# Patient Record
Sex: FEMALE | Race: WHITE | Employment: OTHER | URBAN - METROPOLITAN AREA
[De-identification: names, ages, dates, MRNs, and addresses within clinical notes are randomized per-mention and may not be internally consistent; named-entity substitution may affect disease eponyms.]

---

## 2017-01-04 ENCOUNTER — OFFICE VISIT (OUTPATIENT)
Dept: FAMILY MEDICINE CLINIC | Facility: CLINIC | Age: 72
End: 2017-01-04

## 2017-01-04 VITALS
RESPIRATION RATE: 16 BRPM | HEIGHT: 60 IN | WEIGHT: 134 LBS | SYSTOLIC BLOOD PRESSURE: 126 MMHG | BODY MASS INDEX: 26.31 KG/M2 | DIASTOLIC BLOOD PRESSURE: 88 MMHG | TEMPERATURE: 99 F | HEART RATE: 84 BPM

## 2017-01-04 DIAGNOSIS — L29.9 ITCHING: Primary | ICD-10-CM

## 2017-01-04 PROCEDURE — 99213 OFFICE O/P EST LOW 20 MIN: CPT | Performed by: FAMILY MEDICINE

## 2017-01-04 RX ORDER — HYDROXYZINE HYDROCHLORIDE 25 MG/1
TABLET, FILM COATED ORAL EVERY 8 HOURS PRN
Qty: 40 TABLET | Refills: 1 | Status: SHIPPED | OUTPATIENT
Start: 2017-01-04 | End: 2017-01-17

## 2017-01-04 RX ORDER — PREDNISONE 10 MG/1
10 TABLET ORAL DAILY
Qty: 30 TABLET | Refills: 0 | Status: SHIPPED | OUTPATIENT
Start: 2017-01-04 | End: 2017-04-25 | Stop reason: ALTCHOICE

## 2017-01-06 RX ORDER — FLUOXETINE 10 MG/1
TABLET, FILM COATED ORAL
Qty: 80 TABLET | Refills: 0 | OUTPATIENT
Start: 2017-01-06

## 2017-01-06 NOTE — PROGRESS NOTES
HPI:    Patient ID: Kaykay Aguila is a 66year old female. HPI Comments: She stopped meds for 1 week while on steroids, but then restarted the medications. So we can not tell if stopping the meds really helped her symptoms.   She did not follow up l Rfl: 1   mupirocin 2 % External Ointment Apply 1 Application topically 3 (three) times daily. Disp: 30 g Rfl: 1   aspirin 81 MG Oral Tab EC Take 1 tablet (81 mg total) by mouth daily.  Disp: 90 tablet Rfl: 3   Budesonide-Formoterol Fumarate (SYMBICORT) 160- heard.  Pulmonary/Chest: Effort normal and breath sounds normal. No respiratory distress. She has no wheezes. She has no rales. Skin: She is not diaphoretic.    Slight papular rash on back, chest, and arms, mostly it is just excoriated scabs from her scra

## 2017-01-12 RX ORDER — LEVOTHYROXINE SODIUM 0.05 MG/1
TABLET ORAL
Qty: 90 TABLET | Refills: 0 | OUTPATIENT
Start: 2017-01-12

## 2017-01-16 RX ORDER — BUDESONIDE AND FORMOTEROL FUMARATE DIHYDRATE 160; 4.5 UG/1; UG/1
AEROSOL RESPIRATORY (INHALATION)
Refills: 0 | OUTPATIENT
Start: 2017-01-16

## 2017-01-17 ENCOUNTER — OFFICE VISIT (OUTPATIENT)
Dept: FAMILY MEDICINE CLINIC | Facility: CLINIC | Age: 72
End: 2017-01-17

## 2017-01-17 VITALS
HEIGHT: 60 IN | SYSTOLIC BLOOD PRESSURE: 124 MMHG | TEMPERATURE: 99 F | OXYGEN SATURATION: 98 % | WEIGHT: 136 LBS | HEART RATE: 74 BPM | RESPIRATION RATE: 20 BRPM | DIASTOLIC BLOOD PRESSURE: 80 MMHG | BODY MASS INDEX: 26.7 KG/M2

## 2017-01-17 DIAGNOSIS — L73.9 FOLLICULITIS: ICD-10-CM

## 2017-01-17 DIAGNOSIS — L29.9 ITCHING: Primary | ICD-10-CM

## 2017-01-17 PROCEDURE — 99213 OFFICE O/P EST LOW 20 MIN: CPT | Performed by: FAMILY MEDICINE

## 2017-01-17 RX ORDER — LEVOTHYROXINE SODIUM 0.05 MG/1
50 TABLET ORAL
Qty: 90 TABLET | Refills: 1 | Status: SHIPPED | OUTPATIENT
Start: 2017-01-17 | End: 2017-06-12

## 2017-01-17 RX ORDER — ESCITALOPRAM OXALATE 20 MG/1
20 TABLET ORAL DAILY
Qty: 30 TABLET | Refills: 4 | Status: SHIPPED | OUTPATIENT
Start: 2017-01-17 | End: 2017-06-12

## 2017-01-17 RX ORDER — PREDNISONE 20 MG/1
TABLET ORAL
Qty: 33 TABLET | Refills: 0 | Status: SHIPPED | OUTPATIENT
Start: 2017-01-17 | End: 2017-02-14 | Stop reason: ALTCHOICE

## 2017-01-17 RX ORDER — HYDROXYZINE PAMOATE 50 MG/1
50 CAPSULE ORAL 3 TIMES DAILY PRN
Qty: 90 CAPSULE | Refills: 3 | Status: SHIPPED | OUTPATIENT
Start: 2017-01-17 | End: 2017-04-25

## 2017-01-17 NOTE — PROGRESS NOTES
HPI:    Patient ID: Federico Coronel is a 66year old female. Rash  This is a chronic problem. The current episode started more than 1 month ago. The problem is unchanged. Location: back, chest, and b/l arms.  The rash is characterized by itchiness and needed.  Disp: 30 capsule Rfl: 0   MONTELUKAST SODIUM 10 MG Oral Tab TAKE 1 TABLET(10 MG) BY MOUTH EVERY DAY Disp: 90 tablet Rfl: 0   Clobetasol Propionate 0.05 % External Ointment Apply to the affected of rash BID x 2 weeks then PRN flares Disp: 60 g Rfl: heal, and itching stops. 1. Itching  - predniSONE 20 MG Oral Tab; Daily x 3 weeks, then 1/2 tablet daily x 3 weeks  Dispense: 33 tablet; Refill: 0  - escitalopram 20 MG Oral Tab; Take 1 tablet (20 mg total) by mouth daily. Dispense: 30 tablet;  Refill:

## 2017-01-18 RX ORDER — PRAVASTATIN SODIUM 20 MG
TABLET ORAL
Qty: 90 TABLET | Refills: 0 | Status: SHIPPED | OUTPATIENT
Start: 2017-01-18 | End: 2017-04-25

## 2017-01-20 ENCOUNTER — TELEPHONE (OUTPATIENT)
Dept: FAMILY MEDICINE CLINIC | Facility: CLINIC | Age: 72
End: 2017-01-20

## 2017-01-20 NOTE — TELEPHONE ENCOUNTER
Message from [employer] via fax:  Requesting drug change for Hydroxyz HCL Tab 25mg             OV 1-17-17 YP with RX ordered.

## 2017-01-27 ENCOUNTER — TELEPHONE (OUTPATIENT)
Dept: FAMILY MEDICINE CLINIC | Facility: CLINIC | Age: 72
End: 2017-01-27

## 2017-02-01 RX ORDER — CLONAZEPAM 0.5 MG/1
TABLET ORAL
Qty: 60 TABLET | Refills: 0 | Status: SHIPPED | COMMUNITY
Start: 2017-02-01 | End: 2017-03-01

## 2017-02-01 RX ORDER — BUDESONIDE AND FORMOTEROL FUMARATE DIHYDRATE 160; 4.5 UG/1; UG/1
AEROSOL RESPIRATORY (INHALATION)
Qty: 1 INHALER | Refills: 0 | Status: SHIPPED | OUTPATIENT
Start: 2017-02-01 | End: 2017-03-01

## 2017-02-14 ENCOUNTER — OFFICE VISIT (OUTPATIENT)
Dept: FAMILY MEDICINE CLINIC | Facility: CLINIC | Age: 72
End: 2017-02-14

## 2017-02-14 VITALS
RESPIRATION RATE: 20 BRPM | OXYGEN SATURATION: 98 % | BODY MASS INDEX: 27.09 KG/M2 | HEART RATE: 71 BPM | DIASTOLIC BLOOD PRESSURE: 80 MMHG | WEIGHT: 138 LBS | HEIGHT: 60 IN | SYSTOLIC BLOOD PRESSURE: 138 MMHG | TEMPERATURE: 99 F

## 2017-02-14 DIAGNOSIS — L30.9 DERMATITIS, UNSPECIFIED: Primary | ICD-10-CM

## 2017-02-14 PROCEDURE — 99213 OFFICE O/P EST LOW 20 MIN: CPT | Performed by: FAMILY MEDICINE

## 2017-02-14 NOTE — PROGRESS NOTES
HPI:    Patient ID: Yoselyn Lay is a 66year old female. Itching  This is a chronic problem. The current episode started more than 1 month ago. The problem occurs daily. The problem has been unchanged.  Associated symptoms comments: Itching causing Rfl: 3   benzonatate 200 MG Oral Cap Take 1 capsule (200 mg total) by mouth every 8 (eight) hours as needed.  Disp: 30 capsule Rfl: 0   MONTELUKAST SODIUM 10 MG Oral Tab TAKE 1 TABLET(10 MG) BY MOUTH EVERY DAY Disp: 90 tablet Rfl: 0   Clobetasol Propionate 20mg or extend 10mg as pt needs. Will eventually work on stopping the prednisone. No orders of the defined types were placed in this encounter.        Meds This Visit:  No prescriptions requested or ordered in this encounter    Imaging & Referrals:  N

## 2017-02-16 RX ORDER — PANTOPRAZOLE SODIUM 40 MG/1
TABLET, DELAYED RELEASE ORAL
Qty: 90 TABLET | Refills: 0 | OUTPATIENT
Start: 2017-02-16

## 2017-02-16 RX ORDER — MONTELUKAST SODIUM 10 MG/1
TABLET ORAL
Qty: 90 TABLET | Refills: 0 | OUTPATIENT
Start: 2017-02-16

## 2017-02-22 NOTE — TELEPHONE ENCOUNTER
[employer], spoke with Ronaldo Wilkins, she states this medication went through insurance in January, no PA required. Task completed.

## 2017-03-01 RX ORDER — BUDESONIDE AND FORMOTEROL FUMARATE DIHYDRATE 160; 4.5 UG/1; UG/1
AEROSOL RESPIRATORY (INHALATION)
Qty: 1 INHALER | Refills: 0 | Status: SHIPPED | OUTPATIENT
Start: 2017-03-01 | End: 2017-05-01

## 2017-03-03 RX ORDER — CLONAZEPAM 0.5 MG/1
TABLET ORAL
Qty: 60 TABLET | Refills: 0 | Status: SHIPPED | OUTPATIENT
Start: 2017-03-03 | End: 2017-04-03

## 2017-03-13 ENCOUNTER — TELEPHONE (OUTPATIENT)
Dept: FAMILY MEDICINE CLINIC | Facility: CLINIC | Age: 72
End: 2017-03-13

## 2017-03-13 RX ORDER — PREDNISONE 10 MG/1
TABLET ORAL
Qty: 26 TABLET | Refills: 0 | Status: SHIPPED | OUTPATIENT
Start: 2017-03-13 | End: 2017-04-25 | Stop reason: ALTCHOICE

## 2017-03-13 NOTE — TELEPHONE ENCOUNTER
Pt seen 2/14/17 \"1. Dermatitis, unspecified  Continue current treatment.  Follow up in a month. Increase prednisone back to 20mg or extend 10mg as pt needs. Will eventually work on stopping the prednisone\"    Okay for pt to increase prednisone to 20mg.

## 2017-04-04 RX ORDER — CLONAZEPAM 0.5 MG/1
TABLET ORAL
Qty: 40 TABLET | Refills: 0 | Status: SHIPPED | OUTPATIENT
Start: 2017-04-04 | End: 2017-04-25

## 2017-04-06 ENCOUNTER — TELEPHONE (OUTPATIENT)
Dept: FAMILY MEDICINE CLINIC | Facility: CLINIC | Age: 72
End: 2017-04-06

## 2017-04-07 NOTE — TELEPHONE ENCOUNTER
LMOM for pt with YP's input and advised pt to specific quantity requested at next refill. Requested a return call with questions or to further discuss. Task completed.

## 2017-04-25 ENCOUNTER — OFFICE VISIT (OUTPATIENT)
Dept: FAMILY MEDICINE CLINIC | Facility: CLINIC | Age: 72
End: 2017-04-25

## 2017-04-25 VITALS
HEIGHT: 60 IN | RESPIRATION RATE: 20 BRPM | WEIGHT: 137 LBS | BODY MASS INDEX: 26.9 KG/M2 | HEART RATE: 67 BPM | OXYGEN SATURATION: 97 % | SYSTOLIC BLOOD PRESSURE: 122 MMHG | TEMPERATURE: 98 F | DIASTOLIC BLOOD PRESSURE: 70 MMHG

## 2017-04-25 DIAGNOSIS — R10.13 EPIGASTRIC ABDOMINAL PAIN: ICD-10-CM

## 2017-04-25 DIAGNOSIS — F41.9 ANXIETY: ICD-10-CM

## 2017-04-25 DIAGNOSIS — S49.91XA RIGHT SHOULDER INJURY, INITIAL ENCOUNTER: ICD-10-CM

## 2017-04-25 DIAGNOSIS — Z80.0 FAMILY HISTORY OF COLON CANCER: ICD-10-CM

## 2017-04-25 DIAGNOSIS — J42 CHRONIC BRONCHITIS, UNSPECIFIED CHRONIC BRONCHITIS TYPE (HCC): Primary | ICD-10-CM

## 2017-04-25 DIAGNOSIS — L29.9 ITCHING: ICD-10-CM

## 2017-04-25 DIAGNOSIS — Z86.010 HX OF COLONIC POLYP: ICD-10-CM

## 2017-04-25 DIAGNOSIS — F33.41 RECURRENT MAJOR DEPRESSIVE DISORDER, IN PARTIAL REMISSION (HCC): ICD-10-CM

## 2017-04-25 PROCEDURE — 99214 OFFICE O/P EST MOD 30 MIN: CPT | Performed by: FAMILY MEDICINE

## 2017-04-25 RX ORDER — HYDROXYZINE PAMOATE 50 MG/1
50 CAPSULE ORAL 3 TIMES DAILY PRN
Qty: 90 CAPSULE | Refills: 0 | Status: SHIPPED | OUTPATIENT
Start: 2017-04-25 | End: 2017-06-12

## 2017-04-25 RX ORDER — CLONAZEPAM 0.5 MG/1
0.5 TABLET ORAL 2 TIMES DAILY
Qty: 60 TABLET | Refills: 0 | Status: SHIPPED | OUTPATIENT
Start: 2017-04-25 | End: 2017-06-01

## 2017-04-25 RX ORDER — PANTOPRAZOLE SODIUM 40 MG/1
40 TABLET, DELAYED RELEASE ORAL
Qty: 60 TABLET | Refills: 1 | Status: SHIPPED | OUTPATIENT
Start: 2017-04-25 | End: 2017-04-25

## 2017-04-25 RX ORDER — PRAVASTATIN SODIUM 20 MG
TABLET ORAL
Qty: 90 TABLET | Refills: 2 | Status: SHIPPED | OUTPATIENT
Start: 2017-04-25 | End: 2017-12-03

## 2017-04-25 RX ORDER — PANTOPRAZOLE SODIUM 40 MG/1
TABLET, DELAYED RELEASE ORAL
Qty: 180 TABLET | Refills: 0 | Status: SHIPPED | OUTPATIENT
Start: 2017-04-25 | End: 2017-06-28

## 2017-04-25 RX ORDER — ESCITALOPRAM OXALATE 20 MG/1
20 TABLET ORAL DAILY
Qty: 90 TABLET | Refills: 2 | Status: SHIPPED | OUTPATIENT
Start: 2017-04-25 | End: 2017-06-12

## 2017-04-25 NOTE — PROGRESS NOTES
HPI:    Patient ID: Lanny Field is a 66year old female. HPI Comments: + depression. Waxing and waning. Most of time feels controlled on escitalopram.  At times anhedonia, hopelessness, feeling sad, crying, and change in appetite.   Denied suicid aggravated by activity. She has tried NSAIDS, rest, movement, cold and heat for the symptoms. The treatment provided mild relief. Review of Systems   Musculoskeletal: Positive for stiffness. Neurological: Negative for tingling and numbness.    All o mg by mouth as needed for Pain.  Disp:  Rfl:    Levothyroxine Sodium (SYNTHROID) 50 MCG Oral Tab TAKE 1 TABLET BY MOUTH DAILY Disp: 30 tablet Rfl: 0   PROAIR  (90 BASE) MCG/ACT Inhalation Aero Soln INHALE 2 PUFFS BY MOUTH EVERY 6 HOURS AS NEEDED FOR adenopathy. Skin: She is not diaphoretic. Diffuse excoriations. Scabbing. They do not appear to be infected. Psychiatric: Her behavior is normal. Judgment and thought content normal.   Mild anxiety and depression.   Does not seem suicidal   Vitals r TABLET BY MOUTH EVERY NIGHT AT BEDTIME      HydrOXYzine Pamoate 50 MG Oral Cap 90 capsule 0      Sig: Take 1 capsule (50 mg total) by mouth 3 (three) times daily as needed for Itching.       Pantoprazole Sodium 40 MG Oral Tab EC 60 tablet 1      Sig: Take 1

## 2017-05-02 RX ORDER — BUDESONIDE AND FORMOTEROL FUMARATE DIHYDRATE 160; 4.5 UG/1; UG/1
AEROSOL RESPIRATORY (INHALATION)
Qty: 1 INHALER | Refills: 0 | Status: SHIPPED | OUTPATIENT
Start: 2017-05-02 | End: 2017-05-31

## 2017-05-16 PROCEDURE — 88175 CYTOPATH C/V AUTO FLUID REDO: CPT | Performed by: FAMILY MEDICINE

## 2017-05-16 NOTE — PROGRESS NOTES
HPI:   Yoseph Nguyen is a 66year old female who presents for a MA (Medicare Advantage) [facility] (Once per calendar year). Chronic depression. Waxing and waning. At times has hopelessness, anhedonia, crying.   Decreased appetite and decreased s MCG/ACT Inhalation Aerosol INHALE 2 PUFFS BY MOUTH TWICE DAILY   TRIAMCINOLONE ACETONIDE 0.1 % External Ointment APPLY TO THE AFFECTED AREA TWICE DAILY   escitalopram 20 MG Oral Tab Take 1 tablet (20 mg total) by mouth daily.    ClonazePAM 0.5 MG Oral Tab T impairment; and Esophageal reflux.     She  has past surgical history that includes colonoscopy; cystotomy,excis vesical neck (Left); correct bunion,simple (Bilateral); colonoscopy & polypectomy (9/14); gastro - dmg (9/14); upper gi endoscopy,diagnosis (N/A Acuity: Corrected Right Eye Chart Acuity: 20/50   Left Eye Visual Acuity: Corrected Left Eye Chart Acuity: 20/50   Both Eyes Visual Acuity: Corrected Both Eyes Chart Acuity: 20/50   Able To Tolerate Visual Acuity: Yes      General Appearance:  Alert, coope major depressive disorder, in partial remission ([facility] [address]  - continue escitalopram. See therapist.  Follow up in 3 months. Earlier if wrose.     Encounter for annual health examination  -     Wellness Visit/ CPX, ages 72 and over, Established    Mixed simple a appetite been poor?: No    How does the patient maintain a good energy level?: Daily Walks    How would you describe your daily physical activity?: Light    How would you describe your current health state?: Fair    How do you maintain positive mental well Please go to \"Cognitive Assessment\" under Medicare Assessment section in Charting, test patient and document. Then, refresh your progress note to see your input here.   Cognitive Assessment     What day of the week is this?: Correct    What month i Chlamydia  Annually if high risk No results found for: CHLAMYDIA No flowsheet data found.     Screening Mammogram      Mammogram Annually to 76, then as discussed Mammogram,1 Yr due on 01/11/2017 Update Health Maintenance if applicable     Immunizations Serum Conc  Annually No results found for: DIGOXIN, DIG, VALP No flowsheet data found. Diabetes      HgbA1C  Annually HGBA1C (%)   Date Value   02/28/2013 5.6    No flowsheet data found.     Creat/alb ratio  Annually      LDL  Annually LDL CHOLESTEROL (m

## 2017-05-16 NOTE — PATIENT INSTRUCTIONS
Over the counter vit and calcium daily. Vit D 1000 units and calcium 400mg to 800mg. Bibi Diaz's SCREENING SCHEDULE   Tests on this list are recommended by your physician but may not be covered, or covered at this frequency, by your insurer.  Pl patients who meet one of the following criteria:   • Men who are 73-68 years old and have smoked more than 100 cigarettes in their lifetime   • Anyone with a family history    Colorectal Cancer Screening  Covered up to Age 76     Colonoscopy Screen   Cover Influenza  Covered Annually   Orders placed or performed in visit on 10/17/16  -FLU VAC NO PRSV 4 MISA 3 YRS+   Orders placed or performed in visit on 11/02/15  -FLU VAC NO PRSV 4 MISA 3 YRS+    Please get every year    Pneumococcal 13 (Prevnar)  [employer] regarding Advance Directives.

## 2017-05-17 ENCOUNTER — LAB ENCOUNTER (OUTPATIENT)
Dept: LAB | Age: 72
End: 2017-05-17
Attending: FAMILY MEDICINE
Payer: MEDICARE

## 2017-05-17 DIAGNOSIS — J41.8 MIXED SIMPLE AND MUCOPURULENT CHRONIC BRONCHITIS (HCC): ICD-10-CM

## 2017-05-17 DIAGNOSIS — E03.9 HYPOTHYROIDISM, UNSPECIFIED TYPE: ICD-10-CM

## 2017-05-17 DIAGNOSIS — N18.30 CKD (CHRONIC KIDNEY DISEASE) STAGE 3, GFR 30-59 ML/MIN (HCC): ICD-10-CM

## 2017-05-17 DIAGNOSIS — E78.5 HYPERLIPIDEMIA, UNSPECIFIED HYPERLIPIDEMIA TYPE: ICD-10-CM

## 2017-05-17 PROCEDURE — 80053 COMPREHEN METABOLIC PANEL: CPT | Performed by: FAMILY MEDICINE

## 2017-05-17 PROCEDURE — 85025 COMPLETE CBC W/AUTO DIFF WBC: CPT | Performed by: FAMILY MEDICINE

## 2017-05-17 PROCEDURE — 36415 COLL VENOUS BLD VENIPUNCTURE: CPT | Performed by: FAMILY MEDICINE

## 2017-05-17 PROCEDURE — 80061 LIPID PANEL: CPT | Performed by: FAMILY MEDICINE

## 2017-05-17 PROCEDURE — 84443 ASSAY THYROID STIM HORMONE: CPT | Performed by: FAMILY MEDICINE

## 2017-05-18 ENCOUNTER — TELEPHONE (OUTPATIENT)
Dept: FAMILY MEDICINE CLINIC | Facility: CLINIC | Age: 72
End: 2017-05-18

## 2017-05-18 DIAGNOSIS — E78.2 ELEVATED CHOLESTEROL WITH ELEVATED TRIGLYCERIDES: ICD-10-CM

## 2017-05-18 DIAGNOSIS — E87.6 LOW SERUM POTASSIUM LEVEL: Primary | ICD-10-CM

## 2017-05-18 RX ORDER — POTASSIUM CHLORIDE 20 MEQ/1
TABLET, EXTENDED RELEASE ORAL
Qty: 32 TABLET | Refills: 0 | OUTPATIENT
Start: 2017-05-18

## 2017-05-18 RX ORDER — POTASSIUM CHLORIDE 20 MEQ/1
TABLET, EXTENDED RELEASE ORAL
Qty: 30 TABLET | Refills: 0 | Status: SHIPPED | OUTPATIENT
Start: 2017-05-18 | End: 2017-08-07

## 2017-05-18 NOTE — TELEPHONE ENCOUNTER
----- Message from Carlos May DO sent at 5/18/2017 11:30 AM CDT -----  Also send KCL 20 mEq daily x 5 days, then twice weekly x 12 weeks.

## 2017-06-02 RX ORDER — BUDESONIDE AND FORMOTEROL FUMARATE DIHYDRATE 160; 4.5 UG/1; UG/1
AEROSOL RESPIRATORY (INHALATION)
Qty: 3 INHALER | Refills: 3 | Status: SHIPPED | OUTPATIENT
Start: 2017-06-02 | End: 2017-06-12

## 2017-06-12 ENCOUNTER — OFFICE VISIT (OUTPATIENT)
Dept: FAMILY MEDICINE CLINIC | Facility: CLINIC | Age: 72
End: 2017-06-12

## 2017-06-12 VITALS
HEIGHT: 60 IN | SYSTOLIC BLOOD PRESSURE: 138 MMHG | RESPIRATION RATE: 16 BRPM | TEMPERATURE: 98 F | WEIGHT: 138 LBS | HEART RATE: 70 BPM | BODY MASS INDEX: 27.09 KG/M2 | OXYGEN SATURATION: 97 % | DIASTOLIC BLOOD PRESSURE: 76 MMHG

## 2017-06-12 DIAGNOSIS — J44.0 COPD WITH ACUTE BRONCHITIS (HCC): Primary | ICD-10-CM

## 2017-06-12 DIAGNOSIS — J20.9 COPD WITH ACUTE BRONCHITIS (HCC): Primary | ICD-10-CM

## 2017-06-12 PROCEDURE — 99213 OFFICE O/P EST LOW 20 MIN: CPT | Performed by: INTERNAL MEDICINE

## 2017-06-12 RX ORDER — ESCITALOPRAM OXALATE 20 MG/1
TABLET ORAL
Qty: 90 TABLET | Refills: 1 | Status: SHIPPED | OUTPATIENT
Start: 2017-06-12 | End: 2017-12-15

## 2017-06-12 RX ORDER — PREDNISONE 10 MG/1
TABLET ORAL
Qty: 12 TABLET | Refills: 0 | Status: SHIPPED | OUTPATIENT
Start: 2017-06-12 | End: 2017-12-03

## 2017-06-12 RX ORDER — ALBUTEROL SULFATE 90 UG/1
AEROSOL, METERED RESPIRATORY (INHALATION)
Qty: 8.5 G | Refills: 1 | Status: SHIPPED | OUTPATIENT
Start: 2017-06-12 | End: 2017-07-31

## 2017-06-12 RX ORDER — CEFDINIR 300 MG/1
300 CAPSULE ORAL 2 TIMES DAILY
Qty: 20 CAPSULE | Refills: 0 | Status: SHIPPED | OUTPATIENT
Start: 2017-06-12 | End: 2017-12-03

## 2017-06-12 NOTE — PROGRESS NOTES
HPI:   Elton Gallagher is a 66year old female who presents for cough and congestion for 1 week. Patient reports congestion, cough with clear colored sputum, cough is keeping pt up at night, wheezing. Has COPD with history of bronchitis.  Reports the sa tablet Rfl: 0      Past Medical History   Diagnosis Date   • COPD (chronic obstructive pulmonary disease) (HCC)    • Emphysema    • Hay fever    • Chronic rhinitis    • PONV (postoperative nausea and vomiting)    • Hypercholesterolemia    • Reflux    • Anx and verbalized understanding of care.

## 2017-06-28 DIAGNOSIS — R10.13 EPIGASTRIC ABDOMINAL PAIN: ICD-10-CM

## 2017-06-28 RX ORDER — PANTOPRAZOLE SODIUM 40 MG/1
TABLET, DELAYED RELEASE ORAL
Qty: 60 TABLET | Refills: 0 | Status: SHIPPED | OUTPATIENT
Start: 2017-06-28 | End: 2017-12-03

## 2017-06-28 RX ORDER — PANTOPRAZOLE SODIUM 40 MG/1
TABLET, DELAYED RELEASE ORAL
Qty: 180 TABLET | Refills: 0 | OUTPATIENT
Start: 2017-06-28

## 2017-07-03 DIAGNOSIS — F41.9 ANXIETY: ICD-10-CM

## 2017-07-05 RX ORDER — CLONAZEPAM 0.5 MG/1
TABLET ORAL
Qty: 60 TABLET | Refills: 0 | Status: SHIPPED | OUTPATIENT
Start: 2017-07-05 | End: 2017-08-07

## 2017-07-11 DIAGNOSIS — L29.9 ITCHING: ICD-10-CM

## 2017-07-11 RX ORDER — LEVOTHYROXINE SODIUM 0.05 MG/1
TABLET ORAL
Qty: 90 TABLET | Refills: 0 | Status: SHIPPED | OUTPATIENT
Start: 2017-07-11 | End: 2017-10-19

## 2017-08-07 DIAGNOSIS — F41.9 ANXIETY: ICD-10-CM

## 2017-08-07 RX ORDER — CLONAZEPAM 0.5 MG/1
TABLET ORAL
Qty: 60 TABLET | Refills: 0 | Status: SHIPPED | OUTPATIENT
Start: 2017-08-07 | End: 2017-09-08

## 2017-08-07 RX ORDER — POTASSIUM CHLORIDE 20 MEQ/1
TABLET, EXTENDED RELEASE ORAL
Qty: 30 TABLET | Refills: 0 | Status: SHIPPED | OUTPATIENT
Start: 2017-08-07 | End: 2017-10-30

## 2017-08-07 NOTE — TELEPHONE ENCOUNTER
Last ov was 6/12/17  Last refill potassium chloride ER 20 MEQ 30 pills 5/8/17    .   Potassium:    Lab Results  Component Value Date   K 3.5 (L) 05/17/2017   K 3.9 12/06/2016   K 4.0 11/28/2016

## 2017-09-08 DIAGNOSIS — F41.9 ANXIETY: ICD-10-CM

## 2017-09-08 RX ORDER — CLONAZEPAM 0.5 MG/1
0.5 TABLET ORAL 2 TIMES DAILY
Qty: 60 TABLET | Refills: 0 | Status: SHIPPED | COMMUNITY
Start: 2017-09-08 | End: 2017-10-06

## 2017-09-08 NOTE — TELEPHONE ENCOUNTER
Pt called stated her pharmacy sent us a request for her clonazepam medication about 3 days ago, pt called the pharmacy and was told he have not responded nor approve her refill, pt is now out of medication. Pt is asking for a refill.  Please call and advise

## 2017-10-06 DIAGNOSIS — F41.9 ANXIETY: ICD-10-CM

## 2017-10-09 RX ORDER — CLONAZEPAM 0.5 MG/1
TABLET ORAL
Qty: 60 TABLET | Refills: 0 | Status: SHIPPED | OUTPATIENT
Start: 2017-10-09 | End: 2017-11-07

## 2017-10-19 DIAGNOSIS — L29.9 ITCHING: ICD-10-CM

## 2017-10-19 RX ORDER — LEVOTHYROXINE SODIUM 0.05 MG/1
TABLET ORAL
Qty: 90 TABLET | Refills: 1 | Status: SHIPPED | OUTPATIENT
Start: 2017-10-19 | End: 2017-12-03

## 2017-10-30 NOTE — TELEPHONE ENCOUNTER
Last ov was 6/12/17  Last refill potassium 8/7/17    .   Potassium:    Lab Results  Component Value Date   K 3.5 (L) 05/17/2017   K 3.9 12/06/2016   K 4.0 11/28/2016

## 2017-10-31 RX ORDER — POTASSIUM CHLORIDE 20 MEQ/1
TABLET, EXTENDED RELEASE ORAL
Qty: 30 TABLET | Refills: 0 | Status: SHIPPED | OUTPATIENT
Start: 2017-10-31 | End: 2017-12-03

## 2017-11-07 DIAGNOSIS — F41.9 ANXIETY: ICD-10-CM

## 2017-11-10 ENCOUNTER — TELEPHONE (OUTPATIENT)
Dept: FAMILY MEDICINE CLINIC | Facility: CLINIC | Age: 72
End: 2017-11-10

## 2017-11-10 NOTE — TELEPHONE ENCOUNTER
Patient calling crying. She only has one pill left. She said she will end up in the hospital if she doesn't get the refill.

## 2017-11-11 RX ORDER — CLONAZEPAM 0.5 MG/1
TABLET ORAL
Qty: 60 TABLET | Refills: 0 | Status: SHIPPED | OUTPATIENT
Start: 2017-11-11 | End: 2017-12-07

## 2017-12-03 ENCOUNTER — APPOINTMENT (OUTPATIENT)
Dept: GENERAL RADIOLOGY | Facility: HOSPITAL | Age: 72
End: 2017-12-03
Payer: MEDICARE

## 2017-12-03 ENCOUNTER — HOSPITAL ENCOUNTER (EMERGENCY)
Facility: HOSPITAL | Age: 72
Discharge: HOME | End: 2017-12-03
Payer: MEDICARE

## 2017-12-03 VITALS
TEMPERATURE: 99 F | HEIGHT: 60 IN | OXYGEN SATURATION: 98 % | RESPIRATION RATE: 20 BRPM | SYSTOLIC BLOOD PRESSURE: 132 MMHG | WEIGHT: 140 LBS | DIASTOLIC BLOOD PRESSURE: 84 MMHG | BODY MASS INDEX: 27.48 KG/M2 | HEART RATE: 80 BPM

## 2017-12-03 DIAGNOSIS — L30.9 DERMATITIS: Primary | ICD-10-CM

## 2017-12-03 DIAGNOSIS — J44.1 COPD EXACERBATION (HCC): ICD-10-CM

## 2017-12-03 DIAGNOSIS — F41.9 ANXIETY: ICD-10-CM

## 2017-12-03 PROCEDURE — 99283 EMERGENCY DEPT VISIT LOW MDM: CPT

## 2017-12-03 PROCEDURE — 71020 XR CHEST PA + LAT CHEST (CPT=71020): CPT

## 2017-12-03 RX ORDER — HYDROXYZINE HYDROCHLORIDE 25 MG/1
TABLET, FILM COATED ORAL EVERY 4 HOURS PRN
Qty: 20 TABLET | Refills: 0 | Status: SHIPPED | OUTPATIENT
Start: 2017-12-03 | End: 2017-12-07 | Stop reason: ALTCHOICE

## 2017-12-03 RX ORDER — AZITHROMYCIN 250 MG/1
TABLET, FILM COATED ORAL
Qty: 1 PACKAGE | Refills: 0 | Status: SHIPPED | OUTPATIENT
Start: 2017-12-03 | End: 2017-12-07 | Stop reason: ALTCHOICE

## 2017-12-03 RX ORDER — PREDNISONE 20 MG/1
40 TABLET ORAL DAILY
Qty: 10 TABLET | Refills: 0 | Status: SHIPPED | OUTPATIENT
Start: 2017-12-03 | End: 2017-12-08

## 2017-12-03 RX ORDER — CLONAZEPAM 0.5 MG/1
0.5 TABLET ORAL 3 TIMES DAILY PRN
Qty: 20 TABLET | Refills: 0 | Status: SHIPPED | OUTPATIENT
Start: 2017-12-03 | End: 2017-12-15

## 2017-12-03 NOTE — ED PROVIDER NOTES
Patient Seen in: [facility] Emergency Department    History   Patient presents with:  Dyspnea ROSS SOB (respiratory)    Stated Complaint: cough, rash, hx of emphysema    HPI    Presents with a rash and a cough.   The patient has been diagnosed with de EXPERIENCED ANY* N/A      Comment: Procedure: ESOPHAGOGASTRODUODENOSCOPY,                COLONOSCOPY, POSSIBLE BIOPSY, POSSIBLE                POLYPECTOMY 75687,90089;  Surgeon: Madison Maldonado MD;  Location: [facility]  9/22/2014: P equal round and reactive to light, oropharynx clear, uvula midline. Neck: Supple. Cardiovascular: Regular rate and rhythm, no murmurs. Respiratory: Lungs clear to auscultation. Abdomen: Soft, nontender. Extremities: No CCE.   Skin: Excoriations to trun antibiotics and I agreed to give her a Z-Mehul. If the breathing worsens, she should return to the emergency department.     Disposition and Plan     Clinical Impression:  Dermatitis  (primary encounter diagnosis)  Anxiety  COPD exacerbation ([facility]    Alpa Scott

## 2017-12-03 NOTE — ED NOTES
Round on pt. XR completed. Pt to ED with c/o ROSS x 1 week. Pt sts she came to the ER for the dermatitis that she has had for a couple months. Scattered scabs noted to body. Pt c/o itching. Some bleeding noted. Pt denies any change to meds. Afebrile.  No dis

## 2017-12-03 NOTE — ED INITIAL ASSESSMENT (HPI)
Verbalized concern that she may have bronchitis again. Productive clear cough x 1 week. Chills. Unknown if fevers. Patient also said she was being treated for dermatitis for a few months, but now thinks she is having a flare-up.  Patient said she feels Wm Chi

## 2017-12-03 NOTE — ED NOTES
DC instructions and RXs handed to pt. No distress noted. Pt denies any needs at this time. Pt thanks staff for care. Denies need for [facility] out of ED.

## 2017-12-07 ENCOUNTER — OFFICE VISIT (OUTPATIENT)
Dept: FAMILY MEDICINE CLINIC | Facility: CLINIC | Age: 72
End: 2017-12-07

## 2017-12-07 ENCOUNTER — TELEPHONE (OUTPATIENT)
Dept: FAMILY MEDICINE CLINIC | Facility: CLINIC | Age: 72
End: 2017-12-07

## 2017-12-07 VITALS
HEIGHT: 60 IN | BODY MASS INDEX: 25.52 KG/M2 | RESPIRATION RATE: 16 BRPM | OXYGEN SATURATION: 97 % | DIASTOLIC BLOOD PRESSURE: 82 MMHG | HEART RATE: 69 BPM | TEMPERATURE: 98 F | WEIGHT: 130 LBS | SYSTOLIC BLOOD PRESSURE: 132 MMHG

## 2017-12-07 DIAGNOSIS — E78.5 HYPERLIPIDEMIA, UNSPECIFIED HYPERLIPIDEMIA TYPE: ICD-10-CM

## 2017-12-07 DIAGNOSIS — R05.3 CHRONIC COUGH: ICD-10-CM

## 2017-12-07 DIAGNOSIS — Z12.39 BREAST CANCER SCREENING: ICD-10-CM

## 2017-12-07 DIAGNOSIS — T14.8XXA EXCORIATION: ICD-10-CM

## 2017-12-07 DIAGNOSIS — K21.9 GASTROESOPHAGEAL REFLUX DISEASE WITHOUT ESOPHAGITIS: ICD-10-CM

## 2017-12-07 DIAGNOSIS — L29.9 ITCHING: Primary | ICD-10-CM

## 2017-12-07 PROCEDURE — 99214 OFFICE O/P EST MOD 30 MIN: CPT | Performed by: FAMILY MEDICINE

## 2017-12-07 RX ORDER — PANTOPRAZOLE SODIUM 40 MG/1
40 TABLET, DELAYED RELEASE ORAL
Qty: 180 TABLET | Refills: 1 | Status: ON HOLD | OUTPATIENT
Start: 2017-12-07 | End: 2018-03-02

## 2017-12-07 RX ORDER — GABAPENTIN 300 MG/1
CAPSULE ORAL
Qty: 90 CAPSULE | Refills: 1 | Status: SHIPPED | OUTPATIENT
Start: 2017-12-07 | End: 2017-12-15

## 2017-12-07 RX ORDER — CEFDINIR 300 MG/1
300 CAPSULE ORAL 2 TIMES DAILY
COMMUNITY
End: 2017-12-15

## 2017-12-07 RX ORDER — PREDNISONE 10 MG/1
TABLET ORAL
Qty: 70 TABLET | Refills: 1 | Status: ON HOLD | OUTPATIENT
Start: 2017-12-07 | End: 2018-03-02

## 2017-12-07 RX ORDER — BUDESONIDE AND FORMOTEROL FUMARATE DIHYDRATE 160; 4.5 UG/1; UG/1
2 AEROSOL RESPIRATORY (INHALATION) 2 TIMES DAILY
COMMUNITY
End: 2018-10-22 | Stop reason: ALTCHOICE

## 2017-12-07 RX ORDER — PRAVASTATIN SODIUM 20 MG
20 TABLET ORAL NIGHTLY
Qty: 90 TABLET | Refills: 3 | Status: SHIPPED | OUTPATIENT
Start: 2017-12-07 | End: 2017-12-15

## 2017-12-07 NOTE — PROGRESS NOTES
HPI:    Patient ID: Farideh Garcia is a 67year old female. Itching   This is a chronic problem. The current episode started more than 1 month ago. The problem occurs constantly. The problem has been unchanged.  Associated symptoms include chills and ago. The problem occurs constantly. The problem has been waxing and waning. The symptoms are aggravated by certain foods. Risk factors include smoking/tobacco exposure. She has tried a PPI for the symptoms. The treatment provided moderate relief.        Rev Disp: 30 tablet Rfl: 0     Allergies: Allergy                     Comment:\"Animals\": runny nose, watery eyes, itching  Amoxicillin                 Comment:Vomiting.   Avelox [Moxifloxaci*    Swelling  Avelox [Moxifloxaci*    Tongue Swelling    Comment:\" Refill: 1  - predniSONE 10 MG Oral Tab; 5 tab day 1, 4 tab day 2, 3 tab day 3-5, 2 tab day 6-8, 1 tab day 9-11, 1/2 tab daily after that  Dispense: 70 tablet;  Refill: 1  - gabapentin 300 MG Oral Cap; 1 tab day 1, 1 tab twice daily day 2, then 1 tab 3 times times daily           Imaging & Referrals:  [facility] WADE 2D+3D SCREENING BILAT (CPT=77067/09379)       ZS#[id number]

## 2017-12-12 ENCOUNTER — LABORATORY ENCOUNTER (OUTPATIENT)
Dept: LAB | Age: 72
End: 2017-12-12
Attending: FAMILY MEDICINE
Payer: MEDICARE

## 2017-12-12 DIAGNOSIS — E78.5 HYPERLIPIDEMIA, UNSPECIFIED HYPERLIPIDEMIA TYPE: ICD-10-CM

## 2017-12-12 DIAGNOSIS — E87.6 LOW SERUM POTASSIUM LEVEL: ICD-10-CM

## 2017-12-12 DIAGNOSIS — L29.9 ITCHING: ICD-10-CM

## 2017-12-12 DIAGNOSIS — E78.2 ELEVATED CHOLESTEROL WITH ELEVATED TRIGLYCERIDES: ICD-10-CM

## 2017-12-12 PROCEDURE — 84443 ASSAY THYROID STIM HORMONE: CPT | Performed by: FAMILY MEDICINE

## 2017-12-12 PROCEDURE — 36415 COLL VENOUS BLD VENIPUNCTURE: CPT | Performed by: FAMILY MEDICINE

## 2017-12-12 PROCEDURE — 80053 COMPREHEN METABOLIC PANEL: CPT | Performed by: FAMILY MEDICINE

## 2017-12-12 PROCEDURE — 80061 LIPID PANEL: CPT | Performed by: FAMILY MEDICINE

## 2017-12-12 PROCEDURE — 85025 COMPLETE CBC W/AUTO DIFF WBC: CPT | Performed by: FAMILY MEDICINE

## 2017-12-15 ENCOUNTER — OFFICE VISIT (OUTPATIENT)
Dept: FAMILY MEDICINE CLINIC | Facility: CLINIC | Age: 72
End: 2017-12-15

## 2017-12-15 VITALS
TEMPERATURE: 98 F | BODY MASS INDEX: 25.52 KG/M2 | DIASTOLIC BLOOD PRESSURE: 86 MMHG | HEIGHT: 60 IN | RESPIRATION RATE: 20 BRPM | OXYGEN SATURATION: 96 % | WEIGHT: 130 LBS | HEART RATE: 73 BPM | SYSTOLIC BLOOD PRESSURE: 136 MMHG

## 2017-12-15 DIAGNOSIS — K21.9 GASTROESOPHAGEAL REFLUX DISEASE WITHOUT ESOPHAGITIS: ICD-10-CM

## 2017-12-15 DIAGNOSIS — L29.9 ITCHING: Primary | ICD-10-CM

## 2017-12-15 DIAGNOSIS — F32.A ANXIETY AND DEPRESSION: ICD-10-CM

## 2017-12-15 DIAGNOSIS — J45.20 MILD INTERMITTENT ASTHMA WITHOUT COMPLICATION: ICD-10-CM

## 2017-12-15 DIAGNOSIS — Z12.11 COLON CANCER SCREENING: ICD-10-CM

## 2017-12-15 DIAGNOSIS — E78.5 HYPERLIPIDEMIA, UNSPECIFIED HYPERLIPIDEMIA TYPE: ICD-10-CM

## 2017-12-15 DIAGNOSIS — F41.9 ANXIETY AND DEPRESSION: ICD-10-CM

## 2017-12-15 PROCEDURE — 96160 PT-FOCUSED HLTH RISK ASSMT: CPT | Performed by: FAMILY MEDICINE

## 2017-12-15 PROCEDURE — 99214 OFFICE O/P EST MOD 30 MIN: CPT | Performed by: FAMILY MEDICINE

## 2017-12-15 PROCEDURE — 99397 PER PM REEVAL EST PAT 65+ YR: CPT | Performed by: FAMILY MEDICINE

## 2017-12-15 RX ORDER — ESCITALOPRAM OXALATE 20 MG/1
30 TABLET ORAL DAILY
Qty: 135 TABLET | Refills: 1 | Status: SHIPPED | OUTPATIENT
Start: 2017-12-15 | End: 2018-03-07

## 2017-12-15 RX ORDER — CLONAZEPAM 0.5 MG/1
0.5 TABLET ORAL 3 TIMES DAILY PRN
Qty: 60 TABLET | Refills: 0 | Status: SHIPPED | OUTPATIENT
Start: 2017-12-15 | End: 2018-01-15

## 2017-12-15 RX ORDER — GABAPENTIN 100 MG/1
100 CAPSULE ORAL 3 TIMES DAILY
Qty: 90 CAPSULE | Refills: 1 | Status: SHIPPED | OUTPATIENT
Start: 2017-12-15 | End: 2018-01-05 | Stop reason: ALTCHOICE

## 2017-12-15 NOTE — PROGRESS NOTES
HPI:   Kaykay Aguila is a 67year old female who presents for a MA (Medicare Advantage) [facility] (Once per calendar year). 2. Anxiety and depression  Chronic mild to moderate at times.   Using lexapro 20mg but still episodes of crying for which she file in [address] [place] [address] Rd. Discussed with patient and patient refused resource information       She does NOT have a Power of  for [employer] on file in [address] [place] [address] Rd.    Discussed with patient and patient refused resource information         She smoked tobacco in the 100 MG Oral Cap Take 1 capsule (100 mg total) by mouth 3 (three) times daily. escitalopram 20 MG Oral Tab Take 1.5 tablets (30 mg total) by mouth daily.    ClonazePAM 0.5 MG Oral Tab Take 1 tablet (0.5 mg total) by mouth 3 (three) times daily as needed fo reports that she quit smoking about 37 years ago. Her smoking use included Cigarettes. She quit after 10.00 years of use. She has never used smokeless tobacco. She reports that she drinks alcohol. She reports that she does not use drugs.      REVIEW OF SYST no discharge. Left eye exhibits no discharge. No scleral icterus. Neck: Normal range of motion. Neck supple. No thyromegaly present. Cardiovascular: Normal rate, regular rhythm, normal heart sounds and intact distal pulses. Edema not present.   Pulm Oral Tab; Take 1.5 tablets (30 mg total) by mouth daily. Dispense: 135 tablet; Refill: 1  - ClonazePAM 0.5 MG Oral Tab; Take 1 tablet (0.5 mg total) by mouth 3 (three) times daily as needed for Anxiety. Dispense: 60 tablet; Refill: 0    3.  Colon cancer s vaccine history found Please get every year    Pneumococcal 13 (Prevnar)  Covered Once after 65 05/16/2017 Please get once after your 65th birthday    Pneumococcal 23 (Pneumovax)  Covered Once after 65 01/04/2012 Please get once after your 65th birthday

## 2017-12-19 ENCOUNTER — TELEPHONE (OUTPATIENT)
Dept: FAMILY MEDICINE CLINIC | Facility: CLINIC | Age: 72
End: 2017-12-19

## 2017-12-19 NOTE — TELEPHONE ENCOUNTER
Needs additional info please call Ref# [id number],   Need to respond by tomorrow or it will auto deny

## 2017-12-20 ENCOUNTER — TELEPHONE (OUTPATIENT)
Dept: FAMILY MEDICINE CLINIC | Facility: CLINIC | Age: 72
End: 2017-12-20

## 2017-12-20 ENCOUNTER — HOSPITAL ENCOUNTER (OUTPATIENT)
Dept: MAMMOGRAPHY | Age: 72
Discharge: HOME | End: 2017-12-20
Attending: FAMILY MEDICINE
Payer: MEDICARE

## 2017-12-20 DIAGNOSIS — Z12.39 BREAST CANCER SCREENING: ICD-10-CM

## 2017-12-20 PROCEDURE — 77063 BREAST TOMOSYNTHESIS BI: CPT | Performed by: FAMILY MEDICINE

## 2017-12-20 PROCEDURE — 77067 SCR MAMMO BI INCL CAD: CPT | Performed by: FAMILY MEDICINE

## 2017-12-21 ENCOUNTER — TELEPHONE (OUTPATIENT)
Dept: FAMILY MEDICINE CLINIC | Facility: CLINIC | Age: 72
End: 2017-12-21

## 2018-01-05 ENCOUNTER — OFFICE VISIT (OUTPATIENT)
Dept: FAMILY MEDICINE CLINIC | Facility: CLINIC | Age: 73
End: 2018-01-05

## 2018-01-05 VITALS
WEIGHT: 136 LBS | HEIGHT: 60 IN | DIASTOLIC BLOOD PRESSURE: 82 MMHG | OXYGEN SATURATION: 98 % | SYSTOLIC BLOOD PRESSURE: 122 MMHG | RESPIRATION RATE: 18 BRPM | BODY MASS INDEX: 26.7 KG/M2 | TEMPERATURE: 98 F | HEART RATE: 78 BPM

## 2018-01-05 DIAGNOSIS — R21 RASH: Primary | ICD-10-CM

## 2018-01-05 PROCEDURE — 99213 OFFICE O/P EST LOW 20 MIN: CPT | Performed by: FAMILY MEDICINE

## 2018-01-05 NOTE — PROGRESS NOTES
HPI:    Patient ID: Troy Aden is a 67year old female. Rash   This is a chronic problem. The current episode started more than 1 year ago. The problem has been gradually improving since onset.  The affected locations include the right shoulder an Comment:\"Animals\": runny nose, watery eyes, itching  Amoxicillin                 Comment:Vomiting.   Avelox [Moxifloxaci*    Swelling  Avelox [Moxifloxaci*    Tongue Swelling    Comment:\"TABS\"  Doxycycline Hyclate     Hives, Swelling    Comment:\"

## 2018-01-15 DIAGNOSIS — F41.9 ANXIETY AND DEPRESSION: ICD-10-CM

## 2018-01-15 DIAGNOSIS — F32.A ANXIETY AND DEPRESSION: ICD-10-CM

## 2018-01-16 RX ORDER — CLONAZEPAM 0.5 MG/1
TABLET ORAL
Qty: 60 TABLET | Refills: 0 | Status: SHIPPED | OUTPATIENT
Start: 2018-01-16 | End: 2018-03-07

## 2018-01-29 ENCOUNTER — APPOINTMENT (OUTPATIENT)
Dept: LAB | Age: 73
End: 2018-01-29
Attending: FAMILY MEDICINE
Payer: MEDICARE

## 2018-01-29 ENCOUNTER — OFFICE VISIT (OUTPATIENT)
Dept: FAMILY MEDICINE CLINIC | Facility: CLINIC | Age: 73
End: 2018-01-29

## 2018-01-29 VITALS
HEIGHT: 60 IN | HEART RATE: 68 BPM | SYSTOLIC BLOOD PRESSURE: 136 MMHG | WEIGHT: 134 LBS | BODY MASS INDEX: 26.31 KG/M2 | DIASTOLIC BLOOD PRESSURE: 92 MMHG | TEMPERATURE: 98 F | RESPIRATION RATE: 18 BRPM | OXYGEN SATURATION: 98 %

## 2018-01-29 DIAGNOSIS — L29.9 ITCHING: ICD-10-CM

## 2018-01-29 PROCEDURE — 86003 ALLG SPEC IGE CRUDE XTRC EA: CPT | Performed by: FAMILY MEDICINE

## 2018-01-29 PROCEDURE — 99213 OFFICE O/P EST LOW 20 MIN: CPT | Performed by: FAMILY MEDICINE

## 2018-01-29 PROCEDURE — 36415 COLL VENOUS BLD VENIPUNCTURE: CPT | Performed by: FAMILY MEDICINE

## 2018-01-29 RX ORDER — PREDNISONE 20 MG/1
TABLET ORAL
Qty: 15 TABLET | Refills: 0 | Status: ON HOLD | OUTPATIENT
Start: 2018-01-29 | End: 2018-03-02

## 2018-01-29 RX ORDER — CEPHALEXIN 500 MG/1
500 CAPSULE ORAL 3 TIMES DAILY
Qty: 42 CAPSULE | Refills: 0 | Status: ON HOLD | OUTPATIENT
Start: 2018-01-29 | End: 2018-03-02

## 2018-01-29 NOTE — PROGRESS NOTES
HPI:    Patient ID: Myla Santos is a 67year old female. Rash   This is a chronic problem. The current episode started more than 1 year ago. The problem has been waxing and waning since onset.  The affected locations include the right shoulder and tablet Rfl: 1   PROAIR  (90 Base) MCG/ACT Inhalation Aero Soln INHALE 2 PUFFS BY MOUTH EVERY 6 HOURS AS NEEDED FOR WHEEZING Disp: 8.5 g Rfl: 0   Levothyroxine Sodium (SYNTHROID) 50 MCG Oral Tab TAKE 1 TABLET BY MOUTH DAILY Disp: 30 tablet Rfl: 0 (three) times daily.       predniSONE 20 MG Oral Tab 15 tablet 0      Si tab day 1-4, 1.5 tab day 5 & 6, 1 tab day 7&8, then 1/2 tablet day 9-12      triamcinolone acetonide 0.1 % External Cream 60 g 3      Si application twice daily as needed twice

## 2018-01-30 LAB
ALLERGEN,  SHRIMP IGE: <0.1 KU/L
ALLERGEN, CLAM IGE: <0.1 KU/L
ALLERGEN, CODFISH: <0.1 KU/L
ALLERGEN, CORN IGE: <0.1 KU/L
ALLERGEN, EGG WHITE IGE: <0.1 KU/L
ALLERGEN, MILK (COW) IGE: 0.15 KU/L
ALLERGEN, PEANUT IGE: <0.1 KU/L
ALLERGEN, SCALLOP IGE: <0.1 KU/L
ALLERGEN, SOYBEAN IGE: <0.1 KU/L
ALLERGEN, WALNUT/BLACK WALNUT: <0.1 KU/L
ALLERGEN, WHEAT IGE: 0.11 KU/L
IMMUNOGLOBULIN E: 244 KU/L

## 2018-02-28 ENCOUNTER — HOSPITAL ENCOUNTER (OUTPATIENT)
Facility: HOSPITAL | Age: 73
Setting detail: OBSERVATION
LOS: 2 days | Discharge: HOME HEALTH | End: 2018-03-02
Attending: EMERGENCY MEDICINE | Admitting: HOSPITALIST
Payer: MEDICARE

## 2018-02-28 ENCOUNTER — APPOINTMENT (OUTPATIENT)
Dept: GENERAL RADIOLOGY | Age: 73
End: 2018-02-28
Attending: EMERGENCY MEDICINE
Payer: MEDICARE

## 2018-02-28 DIAGNOSIS — D50.9 IRON DEFICIENCY ANEMIA, UNSPECIFIED IRON DEFICIENCY ANEMIA TYPE: ICD-10-CM

## 2018-02-28 DIAGNOSIS — D64.9 ANEMIA, UNSPECIFIED TYPE: ICD-10-CM

## 2018-02-28 DIAGNOSIS — K92.1 MELENA: ICD-10-CM

## 2018-02-28 DIAGNOSIS — K21.9 GASTROESOPHAGEAL REFLUX DISEASE WITHOUT ESOPHAGITIS: ICD-10-CM

## 2018-02-28 DIAGNOSIS — J18.9 PNEUMONIA OF BOTH LOWER LOBES DUE TO INFECTIOUS ORGANISM: Primary | ICD-10-CM

## 2018-02-28 DIAGNOSIS — R05.3 CHRONIC COUGH: ICD-10-CM

## 2018-02-28 LAB
ALBUMIN SERPL-MCNC: 3.5 G/DL (ref 3.5–4.8)
ALP LIVER SERPL-CCNC: 73 U/L (ref 55–142)
ALT SERPL-CCNC: 26 U/L (ref 14–54)
AST SERPL-CCNC: 28 U/L (ref 15–41)
BASOPHILS # BLD AUTO: 0.08 X10(3) UL (ref 0–0.1)
BASOPHILS NFR BLD AUTO: 1.2 %
BILIRUB SERPL-MCNC: 0.3 MG/DL (ref 0.1–2)
BILIRUB UR QL STRIP.AUTO: NEGATIVE
BUN BLD-MCNC: 10 MG/DL (ref 8–20)
CALCIUM BLD-MCNC: 8.6 MG/DL (ref 8.3–10.3)
CHLORIDE: 110 MMOL/L (ref 101–111)
CLARITY UR REFRACT.AUTO: CLEAR
CO2: 22 MMOL/L (ref 22–32)
COLOR UR AUTO: YELLOW
CREAT BLD-MCNC: 1.1 MG/DL (ref 0.55–1.02)
EOSINOPHIL # BLD AUTO: 0.44 X10(3) UL (ref 0–0.3)
EOSINOPHIL NFR BLD AUTO: 6.8 %
ERYTHROCYTE [DISTWIDTH] IN BLOOD BY AUTOMATED COUNT: 14.5 % (ref 11.5–16)
GLUCOSE BLD-MCNC: 85 MG/DL (ref 70–99)
GLUCOSE UR STRIP.AUTO-MCNC: NEGATIVE MG/DL
HCT VFR BLD AUTO: 31.5 % (ref 34–50)
HGB BLD-MCNC: 10.7 G/DL (ref 12–16)
HGB BLD-MCNC: 9.9 G/DL (ref 12–16)
IMMATURE GRANULOCYTE COUNT: 0.02 X10(3) UL (ref 0–1)
IMMATURE GRANULOCYTE RATIO %: 0.3 %
IRON SATURATION: 5 % (ref 13–45)
IRON: 22 UG/DL (ref 28–170)
KETONES UR STRIP.AUTO-MCNC: NEGATIVE MG/DL
LEUKOCYTE ESTERASE UR QL STRIP.AUTO: NEGATIVE
LYMPHOCYTES # BLD AUTO: 1.07 X10(3) UL (ref 0.9–4)
LYMPHOCYTES NFR BLD AUTO: 16.6 %
M PROTEIN MFR SERPL ELPH: 6.8 G/DL (ref 6.1–8.3)
MCH RBC QN AUTO: 26.8 PG (ref 27–33.2)
MCHC RBC AUTO-ENTMCNC: 31.4 G/DL (ref 31–37)
MCV RBC AUTO: 85.4 FL (ref 81–100)
MONOCYTES # BLD AUTO: 0.97 X10(3) UL (ref 0.1–1)
MONOCYTES NFR BLD AUTO: 15 %
NEUTROPHIL ABS PRELIM: 3.88 X10 (3) UL (ref 1.3–6.7)
NEUTROPHILS # BLD AUTO: 3.88 X10(3) UL (ref 1.3–6.7)
NEUTROPHILS NFR BLD AUTO: 60.1 %
NITRITE UR QL STRIP.AUTO: NEGATIVE
PH UR STRIP.AUTO: 6 [PH] (ref 4.5–8)
PLATELET # BLD AUTO: 421 10(3)UL (ref 150–450)
POTASSIUM SERPL-SCNC: 3.7 MMOL/L (ref 3.6–5.1)
PRO-BETA NATRIURETIC PEPTIDE: 170 PG/ML (ref ?–125)
PROCALCITONIN SERPL-MCNC: <0.11 NG/ML (ref ?–0.11)
PROT UR STRIP.AUTO-MCNC: NEGATIVE MG/DL
RBC # BLD AUTO: 3.69 X10(6)UL (ref 3.8–5.1)
RED CELL DISTRIBUTION WIDTH-SD: 44.4 FL (ref 35.1–46.3)
RETIC ABS CALC AUTO: 63 X10(3) UL (ref 22.5–147.5)
RETIC IRF CALC: 0.22 RATIO (ref 0.09–0.3)
RETIC%: 1.6 % (ref 0.5–2.5)
RETICULOCYTE HEMOGLOBIN EQUIVALENT: 27.6 PG (ref 28.2–36.3)
SODIUM SERPL-SCNC: 141 MMOL/L (ref 136–144)
SP GR UR STRIP.AUTO: 1.01 (ref 1–1.03)
TOTAL IRON BINDING CAPACITY: 435 UG/DL (ref 298–536)
TRANSFERRIN: 292 MG/DL (ref 200–360)
TROPONIN: <0.046 NG/ML (ref ?–0.05)
UROBILINOGEN UR STRIP.AUTO-MCNC: 0.2 MG/DL
WBC # BLD AUTO: 6.5 X10(3) UL (ref 4–13)

## 2018-02-28 PROCEDURE — 71046 X-RAY EXAM CHEST 2 VIEWS: CPT | Performed by: EMERGENCY MEDICINE

## 2018-02-28 RX ORDER — ONDANSETRON 2 MG/ML
4 INJECTION INTRAMUSCULAR; INTRAVENOUS EVERY 6 HOURS PRN
Status: DISCONTINUED | OUTPATIENT
Start: 2018-02-28 | End: 2018-03-02

## 2018-02-28 RX ORDER — CLONAZEPAM 0.5 MG/1
0.5 TABLET ORAL 3 TIMES DAILY PRN
Status: DISCONTINUED | OUTPATIENT
Start: 2018-02-28 | End: 2018-03-02

## 2018-02-28 RX ORDER — DIPHENHYDRAMINE HCL 25 MG
25 TABLET ORAL EVERY 6 HOURS PRN
COMMUNITY
End: 2018-03-20

## 2018-02-28 RX ORDER — LEVOTHYROXINE SODIUM 0.05 MG/1
50 TABLET ORAL
Status: DISCONTINUED | OUTPATIENT
Start: 2018-03-01 | End: 2018-03-02

## 2018-02-28 RX ORDER — POTASSIUM CHLORIDE 20 MEQ/1
40 TABLET, EXTENDED RELEASE ORAL ONCE
Status: COMPLETED | OUTPATIENT
Start: 2018-02-28 | End: 2018-02-28

## 2018-02-28 RX ORDER — SODIUM CHLORIDE 9 MG/ML
INJECTION, SOLUTION INTRAVENOUS CONTINUOUS
Status: DISCONTINUED | OUTPATIENT
Start: 2018-02-28 | End: 2018-03-02

## 2018-02-28 RX ORDER — IPRATROPIUM BROMIDE AND ALBUTEROL SULFATE 2.5; .5 MG/3ML; MG/3ML
3 SOLUTION RESPIRATORY (INHALATION) ONCE
Status: COMPLETED | OUTPATIENT
Start: 2018-02-28 | End: 2018-02-28

## 2018-02-28 RX ORDER — IPRATROPIUM BROMIDE AND ALBUTEROL SULFATE 2.5; .5 MG/3ML; MG/3ML
3 SOLUTION RESPIRATORY (INHALATION) EVERY 4 HOURS PRN
Status: DISCONTINUED | OUTPATIENT
Start: 2018-02-28 | End: 2018-03-02

## 2018-02-28 RX ORDER — ALBUTEROL SULFATE 90 UG/1
1 AEROSOL, METERED RESPIRATORY (INHALATION) EVERY 4 HOURS PRN
Status: DISCONTINUED | OUTPATIENT
Start: 2018-02-28 | End: 2018-03-02

## 2018-02-28 RX ORDER — DIPHENHYDRAMINE HCL 25 MG
25 CAPSULE ORAL EVERY 6 HOURS PRN
Status: DISCONTINUED | OUTPATIENT
Start: 2018-02-28 | End: 2018-03-02

## 2018-02-28 NOTE — ED PROVIDER NOTES
Patient Seen in: THE [facility] Emergency Department In [place]    History   Patient presents with:  Dyspnea JORGE LUIS SOB (respiratory)    Stated Complaint: jorge luis, copd hx, feels shaky, cough     HPI    Patient presents with shortness of breath.   The patient states Comment: multiple polyps; tics; repeat 3 yrs  9/22/2014: Mckenzie Foreman N/A      Comment: Procedure: ESOPHAGOGASTRODUODENOSCOPY,                COLONOSCOPY, POSSIBLE BIOPSY, POSSIBLE                POLYPECTOMY 68811,88109;  Surgeon: Uche Fabian, reviewed. All other systems reviewed and negative except as noted above.     Physical Exam   ED Triage Vitals [02/28/18 1626]  BP: 151/86  Pulse: 82  Resp: 22  Temp: 97.7 °F (36.5 °C)  Temp src: Temporal  SpO2: 99 %  O2 Device: None (Room air)    Mary PLATELET    Narrative: The following orders were created for panel order CBC WITH DIFFERENTIAL WITH PLATELET.   Procedure                               Abnormality         Status                     ---------                               ----------- 2/28/18 1641)   CefTRIAXone Sodium (ROCEPHIN) 1 g in sodium chloride 0.9 % 100 mL MBP/ADD-vantage (0 g Intravenous Stopped 2/28/18 1858)   Pantoprazole Sodium (PROTONIX) 40 mg in Sodium Chloride 0.9 % 10 mL IV push (40 mg Intravenous Given 2/28/18 1856)

## 2018-02-28 NOTE — ED INITIAL ASSESSMENT (HPI)
C/o copd flare up cough today, denies fevers. C/O ROSS and \"feeling shaky\".  Also c/o cough that started this am. Last took inhaler 1 hour pta

## 2018-03-01 ENCOUNTER — ANESTHESIA EVENT (OUTPATIENT)
Dept: ENDOSCOPY | Facility: HOSPITAL | Age: 73
End: 2018-03-01

## 2018-03-01 PROBLEM — D64.9 ANEMIA: Status: ACTIVE | Noted: 2018-02-28

## 2018-03-01 PROBLEM — D50.0 IRON DEFICIENCY ANEMIA DUE TO CHRONIC BLOOD LOSS: Status: ACTIVE | Noted: 2018-03-01

## 2018-03-01 PROBLEM — E03.9 ACQUIRED HYPOTHYROIDISM: Chronic | Status: ACTIVE | Noted: 2018-03-01

## 2018-03-01 PROBLEM — J44.0 CHRONIC OBSTRUCTIVE PULMONARY DISEASE WITH ACUTE LOWER RESPIRATORY INFECTION (HCC): Status: ACTIVE | Noted: 2018-03-01

## 2018-03-01 LAB
ADENOVIRUS PCR:: NEGATIVE
ATRIAL RATE: 73 BPM
B PERT DNA SPEC QL NAA+PROBE: NEGATIVE
C PNEUM DNA SPEC QL NAA+PROBE: NEGATIVE
CORONAVIRUS 229E PCR:: NEGATIVE
CORONAVIRUS HKU1 PCR:: NEGATIVE
CORONAVIRUS NL63 PCR:: NEGATIVE
CORONAVIRUS OC43 PCR:: NEGATIVE
ERYTHROCYTE [DISTWIDTH] IN BLOOD BY AUTOMATED COUNT: 14.2 % (ref 11.5–16)
FLUAV RNA SPEC QL NAA+PROBE: NEGATIVE
FLUBV RNA SPEC QL NAA+PROBE: NEGATIVE
FOLATE (FOLIC ACID), SERUM: 35.2 NG/ML (ref 8.7–24)
HAV AB SERPL IA-ACNC: 509 PG/ML (ref 193–986)
HCT VFR BLD AUTO: 30.9 % (ref 34–50)
HGB BLD-MCNC: 9.6 G/DL (ref 12–16)
INR BLD: 1.09 (ref 0.89–1.11)
MCH RBC QN AUTO: 26.4 PG (ref 27–33.2)
MCHC RBC AUTO-ENTMCNC: 31.1 G/DL (ref 31–37)
MCV RBC AUTO: 84.9 FL (ref 81–100)
METAPNEUMOVIRUS PCR:: NEGATIVE
MYCOPLASMA PNEUMONIA PCR:: NEGATIVE
P AXIS: 45 DEGREES
P-R INTERVAL: 156 MS
PARAINFLUENZA 1 PCR:: NEGATIVE
PARAINFLUENZA 2 PCR:: NEGATIVE
PARAINFLUENZA 3 PCR:: NEGATIVE
PARAINFLUENZA 4 PCR:: NEGATIVE
PLATELET # BLD AUTO: 396 10(3)UL (ref 150–450)
POTASSIUM SERPL-SCNC: 4.2 MMOL/L (ref 3.6–5.1)
PSA SERPL DL<=0.01 NG/ML-MCNC: 14.1 SECONDS (ref 12–14.3)
Q-T INTERVAL: 418 MS
QRS DURATION: 68 MS
QTC CALCULATION (BEZET): 460 MS
R AXIS: -18 DEGREES
RBC # BLD AUTO: 3.64 X10(6)UL (ref 3.8–5.1)
RED CELL DISTRIBUTION WIDTH-SD: 44.3 FL (ref 35.1–46.3)
RHINOVIRUS/ENTERO PCR:: NEGATIVE
RSV RNA SPEC QL NAA+PROBE: NEGATIVE
T AXIS: 37 DEGREES
VENTRICULAR RATE: 73 BPM
WBC # BLD AUTO: 5.8 X10(3) UL (ref 4–13)

## 2018-03-01 PROCEDURE — 99225 SUBSEQUENT OBSERVATION CARE: CPT | Performed by: HOSPITALIST

## 2018-03-01 RX ORDER — LORAZEPAM 2 MG/ML
0.5 INJECTION INTRAMUSCULAR EVERY 6 HOURS PRN
Status: DISCONTINUED | OUTPATIENT
Start: 2018-03-01 | End: 2018-03-02

## 2018-03-01 RX ORDER — CODEINE PHOSPHATE AND GUAIFENESIN 10; 100 MG/5ML; MG/5ML
5 SOLUTION ORAL EVERY 4 HOURS PRN
Status: DISCONTINUED | OUTPATIENT
Start: 2018-03-01 | End: 2018-03-02

## 2018-03-01 RX ORDER — ACETAMINOPHEN 325 MG/1
650 TABLET ORAL EVERY 6 HOURS PRN
Status: DISCONTINUED | OUTPATIENT
Start: 2018-03-01 | End: 2018-03-02

## 2018-03-01 RX ORDER — HYDROXYZINE HYDROCHLORIDE 10 MG/1
10 TABLET, FILM COATED ORAL EVERY 4 HOURS PRN
Status: DISCONTINUED | OUTPATIENT
Start: 2018-03-01 | End: 2018-03-02

## 2018-03-01 NOTE — CONSULTS
[facility]                       Gastroenterology Consultation-[facility]    Frieda Jewell Patient Status:  Inpatient    1945 MRN [id number]   [facility] 5NW-A Attending Vazquez Ruiz* History of depression    • Hypercholesterolemia    • Itch of skin    • Mild intermittent asthma without complication    • PONV (postoperative nausea and vomiting)    • Rash    • Reflux    • Uncomfortable fullness after meals    • Visual impairment     glas Push 200 mg 200 mg Intravenous Daily   HydrOXYzine HCl (ATARAX) tab 10 mg 10 mg Oral Q4H PRN   triamcinolone acetonide (KENALOG) 0.1 % cream  Topical BID   [COMPLETED] ipratropium-albuterol (DUONEB) nebulizer solution 3 mL 3 mL Nebulization Once   [COMPLET Rash  Sulfa Antibiotics       Rash, Itching  Sulfa Drugs Cross R*    Rash, Itching    SocHx:    Smoking status: Quit 1980   Alcohol use: No   Drug use: No     FamHx: + mother was dx with colon cancer;   No family history of ulcer disease, or inflammat apparent; no tympany to percussion    Ext: No peripheral edema or cyanosis    Skin: Warm and dry; diffuse scabs (back/legs/arms/abd)    Psychiatric: Appropriate mood and congruent affect without obvious depression or anxiety    Labs:   Lab Results  Compone silhouette. MEDIASTINUM:  Normal.  PLEURA:  Normal.  No pleural effusions. BONES:  Normal for age.  =====  CONCLUSION:    1. Bilateral lower lobe airspace opacities concerning for pneumonia, followup to resolution. 2. COPD.      Dictated by: Nahid Zamora consultation, we will follow the patient with you.     Laurena Heimlich, APN  10:35 AM  3/1/2018  [facility]  [phone]    Attending physician addendum:   I personally saw and examined the patient, agree with the above findings, assessment a

## 2018-03-01 NOTE — PLAN OF CARE
GASTROINTESTINAL - ADULT    • Maintains or returns to baseline bowel function Progressing        Patient/Family Goals    • Patient/Family Long Term Goal Progressing    • Patient/Family Short Term Goal Progressing        RESPIRATORY - ADULT    • Achieves op

## 2018-03-01 NOTE — H&P
[facility]  History and Physical     Georges Louis Patient Status:  Inpatient    1945 MRN [id number]   [facility] 5NW-A Attending Em Cormier MD   Hosp Day # 0 PCP Lana London DO     Chief Complaint: SOB    History Surgeon: Indu Holden MD;  Location: [facility]  No date: CORRECT BUNION,SIMPLE Bilateral  No date: CYSTOTOMY,EXCIS VESICAL NECK Left  9/14: [facility]STR[facility] - [facility]      Comment: stricture; [facility]  2017: OOPHORECTOMY Bilateral      Comment: only [Moxifloxaci*    Tongue Swelling    Comment:\"TABS\"  Doxycycline Hyclate     Hives, Swelling    Comment:\"TABS\"  Dust                      Latex                   Rash  Sulfa Antibiotics       Rash, Itching  Sulfa Drugs Cross R*    Rash, Itching    Medic noted in the HPI. Physical Exam:    /61 (BP Location: Left arm)   Pulse 72   Temp (!) 97.4 °F (36.3 °C) (Oral)   Resp 20   Ht 5' (1.524 m)   Wt 135 lb (61.2 kg)   SpO2 100%   BMI 26.37 kg/m²   General: No acute distress. Alert and oriented x 3.   H consider checking legionella/ strep   4. Diarrhea - check C.diff/ FOBt  5. Anxiety   1.  Psych eval       Quality:  · DVT Prophylaxis: SCD  · CODE status: DNR  · Flodo: none    Plan of care discussed with patient and RN    Neno Chand MD  2/28/2018

## 2018-03-01 NOTE — PLAN OF CARE
NURSING ADMISSION NOTE      Patient admitted via Cart  Oriented to room 526. Safety precautions initiated. Bed in low position. Call light in reach.

## 2018-03-01 NOTE — PROGRESS NOTES
[facility]  Progress Note     Snehal Phillips Patient Status:  Inpatient    1945 MRN [id number]   [facility] 5NW-A Attending Irene Rodríguez\[facility] [place] [facility]\""[facility] Day # 1 PCP Merary Resendiz DO     Chief Complaint: Shortness of reji reviewed in Epic.     Medications:   • iron sucrose  200 mg Intravenous Daily   • triamcinolone acetonide   Topical BID   • Fluticasone Furoate-Vilanterol  1 puff Inhalation Daily   • Levothyroxine Sodium  50 mcg Oral Before breakfast   • cefTRIAXone  1 g I

## 2018-03-01 NOTE — CM/SW NOTE
No d/c needs anticipated at this time. CM/SW to follow.      03/01/18 1400   CM/SW Referral Data   Referral Source    Reason for Referral Discharge planning   Informant Patient   Pertinent Medical Hx   Primary Care Physician Name Dr. Ponoam Grimes

## 2018-03-02 ENCOUNTER — ANESTHESIA (OUTPATIENT)
Dept: ENDOSCOPY | Facility: HOSPITAL | Age: 73
End: 2018-03-02

## 2018-03-02 ENCOUNTER — SURGERY (OUTPATIENT)
Age: 73
End: 2018-03-02

## 2018-03-02 VITALS
WEIGHT: 134.88 LBS | RESPIRATION RATE: 20 BRPM | OXYGEN SATURATION: 99 % | BODY MASS INDEX: 26.48 KG/M2 | DIASTOLIC BLOOD PRESSURE: 62 MMHG | HEART RATE: 66 BPM | HEIGHT: 60 IN | SYSTOLIC BLOOD PRESSURE: 120 MMHG | TEMPERATURE: 99 F

## 2018-03-02 LAB
BASOPHILS # BLD AUTO: 0.08 X10(3) UL (ref 0–0.1)
BASOPHILS NFR BLD AUTO: 1.6 %
BUN BLD-MCNC: 5 MG/DL (ref 8–20)
CALCIUM BLD-MCNC: 8.9 MG/DL (ref 8.3–10.3)
CHLORIDE: 113 MMOL/L (ref 101–111)
CO2: 25 MMOL/L (ref 22–32)
CREAT BLD-MCNC: 0.95 MG/DL (ref 0.55–1.02)
EOSINOPHIL # BLD AUTO: 0.52 X10(3) UL (ref 0–0.3)
EOSINOPHIL NFR BLD AUTO: 10.4 %
ERYTHROCYTE [DISTWIDTH] IN BLOOD BY AUTOMATED COUNT: 14.4 % (ref 11.5–16)
GLUCOSE BLD-MCNC: 86 MG/DL (ref 70–99)
HAV IGM SER QL: 2.2 MG/DL (ref 1.7–3)
HCT VFR BLD AUTO: 32.2 % (ref 34–50)
HGB BLD-MCNC: 9.9 G/DL (ref 12–16)
IMMATURE GRANULOCYTE COUNT: 0.01 X10(3) UL (ref 0–1)
IMMATURE GRANULOCYTE RATIO %: 0.2 %
LYMPHOCYTES # BLD AUTO: 0.96 X10(3) UL (ref 0.9–4)
LYMPHOCYTES NFR BLD AUTO: 19.2 %
MCH RBC QN AUTO: 26.4 PG (ref 27–33.2)
MCHC RBC AUTO-ENTMCNC: 30.7 G/DL (ref 31–37)
MCV RBC AUTO: 85.9 FL (ref 81–100)
MONOCYTES # BLD AUTO: 0.87 X10(3) UL (ref 0.1–1)
MONOCYTES NFR BLD AUTO: 17.4 %
NEUTROPHIL ABS PRELIM: 2.56 X10 (3) UL (ref 1.3–6.7)
NEUTROPHILS # BLD AUTO: 2.56 X10(3) UL (ref 1.3–6.7)
NEUTROPHILS NFR BLD AUTO: 51.2 %
PLATELET # BLD AUTO: 367 10(3)UL (ref 150–450)
POTASSIUM SERPL-SCNC: 3.8 MMOL/L (ref 3.6–5.1)
RBC # BLD AUTO: 3.75 X10(6)UL (ref 3.8–5.1)
RED CELL DISTRIBUTION WIDTH-SD: 44.5 FL (ref 35.1–46.3)
SODIUM SERPL-SCNC: 144 MMOL/L (ref 136–144)
WBC # BLD AUTO: 5 X10(3) UL (ref 4–13)

## 2018-03-02 PROCEDURE — 0DJ08ZZ INSPECTION OF UPPER INTESTINAL TRACT, VIA NATURAL OR ARTIFICIAL OPENING ENDOSCOPIC: ICD-10-PCS | Performed by: INTERNAL MEDICINE

## 2018-03-02 PROCEDURE — 99217 OBSERVATION CARE DISCHARGE: CPT | Performed by: HOSPITALIST

## 2018-03-02 RX ORDER — PANTOPRAZOLE SODIUM 40 MG/1
40 TABLET, DELAYED RELEASE ORAL
Qty: 112 TABLET | Refills: 0 | Status: SHIPPED | OUTPATIENT
Start: 2018-03-02 | End: 2018-03-20

## 2018-03-02 RX ORDER — CEFUROXIME AXETIL 500 MG/1
500 TABLET ORAL 2 TIMES DAILY
Qty: 12 TABLET | Refills: 0 | Status: SHIPPED | OUTPATIENT
Start: 2018-03-02 | End: 2018-03-08

## 2018-03-02 RX ORDER — HYDROXYZINE HYDROCHLORIDE 10 MG/1
10 TABLET, FILM COATED ORAL EVERY 4 HOURS PRN
Qty: 60 TABLET | Refills: 1 | Status: SHIPPED | OUTPATIENT
Start: 2018-03-02 | End: 2018-03-20

## 2018-03-02 RX ORDER — POTASSIUM CHLORIDE 20 MEQ/1
40 TABLET, EXTENDED RELEASE ORAL ONCE
Status: COMPLETED | OUTPATIENT
Start: 2018-03-02 | End: 2018-03-02

## 2018-03-02 RX ORDER — PANTOPRAZOLE SODIUM 40 MG/1
40 TABLET, DELAYED RELEASE ORAL
Status: DISCONTINUED | OUTPATIENT
Start: 2018-03-02 | End: 2018-03-02

## 2018-03-02 NOTE — CM/SW NOTE
Patient failed Inpatient criteria. Second level of review completed and supports Observation. UR committee in agreement. Discussed with Dr Alexia Herring approves.

## 2018-03-02 NOTE — CM/SW NOTE
MSW spoke with James from [facility]. She met with the patient and provided choice of [address] The patient chose [facility]. Financial interest disclosed. The patient will dc home with [facility] when medically cleared.     [facility]  P:[phone]

## 2018-03-02 NOTE — CONSULTS
[facility] Note: Route Optimization for Pantoprazole (PROTONIX)    Patient is currently on Pantoprazole (PROTONIX) 40 mg IV every 12 hours.    The patient meets the criteria to convert to the oral equivalent as established by the IV to Oral conversion pro

## 2018-03-02 NOTE — ANESTHESIA POSTPROCEDURE EVALUATION
[address] Patient Status:  Inpatient   Age/Gender 67year old female MRN [id number]   Location [facility]. Attending [facility] Day # 2 PCP Baudilio Romero DO       Anesthesia Post-op Note    Proc

## 2018-03-02 NOTE — BH PROGRESS NOTE
Level of Care Assessment Note     General Questions  Why are you here?: \"Turned out to be pneumonia but I thought it was COPD but I guess not. \"  Precipitating Events: Pt states she has been itching for the past year and it is frustrating because they can Property: No  Danger to Others/Property Collateral Provided By: No one present.      Access to Means  Access to Means: No  Access to Firearm/Weapon: No  Discussion for Removal: N/A  Do you have a firearm owner ID card?: No  Access to Means Collateral Provi illicit/prescription drugs have you used/abused?: Denies                                             Withdrawal Symptoms  History of Withdrawal Symptoms: Denies past symptoms  Current Withdrawal Symptoms: No                                   Functional Imp Appropriate to situation     Assessment Summary  Assessment Summary: 67year old female being treated on [facility] floor for pneumonia states that she has been dealing with a rsh/scabs all over her bady that she is constantly itching and it won't go a questions  Transferred:  No

## 2018-03-02 NOTE — PAYOR COMM NOTE
--------------  Order Requisition   Admit to inpatient Once (Order #[id number]) on 2/28/18        ADMISSION REVIEW     Payor: Jorge Sandoval Britt #:  [id number]  Authorization Number: [id number]    Admit date: 2/28/18  Admit time: 2135 Smoker                                                             Physical Exam   BP: 151/86  Pulse: 82  Resp: 22  Temp: 99.7   SpO2: 99 % RA  BMI 26.37    Physical Exam  General: Alert and oriented x3 in no acute distress, seems anxious and is mildly kam oxygen saturations. She did not have any bowel movements while in the emergency department. She was given Protonix given her history of melanotic stools as well as anemia. She was given Rocephin and azithromycin for community acquired pneumonia.   The pa (SYNTHROID) 50 MCG Oral Tab   cephALEXin 500 MG Oral Cap   predniSONE 20 MG Oral Tab   PEG 3350-KCl-NaBcb-NaCl-NaSulf (PEG 3350/ELECTROLYTES) 240 g Oral Recon Soln   Budesonide-Formoterol Fumarate 160-4.5 MCG/ACT Inhalation Aerosol   predniSONE 10 MG Oral 02/28/2018   CA 8.6 02/28/2018   ALB 3.5 02/28/2018   ALKPHO 73 02/28/2018   BILT 0.3 02/28/2018   AST 28 02/28/2018   ALT 26 02/28/2018   INR 1.09 03/01/2018   PTP 14.1 03/01/2018     Medications:   • iron sucrose  200 mg Intravenous Daily   • triamcinolo fluctuations in this pattern and hematochezia. No nausea, vomiting, heartburn (controlled for yrs with daily Protonix), dysphagia, and odynophagia. She denies fluctuations in weight, early satiety, and changes in dietary patterns.  She reports using Advil 4 1 g in sodium chloride 0.9 % 100 mL MBP/ADD-vantage     Date Action Dose Route User    3/1/2018 1731 New Bag 1 g Intravenous Mervat Ana, RN         diphenhydrAMINE (BENADRYL) cap/tab 25 mg     Date Action Dose Route User    3/1/2018 2104 Given 25 mg O

## 2018-03-02 NOTE — OPERATIVE REPORT
Darrel Schultz Patient Status:  Inpatient    1945 MRN [id number]   [facility] ENDOSCOPY Attending SASHA Dumont 21 Day # 2 PCP Andrey Daniel DO       PREOPERATIVE DIAGNOSIS/INDICATION: Anemia, Preet Bi RECOMMENDATIONS:    1. Avoid NSAIDs. 2. Soft diet and advance as tolerated. 3. Pantoprazole 40 mg twice daily x 8 weeks. 4. Check H. Pylori Stool Ag.   5. Follow up in GI office in 6 weeks with repeat cbc, iron studies.     6. Repeat EGD in 8 week

## 2018-03-02 NOTE — INTERVAL H&P NOTE
Pre-op Diagnosis: Melena [K92.1]  Iron deficiency anemia, unspecified iron deficiency anemia type [D50.9]    The above referenced H&P was reviewed by Michelle Wiseman DO on 3/2/2018, the patient was examined and no significant changes have occurred in the

## 2018-03-02 NOTE — H&P (VIEW-ONLY)
[facility]                       Gastroenterology Consultation-[facility]    Natanael Hill Patient Status:  Inpatient    1945 MRN [id number]   [facility] 5NW-A Attending Nicola Carter* History of depression    • Hypercholesterolemia    • Itch of skin    • Mild intermittent asthma without complication    • PONV (postoperative nausea and vomiting)    • Rash    • Reflux    • Uncomfortable fullness after meals    • Visual impairment     glas Push 200 mg 200 mg Intravenous Daily   HydrOXYzine HCl (ATARAX) tab 10 mg 10 mg Oral Q4H PRN   triamcinolone acetonide (KENALOG) 0.1 % cream  Topical BID   [COMPLETED] ipratropium-albuterol (DUONEB) nebulizer solution 3 mL 3 mL Nebulization Once   [COMPLET Rash  Sulfa Antibiotics       Rash, Itching  Sulfa Drugs Cross R*    Rash, Itching    SocHx:    Smoking status: Quit 1980   Alcohol use: No   Drug use: No     FamHx: + mother was dx with colon cancer;   No family history of ulcer disease, or inflammat apparent; no tympany to percussion    Ext: No peripheral edema or cyanosis    Skin: Warm and dry; diffuse scabs (back/legs/arms/abd)    Psychiatric: Appropriate mood and congruent affect without obvious depression or anxiety    Labs:   Lab Results  Compone silhouette. MEDIASTINUM:  Normal.  PLEURA:  Normal.  No pleural effusions. BONES:  Normal for age.  =====  CONCLUSION:    1. Bilateral lower lobe airspace opacities concerning for pneumonia, followup to resolution. 2. COPD.      Dictated by: Nini Lares consultation, we will follow the patient with you.     NICOLE Gallegos  10:35 AM  3/1/2018  [facility]  [phone]    Attending physician addendum:   I personally saw and examined the patient, agree with the above findings, assessment a

## 2018-03-02 NOTE — ANESTHESIA PREPROCEDURE EVALUATION
PRE-OP EVALUATION    Patient Name: Yara Rodriguez    Pre-op Diagnosis: Melena [K92.1]  Iron deficiency anemia, unspecified iron deficiency anemia type [D50.9]    Procedure(s):  ESOPHAGOGASTRODUODENOSCOPY (EGD)    Surgeon(s) and Role:     * Lindsay Navarro Nebulization Q4H PRN   azithromycin (ZITHROMAX) 500 mg in sodium chloride 0.9 % 250 mL IVPB 500 mg Intravenous Q24H   Pantoprazole Sodium (PROTONIX) 40 mg in Sodium Chloride 0.9 % 10 mL IV push 40 mg Intravenous Q12H   [COMPLETED] Potassium Chloride ER (K- Surgical History:  No date: COLONOSCOPY  9/14: COLONOSCOPY & POLYPECTOMY      Comment: multiple polyps; tics; repeat 3 yrs  9/22/2014: Penny Rangel N/A      Comment: Procedure: ESOPHAGOGASTRODUODENOSCOPY,                COLONOSCOPY, POSSIBLE MCH 26.4 (L) 03/01/2018   MCHC 31.1 03/01/2018   RDW 14.2 03/01/2018   .0 03/01/2018       Lab Results  Component Value Date    02/28/2018   K 4.2 03/01/2018    02/28/2018   CO2 22.0 02/28/2018   BUN 10 02/28/2018   CREATSERUM 1.10 (H)

## 2018-03-02 NOTE — PROGRESS NOTES
[facility]  Progress Note     Sharri Lieberman Patient Status:  Inpatient    1945 MRN [id number]   [facility] 5NW-A Attending [facility] Day # 2 PCP Sonam Campa DO     Chief Complaint: Shortness of reji Imaging: Imaging data reviewed in Epic.     Medications:   • iron sucrose  200 mg Intravenous Daily   • triamcinolone acetonide   Topical BID   • Fluticasone Furoate-Vilanterol  1 puff Inhalation Daily   • Levothyroxine Sodium  50 mcg Oral Before breakf

## 2018-03-02 NOTE — PAYOR COMM NOTE
--------------  Order Requisition   Place in observation Once (Order #[id number]) on 3/2/18          CONTINUED STAY REVIEW    Payor: Jorge Sandoval Knox #:  [id number]  Authorization Number: [id number]    Admit date: 2/28/18  Admit time: 2

## 2018-03-04 NOTE — DISCHARGE SUMMARY
[facility] HOSPITAL[facility]  DISCHARGE SUMMARY     Peg Dowse Patient Status:  Observation    1945 MRN [id number]   [facility] 5NW-A Attending No att. providers found   Hosp Day # 2 PCP Ammon Casiano DO     Date of Admission: 2018 cultures sent. Blood sugars came back negative patient was having melena so was evaluated by gastroenterology who did an EGD which showed multiple clean based ulcers that was likely a source of the anemia felt was likely NSAID related.   Patient was contin Tabs  Commonly known as:  SYNTHROID      TAKE 1 TABLET BY MOUTH DAILY   Quantity:  30 tablet  Refills:  0     Pantoprazole Sodium 40 MG Tbec  Commonly known as:  PROTONIX      Take 1 tablet (40 mg total) by mouth 2 (two) times daily before meals.    Stop ta General: No acute distress. Respiratory: Clear to auscultation bilaterally. No wheezes. No rhonchi. Cardiovascular: S1, S2. Regular rate and rhythm. No murmurs, rubs or gallops. Abdomen: Soft, nontender, nondistended. Positive bowel sounds.  No rebo

## 2018-03-05 ENCOUNTER — PATIENT OUTREACH (OUTPATIENT)
Dept: CASE MANAGEMENT | Age: 73
End: 2018-03-05

## 2018-03-05 DIAGNOSIS — Z02.9 ENCOUNTERS FOR ADMINISTRATIVE PURPOSE: ICD-10-CM

## 2018-03-05 NOTE — CM/SW NOTE
Patient discharged on 03/02/2018 as previously planned.          03/05/18 1600   Discharge disposition   Discharged to: Home-Health   Name of On[facility] 1 services after discharge Skilled home care   Discharge transportatio

## 2018-03-06 ENCOUNTER — TELEPHONE (OUTPATIENT)
Dept: FAMILY MEDICINE CLINIC | Facility: CLINIC | Age: 73
End: 2018-03-06

## 2018-03-06 NOTE — PROGRESS NOTES
Initial Post Discharge Follow Up   Discharge Date: 3/2/18  Contact Date: 3/6/2018    Consent Verification:  Assessment Completed With: Patient  HIPAA Verified?   Yes    Discharge Dx:   Pneumonia     General:   • How have you been since your discharge fro External Cream 1 application twice daily as needed twice daily.   No more then 2 weeks at a time in one area without 1 week break Disp: 60 g Rfl: 3   CLONAZEPAM 0.5 MG Oral Tab TAKE 1 TABLET BY MOUTH THREE TIMES DAILY AS NEEDED FOR ANXIETY Disp: 60 tablet R Blanche Duckworth TCMCOPDTEMP  CVA: .TCMCVATEMP  CV Surgery:  . TCMCVSURGERY  Pneumonia: . TCMPNEUMONIATEMP  Ortho/Spine:  TCMORTHOSPINETEMP  Re-admit:  . TCMREADMITTEMP    Pneumonia:   Tell me what you understand about the signs and symptoms of pneumonia reoccurrence?  Review cannot make your appointments? No     NCM Reviewed upcoming Specialist Appt with patient     Yes       Overall Rating:    How would you rate the care you received while in the hospital?  excellent        Interventions by NCM: All d/c instructions reviewed

## 2018-03-06 NOTE — PROGRESS NOTES
Jigar Esteban called back stating the RN has been notified the pt would like to start services therefore the pt should receive a call soon to set up the initial visit. Nothing further needed at this time.

## 2018-03-06 NOTE — TELEPHONE ENCOUNTER
Escitalopram 20mg tablets    Plan does not cover this medication    Plan [id number]  Patgient ID  [id number]    Please see dosage question on form.       Put in PA Triage

## 2018-03-07 ENCOUNTER — HOSPITAL ENCOUNTER (OUTPATIENT)
Dept: GENERAL RADIOLOGY | Age: 73
Discharge: HOME | End: 2018-03-07
Attending: FAMILY MEDICINE
Payer: MEDICARE

## 2018-03-07 ENCOUNTER — OFFICE VISIT (OUTPATIENT)
Dept: FAMILY MEDICINE CLINIC | Facility: CLINIC | Age: 73
End: 2018-03-07

## 2018-03-07 VITALS
SYSTOLIC BLOOD PRESSURE: 148 MMHG | DIASTOLIC BLOOD PRESSURE: 80 MMHG | OXYGEN SATURATION: 98 % | WEIGHT: 134 LBS | HEART RATE: 69 BPM | BODY MASS INDEX: 26.31 KG/M2 | TEMPERATURE: 98 F | RESPIRATION RATE: 18 BRPM | HEIGHT: 60 IN

## 2018-03-07 DIAGNOSIS — F41.9 ANXIETY AND DEPRESSION: ICD-10-CM

## 2018-03-07 DIAGNOSIS — L29.9 ITCHING: ICD-10-CM

## 2018-03-07 DIAGNOSIS — J18.9 PNEUMONIA OF BOTH LOWER LOBES DUE TO INFECTIOUS ORGANISM: Primary | ICD-10-CM

## 2018-03-07 DIAGNOSIS — F32.A ANXIETY AND DEPRESSION: ICD-10-CM

## 2018-03-07 DIAGNOSIS — R11.0 NAUSEA: ICD-10-CM

## 2018-03-07 DIAGNOSIS — J18.9 PNEUMONIA OF BOTH LOWER LOBES DUE TO INFECTIOUS ORGANISM: ICD-10-CM

## 2018-03-07 DIAGNOSIS — K25.4 GASTROINTESTINAL HEMORRHAGE ASSOCIATED WITH GASTRIC ULCER: ICD-10-CM

## 2018-03-07 PROCEDURE — 71046 X-RAY EXAM CHEST 2 VIEWS: CPT | Performed by: FAMILY MEDICINE

## 2018-03-07 PROCEDURE — 1111F DSCHRG MED/CURRENT MED MERGE: CPT | Performed by: FAMILY MEDICINE

## 2018-03-07 PROCEDURE — 99496 TRANSJ CARE MGMT HIGH F2F 7D: CPT | Performed by: FAMILY MEDICINE

## 2018-03-07 RX ORDER — ONDANSETRON 4 MG/1
4 TABLET, FILM COATED ORAL EVERY 8 HOURS PRN
Qty: 30 TABLET | Refills: 0 | Status: SHIPPED | OUTPATIENT
Start: 2018-03-07 | End: 2018-03-22 | Stop reason: ALTCHOICE

## 2018-03-07 RX ORDER — CETIRIZINE HYDROCHLORIDE 10 MG/1
TABLET ORAL
Refills: 1 | COMMUNITY
Start: 2018-03-02 | End: 2018-03-20

## 2018-03-07 RX ORDER — TRIAMCINOLONE ACETONIDE 5 MG/G
1 OINTMENT TOPICAL 2 TIMES DAILY
Qty: 60 G | Refills: 4 | Status: SHIPPED | OUTPATIENT
Start: 2018-03-07 | End: 2018-10-22 | Stop reason: ALTCHOICE

## 2018-03-07 RX ORDER — CLONAZEPAM 0.5 MG/1
0.5 TABLET ORAL 2 TIMES DAILY
Qty: 60 TABLET | Refills: 0 | Status: SHIPPED | OUTPATIENT
Start: 2018-03-07 | End: 2018-07-25

## 2018-03-07 NOTE — PROGRESS NOTES
HPI:    Frieda Jewell is a 67year old female here today for hospital follow up.    She was discharged from Inpatient hospital, [facility] to Home   Admission Date: 2/28/18   Discharge Date: 3/2/18  Hospital Discharge Diagnoses (since 2/5/2018) occurs daily. The problem has been gradually improving. The cough is non-productive. Risk factors for lung disease include smoking/tobacco exposure (pt was a former smoker). Her past medical history is significant for COPD.    Anxiety   This is a chronic pr obstructive pulmonary disease) ([facility] 75.); Depression; Disorder of thyroid; Emphysema; Esophageal reflux; Flatulence/gas pain/belching; Hay fever; Hemorrhoids; High cholesterol; History of depression; Hypercholesterolemia;  Itch of skin; Mild intermittent asthma weakness  PSYCHE: denies depression or anxiety  HEMATOLOGIC: denies hx of anemia, or bruising, denies bleeding  ENDOCRINE: denies thyroid history  ALL/ASTHMA: denies hx of allergy or asthma    PHYSICAL EXAM:   No LMP recorded.  Patient is postmenopausal.  E dose    No orders of the defined types were placed in this encounter.       Meds & Refills for this Visit:    Signed Prescriptions Disp Refills    Ondansetron HCl (ZOFRAN) 4 mg tablet 30 tablet 0      Sig: Take 1 tablet (4 mg total) by mouth every 8 (eight)

## 2018-03-08 ENCOUNTER — TELEPHONE (OUTPATIENT)
Dept: FAMILY MEDICINE CLINIC | Facility: CLINIC | Age: 73
End: 2018-03-08

## 2018-03-19 ENCOUNTER — HOSPITAL ENCOUNTER (EMERGENCY)
Facility: HOSPITAL | Age: 73
Discharge: HOME | End: 2018-03-19
Attending: EMERGENCY MEDICINE
Payer: MEDICARE

## 2018-03-19 VITALS
OXYGEN SATURATION: 95 % | RESPIRATION RATE: 16 BRPM | TEMPERATURE: 99 F | SYSTOLIC BLOOD PRESSURE: 137 MMHG | HEART RATE: 66 BPM | BODY MASS INDEX: 26.5 KG/M2 | DIASTOLIC BLOOD PRESSURE: 66 MMHG | WEIGHT: 135 LBS | HEIGHT: 60 IN

## 2018-03-19 DIAGNOSIS — F32.A DEPRESSION, UNSPECIFIED DEPRESSION TYPE: ICD-10-CM

## 2018-03-19 DIAGNOSIS — L30.9 DERMATITIS: Primary | ICD-10-CM

## 2018-03-19 PROCEDURE — 96374 THER/PROPH/DIAG INJ IV PUSH: CPT

## 2018-03-19 PROCEDURE — 96375 TX/PRO/DX INJ NEW DRUG ADDON: CPT

## 2018-03-19 PROCEDURE — S0028 INJECTION, FAMOTIDINE, 20 MG: HCPCS | Performed by: EMERGENCY MEDICINE

## 2018-03-19 PROCEDURE — 99284 EMERGENCY DEPT VISIT MOD MDM: CPT

## 2018-03-19 RX ORDER — DIPHENHYDRAMINE HYDROCHLORIDE 50 MG/ML
25 INJECTION INTRAMUSCULAR; INTRAVENOUS ONCE
Status: COMPLETED | OUTPATIENT
Start: 2018-03-19 | End: 2018-03-19

## 2018-03-19 RX ORDER — FAMOTIDINE 10 MG/ML
20 INJECTION, SOLUTION INTRAVENOUS ONCE
Status: COMPLETED | OUTPATIENT
Start: 2018-03-19 | End: 2018-03-19

## 2018-03-19 RX ORDER — METHYLPREDNISOLONE SODIUM SUCCINATE 125 MG/2ML
125 INJECTION, POWDER, LYOPHILIZED, FOR SOLUTION INTRAMUSCULAR; INTRAVENOUS ONCE
Status: COMPLETED | OUTPATIENT
Start: 2018-03-19 | End: 2018-03-19

## 2018-03-19 RX ORDER — FAMOTIDINE 20 MG/1
20 TABLET ORAL 2 TIMES DAILY PRN
Qty: 30 TABLET | Refills: 0 | Status: SHIPPED | OUTPATIENT
Start: 2018-03-19 | End: 2018-04-18

## 2018-03-19 NOTE — ED INITIAL ASSESSMENT (HPI)
Pt reports itching all over her body w/ open and scabbed areas from pt itching - reports has not slept for days- is \"suffering from an allergy for over a year\" = reports has been on prednisone and has seen multiple physicians including rheumatologist and

## 2018-03-19 NOTE — ED PROVIDER NOTES
Patient Seen in: [facility] Emergency Department    History   Patient presents with:  Rash Skin Problem (integumentary)    Stated Complaint: recurrent rash    HPI    2year-old female with history of depression and anxiety presents emergency room fo Location: [facility]  No date: CORRECT BUNION,SIMPLE Bilateral  No date: CYSTOTOMY,EXCIS VESICAL NECK Left  9/14: GASTRO - [facility]      Comment: stricture; [facility]  2017: OOPHORECTOMY Bilateral      Comment: only ovaries removed.   9/22/2014: PATIENT DOCUMENTED (Temporal)   Resp 14   Ht 152.4 cm (5')   Wt 61.2 kg   SpO2 96%   BMI 26.37 kg/m²         Physical Exam    GENERAL: Patient is awake, alert, well-appearing, in no acute distress. HEENT:no scleral icterus.   Mucous membranes are moist, oropharynx is clear, encounter diagnosis)  Depression, unspecified depression type    Disposition:  Discharge  3/19/2018  4:37 pm    Follow-up:  Prerna Foote DO  [address] [facility]  [phone]    Go in 1 day  10 am        Medications Pres

## 2018-03-20 ENCOUNTER — OFFICE VISIT (OUTPATIENT)
Dept: FAMILY MEDICINE CLINIC | Facility: CLINIC | Age: 73
End: 2018-03-20

## 2018-03-20 ENCOUNTER — TELEPHONE (OUTPATIENT)
Dept: FAMILY MEDICINE CLINIC | Facility: CLINIC | Age: 73
End: 2018-03-20

## 2018-03-20 VITALS
TEMPERATURE: 98 F | SYSTOLIC BLOOD PRESSURE: 142 MMHG | OXYGEN SATURATION: 98 % | RESPIRATION RATE: 20 BRPM | WEIGHT: 132 LBS | HEIGHT: 60 IN | HEART RATE: 73 BPM | BODY MASS INDEX: 25.91 KG/M2 | DIASTOLIC BLOOD PRESSURE: 80 MMHG

## 2018-03-20 DIAGNOSIS — F41.9 ANXIETY AND DEPRESSION: ICD-10-CM

## 2018-03-20 DIAGNOSIS — F32.A ANXIETY AND DEPRESSION: ICD-10-CM

## 2018-03-20 DIAGNOSIS — L29.9 ITCHING: Primary | ICD-10-CM

## 2018-03-20 PROCEDURE — 99213 OFFICE O/P EST LOW 20 MIN: CPT | Performed by: FAMILY MEDICINE

## 2018-03-20 RX ORDER — CLONAZEPAM 0.5 MG/1
0.5 TABLET ORAL 3 TIMES DAILY PRN
Qty: 90 TABLET | Refills: 1 | Status: SHIPPED | OUTPATIENT
Start: 2018-03-20 | End: 2018-06-05

## 2018-03-20 RX ORDER — LORATADINE 10 MG/1
10 TABLET ORAL 3 TIMES DAILY
Qty: 90 TABLET | Refills: 1 | Status: SHIPPED | OUTPATIENT
Start: 2018-03-20 | End: 2018-10-22 | Stop reason: ALTCHOICE

## 2018-03-20 NOTE — PROGRESS NOTES
HPI:    Patient ID: Sharri Lieberman is a 67year old female. Pt came in for a fup on her itching    Itching   This is a chronic problem. The current episode started more than 1 year ago.  Pt states the problem hasn't been improving and has been waxing by mouth 2 (two) times daily. Disp: 60 tablet Rfl: 0   triamcinolone acetonide 0.5 % External Ointment Apply 1 Application topically 2 (two) times daily.  Apply to b/l arms and torso Disp: 60 g Rfl: 4   triamcinolone acetonide 0.1 % External Cream 1 applica healthy diet, regularly exercise, and get plenty of sleep       . 1. Anxiety and depression  - Sertraline HCl 50 MG Oral Tab; 1/2 tablet daily x 5 days, then 1 tablet daily  Dispense: 30 tablet; Refill: 0  - OP REFERRAL TO [facility]    2.  Itching  - ClonazeP

## 2018-03-20 NOTE — TELEPHONE ENCOUNTER
Patient was seen today. Martina was calling for clarification on the Claritin that was indicated to take 3 times a day.     Please advise

## 2018-03-21 ENCOUNTER — TELEPHONE (OUTPATIENT)
Dept: FAMILY MEDICINE CLINIC | Facility: CLINIC | Age: 73
End: 2018-03-21

## 2018-03-21 NOTE — TELEPHONE ENCOUNTER
Pt called stated her insurance did not cover the claritin, can pt get a different prescription. Please call pt and advise.

## 2018-03-29 ENCOUNTER — HOSPITAL ENCOUNTER (EMERGENCY)
Facility: HOSPITAL | Age: 73
Discharge: HOME | End: 2018-03-29
Attending: EMERGENCY MEDICINE
Payer: MEDICARE

## 2018-03-29 VITALS
RESPIRATION RATE: 18 BRPM | OXYGEN SATURATION: 98 % | DIASTOLIC BLOOD PRESSURE: 64 MMHG | HEIGHT: 60 IN | TEMPERATURE: 100 F | HEART RATE: 67 BPM | SYSTOLIC BLOOD PRESSURE: 134 MMHG | WEIGHT: 133 LBS | BODY MASS INDEX: 26.11 KG/M2

## 2018-03-29 DIAGNOSIS — L30.9 DERMATITIS: Primary | ICD-10-CM

## 2018-03-29 PROCEDURE — 99284 EMERGENCY DEPT VISIT MOD MDM: CPT

## 2018-03-29 PROCEDURE — 96374 THER/PROPH/DIAG INJ IV PUSH: CPT

## 2018-03-29 PROCEDURE — 96375 TX/PRO/DX INJ NEW DRUG ADDON: CPT

## 2018-03-29 PROCEDURE — S0028 INJECTION, FAMOTIDINE, 20 MG: HCPCS | Performed by: EMERGENCY MEDICINE

## 2018-03-29 RX ORDER — METHYLPREDNISOLONE 4 MG/1
TABLET ORAL
Qty: 1 PACKAGE | Refills: 0 | Status: SHIPPED | OUTPATIENT
Start: 2018-03-29 | End: 2018-04-03

## 2018-03-29 RX ORDER — METHYLPREDNISOLONE SODIUM SUCCINATE 125 MG/2ML
125 INJECTION, POWDER, LYOPHILIZED, FOR SOLUTION INTRAMUSCULAR; INTRAVENOUS ONCE
Status: COMPLETED | OUTPATIENT
Start: 2018-03-29 | End: 2018-03-29

## 2018-03-29 RX ORDER — FAMOTIDINE 10 MG/ML
20 INJECTION, SOLUTION INTRAVENOUS ONCE
Status: COMPLETED | OUTPATIENT
Start: 2018-03-29 | End: 2018-03-29

## 2018-03-29 RX ORDER — ONDANSETRON 4 MG/1
4 TABLET, ORALLY DISINTEGRATING ORAL EVERY 4 HOURS PRN
Qty: 10 TABLET | Refills: 0 | Status: SHIPPED | OUTPATIENT
Start: 2018-03-29 | End: 2018-04-05

## 2018-03-29 RX ORDER — DIPHENHYDRAMINE HYDROCHLORIDE 50 MG/ML
25 INJECTION INTRAMUSCULAR; INTRAVENOUS ONCE
Status: COMPLETED | OUTPATIENT
Start: 2018-03-29 | End: 2018-03-29

## 2018-03-29 NOTE — ED INITIAL ASSESSMENT (HPI)
Patient c/o rash started 1 year ago and her pmd hasn't be able to determine what the cause is. Generalized rash.

## 2018-03-29 NOTE — ED PROVIDER NOTES
Patient Seen in: [facility] Emergency Department    History   Patient presents with:  Rash Skin Problem (integumentary)    Stated Complaint: rash    HPI    50-year-old female presents with a pruritic rash.   She was seen in this emergency room 10 day MD;  Location: [facility]  No date: CORRECT BUNION,SIMPLE Bilateral  No date: CYSTOTOMY,EXCIS VESICAL NECK Left  9/14: GASTRO - [facility]      Comment: stricture; [place]  2017: OOPHORECTOMY Bilateral  9/22/2014: PATIENT DOCUMENTED NOT TO HAVE EXPERIENCED ANY* N/ 25.97 kg/m²         Physical Exam    General:  Vitals as listed. No acute distress   HEENT: Sclerae anicteric. Conjunctivae show no pallor.   Oropharynx clear, mucous membranes moist   Neck: supple, no rigidity   Lungs: good air exchange and clear   Heart

## 2018-04-10 DIAGNOSIS — F41.9 ANXIETY AND DEPRESSION: ICD-10-CM

## 2018-04-10 DIAGNOSIS — F32.A ANXIETY AND DEPRESSION: ICD-10-CM

## 2018-04-10 RX ORDER — SERTRALINE HYDROCHLORIDE 100 MG/1
TABLET, FILM COATED ORAL
Qty: 90 TABLET | Refills: 1 | OUTPATIENT
Start: 2018-04-10

## 2018-04-10 NOTE — PROGRESS NOTES
HPI:    Patient ID: Kaykay Aguila is a 67year old female. Pt came in for a fup on her itching. Waxing and waning. Benadryl helping mildly.     Pt is going to see the GI doctor and they are requesting blood work to be done before her visit    Itchi Rfl: 1   loratadine 10 MG Oral Tab Take 1 tablet (10 mg total) by mouth 3 (three) times daily. Disp: 90 tablet Rfl: 1   famoTIDine (PEPCID) 20 MG Oral Tab Take 1 tablet (20 mg total) by mouth 2 (two) times daily as needed for Heartburn.  (Patient taking dif wheezes. She has no rales. Abdominal: Soft. Bowel sounds are normal. She exhibits no distension. There is no tenderness. Lymphadenopathy:     She has no cervical adenopathy. Skin: Rash noted.    Excoriations and redness on both anterior and posterior

## 2018-04-10 NOTE — PATIENT INSTRUCTIONS
Stopped statin but no change in itching. Stopping lexapro did not help with itching. Tried atarax, claritin, zyrtec, pepcid.   But benadryl has worked the best to help with itching  Clonazepam also helps with itching  Prednisone helps control the itch

## 2018-04-11 ENCOUNTER — LABORATORY ENCOUNTER (OUTPATIENT)
Dept: LAB | Age: 73
End: 2018-04-11
Attending: FAMILY MEDICINE
Payer: MEDICARE

## 2018-04-11 DIAGNOSIS — D64.9 ANEMIA, UNSPECIFIED TYPE: ICD-10-CM

## 2018-04-11 DIAGNOSIS — E78.5 HYPERLIPIDEMIA, UNSPECIFIED HYPERLIPIDEMIA TYPE: ICD-10-CM

## 2018-04-11 PROCEDURE — 83540 ASSAY OF IRON: CPT | Performed by: FAMILY MEDICINE

## 2018-04-11 PROCEDURE — 80061 LIPID PANEL: CPT | Performed by: FAMILY MEDICINE

## 2018-04-11 PROCEDURE — 82728 ASSAY OF FERRITIN: CPT | Performed by: FAMILY MEDICINE

## 2018-04-11 PROCEDURE — 85025 COMPLETE CBC W/AUTO DIFF WBC: CPT | Performed by: FAMILY MEDICINE

## 2018-04-11 PROCEDURE — 83550 IRON BINDING TEST: CPT | Performed by: FAMILY MEDICINE

## 2018-04-11 PROCEDURE — 36415 COLL VENOUS BLD VENIPUNCTURE: CPT | Performed by: FAMILY MEDICINE

## 2018-04-13 RX ORDER — PRAVASTATIN SODIUM 20 MG
20 TABLET ORAL NIGHTLY
Qty: 90 TABLET | Refills: 3 | Status: SHIPPED | OUTPATIENT
Start: 2018-04-13 | End: 2018-10-22 | Stop reason: ALTCHOICE

## 2018-04-17 ENCOUNTER — APPOINTMENT (OUTPATIENT)
Dept: LAB | Age: 73
End: 2018-04-17
Attending: NURSE PRACTITIONER
Payer: MEDICARE

## 2018-04-17 DIAGNOSIS — D64.9 ANEMIA, UNSPECIFIED TYPE: ICD-10-CM

## 2018-04-17 DIAGNOSIS — K25.9 GASTRIC ULCER, UNSPECIFIED CHRONICITY, UNSPECIFIED WHETHER GASTRIC ULCER HEMORRHAGE OR PERFORATION PRESENT: ICD-10-CM

## 2018-04-17 DIAGNOSIS — K92.1 MELENA: ICD-10-CM

## 2018-04-17 PROCEDURE — 87338 HPYLORI STOOL AG IA: CPT

## 2018-04-23 DIAGNOSIS — L29.9 ITCHING: ICD-10-CM

## 2018-04-23 RX ORDER — LEVOTHYROXINE SODIUM 0.05 MG/1
TABLET ORAL
Qty: 90 TABLET | Refills: 0 | Status: SHIPPED | OUTPATIENT
Start: 2018-04-23 | End: 2018-05-08

## 2018-05-02 ENCOUNTER — ANESTHESIA (OUTPATIENT)
Dept: ENDOSCOPY | Facility: HOSPITAL | Age: 73
End: 2018-05-02
Payer: MEDICARE

## 2018-05-02 ENCOUNTER — ANESTHESIA EVENT (OUTPATIENT)
Dept: ENDOSCOPY | Facility: HOSPITAL | Age: 73
End: 2018-05-02
Payer: MEDICARE

## 2018-05-02 ENCOUNTER — HOSPITAL ENCOUNTER (OUTPATIENT)
Facility: HOSPITAL | Age: 73
Setting detail: HOSPITAL OUTPATIENT SURGERY
Discharge: HOME | End: 2018-05-02
Attending: INTERNAL MEDICINE | Admitting: INTERNAL MEDICINE
Payer: MEDICARE

## 2018-05-02 ENCOUNTER — SURGERY (OUTPATIENT)
Age: 73
End: 2018-05-02

## 2018-05-02 VITALS
TEMPERATURE: 98 F | RESPIRATION RATE: 16 BRPM | WEIGHT: 134 LBS | BODY MASS INDEX: 26.31 KG/M2 | SYSTOLIC BLOOD PRESSURE: 171 MMHG | HEART RATE: 59 BPM | DIASTOLIC BLOOD PRESSURE: 76 MMHG | HEIGHT: 60 IN | OXYGEN SATURATION: 96 %

## 2018-05-02 DIAGNOSIS — K25.9 GASTRIC ULCER, UNSPECIFIED CHRONICITY, UNSPECIFIED WHETHER GASTRIC ULCER HEMORRHAGE OR PERFORATION PRESENT: ICD-10-CM

## 2018-05-02 PROCEDURE — 0DJ08ZZ INSPECTION OF UPPER INTESTINAL TRACT, VIA NATURAL OR ARTIFICIAL OPENING ENDOSCOPIC: ICD-10-PCS | Performed by: INTERNAL MEDICINE

## 2018-05-02 RX ORDER — HYDROMORPHONE HYDROCHLORIDE 1 MG/ML
0.4 INJECTION, SOLUTION INTRAMUSCULAR; INTRAVENOUS; SUBCUTANEOUS EVERY 5 MIN PRN
Status: DISCONTINUED | OUTPATIENT
Start: 2018-05-02 | End: 2018-05-02

## 2018-05-02 RX ORDER — NALOXONE HYDROCHLORIDE 0.4 MG/ML
80 INJECTION, SOLUTION INTRAMUSCULAR; INTRAVENOUS; SUBCUTANEOUS AS NEEDED
Status: DISCONTINUED | OUTPATIENT
Start: 2018-05-02 | End: 2018-05-02

## 2018-05-02 RX ORDER — SODIUM CHLORIDE, SODIUM LACTATE, POTASSIUM CHLORIDE, CALCIUM CHLORIDE 600; 310; 30; 20 MG/100ML; MG/100ML; MG/100ML; MG/100ML
INJECTION, SOLUTION INTRAVENOUS CONTINUOUS
Status: DISCONTINUED | OUTPATIENT
Start: 2018-05-02 | End: 2018-05-02

## 2018-05-02 NOTE — ANESTHESIA POSTPROCEDURE EVALUATION
4 Gracie Campbell Patient Status:  Hospital Outpatient Surgery   Age/Gender 67year old female MRN [id number]   Location [address] [facility]. Attending 3 Anthony Walker, [address] Day # 0 PCP Marianna Barragan DO       Anesthesia Post-op

## 2018-05-02 NOTE — ANESTHESIA PREPROCEDURE EVALUATION
PRE-OP EVALUATION    Patient Name: Troy Aden    Pre-op Diagnosis: Gastric ulcer, unspecified chronicity, unspecified whether gastric ulcer hemorrhage or perforation present [K25.9]    Procedure(s):  ESOPHAGOGASTRODUODENOSCOPY     Surgeon(s) and Rol reviewed.     Anesthetic Complications  (-) history of anesthetic complications         GI/Hepatic/Renal      (+) GERD                           Cardiovascular                     (+) hyperlipidemia                                  Endo/Other           (+) Comment: 1/2 pack per day. Social history shows \"Tobacco Use: Active\" and \"Never Smoker: Active\"    Alcohol use No    Comment: \"socially\"       Drug use: No     Available pre-op labs reviewed.     Lab Results  Component Value Date   WBC 5.4 04/11/2018

## 2018-05-02 NOTE — H&P
History & Physical Examination    Patient Name: Rachelle Lockett  MRN: [id number]  CSN: [id number]  YOB: 1945    Diagnosis: History of gastric ulcers    Present Illness: 66 y/o F history of as above presents for EGD      Prescriptions Prior t MOUTH DAILY Disp: 30 tablet Rfl: 0 5/1/2018 at Unknown time       Current Facility-Administered Medications:  lactated ringers infusion  Intravenous Continuous       Allergies:    Allergy                     Comment:\"Animals\": runny nose, watery eyes, itc ESOPHAGOGASTRODUODENOSCOPY,                COLONOSCOPY, POSSIBLE BIOPSY, POSSIBLE                POLYPECTOMY [id number];  Surgeon: Mattie Cerna MD;  Location: [facility]  9/22/2014: PATIENT [facility] PREOPERATIVE ORDER FOR IV ANT*

## 2018-05-02 NOTE — OPERATIVE REPORT
Halina Lawrence Patient Status:  Hospital Outpatient Surgery    1945 MRN [id number]   [facility] ENDOSCOPY Attending Chino Deluna DO   Hosp Day # 0 PCP Marifer Carrero DO       PREOPERATIVE DIAGNOSIS/INDICATION: Histor persistent linear clean based non bleeding Juan M's ulcers present due to large paraesophageal hernia. RECOMMENDATIONS:    1.  Given persistent Juan M's ulcers, continue daily pantoprazole 40 mg indefinitely as patient poor surgical candidate for paraes

## 2018-05-08 ENCOUNTER — OFFICE VISIT (OUTPATIENT)
Dept: FAMILY MEDICINE CLINIC | Facility: CLINIC | Age: 73
End: 2018-05-08

## 2018-05-08 VITALS
HEART RATE: 78 BPM | TEMPERATURE: 99 F | DIASTOLIC BLOOD PRESSURE: 86 MMHG | HEIGHT: 60 IN | BODY MASS INDEX: 25.32 KG/M2 | SYSTOLIC BLOOD PRESSURE: 120 MMHG | RESPIRATION RATE: 16 BRPM | WEIGHT: 129 LBS

## 2018-05-08 DIAGNOSIS — E78.5 HYPERLIPIDEMIA, UNSPECIFIED HYPERLIPIDEMIA TYPE: ICD-10-CM

## 2018-05-08 DIAGNOSIS — L20.9 ATOPIC DERMATITIS, UNSPECIFIED TYPE: Primary | ICD-10-CM

## 2018-05-08 DIAGNOSIS — E03.9 HYPOTHYROIDISM, UNSPECIFIED TYPE: ICD-10-CM

## 2018-05-08 PROCEDURE — 99214 OFFICE O/P EST MOD 30 MIN: CPT | Performed by: FAMILY MEDICINE

## 2018-05-08 RX ORDER — PREDNISONE 10 MG/1
10 TABLET ORAL DAILY
COMMUNITY
End: 2018-07-10

## 2018-05-08 NOTE — PROGRESS NOTES
HPI:    Patient ID: Christina Eaton is a 67year old female. Pt came in for a fup on her itching. Pt also needs to get her thyroid levels checked again    Itching   This is a chronic problem. The current episode started more than 1 year ago.  Pt sta Take one tablet (40 mg total) by mouth twice daily, 30 minutes prior to breakfast and dinner. Disp: 60 tablet Rfl: 1   Pravastatin Sodium 20 MG Oral Tab Take 1 tablet (20 mg total) by mouth nightly.  Disp: 90 tablet Rfl: 3   Sertraline HCl 100 MG Oral Tab T Neck: Normal range of motion. Neck supple. No thyromegaly present. Cardiovascular: Normal rate, regular rhythm and normal heart sounds. Pulmonary/Chest: Effort normal and breath sounds normal. No respiratory distress. She has no wheezes.  She has no

## 2018-05-09 ENCOUNTER — TELEPHONE (OUTPATIENT)
Dept: FAMILY MEDICINE CLINIC | Facility: CLINIC | Age: 73
End: 2018-05-09

## 2018-05-10 ENCOUNTER — TELEPHONE (OUTPATIENT)
Dept: FAMILY MEDICINE CLINIC | Facility: CLINIC | Age: 73
End: 2018-05-10

## 2018-05-14 ENCOUNTER — TELEPHONE (OUTPATIENT)
Dept: FAMILY MEDICINE CLINIC | Facility: CLINIC | Age: 73
End: 2018-05-14

## 2018-05-17 ENCOUNTER — TELEPHONE (OUTPATIENT)
Dept: FAMILY MEDICINE CLINIC | Facility: CLINIC | Age: 73
End: 2018-05-17

## 2018-06-05 DIAGNOSIS — F32.A ANXIETY AND DEPRESSION: ICD-10-CM

## 2018-06-05 DIAGNOSIS — F41.9 ANXIETY AND DEPRESSION: ICD-10-CM

## 2018-06-05 DIAGNOSIS — L29.9 ITCHING: ICD-10-CM

## 2018-06-05 NOTE — TELEPHONE ENCOUNTER
Last visit 5-8-18    Last refill Sertraline HCL 100mg 4-10-18    Last refill Clonazepam 0.5mg 3-20-18

## 2018-06-06 NOTE — TELEPHONE ENCOUNTER
Patient called checking on status of her refill request for Sertraline and Clonazapam.    Please advise.

## 2018-06-08 RX ORDER — CLONAZEPAM 0.5 MG/1
TABLET ORAL
Qty: 90 TABLET | Refills: 0 | Status: SHIPPED | OUTPATIENT
Start: 2018-06-08 | End: 2018-11-12

## 2018-06-08 RX ORDER — SERTRALINE HYDROCHLORIDE 100 MG/1
TABLET, FILM COATED ORAL
Qty: 30 TABLET | Refills: 0 | Status: SHIPPED | OUTPATIENT
Start: 2018-06-08 | End: 2018-07-06

## 2018-06-20 NOTE — ED NOTES
Joyce Albert from [facility] here to speak with patient, pt and son refuse ZEYAD intervention at this time.
Pt resting comfortably, states feeling better, NAD
no

## 2018-06-27 ENCOUNTER — APPOINTMENT (OUTPATIENT)
Dept: GENERAL RADIOLOGY | Age: 73
End: 2018-06-27
Attending: FAMILY MEDICINE
Payer: MEDICARE

## 2018-06-27 ENCOUNTER — HOSPITAL ENCOUNTER (OUTPATIENT)
Age: 73
Discharge: HOME | End: 2018-06-27
Attending: FAMILY MEDICINE
Payer: MEDICARE

## 2018-06-27 VITALS
SYSTOLIC BLOOD PRESSURE: 127 MMHG | TEMPERATURE: 98 F | HEART RATE: 78 BPM | WEIGHT: 133 LBS | BODY MASS INDEX: 26.11 KG/M2 | HEIGHT: 60 IN | RESPIRATION RATE: 22 BRPM | OXYGEN SATURATION: 97 % | DIASTOLIC BLOOD PRESSURE: 72 MMHG

## 2018-06-27 DIAGNOSIS — J40 BRONCHITIS: Primary | ICD-10-CM

## 2018-06-27 PROCEDURE — 71046 X-RAY EXAM CHEST 2 VIEWS: CPT | Performed by: FAMILY MEDICINE

## 2018-06-27 PROCEDURE — 99204 OFFICE O/P NEW MOD 45 MIN: CPT

## 2018-06-27 PROCEDURE — 99213 OFFICE O/P EST LOW 20 MIN: CPT

## 2018-06-27 RX ORDER — METHYLPREDNISOLONE 4 MG/1
TABLET ORAL
Qty: 1 PACKAGE | Refills: 0 | Status: SHIPPED | OUTPATIENT
Start: 2018-06-27 | End: 2018-07-10

## 2018-06-27 RX ORDER — CLARITHROMYCIN 500 MG/1
500 TABLET, COATED ORAL 2 TIMES DAILY
Qty: 20 TABLET | Refills: 0 | Status: SHIPPED | OUTPATIENT
Start: 2018-06-27 | End: 2018-07-07

## 2018-06-27 NOTE — ED INITIAL ASSESSMENT (HPI)
Cough- with clear phlegm  X 5 days. Pt states she has normally has cough due to COPD. Also c/o chills. Felt nauseated took zofran 10 am today, also c/o no appetite.   Pt has h/o pneumonia in march 2018

## 2018-06-28 NOTE — ED PROVIDER NOTES
Patient Seen in: [facility] END    History   Patient presents with:  Cough    Stated Complaint: COPD, cough, x5days     HPI    67year old female with history of COPD presents for cough and congestion for past 5 days.  She reports co POLYPECTOMY [id number];  Surgeon: Myla Beckham MD;  Location: [facility]  9/22/2014: PATIENT [facility] PREOPERATIVE ORDER FOR IV ANT* N/A      Comment: Procedure: ESOPHAGOGASTRODUODENOSCOPY,                COLONOSCOPY, POSSIBLE Oropharynx is clear and moist.   Eyes: Conjunctivae and EOM are normal. Pupils are equal, round, and reactive to light. Neck: Normal range of motion. Neck supple.    Cardiovascular: Normal rate, regular rhythm, normal heart sounds and intact distal pulses as of 6/27/2018 12:05 PM    START taking these medications    clarithromycin 500 MG Oral Tab  Take 1 tablet (500 mg total) by mouth 2 (two) times daily. , Normal, Disp-20 tablet, R-0    methylPREDNISolone (MEDROL) 4 MG Oral Tablet Therapy Pack  Dosepack: ta

## 2018-07-05 ENCOUNTER — APPOINTMENT (OUTPATIENT)
Dept: GENERAL RADIOLOGY | Age: 73
End: 2018-07-05
Attending: FAMILY MEDICINE
Payer: MEDICARE

## 2018-07-05 ENCOUNTER — HOSPITAL ENCOUNTER (OUTPATIENT)
Age: 73
Discharge: HOME | End: 2018-07-05
Attending: FAMILY MEDICINE
Payer: MEDICARE

## 2018-07-05 VITALS
RESPIRATION RATE: 20 BRPM | TEMPERATURE: 98 F | HEART RATE: 88 BPM | SYSTOLIC BLOOD PRESSURE: 112 MMHG | DIASTOLIC BLOOD PRESSURE: 87 MMHG | OXYGEN SATURATION: 100 %

## 2018-07-05 DIAGNOSIS — J44.1 COPD EXACERBATION (HCC): ICD-10-CM

## 2018-07-05 DIAGNOSIS — K59.00 CONSTIPATION, UNSPECIFIED CONSTIPATION TYPE: Primary | ICD-10-CM

## 2018-07-05 LAB
POCT BILIRUBIN URINE: NEGATIVE
POCT BLOOD URINE: NEGATIVE
POCT GLUCOSE URINE: NEGATIVE MG/DL
POCT KETONE URINE: NEGATIVE MG/DL
POCT NITRITE URINE: NEGATIVE
POCT PH URINE: 8.5 (ref 5–8)
POCT SPECIFIC GRAVITY URINE: 1.02
POCT UROBILINOGEN URINE: 1 MG/DL

## 2018-07-05 PROCEDURE — 99214 OFFICE O/P EST MOD 30 MIN: CPT

## 2018-07-05 PROCEDURE — 87086 URINE CULTURE/COLONY COUNT: CPT | Performed by: FAMILY MEDICINE

## 2018-07-05 PROCEDURE — 81002 URINALYSIS NONAUTO W/O SCOPE: CPT | Performed by: FAMILY MEDICINE

## 2018-07-05 PROCEDURE — 74019 RADEX ABDOMEN 2 VIEWS: CPT | Performed by: FAMILY MEDICINE

## 2018-07-05 RX ORDER — SODIUM PHOSPHATE, DIBASIC AND SODIUM PHOSPHATE, MONOBASIC 7; 19 G/133ML; G/133ML
1 ENEMA RECTAL ONCE AS NEEDED
Qty: 1 BOTTLE | Refills: 0 | Status: SHIPPED | OUTPATIENT
Start: 2018-07-05 | End: 2018-07-05

## 2018-07-05 RX ORDER — MAGNESIUM CARB/ALUMINUM HYDROX 105-160MG
296 TABLET,CHEWABLE ORAL ONCE
Qty: 296 ML | Refills: 0 | Status: SHIPPED | OUTPATIENT
Start: 2018-07-05 | End: 2018-07-05

## 2018-07-05 RX ORDER — ONDANSETRON 4 MG/1
4 TABLET, ORALLY DISINTEGRATING ORAL EVERY 4 HOURS PRN
Qty: 10 TABLET | Refills: 0 | Status: SHIPPED | OUTPATIENT
Start: 2018-07-05 | End: 2018-07-12

## 2018-07-05 NOTE — ED INITIAL ASSESSMENT (HPI)
Constipation - x 5 days states she  Passes small hard stool, last was yesterday passed 2 small hard stool. Pt used a suppository but nothing happened x 2 days. Last dose last night. Pt states she has lower abdomen pain.    Cough- pt was seen here for bronch

## 2018-07-05 NOTE — ED PROVIDER NOTES
Patient Seen in: [address] [facility]    History   Patient presents with:  Constipation (gastrointestinal)  Cough    Stated Complaint: 5 days no bowel movements,3-4 days cough    HPI    76-year-old female with a history of excessive flatu N/A      Comment: Procedure: ESOPHAGOGASTRODUODENOSCOPY,                COLONOSCOPY, POSSIBLE BIOPSY, POSSIBLE                POLYPECTOMY [phone];  Surgeon: Norma Serna MD;  Location: [facility] date: CORRECT [employer] Racer noted above.     Physical Exam   ED Triage Vitals  BP: 112/87 [07/05/18 0900]  Pulse: 88 [07/05/18 0900]  Resp: 20 [07/05/18 0900]  Temp: 98 °F (36.7 °C) [07/05/18 0900]  Temp src: Oral [07/05/18 0900]  SpO2: 100 % [07/05/18 0900]  O2 Device: None (Room air She has no dysuria, fevers, or chills. We will send urine off for culture as the urinalysis showed trace of sites.         Disposition and Plan     Clinical Impression:  Constipation, unspecified constipation type  (primary encounter diagnosis)  COPD exace

## 2018-07-06 DIAGNOSIS — F32.A ANXIETY AND DEPRESSION: ICD-10-CM

## 2018-07-06 DIAGNOSIS — F41.9 ANXIETY AND DEPRESSION: ICD-10-CM

## 2018-07-08 RX ORDER — SERTRALINE HYDROCHLORIDE 100 MG/1
TABLET, FILM COATED ORAL
Qty: 30 TABLET | Refills: 0 | Status: SHIPPED | OUTPATIENT
Start: 2018-07-08 | End: 2018-08-06

## 2018-07-10 ENCOUNTER — HOSPITAL ENCOUNTER (OUTPATIENT)
Dept: GENERAL RADIOLOGY | Age: 73
Discharge: HOME | End: 2018-07-10
Attending: FAMILY MEDICINE
Payer: MEDICARE

## 2018-07-10 ENCOUNTER — OFFICE VISIT (OUTPATIENT)
Dept: FAMILY MEDICINE CLINIC | Facility: CLINIC | Age: 73
End: 2018-07-10

## 2018-07-10 ENCOUNTER — TELEPHONE (OUTPATIENT)
Dept: FAMILY MEDICINE CLINIC | Facility: CLINIC | Age: 73
End: 2018-07-10

## 2018-07-10 VITALS
DIASTOLIC BLOOD PRESSURE: 78 MMHG | OXYGEN SATURATION: 98 % | RESPIRATION RATE: 20 BRPM | SYSTOLIC BLOOD PRESSURE: 120 MMHG | BODY MASS INDEX: 25.32 KG/M2 | HEIGHT: 60 IN | HEART RATE: 80 BPM | TEMPERATURE: 98 F | WEIGHT: 129 LBS

## 2018-07-10 DIAGNOSIS — E03.9 ACQUIRED HYPOTHYROIDISM: Chronic | ICD-10-CM

## 2018-07-10 DIAGNOSIS — K92.1 MELENA: ICD-10-CM

## 2018-07-10 DIAGNOSIS — R53.83 TIRED: ICD-10-CM

## 2018-07-10 DIAGNOSIS — Z12.31 VISIT FOR SCREENING MAMMOGRAM: ICD-10-CM

## 2018-07-10 DIAGNOSIS — Z86.010 HISTORY OF COLON POLYPS: ICD-10-CM

## 2018-07-10 DIAGNOSIS — K59.00 CONSTIPATION, UNSPECIFIED CONSTIPATION TYPE: ICD-10-CM

## 2018-07-10 DIAGNOSIS — R05.9 COUGH: ICD-10-CM

## 2018-07-10 DIAGNOSIS — K21.9 GASTROESOPHAGEAL REFLUX DISEASE WITHOUT ESOPHAGITIS: ICD-10-CM

## 2018-07-10 DIAGNOSIS — Z12.11 COLON CANCER SCREENING: ICD-10-CM

## 2018-07-10 DIAGNOSIS — J44.0 CHRONIC OBSTRUCTIVE PULMONARY DISEASE WITH ACUTE LOWER RESPIRATORY INFECTION (HCC): ICD-10-CM

## 2018-07-10 DIAGNOSIS — F33.0 MILD RECURRENT MAJOR DEPRESSION (HCC): Chronic | ICD-10-CM

## 2018-07-10 DIAGNOSIS — Z00.00 ENCOUNTER FOR ANNUAL HEALTH EXAMINATION: Primary | ICD-10-CM

## 2018-07-10 DIAGNOSIS — Z13.6 SCREENING FOR CARDIOVASCULAR CONDITION: ICD-10-CM

## 2018-07-10 PROBLEM — D50.0 IRON DEFICIENCY ANEMIA DUE TO CHRONIC BLOOD LOSS: Status: RESOLVED | Noted: 2018-03-01 | Resolved: 2018-07-10

## 2018-07-10 PROBLEM — J18.9 PNEUMONIA OF BOTH LOWER LOBES DUE TO INFECTIOUS ORGANISM: Status: RESOLVED | Noted: 2018-02-28 | Resolved: 2018-07-10

## 2018-07-10 PROBLEM — D64.9 ANEMIA: Status: RESOLVED | Noted: 2018-02-28 | Resolved: 2018-07-10

## 2018-07-10 PROCEDURE — G0439 PPPS, SUBSEQ VISIT: HCPCS | Performed by: FAMILY MEDICINE

## 2018-07-10 PROCEDURE — 96160 PT-FOCUSED HLTH RISK ASSMT: CPT | Performed by: FAMILY MEDICINE

## 2018-07-10 PROCEDURE — 71046 X-RAY EXAM CHEST 2 VIEWS: CPT | Performed by: FAMILY MEDICINE

## 2018-07-10 PROCEDURE — 99397 PER PM REEVAL EST PAT 65+ YR: CPT | Performed by: FAMILY MEDICINE

## 2018-07-10 RX ORDER — PREDNISONE 20 MG/1
TABLET ORAL
Qty: 11 TABLET | Refills: 0 | Status: SHIPPED | OUTPATIENT
Start: 2018-07-10 | End: 2018-10-22 | Stop reason: ALTCHOICE

## 2018-07-10 NOTE — PROGRESS NOTES
HPI:   Chuck Blackwood is a 67year old female who presents for a MA (Medicare Advantage) [facility] (Once per calendar year).     Pt also had hx of polyps being found in her colon and chronic obstructive pulmonary disease with acute lower respiratory inf Associated symptoms include fatigue and melena. Risk factors include lack of exercise and smoking/tobacco exposure. She has tried a PPI for the symptoms. The treatment provided mild relief. Anxiety   This is a chronic problem.  The current episode started functional status.    Vision Problems? : Yes                Depression Screening (PHQ-2/PHQ-9): Over the LAST 2 WEEKS   Little interest or pleasure in doing things (over the last two weeks)?: Not at all  Feeling down, depressed, or hopeless (over the last t CHOLEST 236 (H) 04/11/2018   HDL 48 04/11/2018    (H) 04/11/2018   TRIG 160 (H) 04/11/2018          Last Chemistry Labs:     Lab Results  Component Value Date   AST 28 02/28/2018   ALT 26 02/28/2018   CA 8.9 03/02/2018   ALB 3.5 02/28/2018   TSH 1 Budesonide-Formoterol Fumarate 160-4.5 MCG/ACT Inhalation Aerosol Inhale 2 puffs into the lungs 2 (two) times daily.    PROAIR  (90 Base) MCG/ACT Inhalation Aero Soln INHALE 2 PUFFS BY MOUTH EVERY 6 HOURS AS NEEDED FOR WHEEZING   Levothyroxine Sodi not drink alcohol or use drugs. REVIEW OF SYSTEMS:   Review of Systems   Constitutional: Positive for fatigue. Negative for chills and fever. HENT: Negative for sore throat. Respiratory: Positive for cough.     Cardiovascular: Negative for chest pa Tympanic membrane is not erythematous. No cerumen present  Left Ear: Tympanic membrane and ear canal normal. Tympanic membrane is not erythematous.  No cerumen present  Nose: Nose normal.   Mouth/Throat: Oropharynx is clear and moist. No oropharyngeal exuda Medicare Assessment. PLAN SUMMARY:   Diagnoses and all orders for this visit:    Encounter for annual health examination  -     COMP METABOLIC PANEL (14); Future  -     CBC WITH DIFFERENTIAL WITH PLATELET;  Future    Colon cancer screening  -     GASTRO 02/28/2013 5.6    No flowsheet data found.     Fasting Blood Sugar (FSB)Annually   Glucose (mg/dL)   Date Value   03/02/2018 86   ----------  GLUCOSE (mg/dL)   Date Value   04/18/2014 85   04/17/2012 99   ----------       Cardiovascular Disease Screening 01/04/2012 Please get once after your 65th birthday    Hepatitis B for Moderate/High Risk No vaccine history found Medium/high risk factors:   End-stage renal disease   Hemophiliacs who received Factor VIII or IX concentrates   Clients of institutions for

## 2018-07-10 NOTE — PATIENT INSTRUCTIONS
Take fiber, + colace for a little while to stay from constipation. Bibi Diaz's SCREENING SCHEDULE   Tests on this list are recommended by your physician but may not be covered, or covered at this frequency, by your insurer.  Please check with you IPPE only No results found for this or any previous visit.  Limited to patients who meet one of the following criteria:   • Men who are 73-68 years old and have smoked more than 100 cigarettes in their lifetime   • Anyone with a family history    Colorectal least age 76, and as needed after 76 Mammogram due on 12/20/2018 Please get this Mammogram regularly   Immunizations      Influenza  Covered Annually   Orders placed or performed in visit on 10/17/16  -FLU VAC NO PRSV 4 MISA 3 YRS+   Orders placed or perfor home computer and printer. (the forms are also available in [address])  [email or web address]  This link also has information from the [facility] regarding Advance Directives.

## 2018-07-10 NOTE — TELEPHONE ENCOUNTER
----- Message from Lana London DO sent at 7/10/2018 12:22 PM CDT -----  Results look ok. Send prednisone 40mg x 4 days, then 30mg day 5, 20mg day 6, 10mg day 7.

## 2018-07-11 ENCOUNTER — LABORATORY ENCOUNTER (OUTPATIENT)
Dept: LAB | Age: 73
End: 2018-07-11
Attending: FAMILY MEDICINE
Payer: MEDICARE

## 2018-07-11 DIAGNOSIS — E03.9 ACQUIRED HYPOTHYROIDISM: Chronic | ICD-10-CM

## 2018-07-11 DIAGNOSIS — Z13.6 SCREENING FOR CARDIOVASCULAR CONDITION: ICD-10-CM

## 2018-07-11 DIAGNOSIS — Z00.00 ENCOUNTER FOR ANNUAL HEALTH EXAMINATION: ICD-10-CM

## 2018-07-11 DIAGNOSIS — K92.1 MELENA: ICD-10-CM

## 2018-07-11 DIAGNOSIS — K21.9 GASTROESOPHAGEAL REFLUX DISEASE WITHOUT ESOPHAGITIS: ICD-10-CM

## 2018-07-11 LAB
ALBUMIN SERPL-MCNC: 3.4 G/DL (ref 3.5–4.8)
ALP LIVER SERPL-CCNC: 87 U/L (ref 55–142)
ALT SERPL-CCNC: 86 U/L (ref 14–54)
AST SERPL-CCNC: 58 U/L (ref 15–41)
BASOPHILS # BLD AUTO: 0.08 X10(3) UL (ref 0–0.1)
BASOPHILS NFR BLD AUTO: 1 %
BILIRUB SERPL-MCNC: 0.7 MG/DL (ref 0.1–2)
BUN BLD-MCNC: 8 MG/DL (ref 8–20)
CALCIUM BLD-MCNC: 9.5 MG/DL (ref 8.3–10.3)
CHLORIDE: 109 MMOL/L (ref 101–111)
CHOLEST SMN-MCNC: 206 MG/DL (ref ?–200)
CO2: 26 MMOL/L (ref 22–32)
CREAT BLD-MCNC: 0.97 MG/DL (ref 0.55–1.02)
DEPRECATED HBV CORE AB SER IA-ACNC: 93.4 NG/ML (ref 18–340)
EOSINOPHIL # BLD AUTO: 0.27 X10(3) UL (ref 0–0.3)
EOSINOPHIL NFR BLD AUTO: 3.4 %
ERYTHROCYTE [DISTWIDTH] IN BLOOD BY AUTOMATED COUNT: 13.8 % (ref 11.5–16)
GLUCOSE BLD-MCNC: 91 MG/DL (ref 70–99)
HCT VFR BLD AUTO: 42 % (ref 34–50)
HDLC SERPL-MCNC: 52 MG/DL (ref 45–?)
HDLC SERPL: 3.96 {RATIO} (ref ?–4.44)
HGB BLD-MCNC: 13.3 G/DL (ref 12–16)
IMMATURE GRANULOCYTE COUNT: 0.02 X10(3) UL (ref 0–1)
IMMATURE GRANULOCYTE RATIO %: 0.3 %
IRON SATURATION: 25 % (ref 20–50)
IRON: 98 UG/DL (ref 28–170)
LDLC SERPL CALC-MCNC: 124 MG/DL (ref ?–130)
LYMPHOCYTES # BLD AUTO: 1.02 X10(3) UL (ref 0.9–4)
LYMPHOCYTES NFR BLD AUTO: 12.8 %
M PROTEIN MFR SERPL ELPH: 7 G/DL (ref 6.1–8.3)
MCH RBC QN AUTO: 29.2 PG (ref 27–33.2)
MCHC RBC AUTO-ENTMCNC: 31.7 G/DL (ref 31–37)
MCV RBC AUTO: 92.3 FL (ref 81–100)
MONOCYTES # BLD AUTO: 0.91 X10(3) UL (ref 0.1–1)
MONOCYTES NFR BLD AUTO: 11.4 %
NEUTROPHIL ABS PRELIM: 5.65 X10 (3) UL (ref 1.3–6.7)
NEUTROPHILS # BLD AUTO: 5.65 X10(3) UL (ref 1.3–6.7)
NEUTROPHILS NFR BLD AUTO: 71.1 %
NONHDLC SERPL-MCNC: 154 MG/DL (ref ?–130)
PLATELET # BLD AUTO: 310 10(3)UL (ref 150–450)
POTASSIUM SERPL-SCNC: 3.8 MMOL/L (ref 3.6–5.1)
RBC # BLD AUTO: 4.55 X10(6)UL (ref 3.8–5.1)
RED CELL DISTRIBUTION WIDTH-SD: 46.6 FL (ref 35.1–46.3)
SODIUM SERPL-SCNC: 142 MMOL/L (ref 136–144)
TOTAL IRON BINDING CAPACITY: 387 UG/DL (ref 240–450)
TRANSFERRIN: 260 MG/DL (ref 200–360)
TRIGL SERPL-MCNC: 148 MG/DL (ref ?–150)
TSI SER-ACNC: 3.62 MIU/ML (ref 0.35–5.5)
VLDLC SERPL CALC-MCNC: 30 MG/DL (ref 5–40)
WBC # BLD AUTO: 8 X10(3) UL (ref 4–13)

## 2018-07-11 PROCEDURE — 84443 ASSAY THYROID STIM HORMONE: CPT

## 2018-07-11 PROCEDURE — 36415 COLL VENOUS BLD VENIPUNCTURE: CPT

## 2018-07-11 PROCEDURE — 82728 ASSAY OF FERRITIN: CPT

## 2018-07-11 PROCEDURE — 83550 IRON BINDING TEST: CPT

## 2018-07-11 PROCEDURE — 85025 COMPLETE CBC W/AUTO DIFF WBC: CPT

## 2018-07-11 PROCEDURE — 80061 LIPID PANEL: CPT

## 2018-07-11 PROCEDURE — 83540 ASSAY OF IRON: CPT

## 2018-07-11 PROCEDURE — 80053 COMPREHEN METABOLIC PANEL: CPT

## 2018-07-13 ENCOUNTER — OFFICE VISIT (OUTPATIENT)
Dept: FAMILY MEDICINE CLINIC | Facility: CLINIC | Age: 73
End: 2018-07-13

## 2018-07-13 VITALS
TEMPERATURE: 98 F | WEIGHT: 129 LBS | OXYGEN SATURATION: 96 % | DIASTOLIC BLOOD PRESSURE: 70 MMHG | RESPIRATION RATE: 18 BRPM | SYSTOLIC BLOOD PRESSURE: 122 MMHG | HEART RATE: 73 BPM | HEIGHT: 60 IN | BODY MASS INDEX: 25.32 KG/M2

## 2018-07-13 DIAGNOSIS — J40 BRONCHITIS: Primary | ICD-10-CM

## 2018-07-13 DIAGNOSIS — R35.0 URINARY FREQUENCY: ICD-10-CM

## 2018-07-13 PROCEDURE — 99213 OFFICE O/P EST LOW 20 MIN: CPT | Performed by: FAMILY MEDICINE

## 2018-07-13 NOTE — PROGRESS NOTES
HPI:    Patient ID: Elton Gallagher is a 67year old female. Bronchitis   This is a chronic problem. The current episode started more than 1 month ago. The problem occurs daily. The problem has been gradually improving.  Associated symptoms include cou Oral Tab Take 1 tablet (0.5 mg total) by mouth 2 (two) times daily. Disp: 60 tablet Rfl: 0   triamcinolone acetonide 0.5 % External Ointment Apply 1 Application topically 2 (two) times daily.  Apply to b/l arms and torso (Patient taking differently: Apply 1 placed in this encounter.       Meds This Visit:  No prescriptions requested or ordered in this encounter    Imaging & Referrals:  US BLADDER ONLY (WWD=[id number])       #[phone]

## 2018-07-16 RX ORDER — BUDESONIDE AND FORMOTEROL FUMARATE DIHYDRATE 160; 4.5 UG/1; UG/1
AEROSOL RESPIRATORY (INHALATION)
Qty: 3 INHALER | Refills: 0 | Status: SHIPPED | OUTPATIENT
Start: 2018-07-16 | End: 2018-10-12

## 2018-07-18 ENCOUNTER — HOSPITAL ENCOUNTER (OUTPATIENT)
Dept: ULTRASOUND IMAGING | Age: 73
Discharge: HOME | End: 2018-07-18
Attending: FAMILY MEDICINE
Payer: MEDICARE

## 2018-07-18 DIAGNOSIS — R35.0 URINARY FREQUENCY: ICD-10-CM

## 2018-07-18 PROCEDURE — 76857 US EXAM PELVIC LIMITED: CPT | Performed by: FAMILY MEDICINE

## 2018-07-20 RX ORDER — OXYBUTYNIN CHLORIDE 5 MG/1
TABLET ORAL
Qty: 180 TABLET | Refills: 1 | Status: SHIPPED | OUTPATIENT
Start: 2018-07-20 | End: 2018-10-22 | Stop reason: ALTCHOICE

## 2018-07-25 ENCOUNTER — TELEPHONE (OUTPATIENT)
Dept: FAMILY MEDICINE CLINIC | Facility: CLINIC | Age: 73
End: 2018-07-25

## 2018-07-25 DIAGNOSIS — F41.9 ANXIETY AND DEPRESSION: ICD-10-CM

## 2018-07-25 DIAGNOSIS — F32.A ANXIETY AND DEPRESSION: ICD-10-CM

## 2018-07-25 RX ORDER — CLONAZEPAM 0.5 MG/1
0.5 TABLET ORAL 2 TIMES DAILY
Qty: 60 TABLET | Refills: 0 | Status: SHIPPED | OUTPATIENT
Start: 2018-07-25 | End: 2018-08-24

## 2018-07-25 NOTE — TELEPHONE ENCOUNTER
[facility] wants to know how many refills or how many times pcp wants to approve the clonazepam? Please call and advise.

## 2018-08-06 DIAGNOSIS — F32.A ANXIETY AND DEPRESSION: ICD-10-CM

## 2018-08-06 DIAGNOSIS — F41.9 ANXIETY AND DEPRESSION: ICD-10-CM

## 2018-08-09 RX ORDER — SERTRALINE HYDROCHLORIDE 100 MG/1
TABLET, FILM COATED ORAL
Qty: 30 TABLET | Refills: 0 | Status: SHIPPED | OUTPATIENT
Start: 2018-08-09 | End: 2018-09-07

## 2018-08-09 NOTE — TELEPHONE ENCOUNTER
PT CHECKING STATUS OF SERTRALINE  SHE WAS TOLD YESTERDAY THAT IT WOULD BE READY  PLEASE ADVISE  SHE TOOK LAST PILL LAST NIGHT

## 2018-08-24 DIAGNOSIS — F32.A ANXIETY AND DEPRESSION: ICD-10-CM

## 2018-08-24 DIAGNOSIS — F41.9 ANXIETY AND DEPRESSION: ICD-10-CM

## 2018-08-24 RX ORDER — CLONAZEPAM 0.5 MG/1
TABLET ORAL
Qty: 60 TABLET | Refills: 0 | Status: SHIPPED | OUTPATIENT
Start: 2018-08-24 | End: 2018-09-28

## 2018-08-30 ENCOUNTER — TELEPHONE (OUTPATIENT)
Dept: FAMILY MEDICINE CLINIC | Facility: CLINIC | Age: 73
End: 2018-08-30

## 2018-09-07 DIAGNOSIS — F41.9 ANXIETY AND DEPRESSION: ICD-10-CM

## 2018-09-07 DIAGNOSIS — F32.A ANXIETY AND DEPRESSION: ICD-10-CM

## 2018-09-10 RX ORDER — SERTRALINE HYDROCHLORIDE 100 MG/1
TABLET, FILM COATED ORAL
Qty: 90 TABLET | Refills: 1 | Status: SHIPPED | OUTPATIENT
Start: 2018-09-10 | End: 2019-02-12

## 2018-09-28 DIAGNOSIS — F32.A ANXIETY AND DEPRESSION: ICD-10-CM

## 2018-09-28 DIAGNOSIS — F41.9 ANXIETY AND DEPRESSION: ICD-10-CM

## 2018-10-02 RX ORDER — CLONAZEPAM 0.5 MG/1
TABLET ORAL
Qty: 60 TABLET | Refills: 0 | Status: SHIPPED | OUTPATIENT
Start: 2018-10-02 | End: 2018-10-22

## 2018-10-12 RX ORDER — BUDESONIDE AND FORMOTEROL FUMARATE DIHYDRATE 160; 4.5 UG/1; UG/1
AEROSOL RESPIRATORY (INHALATION)
Qty: 1 INHALER | Refills: 6 | Status: SHIPPED | OUTPATIENT
Start: 2018-10-12 | End: 2019-05-13

## 2018-10-22 ENCOUNTER — OFFICE VISIT (OUTPATIENT)
Dept: FAMILY MEDICINE CLINIC | Facility: CLINIC | Age: 73
End: 2018-10-22
Payer: COMMERCIAL

## 2018-10-22 VITALS
OXYGEN SATURATION: 98 % | TEMPERATURE: 98 F | HEART RATE: 76 BPM | SYSTOLIC BLOOD PRESSURE: 120 MMHG | DIASTOLIC BLOOD PRESSURE: 72 MMHG | WEIGHT: 126 LBS | HEIGHT: 60 IN | BODY MASS INDEX: 24.74 KG/M2 | RESPIRATION RATE: 18 BRPM

## 2018-10-22 DIAGNOSIS — G89.29 TOE PAIN, CHRONIC, LEFT: Primary | ICD-10-CM

## 2018-10-22 DIAGNOSIS — M79.675 TOE PAIN, CHRONIC, LEFT: Primary | ICD-10-CM

## 2018-10-22 PROCEDURE — 99213 OFFICE O/P EST LOW 20 MIN: CPT | Performed by: FAMILY MEDICINE

## 2018-10-22 NOTE — PROGRESS NOTES
HPI:    Patient ID: Farideh Garcia is a 75year old female. Pt came in today complaining about a corn in between the first and second digits of her left foot. This is chronic problem that has been going for more than 6 months.  Pt has tried corn pads Normal rate, regular rhythm and normal heart sounds. Pulmonary/Chest: Effort normal and breath sounds normal. No respiratory distress. She has no wheezes. She has no rales. Vitals reviewed.              ASSESSMENT/PLAN:   Toe pain, chronic, left  (prima

## 2018-11-12 NOTE — PROGRESS NOTES
HPI:    Patient ID: Jay Andres is a 75year old female. Anxiety   This is a chronic problem. The current episode started more than 1 year ago. The problem occurs daily. The problem has been gradually improving.  Associated symptoms include fatigue Comment:Vomiting.   Avelox [Moxifloxaci*    SWELLING  Avelox [Moxifloxaci*    TONGUE SWELLING    Comment:\"TABS\"  Doxycycline Hyclate     HIVES, SWELLING    Comment:\"TABS\"  Dust                      Latex                   RASH  Sulfa Antibiotics       R

## 2018-12-12 DIAGNOSIS — F41.9 ANXIETY: ICD-10-CM

## 2018-12-12 DIAGNOSIS — L29.9 ITCHING: ICD-10-CM

## 2018-12-14 RX ORDER — CLONAZEPAM 0.5 MG/1
TABLET ORAL
Qty: 60 TABLET | Refills: 0 | Status: SHIPPED | OUTPATIENT
Start: 2018-12-14 | End: 2019-01-16

## 2018-12-16 ENCOUNTER — HOSPITAL ENCOUNTER (OUTPATIENT)
Age: 73
Discharge: HOME | End: 2018-12-16
Attending: FAMILY MEDICINE
Payer: MEDICARE

## 2018-12-16 VITALS
OXYGEN SATURATION: 98 % | RESPIRATION RATE: 16 BRPM | HEART RATE: 65 BPM | SYSTOLIC BLOOD PRESSURE: 135 MMHG | TEMPERATURE: 98 F | DIASTOLIC BLOOD PRESSURE: 76 MMHG

## 2018-12-16 DIAGNOSIS — R19.7 DIARRHEA, UNSPECIFIED TYPE: Primary | ICD-10-CM

## 2018-12-16 PROCEDURE — 80047 BASIC METABLC PNL IONIZED CA: CPT

## 2018-12-16 PROCEDURE — 99214 OFFICE O/P EST MOD 30 MIN: CPT

## 2018-12-16 PROCEDURE — 96360 HYDRATION IV INFUSION INIT: CPT

## 2018-12-16 PROCEDURE — 99215 OFFICE O/P EST HI 40 MIN: CPT

## 2018-12-16 PROCEDURE — 85025 COMPLETE CBC W/AUTO DIFF WBC: CPT | Performed by: FAMILY MEDICINE

## 2018-12-16 RX ORDER — SODIUM CHLORIDE 9 MG/ML
INJECTION, SOLUTION INTRAVENOUS ONCE
Status: COMPLETED | OUTPATIENT
Start: 2018-12-16 | End: 2018-12-16

## 2018-12-16 RX ORDER — SODIUM CHLORIDE 9 MG/ML
INJECTION, SOLUTION INTRAVENOUS ONCE
Status: DISCONTINUED | OUTPATIENT
Start: 2018-12-16 | End: 2018-12-16

## 2018-12-16 NOTE — ED PROVIDER NOTES
Patient Seen in: [facility]    History   Patient presents with:  Nausea/Vomiting/Diarrhea (gastrointestinal)    Stated Complaint: diarrhea, chills, weak, dizzy    HPI  77-year-old female presents to immediate care with 4 days hx ESOPHAGOGASTRODUODENOSCOPY, COLONOSCOPY, POSSIBLE BIOPSY, POSSIBLE POLYPECTOMY 90443, [id number] N/A 9/22/2014    Performed by Juanita Ko MD at [facility]  9/14    stricture; HH   • LAPAROSCOPIC OVARIAN CYSTECTOMY N/A 2/9/2016    P pulses. Pulmonary/Chest: Effort normal and breath sounds normal. No respiratory distress. Abdominal: Soft. There is no hepatosplenomegaly. There is no tenderness. There is no guarding, no tenderness at McBurney's point and negative Acevedo's sign.    No [id number]  [phone]    Call in 2 days          Medications Prescribed:  Current Discharge Medication List

## 2018-12-17 ENCOUNTER — LAB ENCOUNTER (OUTPATIENT)
Dept: LAB | Age: 73
End: 2018-12-17
Attending: FAMILY MEDICINE
Payer: MEDICARE

## 2018-12-17 DIAGNOSIS — R19.7 DIARRHEA, UNSPECIFIED TYPE: ICD-10-CM

## 2018-12-17 PROCEDURE — 87427 SHIGA-LIKE TOXIN AG IA: CPT

## 2018-12-17 PROCEDURE — 87015 SPECIMEN INFECT AGNT CONCNTJ: CPT

## 2018-12-17 PROCEDURE — 87077 CULTURE AEROBIC IDENTIFY: CPT

## 2018-12-17 PROCEDURE — 87046 STOOL CULTR AEROBIC BACT EA: CPT

## 2018-12-17 PROCEDURE — 87207 SMEAR SPECIAL STAIN: CPT

## 2018-12-17 PROCEDURE — 87045 FECES CULTURE AEROBIC BACT: CPT

## 2018-12-21 ENCOUNTER — OFFICE VISIT (OUTPATIENT)
Dept: FAMILY MEDICINE CLINIC | Facility: CLINIC | Age: 73
End: 2018-12-21
Payer: COMMERCIAL

## 2018-12-21 VITALS
DIASTOLIC BLOOD PRESSURE: 78 MMHG | TEMPERATURE: 98 F | OXYGEN SATURATION: 96 % | BODY MASS INDEX: 23.95 KG/M2 | SYSTOLIC BLOOD PRESSURE: 122 MMHG | WEIGHT: 122 LBS | HEIGHT: 60 IN | RESPIRATION RATE: 18 BRPM | HEART RATE: 76 BPM

## 2018-12-21 DIAGNOSIS — R19.7 DIARRHEA, UNSPECIFIED TYPE: Primary | ICD-10-CM

## 2018-12-21 PROCEDURE — 99213 OFFICE O/P EST LOW 20 MIN: CPT | Performed by: FAMILY MEDICINE

## 2018-12-21 NOTE — PROGRESS NOTES
HPI:    Patient ID: Criselda Sahu is a 75year old female. Diarrhea    This is a chronic problem. Episode onset: 1 weeks. The problem occurs 2 to 4 times per day. The problem has been gradually improving. The stool consistency is described as watery. Comment:\"TABS\"  Amoxicillin             NAUSEA AND VOMITING    Comment:Vomiting.   Allergy                 Runny nose    Comment:\"Animals\": runny nose, watery eyes, itching  Dust                    Runny nose  Latex                   RASH  Sulfa Antibio

## 2018-12-27 ENCOUNTER — LABORATORY ENCOUNTER (OUTPATIENT)
Dept: LAB | Age: 73
End: 2018-12-27
Attending: INTERNAL MEDICINE
Payer: MEDICARE

## 2018-12-27 DIAGNOSIS — K52.9 CHRONIC DIARRHEA: ICD-10-CM

## 2018-12-27 PROCEDURE — 83516 IMMUNOASSAY NONANTIBODY: CPT

## 2018-12-27 PROCEDURE — 36415 COLL VENOUS BLD VENIPUNCTURE: CPT

## 2018-12-27 PROCEDURE — 83550 IRON BINDING TEST: CPT

## 2018-12-27 PROCEDURE — 82728 ASSAY OF FERRITIN: CPT

## 2018-12-27 PROCEDURE — 83540 ASSAY OF IRON: CPT

## 2019-01-15 DIAGNOSIS — F41.9 ANXIETY: ICD-10-CM

## 2019-01-15 DIAGNOSIS — L29.9 ITCHING: ICD-10-CM

## 2019-01-16 RX ORDER — CLONAZEPAM 0.5 MG/1
TABLET ORAL
Qty: 30 TABLET | Refills: 0 | Status: SHIPPED | OUTPATIENT
Start: 2019-01-16 | End: 2019-02-12

## 2019-02-12 ENCOUNTER — OFFICE VISIT (OUTPATIENT)
Dept: FAMILY MEDICINE CLINIC | Facility: CLINIC | Age: 74
End: 2019-02-12
Payer: COMMERCIAL

## 2019-02-12 VITALS
HEIGHT: 60 IN | BODY MASS INDEX: 23.16 KG/M2 | HEART RATE: 73 BPM | RESPIRATION RATE: 18 BRPM | OXYGEN SATURATION: 97 % | TEMPERATURE: 98 F | WEIGHT: 118 LBS | SYSTOLIC BLOOD PRESSURE: 128 MMHG | DIASTOLIC BLOOD PRESSURE: 80 MMHG

## 2019-02-12 DIAGNOSIS — M77.8 TENDINITIS OF LEFT SHOULDER: ICD-10-CM

## 2019-02-12 DIAGNOSIS — H93.8X3 PRESSURE SENSATION IN BOTH EARS: ICD-10-CM

## 2019-02-12 DIAGNOSIS — F32.A DEPRESSION, UNSPECIFIED DEPRESSION TYPE: Primary | ICD-10-CM

## 2019-02-12 DIAGNOSIS — E03.9 HYPOTHYROIDISM, UNSPECIFIED TYPE: ICD-10-CM

## 2019-02-12 DIAGNOSIS — F41.9 ANXIETY: ICD-10-CM

## 2019-02-12 PROCEDURE — 99214 OFFICE O/P EST MOD 30 MIN: CPT | Performed by: FAMILY MEDICINE

## 2019-02-12 RX ORDER — SERTRALINE HYDROCHLORIDE 100 MG/1
150 TABLET, FILM COATED ORAL DAILY
Qty: 45 TABLET | Refills: 5 | Status: SHIPPED | OUTPATIENT
Start: 2019-02-12 | End: 2019-03-13

## 2019-02-12 RX ORDER — CLONAZEPAM 0.5 MG/1
0.5 TABLET ORAL 3 TIMES DAILY PRN
Qty: 60 TABLET | Refills: 0 | Status: SHIPPED | OUTPATIENT
Start: 2019-02-12 | End: 2019-04-01

## 2019-02-12 RX ORDER — PANTOPRAZOLE SODIUM 40 MG/1
TABLET, DELAYED RELEASE ORAL
Qty: 60 TABLET | Refills: 5 | Status: SHIPPED | OUTPATIENT
Start: 2019-02-12 | End: 2020-07-15

## 2019-02-12 RX ORDER — LEVOTHYROXINE SODIUM 0.05 MG/1
50 TABLET ORAL
Qty: 90 TABLET | Refills: 3 | Status: SHIPPED | OUTPATIENT
Start: 2019-02-12 | End: 2020-05-08

## 2019-02-12 RX ORDER — TRAMADOL HYDROCHLORIDE 50 MG/1
TABLET ORAL EVERY 6 HOURS PRN
Qty: 30 TABLET | Refills: 1 | Status: SHIPPED | OUTPATIENT
Start: 2019-02-12 | End: 2019-06-11 | Stop reason: ALTCHOICE

## 2019-02-12 NOTE — PROGRESS NOTES
HPI:    Patient ID: Snehal Phillips is a 75year old female. Anxiety   This is a chronic problem. The current episode started more than 1 year ago. The problem occurs daily. The problem has been gradually improving.  The symptoms are aggravated by stre one tablet (40 mg total) by mouth twice daily, 30 minutes prior to breakfast and dinner. Disp: 60 tablet Rfl: 5   Levothyroxine Sodium 50 MCG Oral Tab Take 1 tablet (50 mcg total) by mouth once daily.  Disp: 90 tablet Rfl: 3   Diclofenac Sodium 1 % Transder edema, tenderness or deformity.    Unable to do impingement testing due to pain  Rotator cuff testing giving pain   Painful arc   Skin:   Corn in between the first and second digits of her left foot   Psychiatric: Her behavior is normal. Judgment and though tablets ( mg total) by mouth every 6 (six) hours as needed for Pain. Dispense: 30 tablet;  Refill: 1    Meds This Visit:  Requested Prescriptions     Signed Prescriptions Disp Refills   • Sertraline HCl 100 MG Oral Tab 45 tablet 5     Sig: Take 1.5 t

## 2019-02-13 ENCOUNTER — TELEPHONE (OUTPATIENT)
Dept: FAMILY MEDICINE CLINIC | Facility: CLINIC | Age: 74
End: 2019-02-13

## 2019-02-14 ENCOUNTER — APPOINTMENT (OUTPATIENT)
Dept: LAB | Age: 74
End: 2019-02-14
Attending: FAMILY MEDICINE
Payer: MEDICARE

## 2019-02-14 DIAGNOSIS — E03.9 HYPOTHYROIDISM, UNSPECIFIED TYPE: ICD-10-CM

## 2019-02-14 LAB — TSI SER-ACNC: 1.6 MIU/ML (ref 0.36–3.74)

## 2019-02-14 PROCEDURE — 36415 COLL VENOUS BLD VENIPUNCTURE: CPT

## 2019-02-14 PROCEDURE — 84443 ASSAY THYROID STIM HORMONE: CPT

## 2019-02-28 NOTE — TELEPHONE ENCOUNTER
PA for diclofenac gel was denied. States this was denied because the use is not supported by the FDA or by one of the Medicare approved references for treating the medical condition tendonitis of shoulder.       Please advise if you would like to pres

## 2019-03-06 ENCOUNTER — OFFICE VISIT (OUTPATIENT)
Dept: PHYSICAL THERAPY | Age: 74
End: 2019-03-06
Attending: FAMILY MEDICINE
Payer: MEDICARE

## 2019-03-06 DIAGNOSIS — M77.8 TENDINITIS OF LEFT SHOULDER: ICD-10-CM

## 2019-03-06 PROCEDURE — 97110 THERAPEUTIC EXERCISES: CPT

## 2019-03-06 PROCEDURE — 97162 PT EVAL MOD COMPLEX 30 MIN: CPT

## 2019-03-06 NOTE — PROGRESS NOTES
UPPER EXTREMITY EVALUATION:   Referring Physician: Dr. Hong Schwartz  Diagnosis: left shoulder pain with s/s RTCT      Date of Service: 3/6/2019     PATIENT SUMMARY   Rachelle Lockett is a 75year old y/o female who presents to therapy today with complaints of l impairments to improve shoulder ROM both active and passively, decrease pain, improve functional use of her UEs.      Precautions:  None  OBJECTIVE:   Observation/Posture: alert and oriented female, forward rounded shoulders   Cervical Screen:  Rotation/fle Patient will be seen for 1-2 x/week or a total of 8 visits over a 90 day period. Treatment will include: Manual Therapy; Therapeutic Exercises; Neuromuscular Re-education; Therapeutic Activity; Electrical Stim; Ultrasound;  Pt education; Home exercise progr

## 2019-03-11 ENCOUNTER — APPOINTMENT (OUTPATIENT)
Dept: PHYSICAL THERAPY | Age: 74
End: 2019-03-11
Attending: FAMILY MEDICINE
Payer: MEDICARE

## 2019-03-11 ENCOUNTER — TELEPHONE (OUTPATIENT)
Dept: PHYSICAL THERAPY | Age: 74
End: 2019-03-11

## 2019-03-13 ENCOUNTER — OFFICE VISIT (OUTPATIENT)
Dept: FAMILY MEDICINE CLINIC | Facility: CLINIC | Age: 74
End: 2019-03-13
Payer: COMMERCIAL

## 2019-03-13 VITALS
BODY MASS INDEX: 23.16 KG/M2 | HEIGHT: 60 IN | OXYGEN SATURATION: 98 % | RESPIRATION RATE: 20 BRPM | SYSTOLIC BLOOD PRESSURE: 120 MMHG | WEIGHT: 118 LBS | HEART RATE: 70 BPM | DIASTOLIC BLOOD PRESSURE: 84 MMHG | TEMPERATURE: 98 F

## 2019-03-13 DIAGNOSIS — M75.01 ADHESIVE CAPSULITIS OF BOTH SHOULDERS: Primary | ICD-10-CM

## 2019-03-13 DIAGNOSIS — F32.A DEPRESSION, UNSPECIFIED DEPRESSION TYPE: ICD-10-CM

## 2019-03-13 DIAGNOSIS — M77.8 TENDINITIS OF LEFT SHOULDER: ICD-10-CM

## 2019-03-13 DIAGNOSIS — M75.02 ADHESIVE CAPSULITIS OF BOTH SHOULDERS: Primary | ICD-10-CM

## 2019-03-13 DIAGNOSIS — F41.9 ANXIETY: ICD-10-CM

## 2019-03-13 PROCEDURE — 99213 OFFICE O/P EST LOW 20 MIN: CPT | Performed by: FAMILY MEDICINE

## 2019-03-13 RX ORDER — SERTRALINE HYDROCHLORIDE 100 MG/1
150 TABLET, FILM COATED ORAL DAILY
Qty: 135 TABLET | Refills: 3 | Status: SHIPPED | OUTPATIENT
Start: 2019-03-13 | End: 2019-10-07

## 2019-03-13 NOTE — PROGRESS NOTES
HPI:    Patient ID: Chuck Blackwood is a 75year old female. Anxiety   This is a chronic problem. The current episode started more than 1 year ago. The problem occurs daily. The problem has been gradually improving.  The symptoms are aggravated by stress 1-2 tablets ( mg total) by mouth every 6 (six) hours as needed for Pain.  Disp: 30 tablet Rfl: 1   triamcinolone acetonide 0.1 % External Cream  Disp:  Rfl:    Dupilumab (DUPIXENT) 300 MG/2ML Subcutaneous Solution Prefilled Syringe Inject 300 mg into about 1 month (around 4/13/2019), or if symptoms worsen or fail to improve. Encouraged PT but pt states she will just do it at home and refusing to go back to PT. 1. Depression, unspecified depression type  - Sertraline HCl 100 MG Oral Tab;  Take 1.5

## 2019-03-20 ENCOUNTER — APPOINTMENT (OUTPATIENT)
Dept: PHYSICAL THERAPY | Age: 74
End: 2019-03-20
Attending: FAMILY MEDICINE
Payer: MEDICARE

## 2019-03-26 ENCOUNTER — APPOINTMENT (OUTPATIENT)
Dept: PHYSICAL THERAPY | Age: 74
End: 2019-03-26
Attending: FAMILY MEDICINE
Payer: MEDICARE

## 2019-04-01 DIAGNOSIS — F41.9 ANXIETY: ICD-10-CM

## 2019-04-02 RX ORDER — CLONAZEPAM 0.5 MG/1
TABLET ORAL
Qty: 60 TABLET | Refills: 0 | OUTPATIENT
Start: 2019-04-02 | End: 2019-07-09

## 2019-05-13 RX ORDER — BUDESONIDE AND FORMOTEROL FUMARATE DIHYDRATE 160; 4.5 UG/1; UG/1
AEROSOL RESPIRATORY (INHALATION)
Qty: 1 INHALER | Refills: 3 | Status: SHIPPED | OUTPATIENT
Start: 2019-05-13 | End: 2019-09-04

## 2019-06-11 ENCOUNTER — HOSPITAL ENCOUNTER (OUTPATIENT)
Age: 74
Discharge: HOME | End: 2019-06-11
Attending: FAMILY MEDICINE
Payer: MEDICARE

## 2019-06-11 VITALS
DIASTOLIC BLOOD PRESSURE: 80 MMHG | TEMPERATURE: 100 F | SYSTOLIC BLOOD PRESSURE: 140 MMHG | OXYGEN SATURATION: 97 % | RESPIRATION RATE: 18 BRPM | HEART RATE: 69 BPM

## 2019-06-11 DIAGNOSIS — L03.312 CELLULITIS OF BACK EXCEPT BUTTOCK: Primary | ICD-10-CM

## 2019-06-11 PROCEDURE — 99214 OFFICE O/P EST MOD 30 MIN: CPT

## 2019-06-11 PROCEDURE — 99213 OFFICE O/P EST LOW 20 MIN: CPT

## 2019-06-11 RX ORDER — PREDNISONE 10 MG/1
TABLET ORAL
Qty: 20 TABLET | Refills: 0 | Status: SHIPPED | OUTPATIENT
Start: 2019-06-11 | End: 2019-07-09

## 2019-06-11 RX ORDER — PREDNISOLONE ACETATE 10 MG/ML
1 SUSPENSION/ DROPS OPHTHALMIC 3 TIMES DAILY
COMMUNITY
End: 2019-07-09

## 2019-06-11 RX ORDER — CEFPROZIL 250 MG/1
250 TABLET, FILM COATED ORAL 2 TIMES DAILY
Qty: 20 TABLET | Refills: 0 | Status: SHIPPED | OUTPATIENT
Start: 2019-06-11 | End: 2019-07-09

## 2019-06-11 NOTE — ED INITIAL ASSESSMENT (HPI)
Patient reports woke-up this morning with rashes to upper back and spreading to neck and shoulder area. Denies pain nor itching. Has acute dermatitis.

## 2019-06-11 NOTE — ED PROVIDER NOTES
Patient [facility]en in: [facility]    History   Patient presents with:  Rash Skin Problem (integumentary)    Stated Complaint: rash on back    HPI    This 50-year-old female presents to the office with complaint of rash on her upper ba Bilateral    • CYSTOTOMY,EXCIS VESICAL NECK Left    • EGD     • ESOPHAGOGASTRODUODENOSCOPY (EGD) N/A 5/2/2018    Performed by Teresa Squires DO at [facility] ENDOSCOPY   • ESOPHAGOGASTRODUODENOSCOPY (EGD) N/A 3/2/2018    Performed by Teresa Squires DO at [facility] thyromegaly,  HEART: Regular rate and rhythm, no S3, S4 or murmur noted. LUNGS: Clear to ausculation. No retractions or tachypnea noted. SKIN: Warm and dry.   The patient is noted to have a scab in the right upper back just adjacent to the scapular border I am aware of Amoxil allergy. !! - Potential duplicate medications found. Please discuss with provider. The rash on your back may be due to infection which caused cellulitis or may be allergic.   Take the Cefzil 250 mg 1 tablet with breakfast a

## 2019-07-09 ENCOUNTER — OFFICE VISIT (OUTPATIENT)
Dept: FAMILY MEDICINE CLINIC | Facility: CLINIC | Age: 74
End: 2019-07-09
Payer: COMMERCIAL

## 2019-07-09 ENCOUNTER — APPOINTMENT (OUTPATIENT)
Dept: LAB | Age: 74
End: 2019-07-09
Attending: FAMILY MEDICINE
Payer: MEDICARE

## 2019-07-09 ENCOUNTER — TELEPHONE (OUTPATIENT)
Dept: FAMILY MEDICINE CLINIC | Facility: CLINIC | Age: 74
End: 2019-07-09

## 2019-07-09 ENCOUNTER — HOSPITAL ENCOUNTER (OUTPATIENT)
Dept: GENERAL RADIOLOGY | Age: 74
Discharge: HOME | End: 2019-07-09
Attending: FAMILY MEDICINE
Payer: MEDICARE

## 2019-07-09 VITALS
OXYGEN SATURATION: 98 % | RESPIRATION RATE: 20 BRPM | HEART RATE: 76 BPM | DIASTOLIC BLOOD PRESSURE: 86 MMHG | BODY MASS INDEX: 24.15 KG/M2 | HEIGHT: 60 IN | TEMPERATURE: 98 F | WEIGHT: 123 LBS | SYSTOLIC BLOOD PRESSURE: 136 MMHG

## 2019-07-09 DIAGNOSIS — Z13.6 SCREENING FOR CARDIOVASCULAR CONDITION: ICD-10-CM

## 2019-07-09 DIAGNOSIS — Z00.00 ENCOUNTER FOR ANNUAL HEALTH EXAMINATION: Primary | ICD-10-CM

## 2019-07-09 DIAGNOSIS — G89.29 CHRONIC RIGHT-SIDED LOW BACK PAIN WITHOUT SCIATICA: ICD-10-CM

## 2019-07-09 DIAGNOSIS — M54.2 NECK PAIN: ICD-10-CM

## 2019-07-09 DIAGNOSIS — Z00.00 ENCOUNTER FOR ANNUAL HEALTH EXAMINATION: ICD-10-CM

## 2019-07-09 DIAGNOSIS — F33.0 MILD RECURRENT MAJOR DEPRESSION (HCC): Chronic | ICD-10-CM

## 2019-07-09 DIAGNOSIS — Z12.31 VISIT FOR SCREENING MAMMOGRAM: ICD-10-CM

## 2019-07-09 DIAGNOSIS — M54.50 CHRONIC RIGHT-SIDED LOW BACK PAIN WITHOUT SCIATICA: ICD-10-CM

## 2019-07-09 DIAGNOSIS — J44.0 CHRONIC OBSTRUCTIVE PULMONARY DISEASE WITH ACUTE LOWER RESPIRATORY INFECTION (HCC): ICD-10-CM

## 2019-07-09 DIAGNOSIS — R51.9 HEADACHE DISORDER: ICD-10-CM

## 2019-07-09 DIAGNOSIS — E03.9 ACQUIRED HYPOTHYROIDISM: Chronic | ICD-10-CM

## 2019-07-09 DIAGNOSIS — F41.9 ANXIETY: ICD-10-CM

## 2019-07-09 DIAGNOSIS — Z78.0 POST-MENOPAUSAL: ICD-10-CM

## 2019-07-09 DIAGNOSIS — K21.9 GASTROESOPHAGEAL REFLUX DISEASE WITHOUT ESOPHAGITIS: ICD-10-CM

## 2019-07-09 PROBLEM — N18.30 CKD (CHRONIC KIDNEY DISEASE) STAGE 3, GFR 30-59 ML/MIN (HCC): Chronic | Status: ACTIVE | Noted: 2019-07-09

## 2019-07-09 LAB
ATRIAL RATE: 65 BPM
P AXIS: 52 DEGREES
P-R INTERVAL: 158 MS
Q-T INTERVAL: 434 MS
QRS DURATION: 74 MS
QTC CALCULATION (BEZET): 451 MS
R AXIS: -45 DEGREES
T AXIS: 32 DEGREES
VENTRICULAR RATE: 65 BPM

## 2019-07-09 PROCEDURE — 93005 ELECTROCARDIOGRAM TRACING: CPT

## 2019-07-09 PROCEDURE — 96160 PT-FOCUSED HLTH RISK ASSMT: CPT | Performed by: FAMILY MEDICINE

## 2019-07-09 PROCEDURE — 71046 X-RAY EXAM CHEST 2 VIEWS: CPT | Performed by: FAMILY MEDICINE

## 2019-07-09 PROCEDURE — 93010 ELECTROCARDIOGRAM REPORT: CPT | Performed by: INTERNAL MEDICINE

## 2019-07-09 PROCEDURE — G0439 PPPS, SUBSEQ VISIT: HCPCS | Performed by: FAMILY MEDICINE

## 2019-07-09 PROCEDURE — 99397 PER PM REEVAL EST PAT 65+ YR: CPT | Performed by: FAMILY MEDICINE

## 2019-07-09 RX ORDER — CLONAZEPAM 0.5 MG/1
TABLET ORAL
Qty: 60 TABLET | Refills: 0 | Status: SHIPPED | OUTPATIENT
Start: 2019-07-09 | End: 2019-12-16

## 2019-07-09 RX ORDER — LIDOCAINE 50 MG/G
1 PATCH TOPICAL EVERY 24 HOURS
Qty: 30 PATCH | Refills: 0 | Status: SHIPPED | OUTPATIENT
Start: 2019-07-09 | End: 2019-07-19

## 2019-07-09 RX ORDER — TRAMADOL HYDROCHLORIDE 50 MG/1
50 TABLET ORAL EVERY 8 HOURS PRN
Qty: 20 TABLET | Refills: 0 | Status: SHIPPED | OUTPATIENT
Start: 2019-07-09 | End: 2019-08-30

## 2019-07-09 NOTE — PATIENT INSTRUCTIONS
Drink more fluids at least 50 ounces daily  Take the clonazepam nightly for sleep as needed for the next 3 weeks. Do not look down too much  Follow up in 3 weeks. Get blood test done fasting.   Get Chest xray  Use tramadol sparingly for pain but make sure indicated for medical reasons Electrocardiogram date02/28/2018 Routine EKG is not a screening covered service except at the [place] to Medicare Visit    Abdominal aortic aneurysm screening (once between ages 73-68) IPPE only No results found for this or an if High Risk   There are no preventive care reminders to display for this patient. Chlamydia  Annually if high risk No results found for: CHLAMYDIA No flowsheet data found.     Screening Mammogram      Mammogram    Recommend Annually to at least age 76, to the [place]. This site has a lot of good information including definitions of the different types of Advance Directives.  It also has the State forms available on it's website for anyone to review and print using their home compute hazards in your home that might cause you to fall, such as loose rugs or poor lighting? · Do you have a pet that jumps on you or might trip you? · Have you stopped getting regular exercise?   · Do you have diabetes?   · Do you have a neurologic disease, s

## 2019-07-09 NOTE — PROGRESS NOTES
HPI:   Markos Montes is a 75year old female who presents for a MA (Medicare Advantage) [address] [facility] (Once per calendar year). Thyroid Problem   This is a chronic problem. The current episode started more than 1 year ago. The problem occurs daily.  The p Episode frequency: Episodes can last all day. The problem has been unchanged. The pain is located in the bilateral region. The pain does not radiate. The quality of the pain is described as aching. The pain is mild. Associated symptoms include dizziness.  P for Falls and is High Risk: Fall/Risk Scorin    Increasing activity, increasing vitamin D, and/or physical therapy are recommended by the USPSTF and we discussed options, see handouts.     Do you have 3 or more medical conditions?: 1-Yes  Have you falle present), and forms available to patient in AVS         She smoked tobacco in the past but quit greater than 12 months ago.   Social History    Tobacco Use      Smoking status: Former Smoker        Years: 10.00        Types: Cigarettes        Quit date: 9/2 0.5 MG Oral Tab TK1 TABLET BY MOUTH TWICE DAILY AS NEEDED FOR ANXIETY   lidocaine 5 % External Patch Place 1 patch onto the skin daily.    traMADol HCl 50 MG Oral Tab Take 1 tablet (50 mg total) by mouth every 8 (eight) hours as needed for Pain.   rosanna gastro - dmg (9/14); upper gi endoscopy,diagnosis (N/A, 9/22/2014); colonoscopy,remv lesn,snare (N/A, 9/22/2014); patient withough preoperative order for iv antibiotic surgical site infection prophylaxis.  (N/A, 9/22/2014); patient documented not to have ex (Other)   Resp 20   Ht 60\"   Wt 123 lb   SpO2 98%   BMI 24.02 kg/m²  Estimated body mass index is 24.02 kg/m² as calculated from the following:    Height as of this encounter: 60\". Weight as of this encounter: 123 lb.     Medicare Hearing Assessment  ( no rebound and no guarding. No hernia. Musculoskeletal: Normal range of motion. Lymphadenopathy:     She has no cervical adenopathy. Neurological: She is alert and oriented to person, place, and time. Skin: Skin is warm and dry.  No lesion and no ra Tab; Take 1 tablet (50 mg total) by mouth every 8 (eight) hours as needed for Pain. Chronic right-sided low back pain without sciatica  -     traMADol HCl 50 MG Oral Tab; Take 1 tablet (50 mg total) by mouth every 8 (eight) hours as needed for Pain. previous visit. No flowsheet data found. Fecal Occult Blood Annually Occult Blood Result (no units)   Date Value   03/01/2018 Negative for Occult Blood    No flowsheet data found.     Glaucoma Screening      Ophthalmology Visit Annually: Diabetics, FHx covered with your pharmacy  prescription benefits      SPECIFIC DISEASE MONITORING Internal Lab or Procedure External Lab or Procedure      Annual Monitoring of Persistent     Medications (ACE/ARB, digoxin diuretics, anticonvulsants.)    Potassium  Annuall

## 2019-07-10 ENCOUNTER — APPOINTMENT (OUTPATIENT)
Dept: LAB | Age: 74
End: 2019-07-10
Attending: FAMILY MEDICINE
Payer: MEDICARE

## 2019-07-10 LAB
ALBUMIN SERPL-MCNC: 3.8 G/DL (ref 3.4–5)
ALBUMIN/GLOB SERPL: 1.1 {RATIO} (ref 1–2)
ALP LIVER SERPL-CCNC: 111 U/L (ref 55–142)
ALT SERPL-CCNC: 22 U/L (ref 13–56)
ANION GAP SERPL CALC-SCNC: 8 MMOL/L (ref 0–18)
AST SERPL-CCNC: 19 U/L (ref 15–37)
BASOPHILS # BLD AUTO: 0.08 X10(3) UL (ref 0–0.2)
BASOPHILS NFR BLD AUTO: 1.3 %
BILIRUB SERPL-MCNC: 0.5 MG/DL (ref 0.1–2)
BUN BLD-MCNC: 15 MG/DL (ref 7–18)
BUN/CREAT SERPL: 13.9 (ref 10–20)
CALCIUM BLD-MCNC: 9.7 MG/DL (ref 8.5–10.1)
CHLORIDE SERPL-SCNC: 109 MMOL/L (ref 98–112)
CHOLEST SMN-MCNC: 255 MG/DL (ref ?–200)
CO2 SERPL-SCNC: 25 MMOL/L (ref 21–32)
CREAT BLD-MCNC: 1.08 MG/DL (ref 0.55–1.02)
DEPRECATED RDW RBC AUTO: 49.3 FL (ref 35.1–46.3)
EOSINOPHIL # BLD AUTO: 0.22 X10(3) UL (ref 0–0.7)
EOSINOPHIL NFR BLD AUTO: 3.6 %
ERYTHROCYTE [DISTWIDTH] IN BLOOD BY AUTOMATED COUNT: 13.7 % (ref 11–15)
GLOBULIN PLAS-MCNC: 3.4 G/DL (ref 2.8–4.4)
GLUCOSE BLD-MCNC: 86 MG/DL (ref 70–99)
HCT VFR BLD AUTO: 44.6 % (ref 35–48)
HDLC SERPL-MCNC: 56 MG/DL (ref 40–59)
HGB BLD-MCNC: 14 G/DL (ref 12–16)
IMM GRANULOCYTES # BLD AUTO: 0.01 X10(3) UL (ref 0–1)
IMM GRANULOCYTES NFR BLD: 0.2 %
LDLC SERPL CALC-MCNC: 170 MG/DL (ref ?–100)
LYMPHOCYTES # BLD AUTO: 1.68 X10(3) UL (ref 1–4)
LYMPHOCYTES NFR BLD AUTO: 27.1 %
M PROTEIN MFR SERPL ELPH: 7.2 G/DL (ref 6.4–8.2)
MCH RBC QN AUTO: 30.6 PG (ref 26–34)
MCHC RBC AUTO-ENTMCNC: 31.4 G/DL (ref 31–37)
MCV RBC AUTO: 97.6 FL (ref 80–100)
MONOCYTES # BLD AUTO: 0.86 X10(3) UL (ref 0.1–1)
MONOCYTES NFR BLD AUTO: 13.9 %
NEUTROPHILS # BLD AUTO: 3.34 X10 (3) UL (ref 1.5–7.7)
NEUTROPHILS # BLD AUTO: 3.34 X10(3) UL (ref 1.5–7.7)
NEUTROPHILS NFR BLD AUTO: 53.9 %
NONHDLC SERPL-MCNC: 199 MG/DL (ref ?–130)
OSMOLALITY SERPL CALC.SUM OF ELEC: 294 MOSM/KG (ref 275–295)
PLATELET # BLD AUTO: 343 10(3)UL (ref 150–450)
POTASSIUM SERPL-SCNC: 4.1 MMOL/L (ref 3.5–5.1)
RBC # BLD AUTO: 4.57 X10(6)UL (ref 3.8–5.3)
SODIUM SERPL-SCNC: 142 MMOL/L (ref 136–145)
TRIGL SERPL-MCNC: 143 MG/DL (ref 30–149)
TSI SER-ACNC: 2.42 MIU/ML (ref 0.36–3.74)
VLDLC SERPL CALC-MCNC: 29 MG/DL (ref 0–30)
WBC # BLD AUTO: 6.2 X10(3) UL (ref 4–11)

## 2019-07-10 PROCEDURE — 84443 ASSAY THYROID STIM HORMONE: CPT | Performed by: FAMILY MEDICINE

## 2019-07-10 PROCEDURE — 80061 LIPID PANEL: CPT | Performed by: FAMILY MEDICINE

## 2019-07-10 PROCEDURE — 85025 COMPLETE CBC W/AUTO DIFF WBC: CPT | Performed by: FAMILY MEDICINE

## 2019-07-10 PROCEDURE — 36415 COLL VENOUS BLD VENIPUNCTURE: CPT | Performed by: FAMILY MEDICINE

## 2019-07-10 PROCEDURE — 80053 COMPREHEN METABOLIC PANEL: CPT | Performed by: FAMILY MEDICINE

## 2019-07-16 ENCOUNTER — HOSPITAL ENCOUNTER (OUTPATIENT)
Dept: BONE DENSITY | Age: 74
Discharge: HOME | End: 2019-07-16
Attending: FAMILY MEDICINE
Payer: MEDICARE

## 2019-07-16 DIAGNOSIS — Z78.0 POST-MENOPAUSAL: ICD-10-CM

## 2019-07-16 PROCEDURE — 77080 DXA BONE DENSITY AXIAL: CPT | Performed by: FAMILY MEDICINE

## 2019-07-30 ENCOUNTER — OFFICE VISIT (OUTPATIENT)
Dept: FAMILY MEDICINE CLINIC | Facility: CLINIC | Age: 74
End: 2019-07-30
Payer: COMMERCIAL

## 2019-07-30 VITALS
WEIGHT: 121 LBS | DIASTOLIC BLOOD PRESSURE: 82 MMHG | OXYGEN SATURATION: 99 % | SYSTOLIC BLOOD PRESSURE: 118 MMHG | BODY MASS INDEX: 23.75 KG/M2 | TEMPERATURE: 98 F | HEIGHT: 60 IN | HEART RATE: 77 BPM | RESPIRATION RATE: 20 BRPM

## 2019-07-30 DIAGNOSIS — R51.9 SINUS HEADACHE: Primary | ICD-10-CM

## 2019-07-30 DIAGNOSIS — G43.011 INTRACTABLE MIGRAINE WITHOUT AURA AND WITH STATUS MIGRAINOSUS: ICD-10-CM

## 2019-07-30 DIAGNOSIS — E78.5 HYPERLIPIDEMIA, UNSPECIFIED HYPERLIPIDEMIA TYPE: ICD-10-CM

## 2019-07-30 DIAGNOSIS — R51.9 SINUS HEADACHE: ICD-10-CM

## 2019-07-30 PROCEDURE — 99213 OFFICE O/P EST LOW 20 MIN: CPT | Performed by: FAMILY MEDICINE

## 2019-07-30 RX ORDER — AMOXICILLIN AND CLAVULANATE POTASSIUM 875; 125 MG/1; MG/1
1 TABLET, FILM COATED ORAL 2 TIMES DAILY
Qty: 20 TABLET | Refills: 0 | Status: SHIPPED | OUTPATIENT
Start: 2019-07-30 | End: 2019-08-09

## 2019-07-30 RX ORDER — PREDNISONE 20 MG/1
TABLET ORAL
Qty: 12 TABLET | Refills: 0 | Status: SHIPPED | OUTPATIENT
Start: 2019-07-30 | End: 2019-08-19

## 2019-07-30 RX ORDER — SUMATRIPTAN 25 MG/1
25 TABLET, FILM COATED ORAL EVERY 2 HOUR PRN
Qty: 20 TABLET | Refills: 1 | Status: SHIPPED | OUTPATIENT
Start: 2019-07-30 | End: 2019-08-06

## 2019-07-30 RX ORDER — ATORVASTATIN CALCIUM 20 MG/1
20 TABLET, FILM COATED ORAL NIGHTLY
Qty: 90 TABLET | Refills: 1 | Status: SHIPPED | OUTPATIENT
Start: 2019-07-30 | End: 2019-10-07

## 2019-07-30 RX ORDER — FLUTICASONE PROPIONATE 50 MCG
2 SPRAY, SUSPENSION (ML) NASAL DAILY
Qty: 1 BOTTLE | Refills: 1 | Status: SHIPPED | OUTPATIENT
Start: 2019-07-30 | End: 2019-10-07

## 2019-07-30 NOTE — PROGRESS NOTES
HPI:    Patient ID: Yoselyn Lay is a 75year old female. Headache    This is a chronic problem. The current episode started more than 1 month ago. The problem occurs constantly. The problem has been unchanged.  The pain is located in the bilateral re tablet Rfl: 0   SUMAtriptan Succinate 25 MG Oral Tab Take 1 tablet (25 mg total) by mouth every 2 (two) hours as needed for Migraine (max 100mg in 24 hrs).  Disp: 20 tablet Rfl: 1   clonazePAM 0.5 MG Oral Tab TK1 TABLET BY MOUTH TWICE DAILY AS NEEDED FOR AN Cardiovascular: Normal rate, regular rhythm and normal heart sounds. Pulmonary/Chest: Effort normal and breath sounds normal. No respiratory distress. She has no wheezes. She has no rales. Vitals reviewed.              ASSESSMENT/PLAN:   Sinus headach Succinate 25 MG Oral Tab 20 tablet 1     Sig: Take 1 tablet (25 mg total) by mouth every 2 (two) hours as needed for Migraine (max 100mg in 24 hrs).        Imaging & Referrals:  None       #[id number]

## 2019-07-31 RX ORDER — FLUTICASONE PROPIONATE 50 MCG
SPRAY, SUSPENSION (ML) NASAL
Refills: 1 | OUTPATIENT
Start: 2019-07-31

## 2019-08-06 ENCOUNTER — OFFICE VISIT (OUTPATIENT)
Dept: FAMILY MEDICINE CLINIC | Facility: CLINIC | Age: 74
End: 2019-08-06
Payer: COMMERCIAL

## 2019-08-06 VITALS
TEMPERATURE: 98 F | RESPIRATION RATE: 20 BRPM | SYSTOLIC BLOOD PRESSURE: 138 MMHG | OXYGEN SATURATION: 98 % | HEIGHT: 60 IN | DIASTOLIC BLOOD PRESSURE: 82 MMHG | WEIGHT: 121 LBS | BODY MASS INDEX: 23.75 KG/M2 | HEART RATE: 64 BPM

## 2019-08-06 DIAGNOSIS — Z82.49 FAMILY HISTORY OF BRAIN ANEURYSM: ICD-10-CM

## 2019-08-06 DIAGNOSIS — G43.011 INTRACTABLE MIGRAINE WITHOUT AURA AND WITH STATUS MIGRAINOSUS: Primary | ICD-10-CM

## 2019-08-06 DIAGNOSIS — R51.9 HEADACHE, WORSENING: ICD-10-CM

## 2019-08-06 DIAGNOSIS — G43.011 INTRACTABLE MIGRAINE WITHOUT AURA AND WITH STATUS MIGRAINOSUS: ICD-10-CM

## 2019-08-06 PROCEDURE — 99213 OFFICE O/P EST LOW 20 MIN: CPT | Performed by: FAMILY MEDICINE

## 2019-08-06 RX ORDER — SUMATRIPTAN 50 MG/1
50 TABLET, FILM COATED ORAL EVERY 2 HOUR PRN
Qty: 20 TABLET | Refills: 0 | Status: SHIPPED | OUTPATIENT
Start: 2019-08-06 | End: 2019-10-07

## 2019-08-06 RX ORDER — PROPRANOLOL HCL 60 MG
1 CAPSULE, EXTENDED RELEASE 24HR ORAL DAILY
Qty: 30 CAPSULE | Refills: 1 | Status: SHIPPED | OUTPATIENT
Start: 2019-08-06 | End: 2019-08-06

## 2019-08-06 RX ORDER — PROPRANOLOL HCL 60 MG
CAPSULE, EXTENDED RELEASE 24HR ORAL
Qty: 90 CAPSULE | Refills: 1 | Status: SHIPPED | OUTPATIENT
Start: 2019-08-06 | End: 2019-09-04

## 2019-08-06 RX ORDER — BUTALBITAL/ACETAMINOPHEN 50MG-325MG
1 TABLET ORAL EVERY 8 HOURS PRN
Qty: 40 TABLET | Refills: 0 | Status: SHIPPED | OUTPATIENT
Start: 2019-08-06 | End: 2019-08-19

## 2019-08-06 NOTE — PROGRESS NOTES
HPI:    Patient ID: Zachery Fish is a 75year old female. Headache    This is a chronic problem. The current episode started more than 1 month ago (2-3 months). The problem occurs constantly. The problem has been unchanged.  The pain is located in the nightly.  Disp: 90 tablet Rfl: 1   predniSONE 20 MG Oral Tab 2.5 tab day 1-2, 2 tab day 3&4, 1.5 tab day 5, 1 tab day 6, 0.5 tab day 7 Disp: 12 tablet Rfl: 0   clonazePAM 0.5 MG Oral Tab TK1 TABLET BY MOUTH TWICE DAILY AS NEEDED FOR ANXIETY Disp: 60 tablet Normal rate, regular rhythm and normal heart sounds. Pulmonary/Chest: Effort normal and breath sounds normal. No respiratory distress. She has no wheezes. She has no rales. Vitals reviewed.          ASSESSMENT/PLAN:   Intractable migraine without aura a

## 2019-08-07 ENCOUNTER — TELEPHONE (OUTPATIENT)
Dept: FAMILY MEDICINE CLINIC | Facility: CLINIC | Age: 74
End: 2019-08-07

## 2019-08-13 ENCOUNTER — HOSPITAL ENCOUNTER (OUTPATIENT)
Dept: MRI IMAGING | Age: 74
Discharge: HOME | End: 2019-08-13
Attending: FAMILY MEDICINE
Payer: MEDICARE

## 2019-08-13 DIAGNOSIS — G43.011 INTRACTABLE MIGRAINE WITHOUT AURA AND WITH STATUS MIGRAINOSUS: ICD-10-CM

## 2019-08-13 DIAGNOSIS — Z82.49 FAMILY HISTORY OF BRAIN ANEURYSM: ICD-10-CM

## 2019-08-13 DIAGNOSIS — R51.9 HEADACHE, WORSENING: ICD-10-CM

## 2019-08-13 PROCEDURE — 70551 MRI BRAIN STEM W/O DYE: CPT | Performed by: FAMILY MEDICINE

## 2019-08-13 PROCEDURE — 70544 MR ANGIOGRAPHY HEAD W/O DYE: CPT | Performed by: FAMILY MEDICINE

## 2019-08-19 ENCOUNTER — OFFICE VISIT (OUTPATIENT)
Dept: FAMILY MEDICINE CLINIC | Facility: CLINIC | Age: 74
End: 2019-08-19
Payer: COMMERCIAL

## 2019-08-19 VITALS
BODY MASS INDEX: 24.35 KG/M2 | WEIGHT: 124 LBS | TEMPERATURE: 98 F | RESPIRATION RATE: 18 BRPM | HEIGHT: 60 IN | SYSTOLIC BLOOD PRESSURE: 128 MMHG | OXYGEN SATURATION: 99 % | HEART RATE: 86 BPM | DIASTOLIC BLOOD PRESSURE: 76 MMHG

## 2019-08-19 DIAGNOSIS — R93.89 ABNORMAL MRI: Primary | ICD-10-CM

## 2019-08-19 DIAGNOSIS — R51.9 HEADACHE DISORDER: ICD-10-CM

## 2019-08-19 PROCEDURE — 99213 OFFICE O/P EST LOW 20 MIN: CPT | Performed by: FAMILY MEDICINE

## 2019-08-19 NOTE — PROGRESS NOTES
HPI:    Patient ID: Elton Gallagher is a 75year old female. Pt's previous MRI shows that pt had Mild FLAIR abnormalities the white matter likely sequelae of mild chronic small vessel ischemic disease    Headache    This is a chronic problem.  The shiv tablet Rfl: 0   traMADol HCl 50 MG Oral Tab Take 1 tablet (50 mg total) by mouth every 8 (eight) hours as needed for Pain.  Disp: 20 tablet Rfl: 0   triamcinolone acetonide 0.1 % External Cream Apply topically to the affected area twice daily until rash res previous medications that we ordered. Return in about 3 months (around 11/19/2019), or if symptoms worsen or fail to improve. 1. Abnormal MRI  - CTA BRAIN (CPT=70496); Future  2. Headache  Nothing seen on MRI that tells of reason for headache.

## 2019-08-30 ENCOUNTER — OFFICE VISIT (OUTPATIENT)
Dept: FAMILY MEDICINE CLINIC | Facility: CLINIC | Age: 74
End: 2019-08-30
Payer: COMMERCIAL

## 2019-08-30 VITALS
DIASTOLIC BLOOD PRESSURE: 74 MMHG | TEMPERATURE: 98 F | BODY MASS INDEX: 24.35 KG/M2 | OXYGEN SATURATION: 98 % | WEIGHT: 124 LBS | HEIGHT: 60 IN | RESPIRATION RATE: 20 BRPM | SYSTOLIC BLOOD PRESSURE: 110 MMHG | HEART RATE: 71 BPM

## 2019-08-30 DIAGNOSIS — H25.9 AGE-RELATED CATARACT OF BOTH EYES, UNSPECIFIED AGE-RELATED CATARACT TYPE: ICD-10-CM

## 2019-08-30 DIAGNOSIS — E78.5 HYPERLIPIDEMIA, UNSPECIFIED HYPERLIPIDEMIA TYPE: ICD-10-CM

## 2019-08-30 DIAGNOSIS — IMO0002 CHRONIC MIGRAINE: Primary | ICD-10-CM

## 2019-08-30 PROCEDURE — 99214 OFFICE O/P EST MOD 30 MIN: CPT | Performed by: FAMILY MEDICINE

## 2019-08-30 RX ORDER — HYDROCODONE BITARTRATE AND ACETAMINOPHEN 5; 325 MG/1; MG/1
1 TABLET ORAL EVERY 4 HOURS PRN
Qty: 20 TABLET | Refills: 0 | Status: SHIPPED | OUTPATIENT
Start: 2019-08-30 | End: 2019-09-27

## 2019-09-02 ENCOUNTER — HOSPITAL ENCOUNTER (EMERGENCY)
Facility: HOSPITAL | Age: 74
Discharge: HOME | End: 2019-09-02
Attending: EMERGENCY MEDICINE
Payer: MEDICARE

## 2019-09-02 ENCOUNTER — APPOINTMENT (OUTPATIENT)
Dept: CT IMAGING | Facility: HOSPITAL | Age: 74
End: 2019-09-02
Attending: EMERGENCY MEDICINE
Payer: MEDICARE

## 2019-09-02 ENCOUNTER — APPOINTMENT (OUTPATIENT)
Dept: GENERAL RADIOLOGY | Facility: HOSPITAL | Age: 74
End: 2019-09-02
Attending: EMERGENCY MEDICINE
Payer: MEDICARE

## 2019-09-02 VITALS
TEMPERATURE: 98 F | RESPIRATION RATE: 17 BRPM | OXYGEN SATURATION: 93 % | HEART RATE: 52 BPM | DIASTOLIC BLOOD PRESSURE: 60 MMHG | WEIGHT: 123 LBS | SYSTOLIC BLOOD PRESSURE: 141 MMHG | BODY MASS INDEX: 24.15 KG/M2 | HEIGHT: 60 IN

## 2019-09-02 DIAGNOSIS — I10 ESSENTIAL HYPERTENSION: ICD-10-CM

## 2019-09-02 DIAGNOSIS — R55 SYNCOPE, NEAR: Primary | ICD-10-CM

## 2019-09-02 DIAGNOSIS — R00.1 BRADYCARDIA: ICD-10-CM

## 2019-09-02 LAB
ANION GAP SERPL CALC-SCNC: 3 MMOL/L (ref 0–18)
BASOPHILS # BLD AUTO: 0.09 X10(3) UL (ref 0–0.2)
BASOPHILS NFR BLD AUTO: 0.9 %
BILIRUB UR QL STRIP.AUTO: NEGATIVE
BUN BLD-MCNC: 14 MG/DL (ref 7–18)
BUN/CREAT SERPL: 14.6 (ref 10–20)
CALCIUM BLD-MCNC: 9.5 MG/DL (ref 8.5–10.1)
CHLORIDE SERPL-SCNC: 112 MMOL/L (ref 98–112)
CLARITY UR REFRACT.AUTO: CLEAR
CO2 SERPL-SCNC: 28 MMOL/L (ref 21–32)
CREAT BLD-MCNC: 0.96 MG/DL (ref 0.55–1.02)
DEPRECATED RDW RBC AUTO: 44.5 FL (ref 35.1–46.3)
EOSINOPHIL # BLD AUTO: 0.21 X10(3) UL (ref 0–0.7)
EOSINOPHIL NFR BLD AUTO: 2.2 %
ERYTHROCYTE [DISTWIDTH] IN BLOOD BY AUTOMATED COUNT: 13.2 % (ref 11–15)
GLUCOSE BLD-MCNC: 95 MG/DL (ref 70–99)
GLUCOSE UR STRIP.AUTO-MCNC: NEGATIVE MG/DL
HCT VFR BLD AUTO: 40.3 % (ref 35–48)
HGB BLD-MCNC: 13.8 G/DL (ref 12–16)
IMM GRANULOCYTES # BLD AUTO: 0.02 X10(3) UL (ref 0–1)
IMM GRANULOCYTES NFR BLD: 0.2 %
KETONES UR STRIP.AUTO-MCNC: NEGATIVE MG/DL
LYMPHOCYTES # BLD AUTO: 1.47 X10(3) UL (ref 1–4)
LYMPHOCYTES NFR BLD AUTO: 15.4 %
MCH RBC QN AUTO: 31.2 PG (ref 26–34)
MCHC RBC AUTO-ENTMCNC: 34.2 G/DL (ref 31–37)
MCV RBC AUTO: 91.2 FL (ref 80–100)
MONOCYTES # BLD AUTO: 0.87 X10(3) UL (ref 0.1–1)
MONOCYTES NFR BLD AUTO: 9.1 %
NEUTROPHILS # BLD AUTO: 6.87 X10 (3) UL (ref 1.5–7.7)
NEUTROPHILS # BLD AUTO: 6.87 X10(3) UL (ref 1.5–7.7)
NEUTROPHILS NFR BLD AUTO: 72.2 %
NITRITE UR QL STRIP.AUTO: NEGATIVE
OSMOLALITY SERPL CALC.SUM OF ELEC: 296 MOSM/KG (ref 275–295)
PH UR STRIP.AUTO: 7 [PH] (ref 4.5–8)
PLATELET # BLD AUTO: 283 10(3)UL (ref 150–450)
POTASSIUM SERPL-SCNC: 3.8 MMOL/L (ref 3.5–5.1)
PROT UR STRIP.AUTO-MCNC: NEGATIVE MG/DL
RBC # BLD AUTO: 4.42 X10(6)UL (ref 3.8–5.3)
SODIUM SERPL-SCNC: 143 MMOL/L (ref 136–145)
SP GR UR STRIP.AUTO: 1.01 (ref 1–1.03)
TROPONIN I SERPL-MCNC: <0.045 NG/ML (ref ?–0.04)
UROBILINOGEN UR STRIP.AUTO-MCNC: <2 MG/DL
WBC # BLD AUTO: 9.5 X10(3) UL (ref 4–11)

## 2019-09-02 PROCEDURE — 84484 ASSAY OF TROPONIN QUANT: CPT | Performed by: EMERGENCY MEDICINE

## 2019-09-02 PROCEDURE — 96360 HYDRATION IV INFUSION INIT: CPT

## 2019-09-02 PROCEDURE — 71046 X-RAY EXAM CHEST 2 VIEWS: CPT | Performed by: EMERGENCY MEDICINE

## 2019-09-02 PROCEDURE — 70450 CT HEAD/BRAIN W/O DYE: CPT | Performed by: EMERGENCY MEDICINE

## 2019-09-02 PROCEDURE — 99285 EMERGENCY DEPT VISIT HI MDM: CPT

## 2019-09-02 PROCEDURE — 81001 URINALYSIS AUTO W/SCOPE: CPT | Performed by: EMERGENCY MEDICINE

## 2019-09-02 PROCEDURE — 93005 ELECTROCARDIOGRAM TRACING: CPT

## 2019-09-02 PROCEDURE — 85025 COMPLETE CBC W/AUTO DIFF WBC: CPT | Performed by: EMERGENCY MEDICINE

## 2019-09-02 PROCEDURE — 93010 ELECTROCARDIOGRAM REPORT: CPT

## 2019-09-02 PROCEDURE — 96361 HYDRATE IV INFUSION ADD-ON: CPT

## 2019-09-02 PROCEDURE — 80048 BASIC METABOLIC PNL TOTAL CA: CPT | Performed by: EMERGENCY MEDICINE

## 2019-09-02 RX ORDER — AMLODIPINE BESYLATE 2.5 MG/1
2.5 TABLET ORAL DAILY
Qty: 20 TABLET | Refills: 0 | Status: SHIPPED | OUTPATIENT
Start: 2019-09-02 | End: 2019-09-16

## 2019-09-02 RX ORDER — SODIUM CHLORIDE 9 MG/ML
1000 INJECTION, SOLUTION INTRAVENOUS ONCE
Status: COMPLETED | OUTPATIENT
Start: 2019-09-02 | End: 2019-09-02

## 2019-09-02 NOTE — ED INITIAL ASSESSMENT (HPI)
Had episode of dizziness and fell around 5am on Sunday. Pt reports she has had a CT scan for the headaches she has had over the past couple months and was diagnosed with migraines. Pt reports increased and worsening sinus congestion.

## 2019-09-02 NOTE — PROGRESS NOTES
HPI:    Patient ID: Woo Egan is a 75year old female. Has cataracts b/l eyes. Requesting referral to ophthomology to get surgery done as vision is getting worse from the cataracts. Migraine    This is a chronic problem.  The current episode s (four) hours as needed for Pain. Disp: 20 tablet Rfl: 0   SUMAtriptan Succinate 50 MG Oral Tab Take 1 tablet (50 mg total) by mouth every 2 (two) hours as needed for Migraine (max 100mg in 24 hrs).  Disp: 20 tablet Rfl: 0   PROPRANOLOL HCL ER 60 MG Oral Cap Exam   Constitutional: She appears well-developed and well-nourished. No distress. Eyes: Conjunctivae are normal. Right eye exhibits no discharge. Left eye exhibits no discharge. No scleral icterus. Neck: Normal range of motion. Neck supple.  No thyrome

## 2019-09-02 NOTE — ED PROVIDER NOTES
Patient Seen in: [facility] Emergency Department    History   Patient presents with:  Headache (neurologic)    Stated Complaint: uri    HPI    Patient had recent office visit with her primary care doctor just a few days ago.   Patient has a history o her doctor treated her with antibiotic and steroid for sinus infection about 1 month ago. Patient denies any focal numbness or weakness, slurred speech, facial droop, or incoordination or clumsiness at this time. No chest pain or shortness of breath.   No • GASTRO - DMG  9/14    stricture; HH   • LAPAROSCOPIC OVARIAN CYSTECTOMY N/A 2/9/2016    Performed by Luigi Singh MD at [facility] MAIN OR   • OOPHORECTOMY Bilateral 2017                    Social History    Tobacco Use      Smoking status: Former Smoker edema.  Skin: Unremarkable. No skin change or skin rash over the frontal scalp or temporal scalp. Neurologic:  Mental status as above. Cranial nerves as noted above patient moves all extremities with good strength and coordination.   Sensation intact t elevated 149/77. Even 9 months ago at urgent care visit, blood pressure was elevated 135/76. Patient's headache has been chronic and persistent and a little atypical of migraine. As noted, her blood pressure has been elevated for some time.   It is possi and cardiologist tomorrow to arrange close follow-up in the office.   Patient in agreement with this plan and was discharged per her request      Disposition and Plan     Clinical Impression:  Syncope, near  (primary encounter diagnosis)  Essential hyperten

## 2019-09-03 LAB
ATRIAL RATE: 48 BPM
P AXIS: 48 DEGREES
P-R INTERVAL: 176 MS
Q-T INTERVAL: 484 MS
QRS DURATION: 76 MS
QTC CALCULATION (BEZET): 432 MS
R AXIS: -23 DEGREES
T AXIS: 41 DEGREES
VENTRICULAR RATE: 48 BPM

## 2019-09-04 ENCOUNTER — OFFICE VISIT (OUTPATIENT)
Dept: FAMILY MEDICINE CLINIC | Facility: CLINIC | Age: 74
End: 2019-09-04
Payer: COMMERCIAL

## 2019-09-04 VITALS
TEMPERATURE: 97 F | HEIGHT: 60 IN | OXYGEN SATURATION: 97 % | HEART RATE: 72 BPM | DIASTOLIC BLOOD PRESSURE: 80 MMHG | WEIGHT: 124 LBS | SYSTOLIC BLOOD PRESSURE: 134 MMHG | BODY MASS INDEX: 24.35 KG/M2 | RESPIRATION RATE: 18 BRPM

## 2019-09-04 DIAGNOSIS — R51.9 HEADACHE DISORDER: Primary | ICD-10-CM

## 2019-09-04 DIAGNOSIS — I10 ESSENTIAL HYPERTENSION: ICD-10-CM

## 2019-09-04 DIAGNOSIS — N18.30 CKD (CHRONIC KIDNEY DISEASE) STAGE 3, GFR 30-59 ML/MIN (HCC): ICD-10-CM

## 2019-09-04 PROCEDURE — 99214 OFFICE O/P EST MOD 30 MIN: CPT | Performed by: FAMILY MEDICINE

## 2019-09-04 RX ORDER — BUDESONIDE AND FORMOTEROL FUMARATE DIHYDRATE 160; 4.5 UG/1; UG/1
AEROSOL RESPIRATORY (INHALATION)
Qty: 1 INHALER | Refills: 11 | Status: SHIPPED | OUTPATIENT
Start: 2019-09-04 | End: 2020-08-25

## 2019-09-04 RX ORDER — ALBUTEROL SULFATE 90 UG/1
AEROSOL, METERED RESPIRATORY (INHALATION)
Qty: 8.5 G | Refills: 11 | Status: SHIPPED | OUTPATIENT
Start: 2019-09-04 | End: 2021-08-30

## 2019-09-11 RX ORDER — AMLODIPINE BESYLATE 2.5 MG/1
2.5 TABLET ORAL
COMMUNITY
Start: 2019-09-02

## 2019-09-11 RX ORDER — ATORVASTATIN CALCIUM 20 MG/1
20 TABLET, FILM COATED ORAL
COMMUNITY
Start: 2019-07-30 | End: 2019-09-12 | Stop reason: ALTCHOICE

## 2019-09-11 RX ORDER — FLUTICASONE PROPIONATE 50 MCG
2 SPRAY, SUSPENSION (ML) NASAL
COMMUNITY
Start: 2019-07-30

## 2019-09-11 RX ORDER — ALBUTEROL SULFATE 90 UG/1
AEROSOL, METERED RESPIRATORY (INHALATION)
COMMUNITY
Start: 2019-09-04

## 2019-09-11 RX ORDER — HYDROCODONE BITARTRATE AND ACETAMINOPHEN 5; 325 MG/1; MG/1
1 TABLET ORAL
COMMUNITY
Start: 2019-08-30 | End: 2019-09-09

## 2019-09-11 RX ORDER — TRIAMCINOLONE ACETONIDE 1 MG/G
CREAM TOPICAL
COMMUNITY
Start: 2018-03-23

## 2019-09-11 RX ORDER — PREDNISONE 10 MG/1
10 TABLET ORAL
COMMUNITY
Start: 2018-05-03 | End: 2019-09-10

## 2019-09-11 RX ORDER — FLUTICASONE PROPIONATE 220 UG/1
AEROSOL, METERED RESPIRATORY (INHALATION)
COMMUNITY
Start: 2011-07-28

## 2019-09-11 RX ORDER — SUMATRIPTAN 50 MG/1
50 TABLET, FILM COATED ORAL
COMMUNITY
Start: 2019-08-06 | End: 2019-09-10 | Stop reason: ALTCHOICE

## 2019-09-11 RX ORDER — SERTRALINE HYDROCHLORIDE 100 MG/1
150 TABLET, FILM COATED ORAL
COMMUNITY
Start: 2019-03-13

## 2019-09-11 RX ORDER — HYDROXYZINE HYDROCHLORIDE 10 MG/1
TABLET, FILM COATED ORAL
COMMUNITY
Start: 2018-04-01 | End: 2019-09-10 | Stop reason: CLARIF

## 2019-09-11 RX ORDER — CLONAZEPAM 0.5 MG/1
TABLET ORAL
COMMUNITY
Start: 2018-03-27

## 2019-09-11 RX ORDER — BUDESONIDE AND FORMOTEROL FUMARATE DIHYDRATE 160; 4.5 UG/1; UG/1
AEROSOL RESPIRATORY (INHALATION)
COMMUNITY
Start: 2018-01-12

## 2019-09-11 RX ORDER — PANTOPRAZOLE SODIUM 40 MG/1
TABLET, DELAYED RELEASE ORAL
COMMUNITY
Start: 2018-04-20

## 2019-09-11 RX ORDER — FAMOTIDINE 20 MG/1
TABLET, FILM COATED ORAL
COMMUNITY
Start: 2018-03-20 | End: 2019-09-09 | Stop reason: CLARIF

## 2019-09-11 RX ORDER — LEVOTHYROXINE SODIUM 0.05 MG/1
50 TABLET ORAL
COMMUNITY
Start: 2018-01-15

## 2019-09-12 ENCOUNTER — OFFICE VISIT (OUTPATIENT)
Dept: CARDIOLOGY | Age: 74
End: 2019-09-12

## 2019-09-12 VITALS
HEART RATE: 72 BPM | SYSTOLIC BLOOD PRESSURE: 130 MMHG | HEIGHT: 60 IN | BODY MASS INDEX: 24.15 KG/M2 | WEIGHT: 123 LBS | DIASTOLIC BLOOD PRESSURE: 70 MMHG

## 2019-09-12 DIAGNOSIS — I10 ESSENTIAL HYPERTENSION: Primary | ICD-10-CM

## 2019-09-12 DIAGNOSIS — E78.2 HYPERLIPIDEMIA, MIXED: ICD-10-CM

## 2019-09-12 PROCEDURE — 3075F SYST BP GE 130 - 139MM HG: CPT | Performed by: INTERNAL MEDICINE

## 2019-09-12 PROCEDURE — 93000 ELECTROCARDIOGRAM COMPLETE: CPT | Performed by: INTERNAL MEDICINE

## 2019-09-12 PROCEDURE — 99203 OFFICE O/P NEW LOW 30 MIN: CPT | Performed by: INTERNAL MEDICINE

## 2019-09-12 PROCEDURE — 3078F DIAST BP <80 MM HG: CPT | Performed by: INTERNAL MEDICINE

## 2019-09-12 RX ORDER — ATORVASTATIN CALCIUM 40 MG/1
40 TABLET, FILM COATED ORAL DAILY
Qty: 90 TABLET | Refills: 3 | Status: SHIPPED | OUTPATIENT
Start: 2019-09-12

## 2019-09-12 ASSESSMENT — ENCOUNTER SYMPTOMS
WEIGHT GAIN: 0
HEADACHES: 1
HEMATOCHEZIA: 0
CHILLS: 0
HEMOPTYSIS: 0
FEVER: 0
BRUISES/BLEEDS EASILY: 0
SUSPICIOUS LESIONS: 0
ALLERGIC/IMMUNOLOGIC COMMENTS: NO NEW FOOD ALLERGIES
COUGH: 0
WEIGHT LOSS: 0

## 2019-09-16 NOTE — TELEPHONE ENCOUNTER
Pt needs refill of amlodipine  Pt needs it by Friday/ she spoke with Dr. Severiano Corona about filling it

## 2019-09-17 RX ORDER — AMLODIPINE BESYLATE 2.5 MG/1
2.5 TABLET ORAL DAILY
Qty: 30 TABLET | Refills: 0 | Status: SHIPPED | OUTPATIENT
Start: 2019-09-17 | End: 2019-10-07

## 2019-09-17 NOTE — PROGRESS NOTES
HPI:    Patient ID: Troy Aden is a 75year old female. HTN   This is a chronic problem. The current episode started more than 1 year ago. Progression since onset: rapidly worsened which caused her to go to ED. switching of her BP meds helped.  A 108 (90 Base) MCG/ACT Inhalation Aero Soln INHALE 2 PUFFS BY MOUTH EVERY 6 HOURS AS NEEDED FOR WHEEZING Disp: 8.5 g Rfl: 11   amLODIPine Besylate 2.5 MG Oral Tab Take 1 tablet (2.5 mg total) by mouth daily.  Disp: 20 tablet Rfl: 0   HYDROcodone-acetaminophe well-developed and well-nourished. No distress. Eyes: Conjunctivae are normal. Right eye exhibits no discharge. Left eye exhibits no discharge. No scleral icterus. Neck: Normal range of motion. Neck supple. No thyromegaly present.    Cardiovascular: Nor

## 2019-09-27 ENCOUNTER — OFFICE VISIT (OUTPATIENT)
Dept: FAMILY MEDICINE CLINIC | Facility: CLINIC | Age: 74
End: 2019-09-27
Payer: COMMERCIAL

## 2019-09-27 VITALS
DIASTOLIC BLOOD PRESSURE: 80 MMHG | OXYGEN SATURATION: 98 % | SYSTOLIC BLOOD PRESSURE: 138 MMHG | WEIGHT: 127 LBS | TEMPERATURE: 98 F | HEART RATE: 72 BPM | RESPIRATION RATE: 20 BRPM | HEIGHT: 60 IN | BODY MASS INDEX: 24.94 KG/M2

## 2019-09-27 DIAGNOSIS — I10 ESSENTIAL HYPERTENSION: ICD-10-CM

## 2019-09-27 DIAGNOSIS — R51.9 HEADACHE DISORDER: Primary | ICD-10-CM

## 2019-09-27 DIAGNOSIS — R51.9 HEADACHE DISORDER: ICD-10-CM

## 2019-09-27 DIAGNOSIS — IMO0002 CHRONIC MIGRAINE: ICD-10-CM

## 2019-09-27 PROCEDURE — 99213 OFFICE O/P EST LOW 20 MIN: CPT | Performed by: FAMILY MEDICINE

## 2019-09-27 RX ORDER — TOPIRAMATE 50 MG/1
50 TABLET, FILM COATED ORAL 2 TIMES DAILY
Qty: 180 TABLET | Refills: 0 | Status: SHIPPED | OUTPATIENT
Start: 2019-09-27 | End: 2019-10-07

## 2019-09-27 RX ORDER — TOPIRAMATE 50 MG/1
TABLET, FILM COATED ORAL
Qty: 60 TABLET | Refills: 0 | Status: SHIPPED | OUTPATIENT
Start: 2019-09-27 | End: 2019-09-27

## 2019-09-27 RX ORDER — AMLODIPINE BESYLATE 5 MG/1
5 TABLET ORAL DAILY
Qty: 30 TABLET | Refills: 0 | Status: SHIPPED | OUTPATIENT
Start: 2019-09-27 | End: 2019-09-27

## 2019-09-27 RX ORDER — HYDROCODONE BITARTRATE AND ACETAMINOPHEN 5; 325 MG/1; MG/1
1 TABLET ORAL EVERY 4 HOURS PRN
Qty: 20 TABLET | Refills: 0 | Status: SHIPPED | OUTPATIENT
Start: 2019-09-27 | End: 2019-10-07

## 2019-09-27 RX ORDER — AMLODIPINE BESYLATE 5 MG/1
TABLET ORAL
Qty: 90 TABLET | Refills: 0 | Status: SHIPPED | OUTPATIENT
Start: 2019-09-27 | End: 2021-09-08

## 2019-09-27 NOTE — PROGRESS NOTES
HPI:    Patient ID: Snehal Phillips is a 75year old female. Pt has had four visits with a chiropractor without relief.  and has an upcoming appointment with a neurologist. Pt stats that she has been trying not to take the Norco, but experiences relief w reviewed and are negative. Current Outpatient Medications:  hydrocortisone 2.5 % External Ointment Apply topically. Disp:  Rfl:    Ipratropium-Albuterol  MCG/ACT Inhalation Aero Soln Inhale 2 puffs into the lungs.  Disp:  Rfl:    topiramat mouth once daily. Disp: 90 tablet Rfl: 3   Dupilumab (DUPIXENT) 300 MG/2ML Subcutaneous Solution Prefilled Syringe Inject 300 mg into the skin. Disp:  Rfl:      Allergies:   Avelox [Moxifloxaci*    SWELLING  Avelox [Moxifloxaci*    TONGUE SWELLING    Commen 5 MG Oral Tab; Take 1 tablet (5 mg total) by mouth daily. Dispense: 30 tablet;  Refill: 0      Meds This Visit:  Requested Prescriptions     Signed Prescriptions Disp Refills   • topiramate 50 MG Oral Tab 60 tablet 0     Si/2 tablet twice daily x 2 wee

## 2019-10-07 ENCOUNTER — APPOINTMENT (OUTPATIENT)
Dept: LAB | Age: 74
End: 2019-10-07
Attending: Other
Payer: MEDICARE

## 2019-10-07 ENCOUNTER — OFFICE VISIT (OUTPATIENT)
Dept: NEUROLOGY | Facility: CLINIC | Age: 74
End: 2019-10-07
Payer: COMMERCIAL

## 2019-10-07 VITALS
BODY MASS INDEX: 25 KG/M2 | DIASTOLIC BLOOD PRESSURE: 70 MMHG | SYSTOLIC BLOOD PRESSURE: 122 MMHG | WEIGHT: 127 LBS | HEART RATE: 68 BPM | RESPIRATION RATE: 16 BRPM

## 2019-10-07 DIAGNOSIS — K06.8 PAIN IN GUMS: ICD-10-CM

## 2019-10-07 DIAGNOSIS — G44.201 INTRACTABLE TENSION-TYPE HEADACHE, UNSPECIFIED CHRONICITY PATTERN: ICD-10-CM

## 2019-10-07 DIAGNOSIS — G43.001 MIGRAINE WITHOUT AURA AND WITH STATUS MIGRAINOSUS, NOT INTRACTABLE: ICD-10-CM

## 2019-10-07 DIAGNOSIS — F41.9 ANXIETY: ICD-10-CM

## 2019-10-07 DIAGNOSIS — R41.3 MEMORY DEFICIT: Primary | ICD-10-CM

## 2019-10-07 DIAGNOSIS — F32.A DEPRESSION, UNSPECIFIED DEPRESSION TYPE: ICD-10-CM

## 2019-10-07 PROBLEM — G43.009 MIGRAINE WITHOUT AURA, NOT INTRACTABLE: Status: ACTIVE | Noted: 2019-10-07

## 2019-10-07 PROBLEM — G44.209 TENSION TYPE HEADACHE: Status: ACTIVE | Noted: 2019-10-07

## 2019-10-07 PROCEDURE — 85652 RBC SED RATE AUTOMATED: CPT | Performed by: OTHER

## 2019-10-07 PROCEDURE — 82607 VITAMIN B-12: CPT | Performed by: OTHER

## 2019-10-07 PROCEDURE — 82746 ASSAY OF FOLIC ACID SERUM: CPT | Performed by: OTHER

## 2019-10-07 PROCEDURE — 99205 OFFICE O/P NEW HI 60 MIN: CPT | Performed by: OTHER

## 2019-10-07 PROCEDURE — 36415 COLL VENOUS BLD VENIPUNCTURE: CPT | Performed by: OTHER

## 2019-10-07 RX ORDER — SERTRALINE HYDROCHLORIDE 100 MG/1
200 TABLET, FILM COATED ORAL DAILY
Qty: 60 TABLET | Refills: 5 | Status: SHIPPED | OUTPATIENT
Start: 2019-10-07 | End: 2020-07-15

## 2019-10-07 RX ORDER — ACETAMINOPHEN 500 MG
500 TABLET ORAL EVERY 6 HOURS PRN
COMMUNITY

## 2019-10-07 NOTE — PROGRESS NOTES
The patient is here for headaches. The patient is having headaches on the frontal region. Patient states constant headaches for the past four 4 months. Patient is also having pain in the temporal region that goes into the neck and shoulder.  Patient is marley

## 2019-10-07 NOTE — PROGRESS NOTES
[address]   Neurology; INITIAL CLINIC VISIT  10/7/2019, 2:24 PM     Elton Gallagher Patient Status:  No patient class for patient encounter    1945 MRN [id number]   Location [address] [place] [address] (postoperative nausea and vomiting)    • Rash    • Reflux    • Stress    • Uncomfortable fullness after meals    • Visual impairment     glasses   • Wears glasses        PAST SURGICAL HISTORY:  Past Surgical History:   Procedure Laterality Date   • COLONOS Medications:  acetaminophen 500 MG Oral Tab Take 500 mg by mouth every 6 (six) hours as needed for Pain. Disp:  Rfl:    Acetaminophen-Caffeine (EXCEDRIN TENSION HEADACHE) 500-65 MG Oral Tab Take 1 tablet by mouth every 8 (eight) hours as needed.  Disp: 30 t denies bruising or excessive bleeding  ENDOCRINE: denies excessive thirst or urination; denies unexpected wt gain or wt loss  ALLERGY/IMM.: denies food or seasonal allergies      PHYSICAL EXAMINATION:  VITAL SIGNS: /70   Pulse 68   Resp 16   Wt 127 l LABS:    Reviewed with patient      IMAGING: reviewed film or imaging with patient. CONCLUSION:  Stable chronic changes. No acute disease.         Dictated by: Jose Hood MD on 9/02/2019 at 18:04       Approved by: Jose Hood MD       CO Sertraline HCl 100 MG Oral Tab    (F41.9) Anxiety  Plan: Sertraline HCl 100 MG Oral Tab        Summery:  Clinically, This  patient presented with frequent HA, likely migraine of atypical form, Neurological Exam today is normal  MRI brain is normal, I kody

## 2019-10-09 ENCOUNTER — TELEPHONE (OUTPATIENT)
Dept: NEUROLOGY | Facility: CLINIC | Age: 74
End: 2019-10-09

## 2019-10-09 NOTE — TELEPHONE ENCOUNTER
To date, all labs resulted are normal, per below result note. Per Epic review, note that NICHOLAS with reflex is still outstanding. There is no indication in Epic that this lab was drawn.   Contacted ED lab regarding this test.  Spoke with [employer] who states

## 2019-10-14 ENCOUNTER — APPOINTMENT (OUTPATIENT)
Dept: LAB | Age: 74
End: 2019-10-14
Attending: Other
Payer: MEDICARE

## 2019-10-17 ENCOUNTER — TELEPHONE (OUTPATIENT)
Dept: NEUROLOGY | Facility: CLINIC | Age: 74
End: 2019-10-17

## 2019-10-17 NOTE — TELEPHONE ENCOUNTER
Spoke with patient and relayed NICHOLAS result from Dr. Naz Stevens. Pt verbalized understanding. Answered all questions and pt was encouraged to call office with any additional questions or concerns.       ----- Message from Will Campa MD sent at 10/16/2019  4:22 PM

## 2019-10-22 DIAGNOSIS — I10 ESSENTIAL HYPERTENSION: ICD-10-CM

## 2019-10-22 RX ORDER — AMLODIPINE BESYLATE 5 MG/1
TABLET ORAL
Qty: 30 TABLET | Refills: 0 | Status: SHIPPED | OUTPATIENT
Start: 2019-10-22 | End: 2019-11-22

## 2019-10-29 ENCOUNTER — OFFICE VISIT (OUTPATIENT)
Dept: FAMILY MEDICINE CLINIC | Facility: CLINIC | Age: 74
End: 2019-10-29
Payer: COMMERCIAL

## 2019-10-29 VITALS
DIASTOLIC BLOOD PRESSURE: 72 MMHG | HEIGHT: 60 IN | WEIGHT: 127 LBS | SYSTOLIC BLOOD PRESSURE: 132 MMHG | HEART RATE: 81 BPM | BODY MASS INDEX: 24.94 KG/M2 | TEMPERATURE: 98 F | RESPIRATION RATE: 18 BRPM | OXYGEN SATURATION: 98 %

## 2019-10-29 DIAGNOSIS — R51.9 HEADACHE DISORDER: Primary | ICD-10-CM

## 2019-10-29 DIAGNOSIS — IMO0002 CHRONIC MIGRAINE: ICD-10-CM

## 2019-10-29 PROCEDURE — G0008 ADMIN INFLUENZA VIRUS VAC: HCPCS | Performed by: FAMILY MEDICINE

## 2019-10-29 PROCEDURE — 90662 IIV NO PRSV INCREASED AG IM: CPT | Performed by: FAMILY MEDICINE

## 2019-10-29 PROCEDURE — 99213 OFFICE O/P EST LOW 20 MIN: CPT | Performed by: FAMILY MEDICINE

## 2019-10-29 NOTE — PROGRESS NOTES
HPI:    Patient ID: Georges Louis is a 75year old female. Headache    This is a chronic problem. The current episode started more than 1 month ago. The problem occurs daily. The problem has been gradually improving.  The pain is located in the frontal total) by mouth twice daily, 30 minutes prior to breakfast and dinner., Disp: 60 tablet, Rfl: 5  Levothyroxine Sodium 50 MCG Oral Tab, Take 1 tablet (50 mcg total) by mouth once daily. , Disp: 90 tablet, Rfl: 3  Dupilumab (DUPIXENT) 300 MG/2ML Subcutaneous

## 2019-11-22 DIAGNOSIS — I10 ESSENTIAL HYPERTENSION: ICD-10-CM

## 2019-11-22 RX ORDER — AMLODIPINE BESYLATE 5 MG/1
TABLET ORAL
Qty: 30 TABLET | Refills: 0 | Status: SHIPPED | OUTPATIENT
Start: 2019-11-22 | End: 2019-12-20

## 2019-12-09 NOTE — TELEPHONE ENCOUNTER
PT Progress Note     Today's date: 2019  Patient name: Dominic Carlos  : 1981  MRN: [id number]  Referring provider: Kishan Gray MD  Dx:   Encounter Diagnosis     ICD-10-CM    1  Status post arthroscopy of left shoulder Z98 [id number]    2  Acromioclavicular joint injury, left, subsequent encounter S49  [id number]    3  Acute pain of left shoulder M25 512                   Assessment  Dominic Carlos has been compliant with PT and has demonstrated decreased pain, increased strength, increased range of motion, and increased activity tolerance since starting physical therapy services  He is making steady progress toward goals  ROM has improved steadily and patient has reached normal limits of AROM L shoulder  Patient demonstrates weakness/instability of L shoulder and will continue to benefit from progressive strengthening/stabilization of L shoulder for safe return to work  He reports an overall improvement of 60% thus far  Patient will benefit from additional skilled physical therapy interventions to address impairments and maximize function  Impairments: abnormal or restricted ROM, activity intolerance, impaired physical strength, pain with function and scapular dyskinesis  Functional limitations: limited use of L UE (sling when not at home), limited ROM and strength  Understanding of Dx/Px/POC: good   Prognosis: good    Goals    STGs  1) In 4 weeks patient will report reduced pain 50% or greater, without pain medication  -MET  2) In 4 weeks patient will demonstrate independence with HEP -MET  5) In 4 weeks patient will demonstrate L shoulder strength of 4-/5 or greater in all directions with MMT    -MET    LTGs  1) In 4-8 weeks patient will demonstrate pain free L shoulder ROM WNL all directions  2) In 4-8 weeks patient will demonstrate L shoulder strength of at least 4/5 MMT all directions  3) in 4-8 weeks patient will demonstrate ability to lift at least 10 lbs above shoulder height with L Rx has been sent to Pt's pharamcy UE    Plan  Patient would benefit from: skilled physical therapy  Referral necessary: No  Planned modality interventions: cryotherapy, TENS, unattended electrical stimulation and thermotherapy: hydrocollator packs  Planned therapy interventions: joint mobilization, manual therapy, massage, Maloney taping, home exercise program, functional ROM exercises, flexibility, therapeutic exercise, stretching, strengthening, patient education, self care and postural training  Frequency: 3x week  Duration in weeks: 4  Plan of Care beginning date: 12/9/2019  Plan of Care expiration date: 1/6/2019  Treatment plan discussed with: patient        Subjective Evaluation    History of Present Illness  Date of onset: 8/23/2019  Mechanism of injury: Patient presents to PT after work related injury  Patient is employed as a  at [employer]  Reports injury on 8/23/19 to L shoulder when attempting to lift a 10 lb box from about waist height and felt an immediate sharp pain in the L shoulder  He stopped working at that moment, reported to his manager, went on lunch and the pain worsened  Saw WC doc that same day  Had xrays but no MRI at this time  Patient saw ortho doc (referring Dr Preston Marc) on Tues 9/17 and received steroid injection which provided much relief from pain on the day of and day after the injection  Reports the "grinding" is also less since the injection  Pain is primarily L shoulder superior and anterior  Occasionally feels "sore" into L elbow and forearm  Reports intermittent "numbness" along lateral aspect of forearm if he rests arm on pillow  Pain overall has gradually improved since the injury occurred  He uses a sling when out in public to avoid using his arm  RA 10/14:  Patient reports he is feeling better since he started PT  He reports compliance with lifting restrictions at home and at work, however he still has pain with repetitive use of L UE, WB onto L UE and with reaching/pushing/pulling   Nolvia has pain with quick sudden movements  He feels about 50% improved since his SOC  Reports that he no longer experiences the intermittent "numbness" along lateral aspect of forearm  Still using his sling when out in public and when at work not having to use L UE  RA :  Patient returns to PT after surgery on L shoulder 10/31/19  He denies any complications from the surgery  He reports nerve block lasted 3 days before wearing off  He now takes  mg as needed, usually in the morning  Takes prescribed oxycodone as needed as prescribed, but usually only needs to take once a day before bed  Patient is using his sling when "out and about" but does not use it when inside  Overall he feels a little less functional right now compared to before surgery, but feels it is already starting to feel better as the days go on  Patient has a f/u with surgeon in beginning of December  Will not be working at least until that follow up  Progress note :  Patient reports his shoulder is "better than it was" before surgery  He currently reports difficulty/pain with any sudden movements of the arm or with certain isometric resistance such as when attempting to open  when it is locked closed or with pulling blankets over in bed  Had difficulty using L hand/arm to stabilize when carving turkey on Thanksgiving day  Also notes discomfort/strain with overhead activity         Pain  IE    RA 10/14: RA : :  Current pain ratin  3  3  1  At best pain ratin  0  1 (w/meds) 0  At worst pain ratin  7  5  5  Quality: ache    Relieving factors: ice and medications  RA 10/14: takes Aleve or Tylenol prn (reports taking this about every other day), uses ice daily  RA : support (sling), rx medications, ice (using machine that was provided after surgery)  : takes OTC IBU after PT    Aggravating factors: overhead activity and lifting  RA 10/14: patient has been compliant with not lifting >10 lbs, pain increases with WB/leaning onto L UE and when using L UE for prolonged periods (hand held computer , etc) , pain with lowering L UE from elevated position and with reaching forward, pushing/pulling  RA 11/7: lifting  12/9: OHA, sudden movement    Social Support  Lives in: [facility]  Lives with: spouse and young children    Employment status: not working (employed as a  at [employer], currently injured)  Hand dominance: left  Exercise history: daily walks    Treatments  Current treatment: injection treatment and medication  Patient Goals  Patient goals for therapy: return to work and decreased pain          Objective     Tenderness     Left Shoulder   Tenderness in the [facility] joint, acromion, clavicle and coracoid process  No tenderness in the biceps tendon (proximal), bicipital groove, infraspinatus tendon, lateral scapula, medial scapula, scapular spine, SC joint, subscapularis tendon and supraspinatus tendon  RA 10/14: (+) ttp persists on L AC jt, infraspinatus and supraspinatus tendons  RA 11/7: Active Range of Motion    Cervical  Flexion:  WFL  Extension:  WFL  Left lateral flexion:  WFL  Right lateral flexion:  WFL  Left rotation:  WFL  Right rotation:  [facility]      RA 10/14    Left Shoulder  Flexion: 110 degrees with pain  135 deg with pain  Extension: 30 degrees    65 deg  Abduction: 60 degrees with pain  60 deg (pain in AC jt), can lift to 150* with pain  External rotation BTH: T1 with pain  T6  Internal rotation BTB: L2 with pain  L1 with pain    RA 11/7:    12/9:  Flexion: 145 deg w/pain  170 deg (arc of pain  deg)  Extension: 25 deg w/pain  40 deg w/discomfort  Abduction: 85 deg w/pain  170 deg w/discomfort  External rotation BTH: C6 w/pain T6  Internal rotation BTB: T11  T10 w/pain    Additional Active Range of Motion Details  Pain reproduced with all AROM   Extension, IR, ER >> abduction and flexion  RA 10/14: pain reproduced with AROM flexion, abduction and IR  RA 11/7: pain reproduced w/AROM flexion beginning at 90 deg and moves slowly to continue raising arm, reports "strain"     Passive Range of Motion   IE      RA 10/14    Left Shoulder   Flexion: [facility] pain free    Abduction: 50 degrees with pain  150 deg pain free   External rotation 90°: 65 degrees   90+ deg pain free   Internal rotation 90°: 40 degrees with pain 50 deg pain free     RA 11/7:    12/9:  Flexion: 152 deg    WNL pain free  Abduction: 120 deg   160 deg  External rotation @ 90: 70 deg 90+ deg pain free  Internal rotation @ 90: 25 deg  52 deg    Strength/Myotome Testing   IE     RA 10/14  RA 11/7: 12/9:  Left Shoulder     Planes of Motion   Flexion: 4-     4+   NT  4+w/pain  Extension: 4-     4+   NT  4+w/pain  Abduction: 4-     4- with pain  NT  4+  External rotation at 0°: 4-   4+   NT  4+  External rotation at 90°: 4-  4+   NT  4+    Additional Strength Details  MMT tested with shoulder shoulder neutral (arm by side)  RA 11/7: NT due to recent surgery  Will test at next assessment  Tests     Left Shoulder   Positive anterior load and shift, scapular relocation  and scapular assistance test positive  Negative belly press, clunk, crank, Hawkin's, Neer's, painful arc, passive horizontal adduction and bicep load      RA 10/14: (-) anterior load and shift, (+) scapular relocation, (+) scapular assistance test   11/7: N/A, post-op      Flowsheet Rows      Most Recent Value   PT/OT G-Codes   Current Score  67 (initial evaluation = 32)   Projected Score  67          Precautions: none  Re-cert due 7/2/25  Manual  12/5 12/9 11/25 11/29 12/4   L shoulder PROM to jayda x10 mins x10 mins    Focus on IR at future visits x15 min x15 min x10 min                       Exercise Diary  12/5 12/9 11/25 11/29 12/4   HEP instruction        Pulleys Flex/scap  10"x10 ea Flex/scap  10"x10 ea Flex/scap  5" x10 ea Flex/scap  10"x10 ea Flex/scap  10"x10 ea   Finger ladder Flex/scap  5"x10 ea DC Flex/scap  5" x10 ea Flex/scap  5"x10 ea Flex/scap  5"x10 ea   Isometric shoulder IR/ER Red TB IR/ER 2x10 ea Resume NV Yellow TB IR/ER 2x10 ea Red TB IR/ER 2x10 ea Red TB IR/ER 2x10 ea   MTP with TB Green TB 5" 2x10 Resume NV Red TB 5" 2x10 Green TB 5" 2x10 Green TB 5" 2x10   LTP with TB Green TB 5" 2x10 Resume NV Red TB 5" 2x10 Green TB 5" 2x10 Green TB 5" 2x10   Prone Ext 1# 2x10 Resume NV 0# 2x10 0# 2x10 1# 2x10   Prone Hrz ABD 1# 2x10 Resume NV 0# 2x10 0# 2x10 1# 2x10   Prone Flex 1# 2x10 Resume NV 0# 2x10 0# 2x10 1# 2x10   Prone Row 1# 2x10 Resume NV 0# 2x10 0# 2x10 1# 2x10   UBE 90 rpm 5'fwd/5'retro 90 rpm 5'fwd/5'retro 120 rpm 2  5'fwd/2  5'retro 120 rpm 2  5'fwd/2  5'retro 90 rpm 2  5'fwd/2  5'retro   Standing b/l shoulder flexion to 90* 1# 2x10 2# 2x10 0# 2x10 0# 2x10 1# 2x10   Standing b/l shoulder scaption to 90* 1# 2x10 2# 2x10 0# 2x10 0# 2x10 1# 2x10   Rhythmic stabilization - supine at 90* shoulder flexion 15" x4 DC 15" x4 15" x4 15" x4   Rhythmic stabilization - supine shoulder flex , horiz abd 0-30 deg  1# x20 ea      Scap punches 1# 2x10, 5" 2# x20 0# 2x10, 5"  0# 2x10, 5" 1# 2x10, 5"   Scap ABCs 1# x1 2# x1 0# x1 0# x1 1# x1   Flexbar "[place]" Red FlexBar 15"x4 Red FlexBar 15"x4   Red FlexBar 15"x4   Rhythmic stabilization: Ball roll against wall  Green ball  4 directions  20x ea      UT stretch w/TB  Silver  5x10"

## 2019-12-12 ENCOUNTER — TELEPHONE (OUTPATIENT)
Dept: FAMILY MEDICINE CLINIC | Facility: CLINIC | Age: 74
End: 2019-12-12

## 2019-12-16 DIAGNOSIS — F41.9 ANXIETY: ICD-10-CM

## 2019-12-16 RX ORDER — CLONAZEPAM 0.5 MG/1
TABLET ORAL
Qty: 40 TABLET | Refills: 0 | Status: SHIPPED | OUTPATIENT
Start: 2019-12-16 | End: 2020-02-14

## 2019-12-20 DIAGNOSIS — I10 ESSENTIAL HYPERTENSION: ICD-10-CM

## 2019-12-20 RX ORDER — AMLODIPINE BESYLATE 5 MG/1
TABLET ORAL
Qty: 30 TABLET | Refills: 0 | Status: SHIPPED | OUTPATIENT
Start: 2019-12-20 | End: 2019-12-20

## 2019-12-20 RX ORDER — AMLODIPINE BESYLATE 5 MG/1
TABLET ORAL
Qty: 90 TABLET | Refills: 0 | Status: SHIPPED | OUTPATIENT
Start: 2019-12-20 | End: 2020-03-17

## 2020-01-03 ENCOUNTER — OFFICE VISIT (OUTPATIENT)
Dept: FAMILY MEDICINE CLINIC | Facility: CLINIC | Age: 75
End: 2020-01-03
Payer: COMMERCIAL

## 2020-01-03 ENCOUNTER — HOSPITAL ENCOUNTER (OUTPATIENT)
Dept: GENERAL RADIOLOGY | Age: 75
Discharge: HOME | End: 2020-01-03
Attending: FAMILY MEDICINE
Payer: MEDICARE

## 2020-01-03 ENCOUNTER — APPOINTMENT (OUTPATIENT)
Dept: LAB | Age: 75
End: 2020-01-03
Attending: FAMILY MEDICINE
Payer: MEDICARE

## 2020-01-03 VITALS
RESPIRATION RATE: 18 BRPM | HEIGHT: 60 IN | OXYGEN SATURATION: 98 % | HEART RATE: 80 BPM | TEMPERATURE: 98 F | WEIGHT: 125 LBS | SYSTOLIC BLOOD PRESSURE: 124 MMHG | BODY MASS INDEX: 24.54 KG/M2 | DIASTOLIC BLOOD PRESSURE: 74 MMHG

## 2020-01-03 DIAGNOSIS — F33.0 MILD RECURRENT MAJOR DEPRESSION (HCC): ICD-10-CM

## 2020-01-03 DIAGNOSIS — Z01.818 PREOP EXAMINATION: ICD-10-CM

## 2020-01-03 DIAGNOSIS — J44.0 CHRONIC OBSTRUCTIVE PULMONARY DISEASE WITH ACUTE LOWER RESPIRATORY INFECTION (HCC): ICD-10-CM

## 2020-01-03 DIAGNOSIS — R94.31 ABNORMAL EKG: ICD-10-CM

## 2020-01-03 DIAGNOSIS — H26.9 CATARACT OF LEFT EYE, UNSPECIFIED CATARACT TYPE: ICD-10-CM

## 2020-01-03 DIAGNOSIS — L90.0 LICHEN SCLEROSUS: ICD-10-CM

## 2020-01-03 DIAGNOSIS — R05.9 COUGH: Primary | ICD-10-CM

## 2020-01-03 DIAGNOSIS — N18.30 CKD (CHRONIC KIDNEY DISEASE) STAGE 3, GFR 30-59 ML/MIN (HCC): ICD-10-CM

## 2020-01-03 DIAGNOSIS — M05.20 RHEUMATOID ARTERITIS (HCC): ICD-10-CM

## 2020-01-03 DIAGNOSIS — R05.9 COUGH: ICD-10-CM

## 2020-01-03 LAB
ALBUMIN SERPL-MCNC: 3.9 G/DL (ref 3.4–5)
ALBUMIN/GLOB SERPL: 1.1 {RATIO} (ref 1–2)
ALP LIVER SERPL-CCNC: 93 U/L (ref 55–142)
ALT SERPL-CCNC: 26 U/L (ref 13–56)
ANION GAP SERPL CALC-SCNC: 6 MMOL/L (ref 0–18)
AST SERPL-CCNC: 24 U/L (ref 15–37)
ATRIAL RATE: 67 BPM
BASOPHILS # BLD AUTO: 0.12 X10(3) UL (ref 0–0.2)
BASOPHILS NFR BLD AUTO: 1.4 %
BILIRUB SERPL-MCNC: 0.6 MG/DL (ref 0.1–2)
BUN BLD-MCNC: 10 MG/DL (ref 7–18)
BUN/CREAT SERPL: 10.2 (ref 10–20)
CALCIUM BLD-MCNC: 9.2 MG/DL (ref 8.5–10.1)
CHLORIDE SERPL-SCNC: 110 MMOL/L (ref 98–112)
CO2 SERPL-SCNC: 25 MMOL/L (ref 21–32)
CREAT BLD-MCNC: 0.98 MG/DL (ref 0.55–1.02)
DEPRECATED RDW RBC AUTO: 45.2 FL (ref 35.1–46.3)
EOSINOPHIL # BLD AUTO: 0.16 X10(3) UL (ref 0–0.7)
EOSINOPHIL NFR BLD AUTO: 1.9 %
ERYTHROCYTE [DISTWIDTH] IN BLOOD BY AUTOMATED COUNT: 13.2 % (ref 11–15)
GLOBULIN PLAS-MCNC: 3.4 G/DL (ref 2.8–4.4)
GLUCOSE BLD-MCNC: 94 MG/DL (ref 70–99)
HCT VFR BLD AUTO: 41.9 % (ref 35–48)
HGB BLD-MCNC: 13.6 G/DL (ref 12–16)
IMM GRANULOCYTES # BLD AUTO: 0.02 X10(3) UL (ref 0–1)
IMM GRANULOCYTES NFR BLD: 0.2 %
LYMPHOCYTES # BLD AUTO: 1.54 X10(3) UL (ref 1–4)
LYMPHOCYTES NFR BLD AUTO: 17.9 %
M PROTEIN MFR SERPL ELPH: 7.3 G/DL (ref 6.4–8.2)
MCH RBC QN AUTO: 30.5 PG (ref 26–34)
MCHC RBC AUTO-ENTMCNC: 32.5 G/DL (ref 31–37)
MCV RBC AUTO: 93.9 FL (ref 80–100)
MONOCYTES # BLD AUTO: 1.05 X10(3) UL (ref 0.1–1)
MONOCYTES NFR BLD AUTO: 12.2 %
NEUTROPHILS # BLD AUTO: 5.71 X10 (3) UL (ref 1.5–7.7)
NEUTROPHILS # BLD AUTO: 5.71 X10(3) UL (ref 1.5–7.7)
NEUTROPHILS NFR BLD AUTO: 66.4 %
OSMOLALITY SERPL CALC.SUM OF ELEC: 291 MOSM/KG (ref 275–295)
P AXIS: 59 DEGREES
P-R INTERVAL: 168 MS
PATIENT FASTING Y/N/NP: NO
PLATELET # BLD AUTO: 337 10(3)UL (ref 150–450)
POTASSIUM SERPL-SCNC: 3.7 MMOL/L (ref 3.5–5.1)
Q-T INTERVAL: 422 MS
QRS DURATION: 72 MS
QTC CALCULATION (BEZET): 445 MS
R AXIS: -37 DEGREES
RBC # BLD AUTO: 4.46 X10(6)UL (ref 3.8–5.3)
SODIUM SERPL-SCNC: 141 MMOL/L (ref 136–145)
T AXIS: 36 DEGREES
VENTRICULAR RATE: 67 BPM
WBC # BLD AUTO: 8.6 X10(3) UL (ref 4–11)

## 2020-01-03 PROCEDURE — 71046 X-RAY EXAM CHEST 2 VIEWS: CPT | Performed by: FAMILY MEDICINE

## 2020-01-03 PROCEDURE — 93005 ELECTROCARDIOGRAM TRACING: CPT

## 2020-01-03 PROCEDURE — 99214 OFFICE O/P EST MOD 30 MIN: CPT | Performed by: FAMILY MEDICINE

## 2020-01-03 PROCEDURE — 80053 COMPREHEN METABOLIC PANEL: CPT | Performed by: FAMILY MEDICINE

## 2020-01-03 PROCEDURE — 36415 COLL VENOUS BLD VENIPUNCTURE: CPT | Performed by: FAMILY MEDICINE

## 2020-01-03 PROCEDURE — 93010 ELECTROCARDIOGRAM REPORT: CPT | Performed by: INTERNAL MEDICINE

## 2020-01-03 PROCEDURE — 85025 COMPLETE CBC W/AUTO DIFF WBC: CPT | Performed by: FAMILY MEDICINE

## 2020-01-03 NOTE — H&P
Sharri Lieberman is a 75year old female who presents for a pre-op examination     Pt is scheduled for cataract removal surgery of the left eye with Dr. Anne Marie Joshi on 1/23/2020.  Pt has had surgery in the past with no complications with the anaesthesia External Cream Apply 1 Application topically 2 (two) times daily.  1 Tube 1   • AMLODIPINE BESYLATE 5 MG Oral Tab TAKE 1 TABLET(5 MG) BY MOUTH DAILY 90 tablet 0   • clonazePAM 0.5 MG Oral Tab TK1 TABLET BY MOUTH TWICE DAILY AS NEEDED FOR ANXIETY 40 tablet 0 Reflux    • Stress    • Uncomfortable fullness after meals    • Visual impairment     glasses   • Wears glasses       Past Surgical History:   Procedure Laterality Date   • COLONOSCOPY     • COLONOSCOPY & POLYPECTOMY  9/14    multiple polyps; tics; repeat pressure or palpitations  GI: denies abdominal pain,denies heartburn  : denies dysuria; denies vaginal discharge or irritation; denies dyspareunia; denies dryness  MS: denies back pain  EXT: denies LE edema  NEURO: denies frequent headaches or dizziness Future    5. CKD (chronic kidney disease) stage 3, GFR 30-59 ml/min ([facility])  CPM. Continue to monitor regularly. 6. Cataract of left eye, unspecified cataract type  Blood work and testing ordered so that cataract can be surgically removed.    - XR CHEST P

## 2020-01-04 DIAGNOSIS — J44.9 CHRONIC OBSTRUCTIVE PULMONARY DISEASE, UNSPECIFIED COPD TYPE (HCC): Primary | ICD-10-CM

## 2020-01-04 RX ORDER — PREDNISONE 10 MG/1
TABLET ORAL
Qty: 22 TABLET | Refills: 0 | Status: SHIPPED | OUTPATIENT
Start: 2020-01-04 | End: 2020-07-15 | Stop reason: ALTCHOICE

## 2020-01-07 ENCOUNTER — TELEPHONE (OUTPATIENT)
Dept: FAMILY MEDICINE CLINIC | Facility: CLINIC | Age: 75
End: 2020-01-07

## 2020-01-07 NOTE — TELEPHONE ENCOUNTER
Patient is concerned as to why on her problem list it states she has rheumatoid arthritis and chronic kidney disease stage 3. She state she does not have either issues. Please advise.

## 2020-01-08 NOTE — TELEPHONE ENCOUNTER
PC from patient   Patient was concerned that she had several problems listed on her chart that she felt were inaccurate. She states she does not have RA- NICHOLAS test in fall was negative  And she stated she does not have CKD3.    Her Kit lab GFR does support

## 2020-01-09 ENCOUNTER — TELEPHONE (OUTPATIENT)
Dept: FAMILY MEDICINE CLINIC | Facility: CLINIC | Age: 75
End: 2020-01-09

## 2020-01-09 NOTE — TELEPHONE ENCOUNTER
[facility] stated there is a pending denial for the nuclear test, therefore they need additional clinical information or a peer to peer. If is peer to peer it does need to be scheduled by 01/15/20 ref # [id number]. Please press option 3 , [facility] fax # [phone].

## 2020-01-10 ENCOUNTER — TELEPHONE (OUTPATIENT)
Dept: FAMILY MEDICINE CLINIC | Facility: CLINIC | Age: 75
End: 2020-01-10

## 2020-01-10 NOTE — TELEPHONE ENCOUNTER
Spoke w/ Abigail Light, a RN reviewer, to give more clinical information for the stress test.  She will send add'l information to MD for review. We can use the ref # to check the status.

## 2020-01-17 ENCOUNTER — HOSPITAL ENCOUNTER (OUTPATIENT)
Dept: CV DIAGNOSTICS | Facility: HOSPITAL | Age: 75
Discharge: HOME | End: 2020-01-17
Attending: FAMILY MEDICINE
Payer: MEDICARE

## 2020-01-17 DIAGNOSIS — H26.9 CATARACT OF LEFT EYE, UNSPECIFIED CATARACT TYPE: ICD-10-CM

## 2020-01-17 DIAGNOSIS — Z01.818 PREOP EXAMINATION: ICD-10-CM

## 2020-01-17 DIAGNOSIS — R94.31 ABNORMAL EKG: ICD-10-CM

## 2020-01-17 PROCEDURE — 93018 CV STRESS TEST I&R ONLY: CPT | Performed by: FAMILY MEDICINE

## 2020-01-17 PROCEDURE — 78452 HT MUSCLE IMAGE SPECT MULT: CPT | Performed by: FAMILY MEDICINE

## 2020-01-17 PROCEDURE — 93017 CV STRESS TEST TRACING ONLY: CPT | Performed by: FAMILY MEDICINE

## 2020-01-20 ENCOUNTER — TELEPHONE (OUTPATIENT)
Dept: FAMILY MEDICINE CLINIC | Facility: CLINIC | Age: 75
End: 2020-01-20

## 2020-02-12 DIAGNOSIS — F41.9 ANXIETY: ICD-10-CM

## 2020-02-14 RX ORDER — CLONAZEPAM 0.5 MG/1
TABLET ORAL
Qty: 40 TABLET | Refills: 0 | Status: SHIPPED | OUTPATIENT
Start: 2020-02-14 | End: 2020-04-21

## 2020-02-25 ENCOUNTER — TELEPHONE (OUTPATIENT)
Dept: FAMILY MEDICINE CLINIC | Facility: CLINIC | Age: 75
End: 2020-02-25

## 2020-02-25 NOTE — TELEPHONE ENCOUNTER
Patient received letter from the office regarding follow up testing. She would like to know about test ordered by [facility] and also why the outstanding labs?

## 2020-02-25 NOTE — TELEPHONE ENCOUNTER
Would you like to order any labs? Lipid? Last lipid was 7/2019, cholesterol elevated. Anything else?

## 2020-03-17 DIAGNOSIS — I10 ESSENTIAL HYPERTENSION: ICD-10-CM

## 2020-03-17 RX ORDER — AMLODIPINE BESYLATE 5 MG/1
TABLET ORAL
Qty: 90 TABLET | Refills: 0 | Status: SHIPPED | OUTPATIENT
Start: 2020-03-17 | End: 2020-06-16

## 2020-04-20 DIAGNOSIS — F41.9 ANXIETY: ICD-10-CM

## 2020-04-21 RX ORDER — CLONAZEPAM 0.5 MG/1
TABLET ORAL
Qty: 40 TABLET | Refills: 0 | Status: SHIPPED | OUTPATIENT
Start: 2020-04-21 | End: 2020-06-30

## 2020-05-08 DIAGNOSIS — H93.8X3 PRESSURE SENSATION IN BOTH EARS: ICD-10-CM

## 2020-05-08 DIAGNOSIS — E03.9 HYPOTHYROIDISM, UNSPECIFIED TYPE: ICD-10-CM

## 2020-05-08 RX ORDER — LEVOTHYROXINE SODIUM 0.05 MG/1
TABLET ORAL
Qty: 90 TABLET | Refills: 3 | Status: SHIPPED | OUTPATIENT
Start: 2020-05-08 | End: 2021-05-27

## 2020-05-08 NOTE — TELEPHONE ENCOUNTER
Levothyroxine sodium 50mcg prescription approved and sent to pharmacy, no cosign required per protocol as protocol passed.

## 2020-06-12 DIAGNOSIS — I10 ESSENTIAL HYPERTENSION: ICD-10-CM

## 2020-06-15 ENCOUNTER — MA CHART PREP (OUTPATIENT)
Dept: FAMILY MEDICINE CLINIC | Facility: CLINIC | Age: 75
End: 2020-06-15

## 2020-06-15 PROBLEM — I70.0 AORTIC ATHEROSCLEROSIS (HCC): Status: ACTIVE | Noted: 2020-06-15

## 2020-06-15 NOTE — TELEPHONE ENCOUNTER
Rx Request  AMLODIPINE BESYLATE 5 MG Oral Tab    Disp:     90               R: 0    Associated Dx: Essential hypertension    Last Visit: 01/03/2020     Last Refilled: 03/17/2020    Protocol Passed?  Yes[ x ]       No[  ]

## 2020-06-16 RX ORDER — AMLODIPINE BESYLATE 5 MG/1
TABLET ORAL
Qty: 90 TABLET | Refills: 0 | Status: SHIPPED | OUTPATIENT
Start: 2020-06-16 | End: 2020-09-15

## 2020-06-16 NOTE — TELEPHONE ENCOUNTER
Spoke with patient about refill has been sent to the pharmacy. Informed her that Dr. Jacqueline Lomas would like to see her next month for fu/awv. Patient will call back to schedule.

## 2020-06-30 DIAGNOSIS — F41.9 ANXIETY: ICD-10-CM

## 2020-06-30 RX ORDER — CLONAZEPAM 0.5 MG/1
TABLET ORAL
Qty: 40 TABLET | Refills: 0 | Status: SHIPPED | OUTPATIENT
Start: 2020-06-30 | End: 2020-08-24

## 2020-07-07 ENCOUNTER — PATIENT OUTREACH (OUTPATIENT)
Dept: FAMILY MEDICINE CLINIC | Facility: CLINIC | Age: 75
End: 2020-07-07

## 2020-07-07 NOTE — PROGRESS NOTES
Spoke with patient about scheduling MA supervisit. States that her grand-daughter started new job and she needs to find out her availability to bring her.

## 2020-07-15 ENCOUNTER — OFFICE VISIT (OUTPATIENT)
Dept: FAMILY MEDICINE CLINIC | Facility: CLINIC | Age: 75
End: 2020-07-15
Payer: COMMERCIAL

## 2020-07-15 ENCOUNTER — APPOINTMENT (OUTPATIENT)
Dept: LAB | Age: 75
End: 2020-07-15
Attending: FAMILY MEDICINE
Payer: MEDICARE

## 2020-07-15 VITALS
HEART RATE: 70 BPM | SYSTOLIC BLOOD PRESSURE: 110 MMHG | DIASTOLIC BLOOD PRESSURE: 68 MMHG | WEIGHT: 125 LBS | HEIGHT: 60 IN | BODY MASS INDEX: 24.54 KG/M2 | TEMPERATURE: 98 F | RESPIRATION RATE: 16 BRPM | OXYGEN SATURATION: 98 %

## 2020-07-15 DIAGNOSIS — M79.89 LEFT LEG SWELLING: ICD-10-CM

## 2020-07-15 DIAGNOSIS — R41.3 MEMORY DEFICIT: ICD-10-CM

## 2020-07-15 DIAGNOSIS — Z00.00 ANNUAL PHYSICAL EXAM: Primary | ICD-10-CM

## 2020-07-15 DIAGNOSIS — F33.0 MILD RECURRENT MAJOR DEPRESSION (HCC): Chronic | ICD-10-CM

## 2020-07-15 DIAGNOSIS — I70.0 AORTIC ATHEROSCLEROSIS (HCC): ICD-10-CM

## 2020-07-15 DIAGNOSIS — G43.009 MIGRAINE WITHOUT AURA AND WITHOUT STATUS MIGRAINOSUS, NOT INTRACTABLE: ICD-10-CM

## 2020-07-15 DIAGNOSIS — N18.30 CKD (CHRONIC KIDNEY DISEASE) STAGE 3, GFR 30-59 ML/MIN (HCC): Chronic | ICD-10-CM

## 2020-07-15 DIAGNOSIS — R07.89 ATYPICAL CHEST PAIN: ICD-10-CM

## 2020-07-15 DIAGNOSIS — K21.9 GASTROESOPHAGEAL REFLUX DISEASE WITHOUT ESOPHAGITIS: ICD-10-CM

## 2020-07-15 DIAGNOSIS — E03.9 HYPOTHYROIDISM, UNSPECIFIED TYPE: ICD-10-CM

## 2020-07-15 DIAGNOSIS — M05.20 RHEUMATOID ARTERITIS (HCC): ICD-10-CM

## 2020-07-15 DIAGNOSIS — J44.0 CHRONIC OBSTRUCTIVE PULMONARY DISEASE WITH ACUTE LOWER RESPIRATORY INFECTION (HCC): ICD-10-CM

## 2020-07-15 DIAGNOSIS — G44.201 INTRACTABLE TENSION-TYPE HEADACHE, UNSPECIFIED CHRONICITY PATTERN: ICD-10-CM

## 2020-07-15 DIAGNOSIS — F41.9 ANXIETY: ICD-10-CM

## 2020-07-15 DIAGNOSIS — K63.89 SMALL INTESTINAL BACTERIAL OVERGROWTH: ICD-10-CM

## 2020-07-15 DIAGNOSIS — F32.A DEPRESSION, UNSPECIFIED DEPRESSION TYPE: ICD-10-CM

## 2020-07-15 LAB
ATRIAL RATE: 64 BPM
P AXIS: 63 DEGREES
P-R INTERVAL: 168 MS
Q-T INTERVAL: 440 MS
QRS DURATION: 74 MS
QTC CALCULATION (BEZET): 453 MS
R AXIS: -11 DEGREES
T AXIS: 57 DEGREES
VENTRICULAR RATE: 64 BPM

## 2020-07-15 PROCEDURE — 90750 HZV VACC RECOMBINANT IM: CPT | Performed by: FAMILY MEDICINE

## 2020-07-15 PROCEDURE — 3074F SYST BP LT 130 MM HG: CPT | Performed by: FAMILY MEDICINE

## 2020-07-15 PROCEDURE — 96160 PT-FOCUSED HLTH RISK ASSMT: CPT | Performed by: FAMILY MEDICINE

## 2020-07-15 PROCEDURE — 90471 IMMUNIZATION ADMIN: CPT | Performed by: FAMILY MEDICINE

## 2020-07-15 PROCEDURE — 99397 PER PM REEVAL EST PAT 65+ YR: CPT | Performed by: FAMILY MEDICINE

## 2020-07-15 PROCEDURE — 93010 ELECTROCARDIOGRAM REPORT: CPT | Performed by: INTERNAL MEDICINE

## 2020-07-15 PROCEDURE — 3008F BODY MASS INDEX DOCD: CPT | Performed by: FAMILY MEDICINE

## 2020-07-15 PROCEDURE — 93005 ELECTROCARDIOGRAM TRACING: CPT

## 2020-07-15 PROCEDURE — 3078F DIAST BP <80 MM HG: CPT | Performed by: FAMILY MEDICINE

## 2020-07-15 PROCEDURE — G0439 PPPS, SUBSEQ VISIT: HCPCS | Performed by: FAMILY MEDICINE

## 2020-07-15 RX ORDER — AMOXICILLIN AND CLAVULANATE POTASSIUM 875; 125 MG/1; MG/1
1 TABLET, FILM COATED ORAL 2 TIMES DAILY
Qty: 20 TABLET | Refills: 0 | Status: SHIPPED | OUTPATIENT
Start: 2020-07-15 | End: 2020-07-25

## 2020-07-15 RX ORDER — SERTRALINE HYDROCHLORIDE 100 MG/1
200 TABLET, FILM COATED ORAL DAILY
Qty: 180 TABLET | Refills: 5 | Status: SHIPPED | OUTPATIENT
Start: 2020-07-15 | End: 2021-08-30 | Stop reason: ALTCHOICE

## 2020-07-15 RX ORDER — PANTOPRAZOLE SODIUM 40 MG/1
40 TABLET, DELAYED RELEASE ORAL
Qty: 60 TABLET | Refills: 3 | Status: SHIPPED | OUTPATIENT
Start: 2020-07-15

## 2020-07-15 NOTE — PROGRESS NOTES
HPI:   Jamshid Corrales is a 67year old female who presents for a MA (Medicare Advantage) [facility] (Once per calendar year). Pt complains of lower abdominal pain which onset 1 week ago. This problem has been worsening since onset.  She notes that she w anxiety, depression, worry, panic. Pt has decreased her zoloft from 200 MG to 100 MG. She notes this is providing moderate relief. She also takes clonazepam as needed for episodes of panic. Hx of iron deficiency. This is a chronic problem.  This problem risk: Fall/Risk Scorin    Cognitive Assessment   She had a completely normal cognitive assessment- see flowsheet entries    Functional Ability/Status   Katie Carty has some abnormal functions as listed below:  She has Driving difficulties based o Consulting Physician (NEUROLOGY)    Patient Active Problem List:     Esophageal reflux     Chronic obstructive pulmonary disease with acute lower respiratory infection ([facility] [address]     Acquired hypothyroidism     Mild recurrent major depression (HCC)     CKD (chr BESYLATE 5 MG Oral Tab, TAKE 1 TABLET(5 MG) BY MOUTH DAILY  Budesonide-Formoterol Fumarate (SYMBICORT) 160-4.5 MCG/ACT Inhalation Aerosol, INHALE 2 PUFFS BY MOUTH TWICE DAILY  Albuterol Sulfate HFA (PROAIR HFA) 108 (90 Base) MCG/ACT Inhalation Aero Soln, I her mother; emph in her father. SOCIAL HISTORY:   She  reports that she quit smoking about 39 years ago. Her smoking use included cigarettes. She quit after 10.00 years of use.  She has never used smokeless tobacco. She reports that she does not drink alc tenderness with exam  :  + lichen sclerosus  EXTREMITIES:    No calf tenderness  + edema of left lower extremity    Vaccination History     Immunization History   Administered Date(s) Administered   • FLU VACC High Dose 72 YRS & Older PRSV Free (60470) 1 60 tablet; Refill: 3  - Amoxicillin-Pot Clavulanate 875-125 MG Oral Tab; Take 1 tablet by mouth 2 (two) times daily for 10 days. Dispense: 20 tablet; Refill: 0    9.  Chronic obstructive pulmonary disease with acute lower respiratory infection ([facility] [address]  Jhony 30-59 ml/min (HCC)    Gastroesophageal reflux disease without esophagitis    Memory deficit    Mild recurrent major depression (HCC)    Migraine without aura and without status migrainosus, not intractable    Rheumatoid arteritis ([facility] [address]    Intractable tensi visit. No flowsheet data found. Fecal Occult Blood Annually Occult Blood Result (no units)   Date Value   03/01/2018 Negative for Occult Blood    No flowsheet data found.     Glaucoma Screening      Ophthalmology Visit Annually: Diabetics, FHx Glaucoma, This may be covered with your pharmacy  prescription benefits      SPECIFIC DISEASE MONITORING Internal Lab or Procedure External Lab or Procedure      Annual Monitoring of Persistent     Medications (ACE/ARB, digoxin diuretics, anticonvulsants.)    Jesi

## 2020-07-16 ENCOUNTER — TELEPHONE (OUTPATIENT)
Dept: FAMILY MEDICINE CLINIC | Facility: CLINIC | Age: 75
End: 2020-07-16

## 2020-07-16 ENCOUNTER — APPOINTMENT (OUTPATIENT)
Dept: LAB | Age: 75
End: 2020-07-16
Attending: FAMILY MEDICINE
Payer: MEDICARE

## 2020-07-16 LAB
ALBUMIN SERPL-MCNC: 3.8 G/DL (ref 3.4–5)
ALBUMIN/GLOB SERPL: 1.2 {RATIO} (ref 1–2)
ALP LIVER SERPL-CCNC: 94 U/L (ref 55–142)
ALT SERPL-CCNC: 22 U/L (ref 13–56)
ANION GAP SERPL CALC-SCNC: 7 MMOL/L (ref 0–18)
AST SERPL-CCNC: 19 U/L (ref 15–37)
BASOPHILS # BLD AUTO: 0.05 X10(3) UL (ref 0–0.2)
BASOPHILS NFR BLD AUTO: 0.6 %
BILIRUB SERPL-MCNC: 0.6 MG/DL (ref 0.1–2)
BUN BLD-MCNC: 8 MG/DL (ref 7–18)
BUN/CREAT SERPL: 8.2 (ref 10–20)
CALCIUM BLD-MCNC: 9.1 MG/DL (ref 8.5–10.1)
CHLORIDE SERPL-SCNC: 108 MMOL/L (ref 98–112)
CHOLEST SMN-MCNC: 268 MG/DL (ref ?–200)
CO2 SERPL-SCNC: 24 MMOL/L (ref 21–32)
CREAT BLD-MCNC: 0.98 MG/DL (ref 0.55–1.02)
DEPRECATED HBV CORE AB SER IA-ACNC: 50.9 NG/ML (ref 18–340)
DEPRECATED RDW RBC AUTO: 45.3 FL (ref 35.1–46.3)
EOSINOPHIL # BLD AUTO: 0.21 X10(3) UL (ref 0–0.7)
EOSINOPHIL NFR BLD AUTO: 2.7 %
ERYTHROCYTE [DISTWIDTH] IN BLOOD BY AUTOMATED COUNT: 13.2 % (ref 11–15)
GLOBULIN PLAS-MCNC: 3.3 G/DL (ref 2.8–4.4)
GLUCOSE BLD-MCNC: 91 MG/DL (ref 70–99)
HCT VFR BLD AUTO: 41.3 % (ref 35–48)
HDLC SERPL-MCNC: 47 MG/DL (ref 40–59)
HGB BLD-MCNC: 13.3 G/DL (ref 12–16)
IMM GRANULOCYTES # BLD AUTO: 0.03 X10(3) UL (ref 0–1)
IMM GRANULOCYTES NFR BLD: 0.4 %
LDLC SERPL CALC-MCNC: 200 MG/DL (ref ?–100)
LYMPHOCYTES # BLD AUTO: 1.11 X10(3) UL (ref 1–4)
LYMPHOCYTES NFR BLD AUTO: 14.2 %
M PROTEIN MFR SERPL ELPH: 7.1 G/DL (ref 6.4–8.2)
MCH RBC QN AUTO: 30.1 PG (ref 26–34)
MCHC RBC AUTO-ENTMCNC: 32.2 G/DL (ref 31–37)
MCV RBC AUTO: 93.4 FL (ref 80–100)
MONOCYTES # BLD AUTO: 0.81 X10(3) UL (ref 0.1–1)
MONOCYTES NFR BLD AUTO: 10.3 %
NEUTROPHILS # BLD AUTO: 5.62 X10 (3) UL (ref 1.5–7.7)
NEUTROPHILS # BLD AUTO: 5.62 X10(3) UL (ref 1.5–7.7)
NEUTROPHILS NFR BLD AUTO: 71.8 %
NONHDLC SERPL-MCNC: 221 MG/DL (ref ?–130)
OSMOLALITY SERPL CALC.SUM OF ELEC: 286 MOSM/KG (ref 275–295)
PATIENT FASTING Y/N/NP: YES
PATIENT FASTING Y/N/NP: YES
PLATELET # BLD AUTO: 297 10(3)UL (ref 150–450)
POTASSIUM SERPL-SCNC: 3.9 MMOL/L (ref 3.5–5.1)
RBC # BLD AUTO: 4.42 X10(6)UL (ref 3.8–5.3)
SODIUM SERPL-SCNC: 139 MMOL/L (ref 136–145)
TRIGL SERPL-MCNC: 106 MG/DL (ref 30–149)
TSI SER-ACNC: 1.3 MIU/ML (ref 0.36–3.74)
VLDLC SERPL CALC-MCNC: 21 MG/DL (ref 0–30)
WBC # BLD AUTO: 7.8 X10(3) UL (ref 4–11)

## 2020-07-16 PROCEDURE — 84443 ASSAY THYROID STIM HORMONE: CPT | Performed by: FAMILY MEDICINE

## 2020-07-16 PROCEDURE — 85025 COMPLETE CBC W/AUTO DIFF WBC: CPT | Performed by: FAMILY MEDICINE

## 2020-07-16 PROCEDURE — 80053 COMPREHEN METABOLIC PANEL: CPT | Performed by: FAMILY MEDICINE

## 2020-07-16 PROCEDURE — 36415 COLL VENOUS BLD VENIPUNCTURE: CPT | Performed by: FAMILY MEDICINE

## 2020-07-16 PROCEDURE — 80061 LIPID PANEL: CPT | Performed by: FAMILY MEDICINE

## 2020-07-16 PROCEDURE — 82728 ASSAY OF FERRITIN: CPT | Performed by: FAMILY MEDICINE

## 2020-07-16 NOTE — TELEPHONE ENCOUNTER
Patient notified, verbalized understanding, advised patient there may be a PA needed and it will require more time.

## 2020-07-16 NOTE — TELEPHONE ENCOUNTER
Patient reports:  3 hours after Amoxicillin-Pot Clavulanate 875-125 MG Oral Tab, 5 episodes of diarrhea- denies blood/mucous. No diarrhea today. Patient states she will not take abt again.     Patient requesting cream for Lichen Sclerosus    Dr. Silverio Miranda

## 2020-07-20 ENCOUNTER — HOSPITAL ENCOUNTER (OUTPATIENT)
Dept: ULTRASOUND IMAGING | Facility: HOSPITAL | Age: 75
Discharge: HOME | End: 2020-07-20
Attending: FAMILY MEDICINE
Payer: MEDICARE

## 2020-07-20 DIAGNOSIS — M79.89 LEFT LEG SWELLING: ICD-10-CM

## 2020-07-20 PROCEDURE — 93971 EXTREMITY STUDY: CPT | Performed by: FAMILY MEDICINE

## 2020-08-24 DIAGNOSIS — F41.9 ANXIETY: ICD-10-CM

## 2020-08-24 RX ORDER — CLONAZEPAM 0.5 MG/1
TABLET ORAL
Qty: 40 TABLET | Refills: 0 | Status: SHIPPED | OUTPATIENT
Start: 2020-08-24 | End: 2020-10-23

## 2020-08-25 RX ORDER — BUDESONIDE AND FORMOTEROL FUMARATE DIHYDRATE 160; 4.5 UG/1; UG/1
AEROSOL RESPIRATORY (INHALATION)
Qty: 1 INHALER | Refills: 11 | Status: SHIPPED | OUTPATIENT
Start: 2020-08-25 | End: 2021-08-07

## 2020-09-15 DIAGNOSIS — I10 ESSENTIAL HYPERTENSION: ICD-10-CM

## 2020-09-15 RX ORDER — AMLODIPINE BESYLATE 5 MG/1
5 TABLET ORAL DAILY
Qty: 90 TABLET | Refills: 0 | Status: SHIPPED | OUTPATIENT
Start: 2020-09-15 | End: 2020-12-11

## 2020-09-16 ENCOUNTER — APPOINTMENT (OUTPATIENT)
Dept: CARDIOLOGY | Age: 75
End: 2020-09-16

## 2020-10-22 DIAGNOSIS — F41.9 ANXIETY: ICD-10-CM

## 2020-10-23 RX ORDER — CLONAZEPAM 0.5 MG/1
TABLET ORAL
Qty: 40 TABLET | Refills: 0 | Status: SHIPPED | OUTPATIENT
Start: 2020-10-23 | End: 2020-12-11

## 2020-12-08 ENCOUNTER — TELEPHONE (OUTPATIENT)
Dept: FAMILY MEDICINE CLINIC | Facility: CLINIC | Age: 75
End: 2020-12-08

## 2020-12-10 DIAGNOSIS — I10 ESSENTIAL HYPERTENSION: ICD-10-CM

## 2020-12-10 DIAGNOSIS — F41.9 ANXIETY: ICD-10-CM

## 2020-12-11 RX ORDER — AMLODIPINE BESYLATE 5 MG/1
TABLET ORAL
Qty: 90 TABLET | Refills: 0 | Status: SHIPPED | OUTPATIENT
Start: 2020-12-11 | End: 2021-03-09

## 2020-12-11 RX ORDER — CLONAZEPAM 0.5 MG/1
TABLET ORAL
Qty: 40 TABLET | Refills: 0 | Status: SHIPPED | OUTPATIENT
Start: 2020-12-11 | End: 2021-02-10

## 2021-02-08 DIAGNOSIS — F41.9 ANXIETY: ICD-10-CM

## 2021-02-10 RX ORDER — CLONAZEPAM 0.5 MG/1
TABLET ORAL
Qty: 37 TABLET | Refills: 0 | Status: SHIPPED | OUTPATIENT
Start: 2021-02-10 | End: 2021-04-05

## 2021-03-05 DIAGNOSIS — Z23 NEED FOR VACCINATION: ICD-10-CM

## 2021-03-09 ENCOUNTER — TELEPHONE (OUTPATIENT)
Dept: FAMILY MEDICINE CLINIC | Facility: CLINIC | Age: 76
End: 2021-03-09

## 2021-03-09 DIAGNOSIS — I10 ESSENTIAL HYPERTENSION: ICD-10-CM

## 2021-03-09 RX ORDER — AMLODIPINE BESYLATE 5 MG/1
TABLET ORAL
Qty: 90 TABLET | Refills: 0 | Status: SHIPPED | OUTPATIENT
Start: 2021-03-09 | End: 2021-06-07

## 2021-04-05 ENCOUNTER — OFFICE VISIT (OUTPATIENT)
Dept: FAMILY MEDICINE CLINIC | Facility: CLINIC | Age: 76
End: 2021-04-05
Payer: COMMERCIAL

## 2021-04-05 ENCOUNTER — LAB ENCOUNTER (OUTPATIENT)
Dept: LAB | Age: 76
End: 2021-04-05
Attending: FAMILY MEDICINE
Payer: MEDICARE

## 2021-04-05 VITALS
DIASTOLIC BLOOD PRESSURE: 78 MMHG | BODY MASS INDEX: 22.97 KG/M2 | HEIGHT: 62 IN | RESPIRATION RATE: 18 BRPM | TEMPERATURE: 98 F | SYSTOLIC BLOOD PRESSURE: 128 MMHG | WEIGHT: 124.81 LBS | HEART RATE: 74 BPM | OXYGEN SATURATION: 96 %

## 2021-04-05 DIAGNOSIS — K21.9 GASTROESOPHAGEAL REFLUX DISEASE WITHOUT ESOPHAGITIS: ICD-10-CM

## 2021-04-05 DIAGNOSIS — F41.9 ANXIETY: ICD-10-CM

## 2021-04-05 DIAGNOSIS — Z78.0 POST-MENOPAUSAL: ICD-10-CM

## 2021-04-05 DIAGNOSIS — Z00.00 MEDICARE ANNUAL WELLNESS VISIT, SUBSEQUENT: Primary | ICD-10-CM

## 2021-04-05 DIAGNOSIS — I70.0 AORTIC ATHEROSCLEROSIS (HCC): ICD-10-CM

## 2021-04-05 DIAGNOSIS — N18.31 STAGE 3A CHRONIC KIDNEY DISEASE (HCC): ICD-10-CM

## 2021-04-05 DIAGNOSIS — G43.009 MIGRAINE WITHOUT AURA AND WITHOUT STATUS MIGRAINOSUS, NOT INTRACTABLE: ICD-10-CM

## 2021-04-05 DIAGNOSIS — E55.9 VITAMIN D DEFICIENCY: ICD-10-CM

## 2021-04-05 DIAGNOSIS — F33.0 MILD EPISODE OF RECURRENT MAJOR DEPRESSIVE DISORDER (HCC): ICD-10-CM

## 2021-04-05 DIAGNOSIS — J44.9 CHRONIC OBSTRUCTIVE PULMONARY DISEASE, UNSPECIFIED COPD TYPE (HCC): ICD-10-CM

## 2021-04-05 DIAGNOSIS — M25.50 POLYARTHRALGIA: ICD-10-CM

## 2021-04-05 DIAGNOSIS — E03.9 HYPOTHYROIDISM, UNSPECIFIED TYPE: ICD-10-CM

## 2021-04-05 PROCEDURE — 84443 ASSAY THYROID STIM HORMONE: CPT | Performed by: FAMILY MEDICINE

## 2021-04-05 PROCEDURE — 99397 PER PM REEVAL EST PAT 65+ YR: CPT | Performed by: FAMILY MEDICINE

## 2021-04-05 PROCEDURE — 3008F BODY MASS INDEX DOCD: CPT | Performed by: FAMILY MEDICINE

## 2021-04-05 PROCEDURE — 86431 RHEUMATOID FACTOR QUANT: CPT | Performed by: FAMILY MEDICINE

## 2021-04-05 PROCEDURE — 80061 LIPID PANEL: CPT | Performed by: FAMILY MEDICINE

## 2021-04-05 PROCEDURE — 36415 COLL VENOUS BLD VENIPUNCTURE: CPT | Performed by: FAMILY MEDICINE

## 2021-04-05 PROCEDURE — 80053 COMPREHEN METABOLIC PANEL: CPT | Performed by: FAMILY MEDICINE

## 2021-04-05 PROCEDURE — 82306 VITAMIN D 25 HYDROXY: CPT | Performed by: FAMILY MEDICINE

## 2021-04-05 PROCEDURE — 3078F DIAST BP <80 MM HG: CPT | Performed by: FAMILY MEDICINE

## 2021-04-05 PROCEDURE — 96160 PT-FOCUSED HLTH RISK ASSMT: CPT | Performed by: FAMILY MEDICINE

## 2021-04-05 PROCEDURE — 85025 COMPLETE CBC W/AUTO DIFF WBC: CPT | Performed by: FAMILY MEDICINE

## 2021-04-05 PROCEDURE — G0439 PPPS, SUBSEQ VISIT: HCPCS | Performed by: FAMILY MEDICINE

## 2021-04-05 PROCEDURE — 3074F SYST BP LT 130 MM HG: CPT | Performed by: FAMILY MEDICINE

## 2021-04-05 RX ORDER — CLONAZEPAM 0.5 MG/1
0.5 TABLET ORAL 2 TIMES DAILY PRN
Qty: 36 TABLET | Refills: 0 | Status: SHIPPED | OUTPATIENT
Start: 2021-04-05 | End: 2021-05-27

## 2021-04-05 NOTE — PROGRESS NOTES
HPI:   Elton Gallagher is a 67year old female who presents for a MA (Medicare Advantage) [facility] (Once per calendar year). Pt has polyarthralgias. This is a chronic problem. This problem onset over 1 year ago.  This problem has been gradually worseni over one year ago. This problem has been stable. GERD. She complains of coughing and heartburn. This is a chronic problem. The current episode started more than 1 year ago. The problem occurs constantly. The problem has been unchanged.  The symptoms are performed Face to Face with patient and Family/surrogate (if present), and forms available to patient in AVS       She smoked tobacco in the past but quit greater than 12 months ago.   Social History    Tobacco Use      Smoking status: Former Smoker dust, latex, sulfa antibiotics, and sulfa drugs cross reactors. CURRENT MEDICATIONS:   clonazePAM 0.5 MG Oral Tab, Take 1 tablet (0.5 mg total) by mouth 2 (two) times daily as needed for Anxiety.   Budesonide-Formoterol Fumarate 160-4.5 MCG/ACT Inhalatio includes colonoscopy; cystotomy,excis vesical neck (Left); correct bunion,simple (Bilateral); colonoscopy & polypectomy (9/14); gastro - dmg (9/14); upper gi endoscopy,diagnosis (N/A, 9/22/2014); colonoscopy,remv lesn,snare (N/A, 9/22/2014); patient withou encounter: 124 lb 12.8 oz.     Medicare Hearing Assessment  (Required for AWV/SWV)    Good with whisper exam         Visual Acuity  Right Eye Visual Acuity: Uncorrected Right Eye Chart Acuity: 20/30   Left Eye Visual Acuity: Uncorrected Left Eye Chart Acuit type  Controlled, CPM.    5. Anxiety  Controlled, CPM.  - clonazePAM 0.5 MG Oral Tab; Take 1 tablet (0.5 mg total) by mouth 2 (two) times daily as needed for Anxiety. Dispense: 36 tablet; Refill: 0    6.  Aortic atherosclerosis (HCC)  Controlled, CPM.    7 more than 10 pounds without trying?: 2 - No  Has your appetite been poor?: No  How does the patient maintain a good energy level?: Stretching  How would you describe your current health state?: Good  How do you maintain positive mental well-being?: Puzzles Chlamydia  Annually if high risk No results found for: CHLAMYDIA No flowsheet data found. Screening Mammogram      Mammogram  Annually to 75, then as discussed There are no preventive care reminders to display for this patient.  Update [employer] COPD      Spirometry Testing Annually Spirometry date:  No flowsheet data found.             Template: [facility] AMB MEDICARE ANNUAL ASSESSMENT FEMALE Myron Nugent [[id number]]

## 2021-04-05 NOTE — PATIENT INSTRUCTIONS
Get POLST form from online and bring it to us. Understanding Advance Care Planning  Advance care planning is the process of deciding one’s own future medical care.  It helps assure that if you can’t speak for yourself, your wishes can still be carried ou are many places to learn more about how to plan for your care. Ask your healthcare provider or  for resources. · Picking a healthcare proxy. This means choosing a trusted person to speak for you only when you can’t speak for yourself.  When yo duty  Your agent respects your wishes in the following ways:   · Your agent’s duty is to see that your wishes are followed. · If your wishes aren’t known, your agent should try to decide what you want.   · Your agent’s choices come before anyone else’s wis

## 2021-05-03 ENCOUNTER — HOSPITAL ENCOUNTER (EMERGENCY)
Facility: HOSPITAL | Age: 76
Discharge: HOME | End: 2021-05-03
Attending: EMERGENCY MEDICINE
Payer: MEDICARE

## 2021-05-03 ENCOUNTER — APPOINTMENT (OUTPATIENT)
Dept: GENERAL RADIOLOGY | Facility: HOSPITAL | Age: 76
End: 2021-05-03
Attending: EMERGENCY MEDICINE
Payer: MEDICARE

## 2021-05-03 ENCOUNTER — APPOINTMENT (OUTPATIENT)
Dept: CT IMAGING | Facility: HOSPITAL | Age: 76
End: 2021-05-03
Attending: EMERGENCY MEDICINE
Payer: MEDICARE

## 2021-05-03 VITALS
HEIGHT: 60 IN | SYSTOLIC BLOOD PRESSURE: 124 MMHG | RESPIRATION RATE: 16 BRPM | TEMPERATURE: 98 F | DIASTOLIC BLOOD PRESSURE: 74 MMHG | WEIGHT: 127 LBS | OXYGEN SATURATION: 100 % | HEART RATE: 66 BPM | BODY MASS INDEX: 24.94 KG/M2

## 2021-05-03 DIAGNOSIS — M79.10 MYALGIA: ICD-10-CM

## 2021-05-03 DIAGNOSIS — G43.909 MIGRAINE WITHOUT STATUS MIGRAINOSUS, NOT INTRACTABLE, UNSPECIFIED MIGRAINE TYPE: Primary | ICD-10-CM

## 2021-05-03 PROCEDURE — 84484 ASSAY OF TROPONIN QUANT: CPT

## 2021-05-03 PROCEDURE — 80053 COMPREHEN METABOLIC PANEL: CPT | Performed by: EMERGENCY MEDICINE

## 2021-05-03 PROCEDURE — 93010 ELECTROCARDIOGRAM REPORT: CPT

## 2021-05-03 PROCEDURE — 93005 ELECTROCARDIOGRAM TRACING: CPT

## 2021-05-03 PROCEDURE — 71045 X-RAY EXAM CHEST 1 VIEW: CPT | Performed by: EMERGENCY MEDICINE

## 2021-05-03 PROCEDURE — 84484 ASSAY OF TROPONIN QUANT: CPT | Performed by: EMERGENCY MEDICINE

## 2021-05-03 PROCEDURE — 96361 HYDRATE IV INFUSION ADD-ON: CPT

## 2021-05-03 PROCEDURE — 74018 RADEX ABDOMEN 1 VIEW: CPT | Performed by: EMERGENCY MEDICINE

## 2021-05-03 PROCEDURE — 99285 EMERGENCY DEPT VISIT HI MDM: CPT

## 2021-05-03 PROCEDURE — 85025 COMPLETE CBC W/AUTO DIFF WBC: CPT

## 2021-05-03 PROCEDURE — 87086 URINE CULTURE/COLONY COUNT: CPT | Performed by: EMERGENCY MEDICINE

## 2021-05-03 PROCEDURE — 80053 COMPREHEN METABOLIC PANEL: CPT

## 2021-05-03 PROCEDURE — 85025 COMPLETE CBC W/AUTO DIFF WBC: CPT | Performed by: EMERGENCY MEDICINE

## 2021-05-03 PROCEDURE — 70450 CT HEAD/BRAIN W/O DYE: CPT | Performed by: EMERGENCY MEDICINE

## 2021-05-03 PROCEDURE — 96374 THER/PROPH/DIAG INJ IV PUSH: CPT

## 2021-05-03 PROCEDURE — 81001 URINALYSIS AUTO W/SCOPE: CPT | Performed by: EMERGENCY MEDICINE

## 2021-05-03 RX ORDER — SODIUM CHLORIDE 9 MG/ML
INJECTION, SOLUTION INTRAVENOUS ONCE
Status: COMPLETED | OUTPATIENT
Start: 2021-05-03 | End: 2021-05-03

## 2021-05-03 RX ORDER — ACETAMINOPHEN 500 MG
1000 TABLET ORAL ONCE
Status: COMPLETED | OUTPATIENT
Start: 2021-05-03 | End: 2021-05-03

## 2021-05-03 RX ORDER — ONDANSETRON 4 MG/1
4 TABLET, ORALLY DISINTEGRATING ORAL EVERY 4 HOURS PRN
Qty: 10 TABLET | Refills: 0 | Status: SHIPPED | OUTPATIENT
Start: 2021-05-03 | End: 2021-05-10

## 2021-05-03 RX ORDER — ONDANSETRON 2 MG/ML
4 INJECTION INTRAMUSCULAR; INTRAVENOUS ONCE
Status: COMPLETED | OUTPATIENT
Start: 2021-05-03 | End: 2021-05-03

## 2021-05-03 NOTE — ED QUICK NOTES
Pt states she is feeling better, pain now 3/10. Pt reports nausea has subsided. Pt appears comfortable, fluids infusing per order.

## 2021-05-03 NOTE — ED QUICK NOTES
Pt awake and alert, skin w/d,resps reg/unlabored. Pt appears comfortable, states she is feeling a little better but still c/o discomfort to head. Awaiting MD dispo. Pt to attempt to void per bedside commode at this time.

## 2021-05-03 NOTE — ED INITIAL ASSESSMENT (HPI)
Pt reports she received second covid shot Friday. Since reports worsening fatigue, headache, ear congestion/pain and body aches. Also reports L lower rib pain radiating into back.

## 2021-05-03 NOTE — ED QUICK NOTES
Pt to bedside commode accompanied by RN with steady gait. Pt still states she feels a bit lightheaded.

## 2021-05-03 NOTE — ED QUICK NOTES
Pt awake and alert, skin w/d,resps reg/unlabored. Pt c/o headache, body aches, jaw pain, headache 5/10. Pt states she has to urinate, assisted up to bedside commode for clean catch urine sample. Pt with steady gait to commode.      Pt made aware of plan of

## 2021-05-03 NOTE — ED PROVIDER NOTES
Patient Seen in: [facility] Emergency Department      History   Patient presents with:  Headache  Body ache and/or chills    Stated Complaint: Pt presents to ED after getting the Pzifer vaccine on Friday, stating she does *    HPI/Subjective:   HPI no rhonchi   Abdomen: Positive bowel sounds,  soft, no tenderness, no distension, no rebound, no guarding   Extremities: No cyanosis, no clubbing, no edema   Musculoskeletal: No tenderness, swelling, deformity   Skin: No significant lesions   Neuro: Ernesto Ortiz ambulate, smiling and much more comfortable appearing. He was concerned about her job which is pre-existing the symptoms by at least a few weeks.   She does appear to have some TMJ tenderness and will follow up with her primary care physician regarding thi

## 2021-05-03 NOTE — ED QUICK NOTES
Pt states she is feeling much better. Gait steady. Pt left with belongings to await arrival of son for ride.

## 2021-05-10 ENCOUNTER — TELEPHONE (OUTPATIENT)
Dept: FAMILY MEDICINE CLINIC | Facility: CLINIC | Age: 76
End: 2021-05-10

## 2021-05-10 NOTE — TELEPHONE ENCOUNTER
Pt will go after 7/16 for her bone density. If she goes before that date her insurance won't cover it.

## 2021-05-13 ENCOUNTER — TELEPHONE (OUTPATIENT)
Dept: CARDIOLOGY | Age: 76
End: 2021-05-13

## 2021-05-26 DIAGNOSIS — F41.9 ANXIETY: ICD-10-CM

## 2021-05-26 DIAGNOSIS — H93.8X3 PRESSURE SENSATION IN BOTH EARS: ICD-10-CM

## 2021-05-26 DIAGNOSIS — E03.9 HYPOTHYROIDISM, UNSPECIFIED TYPE: ICD-10-CM

## 2021-05-27 ENCOUNTER — TELEPHONE (OUTPATIENT)
Dept: FAMILY MEDICINE CLINIC | Facility: CLINIC | Age: 76
End: 2021-05-27

## 2021-05-27 DIAGNOSIS — E03.9 HYPOTHYROIDISM, UNSPECIFIED TYPE: ICD-10-CM

## 2021-05-27 DIAGNOSIS — H93.8X3 PRESSURE SENSATION IN BOTH EARS: ICD-10-CM

## 2021-05-27 DIAGNOSIS — F41.9 ANXIETY: ICD-10-CM

## 2021-05-27 RX ORDER — LEVOTHYROXINE SODIUM 0.05 MG/1
TABLET ORAL
Qty: 90 TABLET | Refills: 3 | Status: SHIPPED | OUTPATIENT
Start: 2021-05-27 | End: 2021-08-30 | Stop reason: ALTCHOICE

## 2021-05-27 RX ORDER — CLONAZEPAM 0.5 MG/1
TABLET ORAL
Qty: 36 TABLET | Refills: 0 | Status: SHIPPED | OUTPATIENT
Start: 2021-05-27 | End: 2021-07-08

## 2021-05-27 RX ORDER — CLONAZEPAM 0.5 MG/1
TABLET ORAL
Qty: 36 TABLET | Refills: 0 | OUTPATIENT
Start: 2021-05-27

## 2021-05-27 RX ORDER — LEVOTHYROXINE SODIUM 0.05 MG/1
TABLET ORAL
Qty: 90 TABLET | Refills: 3 | Status: SHIPPED | OUTPATIENT
Start: 2021-05-27

## 2021-05-27 NOTE — TELEPHONE ENCOUNTER
Pt having some TMJ pain and Dr. Kalyani Villanueva asking if okay to prescribe some Flexeril - pls advise

## 2021-06-07 DIAGNOSIS — I10 ESSENTIAL HYPERTENSION: ICD-10-CM

## 2021-06-07 RX ORDER — AMLODIPINE BESYLATE 5 MG/1
TABLET ORAL
Qty: 90 TABLET | Refills: 0 | Status: SHIPPED | OUTPATIENT
Start: 2021-06-07 | End: 2021-08-30 | Stop reason: ALTCHOICE

## 2021-06-25 ENCOUNTER — TELEMEDICINE (OUTPATIENT)
Dept: FAMILY MEDICINE CLINIC | Facility: CLINIC | Age: 76
End: 2021-06-25
Payer: COMMERCIAL

## 2021-06-25 ENCOUNTER — TELEPHONE (OUTPATIENT)
Dept: FAMILY MEDICINE CLINIC | Facility: CLINIC | Age: 76
End: 2021-06-25

## 2021-06-25 DIAGNOSIS — G43.911 INTRACTABLE MIGRAINE WITH STATUS MIGRAINOSUS, UNSPECIFIED MIGRAINE TYPE: ICD-10-CM

## 2021-06-25 DIAGNOSIS — G43.911 INTRACTABLE MIGRAINE WITH STATUS MIGRAINOSUS, UNSPECIFIED MIGRAINE TYPE: Primary | ICD-10-CM

## 2021-06-25 PROCEDURE — 99213 OFFICE O/P EST LOW 20 MIN: CPT | Performed by: FAMILY MEDICINE

## 2021-06-25 RX ORDER — ONDANSETRON 4 MG/1
4 TABLET, FILM COATED ORAL EVERY 8 HOURS PRN
Qty: 20 TABLET | Refills: 2 | Status: SHIPPED | OUTPATIENT
Start: 2021-06-25 | End: 2021-12-17

## 2021-06-25 RX ORDER — BENZONATATE 200 MG/1
200 CAPSULE ORAL EVERY 8 HOURS PRN
Qty: 30 CAPSULE | Refills: 0 | Status: SHIPPED | OUTPATIENT
Start: 2021-06-25 | End: 2021-08-30 | Stop reason: ALTCHOICE

## 2021-06-25 RX ORDER — FLUTICASONE PROPIONATE 50 MCG
2 SPRAY, SUSPENSION (ML) NASAL DAILY
Qty: 1 EACH | Refills: 1 | Status: SHIPPED | OUTPATIENT
Start: 2021-06-25 | End: 2021-06-25

## 2021-06-25 RX ORDER — HYDROCODONE BITARTRATE AND ACETAMINOPHEN 5; 325 MG/1; MG/1
1 TABLET ORAL EVERY 6 HOURS PRN
Qty: 20 TABLET | Refills: 0 | Status: SHIPPED | OUTPATIENT
Start: 2021-06-25

## 2021-06-25 RX ORDER — SUMATRIPTAN 50 MG/1
50 TABLET, FILM COATED ORAL EVERY 2 HOUR PRN
Qty: 16 TABLET | Refills: 1 | Status: SHIPPED | OUTPATIENT
Start: 2021-06-25

## 2021-06-25 RX ORDER — FLUTICASONE PROPIONATE 50 MCG
SPRAY, SUSPENSION (ML) NASAL
Qty: 48 G | Refills: 0 | Status: SHIPPED | OUTPATIENT
Start: 2021-06-25 | End: 2021-08-02

## 2021-06-25 RX ORDER — PROPRANOLOL HCL 60 MG
CAPSULE, EXTENDED RELEASE 24HR ORAL
Qty: 90 CAPSULE | Refills: 0 | Status: SHIPPED | OUTPATIENT
Start: 2021-06-25

## 2021-06-25 RX ORDER — PROPRANOLOL HCL 60 MG
1 CAPSULE, EXTENDED RELEASE 24HR ORAL DAILY
Qty: 30 CAPSULE | Refills: 0 | Status: SHIPPED | OUTPATIENT
Start: 2021-06-25 | End: 2021-06-25

## 2021-06-25 NOTE — TELEPHONE ENCOUNTER
Re: Ondansetron:  PA completed electronically through Epic, denial received. Order was not released to pharmacy due to this, I left a verbal order for ondansetron for patient to use [facility] for medication.      Patient made aware, patient verbalized un

## 2021-06-25 NOTE — PROGRESS NOTES
HPI/Subjective:   Patient ID: Yoseph Nguyen is a 67year old female. Migraine   This is a chronic problem. The current episode started more than 1 year ago. The problem occurs intermittently. The problem has been gradually worsening.  Pain location: jasmine DAILY 90 tablet 3   • CLONAZEPAM 0.5 MG Oral Tab TAKE 1 TABLET(0.5 MG) BY MOUTH TWICE DAILY AS NEEDED FOR ANXIETY 36 tablet 0   • LEVOTHYROXINE SODIUM 50 MCG Oral Tab TAKE 1 TABLET BY MOUTH ONCE DAILY 90 tablet 3   • ergocalciferol 1.25 MG (75422 UT) Oral Affect: Mood normal.         Thought Content:  Thought content normal.       Two way communication was completed with video due to COVID lock down     Assessment & Plan:   Intractable migraine with status migrainosus, unspecified migraine type  (primary enc cough.   • Propranolol HCl ER 60 MG Oral Capsule SR 24 Hr 30 capsule 0     Sig: Take 1 capsule (60 mg total) by mouth daily.    • HYDROcodone-acetaminophen (NORCO) 5-325 MG Oral Tab 20 tablet 0     Sig: Take 1 tablet by mouth every 6 (six) hours as needed f

## 2021-06-29 ENCOUNTER — OFFICE VISIT (OUTPATIENT)
Dept: FAMILY MEDICINE CLINIC | Facility: CLINIC | Age: 76
End: 2021-06-29
Payer: COMMERCIAL

## 2021-06-29 ENCOUNTER — LAB ENCOUNTER (OUTPATIENT)
Dept: LAB | Age: 76
End: 2021-06-29
Attending: FAMILY MEDICINE
Payer: MEDICARE

## 2021-06-29 VITALS
SYSTOLIC BLOOD PRESSURE: 132 MMHG | BODY MASS INDEX: 24.74 KG/M2 | TEMPERATURE: 98 F | RESPIRATION RATE: 20 BRPM | HEART RATE: 68 BPM | HEIGHT: 60 IN | WEIGHT: 126 LBS | OXYGEN SATURATION: 98 % | DIASTOLIC BLOOD PRESSURE: 82 MMHG

## 2021-06-29 DIAGNOSIS — E78.5 HYPERLIPIDEMIA, UNSPECIFIED HYPERLIPIDEMIA TYPE: ICD-10-CM

## 2021-06-29 DIAGNOSIS — H92.03 DISCOMFORT OF BOTH EARS: ICD-10-CM

## 2021-06-29 DIAGNOSIS — G43.911 INTRACTABLE MIGRAINE WITH STATUS MIGRAINOSUS, UNSPECIFIED MIGRAINE TYPE: Primary | ICD-10-CM

## 2021-06-29 PROCEDURE — 3008F BODY MASS INDEX DOCD: CPT | Performed by: FAMILY MEDICINE

## 2021-06-29 PROCEDURE — 80061 LIPID PANEL: CPT | Performed by: FAMILY MEDICINE

## 2021-06-29 PROCEDURE — 3079F DIAST BP 80-89 MM HG: CPT | Performed by: FAMILY MEDICINE

## 2021-06-29 PROCEDURE — 36415 COLL VENOUS BLD VENIPUNCTURE: CPT | Performed by: FAMILY MEDICINE

## 2021-06-29 PROCEDURE — 3075F SYST BP GE 130 - 139MM HG: CPT | Performed by: FAMILY MEDICINE

## 2021-06-29 PROCEDURE — 80053 COMPREHEN METABOLIC PANEL: CPT | Performed by: FAMILY MEDICINE

## 2021-06-29 PROCEDURE — 99214 OFFICE O/P EST MOD 30 MIN: CPT | Performed by: FAMILY MEDICINE

## 2021-06-29 RX ORDER — UBROGEPANT 50 MG/1
50 TABLET ORAL AS NEEDED
Qty: 16 TABLET | Refills: 1 | Status: SHIPPED | OUTPATIENT
Start: 2021-06-29 | End: 2021-07-29

## 2021-06-29 RX ORDER — EZETIMIBE 10 MG/1
10 TABLET ORAL NIGHTLY
Qty: 90 TABLET | Refills: 1 | Status: SHIPPED | OUTPATIENT
Start: 2021-06-29 | End: 2021-12-30

## 2021-06-29 NOTE — PATIENT INSTRUCTIONS
Rosita over the counter daily    Fasting blood test in 3 months    Start ezetimibe in 2-3 months    Keep headache diary    Try staying away from various foods one month at a time to see how you do    Get over the counter mineral oil drops to do biwee

## 2021-06-29 NOTE — PROGRESS NOTES
HPI/Subjective:   Patient ID: Frieda Jewell is a 67year old female. Migraine   This is a chronic problem. The current episode started more than 1 year ago. The problem occurs intermittently.  The problem has been gradually worsening and is now occurring Musculoskeletal: Negative for myalgias. Neurological: Negative for focal weakness. All other systems reviewed and are negative.     Current Outpatient Medications   Medication Sig Dispense Refill   • ezetimibe 10 MG Oral Tab Take 1 tablet (10 mg total Pantoprazole Sodium 40 MG Oral Tab EC Take 1 tablet (40 mg total) by mouth 2 (two) times daily before meals. 60 tablet 3   • hydrocortisone 2.5 % External Cream Apply 1 Application topically 2 (two) times daily.  1 Tube 1   • acetaminophen 500 MG Oral Tab T encounter diagnosis)  Hyperlipidemia, unspecified hyperlipidemia type  Discomfort of both ears    1. Intractable migraine with status migrainosus, unspecified migraine type  Injection of aimovig given in office. Start propanolol.  Continue ubrelvy as needed

## 2021-07-07 DIAGNOSIS — F41.9 ANXIETY: ICD-10-CM

## 2021-07-08 RX ORDER — CLONAZEPAM 0.5 MG/1
TABLET ORAL
Qty: 36 TABLET | Refills: 0 | Status: SHIPPED | OUTPATIENT
Start: 2021-07-08 | End: 2021-08-16

## 2021-08-02 DIAGNOSIS — G43.911 INTRACTABLE MIGRAINE WITH STATUS MIGRAINOSUS, UNSPECIFIED MIGRAINE TYPE: ICD-10-CM

## 2021-08-02 RX ORDER — FLUTICASONE PROPIONATE 50 MCG
SPRAY, SUSPENSION (ML) NASAL
Qty: 48 G | Refills: 0 | Status: SHIPPED | OUTPATIENT
Start: 2021-08-02

## 2021-08-07 RX ORDER — BUDESONIDE AND FORMOTEROL FUMARATE DIHYDRATE 160; 4.5 UG/1; UG/1
AEROSOL RESPIRATORY (INHALATION)
Qty: 10.2 G | Refills: 11 | Status: SHIPPED | OUTPATIENT
Start: 2021-08-07 | End: 2022-07-05

## 2021-08-14 ENCOUNTER — HOSPITAL ENCOUNTER (OUTPATIENT)
Age: 76
Discharge: HOME | End: 2021-08-14
Attending: EMERGENCY MEDICINE
Payer: MEDICARE

## 2021-08-14 VITALS
WEIGHT: 127 LBS | OXYGEN SATURATION: 98 % | HEART RATE: 83 BPM | TEMPERATURE: 98 F | DIASTOLIC BLOOD PRESSURE: 79 MMHG | HEIGHT: 60 IN | BODY MASS INDEX: 24.94 KG/M2 | SYSTOLIC BLOOD PRESSURE: 118 MMHG | RESPIRATION RATE: 20 BRPM

## 2021-08-14 DIAGNOSIS — R19.7 DIARRHEA OF PRESUMED INFECTIOUS ORIGIN: Primary | ICD-10-CM

## 2021-08-14 LAB
#MXD IC: 1.4 X10ˆ3/UL (ref 0.1–1)
BUN BLD-MCNC: 6 MG/DL (ref 7–18)
CHLORIDE BLD-SCNC: 108 MMOL/L (ref 98–112)
CO2 BLD-SCNC: 21 MMOL/L (ref 21–32)
CREAT BLD-MCNC: 0.8 MG/DL
GLUCOSE BLD-MCNC: 97 MG/DL (ref 70–99)
HCT VFR BLD AUTO: 38 %
HCT VFR BLD CALC: 36 %
HGB BLD-MCNC: 12.7 G/DL
ISTAT IONIZED CALCIUM FOR CHEM 8: 1.16 MMOL/L (ref 1.12–1.32)
LYMPHOCYTES # BLD AUTO: 1 X10ˆ3/UL (ref 1–4)
LYMPHOCYTES NFR BLD AUTO: 13.5 %
MCH RBC QN AUTO: 30 PG (ref 26–34)
MCHC RBC AUTO-ENTMCNC: 33.4 G/DL (ref 31–37)
MCV RBC AUTO: 89.6 FL (ref 80–100)
MIXED CELL %: 18.7 %
NEUTROPHILS # BLD AUTO: 5.3 X10ˆ3/UL (ref 1.5–7.7)
NEUTROPHILS NFR BLD AUTO: 67.8 %
PLATELET # BLD AUTO: 330 X10ˆ3/UL (ref 150–450)
POTASSIUM BLD-SCNC: 3.7 MMOL/L (ref 3.6–5.1)
RBC # BLD AUTO: 4.24 X10ˆ6/UL
SODIUM BLD-SCNC: 141 MMOL/L (ref 136–145)
WBC # BLD AUTO: 7.7 X10ˆ3/UL (ref 4–11)

## 2021-08-14 PROCEDURE — 99214 OFFICE O/P EST MOD 30 MIN: CPT

## 2021-08-14 PROCEDURE — 85025 COMPLETE CBC W/AUTO DIFF WBC: CPT | Performed by: EMERGENCY MEDICINE

## 2021-08-14 PROCEDURE — 80047 BASIC METABLC PNL IONIZED CA: CPT

## 2021-08-14 PROCEDURE — 96360 HYDRATION IV INFUSION INIT: CPT

## 2021-08-14 RX ORDER — DICYCLOMINE HCL 20 MG
20 TABLET ORAL 2 TIMES DAILY
Qty: 30 TABLET | Refills: 0 | Status: SHIPPED | OUTPATIENT
Start: 2021-08-14 | End: 2021-09-13

## 2021-08-14 NOTE — ED PROVIDER NOTES
Patient Seen in: [facility]      History   Patient presents with:  Abdomen/Flank Pain    Stated Complaint: diarrhea x 4 days / stomach cramps    HPI/Subjective:   HPI    Diarrhea for 4 days. Improving, now only occurs after eating.   Debo COLONOSCOPY, POSSIBLE BIOPSY, POSSIBLE POLYPECTOMY [id number];  Surgeon: Tatiana Colorado MD;  Location: [facility]   • PATIENT [place] PREOPERATIVE ORDER FOR IV ANTIBIOTIC SURGICAL SITE INFECTION PROPHYLAXIS.  N/A 9/22/2014    Procedure: Marissa Correa color.  Pulmonary/Chest: Normal effort. No accessory muscle use. No clubbing, no cyanosis. Abdominal: Soft. There is no tenderness. There is no guarding. Musculoskeletal: No swelling, deformity or ecchymosis.     Neurological: Pt is alert and oriente

## 2021-08-14 NOTE — ED INITIAL ASSESSMENT (HPI)
Pt here w/ generalized sharp abd pain. Constant aching. Diarrhea x4 days, anytime she eats she immediately starts.  Had same s/s in the past and was SIBO

## 2021-08-16 DIAGNOSIS — F41.9 ANXIETY: ICD-10-CM

## 2021-08-16 RX ORDER — CLONAZEPAM 0.5 MG/1
TABLET ORAL
Qty: 36 TABLET | Refills: 0 | Status: SHIPPED | OUTPATIENT
Start: 2021-08-16 | End: 2021-09-21

## 2021-08-27 ENCOUNTER — TELEPHONE (OUTPATIENT)
Dept: FAMILY MEDICINE CLINIC | Facility: CLINIC | Age: 76
End: 2021-08-27

## 2021-08-27 DIAGNOSIS — L30.9 CHRONIC DERMATITIS: Primary | ICD-10-CM

## 2021-08-29 RX ORDER — PREDNISONE 20 MG/1
TABLET ORAL
Qty: 13 TABLET | Refills: 0 | Status: SHIPPED | OUTPATIENT
Start: 2021-08-29

## 2021-08-30 ENCOUNTER — OFFICE VISIT (OUTPATIENT)
Dept: FAMILY MEDICINE CLINIC | Facility: CLINIC | Age: 76
End: 2021-08-30
Payer: COMMERCIAL

## 2021-08-30 VITALS
HEART RATE: 82 BPM | BODY MASS INDEX: 24.15 KG/M2 | DIASTOLIC BLOOD PRESSURE: 76 MMHG | HEIGHT: 60 IN | OXYGEN SATURATION: 97 % | WEIGHT: 123 LBS | TEMPERATURE: 98 F | SYSTOLIC BLOOD PRESSURE: 120 MMHG

## 2021-08-30 DIAGNOSIS — L30.9 CHRONIC DERMATITIS: Primary | ICD-10-CM

## 2021-08-30 PROCEDURE — 3078F DIAST BP <80 MM HG: CPT | Performed by: FAMILY MEDICINE

## 2021-08-30 PROCEDURE — 3074F SYST BP LT 130 MM HG: CPT | Performed by: FAMILY MEDICINE

## 2021-08-30 PROCEDURE — 99213 OFFICE O/P EST LOW 20 MIN: CPT | Performed by: FAMILY MEDICINE

## 2021-08-30 PROCEDURE — 3008F BODY MASS INDEX DOCD: CPT | Performed by: FAMILY MEDICINE

## 2021-08-30 RX ORDER — BETAMETHASONE DIPROPIONATE 0.5 MG/G
1 CREAM TOPICAL 2 TIMES DAILY
Qty: 45 G | Refills: 1 | Status: SHIPPED | OUTPATIENT
Start: 2021-08-30

## 2021-08-30 RX ORDER — ALBUTEROL SULFATE 90 UG/1
AEROSOL, METERED RESPIRATORY (INHALATION)
Qty: 8.5 G | Refills: 2 | Status: SHIPPED | OUTPATIENT
Start: 2021-08-30

## 2021-08-30 NOTE — TELEPHONE ENCOUNTER
Patient called and stated she has dermatitis. She was in tears and said she is itching like crazy and couldn't get in to see her doctor. Can Dr. Agatha Sidhu help her in anyway?
Please squeeze in patient for Monday or Tuesday. I put in for prednisone.
Spoke to pt she states itching worse. Appt scheduled with Dr Koki Alba today. Pt informed Prednisone to pharmacy.
Spoke with patient she states she has extreme itchiness, requesting steroid? States she is on dupixent, Dr. Caro Elam was prescribing dupixent, but she was not able to make it to the city for follow up, so another doctor gave her 3 refills.    She states sh
lower

## 2021-09-06 NOTE — PROGRESS NOTES
HPI/Subjective:   Patient ID: Jamshid Corrales is a 67year old female. Rash  This is a chronic problem. The current episode started more than 1 year ago. The problem has been waxing and waning since onset. The rash is diffuse.  The rash is characterize every 2 (two) hours as needed for Migraine (max 100mg in 24 hrs).  16 tablet 1   • PROPRANOLOL HCL ER 60 MG Oral Capsule SR 24 Hr TAKE 1 CAPSULE(60 MG) BY MOUTH DAILY 90 capsule 0   • LEVOTHYROXINE SODIUM 50 MCG Oral Tab TAKE 1 TABLET BY MOUTH ONCE DAILY 90 respiratory distress. Breath sounds: Normal breath sounds. No wheezing or rales. Skin:     Comments: Multiple red rashs with significant excoriation on them. Assessment & Plan:   Chronic dermatitis  (primary encounter diagnosis)  1.  Chronic de

## 2021-09-08 DIAGNOSIS — I10 ESSENTIAL HYPERTENSION: ICD-10-CM

## 2021-09-08 RX ORDER — AMLODIPINE BESYLATE 5 MG/1
TABLET ORAL
Qty: 90 TABLET | Refills: 0 | Status: SHIPPED | OUTPATIENT
Start: 2021-09-08 | End: 2021-12-07

## 2021-09-20 DIAGNOSIS — F41.9 ANXIETY: ICD-10-CM

## 2021-09-21 RX ORDER — CLONAZEPAM 0.5 MG/1
TABLET ORAL
Qty: 36 TABLET | Refills: 0 | Status: SHIPPED | OUTPATIENT
Start: 2021-09-21 | End: 2021-11-01

## 2021-10-31 DIAGNOSIS — F41.9 ANXIETY: ICD-10-CM

## 2021-11-01 RX ORDER — CLONAZEPAM 0.5 MG/1
TABLET ORAL
Qty: 36 TABLET | Refills: 0 | Status: SHIPPED | OUTPATIENT
Start: 2021-11-01 | End: 2021-12-24

## 2021-12-07 DIAGNOSIS — I10 ESSENTIAL HYPERTENSION: ICD-10-CM

## 2021-12-07 RX ORDER — AMLODIPINE BESYLATE 5 MG/1
TABLET ORAL
Qty: 90 TABLET | Refills: 0 | Status: SHIPPED | OUTPATIENT
Start: 2021-12-07

## 2021-12-17 ENCOUNTER — TELEPHONE (OUTPATIENT)
Dept: FAMILY MEDICINE CLINIC | Facility: CLINIC | Age: 76
End: 2021-12-17

## 2021-12-17 NOTE — TELEPHONE ENCOUNTER
Patient wants us to update her chart that she does not take   ubrogepant (UBRELVY) 50 MG Oral Tab   or  Ondansetron HCl (ZOFRAN) 4 mg tablet    Pharmacy told the patient that we keep sending refill requests to them.   ???????

## 2021-12-22 DIAGNOSIS — F41.9 ANXIETY: ICD-10-CM

## 2021-12-24 RX ORDER — CLONAZEPAM 0.5 MG/1
TABLET ORAL
Qty: 36 TABLET | Refills: 0 | Status: SHIPPED | OUTPATIENT
Start: 2021-12-24

## 2021-12-30 ENCOUNTER — VIRTUAL PHONE E/M (OUTPATIENT)
Dept: FAMILY MEDICINE CLINIC | Facility: CLINIC | Age: 76
End: 2021-12-30
Payer: COMMERCIAL

## 2021-12-30 DIAGNOSIS — F32.A DEPRESSION, UNSPECIFIED DEPRESSION TYPE: Primary | ICD-10-CM

## 2021-12-30 DIAGNOSIS — E78.2 MIXED HYPERLIPIDEMIA: ICD-10-CM

## 2021-12-30 DIAGNOSIS — E78.5 HYPERLIPIDEMIA, UNSPECIFIED HYPERLIPIDEMIA TYPE: ICD-10-CM

## 2021-12-30 DIAGNOSIS — M25.50 POLYARTHRALGIA: ICD-10-CM

## 2021-12-30 PROCEDURE — 99443 PHONE E/M BY PHYS 21-30 MIN: CPT | Performed by: FAMILY MEDICINE

## 2021-12-30 RX ORDER — NAPROXEN 500 MG/1
500 TABLET ORAL 2 TIMES DAILY PRN
Qty: 30 TABLET | Refills: 0 | Status: SHIPPED | OUTPATIENT
Start: 2021-12-30

## 2021-12-30 RX ORDER — EZETIMIBE 10 MG/1
10 TABLET ORAL NIGHTLY
Qty: 90 TABLET | Refills: 1 | Status: SHIPPED | OUTPATIENT
Start: 2021-12-30

## 2021-12-30 RX ORDER — ESCITALOPRAM OXALATE 10 MG/1
TABLET ORAL
Qty: 30 TABLET | Refills: 3 | Status: SHIPPED | OUTPATIENT
Start: 2021-12-30

## 2021-12-30 NOTE — PROGRESS NOTES
Subjective:   Patient ID: Rocael Castillo is a 67year old female. Stopped depression medication x 3 months due to migraines. After stopping it migraines stopped. Last 1 month getting bad depression. Gets crying spells.   Doesn't want to get back on MCG/ACT Nasal Suspension SHAKE LIQUID AND USE 2 SPRAYS IN EACH NOSTRIL DAILY 48 g 0   • HYDROcodone-acetaminophen (NORCO) 5-325 MG Oral Tab Take 1 tablet by mouth every 6 (six) hours as needed for Pain.  20 tablet 0   • SUMAtriptan Succinate 50 MG Oral Tab Tab; 1/2 tab daily x 1 week then 1 tab daily  Dispense: 30 tablet; Refill: 3    2. Polyarthralgia  - CBC WITH DIFFERENTIAL WITH PLATELET  - COMP METABOLIC PANEL (14)  - MAGNESIUM  - RHEUMATOID ARTHRITIS FACTOR  - CYCLIC CITRULLINATE PEP.  IGG  - diclofenac

## 2022-01-14 LAB
ABSOLUTE BASOPHILS: 71 CELLS/UL (ref 0–200)
ABSOLUTE EOSINOPHILS: 171 CELLS/UL (ref 15–500)
ABSOLUTE LYMPHOCYTES: 1440 CELLS/UL (ref 850–3900)
ABSOLUTE MONOCYTES: 696 CELLS/UL (ref 200–950)
ABSOLUTE NEUTROPHILS: 3522 CELLS/UL (ref 1500–7800)
ALBUMIN/GLOBULIN RATIO: 1.9 (CALC) (ref 1–2.5)
ALBUMIN: 4.4 G/DL (ref 3.6–5.1)
ALKALINE PHOSPHATASE: 91 U/L (ref 37–153)
ALT: 14 U/L (ref 6–29)
AST: 20 U/L (ref 10–35)
BASOPHILS: 1.2 %
BILIRUBIN, TOTAL: 0.5 MG/DL (ref 0.2–1.2)
BUN/CREATININE RATIO: 14 (CALC) (ref 6–22)
BUN: 13 MG/DL (ref 7–25)
CALCIUM: 9.8 MG/DL (ref 8.6–10.4)
CARBON DIOXIDE: 27 MMOL/L (ref 20–32)
CHLORIDE: 108 MMOL/L (ref 98–110)
CHOL/HDLC RATIO: 4.9 (CALC)
CHOLESTEROL, TOTAL: 244 MG/DL
CREATININE: 0.94 MG/DL (ref 0.6–0.93)
CYCLIC CITRULLINATED$PEPTIDE (CCP) AB (IGG): <16 UNITS
EGFR IF AFRICN AM: 68 ML/MIN/1.73M2
EGFR IF NONAFRICN AM: 59 ML/MIN/1.73M2
EOSINOPHILS: 2.9 %
GLOBULIN: 2.3 G/DL (CALC) (ref 1.9–3.7)
GLUCOSE: 83 MG/DL (ref 65–99)
HDL CHOLESTEROL: 50 MG/DL
HEMATOCRIT: 37.4 % (ref 35–45)
HEMOGLOBIN: 12.7 G/DL (ref 11.7–15.5)
LDL-CHOLESTEROL: 163 MG/DL (CALC)
LYMPHOCYTES: 24.4 %
MAGNESIUM: 2.2 MG/DL (ref 1.5–2.5)
MCH: 30 PG (ref 27–33)
MCHC: 34 G/DL (ref 32–36)
MCV: 88.4 FL (ref 80–100)
MONOCYTES: 11.8 %
MPV: 9.7 FL (ref 7.5–12.5)
NEUTROPHILS: 59.7 %
NON-HDL CHOLESTEROL: 194 MG/DL (CALC)
PLATELET COUNT: 361 THOUSAND/UL (ref 140–400)
POTASSIUM: 4.3 MMOL/L (ref 3.5–5.3)
PROTEIN, TOTAL: 6.7 G/DL (ref 6.1–8.1)
RDW: 12.8 % (ref 11–15)
RED BLOOD CELL COUNT: 4.23 MILLION/UL (ref 3.8–5.1)
RHEUMATOID FACTOR: <14 IU/ML
SODIUM: 142 MMOL/L (ref 135–146)
TRIGLYCERIDES: 164 MG/DL
WHITE BLOOD CELL COUNT: 5.9 THOUSAND/UL (ref 3.8–10.8)

## 2022-02-15 RX ORDER — CLONAZEPAM 0.5 MG/1
TABLET ORAL
Qty: 36 TABLET | Refills: 0 | Status: SHIPPED | OUTPATIENT
Start: 2022-02-15 | End: 2022-03-31

## 2022-03-08 RX ORDER — AMLODIPINE BESYLATE 5 MG/1
TABLET ORAL
Qty: 90 TABLET | Refills: 0 | Status: SHIPPED | OUTPATIENT
Start: 2022-03-08

## 2022-03-31 RX ORDER — CLONAZEPAM 0.5 MG/1
TABLET ORAL
Qty: 36 TABLET | Refills: 0 | Status: SHIPPED | OUTPATIENT
Start: 2022-03-31

## 2022-04-20 ENCOUNTER — PATIENT OUTREACH (OUTPATIENT)
Dept: CASE MANAGEMENT | Age: 77
End: 2022-04-20

## 2022-04-21 ENCOUNTER — PATIENT OUTREACH (OUTPATIENT)
Dept: CASE MANAGEMENT | Age: 77
End: 2022-04-21

## 2022-04-21 ENCOUNTER — HOSPITAL ENCOUNTER (OUTPATIENT)
Age: 77
Discharge: HOME | End: 2022-04-21
Attending: EMERGENCY MEDICINE
Payer: MEDICARE

## 2022-04-21 VITALS
SYSTOLIC BLOOD PRESSURE: 128 MMHG | OXYGEN SATURATION: 96 % | DIASTOLIC BLOOD PRESSURE: 77 MMHG | HEART RATE: 77 BPM | RESPIRATION RATE: 16 BRPM | TEMPERATURE: 99 F

## 2022-04-21 DIAGNOSIS — N30.00 ACUTE CYSTITIS WITHOUT HEMATURIA: Primary | ICD-10-CM

## 2022-04-21 LAB
GLUCOSE BLD-MCNC: 95 MG/DL (ref 70–99)
POCT BILIRUBIN URINE: NEGATIVE
POCT GLUCOSE URINE: NEGATIVE MG/DL
POCT KETONE URINE: NEGATIVE MG/DL
POCT NITRITE URINE: NEGATIVE
POCT PH URINE: 7 (ref 5–8)
POCT PROTEIN URINE: NEGATIVE MG/DL
POCT SPECIFIC GRAVITY URINE: 1.02
POCT URINE COLOR: YELLOW
POCT UROBILINOGEN URINE: 0.2 MG/DL
SARS-COV-2 RNA RESP QL NAA+PROBE: NOT DETECTED

## 2022-04-21 PROCEDURE — 99213 OFFICE O/P EST LOW 20 MIN: CPT

## 2022-04-21 PROCEDURE — 87086 URINE CULTURE/COLONY COUNT: CPT | Performed by: EMERGENCY MEDICINE

## 2022-04-21 PROCEDURE — 99214 OFFICE O/P EST MOD 30 MIN: CPT

## 2022-04-21 PROCEDURE — 81002 URINALYSIS NONAUTO W/O SCOPE: CPT | Performed by: EMERGENCY MEDICINE

## 2022-04-21 PROCEDURE — 82962 GLUCOSE BLOOD TEST: CPT

## 2022-04-21 RX ORDER — NITROFURANTOIN MACROCRYSTALS 100 MG/1
100 CAPSULE ORAL 2 TIMES DAILY
Qty: 10 CAPSULE | Refills: 0 | Status: SHIPPED | OUTPATIENT
Start: 2022-04-21 | End: 2022-04-26

## 2022-04-21 NOTE — ED INITIAL ASSESSMENT (HPI)
C/o urinary frequency since yesterday afternoon. Feeling fatigue and weak, with suprapubic pressure and back pain.

## 2022-04-22 ENCOUNTER — HOSPITAL ENCOUNTER (EMERGENCY)
Facility: HOSPITAL | Age: 77
Discharge: HOME | End: 2022-04-22
Attending: EMERGENCY MEDICINE
Payer: MEDICARE

## 2022-04-22 ENCOUNTER — TELEPHONE (OUTPATIENT)
Dept: FAMILY MEDICINE CLINIC | Facility: CLINIC | Age: 77
End: 2022-04-22

## 2022-04-22 VITALS
BODY MASS INDEX: 22.31 KG/M2 | RESPIRATION RATE: 19 BRPM | SYSTOLIC BLOOD PRESSURE: 120 MMHG | DIASTOLIC BLOOD PRESSURE: 70 MMHG | OXYGEN SATURATION: 97 % | WEIGHT: 121.25 LBS | HEART RATE: 79 BPM | TEMPERATURE: 99 F | HEIGHT: 62 IN

## 2022-04-22 DIAGNOSIS — N39.0 URINARY TRACT INFECTION WITHOUT HEMATURIA, SITE UNSPECIFIED: Primary | ICD-10-CM

## 2022-04-22 LAB
ALBUMIN SERPL-MCNC: 3.9 G/DL (ref 3.4–5)
ALBUMIN/GLOB SERPL: 1.1 {RATIO} (ref 1–2)
ALP LIVER SERPL-CCNC: 98 U/L
ALT SERPL-CCNC: 25 U/L
ANION GAP SERPL CALC-SCNC: 8 MMOL/L (ref 0–18)
AST SERPL-CCNC: 24 U/L (ref 15–37)
BASOPHILS # BLD AUTO: 0.1 X10(3) UL (ref 0–0.2)
BASOPHILS NFR BLD AUTO: 1.2 %
BILIRUB SERPL-MCNC: 0.5 MG/DL (ref 0.1–2)
BILIRUB UR QL STRIP.AUTO: NEGATIVE
BUN BLD-MCNC: 11 MG/DL (ref 7–18)
CALCIUM BLD-MCNC: 9.8 MG/DL (ref 8.5–10.1)
CHLORIDE SERPL-SCNC: 107 MMOL/L (ref 98–112)
CLARITY UR REFRACT.AUTO: CLEAR
CO2 SERPL-SCNC: 22 MMOL/L (ref 21–32)
COLOR UR AUTO: YELLOW
CREAT BLD-MCNC: 0.9 MG/DL
EOSINOPHIL # BLD AUTO: 0.14 X10(3) UL (ref 0–0.7)
EOSINOPHIL NFR BLD AUTO: 1.6 %
ERYTHROCYTE [DISTWIDTH] IN BLOOD BY AUTOMATED COUNT: 13.1 %
GLOBULIN PLAS-MCNC: 3.6 G/DL (ref 2.8–4.4)
GLUCOSE BLD-MCNC: 109 MG/DL (ref 70–99)
GLUCOSE UR STRIP.AUTO-MCNC: NEGATIVE MG/DL
HCT VFR BLD AUTO: 42 %
HGB BLD-MCNC: 13.4 G/DL
IMM GRANULOCYTES # BLD AUTO: 0.02 X10(3) UL (ref 0–1)
IMM GRANULOCYTES NFR BLD: 0.2 %
KETONES UR STRIP.AUTO-MCNC: NEGATIVE MG/DL
LIPASE SERPL-CCNC: 85 U/L (ref 73–393)
LYMPHOCYTES # BLD AUTO: 1.47 X10(3) UL (ref 1–4)
LYMPHOCYTES NFR BLD AUTO: 17.2 %
MCH RBC QN AUTO: 29.3 PG (ref 26–34)
MCHC RBC AUTO-ENTMCNC: 31.9 G/DL (ref 31–37)
MCV RBC AUTO: 91.7 FL
MONOCYTES # BLD AUTO: 0.93 X10(3) UL (ref 0.1–1)
MONOCYTES NFR BLD AUTO: 10.9 %
NEUTROPHILS # BLD AUTO: 5.91 X10 (3) UL (ref 1.5–7.7)
NEUTROPHILS # BLD AUTO: 5.91 X10(3) UL (ref 1.5–7.7)
NEUTROPHILS NFR BLD AUTO: 68.9 %
NITRITE UR QL STRIP.AUTO: NEGATIVE
OSMOLALITY SERPL CALC.SUM OF ELEC: 284 MOSM/KG (ref 275–295)
PH UR STRIP.AUTO: 8 [PH] (ref 5–8)
PLATELET # BLD AUTO: 348 10(3)UL (ref 150–450)
POTASSIUM SERPL-SCNC: 3.8 MMOL/L (ref 3.5–5.1)
PROT SERPL-MCNC: 7.5 G/DL (ref 6.4–8.2)
PROT UR STRIP.AUTO-MCNC: NEGATIVE MG/DL
RBC # BLD AUTO: 4.58 X10(6)UL
RBC UR QL AUTO: NEGATIVE
SODIUM SERPL-SCNC: 137 MMOL/L (ref 136–145)
SP GR UR STRIP.AUTO: 1.01 (ref 1–1.03)
UROBILINOGEN UR STRIP.AUTO-MCNC: <2 MG/DL
WBC # BLD AUTO: 8.6 X10(3) UL (ref 4–11)

## 2022-04-22 PROCEDURE — 80053 COMPREHEN METABOLIC PANEL: CPT | Performed by: EMERGENCY MEDICINE

## 2022-04-22 PROCEDURE — 99284 EMERGENCY DEPT VISIT MOD MDM: CPT

## 2022-04-22 PROCEDURE — 96365 THER/PROPH/DIAG IV INF INIT: CPT

## 2022-04-22 PROCEDURE — 81001 URINALYSIS AUTO W/SCOPE: CPT | Performed by: EMERGENCY MEDICINE

## 2022-04-22 PROCEDURE — 83690 ASSAY OF LIPASE: CPT | Performed by: EMERGENCY MEDICINE

## 2022-04-22 PROCEDURE — 85025 COMPLETE CBC W/AUTO DIFF WBC: CPT | Performed by: EMERGENCY MEDICINE

## 2022-04-22 PROCEDURE — 96361 HYDRATE IV INFUSION ADD-ON: CPT

## 2022-04-22 PROCEDURE — 96375 TX/PRO/DX INJ NEW DRUG ADDON: CPT

## 2022-04-22 PROCEDURE — 99285 EMERGENCY DEPT VISIT HI MDM: CPT

## 2022-04-22 RX ORDER — SODIUM CHLORIDE 9 MG/ML
INJECTION, SOLUTION INTRAVENOUS CONTINUOUS
Status: DISCONTINUED | OUTPATIENT
Start: 2022-04-22 | End: 2022-04-22

## 2022-04-22 RX ORDER — ACETAMINOPHEN 325 MG/1
650 TABLET ORAL ONCE
Status: COMPLETED | OUTPATIENT
Start: 2022-04-22 | End: 2022-04-22

## 2022-04-22 RX ORDER — ONDANSETRON 2 MG/ML
4 INJECTION INTRAMUSCULAR; INTRAVENOUS
Status: DISCONTINUED | OUTPATIENT
Start: 2022-04-22 | End: 2022-04-22

## 2022-04-22 RX ORDER — ONDANSETRON 8 MG/1
8 TABLET, ORALLY DISINTEGRATING ORAL EVERY 6 HOURS PRN
Qty: 10 TABLET | Refills: 0 | Status: SHIPPED | OUTPATIENT
Start: 2022-04-22

## 2022-04-22 RX ORDER — CEPHALEXIN 500 MG/1
500 CAPSULE ORAL 3 TIMES DAILY
Qty: 21 CAPSULE | Refills: 0 | Status: SHIPPED | OUTPATIENT
Start: 2022-04-22 | End: 2022-04-29

## 2022-04-22 RX ORDER — LORAZEPAM 2 MG/ML
0.5 INJECTION INTRAMUSCULAR ONCE
Status: COMPLETED | OUTPATIENT
Start: 2022-04-22 | End: 2022-04-22

## 2022-04-22 NOTE — ED INITIAL ASSESSMENT (HPI)
Pt too the ER via walk in. Went to the urgent care yesterday. Was dx with a UTI and given ABX. worsening symptoms today. Pt presents to the ER a&ox4. Respirations even and nonlabored.  Pt actively anxious at this time

## 2022-04-22 NOTE — TELEPHONE ENCOUNTER
Patient was seen at urgent care yesterday and dx w/ UTI. She was given antibiotic but states that she feels worse today. Feels weak, still going to bathroom every hour, sick to her stomach. Please advise.

## 2022-04-22 NOTE — TELEPHONE ENCOUNTER
Pt states she is in pain, dizzy, weak, \"peeing every hour\", and sick from antibx. She states she called us and UC and they recommended ER. She is currently on way to ER. I told her I agree with plan. She has OV scheduled with us already for 4/26/22.

## 2022-04-26 ENCOUNTER — OFFICE VISIT (OUTPATIENT)
Dept: FAMILY MEDICINE CLINIC | Facility: CLINIC | Age: 77
End: 2022-04-26
Payer: COMMERCIAL

## 2022-04-26 ENCOUNTER — LAB ENCOUNTER (OUTPATIENT)
Dept: LAB | Age: 77
End: 2022-04-26
Attending: FAMILY MEDICINE
Payer: MEDICARE

## 2022-04-26 VITALS
BODY MASS INDEX: 22.26 KG/M2 | HEIGHT: 62 IN | OXYGEN SATURATION: 97 % | RESPIRATION RATE: 20 BRPM | TEMPERATURE: 97 F | DIASTOLIC BLOOD PRESSURE: 68 MMHG | SYSTOLIC BLOOD PRESSURE: 122 MMHG | WEIGHT: 121 LBS | HEART RATE: 78 BPM

## 2022-04-26 DIAGNOSIS — R53.83 TIRED: ICD-10-CM

## 2022-04-26 DIAGNOSIS — R53.1 WEAK: ICD-10-CM

## 2022-04-26 DIAGNOSIS — E03.9 HYPOTHYROIDISM, UNSPECIFIED TYPE: ICD-10-CM

## 2022-04-26 DIAGNOSIS — R53.83 TIRED: Primary | ICD-10-CM

## 2022-04-26 DIAGNOSIS — N30.00 ACUTE CYSTITIS WITHOUT HEMATURIA: ICD-10-CM

## 2022-04-26 DIAGNOSIS — N18.31 STAGE 3A CHRONIC KIDNEY DISEASE (HCC): ICD-10-CM

## 2022-04-26 LAB
TROPONIN I HIGH SENSITIVITY: 6 NG/L
TSI SER-ACNC: 1.5 MIU/ML (ref 0.36–3.74)

## 2022-04-26 PROCEDURE — 99214 OFFICE O/P EST MOD 30 MIN: CPT | Performed by: FAMILY MEDICINE

## 2022-04-26 PROCEDURE — 3008F BODY MASS INDEX DOCD: CPT | Performed by: FAMILY MEDICINE

## 2022-04-26 PROCEDURE — 84484 ASSAY OF TROPONIN QUANT: CPT

## 2022-04-26 PROCEDURE — 3078F DIAST BP <80 MM HG: CPT | Performed by: FAMILY MEDICINE

## 2022-04-26 PROCEDURE — 84443 ASSAY THYROID STIM HORMONE: CPT | Performed by: FAMILY MEDICINE

## 2022-04-26 PROCEDURE — 36415 COLL VENOUS BLD VENIPUNCTURE: CPT

## 2022-04-26 PROCEDURE — 3074F SYST BP LT 130 MM HG: CPT | Performed by: FAMILY MEDICINE

## 2022-04-26 RX ORDER — CEPHALEXIN 500 MG/1
500 CAPSULE ORAL 3 TIMES DAILY
COMMUNITY

## 2022-05-05 ENCOUNTER — OFFICE VISIT (OUTPATIENT)
Dept: FAMILY MEDICINE CLINIC | Facility: CLINIC | Age: 77
End: 2022-05-05
Payer: COMMERCIAL

## 2022-05-05 ENCOUNTER — HOSPITAL ENCOUNTER (OUTPATIENT)
Dept: GENERAL RADIOLOGY | Age: 77
Discharge: HOME | End: 2022-05-05
Attending: FAMILY MEDICINE
Payer: MEDICARE

## 2022-05-05 ENCOUNTER — LAB ENCOUNTER (OUTPATIENT)
Dept: LAB | Age: 77
End: 2022-05-05
Attending: FAMILY MEDICINE
Payer: MEDICARE

## 2022-05-05 ENCOUNTER — HOSPITAL ENCOUNTER (OUTPATIENT)
Age: 77
Discharge: HOME | End: 2022-05-05
Attending: EMERGENCY MEDICINE
Payer: MEDICARE

## 2022-05-05 VITALS
HEIGHT: 60 IN | OXYGEN SATURATION: 97 % | BODY MASS INDEX: 24.15 KG/M2 | WEIGHT: 123 LBS | DIASTOLIC BLOOD PRESSURE: 76 MMHG | TEMPERATURE: 100 F | HEART RATE: 71 BPM | RESPIRATION RATE: 16 BRPM | SYSTOLIC BLOOD PRESSURE: 142 MMHG

## 2022-05-05 VITALS
HEART RATE: 85 BPM | OXYGEN SATURATION: 98 % | HEIGHT: 62 IN | TEMPERATURE: 98 F | DIASTOLIC BLOOD PRESSURE: 86 MMHG | RESPIRATION RATE: 20 BRPM | BODY MASS INDEX: 22.26 KG/M2 | SYSTOLIC BLOOD PRESSURE: 150 MMHG | WEIGHT: 121 LBS

## 2022-05-05 DIAGNOSIS — R35.0 URINARY FREQUENCY: Primary | ICD-10-CM

## 2022-05-05 DIAGNOSIS — R05.9 COUGH: ICD-10-CM

## 2022-05-05 DIAGNOSIS — R53.83 FATIGUE, UNSPECIFIED TYPE: Primary | ICD-10-CM

## 2022-05-05 DIAGNOSIS — R11.0 NAUSEA: ICD-10-CM

## 2022-05-05 DIAGNOSIS — E86.0 DEHYDRATION: ICD-10-CM

## 2022-05-05 DIAGNOSIS — R53.83 FATIGUE, UNSPECIFIED TYPE: ICD-10-CM

## 2022-05-05 DIAGNOSIS — R35.0 URINARY FREQUENCY: ICD-10-CM

## 2022-05-05 DIAGNOSIS — N30.00 ACUTE CYSTITIS WITHOUT HEMATURIA: Primary | ICD-10-CM

## 2022-05-05 DIAGNOSIS — F41.9 ANXIETY AND DEPRESSION: ICD-10-CM

## 2022-05-05 DIAGNOSIS — F32.A ANXIETY AND DEPRESSION: ICD-10-CM

## 2022-05-05 LAB
APPEARANCE: YELLOW
BASOPHILS # BLD AUTO: 0.09 X10(3) UL (ref 0–0.2)
BASOPHILS NFR BLD AUTO: 0.9 %
BILIRUBIN: NEGATIVE
BUN BLD-MCNC: 10 MG/DL (ref 7–18)
CHLORIDE BLD-SCNC: 104 MMOL/L (ref 98–112)
CO2 BLD-SCNC: 23 MMOL/L (ref 21–32)
CREAT BLD-MCNC: 0.8 MG/DL
EOSINOPHIL # BLD AUTO: 0.07 X10(3) UL (ref 0–0.7)
EOSINOPHIL NFR BLD AUTO: 0.7 %
ERYTHROCYTE [DISTWIDTH] IN BLOOD BY AUTOMATED COUNT: 12.9 %
GLUCOSE (URINE DIPSTICK): NEGATIVE MG/DL
GLUCOSE BLD-MCNC: 101 MG/DL (ref 70–99)
HCT VFR BLD AUTO: 40.1 %
HCT VFR BLD CALC: 42 %
HGB BLD-MCNC: 13.5 G/DL
IMM GRANULOCYTES # BLD AUTO: 0.02 X10(3) UL (ref 0–1)
IMM GRANULOCYTES NFR BLD: 0.2 %
ISTAT IONIZED CALCIUM FOR CHEM 8: 1.2 MMOL/L (ref 1.12–1.32)
LYMPHOCYTES # BLD AUTO: 1.14 X10(3) UL (ref 1–4)
LYMPHOCYTES NFR BLD AUTO: 11.8 %
MCH RBC QN AUTO: 30.5 PG (ref 26–34)
MCHC RBC AUTO-ENTMCNC: 33.7 G/DL (ref 31–37)
MCV RBC AUTO: 90.7 FL
MONOCYTES # BLD AUTO: 0.76 X10(3) UL (ref 0.1–1)
MONOCYTES NFR BLD AUTO: 7.9 %
MULTISTIX LOT#: ABNORMAL NUMERIC
NEUTROPHILS # BLD AUTO: 7.57 X10 (3) UL (ref 1.5–7.7)
NEUTROPHILS # BLD AUTO: 7.57 X10(3) UL (ref 1.5–7.7)
NEUTROPHILS NFR BLD AUTO: 78.5 %
NITRITE, URINE: NEGATIVE
PH, URINE: 8 (ref 4.5–8)
PLATELET # BLD AUTO: 348 10(3)UL (ref 150–450)
POTASSIUM BLD-SCNC: 4.3 MMOL/L (ref 3.6–5.1)
PROTEIN (URINE DIPSTICK): NEGATIVE MG/DL
RBC # BLD AUTO: 4.42 X10(6)UL
SODIUM BLD-SCNC: 139 MMOL/L (ref 136–145)
SPECIFIC GRAVITY: 1.02 (ref 1–1.03)
URINE-COLOR: YELLOW
UROBILINOGEN,SEMI-QN: 0.2 MG/DL (ref 0–1.9)
WBC # BLD AUTO: 9.7 X10(3) UL (ref 4–11)

## 2022-05-05 PROCEDURE — 3077F SYST BP >= 140 MM HG: CPT | Performed by: FAMILY MEDICINE

## 2022-05-05 PROCEDURE — 36415 COLL VENOUS BLD VENIPUNCTURE: CPT | Performed by: FAMILY MEDICINE

## 2022-05-05 PROCEDURE — 3008F BODY MASS INDEX DOCD: CPT | Performed by: FAMILY MEDICINE

## 2022-05-05 PROCEDURE — 81003 URINALYSIS AUTO W/O SCOPE: CPT | Performed by: FAMILY MEDICINE

## 2022-05-05 PROCEDURE — 99214 OFFICE O/P EST MOD 30 MIN: CPT | Performed by: FAMILY MEDICINE

## 2022-05-05 PROCEDURE — 87086 URINE CULTURE/COLONY COUNT: CPT

## 2022-05-05 PROCEDURE — 71046 X-RAY EXAM CHEST 2 VIEWS: CPT | Performed by: FAMILY MEDICINE

## 2022-05-05 PROCEDURE — 85025 COMPLETE CBC W/AUTO DIFF WBC: CPT | Performed by: FAMILY MEDICINE

## 2022-05-05 PROCEDURE — 96360 HYDRATION IV INFUSION INIT: CPT

## 2022-05-05 PROCEDURE — 99213 OFFICE O/P EST LOW 20 MIN: CPT

## 2022-05-05 PROCEDURE — 80047 BASIC METABLC PNL IONIZED CA: CPT

## 2022-05-05 PROCEDURE — 99214 OFFICE O/P EST MOD 30 MIN: CPT

## 2022-05-05 PROCEDURE — 3079F DIAST BP 80-89 MM HG: CPT | Performed by: FAMILY MEDICINE

## 2022-05-05 RX ORDER — ACETAMINOPHEN 500 MG
1000 TABLET ORAL ONCE
Status: COMPLETED | OUTPATIENT
Start: 2022-05-05 | End: 2022-05-05

## 2022-05-05 RX ORDER — OXYBUTYNIN CHLORIDE 5 MG/1
5 TABLET ORAL 3 TIMES DAILY PRN
Qty: 30 TABLET | Refills: 0 | Status: SHIPPED | OUTPATIENT
Start: 2022-05-05

## 2022-05-05 RX ORDER — ONDANSETRON 4 MG/1
4 TABLET, ORALLY DISINTEGRATING ORAL EVERY 8 HOURS PRN
Qty: 30 TABLET | Refills: 0 | Status: SHIPPED | OUTPATIENT
Start: 2022-05-05

## 2022-05-05 RX ORDER — MIRABEGRON 25 MG/1
25 TABLET, FILM COATED, EXTENDED RELEASE ORAL DAILY
Qty: 30 TABLET | Refills: 0 | Status: SHIPPED | OUTPATIENT
Start: 2022-05-05 | End: 2022-06-04

## 2022-05-05 RX ORDER — CEFDINIR 300 MG/1
300 CAPSULE ORAL 2 TIMES DAILY
Qty: 14 CAPSULE | Refills: 0 | Status: SHIPPED | OUTPATIENT
Start: 2022-05-05

## 2022-05-05 RX ORDER — CLONAZEPAM 0.5 MG/1
0.5 TABLET ORAL 2 TIMES DAILY PRN
Qty: 20 TABLET | Refills: 0 | Status: SHIPPED | OUTPATIENT
Start: 2022-05-05

## 2022-05-05 RX ORDER — SODIUM CHLORIDE 9 MG/ML
1000 INJECTION, SOLUTION INTRAVENOUS ONCE
Status: COMPLETED | OUTPATIENT
Start: 2022-05-05 | End: 2022-05-05

## 2022-05-05 RX ORDER — BUPROPION HYDROCHLORIDE 150 MG/1
150 TABLET ORAL DAILY
Qty: 30 TABLET | Refills: 0 | Status: SHIPPED | OUTPATIENT
Start: 2022-05-05

## 2022-05-06 ENCOUNTER — DOCUMENTATION ONLY (OUTPATIENT)
Dept: FAMILY MEDICINE CLINIC | Facility: CLINIC | Age: 77
End: 2022-05-06

## 2022-05-06 LAB — SARS-COV-2 RNA RESP QL NAA+PROBE: NOT DETECTED

## 2022-05-06 NOTE — PROGRESS NOTES
Caitlin Sos in with Dr Odessia Burkitt on 5/5/22. There was a urine collection and a u/a and culture was ran. I sent the culture to [facility] as it says it is the patients preferred lab. Dr Odessia Burkitt received a message that [facility] did not have the culture to run. .. I call [facility] and they confirmed that the culture is there and that it will be processed.

## 2022-05-10 ENCOUNTER — HOSPITAL ENCOUNTER (EMERGENCY)
Facility: HOSPITAL | Age: 77
Discharge: HOME | End: 2022-05-10
Attending: EMERGENCY MEDICINE
Payer: MEDICARE

## 2022-05-10 VITALS
HEART RATE: 64 BPM | OXYGEN SATURATION: 94 % | RESPIRATION RATE: 16 BRPM | DIASTOLIC BLOOD PRESSURE: 64 MMHG | SYSTOLIC BLOOD PRESSURE: 134 MMHG

## 2022-05-10 DIAGNOSIS — R35.0 URINARY FREQUENCY: Primary | ICD-10-CM

## 2022-05-10 LAB
ALBUMIN SERPL-MCNC: 3.7 G/DL (ref 3.4–5)
ALBUMIN/GLOB SERPL: 1 {RATIO} (ref 1–2)
ALP LIVER SERPL-CCNC: 89 U/L
ALT SERPL-CCNC: 26 U/L
ANION GAP SERPL CALC-SCNC: 8 MMOL/L (ref 0–18)
AST SERPL-CCNC: 24 U/L (ref 15–37)
ATRIAL RATE: 68 BPM
BASOPHILS # BLD AUTO: 0.1 X10(3) UL (ref 0–0.2)
BASOPHILS NFR BLD AUTO: 1.2 %
BILIRUB SERPL-MCNC: 0.6 MG/DL (ref 0.1–2)
BILIRUB UR QL STRIP.AUTO: NEGATIVE
BUN BLD-MCNC: 10 MG/DL (ref 7–18)
CALCIUM BLD-MCNC: 9.5 MG/DL (ref 8.5–10.1)
CHLORIDE SERPL-SCNC: 108 MMOL/L (ref 98–112)
CLARITY UR REFRACT.AUTO: CLEAR
CO2 SERPL-SCNC: 23 MMOL/L (ref 21–32)
CREAT BLD-MCNC: 0.92 MG/DL
EOSINOPHIL # BLD AUTO: 0.13 X10(3) UL (ref 0–0.7)
EOSINOPHIL NFR BLD AUTO: 1.5 %
ERYTHROCYTE [DISTWIDTH] IN BLOOD BY AUTOMATED COUNT: 13 %
GLOBULIN PLAS-MCNC: 3.7 G/DL (ref 2.8–4.4)
GLUCOSE BLD-MCNC: 102 MG/DL (ref 70–99)
GLUCOSE UR STRIP.AUTO-MCNC: NEGATIVE MG/DL
HCT VFR BLD AUTO: 38.1 %
HGB BLD-MCNC: 12.9 G/DL
IMM GRANULOCYTES # BLD AUTO: 0.02 X10(3) UL (ref 0–1)
IMM GRANULOCYTES NFR BLD: 0.2 %
LYMPHOCYTES # BLD AUTO: 1.04 X10(3) UL (ref 1–4)
LYMPHOCYTES NFR BLD AUTO: 12.1 %
MCH RBC QN AUTO: 30.6 PG (ref 26–34)
MCHC RBC AUTO-ENTMCNC: 33.9 G/DL (ref 31–37)
MCV RBC AUTO: 90.5 FL
MONOCYTES # BLD AUTO: 0.77 X10(3) UL (ref 0.1–1)
MONOCYTES NFR BLD AUTO: 9 %
NEUTROPHILS # BLD AUTO: 6.51 X10 (3) UL (ref 1.5–7.7)
NEUTROPHILS # BLD AUTO: 6.51 X10(3) UL (ref 1.5–7.7)
NEUTROPHILS NFR BLD AUTO: 76 %
NITRITE UR QL STRIP.AUTO: NEGATIVE
OSMOLALITY SERPL CALC.SUM OF ELEC: 287 MOSM/KG (ref 275–295)
P AXIS: 36 DEGREES
P-R INTERVAL: 170 MS
PH UR STRIP.AUTO: 6 [PH] (ref 5–8)
PLATELET # BLD AUTO: 318 10(3)UL (ref 150–450)
POTASSIUM SERPL-SCNC: 3.9 MMOL/L (ref 3.5–5.1)
PROT SERPL-MCNC: 7.4 G/DL (ref 6.4–8.2)
PROT UR STRIP.AUTO-MCNC: NEGATIVE MG/DL
Q-T INTERVAL: 420 MS
QRS DURATION: 76 MS
QTC CALCULATION (BEZET): 446 MS
R AXIS: -30 DEGREES
RBC # BLD AUTO: 4.21 X10(6)UL
SODIUM SERPL-SCNC: 139 MMOL/L (ref 136–145)
SP GR UR STRIP.AUTO: 1.01 (ref 1–1.03)
T AXIS: 36 DEGREES
UROBILINOGEN UR STRIP.AUTO-MCNC: <2 MG/DL
VENTRICULAR RATE: 68 BPM
WBC # BLD AUTO: 8.6 X10(3) UL (ref 4–11)

## 2022-05-10 PROCEDURE — 80053 COMPREHEN METABOLIC PANEL: CPT | Performed by: EMERGENCY MEDICINE

## 2022-05-10 PROCEDURE — 87086 URINE CULTURE/COLONY COUNT: CPT | Performed by: EMERGENCY MEDICINE

## 2022-05-10 PROCEDURE — 99283 EMERGENCY DEPT VISIT LOW MDM: CPT

## 2022-05-10 PROCEDURE — 36415 COLL VENOUS BLD VENIPUNCTURE: CPT

## 2022-05-10 PROCEDURE — 85025 COMPLETE CBC W/AUTO DIFF WBC: CPT | Performed by: EMERGENCY MEDICINE

## 2022-05-10 PROCEDURE — 93005 ELECTROCARDIOGRAM TRACING: CPT

## 2022-05-10 PROCEDURE — 93010 ELECTROCARDIOGRAM REPORT: CPT

## 2022-05-10 PROCEDURE — 81001 URINALYSIS AUTO W/SCOPE: CPT | Performed by: EMERGENCY MEDICINE

## 2022-05-10 NOTE — ED INITIAL ASSESSMENT (HPI)
Pt here and was recently treated for UTI, STATES SHE STILL WITH URINARY FREQUENCY. Pt states she got up lightheaded and dizzy this morning.  No chest pain

## 2022-05-23 ENCOUNTER — TELEPHONE (OUTPATIENT)
Dept: FAMILY MEDICINE CLINIC | Facility: CLINIC | Age: 77
End: 2022-05-23

## 2022-05-23 NOTE — TELEPHONE ENCOUNTER
Pt daughter is concerned bupropion is not working and pt is not sleeping. Advised pt should have a visit with daughter on visit. VV appt scheduled.

## 2022-06-01 ENCOUNTER — PATIENT OUTREACH (OUTPATIENT)
Dept: CASE MANAGEMENT | Age: 77
End: 2022-06-01

## 2022-06-01 NOTE — PROGRESS NOTES
Attempted [facility] monthly outreach,  left message for patient to call back ,can be reached at  [phone]. Will try back at a later time.

## 2022-06-06 DIAGNOSIS — I10 ESSENTIAL HYPERTENSION: ICD-10-CM

## 2022-06-06 DIAGNOSIS — F41.9 ANXIETY AND DEPRESSION: ICD-10-CM

## 2022-06-06 DIAGNOSIS — F32.A ANXIETY AND DEPRESSION: ICD-10-CM

## 2022-06-06 RX ORDER — AMLODIPINE BESYLATE 5 MG/1
TABLET ORAL
Qty: 90 TABLET | Refills: 0 | Status: SHIPPED | OUTPATIENT
Start: 2022-06-06

## 2022-06-06 RX ORDER — CLONAZEPAM 0.5 MG/1
TABLET ORAL
Qty: 20 TABLET | Refills: 0 | Status: SHIPPED | OUTPATIENT
Start: 2022-06-06

## 2022-06-23 ENCOUNTER — PATIENT OUTREACH (OUTPATIENT)
Dept: CASE MANAGEMENT | Age: 77
End: 2022-06-23

## 2022-06-23 NOTE — PROGRESS NOTES
Attempted CCM intro- left message for patient to call back ,can be reached at  [phone].       Chart review - 3 min  Time with patient - 2 min  Total time - 5min

## 2022-06-27 DIAGNOSIS — E03.9 HYPOTHYROIDISM, UNSPECIFIED TYPE: ICD-10-CM

## 2022-06-27 DIAGNOSIS — H93.8X3 PRESSURE SENSATION IN BOTH EARS: ICD-10-CM

## 2022-06-27 RX ORDER — LEVOTHYROXINE SODIUM 0.05 MG/1
TABLET ORAL
Qty: 90 TABLET | Refills: 3 | Status: SHIPPED | OUTPATIENT
Start: 2022-06-27

## 2022-07-05 DIAGNOSIS — F32.A ANXIETY AND DEPRESSION: ICD-10-CM

## 2022-07-05 DIAGNOSIS — F41.9 ANXIETY AND DEPRESSION: ICD-10-CM

## 2022-07-05 RX ORDER — BUDESONIDE AND FORMOTEROL FUMARATE DIHYDRATE 160; 4.5 UG/1; UG/1
AEROSOL RESPIRATORY (INHALATION)
Qty: 10.2 G | Refills: 11 | Status: SHIPPED | OUTPATIENT
Start: 2022-07-05

## 2022-07-05 RX ORDER — CLONAZEPAM 0.5 MG/1
TABLET ORAL
Qty: 20 TABLET | Refills: 0 | Status: SHIPPED | OUTPATIENT
Start: 2022-07-05

## 2022-07-06 ENCOUNTER — PATIENT OUTREACH (OUTPATIENT)
Dept: CASE MANAGEMENT | Age: 77
End: 2022-07-06

## 2022-07-06 NOTE — PROGRESS NOTES
Attempted [facility] monthly outreach,  left message for patient to call back ,can be reached at  [phone]. Will try back at a later time. Medical record reviewed including recent office visits and test results.     Chart review - 3 min  Time with patient - 2 min  Total time - 5 min

## 2022-07-13 ENCOUNTER — PATIENT OUTREACH (OUTPATIENT)
Dept: CASE MANAGEMENT | Age: 77
End: 2022-07-13

## 2022-07-13 NOTE — PROGRESS NOTES
Attempted [facility] monthly outreach,  left message for patient to call back ,can be reached at  [phone]. Will try back at a later time.

## 2022-07-14 ENCOUNTER — APPOINTMENT (OUTPATIENT)
Dept: GENERAL RADIOLOGY | Facility: HOSPITAL | Age: 77
End: 2022-07-14
Attending: EMERGENCY MEDICINE
Payer: MEDICARE

## 2022-07-14 ENCOUNTER — APPOINTMENT (OUTPATIENT)
Dept: CT IMAGING | Facility: HOSPITAL | Age: 77
End: 2022-07-14
Attending: EMERGENCY MEDICINE
Payer: MEDICARE

## 2022-07-14 ENCOUNTER — HOSPITAL ENCOUNTER (EMERGENCY)
Facility: HOSPITAL | Age: 77
Discharge: HOME | End: 2022-07-14
Attending: EMERGENCY MEDICINE
Payer: MEDICARE

## 2022-07-14 VITALS
BODY MASS INDEX: 24.54 KG/M2 | WEIGHT: 125 LBS | DIASTOLIC BLOOD PRESSURE: 65 MMHG | TEMPERATURE: 98 F | HEIGHT: 60 IN | RESPIRATION RATE: 16 BRPM | OXYGEN SATURATION: 95 % | HEART RATE: 65 BPM | SYSTOLIC BLOOD PRESSURE: 136 MMHG

## 2022-07-14 DIAGNOSIS — B34.9 VIRAL SYNDROME: Primary | ICD-10-CM

## 2022-07-14 DIAGNOSIS — R51.9 ACUTE NONINTRACTABLE HEADACHE, UNSPECIFIED HEADACHE TYPE: ICD-10-CM

## 2022-07-14 LAB
ALBUMIN SERPL-MCNC: 3.8 G/DL (ref 3.4–5)
ALBUMIN/GLOB SERPL: 1.1 {RATIO} (ref 1–2)
ALP LIVER SERPL-CCNC: 89 U/L
ALT SERPL-CCNC: 20 U/L
ANION GAP SERPL CALC-SCNC: 8 MMOL/L (ref 0–18)
AST SERPL-CCNC: 22 U/L (ref 15–37)
BASOPHILS # BLD AUTO: 0.08 X10(3) UL (ref 0–0.2)
BASOPHILS NFR BLD AUTO: 1.2 %
BILIRUB SERPL-MCNC: 0.5 MG/DL (ref 0.1–2)
BILIRUB UR QL STRIP.AUTO: NEGATIVE
BUN BLD-MCNC: 11 MG/DL (ref 7–18)
CALCIUM BLD-MCNC: 9.6 MG/DL (ref 8.5–10.1)
CHLORIDE SERPL-SCNC: 109 MMOL/L (ref 98–112)
CLARITY UR REFRACT.AUTO: CLEAR
CO2 SERPL-SCNC: 23 MMOL/L (ref 21–32)
CREAT BLD-MCNC: 0.86 MG/DL
EOSINOPHIL # BLD AUTO: 0.23 X10(3) UL (ref 0–0.7)
EOSINOPHIL NFR BLD AUTO: 3.4 %
ERYTHROCYTE [DISTWIDTH] IN BLOOD BY AUTOMATED COUNT: 13 %
GLOBULIN PLAS-MCNC: 3.6 G/DL (ref 2.8–4.4)
GLUCOSE BLD-MCNC: 94 MG/DL (ref 70–99)
GLUCOSE UR STRIP.AUTO-MCNC: NEGATIVE MG/DL
HCT VFR BLD AUTO: 40.2 %
HGB BLD-MCNC: 13.5 G/DL
IMM GRANULOCYTES # BLD AUTO: 0.02 X10(3) UL (ref 0–1)
IMM GRANULOCYTES NFR BLD: 0.3 %
KETONES UR STRIP.AUTO-MCNC: NEGATIVE MG/DL
LYMPHOCYTES # BLD AUTO: 1.38 X10(3) UL (ref 1–4)
LYMPHOCYTES NFR BLD AUTO: 20.3 %
MCH RBC QN AUTO: 30.7 PG (ref 26–34)
MCHC RBC AUTO-ENTMCNC: 33.6 G/DL (ref 31–37)
MCV RBC AUTO: 91.4 FL
MONOCYTES # BLD AUTO: 0.88 X10(3) UL (ref 0.1–1)
MONOCYTES NFR BLD AUTO: 13 %
NEUTROPHILS # BLD AUTO: 4.2 X10 (3) UL (ref 1.5–7.7)
NEUTROPHILS # BLD AUTO: 4.2 X10(3) UL (ref 1.5–7.7)
NEUTROPHILS NFR BLD AUTO: 61.8 %
NITRITE UR QL STRIP.AUTO: NEGATIVE
OSMOLALITY SERPL CALC.SUM OF ELEC: 289 MOSM/KG (ref 275–295)
PH UR STRIP.AUTO: 7 [PH] (ref 5–8)
PLATELET # BLD AUTO: 321 10(3)UL (ref 150–450)
POTASSIUM SERPL-SCNC: 3.6 MMOL/L (ref 3.5–5.1)
PROT SERPL-MCNC: 7.4 G/DL (ref 6.4–8.2)
PROT UR STRIP.AUTO-MCNC: NEGATIVE MG/DL
RBC # BLD AUTO: 4.4 X10(6)UL
SARS-COV-2 RNA RESP QL NAA+PROBE: NOT DETECTED
SODIUM SERPL-SCNC: 140 MMOL/L (ref 136–145)
SP GR UR STRIP.AUTO: 1 (ref 1–1.03)
UROBILINOGEN UR STRIP.AUTO-MCNC: <2 MG/DL
WBC # BLD AUTO: 6.8 X10(3) UL (ref 4–11)

## 2022-07-14 PROCEDURE — 96361 HYDRATE IV INFUSION ADD-ON: CPT

## 2022-07-14 PROCEDURE — 99284 EMERGENCY DEPT VISIT MOD MDM: CPT

## 2022-07-14 PROCEDURE — 70450 CT HEAD/BRAIN W/O DYE: CPT | Performed by: EMERGENCY MEDICINE

## 2022-07-14 PROCEDURE — 71045 X-RAY EXAM CHEST 1 VIEW: CPT | Performed by: EMERGENCY MEDICINE

## 2022-07-14 PROCEDURE — 85025 COMPLETE CBC W/AUTO DIFF WBC: CPT | Performed by: EMERGENCY MEDICINE

## 2022-07-14 PROCEDURE — 80053 COMPREHEN METABOLIC PANEL: CPT | Performed by: EMERGENCY MEDICINE

## 2022-07-14 PROCEDURE — 87086 URINE CULTURE/COLONY COUNT: CPT | Performed by: EMERGENCY MEDICINE

## 2022-07-14 PROCEDURE — 96375 TX/PRO/DX INJ NEW DRUG ADDON: CPT

## 2022-07-14 PROCEDURE — 96374 THER/PROPH/DIAG INJ IV PUSH: CPT

## 2022-07-14 PROCEDURE — 81001 URINALYSIS AUTO W/SCOPE: CPT | Performed by: EMERGENCY MEDICINE

## 2022-07-14 RX ORDER — SODIUM CHLORIDE 9 MG/ML
125 INJECTION, SOLUTION INTRAVENOUS CONTINUOUS
Status: DISCONTINUED | OUTPATIENT
Start: 2022-07-14 | End: 2022-07-14

## 2022-07-14 RX ORDER — KETOROLAC TROMETHAMINE 15 MG/ML
15 INJECTION, SOLUTION INTRAMUSCULAR; INTRAVENOUS ONCE
Status: COMPLETED | OUTPATIENT
Start: 2022-07-14 | End: 2022-07-14

## 2022-07-14 RX ORDER — ONDANSETRON 2 MG/ML
4 INJECTION INTRAMUSCULAR; INTRAVENOUS ONCE
Status: COMPLETED | OUTPATIENT
Start: 2022-07-14 | End: 2022-07-14

## 2022-07-14 RX ORDER — ONDANSETRON 4 MG/1
4 TABLET, ORALLY DISINTEGRATING ORAL EVERY 4 HOURS PRN
Qty: 10 TABLET | Refills: 0 | Status: SHIPPED | OUTPATIENT
Start: 2022-07-14 | End: 2022-07-18 | Stop reason: ALTCHOICE

## 2022-07-14 RX ORDER — BUTALBITAL, ACETAMINOPHEN AND CAFFEINE 50; 325; 40 MG/1; MG/1; MG/1
1 TABLET ORAL EVERY 6 HOURS PRN
Qty: 10 TABLET | Refills: 0 | Status: SHIPPED | OUTPATIENT
Start: 2022-07-14 | End: 2022-07-18

## 2022-07-14 NOTE — ED QUICK NOTES
PIV d/c'd, pt verbalizes understanding of discharge paperwork, instructions, follow ups, and prescriptions. Denies any further questions or concerns at this time and denies any additional pains or discomforts. Pt ambulatory with steady gait to ED entrance with discharge paperwork in hand. Advised to return to ED if any worsening s/s occur.

## 2022-07-18 ENCOUNTER — OFFICE VISIT (OUTPATIENT)
Dept: FAMILY MEDICINE CLINIC | Facility: CLINIC | Age: 77
End: 2022-07-18
Payer: COMMERCIAL

## 2022-07-18 ENCOUNTER — LAB ENCOUNTER (OUTPATIENT)
Dept: LAB | Age: 77
End: 2022-07-18
Attending: FAMILY MEDICINE
Payer: MEDICARE

## 2022-07-18 VITALS
OXYGEN SATURATION: 98 % | WEIGHT: 120 LBS | HEART RATE: 108 BPM | TEMPERATURE: 98 F | RESPIRATION RATE: 22 BRPM | SYSTOLIC BLOOD PRESSURE: 138 MMHG | DIASTOLIC BLOOD PRESSURE: 96 MMHG | HEIGHT: 60 IN | BODY MASS INDEX: 23.56 KG/M2

## 2022-07-18 DIAGNOSIS — F41.9 ANXIETY AND DEPRESSION: ICD-10-CM

## 2022-07-18 DIAGNOSIS — F32.A ANXIETY AND DEPRESSION: ICD-10-CM

## 2022-07-18 DIAGNOSIS — R53.83 OTHER FATIGUE: Primary | ICD-10-CM

## 2022-07-18 DIAGNOSIS — R63.0 DECREASED APPETITE: ICD-10-CM

## 2022-07-18 DIAGNOSIS — J01.90 SUBACUTE SINUSITIS, UNSPECIFIED LOCATION: ICD-10-CM

## 2022-07-18 LAB
CRP SERPL-MCNC: 0.3 MG/DL (ref ?–0.3)
TSI SER-ACNC: 2.54 MIU/ML (ref 0.36–3.74)

## 2022-07-18 PROCEDURE — 3075F SYST BP GE 130 - 139MM HG: CPT | Performed by: FAMILY MEDICINE

## 2022-07-18 PROCEDURE — 36415 COLL VENOUS BLD VENIPUNCTURE: CPT | Performed by: FAMILY MEDICINE

## 2022-07-18 PROCEDURE — 99213 OFFICE O/P EST LOW 20 MIN: CPT | Performed by: FAMILY MEDICINE

## 2022-07-18 PROCEDURE — 86140 C-REACTIVE PROTEIN: CPT | Performed by: FAMILY MEDICINE

## 2022-07-18 PROCEDURE — 84443 ASSAY THYROID STIM HORMONE: CPT | Performed by: FAMILY MEDICINE

## 2022-07-18 PROCEDURE — 3080F DIAST BP >= 90 MM HG: CPT | Performed by: FAMILY MEDICINE

## 2022-07-18 PROCEDURE — 3008F BODY MASS INDEX DOCD: CPT | Performed by: FAMILY MEDICINE

## 2022-07-18 RX ORDER — CEFDINIR 300 MG/1
300 CAPSULE ORAL 2 TIMES DAILY
Qty: 20 CAPSULE | Refills: 0 | Status: SHIPPED | OUTPATIENT
Start: 2022-07-18

## 2022-07-18 RX ORDER — CLONAZEPAM 0.5 MG/1
0.5 TABLET ORAL 2 TIMES DAILY PRN
Qty: 20 TABLET | Refills: 0 | Status: SHIPPED | OUTPATIENT
Start: 2022-07-18

## 2022-07-18 RX ORDER — PREDNISONE 20 MG/1
40 TABLET ORAL DAILY
Qty: 10 TABLET | Refills: 0 | Status: SHIPPED | OUTPATIENT
Start: 2022-07-18 | End: 2022-07-23

## 2022-07-19 ENCOUNTER — TELEPHONE (OUTPATIENT)
Dept: FAMILY MEDICINE CLINIC | Facility: CLINIC | Age: 77
End: 2022-07-19

## 2022-07-19 NOTE — TELEPHONE ENCOUNTER
Pt called crying on phone, states feeling the same as yesterday, still no appetite, sleeping a lot, headache, can't stop crying, yesterday had one episode of diarrhea. Started the prednisone and cefdinir. Do you want to start pt on medication for mood disorder per office notes? Refer to Tasneem LU?

## 2022-07-19 NOTE — TELEPHONE ENCOUNTER
Called and spoke to patient - she will continue Prednisone and abx. Would like Anita Jackson to call her.

## 2022-07-19 NOTE — TELEPHONE ENCOUNTER
SAW LOLA YESTERDAY. RH TOLD HER TO CALL IF NOT BETTER. SHE CAN'T STOP CRYING, TROUBLE FOCUSING. SHE HASN'T BEEN EATING.   ANTIBIOTIC GAVE HER DIARRHEA

## 2022-07-20 ENCOUNTER — PATIENT OUTREACH (OUTPATIENT)
Dept: CASE MANAGEMENT | Age: 77
End: 2022-07-20

## 2022-07-20 NOTE — TELEPHONE ENCOUNTER
Placed outgoing follow up call to patient to advise re: counseling support. Left voicemail with this writer's direct contact information.      Eri Porter LCSW (she/her)   Behavioral Health Integration Clinician  Ph. [phone]

## 2022-07-20 NOTE — PROGRESS NOTES
Attempted CCM monthly outreach,  left message for patient to call back ,can be reached at  [phone]. Will try back at a later time.

## 2022-07-22 NOTE — PROGRESS NOTES
Attempted [facility] monthly outreach,  left message for patient to call back ,can be reached at  [phone]. Will try back at a later time. Medical record reviewed including recent office visits and test results.        Total time - 2 min

## 2022-08-01 ENCOUNTER — PATIENT OUTREACH (OUTPATIENT)
Dept: CASE MANAGEMENT | Age: 77
End: 2022-08-01

## 2022-08-01 NOTE — PROGRESS NOTES
Patient called and left a message , she would like to speak to CM about symptoms. Attempted [facility] monthly outreach,  left message for patient to call back ,can be reached at  [phone]. Will try back at a later time. Medical record reviewed including recent office visits and test results.     Chart review - 3 min  Time with patient - 2 min  Total time - 5 min

## 2022-08-04 ENCOUNTER — OFFICE VISIT (OUTPATIENT)
Dept: FAMILY MEDICINE CLINIC | Facility: CLINIC | Age: 77
End: 2022-08-04
Payer: COMMERCIAL

## 2022-08-04 VITALS
BODY MASS INDEX: 23.56 KG/M2 | DIASTOLIC BLOOD PRESSURE: 76 MMHG | SYSTOLIC BLOOD PRESSURE: 118 MMHG | HEART RATE: 86 BPM | WEIGHT: 120 LBS | HEIGHT: 60 IN | RESPIRATION RATE: 20 BRPM | OXYGEN SATURATION: 98 %

## 2022-08-04 DIAGNOSIS — G47.00 INSOMNIA, UNSPECIFIED TYPE: ICD-10-CM

## 2022-08-04 DIAGNOSIS — G44.201 INTRACTABLE TENSION-TYPE HEADACHE, UNSPECIFIED CHRONICITY PATTERN: ICD-10-CM

## 2022-08-04 DIAGNOSIS — I70.0 AORTIC ATHEROSCLEROSIS (HCC): ICD-10-CM

## 2022-08-04 DIAGNOSIS — K21.9 GASTROESOPHAGEAL REFLUX DISEASE WITHOUT ESOPHAGITIS: ICD-10-CM

## 2022-08-04 DIAGNOSIS — Z00.00 ENCOUNTER FOR ANNUAL HEALTH EXAMINATION: Primary | ICD-10-CM

## 2022-08-04 DIAGNOSIS — F32.A ANXIETY AND DEPRESSION: ICD-10-CM

## 2022-08-04 DIAGNOSIS — R41.3 MEMORY DEFICIT: ICD-10-CM

## 2022-08-04 DIAGNOSIS — J44.9 CHRONIC OBSTRUCTIVE PULMONARY DISEASE, UNSPECIFIED COPD TYPE (HCC): ICD-10-CM

## 2022-08-04 DIAGNOSIS — F41.9 ANXIETY AND DEPRESSION: ICD-10-CM

## 2022-08-04 DIAGNOSIS — N18.31 STAGE 3A CHRONIC KIDNEY DISEASE (HCC): Chronic | ICD-10-CM

## 2022-08-04 DIAGNOSIS — F33.0 MILD RECURRENT MAJOR DEPRESSION (HCC): Chronic | ICD-10-CM

## 2022-08-04 DIAGNOSIS — E03.9 ACQUIRED HYPOTHYROIDISM: Chronic | ICD-10-CM

## 2022-08-04 DIAGNOSIS — H57.13 EYE PAIN, BILATERAL: ICD-10-CM

## 2022-08-04 DIAGNOSIS — Z78.0 POST-MENOPAUSAL: ICD-10-CM

## 2022-08-04 PROBLEM — K06.8 PAIN IN GUMS: Status: RESOLVED | Noted: 2019-10-07 | Resolved: 2022-08-04

## 2022-08-04 PROBLEM — M05.20 RHEUMATOID ARTERITIS (HCC): Status: RESOLVED | Noted: 2020-01-03 | Resolved: 2022-08-04

## 2022-08-04 PROCEDURE — G0439 PPPS, SUBSEQ VISIT: HCPCS | Performed by: FAMILY MEDICINE

## 2022-08-04 PROCEDURE — 3008F BODY MASS INDEX DOCD: CPT | Performed by: FAMILY MEDICINE

## 2022-08-04 PROCEDURE — 1126F AMNT PAIN NOTED NONE PRSNT: CPT | Performed by: FAMILY MEDICINE

## 2022-08-04 PROCEDURE — 96160 PT-FOCUSED HLTH RISK ASSMT: CPT | Performed by: FAMILY MEDICINE

## 2022-08-04 PROCEDURE — 3078F DIAST BP <80 MM HG: CPT | Performed by: FAMILY MEDICINE

## 2022-08-04 PROCEDURE — 99397 PER PM REEVAL EST PAT 65+ YR: CPT | Performed by: FAMILY MEDICINE

## 2022-08-04 PROCEDURE — 3074F SYST BP LT 130 MM HG: CPT | Performed by: FAMILY MEDICINE

## 2022-08-04 RX ORDER — QUETIAPINE FUMARATE 50 MG/1
TABLET, FILM COATED ORAL
Qty: 30 TABLET | Refills: 3 | Status: SHIPPED | OUTPATIENT
Start: 2022-08-04

## 2022-08-04 RX ORDER — CLONAZEPAM 0.5 MG/1
0.5 TABLET ORAL 2 TIMES DAILY PRN
Qty: 30 TABLET | Refills: 0 | Status: SHIPPED | OUTPATIENT
Start: 2022-08-04

## 2022-08-11 ENCOUNTER — TELEPHONE (OUTPATIENT)
Dept: FAMILY MEDICINE CLINIC | Facility: CLINIC | Age: 77
End: 2022-08-11

## 2022-08-11 NOTE — TELEPHONE ENCOUNTER
Spoke with patient Monday woke up- shaky, weak, no appetite, coughing, no energy, lethargic, groggy too. Drinking fluids, ate toast with meds this morning, wants to sleep all the time. Coughing up clear phlegm in the morning. Everyday feels worse. Not on dupixant anymore- going to start another medication. Felt good last week when she saw Dr. Mare Dumont in office. Patient is refusing ER/UC at this time, she states she is frustrated with going and them never finding what is wrong with her. She states she is drinking, water, broth. /83 - took while on phone with me. 96 pulse on phone. Could not tolerate quetiapine, not taking, med rec update. Pt not in resp distress on the phone, denies feeling depressed- feels down because she is sick. VV made with YP.

## 2022-08-11 NOTE — TELEPHONE ENCOUNTER
Patient had seen RH a month ago while YP was out of office. Patient had viral infection. RH prescribed antibiotic. She is calling as she is experiencing symptoms again that started on Monday. Very weak, shaky, little cough, losing appetite. Please advise.

## 2022-08-12 ENCOUNTER — HOSPITAL ENCOUNTER (EMERGENCY)
Facility: HOSPITAL | Age: 77
Discharge: HOME | End: 2022-08-12
Attending: EMERGENCY MEDICINE
Payer: MEDICARE

## 2022-08-12 ENCOUNTER — APPOINTMENT (OUTPATIENT)
Dept: GENERAL RADIOLOGY | Facility: HOSPITAL | Age: 77
End: 2022-08-12
Attending: EMERGENCY MEDICINE
Payer: MEDICARE

## 2022-08-12 VITALS
HEIGHT: 60 IN | TEMPERATURE: 99 F | BODY MASS INDEX: 24.54 KG/M2 | SYSTOLIC BLOOD PRESSURE: 130 MMHG | HEART RATE: 70 BPM | WEIGHT: 125 LBS | OXYGEN SATURATION: 97 % | DIASTOLIC BLOOD PRESSURE: 71 MMHG | RESPIRATION RATE: 15 BRPM

## 2022-08-12 DIAGNOSIS — J44.1 COPD EXACERBATION (HCC): Primary | ICD-10-CM

## 2022-08-12 LAB — SARS-COV-2 RNA RESP QL NAA+PROBE: NOT DETECTED

## 2022-08-12 PROCEDURE — 99284 EMERGENCY DEPT VISIT MOD MDM: CPT | Performed by: EMERGENCY MEDICINE

## 2022-08-12 PROCEDURE — 99283 EMERGENCY DEPT VISIT LOW MDM: CPT | Performed by: EMERGENCY MEDICINE

## 2022-08-12 PROCEDURE — 71045 X-RAY EXAM CHEST 1 VIEW: CPT | Performed by: EMERGENCY MEDICINE

## 2022-08-12 RX ORDER — ONDANSETRON 4 MG/1
4 TABLET, ORALLY DISINTEGRATING ORAL ONCE
Status: COMPLETED | OUTPATIENT
Start: 2022-08-12 | End: 2022-08-12

## 2022-08-12 RX ORDER — ALBUTEROL SULFATE 90 UG/1
2 AEROSOL, METERED RESPIRATORY (INHALATION) EVERY 4 HOURS PRN
Qty: 1 EACH | Refills: 0 | Status: SHIPPED | OUTPATIENT
Start: 2022-08-12 | End: 2022-09-11

## 2022-08-12 RX ORDER — PREDNISONE 20 MG/1
40 TABLET ORAL DAILY
Qty: 10 TABLET | Refills: 0 | Status: SHIPPED | OUTPATIENT
Start: 2022-08-12 | End: 2022-08-17

## 2022-08-12 RX ORDER — ACETAMINOPHEN 500 MG
1000 TABLET ORAL ONCE
Status: COMPLETED | OUTPATIENT
Start: 2022-08-12 | End: 2022-08-12

## 2022-08-12 RX ORDER — ONDANSETRON 4 MG/1
4 TABLET, ORALLY DISINTEGRATING ORAL EVERY 4 HOURS PRN
Qty: 10 TABLET | Refills: 0 | Status: SHIPPED | OUTPATIENT
Start: 2022-08-12 | End: 2022-08-19

## 2022-08-12 RX ORDER — CEFUROXIME AXETIL 500 MG/1
500 TABLET ORAL 2 TIMES DAILY
Qty: 20 TABLET | Refills: 0 | Status: SHIPPED | OUTPATIENT
Start: 2022-08-12

## 2022-08-14 ENCOUNTER — APPOINTMENT (OUTPATIENT)
Dept: GENERAL RADIOLOGY | Facility: HOSPITAL | Age: 77
End: 2022-08-14
Attending: EMERGENCY MEDICINE
Payer: MEDICARE

## 2022-08-14 ENCOUNTER — HOSPITAL ENCOUNTER (EMERGENCY)
Facility: HOSPITAL | Age: 77
Discharge: HOME | End: 2022-08-14
Attending: EMERGENCY MEDICINE
Payer: MEDICARE

## 2022-08-14 VITALS
TEMPERATURE: 99 F | DIASTOLIC BLOOD PRESSURE: 76 MMHG | HEART RATE: 80 BPM | OXYGEN SATURATION: 97 % | RESPIRATION RATE: 16 BRPM | SYSTOLIC BLOOD PRESSURE: 140 MMHG

## 2022-08-14 DIAGNOSIS — R53.83 FATIGUE, UNSPECIFIED TYPE: Primary | ICD-10-CM

## 2022-08-14 LAB
ALBUMIN SERPL-MCNC: 3.6 G/DL (ref 3.4–5)
ALBUMIN/GLOB SERPL: 1.1 {RATIO} (ref 1–2)
ALP LIVER SERPL-CCNC: 78 U/L
ALT SERPL-CCNC: 21 U/L
ANION GAP SERPL CALC-SCNC: 7 MMOL/L (ref 0–18)
AST SERPL-CCNC: 14 U/L (ref 15–37)
BASOPHILS # BLD AUTO: 0.03 X10(3) UL (ref 0–0.2)
BASOPHILS NFR BLD AUTO: 0.4 %
BILIRUB SERPL-MCNC: 0.4 MG/DL (ref 0.1–2)
BILIRUB UR QL STRIP.AUTO: NEGATIVE
BUN BLD-MCNC: 14 MG/DL (ref 7–18)
CALCIUM BLD-MCNC: 9.3 MG/DL (ref 8.5–10.1)
CHLORIDE SERPL-SCNC: 106 MMOL/L (ref 98–112)
CLARITY UR REFRACT.AUTO: CLEAR
CO2 SERPL-SCNC: 21 MMOL/L (ref 21–32)
CREAT BLD-MCNC: 0.9 MG/DL
D DIMER PPP FEU-MCNC: 0.35 UG/ML FEU (ref ?–0.78)
EOSINOPHIL # BLD AUTO: 0 X10(3) UL (ref 0–0.7)
EOSINOPHIL NFR BLD AUTO: 0 %
ERYTHROCYTE [DISTWIDTH] IN BLOOD BY AUTOMATED COUNT: 12.7 %
GFR SERPLBLD BASED ON 1.73 SQ M-ARVRAT: 65 ML/MIN/1.73M2 (ref 60–?)
GLOBULIN PLAS-MCNC: 3.4 G/DL (ref 2.8–4.4)
GLUCOSE BLD-MCNC: 114 MG/DL (ref 70–99)
GLUCOSE UR STRIP.AUTO-MCNC: NEGATIVE MG/DL
HCT VFR BLD AUTO: 37.6 %
HGB BLD-MCNC: 12.7 G/DL
IMM GRANULOCYTES # BLD AUTO: 0.04 X10(3) UL (ref 0–1)
IMM GRANULOCYTES NFR BLD: 0.5 %
KETONES UR STRIP.AUTO-MCNC: NEGATIVE MG/DL
LYMPHOCYTES # BLD AUTO: 0.83 X10(3) UL (ref 1–4)
LYMPHOCYTES NFR BLD AUTO: 10.1 %
MCH RBC QN AUTO: 30.5 PG (ref 26–34)
MCHC RBC AUTO-ENTMCNC: 33.8 G/DL (ref 31–37)
MCV RBC AUTO: 90.2 FL
MONOCYTES # BLD AUTO: 0.28 X10(3) UL (ref 0.1–1)
MONOCYTES NFR BLD AUTO: 3.4 %
NEUTROPHILS # BLD AUTO: 7.03 X10 (3) UL (ref 1.5–7.7)
NEUTROPHILS # BLD AUTO: 7.03 X10(3) UL (ref 1.5–7.7)
NEUTROPHILS NFR BLD AUTO: 85.6 %
NITRITE UR QL STRIP.AUTO: NEGATIVE
OSMOLALITY SERPL CALC.SUM OF ELEC: 279 MOSM/KG (ref 275–295)
PH UR STRIP.AUTO: 7 [PH] (ref 5–8)
PLATELET # BLD AUTO: 345 10(3)UL (ref 150–450)
POTASSIUM SERPL-SCNC: 4 MMOL/L (ref 3.5–5.1)
PROT SERPL-MCNC: 7 G/DL (ref 6.4–8.2)
PROT UR STRIP.AUTO-MCNC: NEGATIVE MG/DL
RBC # BLD AUTO: 4.17 X10(6)UL
SARS-COV-2 RNA RESP QL NAA+PROBE: NOT DETECTED
SODIUM SERPL-SCNC: 134 MMOL/L (ref 136–145)
SP GR UR STRIP.AUTO: 1 (ref 1–1.03)
TROPONIN I HIGH SENSITIVITY: 5 NG/L
UROBILINOGEN UR STRIP.AUTO-MCNC: <2 MG/DL
WBC # BLD AUTO: 8.2 X10(3) UL (ref 4–11)

## 2022-08-14 PROCEDURE — 99284 EMERGENCY DEPT VISIT MOD MDM: CPT

## 2022-08-14 PROCEDURE — 93005 ELECTROCARDIOGRAM TRACING: CPT

## 2022-08-14 PROCEDURE — 87086 URINE CULTURE/COLONY COUNT: CPT | Performed by: EMERGENCY MEDICINE

## 2022-08-14 PROCEDURE — 81001 URINALYSIS AUTO W/SCOPE: CPT | Performed by: EMERGENCY MEDICINE

## 2022-08-14 PROCEDURE — 93010 ELECTROCARDIOGRAM REPORT: CPT

## 2022-08-14 PROCEDURE — 96374 THER/PROPH/DIAG INJ IV PUSH: CPT

## 2022-08-14 PROCEDURE — S0028 INJECTION, FAMOTIDINE, 20 MG: HCPCS | Performed by: EMERGENCY MEDICINE

## 2022-08-14 PROCEDURE — 80053 COMPREHEN METABOLIC PANEL: CPT | Performed by: EMERGENCY MEDICINE

## 2022-08-14 PROCEDURE — 85379 FIBRIN DEGRADATION QUANT: CPT | Performed by: EMERGENCY MEDICINE

## 2022-08-14 PROCEDURE — 71045 X-RAY EXAM CHEST 1 VIEW: CPT | Performed by: EMERGENCY MEDICINE

## 2022-08-14 PROCEDURE — 84484 ASSAY OF TROPONIN QUANT: CPT | Performed by: EMERGENCY MEDICINE

## 2022-08-14 PROCEDURE — 85025 COMPLETE CBC W/AUTO DIFF WBC: CPT | Performed by: EMERGENCY MEDICINE

## 2022-08-14 RX ORDER — FAMOTIDINE 10 MG/ML
20 INJECTION, SOLUTION INTRAVENOUS ONCE
Status: COMPLETED | OUTPATIENT
Start: 2022-08-14 | End: 2022-08-14

## 2022-08-14 RX ORDER — ACETAMINOPHEN 500 MG
1000 TABLET ORAL ONCE
Status: COMPLETED | OUTPATIENT
Start: 2022-08-14 | End: 2022-08-14

## 2022-08-14 NOTE — ED INITIAL ASSESSMENT (HPI)
Pt c/o ongoing fatigue since last see in ER 2 days ago.  Also reports cough and chest pressure when laying down

## 2022-08-15 ENCOUNTER — TELEPHONE (OUTPATIENT)
Dept: FAMILY MEDICINE CLINIC | Facility: CLINIC | Age: 77
End: 2022-08-15

## 2022-08-15 LAB
ATRIAL RATE: 83 BPM
P AXIS: 43 DEGREES
P-R INTERVAL: 156 MS
Q-T INTERVAL: 372 MS
QRS DURATION: 74 MS
QTC CALCULATION (BEZET): 437 MS
R AXIS: -26 DEGREES
T AXIS: 38 DEGREES
VENTRICULAR RATE: 83 BPM

## 2022-08-15 NOTE — TELEPHONE ENCOUNTER
Was seen in ER yesterday. They referred her to a gastro. She can't get in until 9/22.   She is hoping we can get her somewhere sooner and give her some medication until she can get in somewhere

## 2022-08-15 NOTE — TELEPHONE ENCOUNTER
Pt seen in ER yesterday, was to f/u for anxiety  Pt states she stopped Duloxetine- made her sleepy, do you want to replace?     pt thinks she has ulcer, wakes up gagging and spitting up water, no appetite, shaky, no vomitting, no diarrhea, + constipation, +nausea  Pt on omeprazole BID with no relief    Pt made appt with Dr. Brendan Huerta NP until 9/22    Is there anything else you can prescribe at this time until appt with GI?

## 2022-08-16 ENCOUNTER — PATIENT OUTREACH (OUTPATIENT)
Dept: CASE MANAGEMENT | Age: 77
End: 2022-08-16

## 2022-08-16 RX ORDER — ARIPIPRAZOLE 2 MG/1
2 TABLET ORAL DAILY
Qty: 30 TABLET | Refills: 1 | Status: SHIPPED | OUTPATIENT
Start: 2022-08-16

## 2022-08-16 RX ORDER — DICYCLOMINE HYDROCHLORIDE 10 MG/1
10 CAPSULE ORAL 4 TIMES DAILY PRN
Qty: 90 CAPSULE | Refills: 1 | Status: SHIPPED | OUTPATIENT
Start: 2022-08-16

## 2022-08-16 NOTE — TELEPHONE ENCOUNTER
Refer pt to Psychiatrist.  Sending abilify for her instead of duloxetine  Sending dicyclomine for the abdominal issues  Follow up in 1 week with me.

## 2022-08-16 NOTE — PROGRESS NOTES
Calling to follow up with patient for CCM . Left message for patient to call back .     Time Spent - 2 min

## 2022-08-23 NOTE — TELEPHONE ENCOUNTER
[phone] [facility] pt son. [phone] (M) [facility]. [facility] tried to reach pt on August:  16, 18, 22. Dr. Ramirez Comes, please see, do you want well being check on patient?

## 2022-08-24 NOTE — TELEPHONE ENCOUNTER
NADIR on pt's voicemail # [phone]  Antonia on emergency contact #'s Son OJP#[phone] and daughter in law # [phone]

## 2022-08-24 NOTE — TELEPHONE ENCOUNTER
Spoke with pt she states she is fine. Pt states she does not want to schedule a appointment with anyone. Pt states she does not want to see psychiatrist.Pt also states she is not taking Abilify. Pt states she does not like how meds make her feel. Pt states she will call and schedule appointment when she feels ready. Pt states she feels her anxiety is under control. Pt states she is seeing GI in September.

## 2022-08-30 DIAGNOSIS — I10 ESSENTIAL HYPERTENSION: ICD-10-CM

## 2022-08-30 RX ORDER — AMLODIPINE BESYLATE 5 MG/1
TABLET ORAL
Qty: 90 TABLET | Refills: 0 | Status: SHIPPED | OUTPATIENT
Start: 2022-08-30 | End: 2022-09-02

## 2022-09-02 ENCOUNTER — PATIENT OUTREACH (OUTPATIENT)
Dept: CASE MANAGEMENT | Age: 77
End: 2022-09-02

## 2022-09-02 DIAGNOSIS — F33.0 MILD RECURRENT MAJOR DEPRESSION (HCC): Chronic | ICD-10-CM

## 2022-09-02 DIAGNOSIS — N18.31 STAGE 3A CHRONIC KIDNEY DISEASE (HCC): Chronic | ICD-10-CM

## 2022-09-02 DIAGNOSIS — E03.9 ACQUIRED HYPOTHYROIDISM: Chronic | ICD-10-CM

## 2022-09-02 NOTE — PROGRESS NOTES
Attempted [facility] monthly outreach,  left message for patient to call back ,can be reached at  [phone]. Will try back at a later time. Medical record reviewed including recent office visits and test results.     Chart review - 3 min  Time with patient - 2 min  Total time - 5  min

## 2022-09-12 DIAGNOSIS — F41.9 ANXIETY AND DEPRESSION: ICD-10-CM

## 2022-09-12 DIAGNOSIS — F32.A ANXIETY AND DEPRESSION: ICD-10-CM

## 2022-09-12 NOTE — TELEPHONE ENCOUNTER
Rx Request  clonazePAM 0.5 MG Oral Tab    Disp:      30              R: 0    Associated Dx: A&D    Last Refilled: 08/04/2022    Last Visit: 08/11/2022 (virtual)

## 2022-09-13 RX ORDER — CLONAZEPAM 0.5 MG/1
TABLET ORAL
Qty: 30 TABLET | Refills: 0 | Status: SHIPPED | OUTPATIENT
Start: 2022-09-13

## 2022-09-22 PROBLEM — L30.9 ACUTE DERMATITIS: Status: ACTIVE | Noted: 2019-05-07

## 2022-09-25 ENCOUNTER — APPOINTMENT (OUTPATIENT)
Dept: CT IMAGING | Facility: HOSPITAL | Age: 77
End: 2022-09-25
Attending: EMERGENCY MEDICINE

## 2022-09-25 ENCOUNTER — HOSPITAL ENCOUNTER (EMERGENCY)
Facility: HOSPITAL | Age: 77
Discharge: HOME | End: 2022-09-25
Attending: EMERGENCY MEDICINE

## 2022-09-25 VITALS
BODY MASS INDEX: 23.95 KG/M2 | OXYGEN SATURATION: 96 % | DIASTOLIC BLOOD PRESSURE: 75 MMHG | TEMPERATURE: 98 F | SYSTOLIC BLOOD PRESSURE: 115 MMHG | WEIGHT: 122 LBS | HEIGHT: 60 IN | HEART RATE: 65 BPM | RESPIRATION RATE: 16 BRPM

## 2022-09-25 DIAGNOSIS — R51.9 ACUTE INTRACTABLE HEADACHE, UNSPECIFIED HEADACHE TYPE: Primary | ICD-10-CM

## 2022-09-25 LAB
ALBUMIN SERPL-MCNC: 3.9 G/DL (ref 3.4–5)
ALBUMIN/GLOB SERPL: 1.1 {RATIO} (ref 1–2)
ALP LIVER SERPL-CCNC: 84 U/L
ALT SERPL-CCNC: 22 U/L
ANION GAP SERPL CALC-SCNC: 5 MMOL/L (ref 0–18)
AST SERPL-CCNC: 23 U/L (ref 15–37)
BASOPHILS # BLD AUTO: 0.1 X10(3) UL (ref 0–0.2)
BASOPHILS NFR BLD AUTO: 1.3 %
BILIRUB SERPL-MCNC: 0.4 MG/DL (ref 0.1–2)
BUN BLD-MCNC: 14 MG/DL (ref 7–18)
CALCIUM BLD-MCNC: 9.5 MG/DL (ref 8.5–10.1)
CHLORIDE SERPL-SCNC: 108 MMOL/L (ref 98–112)
CO2 SERPL-SCNC: 26 MMOL/L (ref 21–32)
CREAT BLD-MCNC: 0.93 MG/DL
EOSINOPHIL # BLD AUTO: 0.14 X10(3) UL (ref 0–0.7)
EOSINOPHIL NFR BLD AUTO: 1.8 %
ERYTHROCYTE [DISTWIDTH] IN BLOOD BY AUTOMATED COUNT: 12.6 %
GFR SERPLBLD BASED ON 1.73 SQ M-ARVRAT: 63 ML/MIN/1.73M2 (ref 60–?)
GLOBULIN PLAS-MCNC: 3.6 G/DL (ref 2.8–4.4)
GLUCOSE BLD-MCNC: 103 MG/DL (ref 70–99)
HCT VFR BLD AUTO: 39 %
HGB BLD-MCNC: 13.4 G/DL
IMM GRANULOCYTES # BLD AUTO: 0.02 X10(3) UL (ref 0–1)
IMM GRANULOCYTES NFR BLD: 0.3 %
LYMPHOCYTES # BLD AUTO: 1.59 X10(3) UL (ref 1–4)
LYMPHOCYTES NFR BLD AUTO: 20.8 %
MCH RBC QN AUTO: 30.7 PG (ref 26–34)
MCHC RBC AUTO-ENTMCNC: 34.4 G/DL (ref 31–37)
MCV RBC AUTO: 89.4 FL
MONOCYTES # BLD AUTO: 0.75 X10(3) UL (ref 0.1–1)
MONOCYTES NFR BLD AUTO: 9.8 %
NEUTROPHILS # BLD AUTO: 5.03 X10 (3) UL (ref 1.5–7.7)
NEUTROPHILS # BLD AUTO: 5.03 X10(3) UL (ref 1.5–7.7)
NEUTROPHILS NFR BLD AUTO: 66 %
OSMOLALITY SERPL CALC.SUM OF ELEC: 289 MOSM/KG (ref 275–295)
PLATELET # BLD AUTO: 350 10(3)UL (ref 150–450)
POTASSIUM SERPL-SCNC: 3.7 MMOL/L (ref 3.5–5.1)
PROT SERPL-MCNC: 7.5 G/DL (ref 6.4–8.2)
RBC # BLD AUTO: 4.36 X10(6)UL
SARS-COV-2 RNA RESP QL NAA+PROBE: NOT DETECTED
SODIUM SERPL-SCNC: 139 MMOL/L (ref 136–145)
WBC # BLD AUTO: 7.6 X10(3) UL (ref 4–11)

## 2022-09-25 PROCEDURE — 96375 TX/PRO/DX INJ NEW DRUG ADDON: CPT | Performed by: EMERGENCY MEDICINE

## 2022-09-25 PROCEDURE — 70450 CT HEAD/BRAIN W/O DYE: CPT | Performed by: EMERGENCY MEDICINE

## 2022-09-25 PROCEDURE — 99285 EMERGENCY DEPT VISIT HI MDM: CPT | Performed by: EMERGENCY MEDICINE

## 2022-09-25 PROCEDURE — 96374 THER/PROPH/DIAG INJ IV PUSH: CPT | Performed by: EMERGENCY MEDICINE

## 2022-09-25 PROCEDURE — 93005 ELECTROCARDIOGRAM TRACING: CPT

## 2022-09-25 PROCEDURE — 85025 COMPLETE CBC W/AUTO DIFF WBC: CPT | Performed by: EMERGENCY MEDICINE

## 2022-09-25 PROCEDURE — 80053 COMPREHEN METABOLIC PANEL: CPT | Performed by: EMERGENCY MEDICINE

## 2022-09-25 RX ORDER — DIPHENHYDRAMINE HYDROCHLORIDE 50 MG/ML
25 INJECTION INTRAMUSCULAR; INTRAVENOUS ONCE
Status: COMPLETED | OUTPATIENT
Start: 2022-09-25 | End: 2022-09-25

## 2022-09-25 RX ORDER — KETOROLAC TROMETHAMINE 15 MG/ML
15 INJECTION, SOLUTION INTRAMUSCULAR; INTRAVENOUS ONCE
Status: COMPLETED | OUTPATIENT
Start: 2022-09-25 | End: 2022-09-25

## 2022-09-25 RX ORDER — TRAMADOL HYDROCHLORIDE 50 MG/1
TABLET ORAL EVERY 6 HOURS PRN
Qty: 10 TABLET | Refills: 0 | Status: SHIPPED | OUTPATIENT
Start: 2022-09-25 | End: 2022-09-29

## 2022-09-25 RX ORDER — METOCLOPRAMIDE HYDROCHLORIDE 5 MG/ML
10 INJECTION INTRAMUSCULAR; INTRAVENOUS ONCE
Status: COMPLETED | OUTPATIENT
Start: 2022-09-25 | End: 2022-09-25

## 2022-09-25 NOTE — ED INITIAL ASSESSMENT (HPI)
C/o headache, facial pain, ear fullness, mouth pain for several days. C/o gum/jaw pain.  States headache is the worst.

## 2022-09-26 LAB
ATRIAL RATE: 67 BPM
P AXIS: 58 DEGREES
P-R INTERVAL: 168 MS
Q-T INTERVAL: 418 MS
QRS DURATION: 76 MS
QTC CALCULATION (BEZET): 441 MS
R AXIS: -17 DEGREES
T AXIS: 45 DEGREES
VENTRICULAR RATE: 67 BPM

## 2022-09-29 ENCOUNTER — OFFICE VISIT (OUTPATIENT)
Dept: FAMILY MEDICINE CLINIC | Facility: CLINIC | Age: 77
End: 2022-09-29

## 2022-09-29 VITALS
OXYGEN SATURATION: 98 % | SYSTOLIC BLOOD PRESSURE: 120 MMHG | RESPIRATION RATE: 16 BRPM | DIASTOLIC BLOOD PRESSURE: 78 MMHG | WEIGHT: 122 LBS | BODY MASS INDEX: 23.95 KG/M2 | HEIGHT: 60 IN | HEART RATE: 68 BPM

## 2022-09-29 DIAGNOSIS — R51.9 ACUTE INTRACTABLE HEADACHE, UNSPECIFIED HEADACHE TYPE: Primary | ICD-10-CM

## 2022-09-29 DIAGNOSIS — L29.9 ITCHING: ICD-10-CM

## 2022-09-29 PROCEDURE — 3074F SYST BP LT 130 MM HG: CPT | Performed by: FAMILY MEDICINE

## 2022-09-29 PROCEDURE — 3078F DIAST BP <80 MM HG: CPT | Performed by: FAMILY MEDICINE

## 2022-09-29 PROCEDURE — 99213 OFFICE O/P EST LOW 20 MIN: CPT | Performed by: FAMILY MEDICINE

## 2022-09-29 PROCEDURE — 3008F BODY MASS INDEX DOCD: CPT | Performed by: FAMILY MEDICINE

## 2022-09-29 RX ORDER — CLOBETASOL PROPIONATE 0.5 MG/G
OINTMENT TOPICAL
Qty: 1 EACH | Refills: 0 | Status: SHIPPED | OUTPATIENT
Start: 2022-09-29

## 2022-09-29 RX ORDER — BUTALBITAL/ACETAMINOPHEN 50MG-325MG
1 TABLET ORAL EVERY 6 HOURS PRN
Qty: 30 TABLET | Refills: 0 | Status: SHIPPED | OUTPATIENT
Start: 2022-09-29

## 2022-09-29 RX ORDER — TRAMADOL HYDROCHLORIDE 50 MG/1
50 TABLET ORAL EVERY 6 HOURS PRN
Qty: 30 TABLET | Refills: 0 | Status: SHIPPED | OUTPATIENT
Start: 2022-09-29

## 2022-09-29 RX ORDER — SUMATRIPTAN 50 MG/1
50 TABLET, FILM COATED ORAL EVERY 2 HOUR PRN
Qty: 8 TABLET | Refills: 1 | Status: SHIPPED | OUTPATIENT
Start: 2022-09-29

## 2022-10-04 ENCOUNTER — PATIENT OUTREACH (OUTPATIENT)
Dept: CASE MANAGEMENT | Age: 77
End: 2022-10-04

## 2022-10-04 DIAGNOSIS — I70.0 AORTIC ATHEROSCLEROSIS (HCC): ICD-10-CM

## 2022-10-04 DIAGNOSIS — K21.9 GASTROESOPHAGEAL REFLUX DISEASE WITHOUT ESOPHAGITIS: ICD-10-CM

## 2022-10-04 DIAGNOSIS — J44.0 CHRONIC OBSTRUCTIVE PULMONARY DISEASE WITH ACUTE LOWER RESPIRATORY INFECTION (HCC): ICD-10-CM

## 2022-10-04 DIAGNOSIS — E78.00 PURE HYPERCHOLESTEROLEMIA: ICD-10-CM

## 2022-10-04 DIAGNOSIS — F33.0 MILD RECURRENT MAJOR DEPRESSION (HCC): Chronic | ICD-10-CM

## 2022-10-04 DIAGNOSIS — E03.9 ACQUIRED HYPOTHYROIDISM: Chronic | ICD-10-CM

## 2022-10-04 DIAGNOSIS — L30.9 ACUTE DERMATITIS: ICD-10-CM

## 2022-10-04 DIAGNOSIS — G44.201 INTRACTABLE TENSION-TYPE HEADACHE, UNSPECIFIED CHRONICITY PATTERN: ICD-10-CM

## 2022-10-05 ENCOUNTER — LAB ENCOUNTER (OUTPATIENT)
Dept: LAB | Facility: HOSPITAL | Age: 77
End: 2022-10-05
Attending: INTERNAL MEDICINE
Payer: MEDICARE

## 2022-10-05 DIAGNOSIS — Z20.822 ENCOUNTER FOR PREPROCEDURE SCREENING LABORATORY TESTING FOR COVID-19: ICD-10-CM

## 2022-10-05 DIAGNOSIS — Z01.812 ENCOUNTER FOR PREPROCEDURE SCREENING LABORATORY TESTING FOR COVID-19: ICD-10-CM

## 2022-10-05 LAB — SARS-COV-2 RNA RESP QL NAA+PROBE: NOT DETECTED

## 2022-10-07 ENCOUNTER — HOSPITAL ENCOUNTER (OUTPATIENT)
Facility: HOSPITAL | Age: 77
Setting detail: HOSPITAL OUTPATIENT SURGERY
Discharge: HOME | End: 2022-10-07
Attending: INTERNAL MEDICINE | Admitting: INTERNAL MEDICINE
Payer: MEDICARE

## 2022-10-07 ENCOUNTER — ANESTHESIA EVENT (OUTPATIENT)
Dept: ENDOSCOPY | Facility: HOSPITAL | Age: 77
End: 2022-10-07
Payer: MEDICARE

## 2022-10-07 ENCOUNTER — ANESTHESIA (OUTPATIENT)
Dept: ENDOSCOPY | Facility: HOSPITAL | Age: 77
End: 2022-10-07
Payer: MEDICARE

## 2022-10-07 VITALS
BODY MASS INDEX: 23.95 KG/M2 | TEMPERATURE: 98 F | HEIGHT: 60 IN | OXYGEN SATURATION: 98 % | DIASTOLIC BLOOD PRESSURE: 57 MMHG | SYSTOLIC BLOOD PRESSURE: 140 MMHG | HEART RATE: 70 BPM | RESPIRATION RATE: 16 BRPM | WEIGHT: 122 LBS

## 2022-10-07 DIAGNOSIS — Z01.812 ENCOUNTER FOR PREPROCEDURE SCREENING LABORATORY TESTING FOR COVID-19: Primary | ICD-10-CM

## 2022-10-07 DIAGNOSIS — Z20.822 ENCOUNTER FOR PREPROCEDURE SCREENING LABORATORY TESTING FOR COVID-19: Primary | ICD-10-CM

## 2022-10-07 DIAGNOSIS — R07.89 CHEST PRESSURE: ICD-10-CM

## 2022-10-07 DIAGNOSIS — R11.0 NAUSEA: ICD-10-CM

## 2022-10-07 PROCEDURE — 0DJ08ZZ INSPECTION OF UPPER INTESTINAL TRACT, VIA NATURAL OR ARTIFICIAL OPENING ENDOSCOPIC: ICD-10-PCS | Performed by: INTERNAL MEDICINE

## 2022-10-07 RX ORDER — SODIUM CHLORIDE, SODIUM LACTATE, POTASSIUM CHLORIDE, CALCIUM CHLORIDE 600; 310; 30; 20 MG/100ML; MG/100ML; MG/100ML; MG/100ML
INJECTION, SOLUTION INTRAVENOUS CONTINUOUS
Status: DISCONTINUED | OUTPATIENT
Start: 2022-10-07 | End: 2022-10-07

## 2022-10-07 NOTE — ANESTHESIA POSTPROCEDURE EVALUATION
[address] Patient Status:  Hospital Outpatient Surgery   Age/Gender 65year old female MRN [id number]   Location [address] [facility] 28 Attending Valerie Rand DO   Hosp Day # 0 PCP Vanessa Mireles DO       Anesthesia Post-op Note    ESOPHAGOGASTRODUODENOSCOPY (EGD)    Procedure Summary     Date: 10/07/22 Room / Location: [address] [place] [address] ENDOSCOPY 02 / 140[facility] ENDOSCOPY    Anesthesia Start: 3388 Anesthesia Stop:     Procedure: ESOPHAGOGASTRODUODENOSCOPY (EGD) (N/A ) Diagnosis:       Chest pressure      Nausea      (sue-esophageal hernia)    Surgeons: Valerie Rand DO Anesthesiologist: Robert Caceres MD    Anesthesia Type: MAC ASA Status: 3          Anesthesia Type: No value filed. Vitals Value Taken Time   /54 10/07/22 0943   Temp na 10/07/22 0943   Pulse 69 10/07/22 0943   Resp 12 10/07/22 0943   SpO2 93 10/07/22 0943       Patient Location: Same Day Surgery    Anesthesia Type: MAC    Airway Patency: patent    Postop Pain Control: adequate    Mental Status: preanesthetic baseline    Nausea/Vomiting: none    Cardiopulmonary/Hydration status: stable euvolemic    Complications: no apparent anesthesia related complications    Postop vital signs: stable    Dental Exam: Unchanged from Preop    Patient to be discharged home when criteria met.

## 2022-10-10 ENCOUNTER — OFFICE VISIT (OUTPATIENT)
Dept: FAMILY MEDICINE CLINIC | Facility: CLINIC | Age: 77
End: 2022-10-10
Payer: COMMERCIAL

## 2022-10-10 VITALS
HEART RATE: 72 BPM | BODY MASS INDEX: 23.36 KG/M2 | WEIGHT: 119 LBS | SYSTOLIC BLOOD PRESSURE: 136 MMHG | OXYGEN SATURATION: 98 % | HEIGHT: 60 IN | DIASTOLIC BLOOD PRESSURE: 86 MMHG | RESPIRATION RATE: 16 BRPM

## 2022-10-10 DIAGNOSIS — K44.9 PARAESOPHAGEAL HERNIA: ICD-10-CM

## 2022-10-10 DIAGNOSIS — R19.8 GAGGING EPISODE: ICD-10-CM

## 2022-10-10 DIAGNOSIS — F32.A ANXIETY AND DEPRESSION: ICD-10-CM

## 2022-10-10 DIAGNOSIS — F41.9 ANXIETY AND DEPRESSION: ICD-10-CM

## 2022-10-10 DIAGNOSIS — G47.00 INSOMNIA, UNSPECIFIED TYPE: Primary | ICD-10-CM

## 2022-10-10 DIAGNOSIS — R05.9 COUGH, UNSPECIFIED TYPE: ICD-10-CM

## 2022-10-10 DIAGNOSIS — R11.0 NAUSEA: ICD-10-CM

## 2022-10-10 PROCEDURE — 3008F BODY MASS INDEX DOCD: CPT | Performed by: FAMILY MEDICINE

## 2022-10-10 PROCEDURE — 3079F DIAST BP 80-89 MM HG: CPT | Performed by: FAMILY MEDICINE

## 2022-10-10 PROCEDURE — 3075F SYST BP GE 130 - 139MM HG: CPT | Performed by: FAMILY MEDICINE

## 2022-10-10 PROCEDURE — 99214 OFFICE O/P EST MOD 30 MIN: CPT | Performed by: FAMILY MEDICINE

## 2022-10-10 RX ORDER — CLONAZEPAM 1 MG/1
1 TABLET ORAL NIGHTLY PRN
Qty: 16 TABLET | Refills: 0 | Status: SHIPPED | OUTPATIENT
Start: 2022-10-10

## 2022-10-10 RX ORDER — PANTOPRAZOLE SODIUM 40 MG/1
40 TABLET, DELAYED RELEASE ORAL
Qty: 180 TABLET | Refills: 1 | Status: SHIPPED | OUTPATIENT
Start: 2022-10-10

## 2022-10-10 RX ORDER — PREGABALIN 25 MG/1
25 CAPSULE ORAL 3 TIMES DAILY
Qty: 90 CAPSULE | Refills: 1 | Status: SHIPPED | OUTPATIENT
Start: 2022-10-10

## 2022-10-10 RX ORDER — PAROXETINE 10 MG/1
10 TABLET, FILM COATED ORAL EVERY MORNING
Qty: 30 TABLET | Refills: 1 | Status: SHIPPED | OUTPATIENT
Start: 2022-10-10

## 2022-10-10 RX ORDER — METOCLOPRAMIDE 5 MG/1
5 TABLET ORAL
Qty: 30 TABLET | Refills: 0 | Status: SHIPPED | OUTPATIENT
Start: 2022-10-10

## 2022-10-10 RX ORDER — BENZONATATE 200 MG/1
200 CAPSULE ORAL EVERY 8 HOURS PRN
Qty: 30 CAPSULE | Refills: 0 | Status: SHIPPED | OUTPATIENT
Start: 2022-10-10

## 2022-10-10 NOTE — PATIENT INSTRUCTIONS
Pantoprazole for acid to help with gagging, abdominal pain, and cough    benzontate to help with coughing    lyrica which will help with gagging, and overall body pains. lyrica 25mg three times a day, after a week go to 50mg three times a day    Do flonase nasal spray daily. Increasing clonazepam 1mg for sleep. Paxil paroxetine 10mg daily. Metoclopramide as needed for nausea    Follow up in 2 weeks.

## 2022-10-19 DIAGNOSIS — F41.9 ANXIETY AND DEPRESSION: ICD-10-CM

## 2022-10-19 DIAGNOSIS — F32.A ANXIETY AND DEPRESSION: ICD-10-CM

## 2022-10-22 RX ORDER — CLONAZEPAM 0.5 MG/1
TABLET ORAL
Qty: 30 TABLET | Refills: 0 | Status: SHIPPED | OUTPATIENT
Start: 2022-10-22

## 2022-10-25 ENCOUNTER — LAB ENCOUNTER (OUTPATIENT)
Dept: LAB | Age: 77
End: 2022-10-25
Attending: FAMILY MEDICINE
Payer: MEDICARE

## 2022-10-25 ENCOUNTER — OFFICE VISIT (OUTPATIENT)
Dept: FAMILY MEDICINE CLINIC | Facility: CLINIC | Age: 77
End: 2022-10-25
Payer: COMMERCIAL

## 2022-10-25 VITALS
DIASTOLIC BLOOD PRESSURE: 74 MMHG | HEIGHT: 60 IN | SYSTOLIC BLOOD PRESSURE: 110 MMHG | WEIGHT: 118 LBS | HEART RATE: 72 BPM | BODY MASS INDEX: 23.16 KG/M2 | RESPIRATION RATE: 16 BRPM | OXYGEN SATURATION: 98 %

## 2022-10-25 DIAGNOSIS — F32.A DEPRESSION, UNSPECIFIED DEPRESSION TYPE: ICD-10-CM

## 2022-10-25 DIAGNOSIS — F41.9 ANXIETY: ICD-10-CM

## 2022-10-25 DIAGNOSIS — E78.5 HYPERLIPIDEMIA, UNSPECIFIED HYPERLIPIDEMIA TYPE: ICD-10-CM

## 2022-10-25 DIAGNOSIS — E55.9 VITAMIN D DEFICIENCY: ICD-10-CM

## 2022-10-25 DIAGNOSIS — E03.9 HYPOTHYROIDISM, UNSPECIFIED TYPE: Primary | ICD-10-CM

## 2022-10-25 DIAGNOSIS — G47.00 INSOMNIA, UNSPECIFIED TYPE: ICD-10-CM

## 2022-10-25 LAB
ALBUMIN SERPL-MCNC: 4 G/DL (ref 3.4–5)
ALBUMIN/GLOB SERPL: 1.1 {RATIO} (ref 1–2)
ALP LIVER SERPL-CCNC: 74 U/L
ALT SERPL-CCNC: 22 U/L
ANION GAP SERPL CALC-SCNC: 6 MMOL/L (ref 0–18)
AST SERPL-CCNC: 20 U/L (ref 15–37)
BASOPHILS # BLD AUTO: 0.09 X10(3) UL (ref 0–0.2)
BASOPHILS NFR BLD AUTO: 1.1 %
BILIRUB SERPL-MCNC: 0.7 MG/DL (ref 0.1–2)
BUN BLD-MCNC: 11 MG/DL (ref 7–18)
CALCIUM BLD-MCNC: 9.9 MG/DL (ref 8.5–10.1)
CHLORIDE SERPL-SCNC: 109 MMOL/L (ref 98–112)
CHOLEST SERPL-MCNC: 259 MG/DL (ref ?–200)
CO2 SERPL-SCNC: 25 MMOL/L (ref 21–32)
CREAT BLD-MCNC: 1.06 MG/DL
EOSINOPHIL # BLD AUTO: 0.14 X10(3) UL (ref 0–0.7)
EOSINOPHIL NFR BLD AUTO: 1.7 %
ERYTHROCYTE [DISTWIDTH] IN BLOOD BY AUTOMATED COUNT: 12.7 %
FASTING PATIENT LIPID ANSWER: YES
FASTING STATUS PATIENT QL REPORTED: YES
GFR SERPLBLD BASED ON 1.73 SQ M-ARVRAT: 54 ML/MIN/1.73M2 (ref 60–?)
GLOBULIN PLAS-MCNC: 3.6 G/DL (ref 2.8–4.4)
GLUCOSE BLD-MCNC: 100 MG/DL (ref 70–99)
HCT VFR BLD AUTO: 41 %
HDLC SERPL-MCNC: 50 MG/DL (ref 40–59)
HGB BLD-MCNC: 13.5 G/DL
IMM GRANULOCYTES # BLD AUTO: 0.03 X10(3) UL (ref 0–1)
IMM GRANULOCYTES NFR BLD: 0.4 %
LDLC SERPL CALC-MCNC: 176 MG/DL (ref ?–100)
LYMPHOCYTES # BLD AUTO: 1.1 X10(3) UL (ref 1–4)
LYMPHOCYTES NFR BLD AUTO: 13.6 %
MCH RBC QN AUTO: 31 PG (ref 26–34)
MCHC RBC AUTO-ENTMCNC: 32.9 G/DL (ref 31–37)
MCV RBC AUTO: 94 FL
MONOCYTES # BLD AUTO: 0.65 X10(3) UL (ref 0.1–1)
MONOCYTES NFR BLD AUTO: 8 %
NEUTROPHILS # BLD AUTO: 6.08 X10 (3) UL (ref 1.5–7.7)
NEUTROPHILS # BLD AUTO: 6.08 X10(3) UL (ref 1.5–7.7)
NEUTROPHILS NFR BLD AUTO: 75.2 %
NONHDLC SERPL-MCNC: 209 MG/DL (ref ?–130)
OSMOLALITY SERPL CALC.SUM OF ELEC: 289 MOSM/KG (ref 275–295)
PLATELET # BLD AUTO: 345 10(3)UL (ref 150–450)
POTASSIUM SERPL-SCNC: 4 MMOL/L (ref 3.5–5.1)
PROT SERPL-MCNC: 7.6 G/DL (ref 6.4–8.2)
RBC # BLD AUTO: 4.36 X10(6)UL
SODIUM SERPL-SCNC: 140 MMOL/L (ref 136–145)
T3FREE SERPL-MCNC: 2.52 PG/ML (ref 2.4–4.2)
TRIGL SERPL-MCNC: 179 MG/DL (ref 30–149)
TSI SER-ACNC: 4.41 MIU/ML (ref 0.36–3.74)
VIT D+METAB SERPL-MCNC: 25.4 NG/ML (ref 30–100)
VLDLC SERPL CALC-MCNC: 36 MG/DL (ref 0–30)
WBC # BLD AUTO: 8.1 X10(3) UL (ref 4–11)

## 2022-10-25 PROCEDURE — 3008F BODY MASS INDEX DOCD: CPT | Performed by: FAMILY MEDICINE

## 2022-10-25 PROCEDURE — 80053 COMPREHEN METABOLIC PANEL: CPT | Performed by: FAMILY MEDICINE

## 2022-10-25 PROCEDURE — 84443 ASSAY THYROID STIM HORMONE: CPT | Performed by: FAMILY MEDICINE

## 2022-10-25 PROCEDURE — 82306 VITAMIN D 25 HYDROXY: CPT

## 2022-10-25 PROCEDURE — 99214 OFFICE O/P EST MOD 30 MIN: CPT | Performed by: FAMILY MEDICINE

## 2022-10-25 PROCEDURE — 80061 LIPID PANEL: CPT | Performed by: FAMILY MEDICINE

## 2022-10-25 PROCEDURE — 84481 FREE ASSAY (FT-3): CPT | Performed by: FAMILY MEDICINE

## 2022-10-25 PROCEDURE — 3074F SYST BP LT 130 MM HG: CPT | Performed by: FAMILY MEDICINE

## 2022-10-25 PROCEDURE — 3078F DIAST BP <80 MM HG: CPT | Performed by: FAMILY MEDICINE

## 2022-10-25 PROCEDURE — 36415 COLL VENOUS BLD VENIPUNCTURE: CPT | Performed by: FAMILY MEDICINE

## 2022-10-25 PROCEDURE — 85025 COMPLETE CBC W/AUTO DIFF WBC: CPT | Performed by: FAMILY MEDICINE

## 2022-10-25 RX ORDER — FLUOXETINE 10 MG/1
TABLET, FILM COATED ORAL
Qty: 30 TABLET | Refills: 1 | Status: SHIPPED | OUTPATIENT
Start: 2022-10-25 | End: 2022-11-06

## 2022-10-25 RX ORDER — ALPRAZOLAM 0.5 MG/1
0.5 TABLET, ORALLY DISINTEGRATING ORAL NIGHTLY PRN
Qty: 30 TABLET | Refills: 1 | Status: SHIPPED | OUTPATIENT
Start: 2022-10-25 | End: 2022-11-03

## 2022-10-26 ENCOUNTER — TELEPHONE (OUTPATIENT)
Dept: FAMILY MEDICINE CLINIC | Facility: CLINIC | Age: 77
End: 2022-10-26

## 2022-10-26 DIAGNOSIS — E03.9 HYPOTHYROIDISM, UNSPECIFIED TYPE: Primary | ICD-10-CM

## 2022-10-26 NOTE — TELEPHONE ENCOUNTER
Pt discussed some issues she was having with her levothyroxine with Dr. Vane Fontenot. She forgot if he told her to start taking 1/2 the dose or not?

## 2022-10-27 RX ORDER — LEVOTHYROXINE AND LIOTHYRONINE 9.5; 2.25 UG/1; UG/1
15 TABLET ORAL DAILY
Qty: 30 TABLET | Refills: 5 | Status: SHIPPED | OUTPATIENT
Start: 2022-10-27

## 2022-10-27 NOTE — TELEPHONE ENCOUNTER
I am sending a different type of thyroid medication. So do that instead of the 1/2 levothyroxine. Recheck TSH around Dec 11th.

## 2022-11-01 RX ORDER — LEVOTHYROXINE SODIUM 0.03 MG/1
TABLET ORAL
Qty: 90 TABLET | Refills: 0 | Status: SHIPPED | OUTPATIENT
Start: 2022-11-01

## 2022-11-03 ENCOUNTER — HOSPITAL ENCOUNTER (OUTPATIENT)
Facility: HOSPITAL | Age: 77
Setting detail: OBSERVATION
Discharge: HOME | End: 2022-11-06
Attending: EMERGENCY MEDICINE | Admitting: INTERNAL MEDICINE
Payer: MEDICARE

## 2022-11-03 ENCOUNTER — APPOINTMENT (OUTPATIENT)
Dept: CT IMAGING | Facility: HOSPITAL | Age: 77
End: 2022-11-03
Attending: EMERGENCY MEDICINE
Payer: MEDICARE

## 2022-11-03 ENCOUNTER — APPOINTMENT (OUTPATIENT)
Dept: GENERAL RADIOLOGY | Facility: HOSPITAL | Age: 77
End: 2022-11-03
Attending: EMERGENCY MEDICINE
Payer: MEDICARE

## 2022-11-03 DIAGNOSIS — R11.0 NAUSEA: ICD-10-CM

## 2022-11-03 DIAGNOSIS — R53.1 WEAKNESS GENERALIZED: Primary | ICD-10-CM

## 2022-11-03 LAB
ALBUMIN SERPL-MCNC: 3.9 G/DL (ref 3.4–5)
ALBUMIN/GLOB SERPL: 1.3 {RATIO} (ref 1–2)
ALP LIVER SERPL-CCNC: 83 U/L
ALT SERPL-CCNC: 21 U/L
ANION GAP SERPL CALC-SCNC: 5 MMOL/L (ref 0–18)
AST SERPL-CCNC: 16 U/L (ref 15–37)
BASOPHILS # BLD AUTO: 0.07 X10(3) UL (ref 0–0.2)
BASOPHILS NFR BLD AUTO: 1.1 %
BILIRUB SERPL-MCNC: 0.5 MG/DL (ref 0.1–2)
BILIRUB UR QL STRIP.AUTO: NEGATIVE
BUN BLD-MCNC: 8 MG/DL (ref 7–18)
CALCIUM BLD-MCNC: 9.2 MG/DL (ref 8.5–10.1)
CHLORIDE SERPL-SCNC: 112 MMOL/L (ref 98–112)
CLARITY UR REFRACT.AUTO: CLEAR
CO2 SERPL-SCNC: 26 MMOL/L (ref 21–32)
CREAT BLD-MCNC: 0.87 MG/DL
EOSINOPHIL # BLD AUTO: 0.16 X10(3) UL (ref 0–0.7)
EOSINOPHIL NFR BLD AUTO: 2.4 %
ERYTHROCYTE [DISTWIDTH] IN BLOOD BY AUTOMATED COUNT: 12.6 %
FLUAV + FLUBV RNA SPEC NAA+PROBE: NEGATIVE
FLUAV + FLUBV RNA SPEC NAA+PROBE: NEGATIVE
GFR SERPLBLD BASED ON 1.73 SQ M-ARVRAT: 68 ML/MIN/1.73M2 (ref 60–?)
GLOBULIN PLAS-MCNC: 3.1 G/DL (ref 2.8–4.4)
GLUCOSE BLD-MCNC: 104 MG/DL (ref 70–99)
GLUCOSE UR STRIP.AUTO-MCNC: NEGATIVE MG/DL
HCT VFR BLD AUTO: 38.4 %
HGB BLD-MCNC: 12.8 G/DL
IMM GRANULOCYTES # BLD AUTO: 0.01 X10(3) UL (ref 0–1)
IMM GRANULOCYTES NFR BLD: 0.2 %
KETONES UR STRIP.AUTO-MCNC: NEGATIVE MG/DL
LACTATE SERPL-SCNC: 1.4 MMOL/L (ref 0.4–2)
LYMPHOCYTES # BLD AUTO: 1.25 X10(3) UL (ref 1–4)
LYMPHOCYTES NFR BLD AUTO: 19.1 %
MCH RBC QN AUTO: 30.5 PG (ref 26–34)
MCHC RBC AUTO-ENTMCNC: 33.3 G/DL (ref 31–37)
MCV RBC AUTO: 91.4 FL
MONOCYTES # BLD AUTO: 0.71 X10(3) UL (ref 0.1–1)
MONOCYTES NFR BLD AUTO: 10.8 %
NEUTROPHILS # BLD AUTO: 4.36 X10 (3) UL (ref 1.5–7.7)
NEUTROPHILS # BLD AUTO: 4.36 X10(3) UL (ref 1.5–7.7)
NEUTROPHILS NFR BLD AUTO: 66.4 %
NITRITE UR QL STRIP.AUTO: NEGATIVE
OSMOLALITY SERPL CALC.SUM OF ELEC: 295 MOSM/KG (ref 275–295)
PH UR STRIP.AUTO: 7 [PH] (ref 5–8)
PLATELET # BLD AUTO: 319 10(3)UL (ref 150–450)
POTASSIUM SERPL-SCNC: 3.9 MMOL/L (ref 3.5–5.1)
PROT SERPL-MCNC: 7 G/DL (ref 6.4–8.2)
PROT UR STRIP.AUTO-MCNC: NEGATIVE MG/DL
RBC # BLD AUTO: 4.2 X10(6)UL
RSV RNA SPEC NAA+PROBE: NEGATIVE
SARS-COV-2 RNA RESP QL NAA+PROBE: NOT DETECTED
SODIUM SERPL-SCNC: 143 MMOL/L (ref 136–145)
SP GR UR STRIP.AUTO: 1.01 (ref 1–1.03)
UROBILINOGEN UR STRIP.AUTO-MCNC: <2 MG/DL
WBC # BLD AUTO: 6.6 X10(3) UL (ref 4–11)

## 2022-11-03 PROCEDURE — 74177 CT ABD & PELVIS W/CONTRAST: CPT | Performed by: EMERGENCY MEDICINE

## 2022-11-03 PROCEDURE — 99220 INITIAL OBSERVATION CARE,LEVL III: CPT | Performed by: INTERNAL MEDICINE

## 2022-11-03 PROCEDURE — 71045 X-RAY EXAM CHEST 1 VIEW: CPT | Performed by: EMERGENCY MEDICINE

## 2022-11-03 RX ORDER — CLONAZEPAM 1 MG/1
0.5 TABLET ORAL 2 TIMES DAILY PRN
Status: DISCONTINUED | OUTPATIENT
Start: 2022-11-03 | End: 2022-11-06

## 2022-11-03 RX ORDER — FLUOXETINE 10 MG/1
10 TABLET, FILM COATED ORAL DAILY
Status: DISCONTINUED | OUTPATIENT
Start: 2022-11-04 | End: 2022-11-04

## 2022-11-03 RX ORDER — ALPRAZOLAM 0.5 MG/1
0.5 TABLET, ORALLY DISINTEGRATING ORAL NIGHTLY PRN
COMMUNITY
Start: 2022-11-03

## 2022-11-03 RX ORDER — ACETAMINOPHEN 500 MG
1000 TABLET ORAL ONCE
Status: COMPLETED | OUTPATIENT
Start: 2022-11-03 | End: 2022-11-03

## 2022-11-03 RX ORDER — ACETAMINOPHEN 500 MG
500 TABLET ORAL EVERY 6 HOURS PRN
Status: DISCONTINUED | OUTPATIENT
Start: 2022-11-03 | End: 2022-11-06

## 2022-11-03 RX ORDER — MELATONIN
3 NIGHTLY PRN
Status: DISCONTINUED | OUTPATIENT
Start: 2022-11-03 | End: 2022-11-06

## 2022-11-03 RX ORDER — ONDANSETRON 2 MG/ML
4 INJECTION INTRAMUSCULAR; INTRAVENOUS EVERY 6 HOURS PRN
Status: DISCONTINUED | OUTPATIENT
Start: 2022-11-03 | End: 2022-11-06

## 2022-11-03 RX ORDER — ALBUTEROL SULFATE 90 UG/1
1 AEROSOL, METERED RESPIRATORY (INHALATION) EVERY 6 HOURS PRN
Status: DISCONTINUED | OUTPATIENT
Start: 2022-11-03 | End: 2022-11-06

## 2022-11-03 RX ORDER — IOHEXOL 350 MG/ML
65 INJECTION, SOLUTION INTRAVENOUS
Status: COMPLETED | OUTPATIENT
Start: 2022-11-03 | End: 2022-11-03

## 2022-11-03 RX ORDER — FLUTICASONE FUROATE AND VILANTEROL 200; 25 UG/1; UG/1
1 POWDER RESPIRATORY (INHALATION) DAILY
Status: DISCONTINUED | OUTPATIENT
Start: 2022-11-03 | End: 2022-11-06

## 2022-11-03 RX ORDER — SODIUM CHLORIDE 9 MG/ML
INJECTION, SOLUTION INTRAVENOUS CONTINUOUS
Status: ACTIVE | OUTPATIENT
Start: 2022-11-03 | End: 2022-11-03

## 2022-11-03 RX ORDER — LEVOTHYROXINE SODIUM 0.03 MG/1
25 TABLET ORAL
Status: DISCONTINUED | OUTPATIENT
Start: 2022-11-04 | End: 2022-11-06

## 2022-11-03 RX ORDER — ONDANSETRON 2 MG/ML
4 INJECTION INTRAMUSCULAR; INTRAVENOUS EVERY 4 HOURS PRN
Status: DISCONTINUED | OUTPATIENT
Start: 2022-11-03 | End: 2022-11-03

## 2022-11-03 RX ORDER — DEXTROSE AND SODIUM CHLORIDE 5; .9 G/100ML; G/100ML
INJECTION, SOLUTION INTRAVENOUS CONTINUOUS
Status: DISCONTINUED | OUTPATIENT
Start: 2022-11-03 | End: 2022-11-06

## 2022-11-03 RX ORDER — ENOXAPARIN SODIUM 100 MG/ML
40 INJECTION SUBCUTANEOUS DAILY
Status: DISCONTINUED | OUTPATIENT
Start: 2022-11-03 | End: 2022-11-06

## 2022-11-03 RX ORDER — ALPRAZOLAM 0.5 MG/1
0.5 TABLET ORAL NIGHTLY PRN
Status: DISCONTINUED | OUTPATIENT
Start: 2022-11-03 | End: 2022-11-06

## 2022-11-03 RX ORDER — AMLODIPINE BESYLATE 5 MG/1
5 TABLET ORAL DAILY
Status: DISCONTINUED | OUTPATIENT
Start: 2022-11-04 | End: 2022-11-06

## 2022-11-03 RX ORDER — AMLODIPINE BESYLATE 5 MG/1
5 TABLET ORAL DAILY
COMMUNITY

## 2022-11-03 RX ORDER — METOCLOPRAMIDE HYDROCHLORIDE 5 MG/ML
5 INJECTION INTRAMUSCULAR; INTRAVENOUS EVERY 8 HOURS PRN
Status: DISCONTINUED | OUTPATIENT
Start: 2022-11-03 | End: 2022-11-06

## 2022-11-03 NOTE — ED QUICK NOTES
Orders for admission, patient is aware of plan and ready to go upstairs. Any questions, please call ED RN Te at extension [phone].      Patient Covid vaccination status: Fully vaccinated     COVID Test Ordered in ED: SARS-CoV-2/Flu A and B/RSV by PCR (GeneXpert)    COVID Suspicion at Admission: COVID negative    Running Infusions:  None    Mental Status/LOC at time of transport: A&O x3    Other pertinent information:   CIWA score: N/A   NIH score:  N/A

## 2022-11-03 NOTE — PROGRESS NOTES
Called about patient after CT abdomen results. Discussed with hospitalist about findings. Called and discussed with radiology who stated that the findings of the large hiatal hernia and reported organo axial volvulus are chronic in nature and present on prior imaging. They state that there are no acute findings or obstruction/strangulation. Patient with reported nausea and vomiting but no abdominal pain. Vital signs are stable. Given  these findings and prior recent EGD earlier this month with paraesophageal hernia, would recommend surgical evaluation and strict NPO status. Would also recommend obtaining lactate as well. If any change in symptoms, change in clinical status, or concern/need for urgent evaluation, advised to call and alert GI on-call overnight. Otherwise, will look to proceed with full consultation in a.m.

## 2022-11-03 NOTE — ED INITIAL ASSESSMENT (HPI)
Pt reports cough, lightheaded, shaky since Saturday. Last 2 days worse, unable to eat or drink. Denies vomiting.

## 2022-11-04 LAB — VIT B12 SERPL-MCNC: 420 PG/ML (ref 193–986)

## 2022-11-04 PROCEDURE — 99226 SUBSEQUENT OBSERVATION CARE: CPT | Performed by: HOSPITALIST

## 2022-11-04 PROCEDURE — 99203 OFFICE O/P NEW LOW 30 MIN: CPT | Performed by: SURGERY

## 2022-11-04 PROCEDURE — 90792 PSYCH DIAG EVAL W/MED SRVCS: CPT | Performed by: OTHER

## 2022-11-04 RX ORDER — MIRTAZAPINE 15 MG/1
15 TABLET, FILM COATED ORAL NIGHTLY
Status: DISCONTINUED | OUTPATIENT
Start: 2022-11-04 | End: 2022-11-06

## 2022-11-04 RX ORDER — RUFINAMIDE 40 MG/ML
1 SUSPENSION ORAL DAILY
Status: DISCONTINUED | OUTPATIENT
Start: 2022-11-04 | End: 2022-11-06

## 2022-11-04 NOTE — DISCHARGE INSTRUCTIONS
Psychiatry- Recommendations is to follow up with a Psychiatrist and Psychotherapist:  Please call one of the following for an appointment. The following has both Psychiatrist and [address]-  They have both psychiatrists and psychotherapist  [phone]   [address] [place] [address] 935- They have both psychiatrists and psychotherapists  [phone]  [address]  [address]  [place], [address] Both psychiatrists and psychotherapists   [address] [facility] [place], [address] [facility]      Counseling  agencies that come to your home:    [facility]-  [phone]    [facility]  [phone]

## 2022-11-04 NOTE — PROGRESS NOTES
22 0926   Over the last 2 weeks, how often have you been bothered by any of the following problems? Little interest or pleasure in doing things 1   Feeling down, depressed, or hopeless 1   Trouble falling or staying asleep, or sleeping too much 1   Feeling tired or having little energy 1   Poor appetite or overeating 1   Feeling bad about yourself - or that you are a failure or have let yourself or your family down 0   Trouble concentrating on things, such as reading the newspaper or watching television 1   Moving or speaking so slowly that other people could have noticed. Or the opposite - being so fidgety or restless that you have been moving around a lot more than usual 0   Thoughts that you would be better off dead, or of hurting yourself in some way 0   PHQ-9 TOTAL SCORE 6   If you checked off any problems, how difficult have these problems made it for you to do your work, take care of things at home, or get along with other people? Somewhat difficult   [facility] Resource Referral Counselor Note    Christobal Keepers Patient Status:  Observation    1945 MRN [id number]   [facility] 4NW-A Attending Rosa Lemus MD   Hosp Day # 0 PCP Eyal Troy DO       S(subjective) The patient admits to some anxiety and depression due to her medical symptoms. The patient said it hard because the doctors are not able to find what is wrong with me. She said, she knows her body and something has to be wrong. The patient denies any suicidal thoughts. O(objective) The patient is alert and oriented. She appears a little anxious. A(assessment) The phq9 was completed. P(plan) The patient was recommended to follow up with a psychiatrist and psychotherapist.  These referrals will be placed in the discharge summary of this record.       Kristian Guzmán RN  2022  9:27 AM

## 2022-11-04 NOTE — PROGRESS NOTES
PSYCH CONSULT    Date of Admission: 11/3/22  Date of Consult: 11/4/22  Reason for Consultation: Eval for anxiety and depression. Impression:  Primary Psychiatric Diagnosis:  Major depressive disorder, recurrent, severe, without psychotic features. She has significant apathy and anhedonia. She has moments of passive suicidal ideation lasting a few seconds on and off the past 6 months, but never any intention or plan of hurting herself. Generalized anxiety disorder. Social isolation and loneliness. Pertinent Medical Diagnoses:  Hiatal hernia. Nausea likely due to paraesophageal hernia per GI. Protein malnutrition. Recommendations:  1) DC Prozac that was just started this week. 2) Start Remeron 15mg nightly to help with low appetite, anxiety, and depression. 3) She will greatly benefit from psychotherapy. She has never had counseling. Psych liaison placed referrals in the DC instructions. Referrals for psychiatrists were give as well, but seeing a psychotherapist is more important at this time. 4) Continue Xanax 0.5mg nightly PRN insomnia/anxiety. 5) Continue Klonipin 0.5mg twice a day PRN anxiety. Full note to follow.     Dr. Whitehead Devaughn

## 2022-11-04 NOTE — PLAN OF CARE
Dx Hiatal hernia, diverticulosis, fatty liver. AOx4. Clear liquid diet. On room aior, clear lungs. NSR on tele. Lovenox for VTE. IV fluids. Up SBA. Had 1x loose stool small amt. C diff fired - will collect stool sample. Problem: Patient/Family Goals  Goal: Patient/Family Long Term Goal  Description: Patient's Long Term Goal: DC home    Interventions:  - administer meds  - monitor VS, labs, intake and output  - See additional Care Plan goals for specific interventions  Outcome: Progressing  Goal: Patient/Family Short Term Goal  Description: Patient's Short Term Goal: VS stable    Interventions:   - administer meds as ordered  - monitor VS, labs, intake and output  - See additional Care Plan goals for specific interventions  Outcome: Progressing     Problem: PAIN - ADULT  Goal: Verbalizes/displays adequate comfort level or patient's stated pain goal  Description: INTERVENTIONS:  - Encourage pt to monitor pain and request assistance  - Assess pain using appropriate pain scale  - Administer analgesics based on type and severity of pain and evaluate response  - Implement non-pharmacological measures as appropriate and evaluate response  - Consider cultural and social influences on pain and pain management  - Manage/alleviate anxiety  - Utilize distraction and/or relaxation techniques  - Monitor for opioid side effects  - Notify MD/LIP if interventions unsuccessful or patient reports new pain  - Anticipate increased pain with activity and pre-medicate as appropriate  Outcome: Progressing     Problem: SAFETY ADULT - FALL  Goal: Free from fall injury  Description: INTERVENTIONS:  - Assess pt frequently for physical needs  - Identify cognitive and physical deficits and behaviors that affect risk of falls.   - [place] fall precautions as indicated by assessment.  - Educate pt/family on patient safety including physical limitations  - Instruct pt to call for assistance with activity based on assessment  - Modify environment to reduce risk of injury  - Provide assistive devices as appropriate  - Consider OT/PT consult to assist with strengthening/mobility  - Encourage toileting schedule  Outcome: Progressing

## 2022-11-04 NOTE — PROGRESS NOTES
11/04/22 1448   Clinical Encounter Type   Visited With Patient not available;Health care provider   Routine Visit Follow-up   Taxonomy   Intended Effects Aligning care plan with patient's values     Discussion:   followed up on previous attempt to complete a POA.  consulted RN and verified patient is decisional. When  visited patient who was not feeling well and asked  to find RN.  expressed patient's need to the RN.  will attempt to complete POA later in the day.

## 2022-11-04 NOTE — PROGRESS NOTES
The patient was seen and examined by general surgery. Full consult note to follow. Jerry Basurto is a 76-year-old female who presented to [facility] with worsening fatigue, nausea, and emesis. The patient has a known large hiatal hernia and has a history of gastric ulcers. She has been evaluated by Dr. Krissy Muñoz and recently had an EGD done on 10/7/2022. That showed a large hiatal hernia. She has encouraged to continue twice daily PPIs and follow-up with surgery if her symptoms worsened. Upon presentation to the hospital the patient is afebrile and hemodynamically stable. There is no electrolyte abnormalities. No leukocytosis. A CT of abdomen and pelvis revealed large hiatal hernia with no acute strangulation. The patient denies dysphagia or abdominal pain.     Plan:  - There are no indications for urgent or emergent surgical intervention at this time  -The patient may have a clear liquid diet advance as tolerated  - Encourage frequent ambulation  - Appreciate continued medical management per primary service  - Pain management as needed  - DVT prophylaxis with Lovenox  - GI prophylaxis with Protonix twice daily    Vanessa Savage PA-C

## 2022-11-05 PROCEDURE — 99225 SUBSEQUENT OBSERVATION CARE: CPT | Performed by: HOSPITALIST

## 2022-11-05 RX ORDER — MIRTAZAPINE 15 MG/1
15 TABLET, FILM COATED ORAL NIGHTLY
Qty: 30 TABLET | Refills: 1 | Status: SHIPPED | OUTPATIENT
Start: 2022-11-05

## 2022-11-05 NOTE — CM/SW NOTE
11/05/22 1300   CM/SW Referral Data   Referral Source Physician   Reason for Referral Discharge planning   Informant Patient   Patient Info   Patient's Current Mental Status at Time of Assessment Alert;Oriented   Patient's Home Environment Condo/Apt no elevator   Number of Levels in Home 1   Patient lives with Alone   Patient Status Prior to Admission   Independent with ADLs and Mobility No   Pt. requires assistance with Driving   Discharge Needs   Anticipated D/C needs To be determined     Order written on 11/4 for sw to see for dc planning- pt can no longer live alone. Sara met with pt. She is 64 yo female, admitted due to nausea/wekaness. Gi following- pthas large hernia- plan outpt follow up. Pt initially was interested in a short stay in [place] [id number] so she can get stronger. Sara discussed Assisted Living and Supportive Living and the financial implications of both. Pt now more interested in short [facility] stay if needed. PT/OT to see pt. Await their recs. Discussed [facility] also.       Carolyn Landry LCSW  /Discharge Planner

## 2022-11-05 NOTE — PLAN OF CARE
Pt resting in bed with one episode of nausea and dry heaving today zofran given. Pt then felt anxious and PRN anxiety medication given. Surgery seen patient at bedside and signing off, recommend GI for medications. Pt would like referrals for assisted living.

## 2022-11-05 NOTE — PROGRESS NOTES
11/05/22 1815   Clinical Encounter Type   Visited With Patient   Routine Visit Follow-up   Continue Visiting No   Patient's Supportive Strategies/Resources  offered to assist patient with [address]. Patient is decisional and does not want to complete this form at this time. Taxonomy   Intended Effects Demonstrate caring and concern   Methods Offer support   Interventions Assist someone with Advance Directives    remains available for spiritual care at pager #[phone].

## 2022-11-05 NOTE — PLAN OF CARE
Received pt alert and oriented x4. VSS. Afebrile. Pt denies pain but felt very anxious. PRN Xanax given along with scheduled meds. IVF infusing. Safety precautions in place and call light within reach.

## 2022-11-06 VITALS
HEIGHT: 60 IN | HEART RATE: 73 BPM | DIASTOLIC BLOOD PRESSURE: 68 MMHG | RESPIRATION RATE: 20 BRPM | OXYGEN SATURATION: 96 % | SYSTOLIC BLOOD PRESSURE: 149 MMHG | WEIGHT: 118 LBS | BODY MASS INDEX: 23.16 KG/M2 | TEMPERATURE: 98 F

## 2022-11-06 PROCEDURE — 99217 OBSERVATION CARE DISCHARGE: CPT | Performed by: HOSPITALIST

## 2022-11-06 RX ORDER — PANTOPRAZOLE SODIUM 40 MG/1
40 TABLET, DELAYED RELEASE ORAL
Status: DISCONTINUED | OUTPATIENT
Start: 2022-11-06 | End: 2022-11-06

## 2022-11-06 NOTE — PROGRESS NOTES
[facility] Pharmacy Note: Route Optimization for Pantoprazole (PROTONIX)    Patient is currently on Pantoprazole (PROTONIX) 40 mg IV every 12 hours. The patient meets the criteria to convert to the oral equivalent as established by the IV to Oral conversion protocol approved by the P&T committee. Medication was changed from IV formulation to Pantoprazole (PROTONIX) 40 mg PO every 12 hours per protocol.       Colleen Aguiar PharmD  11/6/2022,  10:42 AM

## 2022-11-06 NOTE — PROGRESS NOTES
Pt A&Ox4 on room air, VSS, no complaints of pain. Tolerated medications well per mar. Per day shift nurse pt okay to be on regular diet. Pt tolerated diet well, no complaints. Call light within reach, frequent checks made, needs met.      Pt refused synthroid medication this AM.

## 2022-11-06 NOTE — OCCUPATIONAL THERAPY NOTE
Per PT services, pt does not have any skilled OT needs at this time. Will DC OT as no functional goals are present.      Thank you for allowing me to care for this patient,   Roderick Fabian, OTR/L   Occupational Therapist   [facility]   Pager: [phone]

## 2022-11-06 NOTE — PLAN OF CARE
Received pt alert and orientated x4. VSS. No sob on RA. Afebrile. No c/o pain. All meds given per [facility]. Pt tolerating diet. Worked with physical therapy. Up and walking in the halls, sitting up in the chair. Voiding in the bathroom. Plan to discharge later today. Fall precautions in place. Call light within reach. 1400: Pt cleared for DC. IV removed. DC paperwork provided, pt verbalized understanding. All pt belongings gathered and sent with the pt. NURSING DISCHARGE NOTE    Discharged Home via Wheelchair. Accompanied by Support staff  Belongings Taken by patient/family. Problem: PAIN - ADULT  Goal: Verbalizes/displays adequate comfort level or patient's stated pain goal  Description: INTERVENTIONS:  - Encourage pt to monitor pain and request assistance  - Assess pain using appropriate pain scale  - Administer analgesics based on type and severity of pain and evaluate response  - Implement non-pharmacological measures as appropriate and evaluate response  - Consider cultural and social influences on pain and pain management  - Manage/alleviate anxiety  - Utilize distraction and/or relaxation techniques  - Monitor for opioid side effects  - Notify MD/LIP if interventions unsuccessful or patient reports new pain  - Anticipate increased pain with activity and pre-medicate as appropriate  Outcome: Progressing     Problem: SAFETY ADULT - FALL  Goal: Free from fall injury  Description: INTERVENTIONS:  - Assess pt frequently for physical needs  - Identify cognitive and physical deficits and behaviors that affect risk of falls.   - [place] fall precautions as indicated by assessment.  - Educate pt/family on patient safety including physical limitations  - Instruct pt to call for assistance with activity based on assessment  - Modify environment to reduce risk of injury  - Provide assistive devices as appropriate  - Consider OT/PT consult to assist with strengthening/mobility  - Encourage toileting schedule  Outcome: Progressing

## 2022-11-06 NOTE — PROGRESS NOTES
On continued ivf at regulated rates,Medicated x1 for c/o pain,  Up to bathroom w/ stand by assist,Has fair appetite,Assisted needs.

## 2022-11-07 ENCOUNTER — PATIENT OUTREACH (OUTPATIENT)
Dept: CASE MANAGEMENT | Age: 77
End: 2022-11-07

## 2022-11-07 DIAGNOSIS — R53.1 WEAKNESS GENERALIZED: ICD-10-CM

## 2022-11-07 DIAGNOSIS — Z02.9 ENCOUNTERS FOR UNSPECIFIED ADMINISTRATIVE PURPOSE: ICD-10-CM

## 2022-11-07 PROCEDURE — 1111F DSCHRG MED/CURRENT MED MERGE: CPT

## 2022-11-08 ENCOUNTER — OFFICE VISIT (OUTPATIENT)
Dept: FAMILY MEDICINE CLINIC | Facility: CLINIC | Age: 77
End: 2022-11-08
Payer: COMMERCIAL

## 2022-11-08 VITALS
WEIGHT: 118 LBS | OXYGEN SATURATION: 98 % | SYSTOLIC BLOOD PRESSURE: 150 MMHG | BODY MASS INDEX: 23.16 KG/M2 | HEART RATE: 69 BPM | HEIGHT: 60 IN | DIASTOLIC BLOOD PRESSURE: 78 MMHG | RESPIRATION RATE: 20 BRPM

## 2022-11-08 DIAGNOSIS — E03.9 HYPOTHYROIDISM, UNSPECIFIED TYPE: ICD-10-CM

## 2022-11-08 DIAGNOSIS — R11.0 NAUSEA: ICD-10-CM

## 2022-11-08 DIAGNOSIS — F41.9 ANXIETY AND DEPRESSION: ICD-10-CM

## 2022-11-08 DIAGNOSIS — L29.9 ITCHING: ICD-10-CM

## 2022-11-08 DIAGNOSIS — R19.8 GAGGING EPISODE: ICD-10-CM

## 2022-11-08 DIAGNOSIS — F32.A ANXIETY AND DEPRESSION: ICD-10-CM

## 2022-11-08 DIAGNOSIS — K44.9 PARAESOPHAGEAL HERNIA: Primary | ICD-10-CM

## 2022-11-08 PROCEDURE — 99496 TRANSJ CARE MGMT HIGH F2F 7D: CPT | Performed by: FAMILY MEDICINE

## 2022-11-08 PROCEDURE — 3077F SYST BP >= 140 MM HG: CPT | Performed by: FAMILY MEDICINE

## 2022-11-08 PROCEDURE — 3008F BODY MASS INDEX DOCD: CPT | Performed by: FAMILY MEDICINE

## 2022-11-08 PROCEDURE — 1111F DSCHRG MED/CURRENT MED MERGE: CPT | Performed by: FAMILY MEDICINE

## 2022-11-08 PROCEDURE — 3078F DIAST BP <80 MM HG: CPT | Performed by: FAMILY MEDICINE

## 2022-11-08 RX ORDER — LOSARTAN POTASSIUM 50 MG/1
50 TABLET ORAL DAILY
Qty: 30 TABLET | Refills: 5 | Status: SHIPPED | OUTPATIENT
Start: 2022-11-08

## 2022-11-08 RX ORDER — LEVOTHYROXINE SODIUM 0.03 MG/1
25 TABLET ORAL
Qty: 30 TABLET | Refills: 5 | Status: SHIPPED | OUTPATIENT
Start: 2022-11-08

## 2022-11-08 RX ORDER — METOCLOPRAMIDE 5 MG/1
5 TABLET ORAL
Qty: 40 TABLET | Refills: 0 | Status: SHIPPED | OUTPATIENT
Start: 2022-11-08

## 2022-11-08 RX ORDER — CLOBETASOL PROPIONATE 0.5 MG/G
OINTMENT TOPICAL
Qty: 1 EACH | Refills: 0 | Status: SHIPPED | OUTPATIENT
Start: 2022-11-08

## 2022-11-14 ENCOUNTER — PATIENT OUTREACH (OUTPATIENT)
Dept: CASE MANAGEMENT | Age: 77
End: 2022-11-14

## 2022-11-14 NOTE — PROGRESS NOTES
Attempted [facility] monthly outreach,  left message for patient to call back ,can be reached at  [phone]. Will try back at a later time. Medical record reviewed including recent office visits and test results.     Chart review - 3 min  Time with patient - 2 min  Total time - 5 min

## 2022-11-18 ENCOUNTER — TELEPHONE (OUTPATIENT)
Dept: FAMILY MEDICINE CLINIC | Facility: CLINIC | Age: 77
End: 2022-11-18

## 2022-11-18 NOTE — TELEPHONE ENCOUNTER
Placed outgoing call to Pablo Garcia re: seeking therapy. Informed her that this writer is not currently accepting new patients. Due to some physical limitations, suggested that Pablo Garcia explore using [facility] for at-home social work visits. Pablo Garcia was agreeable, and this writer will call next week for an update.      Ajit Quezada LCSW (she/her)   Behavioral Health Integration Clinician  Ph. [phone]

## 2022-11-28 ENCOUNTER — HOSPITAL ENCOUNTER (OUTPATIENT)
Age: 77
Discharge: HOME | End: 2022-11-28
Payer: MEDICARE

## 2022-11-28 VITALS
TEMPERATURE: 98 F | OXYGEN SATURATION: 98 % | DIASTOLIC BLOOD PRESSURE: 94 MMHG | WEIGHT: 118 LBS | SYSTOLIC BLOOD PRESSURE: 161 MMHG | HEART RATE: 81 BPM | BODY MASS INDEX: 23 KG/M2 | RESPIRATION RATE: 18 BRPM

## 2022-11-28 DIAGNOSIS — L30.9 ECZEMA, UNSPECIFIED TYPE: Primary | ICD-10-CM

## 2022-11-28 DIAGNOSIS — L08.9 SKIN INFECTION: ICD-10-CM

## 2022-11-28 PROCEDURE — 99213 OFFICE O/P EST LOW 20 MIN: CPT

## 2022-11-28 PROCEDURE — 99214 OFFICE O/P EST MOD 30 MIN: CPT

## 2022-11-28 RX ORDER — METHYLPREDNISOLONE 4 MG/1
TABLET ORAL
Qty: 1 EACH | Refills: 0 | Status: SHIPPED | OUTPATIENT
Start: 2022-11-28 | End: 2022-12-05 | Stop reason: ALTCHOICE

## 2022-11-28 RX ORDER — CEPHALEXIN 500 MG/1
500 CAPSULE ORAL 4 TIMES DAILY
Qty: 40 CAPSULE | Refills: 0 | Status: SHIPPED | OUTPATIENT
Start: 2022-11-28 | End: 2022-12-05 | Stop reason: ALTCHOICE

## 2022-11-28 RX ORDER — DEXAMETHASONE 4 MG/1
8 TABLET ORAL ONCE
Status: COMPLETED | OUTPATIENT
Start: 2022-11-28 | End: 2022-11-28

## 2022-11-28 NOTE — DISCHARGE INSTRUCTIONS
Please return to the ER/clinic if symptoms worsen. Follow-up with your PCP in 24-48 hours as needed. The Decadron will work in your system the next several days. We will write for an additional Medrol dose pack to start on day 2 or 3 if symptoms persist.  Only tepid showers no hot water. Apply the mupirocin to the sores and use a good emollient over it i.e. Aquaphor. Take the full course of antibiotics as prescribed in tandem with a probiotic daily. Look for any continuing signs and symptoms of infection: Redness, swelling, streaking, drainage or fevers.

## 2022-11-28 NOTE — ED INITIAL ASSESSMENT (HPI)
Pt aox4. Pt states has hx taking dupexa and had to stop do to side effects. Pt states dermitis and eczema has increased. Pt has rash to body, back and buttocks with scabs. Pt states has been itching.

## 2022-12-02 ENCOUNTER — PATIENT OUTREACH (OUTPATIENT)
Dept: CASE MANAGEMENT | Age: 77
End: 2022-12-02

## 2022-12-02 NOTE — PROGRESS NOTES
Attempted [facility] monthly outreach,  left message for patient to call back ,can be reached at  [phone]. Will try back at a later time. Medical record reviewed including recent office visits and test results.     Chart review - 3 min  Time with patient - 2 min  Total time - 5 min

## 2022-12-05 ENCOUNTER — TELEPHONE (OUTPATIENT)
Dept: FAMILY MEDICINE CLINIC | Facility: CLINIC | Age: 77
End: 2022-12-05

## 2022-12-05 NOTE — PROGRESS NOTES
12/5/2022  Spoke to [facility] for CCM. Updates to patient care team/ comments: ***  Patient reported changes in medications: ***  Med Adherence  Comment: ***     Health Maintenance:  Zoster Vaccines(2 of 2) due on 09/09/2020  COVID-19 Vaccine(4 - Booster for [employer] series) due on 02/02/2022  HTN: BP Follow-Up due on 01/05/2023  Colorectal Cancer Screening due on 08/02/2023  Annual Wellness Visit due on 08/04/2023  Annual Depression Screen due on 11/04/2023  Fall Risk Screening due on 11/28/2023  Influenza Vaccine Completed  DEXA Scan Completed  Pneumococcal Vaccine: 65+ Years Completed    Patient Current Concerns:     Patient will continue to take pantoprazole 40 mg daily, and increase to 80 mg, and also continue to take for the rest of her life. Patient has chest pressure which is normal now that she       mirtazapine 15 MG Oral Tab 30 tablet 1 11/5/2022   Since starting mirtazapine 15 mg   Patient is taking clonazepam more often , feeling more anxious. Clonazepam  Twice a day     Cephalexin 500 mg , started last Monday , and took until Sunday took 4 times a day , felt very fatigue, she stated antibiotics make her feel lethargic, tired and irritable , not able to sleep . Patient discontinued and felt better . Patient denied any open sores, redness, swelling , or warm to the touch . Patient stated she is going to follow up with Dermatologist .         Previous Goal Met: ongoing     Self Management Goals/Action Plan:               Patient agrees to goal action plan. Self-Management Abilities (patient reported)             1= least confident in achieving goal, 5= very confident               - confidence: 3     Care Manager Follow Up: 1 month or sooner if needed. Reason For Follow Up: review progress and or barriers towards patients goals. Care Managers Interventions: Continue to provide encouragement, support and education for healthy coping and diagnosis.      Time Spent This Encounter Total: 48  min medical record review, telephone communication, care plan updates where needed, education, goals and action plan recreation/update. Provided acknowledgment and validation to patient's concerns.    Monthly Minute Total including today: 48     Physical assessment, complete health history, and need for CCM established by Rowan Silva DO.

## 2022-12-11 RX ORDER — LEVOTHYROXINE AND LIOTHYRONINE 9.5; 2.25 UG/1; UG/1
15 TABLET ORAL DAILY
Qty: 30 TABLET | Refills: 3 | Status: SHIPPED | OUTPATIENT
Start: 2022-12-11

## 2022-12-11 NOTE — TELEPHONE ENCOUNTER
Pt calling reporting ulcer to left foot x3-4 weeks.   Concerned as ulcer was almost healed, but started bleeding this morning.   Denies any current bleeding.   Reporting some intermittent numbness to foot.   Compliant with all medications including Plavix and aspirin.     Pt w/ hx venous insufficiency and PAD  He also underwent revascularization of his left anterior tibial and peroneal arteries with resolution of pain and healing of the wounds and that foot in 10/2019      Dr. Craig,     Would you be able to add patient for an in office visit for today or tomorrow?  Or is pt to follow up with [facility] - Pt denies having a PCP.       PSR/ RENAY RN-    Please arrange US Vasc extremity lower duplex arterial.  ASAP.   States US cancelled due to covid 19   Switching her to armour 15mg, stop the levothyroxine. Repeat TSH in 6 weeks.

## 2022-12-12 ENCOUNTER — OFFICE VISIT (OUTPATIENT)
Dept: FAMILY MEDICINE CLINIC | Facility: CLINIC | Age: 77
End: 2022-12-12
Payer: COMMERCIAL

## 2022-12-12 VITALS
WEIGHT: 119 LBS | BODY MASS INDEX: 23.36 KG/M2 | OXYGEN SATURATION: 97 % | DIASTOLIC BLOOD PRESSURE: 80 MMHG | RESPIRATION RATE: 20 BRPM | HEART RATE: 72 BPM | HEIGHT: 60 IN | SYSTOLIC BLOOD PRESSURE: 164 MMHG

## 2022-12-12 DIAGNOSIS — I10 PRIMARY HYPERTENSION: ICD-10-CM

## 2022-12-12 DIAGNOSIS — F41.9 ANXIETY AND DEPRESSION: ICD-10-CM

## 2022-12-12 DIAGNOSIS — F32.A ANXIETY AND DEPRESSION: ICD-10-CM

## 2022-12-12 DIAGNOSIS — I10 PRIMARY HYPERTENSION: Primary | ICD-10-CM

## 2022-12-12 DIAGNOSIS — E03.9 HYPOTHYROIDISM, UNSPECIFIED TYPE: ICD-10-CM

## 2022-12-12 PROCEDURE — 3008F BODY MASS INDEX DOCD: CPT | Performed by: FAMILY MEDICINE

## 2022-12-12 PROCEDURE — 3079F DIAST BP 80-89 MM HG: CPT | Performed by: FAMILY MEDICINE

## 2022-12-12 PROCEDURE — 99214 OFFICE O/P EST MOD 30 MIN: CPT | Performed by: FAMILY MEDICINE

## 2022-12-12 PROCEDURE — 3077F SYST BP >= 140 MM HG: CPT | Performed by: FAMILY MEDICINE

## 2022-12-12 RX ORDER — FLUOCINOLONE ACETONIDE 0.11 MG/ML
OIL TOPICAL
COMMUNITY
Start: 2022-12-08

## 2022-12-12 RX ORDER — LOSARTAN POTASSIUM AND HYDROCHLOROTHIAZIDE 12.5; 1 MG/1; MG/1
1 TABLET ORAL DAILY
Qty: 90 TABLET | Refills: 0 | Status: SHIPPED | OUTPATIENT
Start: 2022-12-12

## 2022-12-12 RX ORDER — CLONAZEPAM 0.5 MG/1
0.5 TABLET ORAL 2 TIMES DAILY PRN
Qty: 60 TABLET | Refills: 1 | Status: SHIPPED | OUTPATIENT
Start: 2022-12-12

## 2022-12-12 RX ORDER — LOSARTAN POTASSIUM AND HYDROCHLOROTHIAZIDE 12.5; 1 MG/1; MG/1
1 TABLET ORAL DAILY
Qty: 30 TABLET | Refills: 1 | Status: SHIPPED | OUTPATIENT
Start: 2022-12-12 | End: 2022-12-12

## 2022-12-12 RX ORDER — TRALOKINUMAB-LDRM 150 MG/ML
INJECTION, SOLUTION SUBCUTANEOUS
COMMUNITY
Start: 2022-12-08

## 2022-12-12 RX ORDER — ERGOCALCIFEROL 1.25 MG/1
50000 CAPSULE ORAL WEEKLY
Qty: 12 CAPSULE | Refills: 0 | Status: SHIPPED | OUTPATIENT
Start: 2022-12-12

## 2022-12-12 NOTE — TELEPHONE ENCOUNTER
Dr. Indiana Garrido-  Any advise on b/p and vit d, see note from [address]- see YP response as related to her thyroid

## 2022-12-28 RX ORDER — MIRTAZAPINE 15 MG/1
15 TABLET, FILM COATED ORAL NIGHTLY
Qty: 90 TABLET | Refills: 0 | Status: SHIPPED | OUTPATIENT
Start: 2022-12-28

## 2022-12-29 ENCOUNTER — HOSPITAL ENCOUNTER (INPATIENT)
Facility: HOSPITAL | Age: 77
LOS: 2 days | Discharge: HOME | End: 2022-12-31
Attending: EMERGENCY MEDICINE | Admitting: HOSPITALIST
Payer: MEDICARE

## 2022-12-29 ENCOUNTER — APPOINTMENT (OUTPATIENT)
Dept: CT IMAGING | Facility: HOSPITAL | Age: 77
End: 2022-12-29
Attending: EMERGENCY MEDICINE
Payer: MEDICARE

## 2022-12-29 ENCOUNTER — HOSPITAL ENCOUNTER (OUTPATIENT)
Facility: HOSPITAL | Age: 77
Setting detail: OBSERVATION
Discharge: HOME | End: 2022-12-31
Attending: EMERGENCY MEDICINE | Admitting: HOSPITALIST
Payer: MEDICARE

## 2022-12-29 DIAGNOSIS — R11.2 NAUSEA AND VOMITING IN ADULT: Primary | ICD-10-CM

## 2022-12-29 DIAGNOSIS — K44.9 PARAESOPHAGEAL HERNIA: ICD-10-CM

## 2022-12-29 LAB
ALBUMIN SERPL-MCNC: 3.8 G/DL (ref 3.4–5)
ALBUMIN/GLOB SERPL: 1 {RATIO} (ref 1–2)
ALP LIVER SERPL-CCNC: 85 U/L
ALT SERPL-CCNC: 27 U/L
ANION GAP SERPL CALC-SCNC: 1 MMOL/L (ref 0–18)
APTT PPP: 29.2 SECONDS (ref 23.3–35.6)
AST SERPL-CCNC: 19 U/L (ref 15–37)
BASOPHILS # BLD AUTO: 0.12 X10(3) UL (ref 0–0.2)
BASOPHILS NFR BLD AUTO: 1.7 %
BILIRUB SERPL-MCNC: 0.7 MG/DL (ref 0.1–2)
BUN BLD-MCNC: 8 MG/DL (ref 7–18)
CALCIUM BLD-MCNC: 9.6 MG/DL (ref 8.5–10.1)
CHLORIDE SERPL-SCNC: 107 MMOL/L (ref 98–112)
CO2 SERPL-SCNC: 27 MMOL/L (ref 21–32)
CREAT BLD-MCNC: 0.97 MG/DL
EOSINOPHIL # BLD AUTO: 0.25 X10(3) UL (ref 0–0.7)
EOSINOPHIL NFR BLD AUTO: 3.5 %
ERYTHROCYTE [DISTWIDTH] IN BLOOD BY AUTOMATED COUNT: 13 %
GFR SERPLBLD BASED ON 1.73 SQ M-ARVRAT: 60 ML/MIN/1.73M2 (ref 60–?)
GLOBULIN PLAS-MCNC: 4 G/DL (ref 2.8–4.4)
GLUCOSE BLD-MCNC: 93 MG/DL (ref 70–99)
HCT VFR BLD AUTO: 40 %
HGB BLD-MCNC: 13.3 G/DL
IMM GRANULOCYTES # BLD AUTO: 0.02 X10(3) UL (ref 0–1)
IMM GRANULOCYTES NFR BLD: 0.3 %
INR BLD: 0.99 (ref 0.85–1.16)
LIPASE SERPL-CCNC: 45 U/L (ref 73–393)
LYMPHOCYTES # BLD AUTO: 1.51 X10(3) UL (ref 1–4)
LYMPHOCYTES NFR BLD AUTO: 21 %
MCH RBC QN AUTO: 29.9 PG (ref 26–34)
MCHC RBC AUTO-ENTMCNC: 33.3 G/DL (ref 31–37)
MCV RBC AUTO: 89.9 FL
MONOCYTES # BLD AUTO: 0.77 X10(3) UL (ref 0.1–1)
MONOCYTES NFR BLD AUTO: 10.7 %
NEUTROPHILS # BLD AUTO: 4.51 X10 (3) UL (ref 1.5–7.7)
NEUTROPHILS # BLD AUTO: 4.51 X10(3) UL (ref 1.5–7.7)
NEUTROPHILS NFR BLD AUTO: 62.8 %
OSMOLALITY SERPL CALC.SUM OF ELEC: 278 MOSM/KG (ref 275–295)
PLATELET # BLD AUTO: 344 10(3)UL (ref 150–450)
POTASSIUM SERPL-SCNC: 3.7 MMOL/L (ref 3.5–5.1)
PROT SERPL-MCNC: 7.8 G/DL (ref 6.4–8.2)
PROTHROMBIN TIME: 13.1 SECONDS (ref 11.6–14.8)
RBC # BLD AUTO: 4.45 X10(6)UL
SARS-COV-2 RNA RESP QL NAA+PROBE: NOT DETECTED
SODIUM SERPL-SCNC: 135 MMOL/L (ref 136–145)
WBC # BLD AUTO: 7.2 X10(3) UL (ref 4–11)

## 2022-12-29 PROCEDURE — 74177 CT ABD & PELVIS W/CONTRAST: CPT | Performed by: EMERGENCY MEDICINE

## 2022-12-29 PROCEDURE — 99222 1ST HOSP IP/OBS MODERATE 55: CPT | Performed by: INTERNAL MEDICINE

## 2022-12-29 RX ORDER — SODIUM PHOSPHATE, DIBASIC AND SODIUM PHOSPHATE, MONOBASIC 7; 19 G/133ML; G/133ML
1 ENEMA RECTAL ONCE AS NEEDED
Status: DISCONTINUED | OUTPATIENT
Start: 2022-12-29 | End: 2022-12-31

## 2022-12-29 RX ORDER — FLUTICASONE FUROATE AND VILANTEROL 200; 25 UG/1; UG/1
1 POWDER RESPIRATORY (INHALATION) DAILY
Status: DISCONTINUED | OUTPATIENT
Start: 2022-12-29 | End: 2022-12-31

## 2022-12-29 RX ORDER — MELATONIN
3 NIGHTLY PRN
Status: DISCONTINUED | OUTPATIENT
Start: 2022-12-29 | End: 2022-12-31

## 2022-12-29 RX ORDER — SODIUM CHLORIDE 9 MG/ML
INJECTION, SOLUTION INTRAVENOUS CONTINUOUS
Status: ACTIVE | OUTPATIENT
Start: 2022-12-29 | End: 2022-12-29

## 2022-12-29 RX ORDER — ONDANSETRON 2 MG/ML
4 INJECTION INTRAMUSCULAR; INTRAVENOUS EVERY 6 HOURS PRN
Status: DISCONTINUED | OUTPATIENT
Start: 2022-12-29 | End: 2022-12-31

## 2022-12-29 RX ORDER — ONDANSETRON 2 MG/ML
4 INJECTION INTRAMUSCULAR; INTRAVENOUS ONCE
Status: COMPLETED | OUTPATIENT
Start: 2022-12-29 | End: 2022-12-29

## 2022-12-29 RX ORDER — METOCLOPRAMIDE HYDROCHLORIDE 5 MG/ML
5 INJECTION INTRAMUSCULAR; INTRAVENOUS EVERY 8 HOURS PRN
Status: DISCONTINUED | OUTPATIENT
Start: 2022-12-29 | End: 2022-12-31

## 2022-12-29 RX ORDER — SENNOSIDES 8.6 MG
17.2 TABLET ORAL NIGHTLY PRN
Status: DISCONTINUED | OUTPATIENT
Start: 2022-12-29 | End: 2022-12-31

## 2022-12-29 RX ORDER — ENOXAPARIN SODIUM 100 MG/ML
40 INJECTION SUBCUTANEOUS DAILY
Status: DISCONTINUED | OUTPATIENT
Start: 2022-12-30 | End: 2022-12-31

## 2022-12-29 RX ORDER — ONDANSETRON 2 MG/ML
4 INJECTION INTRAMUSCULAR; INTRAVENOUS EVERY 4 HOURS PRN
Status: DISCONTINUED | OUTPATIENT
Start: 2022-12-29 | End: 2022-12-29 | Stop reason: HOSPADM

## 2022-12-29 RX ORDER — SODIUM CHLORIDE 9 MG/ML
INJECTION, SOLUTION INTRAVENOUS CONTINUOUS
Status: ACTIVE | OUTPATIENT
Start: 2022-12-29 | End: 2022-12-30

## 2022-12-29 RX ORDER — BISACODYL 10 MG
10 SUPPOSITORY, RECTAL RECTAL
Status: DISCONTINUED | OUTPATIENT
Start: 2022-12-29 | End: 2022-12-31

## 2022-12-29 RX ORDER — POLYETHYLENE GLYCOL 3350 17 G/17G
17 POWDER, FOR SOLUTION ORAL DAILY PRN
Status: DISCONTINUED | OUTPATIENT
Start: 2022-12-29 | End: 2022-12-31

## 2022-12-29 RX ORDER — HYDROMORPHONE HYDROCHLORIDE 1 MG/ML
0.5 INJECTION, SOLUTION INTRAMUSCULAR; INTRAVENOUS; SUBCUTANEOUS EVERY 30 MIN PRN
Status: DISCONTINUED | OUTPATIENT
Start: 2022-12-29 | End: 2022-12-29

## 2022-12-29 RX ORDER — HYDROMORPHONE HYDROCHLORIDE 1 MG/ML
0.5 INJECTION, SOLUTION INTRAMUSCULAR; INTRAVENOUS; SUBCUTANEOUS EVERY 30 MIN PRN
Status: DISCONTINUED | OUTPATIENT
Start: 2022-12-29 | End: 2022-12-29 | Stop reason: HOSPADM

## 2022-12-29 RX ORDER — LORAZEPAM 2 MG/ML
0.5 INJECTION INTRAMUSCULAR 2 TIMES DAILY PRN
Status: DISCONTINUED | OUTPATIENT
Start: 2022-12-29 | End: 2022-12-31

## 2022-12-29 RX ORDER — ALBUTEROL SULFATE 90 UG/1
1 AEROSOL, METERED RESPIRATORY (INHALATION) EVERY 6 HOURS PRN
Status: DISCONTINUED | OUTPATIENT
Start: 2022-12-29 | End: 2022-12-31

## 2022-12-29 NOTE — ED INITIAL ASSESSMENT (HPI)
A&Ox3 ambulatory patient p/w fatigue, decreased appetite, nausea \"gagging up white liquid\"    Patient endorses hx of hiatal hernia, was admitted in November and was told needed surgery but declined at this time, states similar sx reoccurring currently.

## 2022-12-30 LAB
ALBUMIN SERPL-MCNC: 3 G/DL (ref 3.4–5)
ALBUMIN/GLOB SERPL: 0.9 {RATIO} (ref 1–2)
ALP LIVER SERPL-CCNC: 72 U/L
ALT SERPL-CCNC: 17 U/L
ANION GAP SERPL CALC-SCNC: 6 MMOL/L (ref 0–18)
AST SERPL-CCNC: 18 U/L (ref 15–37)
BILIRUB SERPL-MCNC: 0.4 MG/DL (ref 0.1–2)
BILIRUB UR QL STRIP.AUTO: NEGATIVE
BUN BLD-MCNC: 7 MG/DL (ref 7–18)
CALCIUM BLD-MCNC: 8.6 MG/DL (ref 8.5–10.1)
CHLORIDE SERPL-SCNC: 113 MMOL/L (ref 98–112)
CLARITY UR REFRACT.AUTO: CLEAR
CO2 SERPL-SCNC: 22 MMOL/L (ref 21–32)
COLOR UR AUTO: YELLOW
CREAT BLD-MCNC: 0.76 MG/DL
ERYTHROCYTE [DISTWIDTH] IN BLOOD BY AUTOMATED COUNT: 13.1 %
GFR SERPLBLD BASED ON 1.73 SQ M-ARVRAT: 80 ML/MIN/1.73M2 (ref 60–?)
GLOBULIN PLAS-MCNC: 3.4 G/DL (ref 2.8–4.4)
GLUCOSE BLD-MCNC: 90 MG/DL (ref 70–99)
GLUCOSE UR STRIP.AUTO-MCNC: NEGATIVE MG/DL
HCT VFR BLD AUTO: 35.4 %
HGB BLD-MCNC: 11.5 G/DL
KETONES UR STRIP.AUTO-MCNC: NEGATIVE MG/DL
LEUKOCYTE ESTERASE UR QL STRIP.AUTO: NEGATIVE
MAGNESIUM SERPL-MCNC: 2.3 MG/DL (ref 1.6–2.6)
MCH RBC QN AUTO: 30.3 PG (ref 26–34)
MCHC RBC AUTO-ENTMCNC: 32.5 G/DL (ref 31–37)
MCV RBC AUTO: 93.2 FL
NITRITE UR QL STRIP.AUTO: NEGATIVE
OSMOLALITY SERPL CALC.SUM OF ELEC: 290 MOSM/KG (ref 275–295)
PH UR STRIP.AUTO: 7 [PH] (ref 5–8)
PLATELET # BLD AUTO: 291 10(3)UL (ref 150–450)
POTASSIUM SERPL-SCNC: 3.9 MMOL/L (ref 3.5–5.1)
PROT SERPL-MCNC: 6.4 G/DL (ref 6.4–8.2)
PROT UR STRIP.AUTO-MCNC: NEGATIVE MG/DL
RBC # BLD AUTO: 3.8 X10(6)UL
RBC UR QL AUTO: NEGATIVE
SODIUM SERPL-SCNC: 141 MMOL/L (ref 136–145)
SP GR UR STRIP.AUTO: 1.01 (ref 1–1.03)
UROBILINOGEN UR STRIP.AUTO-MCNC: 0.2 MG/DL
WBC # BLD AUTO: 6 X10(3) UL (ref 4–11)

## 2022-12-30 PROCEDURE — 99232 SBSQ HOSP IP/OBS MODERATE 35: CPT | Performed by: INTERNAL MEDICINE

## 2022-12-30 PROCEDURE — 99223 1ST HOSP IP/OBS HIGH 75: CPT | Performed by: SURGERY

## 2022-12-30 RX ORDER — ACETAMINOPHEN 10 MG/ML
1000 INJECTION, SOLUTION INTRAVENOUS EVERY 6 HOURS PRN
Status: DISCONTINUED | OUTPATIENT
Start: 2022-12-30 | End: 2022-12-31

## 2022-12-30 NOTE — PROGRESS NOTES
Received permission from patient to speak with DIL, Violeta Soni. DIL would like to be updated on any further changes or treatment plans.  Cell number is [phone]

## 2022-12-30 NOTE — PROGRESS NOTES
Julisa Zavala is a 74-year-old female who presented to the emergency department with complaints of persistent gagging and vomiting. The patient has a known giant hiatal hernia. The patient reports that over the last week she has been feeling weak and \"crappy. \"  She states that she has been waking up gagging and vomiting/spitting up saliva. She states that she did not tolerate any oral intake yesterday, but today tolerated 2 bottles of Gatorade. She reports mild abdominal discomfort. She states that she is passing flatus and had 5 normal bowel movements yesterday. She denies fevers or chills. The patient was previously admitted to [facility] with similar symptoms in early November 2022. Her symptoms resolved with conservative management; however, the patient refused to proceed with recommendations for elective manometry testing and elective surgical intervention. Upon presentation to the emergency department today, the patient was noted to be hypertensive with a blood pressure of 154/91. She is afebrile. The patient CBC is unremarkable with white blood cell count of 7.2, hemoglobin of 13.3, platelets of 988. CMP revealed slightly low sodium of 135. LFTs are within normal limits. Creatinine is within normal limits at 0.97. CT scan is pending. On exam the patient's abdomen is nondistended and soft. No tenderness to palpation. No rebound or guarding. No peritoneal signs. Plan:  1. Ideally, the patient would undergo esophageal manometry testing as an outpatient and then proceed with elective hiatal hernia repair with mesh and possible fundoplication. 2. Await results of CT scan. 3. Continue NPO for now. 4. Further recommendations as the clinical course evolves. 5. Full consult note to follow. Leonardo Dolan PA-C         Addendum:    Agree with the documentation above   I reviewed the CT scan , stomach is incompletely imaged.  There is no clear signs of gastric volvulus, no signs of strangulation or gastric outlet obstruction   Labs and vitals are within normal limits  Patient tolerated liquids this morning without issues  Will require elective repair once workup complete    Baldemar Helton MD   [facility] Surgery

## 2022-12-30 NOTE — ED QUICK NOTES
Orders for admission, patient is aware of plan and ready to go upstairs. Any questions, please call ED RN Adams Calderón at extension [phone].      Patient Covid vaccination status: Fully vaccinated     COVID Test Ordered in ED: Rapid SARS-CoV-2 by PCR    COVID Suspicion at Admission: Low clinical suspicion for COVID    Running Infusions:      Mental Status/LOC at time of transport: 3/3    Other pertinent information:   CIWA score: N/A   NIH score:  N/A

## 2022-12-30 NOTE — PLAN OF CARE
Pt A&Ox4, forgetful at times, VSS on RA, . Pt reporting severe headache; IV tylenol given with adequate relief. Bowel sounds present, abdomen is soft and non tender to touch. Pt to ADAT, tolerating clears, voiding via bathroom, LBM 12/28. Plan yo schedule surgery OP and ADAT. Plan of care reviewed with patient. Patient demonstrates understanding.

## 2022-12-30 NOTE — OCCUPATIONAL THERAPY NOTE
OT orders received and pt chart reviewed. Per PT eval and conversation with pt, pt does not present with, nor reports any acute IP OT needs at this time. OT will sign off.

## 2022-12-30 NOTE — PLAN OF CARE
Patient admitted via cart from ED  Oriented to room. Safety precautions initiated. Bed in low position. Call light in reach. A&Ox4. VSS on room air. Denies pain at the moment. Feeling nauseous and had emesis episode with clear mucous appearance. Received ordered reglan and reported feeling less nauseous. Voiding via purewick. On NPO strict diet. Plan to see Gen sx in the morning. Discussed plan of care with patient. Safety precautions in place.

## 2022-12-31 VITALS
OXYGEN SATURATION: 93 % | HEART RATE: 75 BPM | TEMPERATURE: 99 F | RESPIRATION RATE: 18 BRPM | SYSTOLIC BLOOD PRESSURE: 115 MMHG | DIASTOLIC BLOOD PRESSURE: 58 MMHG | WEIGHT: 119 LBS | BODY MASS INDEX: 23 KG/M2

## 2022-12-31 PROCEDURE — 3078F DIAST BP <80 MM HG: CPT | Performed by: INTERNAL MEDICINE

## 2022-12-31 PROCEDURE — 3074F SYST BP LT 130 MM HG: CPT | Performed by: INTERNAL MEDICINE

## 2022-12-31 PROCEDURE — 99239 HOSP IP/OBS DSCHRG MGMT >30: CPT | Performed by: INTERNAL MEDICINE

## 2022-12-31 RX ORDER — METOCLOPRAMIDE 5 MG/1
5 TABLET ORAL EVERY 6 HOURS PRN
Qty: 30 TABLET | Refills: 0 | Status: SHIPPED | OUTPATIENT
Start: 2022-12-31

## 2022-12-31 NOTE — PLAN OF CARE
A&Ox4. VSS. On room air. . IS encouraged. Patient declined SCDs on BLE. Ankle pumps encouraged. Tolerating full liquid diet. Last BM 12/28, patient declined stool softer. Voiding freely. Pain controlled. Ambulating with standby assist. Plan is to dc home when medically clear. Patient updated on plan of care. Safety precautions in place. Call light within reach.

## 2022-12-31 NOTE — DISCHARGE INSTRUCTIONS
Please be sure to follow up with your general surgeon to make a plan together on when you will get your surgery done. Dr. Jr Katz. If any worsening symptoms please be sure to call Dr Jr Katz or come to ER.

## 2022-12-31 NOTE — PLAN OF CARE
Pt denies any nausea or pain at this time, vss at this time. Pt requesting ativan to help her sleep see mar. Pt declines laxatives, stool softeners at this time. Plan home once tolerating advanced diet.

## 2022-12-31 NOTE — PROGRESS NOTES
Patient discharged home via family transport. Discharge education taught, patient verbalizes understanding. Belongings sent with patient.

## 2023-01-03 ENCOUNTER — PATIENT OUTREACH (OUTPATIENT)
Dept: CASE MANAGEMENT | Age: 78
End: 2023-01-03

## 2023-01-03 DIAGNOSIS — K44.9 PARAESOPHAGEAL HERNIA: ICD-10-CM

## 2023-01-03 DIAGNOSIS — Z02.9 ENCOUNTERS FOR UNSPECIFIED ADMINISTRATIVE PURPOSE: ICD-10-CM

## 2023-01-03 PROCEDURE — 1111F DSCHRG MED/CURRENT MED MERGE: CPT

## 2023-01-06 ENCOUNTER — OFFICE VISIT (OUTPATIENT)
Dept: FAMILY MEDICINE CLINIC | Facility: CLINIC | Age: 78
End: 2023-01-06
Payer: COMMERCIAL

## 2023-01-06 VITALS
HEIGHT: 60 IN | DIASTOLIC BLOOD PRESSURE: 70 MMHG | BODY MASS INDEX: 23.36 KG/M2 | WEIGHT: 119 LBS | OXYGEN SATURATION: 98 % | RESPIRATION RATE: 16 BRPM | SYSTOLIC BLOOD PRESSURE: 116 MMHG | HEART RATE: 64 BPM

## 2023-01-06 DIAGNOSIS — E03.9 HYPOTHYROIDISM, UNSPECIFIED TYPE: ICD-10-CM

## 2023-01-06 DIAGNOSIS — R51.9 ACUTE INTRACTABLE HEADACHE, UNSPECIFIED HEADACHE TYPE: ICD-10-CM

## 2023-01-06 DIAGNOSIS — R11.0 NAUSEA: ICD-10-CM

## 2023-01-06 DIAGNOSIS — K44.9 PARAESOPHAGEAL HERNIA: Primary | ICD-10-CM

## 2023-01-06 DIAGNOSIS — F41.9 ANXIETY: ICD-10-CM

## 2023-01-06 RX ORDER — ACETAMINOPHEN 160 MG
2000 TABLET,DISINTEGRATING ORAL DAILY
COMMUNITY

## 2023-01-06 RX ORDER — LOSARTAN POTASSIUM 50 MG/1
TABLET ORAL DAILY
COMMUNITY

## 2023-01-06 RX ORDER — HYDROCODONE BITARTRATE AND ACETAMINOPHEN 5; 325 MG/1; MG/1
1 TABLET ORAL EVERY 8 HOURS PRN
Qty: 10 TABLET | Refills: 0 | Status: SHIPPED | OUTPATIENT
Start: 2023-01-06

## 2023-01-09 ENCOUNTER — HOSPITAL ENCOUNTER (EMERGENCY)
Facility: HOSPITAL | Age: 78
Discharge: HOME | End: 2023-01-09
Attending: EMERGENCY MEDICINE
Payer: MEDICARE

## 2023-01-09 VITALS
DIASTOLIC BLOOD PRESSURE: 64 MMHG | RESPIRATION RATE: 24 BRPM | SYSTOLIC BLOOD PRESSURE: 164 MMHG | OXYGEN SATURATION: 96 % | HEIGHT: 60.63 IN | HEART RATE: 63 BPM | WEIGHT: 116 LBS | TEMPERATURE: 98 F | BODY MASS INDEX: 22.19 KG/M2

## 2023-01-09 DIAGNOSIS — E05.90 HYPERTHYROIDISM: ICD-10-CM

## 2023-01-09 DIAGNOSIS — R11.0 NAUSEA: Primary | ICD-10-CM

## 2023-01-09 DIAGNOSIS — F41.9 ANXIETY: ICD-10-CM

## 2023-01-09 LAB
ALBUMIN SERPL-MCNC: 4.1 G/DL (ref 3.4–5)
ALBUMIN/GLOB SERPL: 1.1 {RATIO} (ref 1–2)
ALP LIVER SERPL-CCNC: 85 U/L
ALT SERPL-CCNC: 19 U/L
ANION GAP SERPL CALC-SCNC: 7 MMOL/L (ref 0–18)
AST SERPL-CCNC: 19 U/L (ref 15–37)
ATRIAL RATE: 70 BPM
BASOPHILS # BLD AUTO: 0.1 X10(3) UL (ref 0–0.2)
BASOPHILS NFR BLD AUTO: 1.1 %
BILIRUB SERPL-MCNC: 0.5 MG/DL (ref 0.1–2)
BUN BLD-MCNC: 10 MG/DL (ref 7–18)
CALCIUM BLD-MCNC: 9.8 MG/DL (ref 8.5–10.1)
CHLORIDE SERPL-SCNC: 111 MMOL/L (ref 98–112)
CO2 SERPL-SCNC: 24 MMOL/L (ref 21–32)
CREAT BLD-MCNC: 0.92 MG/DL
EOSINOPHIL # BLD AUTO: 0.2 X10(3) UL (ref 0–0.7)
EOSINOPHIL NFR BLD AUTO: 2.3 %
ERYTHROCYTE [DISTWIDTH] IN BLOOD BY AUTOMATED COUNT: 13.3 %
GFR SERPLBLD BASED ON 1.73 SQ M-ARVRAT: 64 ML/MIN/1.73M2 (ref 60–?)
GLOBULIN PLAS-MCNC: 3.6 G/DL (ref 2.8–4.4)
GLUCOSE BLD-MCNC: 107 MG/DL (ref 70–99)
HCT VFR BLD AUTO: 40.5 %
HGB BLD-MCNC: 13.5 G/DL
IMM GRANULOCYTES # BLD AUTO: 0.02 X10(3) UL (ref 0–1)
IMM GRANULOCYTES NFR BLD: 0.2 %
LYMPHOCYTES # BLD AUTO: 1.88 X10(3) UL (ref 1–4)
LYMPHOCYTES NFR BLD AUTO: 21.3 %
MCH RBC QN AUTO: 29.6 PG (ref 26–34)
MCHC RBC AUTO-ENTMCNC: 33.3 G/DL (ref 31–37)
MCV RBC AUTO: 88.8 FL
MONOCYTES # BLD AUTO: 0.89 X10(3) UL (ref 0.1–1)
MONOCYTES NFR BLD AUTO: 10.1 %
NEUTROPHILS # BLD AUTO: 5.74 X10 (3) UL (ref 1.5–7.7)
NEUTROPHILS # BLD AUTO: 5.74 X10(3) UL (ref 1.5–7.7)
NEUTROPHILS NFR BLD AUTO: 65 %
OSMOLALITY SERPL CALC.SUM OF ELEC: 294 MOSM/KG (ref 275–295)
P AXIS: 25 DEGREES
P-R INTERVAL: 168 MS
PLATELET # BLD AUTO: 358 10(3)UL (ref 150–450)
POTASSIUM SERPL-SCNC: 3.5 MMOL/L (ref 3.5–5.1)
PROT SERPL-MCNC: 7.7 G/DL (ref 6.4–8.2)
Q-T INTERVAL: 402 MS
QRS DURATION: 78 MS
QTC CALCULATION (BEZET): 434 MS
R AXIS: -79 DEGREES
RBC # BLD AUTO: 4.56 X10(6)UL
SODIUM SERPL-SCNC: 142 MMOL/L (ref 136–145)
T AXIS: 21 DEGREES
T4 FREE SERPL-MCNC: 0.8 NG/DL (ref 0.8–1.7)
TROPONIN I HIGH SENSITIVITY: 8 NG/L
TSI SER-ACNC: 8.18 MIU/ML (ref 0.36–3.74)
VENTRICULAR RATE: 70 BPM
WBC # BLD AUTO: 8.8 X10(3) UL (ref 4–11)

## 2023-01-09 PROCEDURE — 85025 COMPLETE CBC W/AUTO DIFF WBC: CPT

## 2023-01-09 PROCEDURE — 93010 ELECTROCARDIOGRAM REPORT: CPT

## 2023-01-09 PROCEDURE — 85025 COMPLETE CBC W/AUTO DIFF WBC: CPT | Performed by: EMERGENCY MEDICINE

## 2023-01-09 PROCEDURE — 96361 HYDRATE IV INFUSION ADD-ON: CPT

## 2023-01-09 PROCEDURE — 84484 ASSAY OF TROPONIN QUANT: CPT | Performed by: EMERGENCY MEDICINE

## 2023-01-09 PROCEDURE — 99284 EMERGENCY DEPT VISIT MOD MDM: CPT

## 2023-01-09 PROCEDURE — S0028 INJECTION, FAMOTIDINE, 20 MG: HCPCS | Performed by: EMERGENCY MEDICINE

## 2023-01-09 PROCEDURE — 96375 TX/PRO/DX INJ NEW DRUG ADDON: CPT

## 2023-01-09 PROCEDURE — 84439 ASSAY OF FREE THYROXINE: CPT | Performed by: EMERGENCY MEDICINE

## 2023-01-09 PROCEDURE — 96374 THER/PROPH/DIAG INJ IV PUSH: CPT

## 2023-01-09 PROCEDURE — 80053 COMPREHEN METABOLIC PANEL: CPT

## 2023-01-09 PROCEDURE — 80053 COMPREHEN METABOLIC PANEL: CPT | Performed by: EMERGENCY MEDICINE

## 2023-01-09 PROCEDURE — 93005 ELECTROCARDIOGRAM TRACING: CPT

## 2023-01-09 PROCEDURE — 84443 ASSAY THYROID STIM HORMONE: CPT | Performed by: EMERGENCY MEDICINE

## 2023-01-09 RX ORDER — FAMOTIDINE 10 MG/ML
20 INJECTION, SOLUTION INTRAVENOUS ONCE
Status: COMPLETED | OUTPATIENT
Start: 2023-01-09 | End: 2023-01-09

## 2023-01-09 RX ORDER — DIPHENHYDRAMINE HYDROCHLORIDE 50 MG/ML
25 INJECTION INTRAMUSCULAR; INTRAVENOUS ONCE
Status: COMPLETED | OUTPATIENT
Start: 2023-01-09 | End: 2023-01-09

## 2023-01-09 RX ORDER — ONDANSETRON 4 MG/1
4 TABLET, ORALLY DISINTEGRATING ORAL ONCE
Status: COMPLETED | OUTPATIENT
Start: 2023-01-09 | End: 2023-01-09

## 2023-01-09 RX ORDER — METOCLOPRAMIDE HYDROCHLORIDE 5 MG/ML
10 INJECTION INTRAMUSCULAR; INTRAVENOUS ONCE
Status: COMPLETED | OUTPATIENT
Start: 2023-01-09 | End: 2023-01-09

## 2023-01-09 NOTE — ED INITIAL ASSESSMENT (HPI)
Pt c/o of feeling shaky, N/V, and lightheaded. Pt given zofran en route. Pt states that she was hospitalized last week for her thyroid condition. Pt denies CP or SOB. EMS states that she felt like she was going to pass out while in route. Blood glucose was 112.

## 2023-01-11 ENCOUNTER — PATIENT OUTREACH (OUTPATIENT)
Dept: CASE MANAGEMENT | Age: 78
End: 2023-01-11

## 2023-01-11 ENCOUNTER — OFFICE VISIT (OUTPATIENT)
Facility: LOCATION | Age: 78
End: 2023-01-11
Payer: COMMERCIAL

## 2023-01-11 VITALS — HEART RATE: 75 BPM | TEMPERATURE: 98 F

## 2023-01-11 DIAGNOSIS — L90.0 LICHEN SCLEROSUS: ICD-10-CM

## 2023-01-11 DIAGNOSIS — J44.0 CHRONIC OBSTRUCTIVE PULMONARY DISEASE WITH ACUTE LOWER RESPIRATORY INFECTION (HCC): ICD-10-CM

## 2023-01-11 DIAGNOSIS — E78.00 PURE HYPERCHOLESTEROLEMIA: ICD-10-CM

## 2023-01-11 DIAGNOSIS — R11.2 INTRACTABLE NAUSEA AND VOMITING: ICD-10-CM

## 2023-01-11 DIAGNOSIS — F34.1 DYSTHYMIC DISORDER: ICD-10-CM

## 2023-01-11 DIAGNOSIS — L41.9 PARAPSORIASIS: ICD-10-CM

## 2023-01-11 DIAGNOSIS — G44.201 INTRACTABLE TENSION-TYPE HEADACHE, UNSPECIFIED CHRONICITY PATTERN: ICD-10-CM

## 2023-01-11 DIAGNOSIS — N18.31 STAGE 3A CHRONIC KIDNEY DISEASE (HCC): Chronic | ICD-10-CM

## 2023-01-11 DIAGNOSIS — K21.9 GASTROESOPHAGEAL REFLUX DISEASE WITHOUT ESOPHAGITIS: ICD-10-CM

## 2023-01-11 DIAGNOSIS — K44.9 PARAESOPHAGEAL HIATAL HERNIA: Primary | ICD-10-CM

## 2023-01-11 DIAGNOSIS — R53.1 WEAKNESS GENERALIZED: ICD-10-CM

## 2023-01-11 DIAGNOSIS — E55.9 VITAMIN D DEFICIENCY: ICD-10-CM

## 2023-01-11 DIAGNOSIS — R11.2 NAUSEA AND VOMITING IN ADULT: ICD-10-CM

## 2023-01-11 DIAGNOSIS — F33.0 MILD RECURRENT MAJOR DEPRESSION (HCC): Chronic | ICD-10-CM

## 2023-01-11 DIAGNOSIS — K44.9 PARAESOPHAGEAL HERNIA: ICD-10-CM

## 2023-01-11 DIAGNOSIS — E03.9 ACQUIRED HYPOTHYROIDISM: Chronic | ICD-10-CM

## 2023-01-11 DIAGNOSIS — R11.0 NAUSEA: ICD-10-CM

## 2023-01-11 DIAGNOSIS — R42 DIZZINESS AND GIDDINESS: ICD-10-CM

## 2023-01-11 DIAGNOSIS — I70.0 AORTIC ATHEROSCLEROSIS (HCC): ICD-10-CM

## 2023-01-11 PROCEDURE — 99213 OFFICE O/P EST LOW 20 MIN: CPT | Performed by: SURGERY

## 2023-01-12 ENCOUNTER — TELEPHONE (OUTPATIENT)
Dept: FAMILY MEDICINE CLINIC | Facility: CLINIC | Age: 78
End: 2023-01-12

## 2023-01-12 DIAGNOSIS — E78.5 HYPERLIPIDEMIA, UNSPECIFIED HYPERLIPIDEMIA TYPE: Primary | ICD-10-CM

## 2023-01-12 DIAGNOSIS — I70.0 AORTIC ATHEROSCLEROSIS (HCC): ICD-10-CM

## 2023-01-12 DIAGNOSIS — I10 ESSENTIAL HYPERTENSION: ICD-10-CM

## 2023-01-12 NOTE — TELEPHONE ENCOUNTER
Hi Dr. Suzi Dakins ,   I spoke with Patient today . Patient is scheduled for hernia repair surgery on 2/20/23. She is under the impression she should schedule cardio and pulmonology before surgery,  She wants to be sure you are not requiring her to get cardiac or pulmonary clearance, if so she would need a referral to get in as soon as possible as she has not seen either specialist for quite some time     I spoke with Naila Ayon - DONNIE with Dr. [facility] office, she confirmed he is only requesting medical clearance but it is your discretion for clearance. I have scheduled preop with you for 1/27  Future Appointments   Date Time Provider Mckenzie Ferrell   1/27/2023  9:00 AM Jenny Ma DO [facility] [address] [facility] [address]   2/8/2023  4:00 PM Roger Friedman MD [facility]       Please advise. Thank you .

## 2023-01-16 NOTE — TELEPHONE ENCOUNTER
Called to assist in scheduling an appointment with cardiology - time spent 10 minutes. L/m for office to call back . Patient informed and is appreciative of care managers help.

## 2023-01-16 NOTE — TELEPHONE ENCOUNTER
Call from [facility] - patient scheduled on 1/19/2023 @ 9:45 am .   Scheduled with  Dr. Timmy Cantrell MD     Patient informed and confirmed. Time spent  5 minutes.

## 2023-01-17 ENCOUNTER — TELEPHONE (OUTPATIENT)
Facility: LOCATION | Age: 78
End: 2023-01-17

## 2023-01-17 DIAGNOSIS — K44.9 HIATAL HERNIA: Primary | ICD-10-CM

## 2023-01-17 NOTE — TELEPHONE ENCOUNTER
Needed to reschedule surgery from 2/20 to 2/27. Multiple attempts were made to contact pt and finally a VM was left that we changed the date.

## 2023-01-27 ENCOUNTER — LAB ENCOUNTER (OUTPATIENT)
Dept: LAB | Age: 78
End: 2023-01-27
Attending: FAMILY MEDICINE
Payer: MEDICARE

## 2023-01-27 ENCOUNTER — OFFICE VISIT (OUTPATIENT)
Dept: FAMILY MEDICINE CLINIC | Facility: CLINIC | Age: 78
End: 2023-01-27
Payer: COMMERCIAL

## 2023-01-27 VITALS
WEIGHT: 112 LBS | HEART RATE: 80 BPM | DIASTOLIC BLOOD PRESSURE: 60 MMHG | BODY MASS INDEX: 21.99 KG/M2 | HEIGHT: 60 IN | SYSTOLIC BLOOD PRESSURE: 106 MMHG

## 2023-01-27 DIAGNOSIS — K44.9 PARAESOPHAGEAL HERNIA: ICD-10-CM

## 2023-01-27 DIAGNOSIS — E03.9 HYPOTHYROIDISM, UNSPECIFIED TYPE: ICD-10-CM

## 2023-01-27 DIAGNOSIS — Z01.818 PREOP EXAMINATION: Primary | ICD-10-CM

## 2023-01-27 DIAGNOSIS — E78.5 HYPERLIPIDEMIA, UNSPECIFIED HYPERLIPIDEMIA TYPE: ICD-10-CM

## 2023-01-27 DIAGNOSIS — Z01.818 PREOP EXAMINATION: ICD-10-CM

## 2023-01-27 DIAGNOSIS — I70.0 AORTIC ATHEROSCLEROSIS (HCC): ICD-10-CM

## 2023-01-27 DIAGNOSIS — I10 ESSENTIAL HYPERTENSION: ICD-10-CM

## 2023-01-27 PROBLEM — N18.30 CKD (CHRONIC KIDNEY DISEASE) STAGE 3, GFR 30-59 ML/MIN (HCC): Chronic | Status: RESOLVED | Noted: 2019-07-09 | Resolved: 2023-01-27

## 2023-01-27 PROCEDURE — 87081 CULTURE SCREEN ONLY: CPT

## 2023-01-27 PROCEDURE — 3074F SYST BP LT 130 MM HG: CPT | Performed by: FAMILY MEDICINE

## 2023-01-27 PROCEDURE — 1111F DSCHRG MED/CURRENT MED MERGE: CPT | Performed by: FAMILY MEDICINE

## 2023-01-27 PROCEDURE — 99214 OFFICE O/P EST MOD 30 MIN: CPT | Performed by: FAMILY MEDICINE

## 2023-01-27 PROCEDURE — 3078F DIAST BP <80 MM HG: CPT | Performed by: FAMILY MEDICINE

## 2023-01-27 PROCEDURE — 3008F BODY MASS INDEX DOCD: CPT | Performed by: FAMILY MEDICINE

## 2023-02-02 ENCOUNTER — PATIENT OUTREACH (OUTPATIENT)
Dept: CASE MANAGEMENT | Age: 78
End: 2023-02-02

## 2023-02-02 NOTE — PROGRESS NOTES
Attempted [facility] monthly outreach,  left message for patient to call back ,can be reached at  [phone]. Will try back at a later time. Medical record reviewed including recent office visits and test results.     Chart review - 3 min  Time with patient - 2 min  Total time - 5 min

## 2023-02-08 ENCOUNTER — OFFICE VISIT (OUTPATIENT)
Facility: LOCATION | Age: 78
End: 2023-02-08
Payer: COMMERCIAL

## 2023-02-08 VITALS — TEMPERATURE: 97 F | HEART RATE: 72 BPM

## 2023-02-08 DIAGNOSIS — K44.9 PARAESOPHAGEAL HIATAL HERNIA: Primary | ICD-10-CM

## 2023-02-08 PROCEDURE — 99213 OFFICE O/P EST LOW 20 MIN: CPT | Performed by: SURGERY

## 2023-02-20 RX ORDER — TRIAMCINOLONE ACETONIDE 1 MG/G
CREAM TOPICAL
Qty: 60 G | Refills: 3 | Status: SHIPPED | OUTPATIENT
Start: 2023-02-20

## 2023-02-21 ENCOUNTER — TELEPHONE (OUTPATIENT)
Facility: LOCATION | Age: 78
End: 2023-02-21

## 2023-02-24 ENCOUNTER — LAB ENCOUNTER (OUTPATIENT)
Dept: LAB | Facility: HOSPITAL | Age: 78
End: 2023-02-24
Attending: SURGERY
Payer: MEDICARE

## 2023-02-24 DIAGNOSIS — Z01.812 ENCOUNTER FOR PREOPERATIVE SCREENING LABORATORY TESTING FOR COVID-19 VIRUS: ICD-10-CM

## 2023-02-24 DIAGNOSIS — Z20.822 ENCOUNTER FOR PREOPERATIVE SCREENING LABORATORY TESTING FOR COVID-19 VIRUS: ICD-10-CM

## 2023-02-25 LAB — SARS-COV-2 RNA RESP QL NAA+PROBE: NOT DETECTED

## 2023-02-26 ENCOUNTER — ANESTHESIA EVENT (OUTPATIENT)
Dept: SURGERY | Facility: HOSPITAL | Age: 78
End: 2023-02-26
Payer: MEDICARE

## 2023-02-27 ENCOUNTER — ANESTHESIA (OUTPATIENT)
Dept: SURGERY | Facility: HOSPITAL | Age: 78
End: 2023-02-27
Payer: MEDICARE

## 2023-02-27 ENCOUNTER — HOSPITAL ENCOUNTER (OUTPATIENT)
Facility: HOSPITAL | Age: 78
Discharge: HOME | End: 2023-02-28
Attending: SURGERY | Admitting: SURGERY
Payer: MEDICARE

## 2023-02-27 DIAGNOSIS — Z20.822 ENCOUNTER FOR PREOPERATIVE SCREENING LABORATORY TESTING FOR COVID-19 VIRUS: Primary | ICD-10-CM

## 2023-02-27 DIAGNOSIS — Z01.812 ENCOUNTER FOR PREOPERATIVE SCREENING LABORATORY TESTING FOR COVID-19 VIRUS: Primary | ICD-10-CM

## 2023-02-27 PROCEDURE — 76942 ECHO GUIDE FOR BIOPSY: CPT | Performed by: INTERNAL MEDICINE

## 2023-02-27 PROCEDURE — 43282 LAP PARAESOPH HER RPR W/MESH: CPT | Performed by: SURGERY

## 2023-02-27 PROCEDURE — 0BUT4JZ SUPPLEMENT DIAPHRAGM WITH SYNTHETIC SUBSTITUTE, PERCUTANEOUS ENDOSCOPIC APPROACH: ICD-10-PCS | Performed by: SURGERY

## 2023-02-27 PROCEDURE — 43282 LAP PARAESOPH HER RPR W/MESH: CPT

## 2023-02-27 PROCEDURE — 8E0W4CZ ROBOTIC ASSISTED PROCEDURE OF TRUNK REGION, PERCUTANEOUS ENDOSCOPIC APPROACH: ICD-10-PCS | Performed by: SURGERY

## 2023-02-27 DEVICE — BIO-A TISSUE REINFORCEMENT 7CMX10CM
Type: IMPLANTABLE DEVICE | Site: ABDOMEN | Status: FUNCTIONAL
Brand: GORE BIO-A TISSUE REINFORCEMENT

## 2023-02-27 RX ORDER — ONDANSETRON 2 MG/ML
4 INJECTION INTRAMUSCULAR; INTRAVENOUS EVERY 6 HOURS PRN
Status: DISCONTINUED | OUTPATIENT
Start: 2023-02-27 | End: 2023-02-28

## 2023-02-27 RX ORDER — CLONAZEPAM 0.5 MG/1
0.5 TABLET ORAL 2 TIMES DAILY PRN
Status: DISCONTINUED | OUTPATIENT
Start: 2023-02-27 | End: 2023-02-28

## 2023-02-27 RX ORDER — DEXAMETHASONE SODIUM PHOSPHATE 4 MG/ML
VIAL (ML) INJECTION AS NEEDED
Status: DISCONTINUED | OUTPATIENT
Start: 2023-02-27 | End: 2023-02-27 | Stop reason: SURG

## 2023-02-27 RX ORDER — LOSARTAN POTASSIUM 50 MG/1
50 TABLET ORAL DAILY
Status: DISCONTINUED | OUTPATIENT
Start: 2023-02-27 | End: 2023-02-28

## 2023-02-27 RX ORDER — OXYCODONE HYDROCHLORIDE 5 MG/1
2.5 TABLET ORAL EVERY 4 HOURS PRN
Status: DISCONTINUED | OUTPATIENT
Start: 2023-02-27 | End: 2023-02-28

## 2023-02-27 RX ORDER — ACETAMINOPHEN 325 MG/1
650 TABLET ORAL
Status: DISCONTINUED | OUTPATIENT
Start: 2023-02-27 | End: 2023-02-28

## 2023-02-27 RX ORDER — CLINDAMYCIN PHOSPHATE 900 MG/50ML
900 INJECTION INTRAVENOUS ONCE
Status: COMPLETED | OUTPATIENT
Start: 2023-02-27 | End: 2023-02-27

## 2023-02-27 RX ORDER — NEOSTIGMINE METHYLSULFATE 1 MG/ML
INJECTION, SOLUTION INTRAVENOUS AS NEEDED
Status: DISCONTINUED | OUTPATIENT
Start: 2023-02-27 | End: 2023-02-27 | Stop reason: SURG

## 2023-02-27 RX ORDER — HYDROMORPHONE HYDROCHLORIDE 1 MG/ML
0.4 INJECTION, SOLUTION INTRAMUSCULAR; INTRAVENOUS; SUBCUTANEOUS EVERY 5 MIN PRN
Status: DISCONTINUED | OUTPATIENT
Start: 2023-02-27 | End: 2023-02-27 | Stop reason: HOSPADM

## 2023-02-27 RX ORDER — SENNOSIDES 8.6 MG
17.2 TABLET ORAL NIGHTLY PRN
Status: DISCONTINUED | OUTPATIENT
Start: 2023-02-27 | End: 2023-02-28

## 2023-02-27 RX ORDER — GLYCOPYRROLATE 0.2 MG/ML
INJECTION, SOLUTION INTRAMUSCULAR; INTRAVENOUS AS NEEDED
Status: DISCONTINUED | OUTPATIENT
Start: 2023-02-27 | End: 2023-02-27 | Stop reason: SURG

## 2023-02-27 RX ORDER — POLYETHYLENE GLYCOL 3350 17 G/17G
17 POWDER, FOR SOLUTION ORAL DAILY PRN
Status: DISCONTINUED | OUTPATIENT
Start: 2023-02-27 | End: 2023-02-28

## 2023-02-27 RX ORDER — ACETAMINOPHEN 500 MG
1000 TABLET ORAL ONCE
Status: DISCONTINUED | OUTPATIENT
Start: 2023-02-27 | End: 2023-02-27 | Stop reason: HOSPADM

## 2023-02-27 RX ORDER — HYDROMORPHONE HYDROCHLORIDE 1 MG/ML
0.2 INJECTION, SOLUTION INTRAMUSCULAR; INTRAVENOUS; SUBCUTANEOUS EVERY 2 HOUR PRN
Status: DISCONTINUED | OUTPATIENT
Start: 2023-02-27 | End: 2023-02-28

## 2023-02-27 RX ORDER — OXYCODONE HYDROCHLORIDE 5 MG/1
5 TABLET ORAL EVERY 4 HOURS PRN
Status: DISCONTINUED | OUTPATIENT
Start: 2023-02-27 | End: 2023-02-28

## 2023-02-27 RX ORDER — ALBUTEROL SULFATE 90 UG/1
2 AEROSOL, METERED RESPIRATORY (INHALATION) EVERY 4 HOURS PRN
Status: DISCONTINUED | OUTPATIENT
Start: 2023-02-27 | End: 2023-02-28

## 2023-02-27 RX ORDER — LIDOCAINE HYDROCHLORIDE 10 MG/ML
INJECTION, SOLUTION EPIDURAL; INFILTRATION; INTRACAUDAL; PERINEURAL AS NEEDED
Status: DISCONTINUED | OUTPATIENT
Start: 2023-02-27 | End: 2023-02-27 | Stop reason: SURG

## 2023-02-27 RX ORDER — MIRTAZAPINE 15 MG/1
15 TABLET, FILM COATED ORAL NIGHTLY
Status: DISCONTINUED | OUTPATIENT
Start: 2023-02-27 | End: 2023-02-28

## 2023-02-27 RX ORDER — HEPARIN SODIUM 5000 [USP'U]/ML
5000 INJECTION, SOLUTION INTRAVENOUS; SUBCUTANEOUS ONCE
Status: COMPLETED | OUTPATIENT
Start: 2023-02-27 | End: 2023-02-27

## 2023-02-27 RX ORDER — METOCLOPRAMIDE HYDROCHLORIDE 5 MG/ML
10 INJECTION INTRAMUSCULAR; INTRAVENOUS EVERY 8 HOURS PRN
Status: DISCONTINUED | OUTPATIENT
Start: 2023-02-27 | End: 2023-02-28

## 2023-02-27 RX ORDER — METOCLOPRAMIDE HYDROCHLORIDE 5 MG/ML
INJECTION INTRAMUSCULAR; INTRAVENOUS
Status: COMPLETED
Start: 2023-02-27 | End: 2023-02-27

## 2023-02-27 RX ORDER — ONDANSETRON 2 MG/ML
INJECTION INTRAMUSCULAR; INTRAVENOUS AS NEEDED
Status: DISCONTINUED | OUTPATIENT
Start: 2023-02-27 | End: 2023-02-27 | Stop reason: SURG

## 2023-02-27 RX ORDER — ONDANSETRON 2 MG/ML
INJECTION INTRAMUSCULAR; INTRAVENOUS
Status: COMPLETED
Start: 2023-02-27 | End: 2023-02-27

## 2023-02-27 RX ORDER — HYDROMORPHONE HYDROCHLORIDE 1 MG/ML
0.6 INJECTION, SOLUTION INTRAMUSCULAR; INTRAVENOUS; SUBCUTANEOUS EVERY 5 MIN PRN
Status: DISCONTINUED | OUTPATIENT
Start: 2023-02-27 | End: 2023-02-27 | Stop reason: HOSPADM

## 2023-02-27 RX ORDER — SODIUM CHLORIDE, SODIUM LACTATE, POTASSIUM CHLORIDE, CALCIUM CHLORIDE 600; 310; 30; 20 MG/100ML; MG/100ML; MG/100ML; MG/100ML
INJECTION, SOLUTION INTRAVENOUS CONTINUOUS
Status: DISCONTINUED | OUTPATIENT
Start: 2023-02-27 | End: 2023-02-27 | Stop reason: HOSPADM

## 2023-02-27 RX ORDER — SODIUM CHLORIDE, SODIUM LACTATE, POTASSIUM CHLORIDE, CALCIUM CHLORIDE 600; 310; 30; 20 MG/100ML; MG/100ML; MG/100ML; MG/100ML
INJECTION, SOLUTION INTRAVENOUS CONTINUOUS
Status: DISCONTINUED | OUTPATIENT
Start: 2023-02-27 | End: 2023-02-27

## 2023-02-27 RX ORDER — DEXTROSE, SODIUM CHLORIDE, AND POTASSIUM CHLORIDE 5; .45; .15 G/100ML; G/100ML; G/100ML
INJECTION INTRAVENOUS CONTINUOUS
Status: DISCONTINUED | OUTPATIENT
Start: 2023-02-27 | End: 2023-02-28

## 2023-02-27 RX ORDER — PROCHLORPERAZINE EDISYLATE 5 MG/ML
10 INJECTION INTRAMUSCULAR; INTRAVENOUS EVERY 6 HOURS PRN
Status: DISCONTINUED | OUTPATIENT
Start: 2023-02-27 | End: 2023-02-28

## 2023-02-27 RX ORDER — HYDROMORPHONE HYDROCHLORIDE 1 MG/ML
INJECTION, SOLUTION INTRAMUSCULAR; INTRAVENOUS; SUBCUTANEOUS
Status: COMPLETED
Start: 2023-02-27 | End: 2023-02-27

## 2023-02-27 RX ORDER — BISACODYL 10 MG
10 SUPPOSITORY, RECTAL RECTAL
Status: DISCONTINUED | OUTPATIENT
Start: 2023-02-27 | End: 2023-02-28

## 2023-02-27 RX ORDER — ONDANSETRON 2 MG/ML
4 INJECTION INTRAMUSCULAR; INTRAVENOUS EVERY 6 HOURS PRN
Status: DISCONTINUED | OUTPATIENT
Start: 2023-02-27 | End: 2023-02-27 | Stop reason: HOSPADM

## 2023-02-27 RX ORDER — PHENYLEPHRINE HCL 10 MG/ML
VIAL (ML) INJECTION AS NEEDED
Status: DISCONTINUED | OUTPATIENT
Start: 2023-02-27 | End: 2023-02-27 | Stop reason: SURG

## 2023-02-27 RX ORDER — HYDROMORPHONE HYDROCHLORIDE 1 MG/ML
0.4 INJECTION, SOLUTION INTRAMUSCULAR; INTRAVENOUS; SUBCUTANEOUS EVERY 2 HOUR PRN
Status: DISCONTINUED | OUTPATIENT
Start: 2023-02-27 | End: 2023-02-28

## 2023-02-27 RX ORDER — HYDROMORPHONE HYDROCHLORIDE 1 MG/ML
0.2 INJECTION, SOLUTION INTRAMUSCULAR; INTRAVENOUS; SUBCUTANEOUS EVERY 5 MIN PRN
Status: DISCONTINUED | OUTPATIENT
Start: 2023-02-27 | End: 2023-02-27 | Stop reason: HOSPADM

## 2023-02-27 RX ORDER — METOCLOPRAMIDE HYDROCHLORIDE 5 MG/ML
10 INJECTION INTRAMUSCULAR; INTRAVENOUS EVERY 8 HOURS PRN
Status: DISCONTINUED | OUTPATIENT
Start: 2023-02-27 | End: 2023-02-27 | Stop reason: HOSPADM

## 2023-02-27 RX ORDER — FLUTICASONE FUROATE AND VILANTEROL 100; 25 UG/1; UG/1
1 POWDER RESPIRATORY (INHALATION) DAILY
Status: DISCONTINUED | OUTPATIENT
Start: 2023-02-28 | End: 2023-02-28

## 2023-02-27 RX ORDER — BUPIVACAINE HYDROCHLORIDE 2.5 MG/ML
INJECTION, SOLUTION EPIDURAL; INFILTRATION; INTRACAUDAL AS NEEDED
Status: DISCONTINUED | OUTPATIENT
Start: 2023-02-27 | End: 2023-02-27 | Stop reason: HOSPADM

## 2023-02-27 RX ORDER — ROCURONIUM BROMIDE 10 MG/ML
INJECTION, SOLUTION INTRAVENOUS AS NEEDED
Status: DISCONTINUED | OUTPATIENT
Start: 2023-02-27 | End: 2023-02-27 | Stop reason: SURG

## 2023-02-27 RX ORDER — SODIUM PHOSPHATE, DIBASIC AND SODIUM PHOSPHATE, MONOBASIC 7; 19 G/133ML; G/133ML
1 ENEMA RECTAL ONCE AS NEEDED
Status: DISCONTINUED | OUTPATIENT
Start: 2023-02-27 | End: 2023-02-28

## 2023-02-27 RX ORDER — NALOXONE HYDROCHLORIDE 0.4 MG/ML
80 INJECTION, SOLUTION INTRAMUSCULAR; INTRAVENOUS; SUBCUTANEOUS AS NEEDED
Status: DISCONTINUED | OUTPATIENT
Start: 2023-02-27 | End: 2023-02-27 | Stop reason: HOSPADM

## 2023-02-27 RX ADMIN — SODIUM CHLORIDE, SODIUM LACTATE, POTASSIUM CHLORIDE, CALCIUM CHLORIDE: 600; 310; 30; 20 INJECTION, SOLUTION INTRAVENOUS at 10:12:00

## 2023-02-27 RX ADMIN — GLYCOPYRROLATE 0.4 MG: 0.2 INJECTION, SOLUTION INTRAMUSCULAR; INTRAVENOUS at 10:27:00

## 2023-02-27 RX ADMIN — ROCURONIUM BROMIDE 10 MG: 10 INJECTION, SOLUTION INTRAVENOUS at 08:57:00

## 2023-02-27 RX ADMIN — DEXAMETHASONE SODIUM PHOSPHATE 4 MG: 4 MG/ML VIAL (ML) INJECTION at 07:47:00

## 2023-02-27 RX ADMIN — ONDANSETRON 4 MG: 2 INJECTION INTRAMUSCULAR; INTRAVENOUS at 10:27:00

## 2023-02-27 RX ADMIN — GLYCOPYRROLATE 0.2 MG: 0.2 INJECTION, SOLUTION INTRAMUSCULAR; INTRAVENOUS at 08:37:00

## 2023-02-27 RX ADMIN — PHENYLEPHRINE HCL 100 MCG: 10 MG/ML VIAL (ML) INJECTION at 08:54:00

## 2023-02-27 RX ADMIN — CLINDAMYCIN PHOSPHATE 900 MG: 900 INJECTION INTRAVENOUS at 08:11:00

## 2023-02-27 RX ADMIN — NEOSTIGMINE METHYLSULFATE 3 MG: 1 INJECTION, SOLUTION INTRAVENOUS at 10:27:00

## 2023-02-27 RX ADMIN — SODIUM CHLORIDE, SODIUM LACTATE, POTASSIUM CHLORIDE, CALCIUM CHLORIDE: 600; 310; 30; 20 INJECTION, SOLUTION INTRAVENOUS at 07:42:00

## 2023-02-27 RX ADMIN — ROCURONIUM BROMIDE 50 MG: 10 INJECTION, SOLUTION INTRAVENOUS at 07:57:00

## 2023-02-27 RX ADMIN — LIDOCAINE HYDROCHLORIDE 50 MG: 10 INJECTION, SOLUTION EPIDURAL; INFILTRATION; INTRACAUDAL; PERINEURAL at 07:47:00

## 2023-02-27 NOTE — ANESTHESIA PROCEDURE NOTES
Regional Block    Date/Time: 2/27/2023 7:50 AM    Performed by: Stephanie Enrique MD  Authorized by: Stephanie Enrique MD      General Information and Staff    Start Time:  2/27/2023 7:50 AM  End Time:  2/27/2023 7:55 AM  Anesthesiologist:  Stephanie Enrique MD  Performed by: Anesthesiologist  Patient Location:  OR    Block Placement: Post Induction  Site Identification: real time ultrasound guided and image stored and retrievable    Block site/laterality marked before start: site marked  Reason for Block: at surgeon's request and post-op pain management    Preanesthetic Checklist: 2 patient identifers, IV checked, risks and benefits discussed, monitors and equipment checked, pre-op evaluation, timeout performed, anesthesia consent, sterile technique used, no prohibitive neurological deficits and no local skin infection at insertion site      Procedure Details    Patient Position:  Supine  Prep: ChloraPrep    Monitoring:  Cardiac monitor, continuous pulse ox and blood pressure cuff  Block Type:  TAP  Laterality:  Bilateral  Injection Technique:  Single-shot    Needle    Needle Type:  Short-bevel and echogenic  Needle Gauge:  21 G  Needle Length:  110 mm  Needle Localization:  Ultrasound guidance  Reason for Ultrasound Use: appropriate spread of the medication was noted in real time and no ultrasound evidence of intravascular and/or intraneural injection            Assessment    Injection Assessment:  Good spread noted, negative resistance, negative aspiration for heme, incremental injection and low pressure  Heart Rate Change: No    - Patient tolerated block procedure well without evidence of immediate block related complications.      Medications  2/27/2023 7:50 AM      Additional Comments    Medication:  Bupivacaine 0.25% 40 ml

## 2023-02-27 NOTE — ANESTHESIA PROCEDURE NOTES
Airway  Date/Time: 2/27/2023 7:50 AM  Urgency: elective    Airway not difficult    General Information and Staff    Patient location during procedure: OR  Anesthesiologist: Olga Mtz MD  Performed: anesthesiologist   Performed by: Olga Mtz MD  Authorized by: Olga Mtz MD      Indications and Patient Condition  Indications for airway management: anesthesia  Sedation level: deep  Preoxygenated: yes  Patient position: sniffing  Mask difficulty assessment: 0 - not attempted    Final Airway Details  Final airway type: endotracheal airway      Successful airway: ETT  Cuffed: yes   Successful intubation technique: direct laryngoscopy  Endotracheal tube insertion site: oral  Blade: Eli  Blade size: #3  ETT size (mm): 7.0    Cormack-Lehane Classification: grade I - full view of glottis  Placement verified by: chest auscultation and capnometry   Cuff volume (mL): 7  Measured from: lips  ETT to lips (cm): 21  Number of attempts at approach: 1    Additional Comments  Atraumatic

## 2023-02-27 NOTE — ANESTHESIA POSTPROCEDURE EVALUATION
4 Johnpaige Reesemichaelashavonne Patient Status:  Hospital Outpatient Surgery   Age/Gender 65year old female MRN [id number]   Location [address] [facility] Attending Dexter Fuentes MD   Hosp Day # 0 PCP Yaya Eaton DO       Anesthesia Post-op Note    ROBOTIC LAPAROSCOPIC REPAIR OF HIATAL HERNIA AND NISSEN FUNDOPLICATION WITH MESH    Procedure Summary     Date: 02/27/23 Room / Location: [facility]4 [facility] OR 09 / 1404 [facility] OR    Anesthesia Start: 1224 Anesthesia Stop: 2770    Procedure: ROBOTIC LAPAROSCOPIC REPAIR OF HIATAL HERNIA AND NISSEN FUNDOPLICATION WITH MESH (Abdomen) Diagnosis:       Paraesophageal hiatal hernia      (Paraesophageal hiatal hernia [K44.9])    Surgeons: Dexter Fuentes MD Anesthesiologist: Parag Delgado MD    Anesthesia Type: general ASA Status: 3          Anesthesia Type: general    Vitals Value Taken Time   /72 02/27/23 1043   Temp 97 02/27/23 1043   Pulse 85 02/27/23 1043   Resp 19 02/27/23 1043   SpO2 99 02/27/23 1043       Patient Location: PACU    Anesthesia Type: general    Airway Patency: patent    Postop Pain Control: adequate    Mental Status: mildly sedated but able to meaningfully participate in the post-anesthesia evaluation    Nausea/Vomiting: none    Cardiopulmonary/Hydration status: stable euvolemic    Complications: no apparent anesthesia related complications    Postop vital signs: stable    Dental Exam: Unchanged from Preop    Patient to be discharged from PACU when criteria met.

## 2023-02-27 NOTE — OPERATIVE REPORT
PREOPERATIVE DIAGNOSIS: Paraesophageal Hiatal hernia and chronic gastroesophageal reflux disease. POSTOPERATIVE DIAGNOSIS: Paraesophageal Hiatal hernia and chronic gastroesophageal reflux disease. PROCEDURE PERFORMED:   1. Robotic repair of hiatal hernia with Lee Bio A mesh  2. Robotic Loose Nissen fundoplication. ASSISTANT: CHON Rosado   ANESTHESIA: General endotracheal anesthesia. Anesthesiologist.: Tiffany Gardiner MD  BLOOD LOSS:  10 mL. COMPLICATIONS: None apparent. SPECIMENS: None. OPERATIVE FINDINGS: Patient has a significantly sized hiatal hernia. The stomach is easily reduced. The crura were easily reapproximated with interrupted stitches. The fundoplication was created in standard, yet loose, fashion without a bougie. The wrap was tension free, and there was no injury to adjacent organs and structures. DISPOSITION: The patient was awakened from anesthesia, brought to the recovery room in stable condition, having tolerated the procedure without apparent complications. Needle, sponge, instrument counts are correct at the end of the procedure. Please note, preprocedure antibiotic and DVT prophylaxis is administered per protocol, and a time-out is performed prior to initiating procedure, identifying the correct patient, procedure, and surgeon. INDICATIONS FOR PROCEDURE: Please read the preprocedural history and physical. Briefly, the patient is a 65year old female who has a longstanding history of gastroesophageal reflux disease with significant medical sequelae, including esophagitis. The patient has had endoscopy and imaging that confirms the presence of a hiatal hernia. The patient presented for surgical opinion. The patient has tried maximal medical therapy without resolution of symptoms. The symptoms are progressive and limiting  adult daily living activities.  A preoperative medical risk assessment from the primary care physician determined that the patient is at appropriate risk without further cardiac workup. The risks, benefits, and alternatives of robotic hiatal hernia repair and Nissen fundoplication were discussed. The risks explained include but are not limited to injury to adjacent organs and structures, conversion to open procedure, injury to critical vessels of the critical structures in the mediastinum, pneumothorax, bleeding, possible need for splenectomy, a slipped Nissen, failure to control symptoms, post procedural dysphagia, gastric bloat, discomfort, feeding intolerance, wound complications including hematoma, seroma, or infection, suboptimal cosmetic result, and the need for further therapeutic diagnostic or surgical intervention. After careful discussion of case management and after answering the patient's multiple questions, the patient provided willing and informed consent to proceed. DESCRIPTION OF PROCEDURE: The patient was brought to the operating room, and after induction of general endotracheal anesthesia the abdomen was prepped and draped in the usual sterile fashion. An incision is created midway between the umbilicus and the xiphoid process in the lower aspect of the epigastrium. The fascia is then grasped, elevated, and divided after blunt dissection identifies the fascia, and a Veress needle is introduced into the abdomen. Pneumoperitoneum is established in the usual manner, and an 8 mm trocar is then placed through this incision, through which a 5 mm laparoscope is introduced. Diagnostic survey of the abdomen reveals no other acute pathology. The hiatal hernia is distinctly visible. At this point, additional trocars are placed. Two 8 mm trocars are placed in the right upper quadrant. An 8 mm trocar is placed in the left upper quadrant. An 8 mm assistant port is also placed in the left upper quadrant. All trocars are placed under direct vision. At this point,  The liver retractor is placed through the 5 mm incision in the lateral RUQ.  The liver is elevated, and a view of the stomach and the diaphragmatic hiatus is obtained; the retractor is secured in place. Next the Robot is docked and instruments are placed under vision. At this point, with careful dissection the right and left crura are identified. The lesser sac is dissected in the avascular plane, and the esophagus and the left and right crura are bluntly dissected free. Once this is accomplished, the greater curvature is dissected  Using a vessel sealer by dividing the short gastrics from the cardia of the stomach to the mid point of the stomach with care not to encroach upon the stomach. Once adequate mobilization of the stomach is accomplished, blunt dissection of the hiatus is accomplished to further reduce the stomach into the abdominal cavity. Next, the right and left crura are bluntly  from the hernia sac and the posterior attachments. Then, a blunt grasper is used to pass a Penrose posterior to the esophagus with care not to injure the esophagus. The Penrose is clearly visualized at all times and pulled posteriorly to encircle the esophagus. This is used with gentle traction to reduce the esophagus and stomach fully into the abdominal cavity and to provide traction and mobilization of the distal esophagus and proximal stomach as necessary. Next, the posterior aspect of the esophagus is bluntly cleared with care not to injure adjacent organs or structures. Once an adequate window is created, the proximal stomach that will be used for fundoplication is then passed posteriorly. With gentle traction, the fundus of the stomach is passed posterior to the esophagus and placed into its final position. There is no tension or retraction once placed in its fundoplication orientation. Prior to performing the fundoplication, the esophageal hiatus is reapproximated posterior to the esophagus and stomach using permanent 2-0 V-Loc sutures.     A [place] Bio A Mesh is secured to the crura with interrupted 2-0 Vicryl Suture. Once adequate reapproximation of the crura is accomplished, the loose  fundoplication is secured using  0-Ethibond sutures in 3 point ligation technique. Once the wrap is completed, the Penrose drain and the Bougie are withdrawn. The fundoplication is noted to be hemostatic. The crural repair is noted to be well approximated and tension free. No injury to adjacent organs and structures is identified. Minimal blood loss is encountered. At this point, the robot, liver retractor, and all instruments are withdrawn. Needle, sponge, instrument counts were correct at this portion of the procedure. Pneumoperitoneum is released; all trocars are removed. All wounds are cleansed and irrigated, then subcuticular 4-0 Monocryl is used  to reapproximate all skin incisions. Local anesthetic is injected at all trocar sites, which are then covered with DermaBond. At this point, the patient was awakened from anesthesia and brought to the recovery room in stable condition, having tolerated the procedure without apparent complications. Final needle, sponge, and instrument counts were correct at the end of the procedure. Operative findings were discussed with the patient's family immediately upon conclusion of procedure.      Bimal Edmonds MD

## 2023-02-27 NOTE — BRIEF OP NOTE
Pre-Operative Diagnosis: Paraesophageal hiatal hernia [K44.9]     Post-Operative Diagnosis: Paraesophageal hiatal hernia [K44.9]      Procedure Performed:   ROBOTIC LAPAROSCOPIC REPAIR OF HIATAL HERNIA AND NISSEN FUNDOPLICATION WITH MESH    Surgeon(s) and Role:     * Zainab Friedman MD - Primary    Assistant(s):  PA: MARY Hernandez; CHON Dominguez     Surgical Findings: Large paraesophageal hiatal hernia with widely displaced crura     Specimen: None     Estimated Blood Loss: Blood Output: 2 mL (2/27/2023 10:10 AM)      Dictation Number:      Rafat Andrews MD  2/27/2023  10:13 AM

## 2023-02-27 NOTE — PROGRESS NOTES
Pt is alert and oriented x4. On 1L of oxygen. Denies shortness of breath or difficulty in breathing. VSS. Glasses. Pt is refusing scds. Flood catheter intact draining clear yellow urine. IVF are infusing. Abdomen soft and tender. Lap sites with skin glue. Started on clear liquid diet; advance as tolerated. Reported nausea; antiemetic given. Pain medication given per mar. Updated on plan of care. Call light within reach.

## 2023-02-28 VITALS
HEART RATE: 65 BPM | OXYGEN SATURATION: 92 % | TEMPERATURE: 99 F | HEIGHT: 60 IN | WEIGHT: 110 LBS | RESPIRATION RATE: 18 BRPM | DIASTOLIC BLOOD PRESSURE: 59 MMHG | SYSTOLIC BLOOD PRESSURE: 132 MMHG | BODY MASS INDEX: 21.6 KG/M2

## 2023-02-28 LAB
ANION GAP SERPL CALC-SCNC: 7 MMOL/L (ref 0–18)
BUN BLD-MCNC: 4 MG/DL (ref 7–18)
CALCIUM BLD-MCNC: 9.1 MG/DL (ref 8.5–10.1)
CHLORIDE SERPL-SCNC: 107 MMOL/L (ref 98–112)
CO2 SERPL-SCNC: 24 MMOL/L (ref 21–32)
CREAT BLD-MCNC: 0.67 MG/DL
GFR SERPLBLD BASED ON 1.73 SQ M-ARVRAT: 89 ML/MIN/1.73M2 (ref 60–?)
GLUCOSE BLD-MCNC: 98 MG/DL (ref 70–99)
MAGNESIUM SERPL-MCNC: 2 MG/DL (ref 1.6–2.6)
OSMOLALITY SERPL CALC.SUM OF ELEC: 283 MOSM/KG (ref 275–295)
POTASSIUM SERPL-SCNC: 3.6 MMOL/L (ref 3.5–5.1)
SODIUM SERPL-SCNC: 138 MMOL/L (ref 136–145)

## 2023-02-28 RX ORDER — PANTOPRAZOLE SODIUM 40 MG/1
40 TABLET, DELAYED RELEASE ORAL
Status: DISCONTINUED | OUTPATIENT
Start: 2023-02-28 | End: 2023-02-28

## 2023-02-28 RX ORDER — HYDROCODONE BITARTRATE AND ACETAMINOPHEN 5; 325 MG/1; MG/1
1 TABLET ORAL EVERY 6 HOURS PRN
Qty: 15 TABLET | Refills: 0 | Status: SHIPPED | OUTPATIENT
Start: 2023-02-28

## 2023-02-28 NOTE — PLAN OF CARE
A&Ox4. VSS. RA. . Denies chest pain and SOB. GI: Abdomen soft, nondistended. Denies passing gas. Belching. Denies nausea. : Voids. Pain controlled at this time. Up with standby assist.  Incisions: Surgical incisions intact--no drainage. Diet: Tolerating clear liquids. Wants to take advancement of diet slowly. All appropriate safety measures in place. All questions and concerns addressed. Will continue to monitor. Problem: PAIN - ADULT  Goal: Verbalizes/displays adequate comfort level or patient's stated pain goal  Description: INTERVENTIONS:  - Encourage pt to monitor pain and request assistance  - Assess pain using appropriate pain scale  - Administer analgesics based on type and severity of pain and evaluate response  - Implement non-pharmacological measures as appropriate and evaluate response  - Consider cultural and social influences on pain and pain management  - Manage/alleviate anxiety  - Utilize distraction and/or relaxation techniques  - Monitor for opioid side effects  - Notify MD/LIP if interventions unsuccessful or patient reports new pain  - Anticipate increased pain with activity and pre-medicate as appropriate  Outcome: Progressing     Problem: RISK FOR INFECTION - ADULT  Goal: Absence of fever/infection during anticipated neutropenic period  Description: INTERVENTIONS  - Monitor WBC  - Administer growth factors as ordered  - Implement neutropenic guidelines  Outcome: Progressing     Problem: SAFETY ADULT - FALL  Goal: Free from fall injury  Description: INTERVENTIONS:  - Assess pt frequently for physical needs  - Identify cognitive and physical deficits and behaviors that affect risk of falls.   - [place] fall precautions as indicated by assessment.  - Educate pt/family on patient safety including physical limitations  - Instruct pt to call for assistance with activity based on assessment  - Modify environment to reduce risk of injury  - Provide assistive devices as appropriate  - Consider OT/PT consult to assist with strengthening/mobility  - Encourage toileting schedule  Outcome: Progressing     Problem: DISCHARGE PLANNING  Goal: Discharge to home or other facility with appropriate resources  Description: INTERVENTIONS:  - Identify barriers to discharge w/pt and caregiver  - Include patient/family/discharge partner in discharge planning  - Arrange for needed discharge resources and transportation as appropriate  - Identify discharge learning needs (meds, wound care, etc)  - Arrange for interpreters to assist at discharge as needed  - Consider post-discharge preferences of patient/family/discharge partner  - Complete POLST form as appropriate  - Assess patient's ability to be responsible for managing their own health  - Refer to Case Management Department for coordinating discharge planning if the patient needs post-hospital services based on physician/LIP order or complex needs related to functional status, cognitive ability or social support system  Outcome: Progressing

## 2023-02-28 NOTE — PROGRESS NOTES
Patient is alert and oriented x3. Up ad ck. Up to chair for meals. Voiding freely. ABD incisions are CDI. Tolerating diet; denies nausea or vomiting. Pain controlled. Patient assessed and ready for DC home. All instructions given to patient for home. All questions answered to patients level of satisfaction. PIV removed and intact.

## 2023-02-28 NOTE — PLAN OF CARE
Received pt A/O x4. VSS. Afebrile. On 1L O2. Denies SOB. Mills catheter intact, draining yellow urine. IVF infusing per MAR. Abdomen soft, non-tender. Lap sites x6 with skin glue. Tolerating clear liquid diet. Denies nausea. Pt c/o mild pain, PRN given with relief. Call light within reach. Safety precautions in place.      0600: mills catheter removed

## 2023-03-07 ENCOUNTER — OFFICE VISIT (OUTPATIENT)
Facility: LOCATION | Age: 78
End: 2023-03-07
Payer: COMMERCIAL

## 2023-03-07 VITALS — TEMPERATURE: 99 F | HEART RATE: 68 BPM

## 2023-03-07 DIAGNOSIS — Z98.890 POST-OPERATIVE STATE: Primary | ICD-10-CM

## 2023-03-07 PROCEDURE — 99024 POSTOP FOLLOW-UP VISIT: CPT

## 2023-03-07 PROCEDURE — 1111F DSCHRG MED/CURRENT MED MERGE: CPT

## 2023-03-09 ENCOUNTER — PATIENT OUTREACH (OUTPATIENT)
Dept: CASE MANAGEMENT | Age: 78
End: 2023-03-09

## 2023-03-13 ENCOUNTER — PATIENT OUTREACH (OUTPATIENT)
Dept: CASE MANAGEMENT | Age: 78
End: 2023-03-13

## 2023-03-13 DIAGNOSIS — R42 DIZZINESS AND GIDDINESS: ICD-10-CM

## 2023-03-13 DIAGNOSIS — I70.0 AORTIC ATHEROSCLEROSIS (HCC): ICD-10-CM

## 2023-03-13 DIAGNOSIS — J44.0 CHRONIC OBSTRUCTIVE PULMONARY DISEASE WITH ACUTE LOWER RESPIRATORY INFECTION (HCC): ICD-10-CM

## 2023-03-13 DIAGNOSIS — L90.0 LICHEN SCLEROSUS: ICD-10-CM

## 2023-03-13 DIAGNOSIS — R41.3 MEMORY DEFICIT: ICD-10-CM

## 2023-03-13 DIAGNOSIS — R11.2 INTRACTABLE NAUSEA AND VOMITING: ICD-10-CM

## 2023-03-13 DIAGNOSIS — K21.9 GASTROESOPHAGEAL REFLUX DISEASE WITHOUT ESOPHAGITIS: ICD-10-CM

## 2023-03-13 DIAGNOSIS — K57.30 DIVERTICULOSIS OF COLON: ICD-10-CM

## 2023-03-13 DIAGNOSIS — F34.1 DYSTHYMIC DISORDER: ICD-10-CM

## 2023-03-13 DIAGNOSIS — E03.9 ACQUIRED HYPOTHYROIDISM: Chronic | ICD-10-CM

## 2023-03-13 RX ORDER — FLUOCINOLONE ACETONIDE 0.11 MG/ML
OIL TOPICAL
COMMUNITY

## 2023-03-14 ENCOUNTER — TELEPHONE (OUTPATIENT)
Dept: FAMILY MEDICINE CLINIC | Facility: CLINIC | Age: 78
End: 2023-03-14

## 2023-03-14 DIAGNOSIS — E03.9 HYPOTHYROIDISM, UNSPECIFIED TYPE: ICD-10-CM

## 2023-03-14 DIAGNOSIS — E55.9 VITAMIN D DEFICIENCY: Primary | ICD-10-CM

## 2023-03-14 NOTE — TELEPHONE ENCOUNTER
Just an FYI -    Patient had surgery  and post op visit, is feeling much better. Patient is in agreement that she would like to restart her thyroid medication now that she is feeling better. Patient is also feeling cold , she is aware that it is a side effect of hypothyroidism. Informed her that she is to take on an empty stomach, one hour before pantoprazole (confirmed with Brittney Rayo  Pharmacist  Zoya Carmona) , patient verbalized understanding     She would like to know when you would like to repeat thyroid labs? Patient would also like to have repeat vitamin D at that time if possible. Please advise. Thank you .

## 2023-03-15 NOTE — TELEPHONE ENCOUNTER
Pt informed. Labs ordered per Dr Indiana Garrido and FU appt scheduled 5/23/23 with Dr Indiana Garrido.

## 2023-03-29 ENCOUNTER — TELEPHONE (OUTPATIENT)
Dept: FAMILY MEDICINE CLINIC | Facility: CLINIC | Age: 78
End: 2023-03-29

## 2023-03-29 ENCOUNTER — PATIENT OUTREACH (OUTPATIENT)
Dept: CASE MANAGEMENT | Age: 78
End: 2023-03-29

## 2023-03-29 DIAGNOSIS — F33.2 SEVERE EPISODE OF RECURRENT MAJOR DEPRESSIVE DISORDER, WITHOUT PSYCHOTIC FEATURES (HCC): ICD-10-CM

## 2023-03-29 DIAGNOSIS — R11.2 NAUSEA AND VOMITING IN ADULT: ICD-10-CM

## 2023-03-29 DIAGNOSIS — F33.0 MILD RECURRENT MAJOR DEPRESSION (HCC): Chronic | ICD-10-CM

## 2023-03-29 DIAGNOSIS — K21.9 GASTROESOPHAGEAL REFLUX DISEASE WITHOUT ESOPHAGITIS: ICD-10-CM

## 2023-03-29 DIAGNOSIS — F34.1 DYSTHYMIC DISORDER: ICD-10-CM

## 2023-03-29 DIAGNOSIS — J44.0 CHRONIC OBSTRUCTIVE PULMONARY DISEASE WITH ACUTE LOWER RESPIRATORY INFECTION (HCC): ICD-10-CM

## 2023-03-29 DIAGNOSIS — I70.0 AORTIC ATHEROSCLEROSIS (HCC): ICD-10-CM

## 2023-03-29 DIAGNOSIS — E03.9 HYPOTHYROIDISM, UNSPECIFIED TYPE: Primary | ICD-10-CM

## 2023-03-29 DIAGNOSIS — K57.30 DIVERTICULOSIS OF COLON: ICD-10-CM

## 2023-03-29 DIAGNOSIS — E03.9 ACQUIRED HYPOTHYROIDISM: Chronic | ICD-10-CM

## 2023-03-29 DIAGNOSIS — R11.2 INTRACTABLE NAUSEA AND VOMITING: ICD-10-CM

## 2023-03-29 DIAGNOSIS — E78.00 PURE HYPERCHOLESTEROLEMIA: ICD-10-CM

## 2023-03-29 DIAGNOSIS — R41.3 MEMORY DEFICIT: ICD-10-CM

## 2023-03-29 DIAGNOSIS — L90.0 LICHEN SCLEROSUS: ICD-10-CM

## 2023-03-29 DIAGNOSIS — R42 DIZZINESS AND GIDDINESS: ICD-10-CM

## 2023-03-29 DIAGNOSIS — R11.0 NAUSEA: ICD-10-CM

## 2023-03-29 DIAGNOSIS — L41.9 PARAPSORIASIS: ICD-10-CM

## 2023-03-29 DIAGNOSIS — K44.9 PARAESOPHAGEAL HIATAL HERNIA: ICD-10-CM

## 2023-03-29 NOTE — TELEPHONE ENCOUNTER
Patient called to provide update , she stated she took  levothyroxine for 5 days and had dizziness, shaking weakness, nevousness and felt very lethargic. Patient stated after stopping the symptoms did go away . She would like to try the Armor Thyroid 15 mg , I have called [facility] to obtain a 1 month supply as she has a script on file. Patient would like to   try taking the armor thyroid ,but would like to know if you recommend she see a endocrinologist as she is struggling to manage these symptoms. She is also now not sleeping and would like to know if the MIRTAZAPINE 15 MG Oral Tab can be increased? Informed her symptoms she may be experiencing are due to hypothyroidism. Patient does have follow up scheduled and will have repeat labs done before upcoming appointment. Future Appointments   Date Time Provider Mckenzie Ferrell   4/5/2023  3:00 PM Minda Rendon MD [facility]   5/23/2023  8:00 AM Deandra Ricks,  [facility] [address] [facility] [address]         Please advise.

## 2023-04-03 NOTE — TELEPHONE ENCOUNTER
LM to call back    Endocrine referral [facility] [phone]     Needs f/u visit with JER NAVA

## 2023-04-03 NOTE — TELEPHONE ENCOUNTER
Give her referral to Endocrinologist.    Have her come follow up to discuss the thyroid meds and discuss sleep.     Ok to do video visit

## 2023-04-04 ENCOUNTER — PATIENT OUTREACH (OUTPATIENT)
Dept: CASE MANAGEMENT | Age: 78
End: 2023-04-04

## 2023-04-04 NOTE — TELEPHONE ENCOUNTER
Patient notified, Dr. Debra Mendosa information provided. Phone visit appt made. Pt states she cannot do the video visit.

## 2023-04-04 NOTE — PROGRESS NOTES
Patient called in to [facility] - she stated she was not able to take the thyroid mediation . Patient stated she felt very nauseous. She stopped and is doing much better. Patient stated she is now having difficulty with constipation , stated she took a ducolax today . She has follow up scheduled with Dr. Ana Reich. Patient will discuss symptoms. Patient stated she has also scheduled with PCP for follow up to discuss symptoms. Patient stated she is not eating many fiber rich foods, discussed with patient daily food intake,   Patient is eating cream of wheat for breakfast, Cracker and peanut butter for snack   Protein drink did not work well ,she stated it caused diarrhea. Patient stated she took a protein drink yesterday thinking it would help constipation. Patient tries to eat an egg a day for protein. Other foods patient enjoys are : Chicken salad,  Fruit yogurt , ground beef, chicken salad. Tuna, Rotisserie  Chicken . Time with patient : 10 minutes.

## 2023-04-05 ENCOUNTER — OFFICE VISIT (OUTPATIENT)
Facility: LOCATION | Age: 78
End: 2023-04-05

## 2023-04-05 VITALS — HEART RATE: 68 BPM | TEMPERATURE: 98 F

## 2023-04-05 DIAGNOSIS — K44.9 PARAESOPHAGEAL HIATAL HERNIA: Primary | ICD-10-CM

## 2023-04-05 DIAGNOSIS — K59.00 CONSTIPATION, UNSPECIFIED CONSTIPATION TYPE: ICD-10-CM

## 2023-04-05 PROCEDURE — 99024 POSTOP FOLLOW-UP VISIT: CPT | Performed by: SURGERY

## 2023-04-06 ENCOUNTER — VIRTUAL PHONE E/M (OUTPATIENT)
Dept: FAMILY MEDICINE CLINIC | Facility: CLINIC | Age: 78
End: 2023-04-06
Payer: COMMERCIAL

## 2023-04-06 DIAGNOSIS — F32.A ANXIETY AND DEPRESSION: ICD-10-CM

## 2023-04-06 DIAGNOSIS — K44.9 PARAESOPHAGEAL HERNIA: ICD-10-CM

## 2023-04-06 DIAGNOSIS — F41.9 ANXIETY AND DEPRESSION: ICD-10-CM

## 2023-04-06 DIAGNOSIS — E03.9 HYPOTHYROIDISM, UNSPECIFIED TYPE: Primary | ICD-10-CM

## 2023-04-06 PROCEDURE — 99214 OFFICE O/P EST MOD 30 MIN: CPT | Performed by: FAMILY MEDICINE

## 2023-04-20 ENCOUNTER — PATIENT OUTREACH (OUTPATIENT)
Dept: CASE MANAGEMENT | Age: 78
End: 2023-04-20

## 2023-04-20 DIAGNOSIS — J44.0 CHRONIC OBSTRUCTIVE PULMONARY DISEASE WITH ACUTE LOWER RESPIRATORY INFECTION (HCC): ICD-10-CM

## 2023-04-20 DIAGNOSIS — L41.9 PARAPSORIASIS: ICD-10-CM

## 2023-04-20 DIAGNOSIS — E78.00 PURE HYPERCHOLESTEROLEMIA: ICD-10-CM

## 2023-04-20 DIAGNOSIS — L80 VITILIGO: ICD-10-CM

## 2023-04-20 DIAGNOSIS — E03.9 ACQUIRED HYPOTHYROIDISM: Chronic | ICD-10-CM

## 2023-04-20 DIAGNOSIS — R41.3 MEMORY DEFICIT: ICD-10-CM

## 2023-04-20 DIAGNOSIS — R42 DIZZINESS AND GIDDINESS: ICD-10-CM

## 2023-04-20 DIAGNOSIS — F34.1 DYSTHYMIC DISORDER: ICD-10-CM

## 2023-04-20 DIAGNOSIS — K57.30 DIVERTICULOSIS OF COLON: ICD-10-CM

## 2023-04-20 DIAGNOSIS — K21.9 GASTROESOPHAGEAL REFLUX DISEASE WITHOUT ESOPHAGITIS: ICD-10-CM

## 2023-04-20 DIAGNOSIS — R11.2 INTRACTABLE NAUSEA AND VOMITING: ICD-10-CM

## 2023-04-20 DIAGNOSIS — F33.2 SEVERE EPISODE OF RECURRENT MAJOR DEPRESSIVE DISORDER, WITHOUT PSYCHOTIC FEATURES (HCC): ICD-10-CM

## 2023-04-20 DIAGNOSIS — K59.00 CONSTIPATION, UNSPECIFIED CONSTIPATION TYPE: ICD-10-CM

## 2023-04-20 DIAGNOSIS — G44.201 INTRACTABLE TENSION-TYPE HEADACHE, UNSPECIFIED CHRONICITY PATTERN: ICD-10-CM

## 2023-04-20 RX ORDER — CLOBETASOL PROPIONATE 0.5 MG/G
OINTMENT TOPICAL
COMMUNITY
Start: 2023-04-12

## 2023-04-20 RX ORDER — PREDNISONE 10 MG/1
TABLET ORAL
COMMUNITY
Start: 2023-04-11

## 2023-04-20 RX ORDER — FAMOTIDINE 40 MG/1
40 TABLET, FILM COATED ORAL
COMMUNITY
Start: 2023-04-11

## 2023-04-20 RX ORDER — HYDROXYZINE HYDROCHLORIDE 10 MG/1
TABLET, FILM COATED ORAL
COMMUNITY
Start: 2023-04-11 | End: 2023-04-20

## 2023-04-28 RX ORDER — LOSARTAN POTASSIUM 50 MG/1
TABLET ORAL
Qty: 90 TABLET | Refills: 0 | Status: SHIPPED | OUTPATIENT
Start: 2023-04-28

## 2023-04-28 RX ORDER — LOSARTAN POTASSIUM 50 MG/1
TABLET ORAL
Qty: 30 TABLET | Refills: 0 | Status: SHIPPED | OUTPATIENT
Start: 2023-04-28 | End: 2023-04-28

## 2023-05-01 ENCOUNTER — OFFICE VISIT (OUTPATIENT)
Dept: FAMILY MEDICINE CLINIC | Facility: CLINIC | Age: 78
End: 2023-05-01
Payer: COMMERCIAL

## 2023-05-01 ENCOUNTER — LAB ENCOUNTER (OUTPATIENT)
Dept: LAB | Age: 78
End: 2023-05-01
Attending: FAMILY MEDICINE
Payer: MEDICARE

## 2023-05-01 VITALS
HEIGHT: 60 IN | SYSTOLIC BLOOD PRESSURE: 130 MMHG | BODY MASS INDEX: 20.81 KG/M2 | RESPIRATION RATE: 18 BRPM | HEART RATE: 84 BPM | WEIGHT: 106 LBS | DIASTOLIC BLOOD PRESSURE: 70 MMHG | OXYGEN SATURATION: 99 % | TEMPERATURE: 98 F

## 2023-05-01 DIAGNOSIS — E55.9 VITAMIN D DEFICIENCY: ICD-10-CM

## 2023-05-01 DIAGNOSIS — R11.0 NAUSEA: ICD-10-CM

## 2023-05-01 DIAGNOSIS — R42 DIZZINESS: Primary | ICD-10-CM

## 2023-05-01 DIAGNOSIS — F41.9 ANXIETY AND DEPRESSION: ICD-10-CM

## 2023-05-01 DIAGNOSIS — E03.9 HYPOTHYROIDISM, UNSPECIFIED TYPE: ICD-10-CM

## 2023-05-01 DIAGNOSIS — F32.A ANXIETY AND DEPRESSION: ICD-10-CM

## 2023-05-01 DIAGNOSIS — R42 DIZZINESS: ICD-10-CM

## 2023-05-01 LAB
ALBUMIN SERPL-MCNC: 3.9 G/DL (ref 3.4–5)
ALBUMIN/GLOB SERPL: 1.1 {RATIO} (ref 1–2)
ALP LIVER SERPL-CCNC: 75 U/L
ALT SERPL-CCNC: 22 U/L
ANION GAP SERPL CALC-SCNC: 7 MMOL/L (ref 0–18)
AST SERPL-CCNC: 23 U/L (ref 15–37)
BASOPHILS # BLD AUTO: 0.05 X10(3) UL (ref 0–0.2)
BASOPHILS NFR BLD AUTO: 0.7 %
BILIRUB SERPL-MCNC: 0.6 MG/DL (ref 0.1–2)
BUN BLD-MCNC: 13 MG/DL (ref 7–18)
CALCIUM BLD-MCNC: 9.9 MG/DL (ref 8.5–10.1)
CHLORIDE SERPL-SCNC: 108 MMOL/L (ref 98–112)
CO2 SERPL-SCNC: 24 MMOL/L (ref 21–32)
CREAT BLD-MCNC: 0.84 MG/DL
EOSINOPHIL # BLD AUTO: 0.13 X10(3) UL (ref 0–0.7)
EOSINOPHIL NFR BLD AUTO: 1.9 %
ERYTHROCYTE [DISTWIDTH] IN BLOOD BY AUTOMATED COUNT: 13.6 %
FASTING STATUS PATIENT QL REPORTED: NO
GFR SERPLBLD BASED ON 1.73 SQ M-ARVRAT: 71 ML/MIN/1.73M2 (ref 60–?)
GLOBULIN PLAS-MCNC: 3.4 G/DL (ref 2.8–4.4)
GLUCOSE BLD-MCNC: 91 MG/DL (ref 70–99)
HCT VFR BLD AUTO: 40.6 %
HGB BLD-MCNC: 13.4 G/DL
IMM GRANULOCYTES # BLD AUTO: 0.02 X10(3) UL (ref 0–1)
IMM GRANULOCYTES NFR BLD: 0.3 %
LYMPHOCYTES # BLD AUTO: 1.09 X10(3) UL (ref 1–4)
LYMPHOCYTES NFR BLD AUTO: 15.7 %
MCH RBC QN AUTO: 30.2 PG (ref 26–34)
MCHC RBC AUTO-ENTMCNC: 33 G/DL (ref 31–37)
MCV RBC AUTO: 91.6 FL
MONOCYTES # BLD AUTO: 0.64 X10(3) UL (ref 0.1–1)
MONOCYTES NFR BLD AUTO: 9.2 %
NEUTROPHILS # BLD AUTO: 5.03 X10 (3) UL (ref 1.5–7.7)
NEUTROPHILS # BLD AUTO: 5.03 X10(3) UL (ref 1.5–7.7)
NEUTROPHILS NFR BLD AUTO: 72.2 %
OSMOLALITY SERPL CALC.SUM OF ELEC: 288 MOSM/KG (ref 275–295)
PLATELET # BLD AUTO: 315 10(3)UL (ref 150–450)
POTASSIUM SERPL-SCNC: 4.1 MMOL/L (ref 3.5–5.1)
PROT SERPL-MCNC: 7.3 G/DL (ref 6.4–8.2)
RBC # BLD AUTO: 4.43 X10(6)UL
SODIUM SERPL-SCNC: 139 MMOL/L (ref 136–145)
T4 FREE SERPL-MCNC: 0.9 NG/DL (ref 0.8–1.7)
TSI SER-ACNC: 6.57 MIU/ML (ref 0.36–3.74)
VIT D+METAB SERPL-MCNC: 34.9 NG/ML (ref 30–100)
WBC # BLD AUTO: 7 X10(3) UL (ref 4–11)

## 2023-05-01 PROCEDURE — 3008F BODY MASS INDEX DOCD: CPT | Performed by: FAMILY MEDICINE

## 2023-05-01 PROCEDURE — 3078F DIAST BP <80 MM HG: CPT | Performed by: FAMILY MEDICINE

## 2023-05-01 PROCEDURE — 1160F RVW MEDS BY RX/DR IN RCRD: CPT | Performed by: FAMILY MEDICINE

## 2023-05-01 PROCEDURE — 84439 ASSAY OF FREE THYROXINE: CPT

## 2023-05-01 PROCEDURE — 99214 OFFICE O/P EST MOD 30 MIN: CPT | Performed by: FAMILY MEDICINE

## 2023-05-01 PROCEDURE — 82306 VITAMIN D 25 HYDROXY: CPT

## 2023-05-01 PROCEDURE — 80053 COMPREHEN METABOLIC PANEL: CPT

## 2023-05-01 PROCEDURE — 1159F MED LIST DOCD IN RCRD: CPT | Performed by: FAMILY MEDICINE

## 2023-05-01 PROCEDURE — 36415 COLL VENOUS BLD VENIPUNCTURE: CPT

## 2023-05-01 PROCEDURE — 85025 COMPLETE CBC W/AUTO DIFF WBC: CPT

## 2023-05-01 PROCEDURE — 3075F SYST BP GE 130 - 139MM HG: CPT | Performed by: FAMILY MEDICINE

## 2023-05-01 PROCEDURE — 84443 ASSAY THYROID STIM HORMONE: CPT

## 2023-05-01 RX ORDER — MIRTAZAPINE 30 MG/1
30 TABLET, FILM COATED ORAL NIGHTLY
Qty: 15 TABLET | Refills: 0 | Status: SHIPPED | OUTPATIENT
Start: 2023-05-01 | End: 2023-05-16

## 2023-05-01 RX ORDER — METOCLOPRAMIDE 5 MG/1
5 TABLET ORAL 3 TIMES DAILY PRN
Qty: 24 TABLET | Refills: 0 | Status: SHIPPED | OUTPATIENT
Start: 2023-05-01

## 2023-05-01 RX ORDER — MECLIZINE HCL 25MG 25 MG/1
1 TABLET, CHEWABLE ORAL 3 TIMES DAILY PRN
Qty: 45 TABLET | Refills: 0 | Status: SHIPPED | OUTPATIENT
Start: 2023-05-01 | End: 2023-06-05

## 2023-05-01 NOTE — PATIENT INSTRUCTIONS
Mirtazapine 30mg daily x 2 weeks, then back to 15mg daily. Meclizine should help with dizzyness and thus nausea. Weakness and headache will improve over time with the dizzyness improving.

## 2023-05-04 ENCOUNTER — TELEPHONE (OUTPATIENT)
Dept: FAMILY MEDICINE CLINIC | Facility: CLINIC | Age: 78
End: 2023-05-04

## 2023-05-09 ENCOUNTER — OFFICE VISIT (OUTPATIENT)
Dept: FAMILY MEDICINE CLINIC | Facility: CLINIC | Age: 78
End: 2023-05-09
Payer: COMMERCIAL

## 2023-05-09 VITALS
SYSTOLIC BLOOD PRESSURE: 110 MMHG | RESPIRATION RATE: 16 BRPM | HEART RATE: 78 BPM | OXYGEN SATURATION: 98 % | BODY MASS INDEX: 21.2 KG/M2 | HEIGHT: 60 IN | WEIGHT: 108 LBS | DIASTOLIC BLOOD PRESSURE: 72 MMHG

## 2023-05-09 DIAGNOSIS — R42 DIZZINESS: Primary | ICD-10-CM

## 2023-05-09 DIAGNOSIS — R79.89 ABNORMAL TSH: ICD-10-CM

## 2023-05-09 PROCEDURE — 3008F BODY MASS INDEX DOCD: CPT | Performed by: FAMILY MEDICINE

## 2023-05-09 PROCEDURE — 3074F SYST BP LT 130 MM HG: CPT | Performed by: FAMILY MEDICINE

## 2023-05-09 PROCEDURE — 1160F RVW MEDS BY RX/DR IN RCRD: CPT | Performed by: FAMILY MEDICINE

## 2023-05-09 PROCEDURE — 1159F MED LIST DOCD IN RCRD: CPT | Performed by: FAMILY MEDICINE

## 2023-05-09 PROCEDURE — 99213 OFFICE O/P EST LOW 20 MIN: CPT | Performed by: FAMILY MEDICINE

## 2023-05-09 PROCEDURE — 3078F DIAST BP <80 MM HG: CPT | Performed by: FAMILY MEDICINE

## 2023-05-09 RX ORDER — KETOROLAC TROMETHAMINE 5 MG/ML
SOLUTION OPHTHALMIC
COMMUNITY
Start: 2023-05-05

## 2023-05-16 DIAGNOSIS — F41.9 ANXIETY AND DEPRESSION: ICD-10-CM

## 2023-05-16 DIAGNOSIS — F32.A ANXIETY AND DEPRESSION: ICD-10-CM

## 2023-05-16 RX ORDER — CLONAZEPAM 0.5 MG/1
TABLET ORAL
Qty: 60 TABLET | Refills: 0 | Status: SHIPPED | OUTPATIENT
Start: 2023-05-16

## 2023-05-22 ENCOUNTER — PATIENT OUTREACH (OUTPATIENT)
Dept: CASE MANAGEMENT | Age: 78
End: 2023-05-22

## 2023-05-22 ENCOUNTER — TELEPHONE (OUTPATIENT)
Dept: FAMILY MEDICINE CLINIC | Facility: CLINIC | Age: 78
End: 2023-05-22

## 2023-05-22 DIAGNOSIS — E78.00 PURE HYPERCHOLESTEROLEMIA: ICD-10-CM

## 2023-05-22 DIAGNOSIS — K21.9 GASTROESOPHAGEAL REFLUX DISEASE WITHOUT ESOPHAGITIS: ICD-10-CM

## 2023-05-22 DIAGNOSIS — G44.201 INTRACTABLE TENSION-TYPE HEADACHE, UNSPECIFIED CHRONICITY PATTERN: ICD-10-CM

## 2023-05-22 DIAGNOSIS — R53.1 WEAKNESS GENERALIZED: ICD-10-CM

## 2023-05-22 DIAGNOSIS — L90.0 LICHEN SCLEROSUS: ICD-10-CM

## 2023-05-22 DIAGNOSIS — K57.30 DIVERTICULOSIS OF COLON: ICD-10-CM

## 2023-05-22 DIAGNOSIS — J44.0 CHRONIC OBSTRUCTIVE PULMONARY DISEASE WITH ACUTE LOWER RESPIRATORY INFECTION (HCC): ICD-10-CM

## 2023-05-22 DIAGNOSIS — K44.9 PARAESOPHAGEAL HIATAL HERNIA: ICD-10-CM

## 2023-05-22 DIAGNOSIS — L41.9 PARAPSORIASIS: ICD-10-CM

## 2023-05-22 DIAGNOSIS — R41.3 MEMORY DEFICIT: ICD-10-CM

## 2023-05-22 DIAGNOSIS — K44.9 PARAESOPHAGEAL HERNIA: ICD-10-CM

## 2023-05-22 DIAGNOSIS — L80 VITILIGO: ICD-10-CM

## 2023-05-22 DIAGNOSIS — R11.2 NAUSEA AND VOMITING IN ADULT: ICD-10-CM

## 2023-05-22 DIAGNOSIS — F34.1 DYSTHYMIC DISORDER: ICD-10-CM

## 2023-05-22 DIAGNOSIS — R11.2 INTRACTABLE NAUSEA AND VOMITING: ICD-10-CM

## 2023-05-22 DIAGNOSIS — F33.0 MILD RECURRENT MAJOR DEPRESSION (HCC): Chronic | ICD-10-CM

## 2023-05-22 DIAGNOSIS — E03.9 ACQUIRED HYPOTHYROIDISM: Chronic | ICD-10-CM

## 2023-05-22 DIAGNOSIS — R42 DIZZINESS AND GIDDINESS: ICD-10-CM

## 2023-05-22 DIAGNOSIS — E55.9 VITAMIN D DEFICIENCY: ICD-10-CM

## 2023-05-22 DIAGNOSIS — F33.2 SEVERE EPISODE OF RECURRENT MAJOR DEPRESSIVE DISORDER, WITHOUT PSYCHOTIC FEATURES (HCC): ICD-10-CM

## 2023-05-22 RX ORDER — FLUOCINONIDE TOPICAL SOLUTION USP, 0.05% 0.5 MG/ML
SOLUTION TOPICAL
COMMUNITY
Start: 2023-05-16

## 2023-05-22 RX ORDER — CETIRIZINE HYDROCHLORIDE 10 MG/1
10 TABLET ORAL DAILY
COMMUNITY

## 2023-05-22 NOTE — TELEPHONE ENCOUNTER
Spoke to patient for CCM -   She has appointment to see Dr. Gina Connors. Patient stated she is feeling more depressed and anxious. Patient stated due to her symptoms she has had some thoughts that she would like all of this and her life to end. Patient stated she has no intention of going through with any plan . Patient feels doing some thing would be more harm to her family and she has no desire of doing anything harmful to herself or others. Patient stated she will discuss with Dr. Tommie Sanchez tomorrow , she feels she would benefit in doubling up on the Mirtazapine . Also BP have been elevated, patient mentioned she is feeling confused lately but feels it may be due to all of the symptoms of nausea/stress/depression, thyroid. Patient reported Vitals-   159/82  132/83  169/92  151/92  153/92  149/80      Please advise.

## 2023-05-23 ENCOUNTER — OFFICE VISIT (OUTPATIENT)
Dept: FAMILY MEDICINE CLINIC | Facility: CLINIC | Age: 78
End: 2023-05-23
Payer: COMMERCIAL

## 2023-05-23 VITALS
SYSTOLIC BLOOD PRESSURE: 138 MMHG | RESPIRATION RATE: 20 BRPM | BODY MASS INDEX: 21.2 KG/M2 | HEIGHT: 60 IN | WEIGHT: 108 LBS | HEART RATE: 77 BPM | OXYGEN SATURATION: 98 % | DIASTOLIC BLOOD PRESSURE: 60 MMHG

## 2023-05-23 DIAGNOSIS — I70.0 AORTIC ATHEROSCLEROSIS (HCC): ICD-10-CM

## 2023-05-23 DIAGNOSIS — F33.2 SEVERE EPISODE OF RECURRENT MAJOR DEPRESSIVE DISORDER, WITHOUT PSYCHOTIC FEATURES (HCC): ICD-10-CM

## 2023-05-23 DIAGNOSIS — R41.3 MEMORY DEFICIT: ICD-10-CM

## 2023-05-23 DIAGNOSIS — L80 VITILIGO: ICD-10-CM

## 2023-05-23 DIAGNOSIS — K21.00 GASTROESOPHAGEAL REFLUX DISEASE WITH ESOPHAGITIS WITHOUT HEMORRHAGE: ICD-10-CM

## 2023-05-23 DIAGNOSIS — R42 DIZZINESS AND GIDDINESS: ICD-10-CM

## 2023-05-23 DIAGNOSIS — L90.0 LICHEN SCLEROSUS: ICD-10-CM

## 2023-05-23 DIAGNOSIS — E55.9 VITAMIN D DEFICIENCY: ICD-10-CM

## 2023-05-23 DIAGNOSIS — E03.9 ACQUIRED HYPOTHYROIDISM: Chronic | ICD-10-CM

## 2023-05-23 DIAGNOSIS — K44.9 PARAESOPHAGEAL HERNIA: ICD-10-CM

## 2023-05-23 DIAGNOSIS — J44.0 CHRONIC OBSTRUCTIVE PULMONARY DISEASE WITH ACUTE LOWER RESPIRATORY INFECTION (HCC): ICD-10-CM

## 2023-05-23 DIAGNOSIS — F33.1 MAJOR DEPRESSIVE DISORDER, RECURRENT EPISODE, MODERATE (HCC): ICD-10-CM

## 2023-05-23 DIAGNOSIS — K57.30 DIVERTICULOSIS OF COLON: ICD-10-CM

## 2023-05-23 DIAGNOSIS — Z12.11 COLON CANCER SCREENING: ICD-10-CM

## 2023-05-23 DIAGNOSIS — E78.00 PURE HYPERCHOLESTEROLEMIA: ICD-10-CM

## 2023-05-23 DIAGNOSIS — F34.1 DYSTHYMIC DISORDER: ICD-10-CM

## 2023-05-23 DIAGNOSIS — Z00.00 ENCOUNTER FOR ANNUAL HEALTH EXAMINATION: Primary | ICD-10-CM

## 2023-05-23 PROBLEM — R53.1 WEAKNESS GENERALIZED: Status: RESOLVED | Noted: 2022-11-03 | Resolved: 2023-05-23

## 2023-05-23 PROBLEM — R11.2 NAUSEA AND VOMITING IN ADULT: Status: RESOLVED | Noted: 2022-12-29 | Resolved: 2023-05-23

## 2023-05-23 PROBLEM — R11.0 NAUSEA: Status: RESOLVED | Noted: 2022-11-03 | Resolved: 2023-05-23

## 2023-05-23 PROBLEM — G44.209 TENSION TYPE HEADACHE: Status: RESOLVED | Noted: 2019-10-07 | Resolved: 2023-05-23

## 2023-05-23 PROBLEM — K59.00 CONSTIPATION: Status: RESOLVED | Noted: 2023-04-05 | Resolved: 2023-05-23

## 2023-05-23 PROBLEM — L30.9 ACUTE DERMATITIS: Status: RESOLVED | Noted: 2019-05-07 | Resolved: 2023-05-23

## 2023-05-23 PROBLEM — F33.0 MILD RECURRENT MAJOR DEPRESSION (HCC): Chronic | Status: RESOLVED | Noted: 2018-07-10 | Resolved: 2023-05-23

## 2023-05-23 PROCEDURE — 3008F BODY MASS INDEX DOCD: CPT | Performed by: FAMILY MEDICINE

## 2023-05-23 PROCEDURE — 96160 PT-FOCUSED HLTH RISK ASSMT: CPT | Performed by: FAMILY MEDICINE

## 2023-05-23 PROCEDURE — 1170F FXNL STATUS ASSESSED: CPT | Performed by: FAMILY MEDICINE

## 2023-05-23 PROCEDURE — G0439 PPPS, SUBSEQ VISIT: HCPCS | Performed by: FAMILY MEDICINE

## 2023-05-23 PROCEDURE — 3075F SYST BP GE 130 - 139MM HG: CPT | Performed by: FAMILY MEDICINE

## 2023-05-23 PROCEDURE — 1125F AMNT PAIN NOTED PAIN PRSNT: CPT | Performed by: FAMILY MEDICINE

## 2023-05-23 PROCEDURE — 3078F DIAST BP <80 MM HG: CPT | Performed by: FAMILY MEDICINE

## 2023-05-23 RX ORDER — PANTOPRAZOLE SODIUM 40 MG/1
TABLET, DELAYED RELEASE ORAL
Qty: 60 TABLET | Refills: 1 | Status: SHIPPED | OUTPATIENT
Start: 2023-05-23

## 2023-05-23 RX ORDER — PANTOPRAZOLE SODIUM 40 MG/1
TABLET, DELAYED RELEASE ORAL
Qty: 135 TABLET | Refills: 0 | OUTPATIENT
Start: 2023-05-23

## 2023-05-23 RX ORDER — MIRTAZAPINE 30 MG/1
30 TABLET, FILM COATED ORAL NIGHTLY
Qty: 90 TABLET | Refills: 1 | Status: SHIPPED | OUTPATIENT
Start: 2023-05-23

## 2023-05-23 RX ORDER — ONDANSETRON 4 MG/1
4 TABLET, FILM COATED ORAL EVERY 8 HOURS PRN
COMMUNITY

## 2023-05-23 RX ORDER — MECLIZINE HCL 12.5 MG/1
12.5 TABLET ORAL 3 TIMES DAILY PRN
Qty: 30 TABLET | Refills: 1 | Status: SHIPPED | OUTPATIENT
Start: 2023-05-23

## 2023-05-23 NOTE — PATIENT INSTRUCTIONS
Bibi Diaz's SCREENING SCHEDULE   Tests on this list are recommended by your physician but may not be covered, or covered at this frequency, by your insurer. Please check with your insurance carrier before scheduling to verify coverage. PREVENTATIVE SERVICES FREQUENCY &  COVERAGE DETAILS LAST COMPLETION DATE   Diabetes Screening    Fasting Blood Sugar /  Glucose    One screening every 12 months if never tested or if previously tested but not diagnosed with pre-diabetes   One screening every 6 months if diagnosed with pre-diabetes Lab Results   Component Value Date    GLU 91 05/01/2023        Cardiovascular Disease Screening    Lipid Panel  Cholesterol  Lipoprotein (HDL)  Triglycerides Covered every 5 years for all Medicare beneficiaries without apparent signs or symptoms of cardiovascular disease Lab Results   Component Value Date    OBHCTKO 724 (H) 10/25/2022    HDL 50 10/25/2022     (H) 10/25/2022    TRIG 179 (H) 10/25/2022         Electrocardiogram (EKG)   Covered if needed at Welcome to Medicare, and non-screening if indicated for medical reasons 01/11/2023      Ultrasound Screening for Abdominal Aortic Aneurysm (AAA) Covered once in a lifetime for one of the following risk factors    Men who are 73-68 years old and have ever smoked    Anyone with a family history -     Colorectal Cancer Screening  Covered for ages 52-80; only need ONE of the following:    Colonoscopy   Covered every 10 years    Covered every 2 years if patient is at high risk or previous colonoscopy was abnormal 08/02/2018    Colorectal Cancer Screening due on 08/02/2023    Flexible Sigmoidoscopy   Covered every 4 years -    Fecal Occult Blood Test Covered annually -   Bone Density Screening    Bone density screening    Covered every 2 years after age 72 if diagnosed with risk of osteoporosis or estrogen deficiency.     Covered yearly for long-term glucocorticoid medication use (Steroids) Last Dexa Scan:    XR DEXA BONE DENSITOMETRY (CPT=77080) 07/16/2019      No recommendations at this time   Pap and Pelvic    Pap   Covered every 2 years for women at normal risk;  Annually if at high risk -  No recommendations at this time    Chlamydia Annually if high risk -  No recommendations at this time   Screening Mammogram    Mammogram     Recommend annually for all female patients aged 36 and older    One baseline mammogram covered for patients aged 32-38 -    No recommendations at this time    Immunizations    Influenza Covered once per flu season  Please get every year 09/08/2022  No recommendations at this time    Pneumococcal Each vaccine (Bknqqkb06 & [id number]) covered once after 72 Prevnar 13: 05/16/2017    Djcuhxtlj29: 01/04/2012     No recommendations at this time    Hepatitis B One screening covered for patients with certain risk factors   -  No recommendations at this time    Tetanus Toxoid Not covered by Medicare Part B unless medically necessary (cut with metal); may be covered with your pharmacy prescription benefits 03/21/2006    Tetanus, Diptheria and Pertusis TD and TDaP Not covered by Medicare Part B -  No recommendations at this time    Zoster Not covered by Medicare Part B; may be covered with your pharmacy  prescription benefits -  Zoster Vaccines(2 of 2) due on 09/09/2020       Annual Monitoring of Persistent Medications (ACE/ARB, digoxin diuretics, anticonvulsants)    Potassium Annually Lab Results   Component Value Date    K 4.1 05/01/2023         Creatinine   Annually Lab Results   Component Value Date    CREATSERUM 0.84 05/01/2023         BUN Annually Lab Results   Component Value Date    BUN 13 05/01/2023       Drug Serum Conc Annually No results found for: DIGOXIN, DIG, VALP           Chronic Obstructive Pulmonary Disease (COPD)    Spirometry Annually Spirometry date:

## 2023-05-25 PROCEDURE — 82274 ASSAY TEST FOR BLOOD FECAL: CPT | Performed by: FAMILY MEDICINE

## 2023-05-26 ENCOUNTER — PATIENT OUTREACH (OUTPATIENT)
Dept: CASE MANAGEMENT | Age: 78
End: 2023-05-26

## 2023-05-26 NOTE — PROGRESS NOTES
CCM called to follow with patient     Left message for patient to return call   Time spent - 2 minutes

## 2023-06-01 LAB — HEMOCCULT STL QL: NEGATIVE

## 2023-06-02 RX ORDER — BUDESONIDE AND FORMOTEROL FUMARATE DIHYDRATE 160; 4.5 UG/1; UG/1
AEROSOL RESPIRATORY (INHALATION)
Qty: 10.2 G | Refills: 11 | OUTPATIENT
Start: 2023-06-02

## 2023-06-02 RX ORDER — BUDESONIDE AND FORMOTEROL FUMARATE DIHYDRATE 160; 4.5 UG/1; UG/1
AEROSOL RESPIRATORY (INHALATION)
Qty: 10.2 G | Refills: 11 | Status: SHIPPED | OUTPATIENT
Start: 2023-06-02

## 2023-06-05 ENCOUNTER — PATIENT OUTREACH (OUTPATIENT)
Dept: CASE MANAGEMENT | Age: 78
End: 2023-06-05

## 2023-06-05 DIAGNOSIS — E03.9 ACQUIRED HYPOTHYROIDISM: Chronic | ICD-10-CM

## 2023-06-05 DIAGNOSIS — E78.00 PURE HYPERCHOLESTEROLEMIA: ICD-10-CM

## 2023-06-05 DIAGNOSIS — F33.2 SEVERE EPISODE OF RECURRENT MAJOR DEPRESSIVE DISORDER, WITHOUT PSYCHOTIC FEATURES (HCC): ICD-10-CM

## 2023-06-05 DIAGNOSIS — F33.1 MAJOR DEPRESSIVE DISORDER, RECURRENT EPISODE, MODERATE (HCC): ICD-10-CM

## 2023-06-05 DIAGNOSIS — L80 VITILIGO: ICD-10-CM

## 2023-06-29 DIAGNOSIS — F32.A ANXIETY AND DEPRESSION: ICD-10-CM

## 2023-06-29 DIAGNOSIS — F41.9 ANXIETY AND DEPRESSION: ICD-10-CM

## 2023-06-30 RX ORDER — CLONAZEPAM 0.5 MG/1
0.5 TABLET ORAL 2 TIMES DAILY PRN
Qty: 60 TABLET | Refills: 0 | Status: SHIPPED | OUTPATIENT
Start: 2023-06-30

## 2023-07-03 ENCOUNTER — OFFICE VISIT (OUTPATIENT)
Dept: ENDOCRINOLOGY CLINIC | Facility: CLINIC | Age: 78
End: 2023-07-03

## 2023-07-03 VITALS
DIASTOLIC BLOOD PRESSURE: 71 MMHG | SYSTOLIC BLOOD PRESSURE: 149 MMHG | OXYGEN SATURATION: 100 % | BODY MASS INDEX: 20.42 KG/M2 | HEART RATE: 71 BPM | TEMPERATURE: 98 F | WEIGHT: 104 LBS | HEIGHT: 60 IN

## 2023-07-03 DIAGNOSIS — E03.8 SUBCLINICAL HYPOTHYROIDISM: Primary | ICD-10-CM

## 2023-07-03 RX ORDER — LEVOTHYROXINE SODIUM 25 UG/1
25 TABLET ORAL
Qty: 90 TABLET | Refills: 0 | Status: SHIPPED | OUTPATIENT
Start: 2023-07-03 | End: 2023-10-01

## 2023-07-07 ENCOUNTER — HOSPITAL ENCOUNTER (EMERGENCY)
Facility: HOSPITAL | Age: 78
Discharge: HOME | End: 2023-07-07
Attending: EMERGENCY MEDICINE
Payer: MEDICARE

## 2023-07-07 ENCOUNTER — APPOINTMENT (OUTPATIENT)
Dept: CT IMAGING | Facility: HOSPITAL | Age: 78
End: 2023-07-07
Attending: EMERGENCY MEDICINE
Payer: MEDICARE

## 2023-07-07 VITALS
HEIGHT: 60 IN | BODY MASS INDEX: 20.62 KG/M2 | RESPIRATION RATE: 13 BRPM | HEART RATE: 72 BPM | OXYGEN SATURATION: 97 % | SYSTOLIC BLOOD PRESSURE: 132 MMHG | DIASTOLIC BLOOD PRESSURE: 81 MMHG | TEMPERATURE: 99 F | WEIGHT: 105 LBS

## 2023-07-07 DIAGNOSIS — R51.9 ACUTE NONINTRACTABLE HEADACHE, UNSPECIFIED HEADACHE TYPE: Primary | ICD-10-CM

## 2023-07-07 DIAGNOSIS — T50.905A ADVERSE EFFECT OF DRUG, INITIAL ENCOUNTER: ICD-10-CM

## 2023-07-07 LAB
ALBUMIN SERPL-MCNC: 3.9 G/DL (ref 3.4–5)
ALBUMIN/GLOB SERPL: 1.1 {RATIO} (ref 1–2)
ALP LIVER SERPL-CCNC: 82 U/L
ALT SERPL-CCNC: 21 U/L
ANION GAP SERPL CALC-SCNC: 5 MMOL/L (ref 0–18)
AST SERPL-CCNC: 23 U/L (ref 15–37)
BASOPHILS # BLD AUTO: 0.09 X10(3) UL (ref 0–0.2)
BASOPHILS NFR BLD AUTO: 1.4 %
BILIRUB SERPL-MCNC: 0.5 MG/DL (ref 0.1–2)
BILIRUB UR QL STRIP.AUTO: NEGATIVE
BUN BLD-MCNC: 8 MG/DL (ref 7–18)
CALCIUM BLD-MCNC: 9.4 MG/DL (ref 8.5–10.1)
CHLORIDE SERPL-SCNC: 108 MMOL/L (ref 98–112)
CLARITY UR REFRACT.AUTO: CLEAR
CO2 SERPL-SCNC: 26 MMOL/L (ref 21–32)
COLOR UR AUTO: COLORLESS
CREAT BLD-MCNC: 0.71 MG/DL
EOSINOPHIL # BLD AUTO: 0.27 X10(3) UL (ref 0–0.7)
EOSINOPHIL NFR BLD AUTO: 4.2 %
ERYTHROCYTE [DISTWIDTH] IN BLOOD BY AUTOMATED COUNT: 12.7 %
GFR SERPLBLD BASED ON 1.73 SQ M-ARVRAT: 87 ML/MIN/1.73M2 (ref 60–?)
GLOBULIN PLAS-MCNC: 3.4 G/DL (ref 2.8–4.4)
GLUCOSE BLD-MCNC: 100 MG/DL (ref 70–99)
GLUCOSE UR STRIP.AUTO-MCNC: NEGATIVE MG/DL
HCT VFR BLD AUTO: 40.2 %
HGB BLD-MCNC: 13.4 G/DL
IMM GRANULOCYTES # BLD AUTO: 0.08 X10(3) UL (ref 0–1)
IMM GRANULOCYTES NFR BLD: 1.3 %
KETONES UR STRIP.AUTO-MCNC: NEGATIVE MG/DL
LYMPHOCYTES # BLD AUTO: 1.03 X10(3) UL (ref 1–4)
LYMPHOCYTES NFR BLD AUTO: 16.1 %
MCH RBC QN AUTO: 30.7 PG (ref 26–34)
MCHC RBC AUTO-ENTMCNC: 33.3 G/DL (ref 31–37)
MCV RBC AUTO: 92.2 FL
MONOCYTES # BLD AUTO: 0.53 X10(3) UL (ref 0.1–1)
MONOCYTES NFR BLD AUTO: 8.3 %
NEUTROPHILS # BLD AUTO: 4.39 X10 (3) UL (ref 1.5–7.7)
NEUTROPHILS # BLD AUTO: 4.39 X10(3) UL (ref 1.5–7.7)
NEUTROPHILS NFR BLD AUTO: 68.7 %
NITRITE UR QL STRIP.AUTO: NEGATIVE
OSMOLALITY SERPL CALC.SUM OF ELEC: 286 MOSM/KG (ref 275–295)
PH UR STRIP.AUTO: 8 [PH] (ref 5–8)
PLATELET # BLD AUTO: 318 10(3)UL (ref 150–450)
POTASSIUM SERPL-SCNC: 3.7 MMOL/L (ref 3.5–5.1)
PROT SERPL-MCNC: 7.3 G/DL (ref 6.4–8.2)
PROT UR STRIP.AUTO-MCNC: NEGATIVE MG/DL
RBC # BLD AUTO: 4.36 X10(6)UL
RBC UR QL AUTO: NEGATIVE
SODIUM SERPL-SCNC: 139 MMOL/L (ref 136–145)
SP GR UR STRIP.AUTO: 1 (ref 1–1.03)
T4 FREE SERPL-MCNC: 0.8 NG/DL (ref 0.8–1.7)
TSI SER-ACNC: 3.75 MIU/ML (ref 0.36–3.74)
UROBILINOGEN UR STRIP.AUTO-MCNC: <2 MG/DL
WBC # BLD AUTO: 6.4 X10(3) UL (ref 4–11)

## 2023-07-07 PROCEDURE — 96374 THER/PROPH/DIAG INJ IV PUSH: CPT

## 2023-07-07 PROCEDURE — 80053 COMPREHEN METABOLIC PANEL: CPT | Performed by: EMERGENCY MEDICINE

## 2023-07-07 PROCEDURE — 84443 ASSAY THYROID STIM HORMONE: CPT | Performed by: EMERGENCY MEDICINE

## 2023-07-07 PROCEDURE — 93005 ELECTROCARDIOGRAM TRACING: CPT

## 2023-07-07 PROCEDURE — 70450 CT HEAD/BRAIN W/O DYE: CPT | Performed by: EMERGENCY MEDICINE

## 2023-07-07 PROCEDURE — 99285 EMERGENCY DEPT VISIT HI MDM: CPT

## 2023-07-07 PROCEDURE — 84439 ASSAY OF FREE THYROXINE: CPT | Performed by: EMERGENCY MEDICINE

## 2023-07-07 PROCEDURE — 96375 TX/PRO/DX INJ NEW DRUG ADDON: CPT

## 2023-07-07 PROCEDURE — 87086 URINE CULTURE/COLONY COUNT: CPT | Performed by: EMERGENCY MEDICINE

## 2023-07-07 PROCEDURE — 81001 URINALYSIS AUTO W/SCOPE: CPT | Performed by: EMERGENCY MEDICINE

## 2023-07-07 PROCEDURE — 85025 COMPLETE CBC W/AUTO DIFF WBC: CPT | Performed by: EMERGENCY MEDICINE

## 2023-07-07 PROCEDURE — 93010 ELECTROCARDIOGRAM REPORT: CPT

## 2023-07-07 RX ORDER — KETOROLAC TROMETHAMINE 15 MG/ML
15 INJECTION, SOLUTION INTRAMUSCULAR; INTRAVENOUS ONCE
Status: COMPLETED | OUTPATIENT
Start: 2023-07-07 | End: 2023-07-07

## 2023-07-07 RX ORDER — ONDANSETRON 4 MG/1
4 TABLET, ORALLY DISINTEGRATING ORAL EVERY 4 HOURS PRN
Qty: 10 TABLET | Refills: 0 | Status: SHIPPED | OUTPATIENT
Start: 2023-07-07 | End: 2023-07-14

## 2023-07-07 RX ORDER — ONDANSETRON 2 MG/ML
4 INJECTION INTRAMUSCULAR; INTRAVENOUS ONCE
Status: COMPLETED | OUTPATIENT
Start: 2023-07-07 | End: 2023-07-07

## 2023-07-07 NOTE — DISCHARGE INSTRUCTIONS
Take Tylenol and Motrin for your headache. Call your physician before taking any of your thyroid medication and for possible change of preparation as well.

## 2023-07-07 NOTE — ED INITIAL ASSESSMENT (HPI)
Started unithroid yesterday morning. C/O headache starting at 1600. Headache and nausea continuing today unrelieved by OTC meds. Elevated BP at home today and called EMS. Given zofran by EMS with improved nausea. Headache improving. No other neuro symptoms noted.

## 2023-07-08 LAB
ATRIAL RATE: 135 BPM
P AXIS: 56 DEGREES
P-R INTERVAL: 112 MS
Q-T INTERVAL: 166 MS
QRS DURATION: 10 MS
QTC CALCULATION (BEZET): 249 MS
R AXIS: 0 DEGREES
T AXIS: 212 DEGREES
VENTRICULAR RATE: 135 BPM

## 2023-07-10 ENCOUNTER — PATIENT OUTREACH (OUTPATIENT)
Dept: CASE MANAGEMENT | Age: 78
End: 2023-07-10

## 2023-07-10 ENCOUNTER — TELEPHONE (OUTPATIENT)
Dept: ENDOCRINOLOGY CLINIC | Facility: CLINIC | Age: 78
End: 2023-07-10

## 2023-07-10 DIAGNOSIS — E78.00 PURE HYPERCHOLESTEROLEMIA: ICD-10-CM

## 2023-07-10 DIAGNOSIS — E03.9 ACQUIRED HYPOTHYROIDISM: Primary | ICD-10-CM

## 2023-07-10 DIAGNOSIS — F33.1 MAJOR DEPRESSIVE DISORDER, RECURRENT EPISODE, MODERATE (HCC): ICD-10-CM

## 2023-07-10 DIAGNOSIS — K44.9 PARAESOPHAGEAL HERNIA: ICD-10-CM

## 2023-07-10 DIAGNOSIS — F33.2 SEVERE EPISODE OF RECURRENT MAJOR DEPRESSIVE DISORDER, WITHOUT PSYCHOTIC FEATURES (HCC): ICD-10-CM

## 2023-07-10 DIAGNOSIS — R42 DIZZINESS AND GIDDINESS: ICD-10-CM

## 2023-07-10 DIAGNOSIS — K21.00 GASTROESOPHAGEAL REFLUX DISEASE WITH ESOPHAGITIS WITHOUT HEMORRHAGE: ICD-10-CM

## 2023-07-10 DIAGNOSIS — L80 VITILIGO: ICD-10-CM

## 2023-07-10 DIAGNOSIS — J44.0 CHRONIC OBSTRUCTIVE PULMONARY DISEASE WITH ACUTE LOWER RESPIRATORY INFECTION (HCC): ICD-10-CM

## 2023-07-10 DIAGNOSIS — I70.0 AORTIC ATHEROSCLEROSIS (HCC): ICD-10-CM

## 2023-07-10 NOTE — PROGRESS NOTES
7/10/2023  Spoke to Javier Angeles at [facility] about CCM, current care plan with Javier Angeles reviewed meds and compliance. Reviewed pt Acquired hypothyroidism  (primary encounter diagnosis)  Gastroesophageal reflux disease with esophagitis without hemorrhage  Severe episode of recurrent major depressive disorder, without psychotic features (hcc)  Aortic atherosclerosis (hcc)  Dizziness and giddiness  Chronic obstructive pulmonary disease with acute lower respiratory infection (hcc)  Major depressive disorder, recurrent episode, moderate (hcc)  Pure hypercholesterolemia     Updates to patient care team/comments: Established care with Endocrinologist -Dr. Crystal Hilraio  Patient reported changes in medications: Patient prescribed unithroid- had adverse reaction and removed from medication list.   Med Adherence  Comment: Patient reported compliance      Health Maintenance:  Zoster Vaccines(2 of 2) due on 09/09/2020  COVID-19 Vaccine(4 - Wiley Peter series) due on 02/02/2022  Colorectal Cancer Screening due on 08/02/2023  HTN: BP Follow-Up due on 08/03/2023  Influenza Vaccine(1) due on 10/01/2023  DEXA Scan Completed  MA Annual Health Assessment Completed  Annual Depression Screening Completed  Fall Risk Screening (Annual) Completed  Pneumococcal Vaccine: 65+ Years Completed       Patient Current Concerns:   Spoke to patient . Patient reported she established care with Dr. Crystal Hilario - endocrinologist. Patient stated she was prescribed unithroid, took first dose 7/6/2023. Patient stated she felt good when taking but by 4pm had a severy headache. Patient reported taking Advil and tylenol and that helped- she then noticed headache got worse and took oxycodone, she stated she did not get much relief and  woke up with pain. Patient stated she went to ER . BP was elevated around systolic-180 , she does not recall exact BP reading.  Patient stated she called paramedics , had CT scan done , everything was normal. Patient stated she continues to have stomach discomfort, Saturday she had an episode of loose stools. Reports having a significant amount of stool , beige in color. Patient reported  since surgery on hernia she has constipation or diarrhea. She is switching between Mylanta and metamucil for relief. Patient is now back on [address] , she is doing much better- she is due for the next injection tomorrow . She is due to follow up with dermatology -July 26. Patient declined visit with Dr. Haile cheng this time time, she would like to follow up with Gastroenterology . Encouraged patient to monitor home BP , patient is unsure she can do so as she is very anxious . Future Appointments   Date Time Provider Mckenzie Ferrell   10/2/2023  9:30 AM Cristi Jauregui MD [facility] sent to Dr. Juan Alberto Cordero-       Previous Goal Met: ongoing      Self Management Goals/Action Plan: Active goal from previous outreach:                             Be more active, improve mood. Patient reported progress toward goal: patient continues to work toward goal.          Update to previous barriers: Patient reported she started dupixent and skin itching has improved. Patient Reported New Barriers And Concerns:    Patient was not able to tolerate the unithroid prescribed by Dr. Juan Alberto Cordero - Endocrinologist -had reaction and went to ER. Care manager sent message to endocrinologist to advise. Plan for overcoming all barriers: Follow up with care team, patient calling for appointment with gastroenterologist. Patient also starting gluten free diet as instructed. Encouraged patient to call with any questions or concerns. Patient agrees to goal action plan. Self-Management Abilities (patient reported)             1= least confident in achieving goal, 5= very confident               - confidence: 3      Care Manager Follow Up: 1 month or sooner if needed.    Reason For Follow Up: review progress and or barriers towards patients goals. Care Managers Interventions: Continue to provide encouragement, support and education for healthy coping and diagnosis. Time Spent This Encounter Total: 40  min medical record review, telephone communication, care plan updates where needed, education, goals and action plan recreation/update. Provided acknowledgment and validation to patient's concerns.    Monthly Minute Total including today: 40 min     Physical assessment, complete health history, and need for CCM established by Kiran Hwang DO.

## 2023-07-10 NOTE — TELEPHONE ENCOUNTER
Patient was in ER on 7/7 . Patient took first dose of unithroid on 7/6- at about 4 pm symptoms started - she had elevated BP and severe headache, and she went to ER. ER physician stated she should no longer take this medication - unithroid . Labs repeated. Thyroid:    Lab Results   Component Value Date    TSH 3.750 (H) 07/07/2023    TSH 6.570 (H) 05/01/2023    TSH 8.180 (H) 01/09/2023    T4F 0.8 07/07/2023    T4F 0.9 05/01/2023    T4F 0.8 01/09/2023       Patient is hesitant on starting a new medication . She would like to know if have any further recommendations. She will try her best to start gluten free diet and will be calling her gastroenterologist.     Please advise.

## 2023-07-10 NOTE — PROGRESS NOTES
1st attempt ED f/up apt request  PCP -decline, pt doesn't want to schedule at this time  Closing encounter

## 2023-07-11 NOTE — PROGRESS NOTES
CCM called patient to inform of endocrinologist recommendation. Hi! Please let the patient know that I am very sorry that she is having side effects to the thyroid replacement medication. We have two options. She can either stay off of the thyroid replacement medication altogether and we can monitor her thyroid function tests periodically, or we can place her on 12.5mcg of Unithroid and see how she does on this. Thank you        Patient informed- she would like to stop treatment at this time and monitor symptoms. Encouraged patient to call with any questions or concerns. CCM to continue to check in with patient as well. Patient requesting some more information on gluten free foods. CCM to send patient a list of gluten free foods. Spoke to patient , she stated she called GI and was on hold and was not able to schedule appointment- ccm sent message to GI. Informed-   Patient stated she is having loose stools , beige colored stools. Patient reported she does not have any nausea. Patient reported she is taking metamucil and miralax. Patient would like to know if Dr. Tiff Howell has any further recommendations . Thank you.    Time with patient - 12 minutes  Time spent - 20  minutes    Total monthly time - 60 minutes

## 2023-07-11 NOTE — TELEPHONE ENCOUNTER
Hi!  Please let the patient know that I am very sorry that she is having side effects to the thyroid replacement medication. We have two options. She can either stay off of the thyroid replacement medication altogether and we can monitor her thyroid function tests periodically, or we can place her on 12.5mcg of Unithroid and see how she does on this. Thank you!

## 2023-07-11 NOTE — TELEPHONE ENCOUNTER
Patient informed- she would like to stop treatment at this time and monitor symptoms. Encouraged patient to call with any questions or concerns. CCM to continue to check in with patient as well.

## 2023-07-21 ENCOUNTER — TELEPHONE (OUTPATIENT)
Dept: FAMILY MEDICINE CLINIC | Facility: CLINIC | Age: 78
End: 2023-07-21

## 2023-07-21 ENCOUNTER — PATIENT OUTREACH (OUTPATIENT)
Dept: CASE MANAGEMENT | Age: 78
End: 2023-07-21

## 2023-07-21 DIAGNOSIS — E03.8 SUBCLINICAL HYPOTHYROIDISM: ICD-10-CM

## 2023-07-21 DIAGNOSIS — J44.0 CHRONIC OBSTRUCTIVE PULMONARY DISEASE WITH ACUTE LOWER RESPIRATORY INFECTION (HCC): ICD-10-CM

## 2023-07-21 DIAGNOSIS — R42 DIZZINESS AND GIDDINESS: ICD-10-CM

## 2023-07-21 DIAGNOSIS — E78.00 PURE HYPERCHOLESTEROLEMIA: ICD-10-CM

## 2023-07-21 DIAGNOSIS — L90.0 LICHEN SCLEROSUS: ICD-10-CM

## 2023-07-21 DIAGNOSIS — E03.9 ACQUIRED HYPOTHYROIDISM: Primary | ICD-10-CM

## 2023-07-21 DIAGNOSIS — F34.1 DYSTHYMIC DISORDER: ICD-10-CM

## 2023-07-21 DIAGNOSIS — K44.9 PARAESOPHAGEAL HERNIA: ICD-10-CM

## 2023-07-21 DIAGNOSIS — K21.00 GASTROESOPHAGEAL REFLUX DISEASE WITH ESOPHAGITIS WITHOUT HEMORRHAGE: ICD-10-CM

## 2023-07-21 RX ORDER — DUPILUMAB 300 MG/2ML
INJECTION, SOLUTION SUBCUTANEOUS
COMMUNITY
Start: 2023-07-18

## 2023-07-21 NOTE — TELEPHONE ENCOUNTER
Patient sounds like she is in distress  She called because of multiple issues she is experiencing      Gagging all the time  Can't eat  Has Diarrhea or Constipation  Multiple BM's daily  Nauseated    Won't see gastro for 2 weeks. Please advise.

## 2023-07-21 NOTE — PROGRESS NOTES
Patient left  for CCM after hours. Care manager called patient back l/m to follow up on patient symptoms.

## 2023-07-21 NOTE — TELEPHONE ENCOUNTER
Over a month, mushy stool then has constipation. Had hernia surgery on 23. Miralax and mylanta - too many BM. Last doses of these was on Wednesday. Mushy BM is her concern, 1-2 daily- a large quantity, light brown/beige. She states she will take an imodium, then she gets  constipated and has to take miralax, feels like she is in a viscous cycle. Abd cramping during night. Currently has no abd pain after taking pepto bismol. Has a lot of gas. Feels shaky, weak, and lightheaded- last couple of days, nausea-eats souleymane tablet. No appetite. Forcing herself to eat. This mornin/2 cup shredded wheat, coffee. Dinner last night: fish sticks and hash browns. Drinking 12 oz 7up/gingerale and water. Advised to stay hydrated. Has headache, took tylenol /advil at 6pm.     No fever/chills. Soft abdomen to touch per pt. Denies need for ER at this time. /94 830am- took morning medication at 8am.       Dr. Bethany Dobbins, please see pt concerns. Advised pt to hold any additional bowel regiment at this time.      Time spent: 13 min

## 2023-07-21 NOTE — TELEPHONE ENCOUNTER
CCM spoke to patient as well today     CCM spoke to RN - Aliyah Jang. Updated medication list today     She reported she is no longer taking Pantoprazole, she stopped a while back as she stated she did not have heartburn . Patient confirmed medication : Miralax and Metamucil. (Stated she is not on mylanta - she mixes up the names). She reached out to GI who told her to only resume Metamucil -1 tsp daily, they did schedule her for follow up . Removed - meclizine , Zofran and metoclopramide from medication list. Patient stated it only helps for a short time and so she stopped taking it . she is also back on dupixent - I asked if she noticed this could be causing symptoms , she does not feel like it has , she feels she cannot go without it     Encoruaged patient to clean up diet. Dr. Shiva Ford - Mary encouraged patient to try gluten free diet. Discussed keeping it simple. Fruits and vegetables/ legumes/ chicken and fish (non breaded). Patient feels eating vegetables makes her use the bathroom more. Patient very anxious - CCM asked patient to work on breathing techniques , to get her through this and offered support , she can call if she needs help . Patient verbalized understanding  and was more relaxed at the end of call.

## 2023-07-21 NOTE — PROGRESS NOTES
CCM spoke to patient as well today      CCM spoke to RN - Francisco Black. Updated medication list today      She reported she is no longer taking Pantoprazole, she stopped a while back as she stated she did not have heartburn . Patient confirmed medication : Miralax and Metamucil. (Stated she is not on mylanta - she mixes up the names). She reached out to GI who told her to only resume Metamucil -1 tsp daily, they did schedule her for follow up . Removed - meclizine , Zofran and metoclopramide from medication list. Patient stated it only helps for a short time and so she stopped taking it . she is also back on dupixent - I asked if she noticed this could be causing symptoms , she does not feel like it has , she feels she cannot go without it      Encoruaged patient to clean up diet. Dr. Casanova Patient - Endo encouraged patient to try gluten free diet. Discussed keeping it simple. Fruits and vegetables/ legumes/ chicken and fish (non breaded). Patient feels eating vegetables makes her use the bathroom more. Patient very anxious - CCM asked patient to work on breathing techniques , to get her through this and offered support , she can call if she needs help . Patient verbalized understanding  and was more relaxed at the end of call. Time spent with patient - 30 minutes-   Total monthly time - 90 minutes.

## 2023-07-21 NOTE — TELEPHONE ENCOUNTER
5 min ago. Tiny piece of ball of stool came out when she was on toilet and pushed hard. Yesterday morning was her last large BM. She will take miralax, if stool gets too lose then stop. Notified patient of YP message, she verbalized understanding. She states her son brought her chicken/mashed potato bowl for dinner and she will eat small amounts at a time. Advised patient on symptoms that she needs to go to ER for, and advised there is an on call doctor this weekend for any questions that arise. Patient verbalized understanding, will call pt on Monday to check in. Time spent 10min.

## 2023-07-24 RX ORDER — LOSARTAN POTASSIUM 50 MG/1
50 TABLET ORAL EVERY MORNING
Qty: 90 TABLET | Refills: 0 | Status: SHIPPED | OUTPATIENT
Start: 2023-07-24

## 2023-07-24 NOTE — TELEPHONE ENCOUNTER
Spoke with patient she states she is feeling much better,   Denies nausea, eating more- appetite is better. Taking 2 teaspoons at night of Miralax. Sunday - kept going in bathroom every hour- felt like the stool was coming, and pushing a long time and gave her self a head ache, mushy stool, very large amount. Took gasx on Sunday night after dinner for gas pain. Going to add prune juice.      YP as fyi.    8/3/23 appt with GI.

## 2023-07-26 ENCOUNTER — PATIENT OUTREACH (OUTPATIENT)
Dept: CASE MANAGEMENT | Age: 78
End: 2023-07-26

## 2023-07-26 NOTE — PROGRESS NOTES
CCM called to check in and follow up with patient regarding symptoms. Left message for patient to call back .

## 2023-08-03 PROBLEM — I10 HYPERTENSION: Status: ACTIVE | Noted: 2023-01-19

## 2023-08-07 ENCOUNTER — PATIENT OUTREACH (OUTPATIENT)
Dept: CASE MANAGEMENT | Age: 78
End: 2023-08-07

## 2023-08-07 DIAGNOSIS — R42 DIZZINESS AND GIDDINESS: ICD-10-CM

## 2023-08-07 DIAGNOSIS — E03.9 ACQUIRED HYPOTHYROIDISM: Primary | ICD-10-CM

## 2023-08-07 DIAGNOSIS — E03.8 SUBCLINICAL HYPOTHYROIDISM: ICD-10-CM

## 2023-08-07 DIAGNOSIS — J44.0 CHRONIC OBSTRUCTIVE PULMONARY DISEASE WITH ACUTE LOWER RESPIRATORY INFECTION (HCC): ICD-10-CM

## 2023-08-07 DIAGNOSIS — I10 PRIMARY HYPERTENSION: ICD-10-CM

## 2023-08-07 DIAGNOSIS — R41.3 MEMORY DEFICIT: ICD-10-CM

## 2023-08-07 DIAGNOSIS — F34.1 DYSTHYMIC DISORDER: ICD-10-CM

## 2023-08-07 DIAGNOSIS — F33.1 MAJOR DEPRESSIVE DISORDER, RECURRENT EPISODE, MODERATE (HCC): ICD-10-CM

## 2023-08-07 NOTE — PROGRESS NOTES
Spoke to Michael Matos for [facility]. Updates to patient care team/comments: UTD   Patient reported changes in medications: UTD   Med Adherence  Comment: Patient reported compliance     Health Maintenance: Zoster Vaccines(2 of 2) due on 09/09/2020  COVID-19 Vaccine(4 - Wiley Peter series) due on 02/02/2022  Colorectal Cancer Screening due on 08/02/2023  HTN: BP Follow-Up due on 09/03/2023  Influenza Vaccine(1) due on 10/01/2023  DEXA Scan Completed  MA Annual Health Assessment Completed  Annual Depression Screening Completed  Fall Risk Screening (Annual) Completed  Pneumococcal Vaccine: 65+ Years Completed    Patient updates/concerns:       Patient stated today is not a good day , she is feeling depressed, she expresses \" it is going to be okay\" . Patient then discussed the visit with GI specialist. Patient stated she was happy she found out what is causing her symptoms, she was diagnosed with Gastroparesis. Patient stated she is now more informed on the matter , she was happy Nurse practitioner spent some time with her to go over symptoms , treatment, she is feeling better. Patient no longer taking metamucil. Patient stated she is taking prune juice to help with constipation . PER Jennifer Kong, APRN :   Instructions-    Continue Miralax 2 tsp daily in the morning. May take another 2 tsp in the evening as needed  May continue Gas x 1-3 tablets daily as needed  Small frequent meals: 4-6 meals/day making dinner as the smallest meal of the day  Gastroparesis diet-information  Colonoscopy now. Patient prefers to hold off for now. Will see how she is doing on her follow up office visit  If your lump is more significant at the left lower quadrant then will have a repeat CT abdomen/pelvis. You had a normal CT abdomen in 12/2022  Follow up office visit in 6 weeks             Patient using dupixent injections every 2 weeks.    She is following closely with dermatologist   Patient is going to get a skin allergy patching testing to check for metals and other environmental allergies. Patient stated she is no longer able to read due to floaters in eyes. Patient stated she will follow up with the eye doctor but is hoping to finish with all of her pending appointments for now, improve symptoms and feel better. Patient very stressed, she has been feeling more depressed. Patient stated she did not want to talk about it . Patient not interested in speaking with a counselor. Patient stated she is handling well herself. Patient denied any suicidal ideation or harm to others. Patient stated she is just overwhelmed with her health . Encouraged patient to work on taking time to meditate and go outdoors each morning on her balcony to get some fresh air . Patient agrees it is a good idea, she stated she has not been able to do so due to recent weather, appreciates the check in and stated she will call with any new concerns . Goals/Action Plan: Active goal from previous outreach: improve GI symptoms/ mood     Patient reported progress towards goals: patient is hoping to have reduces gastro symptoms               - What:  Follow process in place from GI specialist           - When: daily            - How: Eating 4-5 small meals a day . - How Often: daily            - Where: n/a  Patient Reported Barriers and Concerns: Patient having stress in her life causing her to be emotional and stressed. - Plan for overcoming barriers: CCM encouraged to reach out if she needs help managing stress. Discussed stress reducing techniques. Care Managers Interventions: ccm reviewed recommendations form GI specialist, CCM encouraged patient to increase physical activity , short walks,  ccm encouraged stress reducing techniques.      Future Appointments:   Future Appointments   Date Time Provider Mckenzie Ferrell   9/14/2023  8:00 AM ZEENAT Cordero [facility]   10/2/2023  9:30 AM Rakan Bautista MD O[facility]O [facility] MARU Snider         [facility] Care Manager Follow Up Date:  1 month or sooner if needed. Reason For Follow Up: review progress and or barriers towards patient's goals. Time Spent This Encounter Total:    23 min medical record review, telephone communication, care plan updates where needed, education, goals, and action plan recreation/update. Provided acknowledgment and validation to patient's concerns.    Monthly Minute Total including today:  23   Physical assessment, complete health history, and need for CCM established by Romelia Ennis DO.

## 2023-08-11 DIAGNOSIS — F32.A ANXIETY AND DEPRESSION: ICD-10-CM

## 2023-08-11 DIAGNOSIS — F41.9 ANXIETY AND DEPRESSION: ICD-10-CM

## 2023-08-11 RX ORDER — CLONAZEPAM 0.5 MG/1
0.5 TABLET ORAL 2 TIMES DAILY PRN
Qty: 60 TABLET | Refills: 0 | Status: SHIPPED | OUTPATIENT
Start: 2023-08-11

## 2023-08-11 NOTE — TELEPHONE ENCOUNTER
A refill request was received for:  Requested Prescriptions     Pending Prescriptions Disp Refills    CLONAZEPAM 0.5 MG Oral Tab [Pharmacy Med Name: CLONAZEPAM 0.5MG TABLETS] 60 tablet 0     Sig: TAKE 1 TABLET(0.5 MG) BY MOUTH TWICE DAILY AS NEEDED FOR ANXIETY       Last refill date:  6/30/2023     Last office visit:5/23/2023     Follow up due:  Future Appointments   Date Time Provider Mckenzie Ferrell   9/14/2023  8:00 AM ZEENAT Pavon [facility]   10/2/2023  9:30 AM Minal Goddard MD [facility]

## 2023-09-18 DIAGNOSIS — F32.A ANXIETY AND DEPRESSION: ICD-10-CM

## 2023-09-18 DIAGNOSIS — F41.9 ANXIETY AND DEPRESSION: ICD-10-CM

## 2023-09-19 ENCOUNTER — PATIENT OUTREACH (OUTPATIENT)
Dept: CASE MANAGEMENT | Age: 78
End: 2023-09-19

## 2023-09-19 RX ORDER — CLONAZEPAM 0.5 MG/1
0.5 TABLET ORAL 2 TIMES DAILY PRN
Qty: 60 TABLET | Refills: 0 | Status: SHIPPED | OUTPATIENT
Start: 2023-09-19

## 2023-09-19 NOTE — PROGRESS NOTES
Attempted [facility] monthly outreach,  left message for patient to call back ,can be reached at  [phone]. Will try back at a later time. Medical record reviewed including recent office visits and test results.     Chart review - 3 min  Time with patient - 2 min  Total time - 5 min

## 2023-09-27 ENCOUNTER — PATIENT OUTREACH (OUTPATIENT)
Dept: CASE MANAGEMENT | Age: 78
End: 2023-09-27

## 2023-09-27 DIAGNOSIS — R41.3 MEMORY DEFICIT: ICD-10-CM

## 2023-09-27 DIAGNOSIS — E78.00 PURE HYPERCHOLESTEROLEMIA: ICD-10-CM

## 2023-09-27 DIAGNOSIS — F33.1 MAJOR DEPRESSIVE DISORDER, RECURRENT EPISODE, MODERATE (HCC): ICD-10-CM

## 2023-09-27 DIAGNOSIS — R42 DIZZINESS AND GIDDINESS: ICD-10-CM

## 2023-09-27 DIAGNOSIS — J44.0 CHRONIC OBSTRUCTIVE PULMONARY DISEASE WITH ACUTE LOWER RESPIRATORY INFECTION (HCC): ICD-10-CM

## 2023-09-27 DIAGNOSIS — E03.9 ACQUIRED HYPOTHYROIDISM: ICD-10-CM

## 2023-09-27 DIAGNOSIS — K57.30 DIVERTICULOSIS OF COLON: Primary | ICD-10-CM

## 2023-09-27 DIAGNOSIS — K44.9 PARAESOPHAGEAL HERNIA: ICD-10-CM

## 2023-09-27 DIAGNOSIS — F34.1 DYSTHYMIC DISORDER: ICD-10-CM

## 2023-09-27 NOTE — PROGRESS NOTES
Spoke to Michael Matos for [facility]. Updates to patient care team/comments: UTD  Patient reported changes in medications: UTD  Med Adherence  Comment: patient stated     Health Maintenance: Zoster Vaccines(2 of 2) due on 09/09/2020  COVID-19 Vaccine(4 - Wiley Peter series) due on 02/02/2022  Colorectal Cancer Screening due on 08/02/2023  HTN: BP Follow-Up due on 10/14/2023  Influenza Vaccine(1) due on 10/01/2023  DEXA Scan Completed  MA Annual Health Assessment Completed  Annual Depression Screening Completed  Fall Risk Screening (Annual) Completed  Pneumococcal Vaccine: 65+ Years Completed    Patient updates/concerns:     Spoke to patient today for [facility] , she states she does not feel like talking very long ,  she stated she is not feeling well - has some nausea and dizziness due to gastroparesis episode. Granddaughter did check in on her today stated she took her for a haircut today and while she was out she started to feel  shaky and had dizziness, nausea. She come to lay down and took some meclizine that she had from before. Patient denied any current chest pain, dizziness, SOB , speech sounded normal during call , patient did sound a bit anxious as she was not feeling well. Patient stated this episode started 2 weeks ago right after her visit with [facility]. Patient stated she is drinking fluids, had cream of wheat this morning . Patient stated she had some body armor hydration drink and some water, she also had coffee this morning. Patient stated she is aware of needing to follow a specific diet to avoid flare ups as directed by Anai Barnes   she stated she is to stop eating for a day and drink liquid diet to help . Patient stated she did not tolerate ensure, she stated her bowels are loose and other times has constipation . Encouraged patient to look into alternatives, such as smoothies, soups with bone broth, protein.      Patient states she was concerned about her weight loss but gastroenterology stated it was not concerning due to it being over a long period of time. Patient stated she is doing her best to follow instructions but it has been difficult. Patient stated she followed up with allergist who requested she go back for allergy testing - metals- patient declined due to lengthy appointments. Patient stated symptoms have improved if she  follows the diet   but it is hard to follow the diet as she does not know what could trigger these episodes. Patient is checking BP - once or twice a week , she stated it has been within normal range, did not provide readings . Patient is checking weight at home  - last weight was 98lb. Patient following up with PCP and will discuss refill on clonazepam .     Patient requested CCM cancel her endocrinologist appointment , patient stated she is not feeling well and would prefer not to take any new medications at this time. Patient stated she will address with PCP . Goals/Action Plan: Active goal from previous outreach:  improve GI symptoms/ mood      Patient reported progress towards goals:  patient following up with providers as directed, states she continues to her best to follow a healthy diet. - What: continue to follow gastroparesis diet              - Where/When/How: small meals -4-6 times a day , taking walks after meals to help with digestion. Patient Reported Barriers and Concerns:  patient not feeling well today                    - Plan for overcoming barriers: CCM sending TE to GI specialist to advise of any further recommendations. Care Managers Interventions: CCM reminded patient of upcoming appointments , patient requesting that ccm cancel appointment for 10/2/2023 with Dr. Marylin Holt. Sending FYI to PCP  ,   TE to GI specialist to advise .       Future Appointments:   Future Appointments   Date Time Provider Mckenzie Ferrell   10/2/2023  9:30 AM Wade Ware MD [facility]   10/3/2023  7:00 AM Purvi Joseph Anita Hood DO [facility] [address]   12/21/2023  9:30 AM Dev Sanchez DO [facility]   1/9/2024  8:00 AM Dev Sanchez DO [facility]         Next Care Manager Follow Up Date: 1 month or sooner if needed. Reason For Follow Up: review progress and or barriers towards patient's goals. Time Spent This Encounter Total: 35   min medical record review, telephone communication, care plan updates where needed, education, goals, and action plan recreation/update. Provided acknowledgment and validation to patient's concerns.    Monthly Minute Total including today: 40   Physical assessment, complete health history, and need for CCM established by Sean Lewis DO.

## 2023-09-30 PROCEDURE — 99490 CHRNC CARE MGMT STAFF 1ST 20: CPT

## 2023-09-30 PROCEDURE — 99439 CHRNC CARE MGMT STAF EA ADDL: CPT

## 2023-10-03 ENCOUNTER — OFFICE VISIT (OUTPATIENT)
Dept: FAMILY MEDICINE CLINIC | Facility: CLINIC | Age: 78
End: 2023-10-03
Payer: COMMERCIAL

## 2023-10-03 VITALS
HEART RATE: 78 BPM | HEIGHT: 60 IN | RESPIRATION RATE: 18 BRPM | DIASTOLIC BLOOD PRESSURE: 72 MMHG | WEIGHT: 96.38 LBS | BODY MASS INDEX: 18.92 KG/M2 | SYSTOLIC BLOOD PRESSURE: 124 MMHG | OXYGEN SATURATION: 99 %

## 2023-10-03 DIAGNOSIS — R42 DIZZY: Primary | ICD-10-CM

## 2023-10-03 DIAGNOSIS — F32.A ANXIETY AND DEPRESSION: ICD-10-CM

## 2023-10-03 DIAGNOSIS — F41.9 ANXIETY AND DEPRESSION: ICD-10-CM

## 2023-10-03 DIAGNOSIS — E44.0 MODERATE PROTEIN-CALORIE MALNUTRITION (HCC): ICD-10-CM

## 2023-10-03 PROCEDURE — 1170F FXNL STATUS ASSESSED: CPT | Performed by: FAMILY MEDICINE

## 2023-10-03 PROCEDURE — 1159F MED LIST DOCD IN RCRD: CPT | Performed by: FAMILY MEDICINE

## 2023-10-03 PROCEDURE — 3008F BODY MASS INDEX DOCD: CPT | Performed by: FAMILY MEDICINE

## 2023-10-03 PROCEDURE — 1160F RVW MEDS BY RX/DR IN RCRD: CPT | Performed by: FAMILY MEDICINE

## 2023-10-03 PROCEDURE — 3074F SYST BP LT 130 MM HG: CPT | Performed by: FAMILY MEDICINE

## 2023-10-03 PROCEDURE — 99214 OFFICE O/P EST MOD 30 MIN: CPT | Performed by: FAMILY MEDICINE

## 2023-10-03 PROCEDURE — 3078F DIAST BP <80 MM HG: CPT | Performed by: FAMILY MEDICINE

## 2023-10-03 RX ORDER — MECLIZINE HCL 12.5 MG/1
12.5 TABLET ORAL 3 TIMES DAILY PRN
Qty: 30 TABLET | Refills: 0 | Status: SHIPPED | OUTPATIENT
Start: 2023-10-03

## 2023-10-03 RX ORDER — CLONAZEPAM 0.5 MG/1
0.5 TABLET ORAL 2 TIMES DAILY PRN
Qty: 60 TABLET | Refills: 0 | Status: SHIPPED | OUTPATIENT
Start: 2023-10-03

## 2023-10-03 NOTE — PROGRESS NOTES
Subjective:   Patient ID: Hilario Neumann is a 77year old female. Taking clonazepam every night and sometimes a 1/2 during the day. History/Other:   Review of Systems   All other systems reviewed and are negative. Current Outpatient Medications   Medication Sig Dispense Refill    clonazePAM 0.5 MG Oral Tab Take 1 tablet (0.5 mg total) by mouth 2 (two) times daily as needed. 60 tablet 0    meclizine 12.5 MG Oral Tab Take 1 tablet (12.5 mg total) by mouth 3 (three) times daily as needed. 30 tablet 0    Na Sulfate-K Sulfate-Mg Sulf (SUPREP BOWEL PREP KIT) 17.5-3.13-1.6 GM/177ML Oral Solution Take as directed by physician. 1 each 0    Cholecalciferol (VITAMIN D-3 OR) Take by mouth.      polyethylene glycol, PEG 3350, 17 g Oral Powd Pack Take 17 g by mouth daily. losartan 50 MG Oral Tab Take 1 tablet (50 mg total) by mouth every morning. 90 tablet 0    DUPIXENT 300 MG/2ML Subcutaneous Solution Prefilled Syringe       SYMBICORT 160-4.5 MCG/ACT Inhalation Aerosol INHALE 2 PUFFS BY MOUTH TWICE DAILY 10.2 g 11    ondansetron (ZOFRAN) 4 mg tablet Take 1 tablet (4 mg total) by mouth every 8 (eight) hours as needed for Nausea. mirtazapine 30 MG Oral Tab Take 1 tablet (30 mg total) by mouth nightly. 90 tablet 1    metoclopramide 5 MG Oral Tab Take 1 tablet (5 mg total) by mouth 3 (three) times daily as needed. 24 tablet 0    clobetasol 0.05 % External Ointment       TRIAMCINOLONE 0.1 % External Cream APPLY TOPICALLY TO THE AFFECTED AREA TWICE DAILY AS NEEDED (Patient taking differently: as needed.) 60 g 3    Albuterol Sulfate HFA (PROAIR HFA) 108 (90 Base) MCG/ACT Inhalation Aero Soln INHALE 2 PUFFS BY MOUTH EVERY 6 HOURS AS NEEDED FOR WHEEZING 8.5 g 2    acetaminophen 500 MG Oral Tab Take 1 tablet (500 mg total) by mouth every 6 (six) hours as needed for Pain. Allergies:   Avelox [Moxifloxaci*    SWELLING  Avelox [Moxifloxaci*    TONGUE SWELLING    Comment:\"TABS\"  Amoxicillin             NAUSEA AND VOMITING    Comment:Vomiting. Statins                 Myopathy  Sulfa Antibiotics       RASH, ITCHING  Dander                  OTHER (SEE COMMENTS)    Comment:\"Animals\": runny nose, watery eyes, itching  Diphenhydramine         Runny nose    Comment:\"Animals\": runny nose, watery eyes, itching  Dust                    Runny nose  Latex                   RASH  Sulfa Drugs Cross R*    RASH, ITCHING    Objective:   Physical Exam  Vitals reviewed. Constitutional:       General: She is not in acute distress. Appearance: She is well-developed. She is not diaphoretic. Eyes:      General: No scleral icterus. Right eye: No discharge. Left eye: No discharge. Conjunctiva/sclera: Conjunctivae normal.   Cardiovascular:      Rate and Rhythm: Normal rate and regular rhythm. Heart sounds: Normal heart sounds. No murmur heard. No friction rub. No gallop. Pulmonary:      Effort: Pulmonary effort is normal. No respiratory distress. Breath sounds: Normal breath sounds. No wheezing or rales. Assessment & Plan:   Dizzy  (primary encounter diagnosis)  Anxiety and depression  Moderate protein-calorie malnutrition ([address]    1. Anxiety and depression  - clonazePAM 0.5 MG Oral Tab; Take 1 tablet (0.5 mg total) by mouth 2 (two) times daily as needed. Dispense: 60 tablet; Refill: 0    2. Dizzy  - meclizine 12.5 MG Oral Tab; Take 1 tablet (12.5 mg total) by mouth 3 (three) times daily as needed. Dispense: 30 tablet; Refill: 0    3. Moderate protein-calorie malnutrition (HCC)  Do protein shake  Exercise more often. Meds This Visit:  Requested Prescriptions     Signed Prescriptions Disp Refills    clonazePAM 0.5 MG Oral Tab 60 tablet 0     Sig: Take 1 tablet (0.5 mg total) by mouth 2 (two) times daily as needed. meclizine 12.5 MG Oral Tab 30 tablet 0     Sig: Take 1 tablet (12.5 mg total) by mouth 3 (three) times daily as needed.      Imaging & Referrals:  None

## 2023-10-19 RX ORDER — LOSARTAN POTASSIUM 50 MG/1
50 TABLET ORAL EVERY MORNING
Qty: 90 TABLET | Refills: 0 | Status: SHIPPED | OUTPATIENT
Start: 2023-10-19

## 2023-10-24 NOTE — PATIENT INSTRUCTIONS
Hold on benadryl for now. Take pepcid twice daily  Take the claritin 3 times a day  Take clonazepam 3 times a day  Restart the sertraline, and follow the instructions on the bottle. Repair Anesthesia Method: local infiltration

## 2023-11-07 DIAGNOSIS — R42 DIZZINESS AND GIDDINESS: ICD-10-CM

## 2023-11-07 NOTE — TELEPHONE ENCOUNTER
A refill request was received for:  Requested Prescriptions     Pending Prescriptions Disp Refills    MIRTAZAPINE 30 MG Oral Tab [Pharmacy Med Name: MIRTAZAPINE 30MG TABLETS] 90 tablet 1     Sig: TAKE 1 TABLET(30 MG) BY MOUTH EVERY NIGHT       Last refill date:   5-23-23    Last office visit: 10-3-23      Follow up due:  Future Appointments   Date Time Provider Mckenzie Ferrell   11/28/2023  8:00 AM [facility] [address]   12/21/2023  9:30 AM [place] [id number] [address] [facility] SUB GI   1/9/2024  8:00 AM [facility]

## 2023-11-08 RX ORDER — MIRTAZAPINE 30 MG/1
30 TABLET, FILM COATED ORAL NIGHTLY
Qty: 90 TABLET | Refills: 1 | Status: SHIPPED | OUTPATIENT
Start: 2023-11-08

## 2023-11-28 ENCOUNTER — HOSPITAL ENCOUNTER (OUTPATIENT)
Dept: NUCLEAR MEDICINE | Facility: HOSPITAL | Age: 78
Discharge: HOME | End: 2023-11-28
Attending: NURSE PRACTITIONER
Payer: MEDICARE

## 2023-11-28 DIAGNOSIS — R11.0 NAUSEA: ICD-10-CM

## 2023-11-28 PROCEDURE — 78264 GASTRIC EMPTYING IMG STUDY: CPT | Performed by: NURSE PRACTITIONER

## 2023-12-04 DIAGNOSIS — F41.9 ANXIETY AND DEPRESSION: ICD-10-CM

## 2023-12-04 DIAGNOSIS — F32.A ANXIETY AND DEPRESSION: ICD-10-CM

## 2023-12-04 DIAGNOSIS — R42 DIZZY: ICD-10-CM

## 2023-12-04 RX ORDER — CLONAZEPAM 0.5 MG/1
0.5 TABLET ORAL 2 TIMES DAILY PRN
Qty: 60 TABLET | Refills: 0 | Status: SHIPPED | OUTPATIENT
Start: 2023-12-04

## 2023-12-04 RX ORDER — MECLIZINE HCL 12.5 MG/1
12.5 TABLET ORAL 3 TIMES DAILY PRN
Qty: 30 TABLET | Refills: 0 | Status: SHIPPED | OUTPATIENT
Start: 2023-12-04

## 2023-12-04 NOTE — TELEPHONE ENCOUNTER
LOV: 10/3/2023    LR: 10/3/2023  meclizine 12.5 MG Oral Tab 30 tablet 0 10/3/2023    Sig:   Take 1 tablet (12.5 mg total) by mouth 3 (three) times daily as needed. clonazePAM 0.5 MG Oral Tab 60 tablet 0 10/3/2023    Sig:   Take 1 tablet (0.5 mg total) by mouth 2 (two) times daily as needed.

## 2023-12-11 ENCOUNTER — APPOINTMENT (OUTPATIENT)
Dept: GENERAL RADIOLOGY | Facility: HOSPITAL | Age: 78
End: 2023-12-11
Attending: EMERGENCY MEDICINE
Payer: MEDICARE

## 2023-12-11 ENCOUNTER — HOSPITAL ENCOUNTER (EMERGENCY)
Facility: HOSPITAL | Age: 78
Discharge: HOME | End: 2023-12-11
Attending: EMERGENCY MEDICINE
Payer: MEDICARE

## 2023-12-11 VITALS
HEART RATE: 62 BPM | SYSTOLIC BLOOD PRESSURE: 148 MMHG | TEMPERATURE: 98 F | OXYGEN SATURATION: 100 % | BODY MASS INDEX: 19.44 KG/M2 | WEIGHT: 99 LBS | RESPIRATION RATE: 18 BRPM | HEIGHT: 60 IN | DIASTOLIC BLOOD PRESSURE: 71 MMHG

## 2023-12-11 DIAGNOSIS — E86.0 DEHYDRATION: ICD-10-CM

## 2023-12-11 DIAGNOSIS — M25.552 LEFT HIP PAIN: Primary | ICD-10-CM

## 2023-12-11 LAB
ANION GAP SERPL CALC-SCNC: 8 MMOL/L (ref 0–18)
BASOPHILS # BLD AUTO: 0.09 X10(3) UL (ref 0–0.2)
BASOPHILS NFR BLD AUTO: 1 %
BUN BLD-MCNC: 15 MG/DL (ref 9–23)
CALCIUM BLD-MCNC: 9.2 MG/DL (ref 8.5–10.1)
CHLORIDE SERPL-SCNC: 95 MMOL/L (ref 98–112)
CO2 SERPL-SCNC: 25 MMOL/L (ref 21–32)
CREAT BLD-MCNC: 0.79 MG/DL
EGFRCR SERPLBLD CKD-EPI 2021: 76 ML/MIN/1.73M2 (ref 60–?)
EOSINOPHIL # BLD AUTO: 0.15 X10(3) UL (ref 0–0.7)
EOSINOPHIL NFR BLD AUTO: 1.7 %
ERYTHROCYTE [DISTWIDTH] IN BLOOD BY AUTOMATED COUNT: 12.1 %
GLUCOSE BLD-MCNC: 128 MG/DL (ref 70–99)
HCT VFR BLD AUTO: 31.1 %
HGB BLD-MCNC: 11.1 G/DL
IMM GRANULOCYTES # BLD AUTO: 0.02 X10(3) UL (ref 0–1)
IMM GRANULOCYTES NFR BLD: 0.2 %
LYMPHOCYTES # BLD AUTO: 1 X10(3) UL (ref 1–4)
LYMPHOCYTES NFR BLD AUTO: 11.4 %
MCH RBC QN AUTO: 32.1 PG (ref 26–34)
MCHC RBC AUTO-ENTMCNC: 35.7 G/DL (ref 31–37)
MCV RBC AUTO: 89.9 FL
MONOCYTES # BLD AUTO: 0.92 X10(3) UL (ref 0.1–1)
MONOCYTES NFR BLD AUTO: 10.5 %
NEUTROPHILS # BLD AUTO: 6.59 X10 (3) UL (ref 1.5–7.7)
NEUTROPHILS # BLD AUTO: 6.59 X10(3) UL (ref 1.5–7.7)
NEUTROPHILS NFR BLD AUTO: 75.2 %
OSMOLALITY SERPL CALC.SUM OF ELEC: 268 MOSM/KG (ref 275–295)
PLATELET # BLD AUTO: 284 10(3)UL (ref 150–450)
POTASSIUM SERPL-SCNC: 4.4 MMOL/L (ref 3.5–5.1)
RBC # BLD AUTO: 3.46 X10(6)UL
SODIUM SERPL-SCNC: 128 MMOL/L (ref 136–145)
WBC # BLD AUTO: 8.8 X10(3) UL (ref 4–11)

## 2023-12-11 PROCEDURE — 99285 EMERGENCY DEPT VISIT HI MDM: CPT

## 2023-12-11 PROCEDURE — 96361 HYDRATE IV INFUSION ADD-ON: CPT

## 2023-12-11 PROCEDURE — 80048 BASIC METABOLIC PNL TOTAL CA: CPT | Performed by: EMERGENCY MEDICINE

## 2023-12-11 PROCEDURE — 85025 COMPLETE CBC W/AUTO DIFF WBC: CPT | Performed by: EMERGENCY MEDICINE

## 2023-12-11 PROCEDURE — 73502 X-RAY EXAM HIP UNI 2-3 VIEWS: CPT | Performed by: EMERGENCY MEDICINE

## 2023-12-11 PROCEDURE — 96374 THER/PROPH/DIAG INJ IV PUSH: CPT

## 2023-12-11 RX ORDER — KETOROLAC TROMETHAMINE 15 MG/ML
15 INJECTION, SOLUTION INTRAMUSCULAR; INTRAVENOUS ONCE
Status: DISCONTINUED | OUTPATIENT
Start: 2023-12-11 | End: 2023-12-11

## 2023-12-11 RX ORDER — ONDANSETRON 2 MG/ML
4 INJECTION INTRAMUSCULAR; INTRAVENOUS ONCE
Status: COMPLETED | OUTPATIENT
Start: 2023-12-11 | End: 2023-12-11

## 2023-12-11 RX ORDER — CLONAZEPAM 0.5 MG/1
0.5 TABLET ORAL ONCE
Status: COMPLETED | OUTPATIENT
Start: 2023-12-11 | End: 2023-12-11

## 2023-12-11 RX ORDER — ACETAMINOPHEN 500 MG
1000 TABLET ORAL ONCE
Status: COMPLETED | OUTPATIENT
Start: 2023-12-11 | End: 2023-12-11

## 2023-12-11 RX ORDER — MORPHINE SULFATE 2 MG/ML
2 INJECTION, SOLUTION INTRAMUSCULAR; INTRAVENOUS ONCE
Status: DISCONTINUED | OUTPATIENT
Start: 2023-12-11 | End: 2023-12-11

## 2023-12-11 RX ORDER — ACETAMINOPHEN 500 MG
TABLET ORAL
Status: COMPLETED
Start: 2023-12-11 | End: 2023-12-11

## 2023-12-11 NOTE — ED INITIAL ASSESSMENT (HPI)
Pt presents to ED via EMS for right hip pain that started today out of no where. Pt denies injury or trauma to hip. Pt rcvd 75 mcg of fentanyl en route. Pt able to move toes, weak against resistance. 2+ pedal pulses. Pt lives home alone. Pt is moaning and groaning in room. Denies use of assistive devices.

## 2023-12-12 NOTE — ED QUICK NOTES
Pt eating apple sauce, cracker, and water. Md at bedside. Pt discussing feeling dizzy and light headed.

## 2023-12-13 ENCOUNTER — PATIENT OUTREACH (OUTPATIENT)
Dept: CASE MANAGEMENT | Age: 78
End: 2023-12-13

## 2023-12-13 NOTE — PROGRESS NOTES
Attempted to contact pt for monthly outreach no answer left detailed message for pt to call back. Outreach to patient to complete monthly follow up on : Discus ER visit /goals/symptoms    Reviewed pt's chart including recent visits.     -Care everywhere updated. -Immunization reconciliation completed. No updates available.     Pt is due for:     Health Maintenance   Topic Date Due    Zoster Vaccines (2 of 2) 09/09/2020    Colorectal Cancer Screening  08/02/2023    COVID-19 Vaccine (4 - 2023-24 season) 09/01/2023    Influenza Vaccine  Completed    DEXA Scan  Completed    MA Annual Health Assessment  Completed    Annual Depression Screening  Completed    Fall Risk Screening (Annual)  Completed    Pneumococcal Vaccine: 65+ Years  Completed         Future Appointments   Date Time Provider Mckenzie Ferrell   1/9/2024  8:00 AM Dev Sanchez DO [facility]       Total time -  5 min  Total Monthly time- 5 min

## 2023-12-14 ENCOUNTER — PATIENT OUTREACH (OUTPATIENT)
Dept: CASE MANAGEMENT | Age: 78
End: 2023-12-14

## 2023-12-14 NOTE — PROGRESS NOTES
1st attempt ER f/up apt request    [place]  PCP  [id number]  [address]  [phone]  Apt:Jan 22 @7am   On wait list    Confirmed w/ pt  Closing encounter

## 2024-01-01 ENCOUNTER — HOSPITAL ENCOUNTER (INPATIENT)
Age: 79
LOS: 3 days | DRG: 951 | End: 2024-08-09
Attending: HOSPITALIST | Admitting: HOSPITALIST

## 2024-01-01 ENCOUNTER — APPOINTMENT (OUTPATIENT)
Dept: GENERAL RADIOLOGY | Age: 79
DRG: 388 | End: 2024-01-01
Attending: EMERGENCY MEDICINE

## 2024-01-01 ENCOUNTER — APPOINTMENT (OUTPATIENT)
Dept: GENERAL RADIOLOGY | Age: 79
DRG: 388 | End: 2024-01-01
Attending: SURGERY

## 2024-01-01 ENCOUNTER — TELEPHONE (OUTPATIENT)
Dept: NEUROLOGY | Facility: CLINIC | Age: 79
End: 2024-01-01

## 2024-01-01 VITALS
DIASTOLIC BLOOD PRESSURE: 97 MMHG | HEART RATE: 96 BPM | WEIGHT: 77.82 LBS | OXYGEN SATURATION: 97 % | RESPIRATION RATE: 22 BRPM | SYSTOLIC BLOOD PRESSURE: 169 MMHG | HEIGHT: 60 IN | BODY MASS INDEX: 15.28 KG/M2 | TEMPERATURE: 97.7 F

## 2024-01-01 PROCEDURE — 10004557 HB ROOM CHARGE HOSPICE OR RESPITE

## 2024-01-01 PROCEDURE — 10002803 HB RX 637: Performed by: HOSPITALIST

## 2024-01-01 PROCEDURE — 10004651 HB RX, NO CHARGE ITEM: Performed by: HOSPITALIST

## 2024-01-01 PROCEDURE — 99233 SBSQ HOSP IP/OBS HIGH 50: CPT | Performed by: HOSPITALIST

## 2024-01-01 PROCEDURE — 10002800 HB RX 250 W HCPCS: Performed by: HOSPITALIST

## 2024-01-01 PROCEDURE — S0310 HOSPITALIST VISIT: HCPCS | Performed by: HOSPITALIST

## 2024-01-01 PROCEDURE — 71045 X-RAY EXAM CHEST 1 VIEW: CPT

## 2024-01-01 PROCEDURE — 74019 RADEX ABDOMEN 2 VIEWS: CPT

## 2024-01-01 PROCEDURE — 99238 HOSP IP/OBS DSCHRG MGMT 30/<: CPT | Performed by: HOSPITALIST

## 2024-01-01 RX ORDER — BISACODYL 10 MG
10 SUPPOSITORY, RECTAL RECTAL DAILY PRN
Status: DISCONTINUED | OUTPATIENT
Start: 2024-01-01 | End: 2024-01-01 | Stop reason: HOSPADM

## 2024-01-01 RX ORDER — AMOXICILLIN 250 MG
2 CAPSULE ORAL 2 TIMES DAILY PRN
Status: DISCONTINUED | OUTPATIENT
Start: 2024-01-01 | End: 2024-01-01 | Stop reason: HOSPADM

## 2024-01-01 RX ORDER — LORAZEPAM 2 MG/ML
0.5 INJECTION INTRAMUSCULAR
Status: DISCONTINUED | OUTPATIENT
Start: 2024-01-01 | End: 2024-01-01 | Stop reason: HOSPADM

## 2024-01-01 RX ORDER — MIRTAZAPINE 30 MG/1
30 TABLET, FILM COATED ORAL DAILY
Status: DISCONTINUED | OUTPATIENT
Start: 2024-01-01 | End: 2024-01-01 | Stop reason: HOSPADM

## 2024-01-01 RX ORDER — MORPHINE SULFATE/0.9% NACL/PF 30 MG/30ML
PATIENT CONTROLLED ANALGESIA SYRINGE INTRAVENOUS CONTINUOUS
Status: DISCONTINUED | OUTPATIENT
Start: 2024-01-01 | End: 2024-01-01

## 2024-01-01 RX ORDER — LACTULOSE 10 G/15ML
20 SOLUTION ORAL DAILY PRN
Status: DISCONTINUED | OUTPATIENT
Start: 2024-01-01 | End: 2024-01-01 | Stop reason: HOSPADM

## 2024-01-01 RX ORDER — LIDOCAINE HYDROCHLORIDE 20 MG/ML
10 JELLY TOPICAL PRN
Status: DISCONTINUED | OUTPATIENT
Start: 2024-01-01 | End: 2024-01-01 | Stop reason: HOSPADM

## 2024-01-01 RX ORDER — DULOXETIN HYDROCHLORIDE 30 MG/1
30 CAPSULE, DELAYED RELEASE ORAL DAILY
Status: DISCONTINUED | OUTPATIENT
Start: 2024-01-01 | End: 2024-01-01 | Stop reason: HOSPADM

## 2024-01-01 RX ORDER — SCOLOPAMINE TRANSDERMAL SYSTEM 1 MG/1
1 PATCH, EXTENDED RELEASE TRANSDERMAL
Status: DISCONTINUED | OUTPATIENT
Start: 2024-01-01 | End: 2024-01-01 | Stop reason: HOSPADM

## 2024-01-01 RX ORDER — PROCHLORPERAZINE 25 MG
25 SUPPOSITORY, RECTAL RECTAL EVERY 8 HOURS PRN
Status: DISCONTINUED | OUTPATIENT
Start: 2024-01-01 | End: 2024-01-01 | Stop reason: HOSPADM

## 2024-01-01 RX ORDER — 0.9 % SODIUM CHLORIDE 0.9 %
2 VIAL (ML) INJECTION EVERY 12 HOURS SCHEDULED
Status: DISCONTINUED | OUTPATIENT
Start: 2024-01-01 | End: 2024-01-01 | Stop reason: HOSPADM

## 2024-01-01 RX ORDER — MORPHINE SULFATE IN 0.9 % NACL 30 MG/30ML
0-10 PATIENT CONTROLLED ANALGESIA SYRINGE INTRAVENOUS CONTINUOUS
Status: DISCONTINUED | OUTPATIENT
Start: 2024-01-01 | End: 2024-01-01 | Stop reason: CLARIF

## 2024-01-01 RX ORDER — PROCHLORPERAZINE MALEATE 5 MG
10 TABLET ORAL EVERY 8 HOURS PRN
Status: DISCONTINUED | OUTPATIENT
Start: 2024-01-01 | End: 2024-01-01 | Stop reason: HOSPADM

## 2024-01-01 RX ORDER — PROCHLORPERAZINE EDISYLATE 5 MG/ML
5 INJECTION INTRAMUSCULAR; INTRAVENOUS EVERY 4 HOURS PRN
Status: DISCONTINUED | OUTPATIENT
Start: 2024-01-01 | End: 2024-01-01 | Stop reason: HOSPADM

## 2024-01-01 RX ORDER — BUDESONIDE AND FORMOTEROL FUMARATE DIHYDRATE 160; 4.5 UG/1; UG/1
2 AEROSOL RESPIRATORY (INHALATION) 2 TIMES DAILY
Status: DISCONTINUED | OUTPATIENT
Start: 2024-01-01 | End: 2024-01-01

## 2024-01-01 RX ORDER — CLONAZEPAM 0.5 MG/1
0.5 TABLET ORAL EVERY 12 HOURS SCHEDULED
Status: DISCONTINUED | OUTPATIENT
Start: 2024-01-01 | End: 2024-01-01 | Stop reason: HOSPADM

## 2024-01-01 RX ORDER — LORAZEPAM 1 MG/1
1 TABLET ORAL EVERY 6 HOURS
Status: DISCONTINUED | OUTPATIENT
Start: 2024-01-01 | End: 2024-01-01 | Stop reason: HOSPADM

## 2024-01-01 RX ORDER — ONDANSETRON 4 MG/1
4 TABLET, ORALLY DISINTEGRATING ORAL EVERY 12 HOURS PRN
Status: DISCONTINUED | OUTPATIENT
Start: 2024-01-01 | End: 2024-01-01 | Stop reason: HOSPADM

## 2024-01-01 RX ORDER — GLYCOPYRROLATE 1 MG/5ML
0.5 SOLUTION ORAL EVERY 6 HOURS PRN
Status: DISCONTINUED | OUTPATIENT
Start: 2024-01-01 | End: 2024-01-01 | Stop reason: HOSPADM

## 2024-01-01 RX ORDER — HALOPERIDOL 1 MG/1
1 TABLET ORAL EVERY 4 HOURS PRN
Status: DISCONTINUED | OUTPATIENT
Start: 2024-01-01 | End: 2024-01-01 | Stop reason: HOSPADM

## 2024-01-01 RX ORDER — LORAZEPAM 0.5 MG/1
0.5 TABLET ORAL
Status: DISCONTINUED | OUTPATIENT
Start: 2024-01-01 | End: 2024-01-01 | Stop reason: HOSPADM

## 2024-01-01 RX ORDER — ONDANSETRON 2 MG/ML
4 INJECTION INTRAMUSCULAR; INTRAVENOUS EVERY 12 HOURS PRN
Status: DISCONTINUED | OUTPATIENT
Start: 2024-01-01 | End: 2024-01-01 | Stop reason: HOSPADM

## 2024-01-01 RX ORDER — GLYCOPYRROLATE 0.2 MG/ML
0.4 INJECTION, SOLUTION INTRAMUSCULAR; INTRAVENOUS EVERY 6 HOURS PRN
Status: DISCONTINUED | OUTPATIENT
Start: 2024-01-01 | End: 2024-01-01 | Stop reason: HOSPADM

## 2024-01-01 RX ORDER — MORPHINE SULFATE IN 0.9 % NACL 30 MG/30ML
0-10 PATIENT CONTROLLED ANALGESIA SYRINGE INTRAVENOUS CONTINUOUS
Status: DISCONTINUED | OUTPATIENT
Start: 2024-01-01 | End: 2024-01-01 | Stop reason: HOSPADM

## 2024-01-01 RX ORDER — HALOPERIDOL 5 MG/ML
1 INJECTION INTRAMUSCULAR EVERY 4 HOURS PRN
Status: DISCONTINUED | OUTPATIENT
Start: 2024-01-01 | End: 2024-01-01 | Stop reason: HOSPADM

## 2024-01-01 RX ORDER — CARBOXYMETHYLCELLULOSE SODIUM 5 MG/ML
1 SOLUTION/ DROPS OPHTHALMIC PRN
Status: DISCONTINUED | OUTPATIENT
Start: 2024-01-01 | End: 2024-01-01 | Stop reason: HOSPADM

## 2024-01-01 RX ORDER — GLYCOPYRROLATE 1 MG/1
0.5 TABLET ORAL EVERY 6 HOURS PRN
Status: DISCONTINUED | OUTPATIENT
Start: 2024-01-01 | End: 2024-01-01 | Stop reason: HOSPADM

## 2024-01-01 RX ORDER — MORPHINE SULFATE 20 MG/ML
5 SOLUTION ORAL
Status: DISCONTINUED | OUTPATIENT
Start: 2024-01-01 | End: 2024-01-01

## 2024-01-01 RX ORDER — MAGNESIUM HYDROXIDE/ALUMINUM HYDROXICE/SIMETHICONE 120; 1200; 1200 MG/30ML; MG/30ML; MG/30ML
30 SUSPENSION ORAL PRN
Status: DISCONTINUED | OUTPATIENT
Start: 2024-01-01 | End: 2024-01-01 | Stop reason: HOSPADM

## 2024-01-01 RX ADMIN — MORPHINE SULFATE 5 MG: 100 SOLUTION ORAL at 19:44

## 2024-01-01 RX ADMIN — SCOPALAMINE 1 PATCH: 1 PATCH, EXTENDED RELEASE TRANSDERMAL at 04:31

## 2024-01-01 RX ADMIN — SODIUM CHLORIDE, PRESERVATIVE FREE 2 ML: 5 INJECTION INTRAVENOUS at 20:39

## 2024-01-01 RX ADMIN — LORAZEPAM 1 MG: 1 TABLET ORAL at 18:28

## 2024-01-01 RX ADMIN — LORAZEPAM 1 MG: 1 TABLET ORAL at 14:44

## 2024-01-01 RX ADMIN — CLONAZEPAM 0.5 MG: 0.5 TABLET ORAL at 08:44

## 2024-01-01 RX ADMIN — LORAZEPAM 1 MG: 1 TABLET ORAL at 13:42

## 2024-01-01 RX ADMIN — DULOXETINE HYDROCHLORIDE 30 MG: 30 CAPSULE, DELAYED RELEASE ORAL at 08:45

## 2024-01-01 RX ADMIN — CLONAZEPAM 0.5 MG: 0.5 TABLET ORAL at 21:08

## 2024-01-01 RX ADMIN — ONDANSETRON 4 MG: 2 INJECTION INTRAMUSCULAR; INTRAVENOUS at 08:53

## 2024-01-01 RX ADMIN — SODIUM CHLORIDE, PRESERVATIVE FREE 2 ML: 5 INJECTION INTRAVENOUS at 09:23

## 2024-01-01 RX ADMIN — SODIUM CHLORIDE, PRESERVATIVE FREE 2 ML: 5 INJECTION INTRAVENOUS at 08:46

## 2024-01-01 RX ADMIN — LORAZEPAM 1 MG: 1 TABLET ORAL at 13:47

## 2024-01-01 RX ADMIN — Medication 1 MG/HR: at 11:37

## 2024-01-01 RX ADMIN — LORAZEPAM 1 MG: 1 TABLET ORAL at 06:38

## 2024-01-01 RX ADMIN — LORAZEPAM 1 MG: 1 TABLET ORAL at 18:42

## 2024-01-01 RX ADMIN — LORAZEPAM 1 MG: 1 TABLET ORAL at 00:50

## 2024-01-01 RX ADMIN — GLYCOPYRROLATE 0.5 MG: 1 TABLET ORAL at 06:37

## 2024-01-01 RX ADMIN — LORAZEPAM 0.5 MG: 2 INJECTION INTRAMUSCULAR; INTRAVENOUS at 22:42

## 2024-01-01 RX ADMIN — LORAZEPAM 1 MG: 1 TABLET ORAL at 20:39

## 2024-01-01 RX ADMIN — GLYCOPYRROLATE 0.4 MG: 0.2 INJECTION, SOLUTION INTRAMUSCULAR; INTRAVENOUS at 04:32

## 2024-01-01 RX ADMIN — MIRTAZAPINE 30 MG: 30 TABLET, FILM COATED ORAL at 08:45

## 2024-01-01 RX ADMIN — ALUMINUM HYDROXIDE, MAGNESIUM HYDROXIDE, DIMETHICONE 30 ML: 200; 200; 20 LIQUID ORAL at 19:44

## 2024-01-01 RX ADMIN — MORPHINE SULFATE: 1 INJECTION INTRAVENOUS at 21:30

## 2024-01-01 RX ADMIN — SODIUM CHLORIDE, PRESERVATIVE FREE 2 ML: 5 INJECTION INTRAVENOUS at 20:36

## 2024-01-01 RX ADMIN — ONDANSETRON 4 MG: 2 INJECTION INTRAMUSCULAR; INTRAVENOUS at 16:26

## 2024-01-01 RX ADMIN — ONDANSETRON 4 MG: 2 INJECTION INTRAMUSCULAR; INTRAVENOUS at 21:33

## 2024-01-01 RX ADMIN — MORPHINE SULFATE 2 MG: 2 INJECTION, SOLUTION INTRAMUSCULAR; INTRAVENOUS at 20:41

## 2024-01-01 RX ADMIN — ONDANSETRON 4 MG: 4 TABLET, ORALLY DISINTEGRATING ORAL at 09:22

## 2024-01-01 RX ADMIN — MORPHINE SULFATE 5 MG: 100 SOLUTION ORAL at 18:42

## 2024-01-01 RX ADMIN — Medication 1 MG/HR: at 14:44

## 2024-01-01 SDOH — SOCIAL STABILITY: SOCIAL INSECURITY: HOW OFTEN DOES ANYONE, INCLUDING FAMILY AND FRIENDS, INSULT OR TALK DOWN TO YOU?: NEVER

## 2024-01-01 SDOH — HEALTH STABILITY: GENERAL
BECAUSE OF A PHYSICAL, MENTAL, OR EMOTIONAL CONDITION, DO YOU HAVE SERIOUS DIFFICULTY CONCENTRATING, REMEMBERING OR MAKING DECISIONS?: NO

## 2024-01-01 SDOH — SOCIAL STABILITY: SOCIAL INSECURITY: HOW OFTEN DOES ANYONE, INCLUDING FAMILY AND FRIENDS, THREATEN YOU WITH HARM?: NEVER

## 2024-01-01 SDOH — ECONOMIC STABILITY: INCOME INSECURITY: IN THE PAST 12 MONTHS, HAS THE ELECTRIC, GAS, OIL, OR WATER COMPANY THREATENED TO SHUT OFF SERVICE IN YOUR HOME?: NO

## 2024-01-01 SDOH — ECONOMIC STABILITY: HOUSING INSECURITY: WHAT IS YOUR LIVING SITUATION TODAY?: ADULT CHILDREN

## 2024-01-01 SDOH — ECONOMIC STABILITY: HOUSING INSECURITY: WHAT IS YOUR LIVING SITUATION TODAY?: HOUSE

## 2024-01-01 SDOH — SOCIAL STABILITY: SOCIAL INSECURITY: HOW OFTEN DOES ANYONE, INCLUDING FAMILY AND FRIENDS, SCREAM OR CURSE AT YOU?: NEVER

## 2024-01-01 SDOH — ECONOMIC STABILITY: HOUSING INSECURITY: WHAT IS YOUR LIVING SITUATION TODAY?: PATIENT DECLINED

## 2024-01-01 SDOH — HEALTH STABILITY: PHYSICAL HEALTH: DO YOU HAVE DIFFICULTY DRESSING OR BATHING?: YES

## 2024-01-01 SDOH — HEALTH STABILITY: GENERAL: BECAUSE OF A PHYSICAL, MENTAL, OR EMOTIONAL CONDITION, DO YOU HAVE DIFFICULTY DOING ERRANDS ALONE?: NO

## 2024-01-01 SDOH — ECONOMIC STABILITY: FOOD INSECURITY: WITHIN THE PAST 12 MONTHS, THE FOOD YOU BOUGHT JUST DIDN'T LAST AND YOU DIDN'T HAVE MONEY TO GET MORE.: NEVER TRUE

## 2024-01-01 SDOH — SOCIAL STABILITY: SOCIAL INSECURITY: HOW OFTEN DOES ANYONE, INCLUDING FAMILY AND FRIENDS, PHYSICALLY HURT YOU?: NEVER

## 2024-01-01 SDOH — ECONOMIC STABILITY: GENERAL: WOULD YOU LIKE HELP WITH ANY OF THE FOLLOWING NEEDS?: I DON'T WANT HELP WITH ANY OF THESE

## 2024-01-01 SDOH — HEALTH STABILITY: PHYSICAL HEALTH: DO YOU HAVE SERIOUS DIFFICULTY WALKING OR CLIMBING STAIRS?: YES

## 2024-01-01 SDOH — ECONOMIC STABILITY: GENERAL

## 2024-01-01 SDOH — ECONOMIC STABILITY: HOUSING INSECURITY: DO YOU HAVE PROBLEMS WITH ANY OF THE FOLLOWING?: NONE OF THE ABOVE

## 2024-01-01 SDOH — ECONOMIC STABILITY: TRANSPORTATION INSECURITY
IN THE PAST 12 MONTHS, HAS LACK OF RELIABLE TRANSPORTATION KEPT YOU FROM MEDICAL APPOINTMENTS, MEETINGS, WORK OR FROM GETTING THINGS NEEDED FOR DAILY LIVING?: NO

## 2024-01-01 SDOH — SOCIAL STABILITY: SOCIAL NETWORK
HOW OFTEN DO YOU SEE OR TALK TO PEOPLE THAT YOU CARE ABOUT AND FEEL CLOSE TO? (FOR EXAMPLE: TALKING TO FRIENDS ON THE PHONE, VISITING FRIENDS OR FAMILY, GOING TO CHURCH OR CLUB MEETINGS): 3 TO 5 TIMES A WEEK

## 2024-01-01 SDOH — SOCIAL STABILITY: SOCIAL NETWORK: SUPPORT SYSTEMS: FAMILY MEMBERS

## 2024-01-01 ASSESSMENT — COLUMBIA-SUICIDE SEVERITY RATING SCALE - C-SSRS
6. HAVE YOU EVER DONE ANYTHING, STARTED TO DO ANYTHING, OR PREPARED TO DO ANYTHING TO END YOUR LIFE?: NO
1. WITHIN THE PAST MONTH, HAVE YOU WISHED YOU WERE DEAD OR WISHED YOU COULD GO TO SLEEP AND NOT WAKE UP?: NO
IS THE PATIENT ABLE TO COMPLETE C-SSRS: YES
2. HAVE YOU ACTUALLY HAD ANY THOUGHTS OF KILLING YOURSELF?: NO

## 2024-01-01 ASSESSMENT — ORIENTATION MEMORY CONCENTRATION TEST (OMCT)
WHAT TIME IS IT (NO WATCH OR CLOCK): CORRECT
OMCT SCORE: 2
WHAT YEAR IS IT NOW (MUST BE EXACT): CORRECT
WHAT MONTH IS IT NOW: CORRECT
OMCT INTERPRETATION: 0-6: NO SIGNIFICANT IMPAIRMENT
COUNT BACKWARDS FROM 20 TO 1: CORRECT
SAY THE MONTHS IN REVERSE ORDER STARTING WITH LAST MONTH: 1 ERROR
REPEAT THE NAME AND ADDRESS I ASKED YOU TO REMEMBER: CORRECT

## 2024-01-01 ASSESSMENT — LIFESTYLE VARIABLES
HOW OFTEN DO YOU HAVE 6 OR MORE DRINKS ON ONE OCCASION: NEVER
ALCOHOL_USE_STATUS: NO OR LOW RISK WITH VALIDATED TOOL
HOW OFTEN DO YOU HAVE A DRINK CONTAINING ALCOHOL: NEVER
AUDIT-C TOTAL SCORE: 0
HOW MANY STANDARD DRINKS CONTAINING ALCOHOL DO YOU HAVE ON A TYPICAL DAY: 0,1 OR 2

## 2024-01-01 ASSESSMENT — PAIN SCALES - GENERAL
PAINLEVEL_OUTOF10: 0
PAINLEVEL_OUTOF10: 0
PAINLEVEL_OUTOF10: 8
PAINLEVEL_OUTOF10: 10
PAINLEVEL_OUTOF10: 0
PAINLEVEL_OUTOF10: 0
PAINLEVEL_OUTOF10: 8
PAINLEVEL_OUTOF10: 0
PAINLEVEL_OUTOF10: 5
PAINLEVEL_OUTOF10: 10
PAINLEVEL_OUTOF10: 0
PAINLEVEL_OUTOF10: 0
PAINLEVEL_OUTOF10: 10
PAINLEVEL_OUTOF10: 0
PAINLEVEL_OUTOF10: 0
PAINLEVEL_OUTOF10: 8
PAINLEVEL_OUTOF10: 4

## 2024-01-01 ASSESSMENT — ACTIVITIES OF DAILY LIVING (ADL)
FEEDING: NEEDS ASSISTANCE
ADL_SCORE: 6
ADL_SHORT_OF_BREATH: NO
TOILETING: NEEDS ASSISTANCE
BATHING: NEEDS ASSISTANCE
ADL_BEFORE_ADMISSION: NEEDS/REQUIRES ASSISTANCE
RECENT_DECLINE_ADL: YES, DECLINE IN AMBULATION/TRANSFERRING, COLLABORATE WITH PROVIDER (T);YES, DECLINE IN BATHING/DRESSING/FEEDING, COLLABORATE WITH PROVIDER (T)
DRESSING: NEEDS ASSISTANCE

## 2024-01-01 ASSESSMENT — PATIENT HEALTH QUESTIONNAIRE - PHQ9
2. FEELING DOWN, DEPRESSED OR HOPELESS: SEVERAL DAYS
1. LITTLE INTEREST OR PLEASURE IN DOING THINGS: SEVERAL DAYS
IS PATIENT ABLE TO COMPLETE PHQ2 OR PHQ9: YES
SUM OF ALL RESPONSES TO PHQ9 QUESTIONS 1 AND 2: 2
SUM OF ALL RESPONSES TO PHQ9 QUESTIONS 1 AND 2: 2
CLINICAL INTERPRETATION OF PHQ2 SCORE: NO FURTHER SCREENING NEEDED

## 2024-01-01 ASSESSMENT — PAIN SCALES - WONG BAKER: WONGBAKER_NUMERICALRESPONSE: 0

## 2024-01-12 ENCOUNTER — PATIENT OUTREACH (OUTPATIENT)
Dept: CASE MANAGEMENT | Age: 79
End: 2024-01-12

## 2024-01-12 NOTE — PROGRESS NOTES
Attempted to contact pt for monthly outreach no answer left detailed message for pt to call back.     Outreach to patient to complete monthly follow up on : symptoms ,goals    Reviewed pt's chart including recent visits.     -Care everywhere updated.    -Immunization reconciliation completed. No updates available.    Pt is due for:     Health Maintenance   Topic Date Due    Zoster Vaccines (2 of 2) 09/09/2020    Colorectal Cancer Screening  08/02/2023    COVID-19 Vaccine (4 - 2023-24 season) 09/01/2023    MA Annual Health Assessment  01/01/2024    Annual Depression Screening  01/01/2024    Fall Risk Screening (Annual)  01/01/2024    HTN: BP Follow-Up  02/09/2024    Influenza Vaccine  Completed    DEXA Scan  Completed    Pneumococcal Vaccine: 65+ Years  Completed         Future Appointments   Date Time Provider Department Center   1/22/2024  7:00 AM Rick Shannon DO [facility] [address]   2/16/2024  8:00 AM Mihai Esposito MD [facility]       Total time -  5 min  Total Monthly time- 5 min

## 2024-01-15 DIAGNOSIS — F32.A ANXIETY AND DEPRESSION: ICD-10-CM

## 2024-01-15 DIAGNOSIS — F41.9 ANXIETY AND DEPRESSION: ICD-10-CM

## 2024-01-15 RX ORDER — CLONAZEPAM 0.5 MG/1
0.5 TABLET ORAL 2 TIMES DAILY PRN
Qty: 60 TABLET | Refills: 0 | Status: SHIPPED | OUTPATIENT
Start: 2024-01-15

## 2024-01-15 NOTE — TELEPHONE ENCOUNTER
.A refill request was received for:  Requested Prescriptions     Pending Prescriptions Disp Refills    CLONAZEPAM 0.5 MG Oral Tab [Pharmacy Med Name: CLONAZEPAM 0.5MG TABLETS] 60 tablet 0     Sig: TAKE 1 TABLET(0.5 MG) BY MOUTH TWICE DAILY AS NEEDED       Last refill date:   12/4/2023    Last office visit: 10/3/2023    Follow up due:  Future Appointments   Date Time Provider Department Center   1/22/2024  7:00 AM Rick Shannon DO [facility] [address]   2/16/2024  8:00 AM Mihai Esposito MD [facility]

## 2024-01-22 ENCOUNTER — OFFICE VISIT (OUTPATIENT)
Dept: FAMILY MEDICINE CLINIC | Facility: CLINIC | Age: 79
End: 2024-01-22
Payer: COMMERCIAL

## 2024-01-22 VITALS
HEIGHT: 60 IN | WEIGHT: 91 LBS | BODY MASS INDEX: 17.87 KG/M2 | SYSTOLIC BLOOD PRESSURE: 122 MMHG | DIASTOLIC BLOOD PRESSURE: 78 MMHG | HEART RATE: 76 BPM | OXYGEN SATURATION: 96 %

## 2024-01-22 DIAGNOSIS — F33.2 SEVERE EPISODE OF RECURRENT MAJOR DEPRESSIVE DISORDER, WITHOUT PSYCHOTIC FEATURES (HCC): ICD-10-CM

## 2024-01-22 DIAGNOSIS — R20.0 NUMBNESS OF FOOT: ICD-10-CM

## 2024-01-22 DIAGNOSIS — M25.551 CHRONIC RIGHT HIP PAIN: Primary | ICD-10-CM

## 2024-01-22 DIAGNOSIS — G89.29 CHRONIC RIGHT HIP PAIN: Primary | ICD-10-CM

## 2024-01-22 DIAGNOSIS — J04.0 LARYNGITIS: ICD-10-CM

## 2024-01-22 DIAGNOSIS — E46 PROTEIN-CALORIE MALNUTRITION, UNSPECIFIED SEVERITY (HCC): ICD-10-CM

## 2024-01-22 DIAGNOSIS — E87.1 HYPONATREMIA: ICD-10-CM

## 2024-01-22 DIAGNOSIS — M19.049 HAND ARTHRITIS: ICD-10-CM

## 2024-01-22 PROCEDURE — 99214 OFFICE O/P EST MOD 30 MIN: CPT | Performed by: FAMILY MEDICINE

## 2024-01-22 PROCEDURE — 3078F DIAST BP <80 MM HG: CPT | Performed by: FAMILY MEDICINE

## 2024-01-22 PROCEDURE — 1160F RVW MEDS BY RX/DR IN RCRD: CPT | Performed by: FAMILY MEDICINE

## 2024-01-22 PROCEDURE — 1159F MED LIST DOCD IN RCRD: CPT | Performed by: FAMILY MEDICINE

## 2024-01-22 PROCEDURE — 3008F BODY MASS INDEX DOCD: CPT | Performed by: FAMILY MEDICINE

## 2024-01-22 PROCEDURE — 3074F SYST BP LT 130 MM HG: CPT | Performed by: FAMILY MEDICINE

## 2024-01-22 RX ORDER — PREDNISONE 20 MG/1
TABLET ORAL
Qty: 13 TABLET | Refills: 0 | Status: SHIPPED | OUTPATIENT
Start: 2024-01-22 | End: 2024-02-20 | Stop reason: ALTCHOICE

## 2024-01-22 RX ORDER — HYDROCODONE BITARTRATE AND ACETAMINOPHEN 5; 325 MG/1; MG/1
1 TABLET ORAL EVERY 8 HOURS PRN
Qty: 20 TABLET | Refills: 0 | Status: SHIPPED | OUTPATIENT
Start: 2024-01-22

## 2024-01-22 RX ORDER — PANTOPRAZOLE SODIUM 40 MG/1
TABLET, DELAYED RELEASE ORAL
Qty: 60 TABLET | Refills: 0 | Status: SHIPPED | OUTPATIENT
Start: 2024-01-22 | End: 2024-02-20 | Stop reason: ALTCHOICE

## 2024-01-22 RX ORDER — MELOXICAM 7.5 MG/1
7.5 TABLET ORAL DAILY
Qty: 30 TABLET | Refills: 3 | Status: SHIPPED | OUTPATIENT
Start: 2024-01-29 | End: 2024-02-13

## 2024-01-22 RX ORDER — PANTOPRAZOLE SODIUM 40 MG/1
TABLET, DELAYED RELEASE ORAL
Qty: 135 TABLET | Refills: 0 | OUTPATIENT
Start: 2024-01-22

## 2024-01-22 NOTE — PROGRESS NOTES
Subjective:   Patient ID: Bibi Diaz is a 78 year old female.    Right hip pain > 1 month.  Severe sudden intermittent, lasting about 30 mins at a time.      2. Hand arthritis.  Chronic. Mildly painful.  At times swelling.    3. Numbness of foot.  Chronic.  No back pain.    4. Protein-calorie malnutrition, unspecified severity (HCC).  Decreased appetite.    5. Severe episode of recurrent major depressive disorder, without psychotic features (HCC).  Chronic.  States current medication is helping.  Was having a hard time but now ready to do therapy.    6. Hyponatremia.  Intermittent.    7. Laryngitis.  Chronic.  Decreased voice.            History/Other:   Review of Systems   All other systems reviewed and are negative.    Current Outpatient Medications   Medication Sig Dispense Refill    HYDROcodone-acetaminophen (NORCO) 5-325 MG Oral Tab Take 1 tablet by mouth every 8 (eight) hours as needed for Pain. 20 tablet 0    clonazePAM 0.5 MG Oral Tab Take 1 tablet (0.5 mg total) by mouth 2 (two) times daily as needed. 60 tablet 0    mirtazapine 30 MG Oral Tab Take 1 tablet (30 mg total) by mouth nightly. 90 tablet 1    Cholecalciferol (VITAMIN D-3 OR) Take by mouth.      polyethylene glycol, PEG 3350, 17 g Oral Powd Pack Take 17 g by mouth daily.      DUPIXENT 300 MG/2ML Subcutaneous Solution Prefilled Syringe       SYMBICORT 160-4.5 MCG/ACT Inhalation Aerosol INHALE 2 PUFFS BY MOUTH TWICE DAILY 10.2 g 11    clobetasol 0.05 % External Ointment       TRIAMCINOLONE 0.1 % External Cream APPLY TOPICALLY TO THE AFFECTED AREA TWICE DAILY AS NEEDED (Patient taking differently: as needed.) 60 g 3    Albuterol Sulfate HFA (PROAIR HFA) 108 (90 Base) MCG/ACT Inhalation Aero Soln INHALE 2 PUFFS BY MOUTH EVERY 6 HOURS AS NEEDED FOR WHEEZING 8.5 g 2    acetaminophen 500 MG Oral Tab Take 1 tablet (500 mg total) by mouth every 6 (six) hours as needed for Pain.      meclizine 12.5 MG Oral Tab Take 1 tablet (12.5 mg total) by mouth 3  (three) times daily as needed. 30 tablet 0    losartan 50 MG Oral Tab Take 1 tablet (50 mg total) by mouth every morning. 90 tablet 0     Allergies:  Allergies   Allergen Reactions    Avelox [Moxifloxacin Hcl In Nacl] SWELLING    Avelox [Moxifloxacin Hydrochloride] TONGUE SWELLING     \"TABS\"    Amoxicillin NAUSEA AND VOMITING     Vomiting.    Statins Myopathy    Sulfa Antibiotics RASH and ITCHING    Dander OTHER (SEE COMMENTS)     \"Animals\": runny nose, watery eyes, itching    Diphenhydramine Runny nose     \"Animals\": runny nose, watery eyes, itching    Dust Runny nose    Latex RASH    Sulfa Drugs Cross Reactors RASH and ITCHING       Objective:   Physical Exam  Vitals reviewed.   Constitutional:       General: She is not in acute distress.     Appearance: She is well-developed. She is not diaphoretic.   Eyes:      General: No scleral icterus.        Right eye: No discharge.         Left eye: No discharge.      Conjunctiva/sclera: Conjunctivae normal.   Cardiovascular:      Rate and Rhythm: Normal rate and regular rhythm.      Heart sounds: Normal heart sounds. No murmur heard.     No friction rub. No gallop.   Pulmonary:      Effort: Pulmonary effort is normal. No respiratory distress.      Breath sounds: Normal breath sounds. No wheezing or rales.   Musculoskeletal:      Comments: + right hand swelling mild and arthritis.    + right hip pain.  Hip testing not giving groin pain.         Assessment & Plan:   1. Chronic right hip pain    2. Hand arthritis    3. Numbness of foot    4. Protein-calorie malnutrition, unspecified severity (HCC)    5. Severe episode of recurrent major depressive disorder, without psychotic features (HCC)    6. Hyponatremia    7. Laryngitis        1. Chronic right hip pain  - Ortho Referral - In Network  - OP REFERRAL TO [facility]  - HYDROcodone-acetaminophen (NORCO) 5-325 MG Oral Tab; Take 1 tablet by mouth every 8 (eight) hours as needed for Pain.  Dispense: 20  tablet; Refill: 0    2. Hand arthritis  As above.    3. Numbness of foot  - Vitamin B12 [E]; Future    4. Protein-calorie malnutrition, unspecified severity (HCC)  Use ensure    5. Severe episode of recurrent major depressive disorder, without psychotic features (HCC)  CPM.  See therapist.    6. Hyponatremia  - Basic Metabolic Panel (8) [E]; Future    7. Laryngitis  PPIs.  Rest.  Time.    Meds This Visit:  Requested Prescriptions     Signed Prescriptions Disp Refills    HYDROcodone-acetaminophen (NORCO) 5-325 MG Oral Tab 20 tablet 0     Sig: Take 1 tablet by mouth every 8 (eight) hours as needed for Pain.     Imaging & Referrals:  ORTHOPEDIC - INTERNAL  OP REFERRAL TO [facility]

## 2024-01-22 NOTE — PATIENT INSTRUCTIONS
1 tab daily low dose aspirin  Prednisone first then when that's done take start the meloxicam.  Do Physical therapy  See orthopedics after 3 weeks.  Take pantoprazole exactly like prescribed.

## 2024-01-25 ENCOUNTER — LAB ENCOUNTER (OUTPATIENT)
Dept: LAB | Age: 79
End: 2024-01-25
Attending: FAMILY MEDICINE
Payer: MEDICARE

## 2024-01-25 DIAGNOSIS — R20.0 NUMBNESS OF FOOT: ICD-10-CM

## 2024-01-25 DIAGNOSIS — E87.1 HYPONATREMIA: ICD-10-CM

## 2024-01-25 LAB
ANION GAP SERPL CALC-SCNC: 6 MMOL/L (ref 0–18)
BUN BLD-MCNC: 6 MG/DL (ref 9–23)
CALCIUM BLD-MCNC: 9.7 MG/DL (ref 8.5–10.1)
CHLORIDE SERPL-SCNC: 97 MMOL/L (ref 98–112)
CO2 SERPL-SCNC: 27 MMOL/L (ref 21–32)
CREAT BLD-MCNC: 0.72 MG/DL
EGFRCR SERPLBLD CKD-EPI 2021: 85 ML/MIN/1.73M2 (ref 60–?)
FASTING STATUS PATIENT QL REPORTED: YES
GLUCOSE BLD-MCNC: 90 MG/DL (ref 70–99)
OSMOLALITY SERPL CALC.SUM OF ELEC: 267 MOSM/KG (ref 275–295)
POTASSIUM SERPL-SCNC: 4.5 MMOL/L (ref 3.5–5.1)
SODIUM SERPL-SCNC: 130 MMOL/L (ref 136–145)
VIT B12 SERPL-MCNC: 753 PG/ML (ref 193–986)

## 2024-01-25 PROCEDURE — 36415 COLL VENOUS BLD VENIPUNCTURE: CPT

## 2024-01-25 PROCEDURE — 80048 BASIC METABOLIC PNL TOTAL CA: CPT

## 2024-01-25 PROCEDURE — 82607 VITAMIN B-12: CPT

## 2024-01-26 RX ORDER — LOSARTAN POTASSIUM 50 MG/1
50 TABLET ORAL EVERY MORNING
Qty: 90 TABLET | Refills: 0 | Status: SHIPPED | OUTPATIENT
Start: 2024-01-26

## 2024-01-29 ENCOUNTER — TELEPHONE (OUTPATIENT)
Dept: ORTHOPEDICS CLINIC | Facility: CLINIC | Age: 79
End: 2024-01-29

## 2024-01-31 DIAGNOSIS — R42 DIZZY: ICD-10-CM

## 2024-01-31 RX ORDER — MECLIZINE HCL 12.5 MG/1
12.5 TABLET ORAL 3 TIMES DAILY PRN
Qty: 30 TABLET | Refills: 0 | Status: SHIPPED | OUTPATIENT
Start: 2024-01-31

## 2024-01-31 NOTE — TELEPHONE ENCOUNTER
A refill request was received for:  Requested Prescriptions     Pending Prescriptions Disp Refills    MECLIZINE 12.5 MG Oral Tab [Pharmacy Med Name: MECLIZINE 12.5MG (RX) TABLETS] 30 tablet 0     Sig: TAKE 1 TABLET(12.5 MG) BY MOUTH THREE TIMES DAILY AS NEEDED       Last refill date:   12/4/2023    Last office visit: 1/22/2024    Follow up due:  Future Appointments   Date Time Provider Department Center   2/16/2024  8:00 AM Mihai Esposito MD [facility]   2/19/2024  7:00 AM Rick Shannon DO [facility] [address]   3/4/2024  8:20 AM Brayan Gandhi MD [facility]

## 2024-02-08 ENCOUNTER — PATIENT OUTREACH (OUTPATIENT)
Dept: CASE MANAGEMENT | Age: 79
End: 2024-02-08

## 2024-02-08 NOTE — PROGRESS NOTES
Attempted to contact pt for monthly outreach no answer left detailed message for pt to call back.     Outreach to patient to complete monthly follow up on : symptoms , recent OV, pain ,mood, SDOH.  Reviewed pt's chart including recent visits.     -Care everywhere updated.    -Immunization reconciliation completed. No updates available.    Pt is due for:     Health Maintenance   Topic Date Due    Zoster Vaccines (2 of 2) 09/09/2020    Colorectal Cancer Screening  08/02/2023    COVID-19 Vaccine (4 - 2023-24 season) 09/01/2023    MA Annual Health Assessment  01/01/2024    Annual Depression Screening  01/01/2024    Fall Risk Screening (Annual)  01/01/2024    Influenza Vaccine  Completed    DEXA Scan  Completed    Pneumococcal Vaccine: 65+ Years  Completed         Future Appointments   Date Time Provider Department Center   2/16/2024  8:00 AM Mihai Esposito MD [facility]   2/19/2024  7:00 AM Rick Shannon DO [facility] [address]   3/4/2024  8:20 AM Brayan Gandhi MD [facility]       Total time -  5 min  Total Monthly time- 5 min

## 2024-02-19 ENCOUNTER — OFFICE VISIT (OUTPATIENT)
Dept: FAMILY MEDICINE CLINIC | Facility: CLINIC | Age: 79
End: 2024-02-19
Payer: COMMERCIAL

## 2024-02-19 VITALS
WEIGHT: 92.81 LBS | HEART RATE: 67 BPM | BODY MASS INDEX: 18.22 KG/M2 | OXYGEN SATURATION: 95 % | DIASTOLIC BLOOD PRESSURE: 64 MMHG | HEIGHT: 60 IN | SYSTOLIC BLOOD PRESSURE: 122 MMHG

## 2024-02-19 DIAGNOSIS — Z00.00 ENCOUNTER FOR ANNUAL HEALTH EXAMINATION: Primary | ICD-10-CM

## 2024-02-19 DIAGNOSIS — J44.0 CHRONIC OBSTRUCTIVE PULMONARY DISEASE WITH ACUTE LOWER RESPIRATORY INFECTION (HCC): ICD-10-CM

## 2024-02-19 DIAGNOSIS — F33.1 MAJOR DEPRESSIVE DISORDER, RECURRENT EPISODE, MODERATE (HCC): ICD-10-CM

## 2024-02-19 DIAGNOSIS — F33.2 SEVERE EPISODE OF RECURRENT MAJOR DEPRESSIVE DISORDER, WITHOUT PSYCHOTIC FEATURES (HCC): ICD-10-CM

## 2024-02-19 DIAGNOSIS — E46 PROTEIN-CALORIE MALNUTRITION, UNSPECIFIED SEVERITY (HCC): ICD-10-CM

## 2024-02-19 DIAGNOSIS — I70.0 AORTIC ATHEROSCLEROSIS (HCC): ICD-10-CM

## 2024-02-19 PROCEDURE — 3078F DIAST BP <80 MM HG: CPT | Performed by: FAMILY MEDICINE

## 2024-02-19 PROCEDURE — 1160F RVW MEDS BY RX/DR IN RCRD: CPT | Performed by: FAMILY MEDICINE

## 2024-02-19 PROCEDURE — 1126F AMNT PAIN NOTED NONE PRSNT: CPT | Performed by: FAMILY MEDICINE

## 2024-02-19 PROCEDURE — 1170F FXNL STATUS ASSESSED: CPT | Performed by: FAMILY MEDICINE

## 2024-02-19 PROCEDURE — G0439 PPPS, SUBSEQ VISIT: HCPCS | Performed by: FAMILY MEDICINE

## 2024-02-19 PROCEDURE — 96160 PT-FOCUSED HLTH RISK ASSMT: CPT | Performed by: FAMILY MEDICINE

## 2024-02-19 PROCEDURE — 1159F MED LIST DOCD IN RCRD: CPT | Performed by: FAMILY MEDICINE

## 2024-02-19 PROCEDURE — 3008F BODY MASS INDEX DOCD: CPT | Performed by: FAMILY MEDICINE

## 2024-02-19 PROCEDURE — 3074F SYST BP LT 130 MM HG: CPT | Performed by: FAMILY MEDICINE

## 2024-02-20 NOTE — PROGRESS NOTES
Subjective:   Bibi Diaz is a 78 year old female who presents for a MA (Medicare Advantage) Supervisit (Once per calendar year) and scheduled follow up of multiple significant but stable problems.     2. Aortic atherosclerosis (HCC)  Chronic, stable. No new symptoms.     3. Chronic obstructive pulmonary disease with acute lower respiratory infection (HCC)  Chronic, stable. No new symptoms.     4. Major depressive disorder, recurrent episode, moderate (HCC)  Chronic, stable. No new symptoms.    5. Protein-calorie malnutrition, unspecified severity (HCC)  Chronic, stable. No new symptoms.     6. Severe episode of recurrent major depressive disorder, without psychotic features (HCC)  Chronic, stable. No new symptoms.  Now will be going to see therapist.  She states she is doing well now and talking to Halie has helped.    History/Other:   Fall Risk Assessment:   She has been screened for Falls and is low risk.      Cognitive Assessment:   She had a completely normal cognitive assessment - see flowsheet entries     Functional Ability/Status:   Bibi Diaz has some abnormal functions as listed below:  She has Driving difficulties based on screening of functional status. She has difficulties Shopping for Groceries based on screening of functional status. She has Vision problems based on screening of functional status.       Depression Screening (PHQ-2/PHQ-9): PHQ-2 SCORE: 0  , done 2/19/2024             Advanced Directives:   She does NOT have a Living Will. [Do you have a living will?: No]  She does NOT have a Power of  for Health Care. [Do you have a healthcare power of ?: No]  Discussed Advance Care Planning with patient (and family/surrogate if present). Standard forms made available to patient in After Visit Summary.      Patient Active Problem List   Diagnosis    Major depressive disorder, recurrent episode, moderate (HCC)    Esophageal reflux    Chronic obstructive pulmonary disease with  acute lower respiratory infection (HCC)    Acquired hypothyroidism    Memory deficit    Aortic atherosclerosis (HCC)    Diverticulosis of colon    Dizziness and giddiness    Dysthymic disorder    Lichen sclerosus    Pure hypercholesterolemia    Vitamin D deficiency    Vitiligo    Severe episode of recurrent major depressive disorder, without psychotic features (HCC)    Paraesophageal hernia    Subclinical hypothyroidism    Hypertension    Protein-calorie malnutrition, unspecified severity (HCC)     Allergies:  She is allergic to avelox [moxifloxacin hcl in nacl], avelox [moxifloxacin hydrochloride], amoxicillin, statins, sulfa antibiotics, dander, diphenhydramine, dust, latex, and sulfa drugs cross reactors.    Current Medications:  Outpatient Medications Marked as Taking for the 2/19/24 encounter (Office Visit) with Rick Shannon, DO   Medication Sig    meclizine 12.5 MG Oral Tab Take 1 tablet (12.5 mg total) by mouth 3 (three) times daily as needed.    losartan 50 MG Oral Tab Take 1 tablet (50 mg total) by mouth every morning.    predniSONE 20 MG Oral Tab 2.5 tab day 1-3, 2 tab day 4, 1.5 tab day 5, 1 tab day 6, 0.5 tab day 7    pantoprazole 40 MG Oral Tab EC 1 tab 30 mins before breakfast and dinner x 2 weeks, then once daily x 2 weeks.    HYDROcodone-acetaminophen (NORCO) 5-325 MG Oral Tab Take 1 tablet by mouth every 8 (eight) hours as needed for Pain.    clonazePAM 0.5 MG Oral Tab Take 1 tablet (0.5 mg total) by mouth 2 (two) times daily as needed.    mirtazapine 30 MG Oral Tab Take 1 tablet (30 mg total) by mouth nightly.    Cholecalciferol (VITAMIN D-3 OR) Take by mouth.    polyethylene glycol, PEG 3350, 17 g Oral Powd Pack Take 17 g by mouth daily.    DUPIXENT 300 MG/2ML Subcutaneous Solution Prefilled Syringe     SYMBICORT 160-4.5 MCG/ACT Inhalation Aerosol INHALE 2 PUFFS BY MOUTH TWICE DAILY    ondansetron (ZOFRAN) 4 mg tablet Take 1 tablet (4 mg total) by mouth every 8 (eight) hours as needed for Nausea.     clobetasol 0.05 % External Ointment     TRIAMCINOLONE 0.1 % External Cream APPLY TOPICALLY TO THE AFFECTED AREA TWICE DAILY AS NEEDED (Patient taking differently: as needed.)    Albuterol Sulfate HFA (PROAIR HFA) 108 (90 Base) MCG/ACT Inhalation Aero Soln INHALE 2 PUFFS BY MOUTH EVERY 6 HOURS AS NEEDED FOR WHEEZING    acetaminophen 500 MG Oral Tab Take 1 tablet (500 mg total) by mouth every 6 (six) hours as needed for Pain.       Medical History:  She  has a past medical history of Abdominal distention, Abdominal pain, Acquired hypothyroidism (03/01/2018), Anxiety, Belching, Bloating, Chronic rhinitis, CKD (chronic kidney disease) stage 3, GFR 30-59 ml/min ([facility]) (07/09/2019), Constipation, COPD (chronic obstructive pulmonary disease) ([facility]), Depression, Diarrhea, unspecified, Diverticulosis of large intestine, Emphysema, Esophageal reflux, Fatigue, Feeling lonely, Flatulence/gas pain/belching, Food intolerance, H/O bronchitis, Hay fever, Hemorrhoids, High blood pressure, History of depression, Hypercholesterolemia, Hypertension (01/19/2023), Itch of skin, Migraines, Mild intermittent asthma without complication, Nausea, Night sweats, Pneumonia (03/2018), PONV (postoperative nausea and vomiting), Pure hypercholesterolemia (12/15/2004), Rash, Reflux, Sleep disturbance, Stress, Uncomfortable fullness after meals, Visual impairment, Vomiting, and Wears glasses.  Surgical History:  She  has a past surgical history that includes colonoscopy; cystotomy,excis vesical neck (Left); correct bunion,simple (Bilateral); colonoscopy & polypectomy (09/2014); gastro - dmg (09/2014); upper gi endoscopy,diagnosis (N/A, 09/22/2014); colonoscopy,remv lesn,snare (N/A, 09/22/2014); patient withough preoperative order for iv antibiotic surgical site infection prophylaxis. (N/A, 09/22/2014); patient documented not to have experienced any of the following events (N/A, 09/22/2014); oophorectomy (Bilateral, 2017); egd; and other surgical  history (2023).   Family History:  Her family history includes Alcohol and Other Disorders Associated in her father and son; Colon Cancer in her mother; Hypertension in her brother and sister; Other in her father and mother; Prostate Cancer in her brother; copd in her mother; emph in her father.  Social History:  She  reports that she quit smoking about 43 years ago. Her smoking use included cigarettes. She has never used smokeless tobacco. She reports that she does not drink alcohol and does not use drugs.    Tobacco:  She smoked tobacco in the past but quit greater than 12 months ago.  Social History    Tobacco Use      Smoking status: Former        Years: 10        Types: Cigarettes        Quit date: 1980        Years since quittin.4      Smokeless tobacco: Never      Tobacco comments:  1/2 pack per day. Social history shows \"Tobacco Use: Active\" and \"Never Smoker: Active\"         CAGE Alcohol Screen:   CAGE screening score of 0 on 2024, showing low risk of alcohol abuse.      Patient Care Team:  Rick Shannon DO as PCP - General (Family Medicine)  Halie Lu RMA as CCM Care Manager  Adelaide Barber as Consulting Physician (DERMATOLOGY)  Senait Paula as Consulting Physician (UROLOGY)  Naya Corrales APRN (Nurse Practitioner)  Dev Sanchez DO (GASTROENTEROLOGY)  Remi Friedman MD (SURGERY, GENERAL)  James Chilel MD (Cardiovascular Diseases)    Review of Systems   All other systems reviewed and are negative.     Objective:   Physical Exam  Vitals reviewed.   Constitutional:       General: She is not in acute distress.     Appearance: She is well-developed. She is not diaphoretic.   HENT:      Right Ear: External ear normal.      Left Ear: External ear normal.      Nose: Nose normal.      Mouth/Throat:      Pharynx: No oropharyngeal exudate.   Eyes:      General: No scleral icterus.        Right eye: No discharge.         Left eye: No discharge.      Conjunctiva/sclera:  Conjunctivae normal.   Neck:      Thyroid: No thyromegaly.   Cardiovascular:      Rate and Rhythm: Normal rate and regular rhythm.      Heart sounds: Normal heart sounds. No murmur heard.     No friction rub. No gallop.   Pulmonary:      Effort: Pulmonary effort is normal. No respiratory distress.      Breath sounds: Normal breath sounds. No wheezing or rales.   Abdominal:      General: Bowel sounds are normal. There is no distension.      Palpations: Abdomen is soft. There is no mass.      Tenderness: There is no abdominal tenderness. There is no guarding or rebound.   Musculoskeletal:         General: No tenderness. Normal range of motion.      Cervical back: Normal range of motion and neck supple.   Lymphadenopathy:      Cervical: No cervical adenopathy.   Skin:     General: Skin is warm and dry.      Findings: No erythema or rash.   Neurological:      Mental Status: She is alert and oriented to person, place, and time.      Motor: No abnormal muscle tone.      Deep Tendon Reflexes: Reflexes are normal and symmetric.   Psychiatric:         Behavior: Behavior normal.         Thought Content: Thought content normal.         Judgment: Judgment normal.            /64   Pulse 67   Ht 5' (1.524 m)   Wt 92 lb 12.8 oz (42.1 kg)   SpO2 95%   BMI 18.12 kg/m²  Estimated body mass index is 18.12 kg/m² as calculated from the following:    Height as of this encounter: 5' (1.524 m).    Weight as of this encounter: 92 lb 12.8 oz (42.1 kg).    Medicare Hearing Assessment:   Hearing Screening    Time taken: 2/19/2024  7:13 AM  Screening Method: Whisper Test  Whisper Test Result: Fail             Visual Acuity:   Right Eye Visual Acuity: Corrected Right Eye Chart Acuity: 20/70   Left Eye Visual Acuity: Corrected Left Eye Chart Acuity: 20/50     Both Eyes Chart Acuity: 20/50   Able To Tolerate Visual Acuity: Yes        Assessment & Plan:   Bibi Diaz is a 78 year old female who presents for a Medicare Assessment.      1. Encounter for annual health examination (Primary)  2. Aortic atherosclerosis (HCC)  Overview:  9/2/2019 XR PA and LAT  Chronic, stable. CPM   3. Chronic obstructive pulmonary disease with acute lower respiratory infection (HCC)  Chronic, stable. CPM   4. Major depressive disorder, recurrent episode, moderate (HCC)  Chronic, stable. CPM   5. Protein-calorie malnutrition, unspecified severity (HCC)  Chronic, stable. CPM   6. Severe episode of recurrent major depressive disorder, without psychotic features (HCC)  Chronic, stable. CPM     The patient indicates understanding of these issues and agrees to the plan.  Reinforced healthy diet, lifestyle, and exercise.      Return in about 3 months (around 5/19/2024).     Rick Shannon DO, 2/20/2024     Supplementary Documentation:   General Health:  In the past six months, have you lost more than 10 pounds without trying?: 2 - No  Has your appetite been poor?: No  Type of Diet: Other (\"regular\")  How does the patient maintain a good energy level?: Daily Walks  How would you describe your daily physical activity?: Light  How would you describe your current health state?: Poor  How do you maintain positive mental well-being?: Visiting Family  On a scale of 0 to 10, with 0 being no pain and 10 being severe pain, what is your pain level?: 0 - (None)  In the past six months, have you experienced urine leakage?: 0-No  At any time do you feel concerned for the safety/well-being of yourself and/or your children, in your home or elsewhere?: No  Have you had any immunizations at another office such as Influenza, Hepatitis B, Tetanus, or Pneumococcal?: No         Bibi Diaz's SCREENING SCHEDULE   Tests on this list are recommended by your physician but may not be covered, or covered at this frequency, by your insurer.   Please check with your insurance carrier before scheduling to verify coverage.   PREVENTATIVE SERVICES FREQUENCY &  COVERAGE DETAILS LAST COMPLETION DATE    Diabetes Screening    Fasting Blood Sugar /  Glucose    One screening every 12 months if never tested or if previously tested but not diagnosed with pre-diabetes   One screening every 6 months if diagnosed with pre-diabetes Lab Results   Component Value Date    GLU 90 01/25/2024        Cardiovascular Disease Screening    Lipid Panel  Cholesterol  Lipoprotein (HDL)  Triglycerides Covered every 5 years for all Medicare beneficiaries without apparent signs or symptoms of cardiovascular disease Lab Results   Component Value Date    CHOLEST 259 (H) 10/25/2022    HDL 50 10/25/2022     (H) 10/25/2022    TRIG 179 (H) 10/25/2022         Electrocardiogram (EKG)   Covered if needed at Welcome to Medicare, and non-screening if indicated for medical reasons 07/08/2023      Ultrasound Screening for Abdominal Aortic Aneurysm (AAA) Covered once in a lifetime for one of the following risk factors    Men who are 65-75 years old and have ever smoked    Anyone with a family history -     Colorectal Cancer Screening  Covered for ages 50-85; only need ONE of the following:    Colonoscopy   Covered every 10 years    Covered every 2 years if patient is at high risk or previous colonoscopy was abnormal 08/02/2018    Health Maintenance   Topic Date Due    Colorectal Cancer Screening  08/02/2023       Flexible Sigmoidoscopy   Covered every 4 years -    Fecal Occult Blood Test Covered annually 05/25/2023   Bone Density Screening    Bone density screening    Covered every 2 years after age 65 if diagnosed with risk of osteoporosis or estrogen deficiency.    Covered yearly for long-term glucocorticoid medication use (Steroids) Last Dexa Scan:    XR DEXA BONE DENSITOMETRY (CPT=77080) 07/16/2019      No recommendations at this time   Pap and Pelvic    Pap   Covered every 2 years for women at normal risk; Annually if at high risk -  No recommendations at this time    Chlamydia Annually if high risk 05/25/2023  No recommendations at this  time   Screening Mammogram    Mammogram     Recommend annually for all female patients aged 40 and older    One baseline mammogram covered for patients aged 35-39 -    No recommendations at this time    Immunizations    Influenza Covered once per flu season  Please get every year 10/20/2023  No recommendations at this time    Pneumococcal Each vaccine ([id number] & [id number]) covered once after 65 Prevnar 13: 05/16/2017    Lufnianjp25: 01/04/2012     No recommendations at this time    Hepatitis B One screening covered for patients with certain risk factors   -  No recommendations at this time    Tetanus Toxoid Not covered by Medicare Part B unless medically necessary (cut with metal); may be covered with your pharmacy prescription benefits 03/21/2006    Tetanus, Diptheria and Pertusis TD and TDaP Not covered by Medicare Part B -  No recommendations at this time    Zoster Not covered by Medicare Part B; may be covered with your pharmacy  prescription benefits -  Zoster Vaccines(2 of 2) due on 09/09/2020     Annual Monitoring of Persistent Medications (ACE/ARB, digoxin diuretics, anticonvulsants)    Potassium Annually Lab Results   Component Value Date    K 4.5 01/25/2024         Creatinine   Annually Lab Results   Component Value Date    CREATSERUM 0.72 01/25/2024         BUN Annually Lab Results   Component Value Date    BUN 6 (L) 01/25/2024       Drug Serum Conc Annually No results found for: \"DIGOXIN\", \"DIG\", \"VALP\"           Chronic Obstructive Pulmonary Disease (COPD)    Spirometry Annually Spirometry date:             Estimated Blood Loss (Cc): minimal

## 2024-02-20 NOTE — PATIENT INSTRUCTIONS
Bibi Diaz's SCREENING SCHEDULE   Tests on this list are recommended by your physician but may not be covered, or covered at this frequency, by your insurer.   Please check with your insurance carrier before scheduling to verify coverage.   PREVENTATIVE SERVICES FREQUENCY &  COVERAGE DETAILS LAST COMPLETION DATE   Diabetes Screening    Fasting Blood Sugar /  Glucose    One screening every 12 months if never tested or if previously tested but not diagnosed with pre-diabetes   One screening every 6 months if diagnosed with pre-diabetes Lab Results   Component Value Date    GLU 90 01/25/2024        Cardiovascular Disease Screening    Lipid Panel  Cholesterol  Lipoprotein (HDL)  Triglycerides Covered every 5 years for all Medicare beneficiaries without apparent signs or symptoms of cardiovascular disease Lab Results   Component Value Date    CHOLEST 259 (H) 10/25/2022    HDL 50 10/25/2022     (H) 10/25/2022    TRIG 179 (H) 10/25/2022         Electrocardiogram (EKG)   Covered if needed at Welcome to Medicare, and non-screening if indicated for medical reasons 07/08/2023      Ultrasound Screening for Abdominal Aortic Aneurysm (AAA) Covered once in a lifetime for one of the following risk factors   • Men who are 65-75 years old and have ever smoked   • Anyone with a family history -     Colorectal Cancer Screening  Covered for ages 50-85; only need ONE of the following:    Colonoscopy   Covered every 10 years    Covered every 2 years if patient is at high risk or previous colonoscopy was abnormal 08/02/2018    Health Maintenance   Topic Date Due   • Colorectal Cancer Screening  08/02/2023       Flexible Sigmoidoscopy   Covered every 4 years -    Fecal Occult Blood Test Covered annually 05/25/2023   Bone Density Screening    Bone density screening    Covered every 2 years after age 65 if diagnosed with risk of osteoporosis or estrogen deficiency.    Covered yearly for long-term glucocorticoid medication use  (Steroids) Last Dexa Scan:    XR DEXA BONE DENSITOMETRY (CPT=77080) 07/16/2019      No recommendations at this time   Pap and Pelvic    Pap   Covered every 2 years for women at normal risk; Annually if at high risk -  No recommendations at this time    Chlamydia Annually if high risk 05/25/2023  No recommendations at this time   Screening Mammogram    Mammogram     Recommend annually for all female patients aged 40 and older    One baseline mammogram covered for patients aged 35-39 -    No recommendations at this time    Immunizations    Influenza Covered once per flu season  Please get every year 10/20/2023  No recommendations at this time    Pneumococcal Each vaccine ([id number] & [id number]) covered once after 65 Prevnar 13: 05/16/2017    [id number]: 01/04/2012     No recommendations at this time    Hepatitis B One screening covered for patients with certain risk factors   -  No recommendations at this time    Tetanus Toxoid Not covered by Medicare Part B unless medically necessary (cut with metal); may be covered with your pharmacy prescription benefits 03/21/2006    Tetanus, Diptheria and Pertusis TD and TDaP Not covered by Medicare Part B -  No recommendations at this time    Zoster Not covered by Medicare Part B; may be covered with your pharmacy  prescription benefits -  Zoster Vaccines(2 of 2) due on 09/09/2020     Annual Monitoring of Persistent Medications (ACE/ARB, digoxin diuretics, anticonvulsants)    Potassium Annually Lab Results   Component Value Date    K 4.5 01/25/2024         Creatinine   Annually Lab Results   Component Value Date    CREATSERUM 0.72 01/25/2024         BUN Annually Lab Results   Component Value Date    BUN 6 (L) 01/25/2024       Drug Serum Conc Annually No results found for: \"DIGOXIN\", \"DIG\", \"VALP\"         Chronic Obstructive Pulmonary Disease (COPD)    Spirometry Annually Spirometry date:

## 2024-02-27 ENCOUNTER — HOSPITAL ENCOUNTER (OUTPATIENT)
Dept: CT IMAGING | Age: 79
Discharge: HOME | End: 2024-02-27
Attending: INTERNAL MEDICINE
Payer: MEDICARE

## 2024-02-27 ENCOUNTER — APPOINTMENT (OUTPATIENT)
Dept: CT IMAGING | Age: 79
End: 2024-02-27
Attending: EMERGENCY MEDICINE
Payer: MEDICARE

## 2024-02-27 ENCOUNTER — HOSPITAL ENCOUNTER (EMERGENCY)
Age: 79
Discharge: HOME | End: 2024-02-27
Attending: EMERGENCY MEDICINE
Payer: MEDICARE

## 2024-02-27 ENCOUNTER — APPOINTMENT (OUTPATIENT)
Dept: GENERAL RADIOLOGY | Age: 79
End: 2024-02-27
Attending: EMERGENCY MEDICINE
Payer: MEDICARE

## 2024-02-27 VITALS
DIASTOLIC BLOOD PRESSURE: 62 MMHG | HEIGHT: 60 IN | TEMPERATURE: 96 F | WEIGHT: 92 LBS | RESPIRATION RATE: 20 BRPM | BODY MASS INDEX: 18.06 KG/M2 | OXYGEN SATURATION: 100 % | HEART RATE: 70 BPM | SYSTOLIC BLOOD PRESSURE: 140 MMHG

## 2024-02-27 DIAGNOSIS — S76.011D STRAIN OF RIGHT HIP, SUBSEQUENT ENCOUNTER: ICD-10-CM

## 2024-02-27 DIAGNOSIS — R10.9 ABDOMINAL PAIN, ACUTE: Primary | ICD-10-CM

## 2024-02-27 DIAGNOSIS — E87.1 HYPONATREMIA: ICD-10-CM

## 2024-02-27 DIAGNOSIS — R11.0 NAUSEA: ICD-10-CM

## 2024-02-27 LAB
ALBUMIN SERPL-MCNC: 4.1 G/DL (ref 3.4–5)
ALBUMIN/GLOB SERPL: 1.2 {RATIO} (ref 1–2)
ALP LIVER SERPL-CCNC: 91 U/L
ALT SERPL-CCNC: 19 U/L
ANION GAP SERPL CALC-SCNC: 7 MMOL/L (ref 0–18)
AST SERPL-CCNC: 24 U/L (ref 15–37)
BASOPHILS # BLD AUTO: 0.08 X10(3) UL (ref 0–0.2)
BASOPHILS NFR BLD AUTO: 0.8 %
BILIRUB SERPL-MCNC: 0.3 MG/DL (ref 0.1–2)
BILIRUB UR QL STRIP.AUTO: NEGATIVE
BUN BLD-MCNC: 8 MG/DL (ref 9–23)
CALCIUM BLD-MCNC: 9.7 MG/DL (ref 8.5–10.1)
CHLORIDE SERPL-SCNC: 92 MMOL/L (ref 98–112)
CLARITY UR REFRACT.AUTO: CLEAR
CO2 SERPL-SCNC: 24 MMOL/L (ref 21–32)
COLOR UR AUTO: YELLOW
CREAT BLD-MCNC: 0.84 MG/DL
EGFRCR SERPLBLD CKD-EPI 2021: 71 ML/MIN/1.73M2 (ref 60–?)
EOSINOPHIL # BLD AUTO: 0.06 X10(3) UL (ref 0–0.7)
EOSINOPHIL NFR BLD AUTO: 0.6 %
ERYTHROCYTE [DISTWIDTH] IN BLOOD BY AUTOMATED COUNT: 12.1 %
GLOBULIN PLAS-MCNC: 3.4 G/DL (ref 2.8–4.4)
GLUCOSE BLD-MCNC: 112 MG/DL (ref 70–99)
GLUCOSE UR STRIP.AUTO-MCNC: NEGATIVE MG/DL
HCT VFR BLD AUTO: 33.2 %
HGB BLD-MCNC: 11.8 G/DL
IMM GRANULOCYTES # BLD AUTO: 0.04 X10(3) UL (ref 0–1)
IMM GRANULOCYTES NFR BLD: 0.4 %
KETONES UR STRIP.AUTO-MCNC: NEGATIVE MG/DL
LEUKOCYTE ESTERASE UR QL STRIP.AUTO: NEGATIVE
LYMPHOCYTES # BLD AUTO: 0.97 X10(3) UL (ref 1–4)
LYMPHOCYTES NFR BLD AUTO: 9.5 %
MCH RBC QN AUTO: 32.1 PG (ref 26–34)
MCHC RBC AUTO-ENTMCNC: 35.5 G/DL (ref 31–37)
MCV RBC AUTO: 90.2 FL
MONOCYTES # BLD AUTO: 1.18 X10(3) UL (ref 0.1–1)
MONOCYTES NFR BLD AUTO: 11.6 %
NEUTROPHILS # BLD AUTO: 7.83 X10 (3) UL (ref 1.5–7.7)
NEUTROPHILS # BLD AUTO: 7.83 X10(3) UL (ref 1.5–7.7)
NEUTROPHILS NFR BLD AUTO: 77.1 %
NITRITE UR QL STRIP.AUTO: NEGATIVE
OSMOLALITY SERPL CALC.SUM OF ELEC: 255 MOSM/KG (ref 275–295)
PH UR STRIP.AUTO: 7 [PH] (ref 5–8)
PLATELET # BLD AUTO: 377 10(3)UL (ref 150–450)
POTASSIUM SERPL-SCNC: 4.1 MMOL/L (ref 3.5–5.1)
PROT SERPL-MCNC: 7.5 G/DL (ref 6.4–8.2)
PROT UR STRIP.AUTO-MCNC: NEGATIVE MG/DL
RBC # BLD AUTO: 3.68 X10(6)UL
SODIUM SERPL-SCNC: 123 MMOL/L (ref 136–145)
SP GR UR STRIP.AUTO: 1.01 (ref 1–1.03)
UROBILINOGEN UR STRIP.AUTO-MCNC: 0.2 MG/DL
WBC # BLD AUTO: 10.2 X10(3) UL (ref 4–11)

## 2024-02-27 PROCEDURE — 96374 THER/PROPH/DIAG INJ IV PUSH: CPT

## 2024-02-27 PROCEDURE — 85025 COMPLETE CBC W/AUTO DIFF WBC: CPT | Performed by: EMERGENCY MEDICINE

## 2024-02-27 PROCEDURE — 99285 EMERGENCY DEPT VISIT HI MDM: CPT

## 2024-02-27 PROCEDURE — 81001 URINALYSIS AUTO W/SCOPE: CPT | Performed by: EMERGENCY MEDICINE

## 2024-02-27 PROCEDURE — 74177 CT ABD & PELVIS W/CONTRAST: CPT | Performed by: EMERGENCY MEDICINE

## 2024-02-27 PROCEDURE — 80053 COMPREHEN METABOLIC PANEL: CPT | Performed by: EMERGENCY MEDICINE

## 2024-02-27 PROCEDURE — 81015 MICROSCOPIC EXAM OF URINE: CPT | Performed by: EMERGENCY MEDICINE

## 2024-02-27 PROCEDURE — 73502 X-RAY EXAM HIP UNI 2-3 VIEWS: CPT | Performed by: EMERGENCY MEDICINE

## 2024-02-27 PROCEDURE — 99284 EMERGENCY DEPT VISIT MOD MDM: CPT

## 2024-02-27 RX ORDER — SODIUM CHLORIDE 9 MG/ML
INJECTION, SOLUTION INTRAVENOUS ONCE
Status: COMPLETED | OUTPATIENT
Start: 2024-02-27 | End: 2024-02-27

## 2024-02-27 NOTE — ED PROVIDER NOTES
Patient Seen in: [facility] In [place]      History     Chief Complaint   Patient presents with    Leg or Foot Injury    Nausea/Vomiting/Diarrhea     Stated Complaint: brought from CT scan out patient, sudden onset of right hip pain, has had inter*    Subjective:   HPI    Patient is a 78-year-old female presents emergency room with history of multiple complaints.  The patient has had ongoing right hip pain that she has had for some time for which she has been followed up with primary care and intermittently will get pain to the lateral aspect of her right hip.  Patient was told that she had a history of arthritis in the past.  Patient denies history of any recent fall or trauma.  The patient took 2 Vicodin for her right hip pain this morning which is since improved.  The patient was post have an outpatient CAT scan done today to follow-up with ongoing nausea issues she has had for which she has been following up with Dr. Esposito.  The patient states that she cannot get the CAT scan done today as she was having hip pain.  Patient denies history of any back pain.  The patient denies history of any weakness or numbness in her lower extremities.  The patient denies history of any fever.  Patient denies history of any other somatic complaints or discomfort at this time.    Objective:   Past Medical History:   Diagnosis Date    Abdominal distention     Abdominal pain     Acquired hypothyroidism 03/01/2018    Anxiety     Asthma ([facility])     Belching     Bloating     Chronic rhinitis     CKD (chronic kidney disease) stage 3, GFR 30-59 ml/min ([facility]) 07/09/2019    Constipation     COPD (chronic obstructive pulmonary disease) ([facility])     NO OXYGEN     Depression     Diarrhea, unspecified     Diverticulosis of large intestine     Emphysema     Esophageal reflux     Fatigue     Feeling lonely     Flatulence/gas pain/belching     Food intolerance     H/O bronchitis     Hay fever     Hemorrhoids     High blood  pressure     History of depression     Hypercholesterolemia     Hypertension 01/19/2023    Itch of skin     Migraines     Mild intermittent asthma without complication (HCC)     Nausea     Night sweats     Pneumonia 03/2018    PONV (postoperative nausea and vomiting)     Pure hypercholesterolemia 12/15/2004    Rash     Reflux     Sleep disturbance     Stress     Uncomfortable fullness after meals     Visual impairment     glasses    Vomiting     Wears glasses               Past Surgical History:   Procedure Laterality Date    CATARACT      COLONOSCOPY      COLONOSCOPY & POLYPECTOMY  09/2014    multiple polyps; tics; repeat 3 yrs    COLONOSCOPY,REMV LESN,SNARE N/A 09/22/2014    Procedure: ESOPHAGOGASTRODUODENOSCOPY, COLONOSCOPY, POSSIBLE BIOPSY, POSSIBLE POLYPECTOMY 17514,47179;  Surgeon: Slade Ivan MD;  Location: [facility]    CORRECT BUNION,SIMPLE Bilateral     CYSTOTOMY,EXCIS VESICAL NECK Left     EGD      GASTRO - DMG  09/2014    stricture; HH    OOPHORECTOMY Bilateral 2017    OTHER SURGICAL HISTORY  02/27/2023    Robotic repair of hiatal hernia and Nissen fundoplication    PATIENT DOCUMENTED NOT TO HAVE EXPERIENCED ANY OF THE FOLLOWING EVENTS N/A 09/22/2014    Procedure: ESOPHAGOGASTRODUODENOSCOPY, COLONOSCOPY, POSSIBLE BIOPSY, POSSIBLE POLYPECTOMY 88478,50668;  Surgeon: Slade Ivan MD;  Location: [facility]    PATIENT WITHOUGH PREOPERATIVE ORDER FOR IV ANTIBIOTIC SURGICAL SITE INFECTION PROPHYLAXIS. N/A 09/22/2014    Procedure: ESOPHAGOGASTRODUODENOSCOPY, COLONOSCOPY, POSSIBLE BIOPSY, POSSIBLE POLYPECTOMY 18974,07689;  Surgeon: Slade Ivan MD;  Location: [facility]    UPPER GI ENDOSCOPY,DIAGNOSIS N/A 09/22/2014    Procedure: ESOPHAGOGASTRODUODENOSCOPY, COLONOSCOPY, POSSIBLE BIOPSY, POSSIBLE POLYPECTOMY 58004,03115;  Surgeon: Slade Ivan MD;  Location: [facility]                Social History     Socioeconomic History    Marital status:    Tobacco Use     Smoking status: Former     Years: 10     Types: Cigarettes     Quit date: 1980     Years since quittin.4    Smokeless tobacco: Never    Tobacco comments:      1/2 pack per day. Social history shows \"Tobacco Use: Active\" and \"Never Smoker: Active\"   Vaping Use    Vaping Use: Never used   Substance and Sexual Activity    Alcohol use: No    Drug use: No   Other Topics Concern    Caffeine Concern Yes     Comment: \"1 cup of coffee and tea, and can of pop daily\"    Exercise Yes     Comment: walking   Social History Narrative    ** Merged History Encounter **          Social Determinants of Health     Financial Resource Strain: Low Risk  (2023)    Financial Resource Strain     Difficulty of Paying Living Expenses: Not hard at all     Med Affordability: No   Food Insecurity: No Food Insecurity (2023)    Food Insecurity     Food Insecurity: Never true   Transportation Needs: Unmet Transportation Needs (2023)    Transportation Needs     Lack of Transportation: Yes   Physical Activity: Inactive (2022)    Exercise Vital Sign     Days of Exercise per Week: 0 days     Minutes of Exercise per Session: 0 min   Stress: Stress Concern Present (2024)    Stress     Feeling of Stress : Yes    Social Connections   Housing Stability: Low Risk  (2023)    Housing Stability     Housing Instability: No              Review of Systems    Positive for stated complaint: brought from CT scan out patient, sudden onset of right hip pain, has had inter*  Other systems are as noted in HPI.  Constitutional and vital signs reviewed.      All other systems reviewed and negative except as noted above.    Physical Exam     ED Triage Vitals [24 0756]   /68   Pulse 75   Resp 20   Temp (!) 96.3 °F (35.7 °C)   Temp src Temporal   SpO2 99 %   O2 Device None (Room air)       Current:/62   Pulse 70   Temp (!) 96.3 °F (35.7 °C) (Temporal)   Resp 20   Ht 152.4 cm (5')   Wt 41.7 kg   SpO2 100%   BMI  17.97 kg/m²         Physical Exam    GENERAL: Well-developed, well-nourished female sitting up breathing easily in no apparent distress.  Patient is nontoxic in appearance.  HEENT: Head is normocephalic, atraumatic. Pupils are 4 mm equally round and reactive to light. Oropharynx is clear. Mucous membranes are moist.  LUNGS: Clear to auscultation bilaterally with no wheeze. There is good equal air entry bilaterally.  HEART: Regular rate and rhythm. Normal S1, S2 no S3, or S4. No murmur.  ABDOMEN: There is mild focal tenderness to palpation appreciated to the suprapubic area and right lower quadrant only with no other focal tenderness to palpation appreciated.. There is no guarding, no rebound, no mass, and no organomegaly appreciated. There is normoactive bowel sounds. There is no hernia.  EXTREMITIES: There is no cyanosis, clubbing, or edema appreciated. Pulses are 2+ and equal in all 4 extremities.  There is minimal tenderness to palpation along the lateral aspect of the right hip appreciated.  There is no deformity appreciated.  NEURO: Patient is awake, alert and oriented to time place and person. Motor strength is 5 over 5 in all 4 extremities. There are no gross motor or sensory deficits appreciated.  Patient answering all questions appropriately.        ED Course     Labs Reviewed   COMP METABOLIC PANEL (14) - Abnormal; Notable for the following components:       Result Value    Glucose 112 (*)     Sodium 123 (*)     Chloride 92 (*)     BUN 8 (*)     Calculated Osmolality 255 (*)     All other components within normal limits   URINALYSIS WITH CULTURE REFLEX - Abnormal; Notable for the following components:    Blood Urine Trace-Intact (*)     All other components within normal limits   UA MICROSCOPIC ONLY, URINE - Abnormal; Notable for the following components:    Bacteria Urine Rare (*)     Squamous Epi. Cells Few (*)     All other components within normal limits   CBC W/ DIFFERENTIAL - Abnormal; Notable for  the following components:    RBC 3.68 (*)     HGB 11.8 (*)     HCT 33.2 (*)     Neutrophil Absolute Prelim 7.83 (*)     Neutrophil Absolute 7.83 (*)     Lymphocyte Absolute 0.97 (*)     Monocyte Absolute 1.18 (*)     All other components within normal limits   CBC WITH DIFFERENTIAL WITH PLATELET    Narrative:     The following orders were created for panel order CBC With Differential With Platelet.  Procedure                               Abnormality         Status                     ---------                               -----------         ------                     CBC W/ DIFFERENTIAL[[id number]]          Abnormal            Final result                 Please view results for these tests on the individual orders.     I personally reviewed the patient's right hip x-ray my dividual interpretation shows no evidence of any acute fracture.  I also reviewed the official radiology report which showed results as noted below.        CT ABDOMEN+PELVIS(CONTRAST ONLY)(CPT=74177)    Result Date: 2/27/2024  CONCLUSION:   1. The appendix is normal.  There is diverticulosis without evidence of acute diverticulitis.  No evidence of an acute inflammatory process.  2. Repair of previously noted large hiatal hernia is noted.    LOCATION:  [place]   Dictated by (CST): Irineo Ferguson MD on 2/27/2024 at 9:58 AM     Finalized by (CST): Irineo Ferguson MD on 2/27/2024 at 10:01 AM       XR HIP W OR WO PELVIS 2 OR 3 VIEWS, RIGHT (CPT=73502)    Result Date: 2/27/2024  CONCLUSION:  There is no acute fracture detected.   LOCATION:  [place]   Dictated by (CST): Jesus Lindo MD on 2/27/2024 at 9:30 AM     Finalized by (CST): Jesus Lindo MD on 2/27/2024 at 9:31 AM               MDM          11:05 patient sitting back breathing easily in no apparent distress this time.  Patient feeling better at this time.  Will discharge to home at this time.  Patient aware and patient's family aware of the need to follow-up with primary care  physician for repeat blood work which is already been ordered regarding her sodium levels being low.  The patient does admit she is been drinking 32 ounces of free water a day and was asked to stop this at that time.      Patient had IV line established blood were drawn including a CBC, chemistries, BUN/creatinine, and blood sugar showed evidence of normal white blood count showed a sodium of 123 which is low however the patient has had low sodium levels and tested recently.  Patient was given a liter of IV fluid for her low sodium here in the ER.  Liver function test were found to be negative.  Urinalysis is unremarkable.  Patient offered pain medication upon arrival to the ER states she was feeling better after taking her hydrocodone earlier today.  Patient underwent x-ray and CT findings with results as noted above.  Patient is sitting back and breathing easily in no apparent distress on repeat examination.  Patient will be discharged home at this time.  Patient is aware that her sodium level is low she states that she does drink about 32 ounces of free water a day and was asked to stop this at this time.  The patient states she was also told by her primary care doctor that her sodium levels were low had blood work done within the last month or so and has a order to do a repeat blood test which she will do next week.  The patient knows the importance of following up with her primary care doctor regarding her low sodium.  The patient is directed to minimize free water at home.  The patient has been instructed to return to the ER immediately if symptoms worsen or if any other problems arise.  Patient discharged home with no further complaints at this time                             Medical Decision Making      Disposition and Plan     Clinical Impression:  1. Abdominal pain, acute    2. Strain of right hip, subsequent encounter    3. Hyponatremia         Disposition:  Discharge  2/27/2024 11:06  am    Follow-up:  Rick Shannon DO  [address] [place] [id number] [phone]    Follow up in 2 day(s)            Medications Prescribed:  Discharge Medication List as of 2/27/2024 11:10 AM

## 2024-02-27 NOTE — DISCHARGE INSTRUCTIONS
REST AT HOME  STOP INTAKE OF FREE WATER AT HOME  PLEASE HAVE YOUR BLOOD TEST DONE TO RECHECK SODIUM LEVELS NEXT WEEK  RETURN TO THE ED IF SYMPTOMS WORSEN OR IF ANY OTHER PROBLEMS ARISE

## 2024-02-27 NOTE — ED INITIAL ASSESSMENT (HPI)
Here from outpt CT - c/o hip pain - denies injury - states took 2 vicodin at home - states history of this pain

## 2024-02-28 ENCOUNTER — TELEPHONE (OUTPATIENT)
Dept: FAMILY MEDICINE CLINIC | Facility: CLINIC | Age: 79
End: 2024-02-28

## 2024-02-28 ENCOUNTER — PATIENT OUTREACH (OUTPATIENT)
Dept: CASE MANAGEMENT | Age: 79
End: 2024-02-28

## 2024-02-28 DIAGNOSIS — K21.00 GASTROESOPHAGEAL REFLUX DISEASE WITH ESOPHAGITIS WITHOUT HEMORRHAGE: ICD-10-CM

## 2024-02-28 DIAGNOSIS — K57.30 DIVERTICULOSIS OF COLON: ICD-10-CM

## 2024-02-28 DIAGNOSIS — F34.1 DYSTHYMIC DISORDER: ICD-10-CM

## 2024-02-28 DIAGNOSIS — R79.89 LOW SERUM SODIUM: Primary | ICD-10-CM

## 2024-02-28 DIAGNOSIS — E55.9 VITAMIN D DEFICIENCY: ICD-10-CM

## 2024-02-28 DIAGNOSIS — E46 PROTEIN-CALORIE MALNUTRITION, UNSPECIFIED SEVERITY (HCC): ICD-10-CM

## 2024-02-28 DIAGNOSIS — L90.0 LICHEN SCLEROSUS: ICD-10-CM

## 2024-02-28 DIAGNOSIS — J44.0 CHRONIC OBSTRUCTIVE PULMONARY DISEASE WITH ACUTE LOWER RESPIRATORY INFECTION (HCC): Primary | ICD-10-CM

## 2024-02-28 DIAGNOSIS — F33.1 MAJOR DEPRESSIVE DISORDER, RECURRENT EPISODE, MODERATE (HCC): ICD-10-CM

## 2024-02-28 DIAGNOSIS — I15.9 SECONDARY HYPERTENSION: ICD-10-CM

## 2024-02-28 DIAGNOSIS — E03.8 SUBCLINICAL HYPOTHYROIDISM: ICD-10-CM

## 2024-02-28 DIAGNOSIS — K44.9 PARAESOPHAGEAL HERNIA: ICD-10-CM

## 2024-02-28 DIAGNOSIS — R42 DIZZINESS AND GIDDINESS: ICD-10-CM

## 2024-02-28 DIAGNOSIS — F33.2 SEVERE EPISODE OF RECURRENT MAJOR DEPRESSIVE DISORDER, WITHOUT PSYCHOTIC FEATURES (HCC): ICD-10-CM

## 2024-02-28 DIAGNOSIS — F41.9 ANXIETY AND DEPRESSION: ICD-10-CM

## 2024-02-28 DIAGNOSIS — F32.A ANXIETY AND DEPRESSION: ICD-10-CM

## 2024-02-28 DIAGNOSIS — R41.3 MEMORY DEFICIT: ICD-10-CM

## 2024-02-28 PROCEDURE — 99499 UNLISTED E&M SERVICE: CPT

## 2024-02-28 RX ORDER — CLONAZEPAM 0.5 MG/1
0.5 TABLET ORAL 2 TIMES DAILY PRN
Qty: 60 TABLET | Refills: 0 | Status: SHIPPED | OUTPATIENT
Start: 2024-02-28

## 2024-02-28 NOTE — TELEPHONE ENCOUNTER
Have her get testing done again tomorrow.  Avoid over hyrdation.  No more then 1.5 litres of fluids per day.

## 2024-02-28 NOTE — TELEPHONE ENCOUNTER
Pt was in ED on 2/27 :   DX :    Strain of right hip, subsequent  encounter  Hyponatremia      Component  Ref Range & Units 2/27/24  8:19 AM   Glucose  70 - 99 mg/dL 112 High    Sodium  136 - 145 mmol/L 123 Low          instructed to :    STOP INTAKE OF FREE WATER AT HOME  PLEASE HAVE YOUR BLOOD TEST DONE TO RECHECK SODIUM LEVELS  NEXT WEEK    she is aware she should follow up  but  she was just in to see you , no other concerns at this time.  declined ER follow up ,  prefers to wait as she has so many upcoming appointments.     Pt would like to know if you can place lab order  to recheck levels?       Please advise.

## 2024-02-28 NOTE — TELEPHONE ENCOUNTER
A refill request was received for:  Requested Prescriptions     Pending Prescriptions Disp Refills    CLONAZEPAM 0.5 MG Oral Tab [Pharmacy Med Name: CLONAZEPAM 0.5MG TABLETS] 60 tablet 0     Sig: TAKE 1 TABLET(0.5 MG) BY MOUTH TWICE DAILY AS NEEDED       Last refill date:  1/15/2024     Last office visit: 2/19/2024    Follow up due:  Future Appointments   Date Time Provider Department Center   3/4/2024  8:20 AM Brayan Gandhi MD [facility]   3/6/2024  8:35 AM [facility] None

## 2024-02-28 NOTE — PROGRESS NOTES
Pt called in to [facility] - patient was in the CT department ready to have her CT and developed hip pain , they put the CT on hold and was taken to the ED department in [place].   Patient stated she had labs and hip xray and CT scan .     Pt was in ED on 2/27 :   DX :    Strain of right hip, subsequent  encounter  Hyponatremia        Component  Ref Range & Units 2/27/24  8:19 AM   Glucose  70 - 99 mg/dL 112 High    Sodium  136 - 145 mmol/L 123 Low          instructed to :     STOP INTAKE OF FREE WATER AT HOME  PLEASE HAVE YOUR BLOOD TEST DONE TO RECHECK SODIUM LEVELS  NEXT WEEK     Patient stated she understands instructions.   Patient sated she is aware she should follow up  but  she was just in to see Dr. Shannon and other than the pain which she has an upcoming appointment with ortho , and endoscopy with Dr. Esposito coming up , she has no other concerns at this time.  declined ER follow up ,  prefers to wait as she has so many upcoming appointments.   [facility] will send message to PCP to advise on labs.      Patient stated she also had an televisit with Rickey reid and stated it went well , she will have weekly appointments with Dana , so far it is going well , she is going to continue to give it a try .     Attempted [facility] monthly outreach,  left message for patient to call back ,can be reached at  [phone]. Will try back at a later time.      Medical record reviewed including recent office visits and test results.     Time with patient - 13 min  Total Monthly  time - 60 min

## 2024-02-29 ENCOUNTER — LAB ENCOUNTER (OUTPATIENT)
Dept: LAB | Age: 79
End: 2024-02-29
Attending: FAMILY MEDICINE
Payer: MEDICARE

## 2024-02-29 DIAGNOSIS — R79.89 LOW SERUM SODIUM: ICD-10-CM

## 2024-02-29 LAB
ALBUMIN SERPL-MCNC: 3.9 G/DL (ref 3.4–5)
ALBUMIN/GLOB SERPL: 1.3 {RATIO} (ref 1–2)
ALP LIVER SERPL-CCNC: 84 U/L
ALT SERPL-CCNC: 19 U/L
ANION GAP SERPL CALC-SCNC: 5 MMOL/L (ref 0–18)
AST SERPL-CCNC: 18 U/L (ref 15–37)
BILIRUB SERPL-MCNC: 0.2 MG/DL (ref 0.1–2)
BUN BLD-MCNC: 7 MG/DL (ref 9–23)
CALCIUM BLD-MCNC: 10 MG/DL (ref 8.5–10.1)
CHLORIDE SERPL-SCNC: 100 MMOL/L (ref 98–112)
CO2 SERPL-SCNC: 27 MMOL/L (ref 21–32)
CREAT BLD-MCNC: 0.7 MG/DL
EGFRCR SERPLBLD CKD-EPI 2021: 88 ML/MIN/1.73M2 (ref 60–?)
FASTING STATUS PATIENT QL REPORTED: NO
GLOBULIN PLAS-MCNC: 3 G/DL (ref 2.8–4.4)
GLUCOSE BLD-MCNC: 82 MG/DL (ref 70–99)
OSMOLALITY SERPL CALC.SUM OF ELEC: 271 MOSM/KG (ref 275–295)
POTASSIUM SERPL-SCNC: 4.3 MMOL/L (ref 3.5–5.1)
PROT SERPL-MCNC: 6.9 G/DL (ref 6.4–8.2)
SODIUM SERPL-SCNC: 132 MMOL/L (ref 136–145)

## 2024-02-29 PROCEDURE — 99490 CHRNC CARE MGMT STAFF 1ST 20: CPT

## 2024-02-29 PROCEDURE — 80053 COMPREHEN METABOLIC PANEL: CPT

## 2024-02-29 PROCEDURE — 36415 COLL VENOUS BLD VENIPUNCTURE: CPT

## 2024-03-04 ENCOUNTER — OFFICE VISIT (OUTPATIENT)
Dept: ORTHOPEDICS CLINIC | Facility: CLINIC | Age: 79
End: 2024-03-04
Payer: COMMERCIAL

## 2024-03-04 VITALS — WEIGHT: 93.81 LBS | HEIGHT: 60 IN | BODY MASS INDEX: 18.42 KG/M2

## 2024-03-04 DIAGNOSIS — M79.604 PAIN OF RIGHT LOWER EXTREMITY: Primary | ICD-10-CM

## 2024-03-04 PROCEDURE — 99203 OFFICE O/P NEW LOW 30 MIN: CPT | Performed by: ORTHOPAEDIC SURGERY

## 2024-03-04 PROCEDURE — 1159F MED LIST DOCD IN RCRD: CPT | Performed by: ORTHOPAEDIC SURGERY

## 2024-03-04 PROCEDURE — 1160F RVW MEDS BY RX/DR IN RCRD: CPT | Performed by: ORTHOPAEDIC SURGERY

## 2024-03-04 PROCEDURE — 3008F BODY MASS INDEX DOCD: CPT | Performed by: ORTHOPAEDIC SURGERY

## 2024-03-04 NOTE — H&P
[facility] New Patient Note    CC:   Chief Complaint   Patient presents with    Hip Pain     Right hip pain        HPI: This 78 year old female presents today with complaints of right lower extremity pain.  She reports that this has been occurring for the past few months, began atraumatically, no falls or injuries.  She states that the pain comes on, points to the proximal lateral thigh, and when it comes on it comes on rapidly.  She states it is a severe pain, feels like a sharp stabbing pain, does not radiate but she does get radiation of numbness down the thigh and leg.  She states she does have chronic numbness in the upper and lower extremities, but these radiating symptoms are new.  When the pain comes on, it can last up to 2 hours and is severe when it occurs.  She states that it is worse with sitting down, better with standing and better with lying flat.  The pain will abruptly stop on its own.  She has been taking Norco which has not alleviated the symptoms.  She also took a steroid pack about a month ago.    Past Medical History:   Diagnosis Date    Abdominal distention     Abdominal pain     Acquired hypothyroidism 03/01/2018    Anxiety     Asthma ([facility])     Belching     Bloating     Chronic rhinitis     CKD (chronic kidney disease) stage 3, GFR 30-59 ml/min ([facility]) 07/09/2019    Constipation     COPD (chronic obstructive pulmonary disease) ([facility])     NO OXYGEN     Depression     Diarrhea, unspecified     Diverticulosis of large intestine     Emphysema     Esophageal reflux     Fatigue     Feeling lonely     Flatulence/gas pain/belching     Food intolerance     H/O bronchitis     Hay fever     Hemorrhoids     High blood pressure     History of depression     Hypercholesterolemia     Hypertension 01/19/2023    Itch of skin     Migraines     Mild intermittent asthma without complication ([facility])     Nausea     Night sweats     Pneumonia 03/2018    PONV (postoperative nausea and vomiting)     Pure  hypercholesterolemia 12/15/2004    Rash     Reflux     Sleep disturbance     Stress     Uncomfortable fullness after meals     Visual impairment     glasses    Vomiting     Wears glasses      Past Surgical History:   Procedure Laterality Date    CATARACT      COLONOSCOPY      COLONOSCOPY & POLYPECTOMY  09/2014    multiple polyps; tics; repeat 3 yrs    COLONOSCOPY,REMV LESN,SNARE N/A 09/22/2014    Procedure: ESOPHAGOGASTRODUODENOSCOPY, COLONOSCOPY, POSSIBLE BIOPSY, POSSIBLE POLYPECTOMY 57560,95120;  Surgeon: Slade Ivan MD;  Location: [facility]    CORRECT BUNION,SIMPLE Bilateral     CYSTOTOMY,EXCIS VESICAL NECK Left     EGD      GASTRO - DMG  09/2014    stricture; HH    OOPHORECTOMY Bilateral 2017    OTHER SURGICAL HISTORY  02/27/2023    Robotic repair of hiatal hernia and Nissen fundoplication    PATIENT DOCUMENTED NOT TO HAVE EXPERIENCED ANY OF THE FOLLOWING EVENTS N/A 09/22/2014    Procedure: ESOPHAGOGASTRODUODENOSCOPY, COLONOSCOPY, POSSIBLE BIOPSY, POSSIBLE POLYPECTOMY 43563,27601;  Surgeon: Slade Ivan MD;  Location: [facility]    PATIENT WITHOUGH PREOPERATIVE ORDER FOR IV ANTIBIOTIC SURGICAL SITE INFECTION PROPHYLAXIS. N/A 09/22/2014    Procedure: ESOPHAGOGASTRODUODENOSCOPY, COLONOSCOPY, POSSIBLE BIOPSY, POSSIBLE POLYPECTOMY 38208,22169;  Surgeon: Slade Ivan MD;  Location: [facility]    UPPER GI ENDOSCOPY,DIAGNOSIS N/A 09/22/2014    Procedure: ESOPHAGOGASTRODUODENOSCOPY, COLONOSCOPY, POSSIBLE BIOPSY, POSSIBLE POLYPECTOMY 36147,22732;  Surgeon: Slade Ivan MD;  Location: [facility]     Current Outpatient Medications   Medication Sig Dispense Refill    clonazePAM 0.5 MG Oral Tab Take 1 tablet (0.5 mg total) by mouth 2 (two) times daily as needed. 60 tablet 0    meclizine 12.5 MG Oral Tab Take 1 tablet (12.5 mg total) by mouth 3 (three) times daily as needed. 30 tablet 0    losartan 50 MG Oral Tab Take 1 tablet (50 mg total) by mouth every morning. 90 tablet 0     HYDROcodone-acetaminophen (NORCO) 5-325 MG Oral Tab Take 1 tablet by mouth every 8 (eight) hours as needed for Pain. 20 tablet 0    mirtazapine 30 MG Oral Tab Take 1 tablet (30 mg total) by mouth nightly. 90 tablet 1    Cholecalciferol (VITAMIN D-3 OR) Take by mouth.      polyethylene glycol, PEG 3350, 17 g Oral Powd Pack Take 17 g by mouth daily.      DUPIXENT 300 MG/2ML Subcutaneous Solution Prefilled Syringe       SYMBICORT 160-4.5 MCG/ACT Inhalation Aerosol INHALE 2 PUFFS BY MOUTH TWICE DAILY 10.2 g 11    clobetasol 0.05 % External Ointment       TRIAMCINOLONE 0.1 % External Cream APPLY TOPICALLY TO THE AFFECTED AREA TWICE DAILY AS NEEDED (Patient taking differently: as needed.) 60 g 3    Albuterol Sulfate HFA (PROAIR HFA) 108 (90 Base) MCG/ACT Inhalation Aero Soln INHALE 2 PUFFS BY MOUTH EVERY 6 HOURS AS NEEDED FOR WHEEZING 8.5 g 2    acetaminophen 500 MG Oral Tab Take 1 tablet (500 mg total) by mouth every 6 (six) hours as needed for Pain.       Allergies   Allergen Reactions    Avelox [Moxifloxacin Hcl In Nacl] SWELLING    Avelox [Moxifloxacin Hydrochloride] TONGUE SWELLING     \"TABS\"    Amoxicillin NAUSEA AND VOMITING     Vomiting.    Statins Myopathy    Sulfa Antibiotics RASH and ITCHING    Dander OTHER (SEE COMMENTS)     \"Animals\": runny nose, watery eyes, itching    Diphenhydramine Runny nose     \"Animals\": runny nose, watery eyes, itching    Dust Runny nose    Latex RASH    Sulfa Drugs Cross Reactors RASH and ITCHING     Family History   Problem Relation Age of Onset    Colon Cancer Mother     Other (Other) Mother     Other (copd) Mother     Alcohol and Other Disorders Associated Father     Other (Other) Father     Other (emph) Father     Alcohol and Other Disorders Associated Son     Hypertension Sister     Prostate Cancer Brother     Hypertension Brother      Social History     Occupational History    Not on file   Tobacco Use    Smoking status: Former     Years: 10     Types: Cigarettes     Quit date:  1980     Years since quittin.4    Smokeless tobacco: Never    Tobacco comments:      1/2 pack per day. Social history shows \"Tobacco Use: Active\" and \"Never Smoker: Active\"   Vaping Use    Vaping Use: Never used   Substance and Sexual Activity    Alcohol use: No    Drug use: No    Sexual activity: Not on file        ROS:  Detailed system review obtained and negative except as mentioned above      Physical Exam:    Ht 5' (1.524 m)   Wt 93 lb 12.8 oz (42.5 kg)   BMI 18.32 kg/m²   Constitutional: Awake, alert, anxious and tearful  Psychological: Appropriate affect.  Respiratory: Unlabored breathing.  Gait: Normal, level, nonantalgic  Right lower extremity:  Inspection: skin intact, no lesions, no effusion about the hip  Palpation: No tenderness about the greater trochanter, posterior soft tissues about the hip  Range of motion: Passive hip flexion and 90 with external rotation 40 and internal rotation 30 is symmetric and painless to the left  Stinchfield negative  Neuromuscular: 5 out of 5 hip flexion strength, sensation intact throughout the lower extremities, negative straight leg raise  Vascular: Warm well-perfused      Imaging: Emergency department x-ray and CT scan of the right hip personally viewed, independently interpreted and radiology report read.  Demonstrates well-maintained hip joint space, minimal degenerative changes.  No hip or femoral neck fracture identified      Assessment/Plan:  Diagnoses and all orders for this visit:    Pain of right lower extremity  -     Physiatry Referral - In Network      Assessment: 78-year-old female with subacute atraumatic intermittent right lower extremity pain and subjective numbness    Plan: Discussed with the patient and her granddaughter that based on history of pain/symptoms, exam and imaging findings, does not appear to be ball-and-socket hip joint is the source of her symptoms.  She does have a neurologic component to her pain, also potentially some  coping/anxiety contributing.  I would recommend further workup of potential nerve/neurologic source for her pain, and recommended follow-up with our physiatry team.  I did provide a referral for her.  All questions were answered.    Brayan Gandhi MD, [facility]  [facility] Orthopedic Surgery  Phone [phone]  Fax [phone]

## 2024-03-05 ENCOUNTER — ANESTHESIA EVENT (OUTPATIENT)
Dept: ENDOSCOPY | Facility: HOSPITAL | Age: 79
End: 2024-03-05
Payer: MEDICARE

## 2024-03-06 ENCOUNTER — PATIENT OUTREACH (OUTPATIENT)
Dept: CASE MANAGEMENT | Age: 79
End: 2024-03-06

## 2024-03-06 ENCOUNTER — ANESTHESIA (OUTPATIENT)
Dept: ENDOSCOPY | Facility: HOSPITAL | Age: 79
End: 2024-03-06
Payer: MEDICARE

## 2024-03-06 ENCOUNTER — HOSPITAL ENCOUNTER (OUTPATIENT)
Facility: HOSPITAL | Age: 79
Setting detail: HOSPITAL OUTPATIENT SURGERY
Discharge: HOME | End: 2024-03-06
Attending: INTERNAL MEDICINE | Admitting: INTERNAL MEDICINE
Payer: MEDICARE

## 2024-03-06 VITALS
HEIGHT: 60 IN | OXYGEN SATURATION: 100 % | WEIGHT: 92 LBS | RESPIRATION RATE: 18 BRPM | SYSTOLIC BLOOD PRESSURE: 136 MMHG | BODY MASS INDEX: 18.06 KG/M2 | DIASTOLIC BLOOD PRESSURE: 64 MMHG | TEMPERATURE: 99 F | HEART RATE: 62 BPM

## 2024-03-06 DIAGNOSIS — R11.0 NAUSEA: ICD-10-CM

## 2024-03-06 PROCEDURE — 88305 TISSUE EXAM BY PATHOLOGIST: CPT | Performed by: INTERNAL MEDICINE

## 2024-03-06 PROCEDURE — 0DB68ZX EXCISION OF STOMACH, VIA NATURAL OR ARTIFICIAL OPENING ENDOSCOPIC, DIAGNOSTIC: ICD-10-PCS | Performed by: INTERNAL MEDICINE

## 2024-03-06 PROCEDURE — 0DB58ZX EXCISION OF ESOPHAGUS, VIA NATURAL OR ARTIFICIAL OPENING ENDOSCOPIC, DIAGNOSTIC: ICD-10-PCS | Performed by: INTERNAL MEDICINE

## 2024-03-06 PROCEDURE — 0DB98ZX EXCISION OF DUODENUM, VIA NATURAL OR ARTIFICIAL OPENING ENDOSCOPIC, DIAGNOSTIC: ICD-10-PCS | Performed by: INTERNAL MEDICINE

## 2024-03-06 PROCEDURE — 0D758ZZ DILATION OF ESOPHAGUS, VIA NATURAL OR ARTIFICIAL OPENING ENDOSCOPIC: ICD-10-PCS | Performed by: INTERNAL MEDICINE

## 2024-03-06 RX ORDER — SODIUM CHLORIDE, SODIUM LACTATE, POTASSIUM CHLORIDE, CALCIUM CHLORIDE 600; 310; 30; 20 MG/100ML; MG/100ML; MG/100ML; MG/100ML
INJECTION, SOLUTION INTRAVENOUS CONTINUOUS
Status: DISCONTINUED | OUTPATIENT
Start: 2024-03-06 | End: 2024-03-06

## 2024-03-06 RX ORDER — NALOXONE HYDROCHLORIDE 0.4 MG/ML
80 INJECTION, SOLUTION INTRAMUSCULAR; INTRAVENOUS; SUBCUTANEOUS AS NEEDED
Status: DISCONTINUED | OUTPATIENT
Start: 2024-03-06 | End: 2024-03-06

## 2024-03-06 RX ORDER — LIDOCAINE HYDROCHLORIDE 10 MG/ML
INJECTION, SOLUTION EPIDURAL; INFILTRATION; INTRACAUDAL; PERINEURAL AS NEEDED
Status: DISCONTINUED | OUTPATIENT
Start: 2024-03-06 | End: 2024-03-06 | Stop reason: SURG

## 2024-03-06 RX ADMIN — LIDOCAINE HYDROCHLORIDE 50 MG: 10 INJECTION, SOLUTION EPIDURAL; INFILTRATION; INTRACAUDAL; PERINEURAL at 09:15:00

## 2024-03-06 RX ADMIN — SODIUM CHLORIDE, SODIUM LACTATE, POTASSIUM CHLORIDE, CALCIUM CHLORIDE: 600; 310; 30; 20 INJECTION, SOLUTION INTRAVENOUS at 09:32:00

## 2024-03-06 RX ADMIN — LIDOCAINE HYDROCHLORIDE 50 MG: 10 INJECTION, SOLUTION EPIDURAL; INFILTRATION; INTRACAUDAL; PERINEURAL at 09:17:00

## 2024-03-06 RX ADMIN — SODIUM CHLORIDE, SODIUM LACTATE, POTASSIUM CHLORIDE, CALCIUM CHLORIDE: 600; 310; 30; 20 INJECTION, SOLUTION INTRAVENOUS at 09:13:00

## 2024-03-06 NOTE — DISCHARGE INSTRUCTIONS

## 2024-03-06 NOTE — ANESTHESIA PREPROCEDURE EVALUATION
PRE-OP EVALUATION    Patient Name: Bibi Diaz    Admit Diagnosis: Nausea [R11.0]    Pre-op Diagnosis: Nausea [R11.0]    ESOPHAGOGASTRODUODENOSCOPY (EGD)    Anesthesia Procedure: ESOPHAGOGASTRODUODENOSCOPY (EGD)    Surgeon(s) and Role:     * Mihai Esposito MD - Primary    Pre-op vitals reviewed.  Temp: 98.7 °F (37.1 °C)  Pulse: 62  Resp: 18  BP: 146/69  SpO2: 100 %  Body mass index is 17.97 kg/m².    Current medications reviewed.  Hospital Medications:   lactated ringers infusion   Intravenous Continuous       Outpatient Medications:     Medications Prior to Admission   Medication Sig Dispense Refill Last Dose    clonazePAM 0.5 MG Oral Tab Take 1 tablet (0.5 mg total) by mouth 2 (two) times daily as needed. 60 tablet 0 3/5/2024    meclizine 12.5 MG Oral Tab Take 1 tablet (12.5 mg total) by mouth 3 (three) times daily as needed. 30 tablet 0     losartan 50 MG Oral Tab Take 1 tablet (50 mg total) by mouth every morning. 90 tablet 0 3/6/2024    mirtazapine 30 MG Oral Tab Take 1 tablet (30 mg total) by mouth nightly. 90 tablet 1 3/5/2024    Cholecalciferol (VITAMIN D-3 OR) Take by mouth.       polyethylene glycol, PEG 3350, 17 g Oral Powd Pack Take 17 g by mouth daily.       DUPIXENT 300 MG/2ML Subcutaneous Solution Prefilled Syringe        SYMBICORT 160-4.5 MCG/ACT Inhalation Aerosol INHALE 2 PUFFS BY MOUTH TWICE DAILY 10.2 g 11 3/6/2024    clobetasol 0.05 % External Ointment        TRIAMCINOLONE 0.1 % External Cream APPLY TOPICALLY TO THE AFFECTED AREA TWICE DAILY AS NEEDED (Patient taking differently: as needed.) 60 g 3     HYDROcodone-acetaminophen (NORCO) 5-325 MG Oral Tab Take 1 tablet by mouth every 8 (eight) hours as needed for Pain. 20 tablet 0 Unknown    Albuterol Sulfate HFA (PROAIR HFA) 108 (90 Base) MCG/ACT Inhalation Aero Soln INHALE 2 PUFFS BY MOUTH EVERY 6 HOURS AS NEEDED FOR WHEEZING 8.5 g 2 Unknown    acetaminophen 500 MG Oral Tab Take 1 tablet (500 mg total) by mouth every 6 (six) hours as  needed for Pain.   Unknown       Allergies: Avelox [moxifloxacin hcl in nacl], Avelox [moxifloxacin hydrochloride], Amoxicillin, Statins, Sulfa antibiotics, Dander, Diphenhydramine, Dust, Latex, and Sulfa drugs cross reactors      Anesthesia Evaluation    Patient summary reviewed.    Anesthetic Complications  (-) history of anesthetic complications         GI/Hepatic/Renal      (+) GERD and well controlled                          Cardiovascular    Negative cardiovascular ROS.    Exercise tolerance: good     MET: >4      (+) hypertension and well controlled                                    Endo/Other           (+) hypothyroidism                       Pulmonary      (+) asthma  (+) COPD and mild  COPD not requiring home oxygen.                Neuro/Psych      (+) depression                        As per Epic:  Patient Active Problem List:     Major depressive disorder, recurrent episode, moderate (HCC)     Esophageal reflux     Chronic obstructive pulmonary disease with acute lower respiratory infection (HCC)     Acquired hypothyroidism     Memory deficit     Aortic atherosclerosis (HCC)     Diverticulosis of colon     Dizziness and giddiness     Dysthymic disorder     Lichen sclerosus     Pure hypercholesterolemia     Vitamin D deficiency     Vitiligo     Severe episode of recurrent major depressive disorder, without psychotic features (HCC)     Paraesophageal hernia     Subclinical hypothyroidism     Hypertension     Protein-calorie malnutrition, unspecified severity (HCC)          Past Surgical History:   Procedure Laterality Date    CATARACT      COLONOSCOPY      COLONOSCOPY & POLYPECTOMY  09/2014    multiple polyps; tics; repeat 3 yrs    COLONOSCOPY,REMV LESN,SNARE N/A 09/22/2014    Procedure: ESOPHAGOGASTRODUODENOSCOPY, COLONOSCOPY, POSSIBLE BIOPSY, POSSIBLE POLYPECTOMY 59232,[id number];  Surgeon: Slade Ivan MD;  Location: [facility]    CORRECT BUNION,SIMPLE Bilateral     CYSTOTOMY,EXCIS VESICAL NECK  Left     EGD      GASTRO - DMG  2014    stricture; HH    OOPHORECTOMY Bilateral 2017    OTHER SURGICAL HISTORY  2023    Robotic repair of hiatal hernia and Nissen fundoplication    PATIENT DOCUMENTED NOT TO HAVE EXPERIENCED ANY OF THE FOLLOWING EVENTS N/A 2014    Procedure: ESOPHAGOGASTRODUODENOSCOPY, COLONOSCOPY, POSSIBLE BIOPSY, POSSIBLE POLYPECTOMY 72749,26046;  Surgeon: Slade Ivan MD;  Location: [facility]    PATIENT WITHOUGH PREOPERATIVE ORDER FOR IV ANTIBIOTIC SURGICAL SITE INFECTION PROPHYLAXIS. N/A 2014    Procedure: ESOPHAGOGASTRODUODENOSCOPY, COLONOSCOPY, POSSIBLE BIOPSY, POSSIBLE POLYPECTOMY 34110,76957;  Surgeon: Slade Ivan MD;  Location: [facility]    UPPER GI ENDOSCOPY,DIAGNOSIS N/A 2014    Procedure: ESOPHAGOGASTRODUODENOSCOPY, COLONOSCOPY, POSSIBLE BIOPSY, POSSIBLE POLYPECTOMY 84262,07844;  Surgeon: Slade Ivan MD;  Location: [facility]     Social History     Socioeconomic History    Marital status:    Tobacco Use    Smoking status: Former     Years: 10     Types: Cigarettes     Quit date: 1980     Years since quittin.4    Smokeless tobacco: Never    Tobacco comments:      1/2 pack per day. Social history shows \"Tobacco Use: Active\" and \"Never Smoker: Active\"   Vaping Use    Vaping Use: Never used   Substance and Sexual Activity    Alcohol use: No    Drug use: No   Other Topics Concern    Caffeine Concern Yes     Comment: \"1 cup of coffee and tea, and can of pop daily\"    Exercise Yes     Comment: walking     History   Drug Use No     Available pre-op labs reviewed.  Lab Results   Component Value Date    WBC 10.2 2024    RBC 3.68 (L) 2024    HGB 11.8 (L) 2024    HCT 33.2 (L) 2024    MCV 90.2 2024    MCH 32.1 2024    MCHC 35.5 2024    RDW 12.1 2024    .0 2024     Lab Results   Component Value Date     (L) 2024    K 4.3 2024      02/29/2024    CO2 27.0 02/29/2024    BUN 7 (L) 02/29/2024    CREATSERUM 0.70 02/29/2024    GLU 82 02/29/2024    CA 10.0 02/29/2024            Airway      Mallampati: II  Mouth opening: >3 FB  TM distance: > 6 cm  Neck ROM: full Cardiovascular      Rhythm: regular  Rate: normal  (-) murmur   Dental  Comment: Dentition is grossly intact;  Patient does not demonstrate loose teeth to inspection.           Pulmonary  Comment: Unlabored ventilatory effort, no retractions.  Pulmonary exam normal.  Breath sounds clear to auscultation bilaterally.               Other findings              ASA: 3   Plan: MAC  NPO status verified and patient meets guidelines.        Comment: Plan is MAC anesthesia, which may include deep sedation.  Implied that memory of procedure is unlikely although intraop recall, if it occurs, may be a reasonable and comfortable experience with this anesthetic.  Aware that general anesthesia is not intended though necessary care may involve deep sedation with transient moments of general anesthesia.  The backup plan for safe patient care may require change of plan to full general anesthesia with potential airway placement and attendant risks.  Patient (legal guardian) demonstrated understanding, accepted risks and benefits, and wishes to proceed with MAC anesthesia as reflected in the signed consent form.    Plan/risks discussed with: patient and Other                Present on Admission:  **None**

## 2024-03-06 NOTE — ANESTHESIA POSTPROCEDURE EVALUATION
[facility] Patient Status:  Hospital Outpatient Surgery   Age/Gender 78 year old female MRN [id number]   Location [facility] ENDOSCOPY [facility] Attending Mihai Esposito MD   Hosp Day # 0 PCP Rick Shannon DO       Anesthesia Post-op Note    ESOPHAGOGASTRODUODENOSCOPY (EGD) with biopsies and balloon dilation    Procedure Summary       Date: 03/06/24 Room / Location:  ENDOSCOPY 02 /  ENDOSCOPY    Anesthesia Start: 0913 Anesthesia Stop: 0932    Procedure: ESOPHAGOGASTRODUODENOSCOPY (EGD) with biopsies and balloon dilation Diagnosis:       Nausea      (post surgical changes)    Surgeons: Mihai Esposito MD Anesthesiologist: Ike Canales MD    Anesthesia Type: MAC ASA Status: 3            Anesthesia Type: MAC    Vitals Value Taken Time   /57 03/06/24 0932   Temp  03/06/24 0932   Pulse 61 03/06/24 0932   Resp 18 03/06/24 0932   SpO2 97 % 03/06/24 0932       Patient Location: Endoscopy    Anesthesia Type: MAC    Airway Patency: patent    Postop Pain Control: adequate    Mental Status: mildly sedated but able to meaningfully participate in the post-anesthesia evaluation    Nausea/Vomiting: none    Cardiopulmonary/Hydration status: stable euvolemic    Complications: no apparent anesthesia related complications    Postop vital signs: stable    Comments: The patient was responsive in a meaningful way and demonstrated a good airway.  Full report, identifications, history, procedure, anesthesia course, recovery expectations with chance for questions was provided to a responsible recovery RN from the endoscopy staff.    Report to DONNIE Ng.    Dental Exam: Unchanged from Preop    Patient to be discharged home when criteria met.

## 2024-03-06 NOTE — PROGRESS NOTES
1st attempt ER f/up apt request  PCP -decline, pt stated she can make own apt  Confirmed w/ pt  Closing encounter

## 2024-03-06 NOTE — H&P
[facility]  Pre-op H and P    Bibi Diaz Patient Status:  Hospital Outpatient Surgery    1945 MRN [id number]   Location [facility] ENDOSCOPY PAIN CENTER Attending Mihai Esposito MD   Date 3/6/2024 PCP Rick Shannon DO     CC: Nausea, vomiting weight loss  Post paraesophageal hernia repeair  Gastroparesis    History of Present Illness:  Bibi Diaz is a a(n) 78 year old female. Nausea, vomiting weight loss  Post paraesophageal hernia repeair  Gastroparesis    History:  Past Medical History:   Diagnosis Date    Abdominal distention     Abdominal pain     Acquired hypothyroidism 2018    Anxiety     Asthma ([facility])     Belching     Bloating     Chronic rhinitis     CKD (chronic kidney disease) stage 3, GFR 30-59 ml/min ([facility]) 2019    Constipation     COPD (chronic obstructive pulmonary disease) ([facility])     NO OXYGEN     Depression     Diarrhea, unspecified     Diverticulosis of large intestine     Emphysema     Esophageal reflux     Fatigue     Feeling lonely     Flatulence/gas pain/belching     Food intolerance     H/O bronchitis     Hay fever     Hemorrhoids     High blood pressure     History of depression     Hypercholesterolemia     Hypertension 2023    Itch of skin     Migraines     Mild intermittent asthma without complication ([facility])     Nausea     Night sweats     Pneumonia 2018    PONV (postoperative nausea and vomiting)     Pure hypercholesterolemia 12/15/2004    Rash     Reflux     Sleep disturbance     Stress     Uncomfortable fullness after meals     Visual impairment     glasses    Vomiting     Wears glasses      Past Surgical History:   Procedure Laterality Date    CATARACT      COLONOSCOPY      COLONOSCOPY & POLYPECTOMY  2014    multiple polyps; tics; repeat 3 yrs    COLONOSCOPY,REMV LESN,SNARE N/A 2014    Procedure: ESOPHAGOGASTRODUODENOSCOPY, COLONOSCOPY, POSSIBLE BIOPSY, POSSIBLE POLYPECTOMY 84751,28081;  Surgeon: Slade Ivan MD;   Location: [facility]    CORRECT BUNION,SIMPLE Bilateral     CYSTOTOMY,EXCIS VESICAL NECK Left     EGD      GASTRO - DMG  09/2014    stricture; HH    OOPHORECTOMY Bilateral 2017    OTHER SURGICAL HISTORY  02/27/2023    Robotic repair of hiatal hernia and Nissen fundoplication    PATIENT DOCUMENTED NOT TO HAVE EXPERIENCED ANY OF THE FOLLOWING EVENTS N/A 09/22/2014    Procedure: ESOPHAGOGASTRODUODENOSCOPY, COLONOSCOPY, POSSIBLE BIOPSY, POSSIBLE POLYPECTOMY 43480,71553;  Surgeon: Slade Ivan MD;  Location: [facility]    PATIENT WITHOUGH PREOPERATIVE ORDER FOR IV ANTIBIOTIC SURGICAL SITE INFECTION PROPHYLAXIS. N/A 09/22/2014    Procedure: ESOPHAGOGASTRODUODENOSCOPY, COLONOSCOPY, POSSIBLE BIOPSY, POSSIBLE POLYPECTOMY 18699,24366;  Surgeon: Slade Ivan MD;  Location: [facility]    UPPER GI ENDOSCOPY,DIAGNOSIS N/A 09/22/2014    Procedure: ESOPHAGOGASTRODUODENOSCOPY, COLONOSCOPY, POSSIBLE BIOPSY, POSSIBLE POLYPECTOMY 75516,73118;  Surgeon: Slade Ivan MD;  Location: [facility]     Family History   Problem Relation Age of Onset    Colon Cancer Mother     Other (Other) Mother     Other (copd) Mother     Alcohol and Other Disorders Associated Father     Other (Other) Father     Other (emph) Father     Alcohol and Other Disorders Associated Son     Hypertension Sister     Prostate Cancer Brother     Hypertension Brother       reports that she quit smoking about 43 years ago. Her smoking use included cigarettes. She has never used smokeless tobacco. She reports that she does not drink alcohol and does not use drugs.    Allergies:  Allergies   Allergen Reactions    Avelox [Moxifloxacin Hcl In Nacl] SWELLING    Avelox [Moxifloxacin Hydrochloride] TONGUE SWELLING     \"TABS\"    Amoxicillin NAUSEA AND VOMITING     Vomiting.    Statins Myopathy    Sulfa Antibiotics RASH and ITCHING    Dander OTHER (SEE COMMENTS)     \"Animals\": runny nose, watery eyes, itching    Diphenhydramine Runny nose      \"Animals\": runny nose, watery eyes, itching    Dust Runny nose    Latex RASH    Sulfa Drugs Cross Reactors RASH and ITCHING       Medications:    Current Facility-Administered Medications:     lactated ringers infusion, , Intravenous, Continuous    Physical Exam:    Blood pressure 146/69, pulse 62, temperature 98.7 °F (37.1 °C), resp. rate 18, height 5' (1.524 m), weight 92 lb (41.7 kg), SpO2 100%, not currently breastfeeding.    General: Appears alert, oriented x3 and in no acute distress.  CV: Normal rate   Lungs: Normal effort   Skin: Warm and dry.  Laboratory Data:       Imaging:      Assessment/Plan/Procedure:  Patient Active Problem List   Diagnosis    Major depressive disorder, recurrent episode, moderate (HCC)    Esophageal reflux    Chronic obstructive pulmonary disease with acute lower respiratory infection (HCC)    Acquired hypothyroidism    Memory deficit    Aortic atherosclerosis (HCC)    Diverticulosis of colon    Dizziness and giddiness    Dysthymic disorder    Lichen sclerosus    Pure hypercholesterolemia    Vitamin D deficiency    Vitiligo    Severe episode of recurrent major depressive disorder, without psychotic features (HCC)    Paraesophageal hernia    Subclinical hypothyroidism    Hypertension    Protein-calorie malnutrition, unspecified severity (HCC)       Nausea, vomiting weight loss  Post paraesophageal hernia repeair  Gastroparesis    Plan:  EGD    Mihai Esposito MD  3/6/2024  8:40 AM

## 2024-03-06 NOTE — OPERATIVE REPORT
Bibi Diaz Patient Status:  Hospital Outpatient Surgery    1945 MRN [id number]   [facility] ENDOSCOPY [facility]IN [facility] Attending Mihai Esposito MD   Date 3/6/2024 PCP Rick Shannon DO     PREOPERATIVE DIAGNOSIS/INDICATION: H/o large paraesophageal hiatal hernia post 1. Robotic repair of hiatal hernia with [place] [employer] mesh. Robotic Loose Nissen fundoplication.  2023, prior to repair symptoms include nausea, vomiting, prior h/o volvulus, since surgery reports only able to tolerate liquids to full liquid diet, like ensure, apple sauce, jello, pudding, body armour, and intermittently developing nausea, vomiting, unable to eat , has lost 35# in past 6 months. NO recent imaging or endoscopy, GES showed  normal 1-2 hrs emptying but slow at 4 hrs 52% (normal >90%)   POSTOPERTATIVE DIAGNOSIS: Post surgical changes, paraesophageal hernia and twisted fundoplication  PROCEDURE PERFORMED: EGD with biopsy and esophageal balloon dilatation 20 mm  TIME OUT WAS PERFORMED    SEDATION: MAC sedation provided by General Anesthesia    INFORMED CONSENT: Risks, benefits and alternatives to the procedure were explained to the patient including but not limited to bleeding, infection, perforation, adverse drug reactions, pancreatitis and the need for hospitalization and surgery if this occurs, the patient understands and agrees to procedure.  PROCEDURE DESCRIPTION: A regular upper endoscope was introduced into the patient’s mouth, hypo pharynx, esophagus, stomach and the first and second portion of the duodenum, retroflexion of the endoscope was performed in the stomach. Careful examination of the above described areas was performed on withdrawal of the endoscope. The patient tolerated the procedure well, there were no immediate complication immediately following the procedure, and the patient was transferred to recovery in stable condition.  FINDINGS:  ESOPHAGUS: Normal  EGJ: Irregular z line  STOMACH: Post  surgical changes c/w fundoplication with paraesophageal hernia and twisted fundoplication  DUODENUM: Normal  THERAPEUTICS: Cold forceps biopsies were performed from esophagus, antrum, duodenum, Esophageal ring balloon dilatation through the scope with CRE fixed wire balloon size 18-19-20 mm mucosal inspection was performed after each dilatation diameter, no mucosal break noted, no complications, perforation of bleeding noted  RECOMMENDATIONS:   Post EGD precautions, watch for bleeding, infection, perforation, adverse drug reactions   Follow biopsies.  Esophagogram and UGI series, esophageal manometry and consider surgical consultation for revision    Mihai Esposito MD  3/6/2024  9:34 AM

## 2024-03-10 NOTE — PROGRESS NOTES
Date: 3/10/2024    To: Bibi Mannmeghana  : 1945    I hope this letter finds you doing well.  I am writing to inform you of the following:     The biopsies obtained at the time of your recent endoscopic procedure were benign and showed no evidence of infection or malignancy.          Please call the office at [phone] if there are any questions.    Regards,    Mihai Esposito M.D.

## 2024-03-13 ENCOUNTER — HOSPITAL ENCOUNTER (OUTPATIENT)
Age: 79
Discharge: HOME | End: 2024-03-13
Attending: EMERGENCY MEDICINE
Payer: MEDICARE

## 2024-03-13 VITALS
SYSTOLIC BLOOD PRESSURE: 154 MMHG | HEART RATE: 66 BPM | HEIGHT: 60 IN | OXYGEN SATURATION: 100 % | WEIGHT: 97 LBS | BODY MASS INDEX: 19.04 KG/M2 | TEMPERATURE: 99 F | DIASTOLIC BLOOD PRESSURE: 74 MMHG | RESPIRATION RATE: 18 BRPM

## 2024-03-13 DIAGNOSIS — E87.1 HYPONATREMIA: ICD-10-CM

## 2024-03-13 DIAGNOSIS — R42 DIZZINESS: Primary | ICD-10-CM

## 2024-03-13 LAB
#MXD IC: 0.7 X10ˆ3/UL (ref 0.1–1)
BUN BLD-MCNC: 6 MG/DL (ref 7–18)
CHLORIDE BLD-SCNC: 97 MMOL/L (ref 98–112)
CO2 BLD-SCNC: 25 MMOL/L (ref 21–32)
CREAT BLD-MCNC: 0.6 MG/DL
EGFRCR SERPLBLD CKD-EPI 2021: 91 ML/MIN/1.73M2 (ref 60–?)
GLUCOSE BLD-MCNC: 95 MG/DL (ref 70–99)
HCT VFR BLD AUTO: 34.9 %
HCT VFR BLD CALC: 36 %
HGB BLD-MCNC: 11.8 G/DL
ISTAT IONIZED CALCIUM FOR CHEM 8: 1.26 MMOL/L (ref 1.12–1.32)
LYMPHOCYTES # BLD AUTO: 0.9 X10ˆ3/UL (ref 1–4)
LYMPHOCYTES NFR BLD AUTO: 15.1 %
MCH RBC QN AUTO: 30.6 PG (ref 26–34)
MCHC RBC AUTO-ENTMCNC: 33.8 G/DL (ref 31–37)
MCV RBC AUTO: 90.6 FL (ref 80–100)
MIXED CELL %: 12.2 %
NEUTROPHILS # BLD AUTO: 4.5 X10ˆ3/UL (ref 1.5–7.7)
NEUTROPHILS NFR BLD AUTO: 72.7 %
PLATELET # BLD AUTO: 472 X10ˆ3/UL (ref 150–450)
POTASSIUM BLD-SCNC: 4.7 MMOL/L (ref 3.6–5.1)
RBC # BLD AUTO: 3.85 X10ˆ6/UL
SODIUM BLD-SCNC: 133 MMOL/L (ref 136–145)
TROPONIN I BLD-MCNC: <0.02 NG/ML
WBC # BLD AUTO: 6.1 X10ˆ3/UL (ref 4–11)

## 2024-03-13 PROCEDURE — 80047 BASIC METABLC PNL IONIZED CA: CPT

## 2024-03-13 PROCEDURE — 93005 ELECTROCARDIOGRAM TRACING: CPT

## 2024-03-13 PROCEDURE — 96374 THER/PROPH/DIAG INJ IV PUSH: CPT

## 2024-03-13 PROCEDURE — 96361 HYDRATE IV INFUSION ADD-ON: CPT

## 2024-03-13 PROCEDURE — 84484 ASSAY OF TROPONIN QUANT: CPT

## 2024-03-13 PROCEDURE — 93010 ELECTROCARDIOGRAM REPORT: CPT

## 2024-03-13 PROCEDURE — 99215 OFFICE O/P EST HI 40 MIN: CPT

## 2024-03-13 PROCEDURE — 85025 COMPLETE CBC W/AUTO DIFF WBC: CPT | Performed by: EMERGENCY MEDICINE

## 2024-03-13 PROCEDURE — 99214 OFFICE O/P EST MOD 30 MIN: CPT

## 2024-03-13 RX ORDER — SODIUM CHLORIDE 9 MG/ML
INJECTION, SOLUTION INTRAVENOUS ONCE
Status: COMPLETED | OUTPATIENT
Start: 2024-03-13 | End: 2024-03-13

## 2024-03-13 RX ORDER — ONDANSETRON 2 MG/ML
4 INJECTION INTRAMUSCULAR; INTRAVENOUS ONCE
Status: COMPLETED | OUTPATIENT
Start: 2024-03-13 | End: 2024-03-13

## 2024-03-13 RX ORDER — ONDANSETRON 4 MG/1
4 TABLET, ORALLY DISINTEGRATING ORAL EVERY 4 HOURS PRN
Qty: 10 TABLET | Refills: 0 | Status: SHIPPED | OUTPATIENT
Start: 2024-03-13 | End: 2024-03-20

## 2024-03-13 NOTE — ED PROVIDER NOTES
Patient Seen in: [facility]      History   No chief complaint on file.    Stated Complaint: Cough/URI, Dizziness, Headache    Subjective:   HPI    78-year-old female with a history of emphysema, gastroesophageal reflux, hyponatremia, history of anxiety, chronic kidney disease, migraine headaches, presents to the immediate care for complaints of dizziness.  Patient states that she recently developed some cough and cold symptoms.  Denies any fevers or chills.  Denies any purulent sputum production.  She states that yesterday she has been for having symptoms of nauseousness and some dizziness.  She describes a feeling of any room spinning sensation but also denies of feeling as though she is going to faint.  She denies any ataxia.  Denies any focal motor weakness.  Patient states that she was recently told by her physician that she has hyponatremia.  Patient denies any chest pain or palpitations.  Denies any shortness of breath.  She has a minimal headache.    Objective:   Past Medical History:   Diagnosis Date    Abdominal distention     Abdominal pain     Acquired hypothyroidism 03/01/2018    Anxiety     Asthma ([facility])     Belching     Bloating     Chronic rhinitis     CKD (chronic kidney disease) stage 3, GFR 30-59 ml/min ([facility]) 07/09/2019    Constipation     COPD (chronic obstructive pulmonary disease) ([facility])     NO OXYGEN     Depression     Diarrhea, unspecified     Diverticulosis of large intestine     Emphysema     Esophageal reflux     Fatigue     Feeling lonely     Flatulence/gas pain/belching     Food intolerance     H/O bronchitis     Hay fever     Hemorrhoids     High blood pressure     History of depression     Hypercholesterolemia     Hypertension 01/19/2023    Itch of skin     Migraines     Mild intermittent asthma without complication ([facility])     Nausea     Night sweats     Pneumonia 03/2018    PONV (postoperative nausea and vomiting)     Pure hypercholesterolemia 12/15/2004    Rash      Reflux     Sleep disturbance     Stress     Uncomfortable fullness after meals     Visual impairment     glasses    Vomiting     Wears glasses               Past Surgical History:   Procedure Laterality Date    CATARACT      COLONOSCOPY      COLONOSCOPY & POLYPECTOMY  2014    multiple polyps; tics; repeat 3 yrs    COLONOSCOPY,REMV LESN,SNARE N/A 2014    Procedure: ESOPHAGOGASTRODUODENOSCOPY, COLONOSCOPY, POSSIBLE BIOPSY, POSSIBLE POLYPECTOMY [id number];  Surgeon: Slade Ivan MD;  Location: [facility]    CORRECT BUNION,SIMPLE Bilateral     CYSTOTOMY,EXCIS VESICAL NECK Left     EGD      GASTRO - DMG  2014    stricture; HH    OOPHORECTOMY Bilateral 2017    OTHER SURGICAL HISTORY  2023    Robotic repair of hiatal hernia and Nissen fundoplication    PATIENT DOCUMENTED NOT TO HAVE EXPERIENCED ANY OF THE FOLLOWING EVENTS N/A 2014    Procedure: ESOPHAGOGASTRODUODENOSCOPY, COLONOSCOPY, POSSIBLE BIOPSY, POSSIBLE POLYPECTOMY [id number];  Surgeon: Slade Ivan MD;  Location: [facility]    PATIENT WITHOUGH PREOPERATIVE ORDER FOR IV ANTIBIOTIC SURGICAL SITE INFECTION PROPHYLAXIS. N/A 2014    Procedure: ESOPHAGOGASTRODUODENOSCOPY, COLONOSCOPY, POSSIBLE BIOPSY, POSSIBLE POLYPECTOMY [id number];  Surgeon: Slade Ivan MD;  Location: [facility]    REPAIR ING HERNIA,5+Y/O,REDUCIBL      UPPER GI ENDOSCOPY,DIAGNOSIS N/A 2014    Procedure: ESOPHAGOGASTRODUODENOSCOPY, COLONOSCOPY, POSSIBLE BIOPSY, POSSIBLE POLYPECTOMY [id number];  Surgeon: Slade Ivan MD;  Location: [facility]                Social History     Socioeconomic History    Marital status:    Tobacco Use    Smoking status: Former     Years: 10     Types: Cigarettes     Quit date: 1980     Years since quittin.4    Smokeless tobacco: Never    Tobacco comments:      1/2 pack per day. Social history shows \"Tobacco Use: Active\" and \"Never Smoker: Active\"   Vaping Use    Vaping Use:  Never used   Substance and Sexual Activity    Alcohol use: No    Drug use: No   Other Topics Concern    Caffeine Concern Yes     Comment: \"1 cup of coffee and tea, and can of pop daily\"    Exercise Yes     Comment: walking   Social History Narrative    ** Merged History Encounter **          Social Determinants of Health     Financial Resource Strain: Low Risk  (4/20/2023)    Financial Resource Strain     Difficulty of Paying Living Expenses: Not hard at all     Med Affordability: No   Food Insecurity: No Food Insecurity (4/20/2023)    Food Insecurity     Food Insecurity: Never true   Transportation Needs: Unmet Transportation Needs (4/20/2023)    Transportation Needs     Lack of Transportation: Yes   Physical Activity: Inactive (4/21/2022)    Exercise Vital Sign     Days of Exercise per Week: 0 days     Minutes of Exercise per Session: 0 min   Stress: Stress Concern Present (2/13/2024)    Stress     Feeling of Stress : Yes    Social Connections   Housing Stability: Low Risk  (4/20/2023)    Housing Stability     Housing Instability: No              Review of Systems    Positive for stated complaint: Cough/URI, Dizziness, Headache  Other systems are as noted in HPI.  Constitutional and vital signs reviewed.      All other systems reviewed and negative except as noted above.    Physical Exam     ED Triage Vitals [03/13/24 0902]   BP (!) 162/84   Pulse 81   Resp 22   Temp 98.5 °F (36.9 °C)   Temp src Temporal   SpO2 99 %   O2 Device None (Room air)       Current:/74   Pulse 66   Temp 98.5 °F (36.9 °C) (Temporal)   Resp 18   Ht 152.4 cm (5')   Wt 44 kg   SpO2 100%   BMI 18.94 kg/m²         Physical Exam  General: Alert and oriented. No acute distress.  HEENT: Normocephalic. No evidence of trauma. Extraocular movements are intact.  Cardiovascular exam: Regular rate and rhythm  Lungs: Clear to auscultation bilaterally.  Abdomen: Soft, nondistended, nontender.  Extremities: No evidence of deformity. No  clubbing or cyanosis.  Neuro: No focal deficit is noted.  Cranial nerves II to XII are intact.  Motor strength is 5 5 to both upper and lower extremities.  No evidence of ataxia with finger-nose testing bilaterally.       ED Course     Labs Reviewed   POCT CBC - Abnormal; Notable for the following components:       Result Value    HGB IC 11.8 (*)     HCT IC 34.9 (*)     PLT .0 (*)     # Lymphocyte 0.9 (*)     All other components within normal limits   POCT ISTAT CHEM8 CARTRIDGE - Abnormal; Notable for the following components:    ISTAT Sodium 133 (*)     ISTAT BUN 6 (*)     ISTAT Chloride 97 (*)     All other components within normal limits   ISTAT TROPONIN - Normal     EKG    Rate, intervals and axes as noted on EKG Report.  Rate: 72  Rhythm: Sinus Rhythm  Reading: Normal sinus rhythm.  Ventricular rate 72.  Inferior Q waves.  Anterior T wave inversions.  No acute ST elevation or depression.  Rate, axis, intervals are noted.  Agree with computer interpretation.  No acute change compared to July 7, 2003.            Patient was brought back to the examination room immediately.  The patient was then placed on a cardiac monitor and pulse oximetry was applied.  Patient had an IV established and labs were drawn.    The patient was administered [normal saline infusion] medications for the purposes of treating  [possible orthostatic syncope or dehydration].  The patient had good response to these medications.    Patient continued to be observed here in the emergency department.  Patient remained stable throughout the emergency department observation period.  Patient's sodium today is 133.  This is actually improved compared to February 27, 2024 when her sodium was 123.  Cardiac troponin is negative.  CBC shows a hemoglobin 11.8.  Potassium is 4.7, chloride 97, bicarb is 25, BUN 6, creatinine 0.6.  Glucose is 95.  Patient was reassessed and feels improved after IV fluids.  Patient is hemodynamically stable here in  the immediate care.  Unclear etiology for dizziness.  Patient is neurologically intact.  There is no focal neurologic deficit noted.  No evidence of any nystagmus on examination.  She denies any room spinning sensation.  She currently denies any fainting symptoms.  Patient will be discharged home.  Recommend follow-up with her primary care provider.  She is instructed to follow-up in the emergency department if she has any worsening symptoms or new concerns.    Findings  of the patient's tests results were discussed with the patient in detail.  They were understanding of these results and their discharge instructions.  All questions were answered.       MDM   History is provided by the patient    Differential diagnosis includes vertigo versus orthostatic syncope versus near syncope versus vasovagal syncope versus hyponatremia versus dehydration    Past medical history includes COPD, hypercholesterolemia, anxiety, gastroesophageal reflux, migraine headaches, hypertension, asthma, chronic kidney disease    Social history negative for smoking or alcohol abuse    Family history is noncontributory    Patient had a twelve-lead EKG ordered.  CBC chemistry and troponin were ordered.  She was given a normal saline IV infusion and peripheral IV placement.                                   Medical Decision Making      Disposition and Plan     Clinical Impression:  1. Dizziness    2. Hyponatremia         Disposition:  Discharge  3/13/2024 10:36 am    Follow-up:  Rick Shannon DO  [address] [place] [id number] [phone]    Call   As needed, If symptoms worsen          Medications Prescribed:  Discharge Medication List as of 3/13/2024 11:04 AM        START taking these medications    Details   ondansetron 4 MG Oral Tablet Dispersible Take 1 tablet (4 mg total) by mouth every 4 (four) hours as needed for Nausea., Normal, Disp-10 tablet, R-0

## 2024-03-13 NOTE — DISCHARGE INSTRUCTIONS
Follow-up with your primary care doctor  Continue your home medications  Take Zofran as needed for nausea every 4 hours  Drink enough fluids with electrolyte supplements as needed  Return if any worsening symptoms or new concerns

## 2024-03-14 ENCOUNTER — OFFICE VISIT (OUTPATIENT)
Dept: FAMILY MEDICINE CLINIC | Facility: CLINIC | Age: 79
End: 2024-03-14
Payer: COMMERCIAL

## 2024-03-14 ENCOUNTER — PATIENT OUTREACH (OUTPATIENT)
Dept: CASE MANAGEMENT | Age: 79
End: 2024-03-14

## 2024-03-14 VITALS
SYSTOLIC BLOOD PRESSURE: 138 MMHG | RESPIRATION RATE: 15 BRPM | BODY MASS INDEX: 18.19 KG/M2 | WEIGHT: 92.63 LBS | HEART RATE: 64 BPM | HEIGHT: 60 IN | OXYGEN SATURATION: 99 % | DIASTOLIC BLOOD PRESSURE: 64 MMHG

## 2024-03-14 DIAGNOSIS — R20.0 NUMBNESS OF RIGHT FOOT: ICD-10-CM

## 2024-03-14 DIAGNOSIS — G56.21 CUBITAL TUNNEL SYNDROME ON RIGHT: ICD-10-CM

## 2024-03-14 DIAGNOSIS — R42 CHRONIC VERTIGO: Primary | ICD-10-CM

## 2024-03-14 DIAGNOSIS — K44.9 HIATAL HERNIA: ICD-10-CM

## 2024-03-14 LAB
ATRIAL RATE: 72 BPM
P AXIS: 65 DEGREES
P-R INTERVAL: 168 MS
Q-T INTERVAL: 404 MS
QRS DURATION: 74 MS
QTC CALCULATION (BEZET): 442 MS
R AXIS: -78 DEGREES
T AXIS: 37 DEGREES
VENTRICULAR RATE: 72 BPM

## 2024-03-14 PROCEDURE — 1159F MED LIST DOCD IN RCRD: CPT | Performed by: FAMILY MEDICINE

## 2024-03-14 PROCEDURE — 3008F BODY MASS INDEX DOCD: CPT | Performed by: FAMILY MEDICINE

## 2024-03-14 PROCEDURE — 99214 OFFICE O/P EST MOD 30 MIN: CPT | Performed by: FAMILY MEDICINE

## 2024-03-14 PROCEDURE — 3078F DIAST BP <80 MM HG: CPT | Performed by: FAMILY MEDICINE

## 2024-03-14 PROCEDURE — 3075F SYST BP GE 130 - 139MM HG: CPT | Performed by: FAMILY MEDICINE

## 2024-03-14 PROCEDURE — 1160F RVW MEDS BY RX/DR IN RCRD: CPT | Performed by: FAMILY MEDICINE

## 2024-03-14 NOTE — PROGRESS NOTES
Chart review for CCM monthly outreach - patient had appointment with PCP 3/14/2024.    CCM will reach out to patient next week.

## 2024-03-14 NOTE — PATIENT INSTRUCTIONS
When ear pressure. Yawn frequently 50 times a day and make sure no nasal congestion or rhinorrhea.    Take meclazine when dizzy, and if still nausea take zofran.

## 2024-03-15 NOTE — PROGRESS NOTES
Subjective:   Patient ID: Bibi Diaz is a 78 year old female.    Vertigo.  Chronic intermittent, recurrent.  No HA.      Hiatal hernia.  Recurrent.  Giving him worsening symptoms.  No black or bloody stools.  3. Numbness of right foot and right hand.  Chronic.  Ongoing for years.  Cause not found.  Denied weakness.          History/Other:   Review of Systems   All other systems reviewed and are negative.    Current Outpatient Medications   Medication Sig Dispense Refill    ondansetron 4 MG Oral Tablet Dispersible Take 1 tablet (4 mg total) by mouth every 4 (four) hours as needed for Nausea. 10 tablet 0    clonazePAM 0.5 MG Oral Tab Take 1 tablet (0.5 mg total) by mouth 2 (two) times daily as needed. 60 tablet 0    meclizine 12.5 MG Oral Tab Take 1 tablet (12.5 mg total) by mouth 3 (three) times daily as needed. 30 tablet 0    losartan 50 MG Oral Tab Take 1 tablet (50 mg total) by mouth every morning. 90 tablet 0    HYDROcodone-acetaminophen (NORCO) 5-325 MG Oral Tab Take 1 tablet by mouth every 8 (eight) hours as needed for Pain. 20 tablet 0    mirtazapine 30 MG Oral Tab Take 1 tablet (30 mg total) by mouth nightly. 90 tablet 1    Cholecalciferol (VITAMIN D-3 OR) Take by mouth.      polyethylene glycol, PEG 3350, 17 g Oral Powd Pack Take 17 g by mouth daily.      DUPIXENT 300 MG/2ML Subcutaneous Solution Prefilled Syringe       SYMBICORT 160-4.5 MCG/ACT Inhalation Aerosol INHALE 2 PUFFS BY MOUTH TWICE DAILY 10.2 g 11    clobetasol 0.05 % External Ointment       TRIAMCINOLONE 0.1 % External Cream APPLY TOPICALLY TO THE AFFECTED AREA TWICE DAILY AS NEEDED (Patient taking differently: as needed.) 60 g 3    Albuterol Sulfate HFA (PROAIR HFA) 108 (90 Base) MCG/ACT Inhalation Aero Soln INHALE 2 PUFFS BY MOUTH EVERY 6 HOURS AS NEEDED FOR WHEEZING 8.5 g 2    acetaminophen 500 MG Oral Tab Take 1 tablet (500 mg total) by mouth every 6 (six) hours as needed for Pain.       Allergies:  Allergies   Allergen Reactions     Avelox [Moxifloxacin Hcl In Nacl] SWELLING    Avelox [Moxifloxacin Hydrochloride] TONGUE SWELLING     \"TABS\"    Amoxicillin NAUSEA AND VOMITING     Vomiting.    Statins Myopathy    Sulfa Antibiotics RASH and ITCHING    Dander OTHER (SEE COMMENTS)     \"Animals\": runny nose, watery eyes, itching    Diphenhydramine Runny nose     \"Animals\": runny nose, watery eyes, itching    Dust Runny nose    Latex RASH    Sulfa Drugs Cross Reactors RASH and ITCHING       Objective:   Physical Exam  Vitals reviewed.   Constitutional:       General: She is not in acute distress.     Appearance: She is well-developed. She is not diaphoretic.   Eyes:      General: No scleral icterus.        Right eye: No discharge.         Left eye: No discharge.      Conjunctiva/sclera: Conjunctivae normal.   Cardiovascular:      Rate and Rhythm: Normal rate and regular rhythm.      Heart sounds: Normal heart sounds. No murmur heard.     No friction rub. No gallop.   Pulmonary:      Effort: Pulmonary effort is normal. No respiratory distress.      Breath sounds: Normal breath sounds. No wheezing or rales.   Neurological:      Comments: Negative Spurling's test negative straight leg test.  Negative carpal tunnel.  Positive cubital tunnel.         Assessment & Plan:   1. Chronic vertigo    2. Hiatal hernia    3. Numbness of right foot    4. Cubital tunnel syndrome on right      Reviewed previous MRIs and CTs of the head and neck and back.  Carpal tunnel negative.  Cubital tunnel is positive.  Continue meclizine as needed.  Zofran as needed.  Follow-up neurologist.  Work on not keeping her arm bent at night.  Meds This Visit:  Requested Prescriptions      No prescriptions requested or ordered in this encounter       Imaging & Referrals:  MRI [facility]/PITUITARY/ORBIT (CPT=70551)  NEURO - INTERNAL

## 2024-03-20 ENCOUNTER — HOSPITAL ENCOUNTER (INPATIENT)
Facility: HOSPITAL | Age: 79
LOS: 8 days | Discharge: HOME | End: 2024-03-28
Attending: EMERGENCY MEDICINE | Admitting: HOSPITALIST
Payer: MEDICARE

## 2024-03-20 ENCOUNTER — HOSPITAL ENCOUNTER (INPATIENT)
Facility: HOSPITAL | Age: 79
LOS: 8 days | Discharge: HOME HEALTH | End: 2024-03-28
Attending: EMERGENCY MEDICINE | Admitting: HOSPITALIST
Payer: MEDICARE

## 2024-03-20 DIAGNOSIS — K92.2 GASTROINTESTINAL HEMORRHAGE, UNSPECIFIED GASTROINTESTINAL HEMORRHAGE TYPE: Primary | ICD-10-CM

## 2024-03-20 LAB
ALBUMIN SERPL-MCNC: 3.8 G/DL (ref 3.4–5)
ALBUMIN/GLOB SERPL: 1.3 {RATIO} (ref 1–2)
ALP LIVER SERPL-CCNC: 78 U/L
ALT SERPL-CCNC: 16 U/L
ANION GAP SERPL CALC-SCNC: 7 MMOL/L (ref 0–18)
ANTIBODY SCREEN: NEGATIVE
APTT PPP: 26.9 SECONDS (ref 23.3–35.6)
AST SERPL-CCNC: 21 U/L (ref 15–37)
BASOPHILS # BLD AUTO: 0.07 X10(3) UL (ref 0–0.2)
BASOPHILS NFR BLD AUTO: 1.2 %
BILIRUB SERPL-MCNC: 0.4 MG/DL (ref 0.1–2)
BUN BLD-MCNC: 8 MG/DL (ref 9–23)
CALCIUM BLD-MCNC: 9.7 MG/DL (ref 8.5–10.1)
CHLORIDE SERPL-SCNC: 100 MMOL/L (ref 98–112)
CO2 SERPL-SCNC: 23 MMOL/L (ref 21–32)
CREAT BLD-MCNC: 0.8 MG/DL
EGFRCR SERPLBLD CKD-EPI 2021: 75 ML/MIN/1.73M2 (ref 60–?)
EOSINOPHIL # BLD AUTO: 0.17 X10(3) UL (ref 0–0.7)
EOSINOPHIL NFR BLD AUTO: 2.8 %
ERYTHROCYTE [DISTWIDTH] IN BLOOD BY AUTOMATED COUNT: 11.9 %
ERYTHROCYTE [DISTWIDTH] IN BLOOD BY AUTOMATED COUNT: 12.1 %
GLOBULIN PLAS-MCNC: 2.9 G/DL (ref 2.8–4.4)
GLUCOSE BLD-MCNC: 102 MG/DL (ref 70–99)
HCT VFR BLD AUTO: 28.2 %
HCT VFR BLD AUTO: 32.3 %
HGB BLD-MCNC: 10.1 G/DL
HGB BLD-MCNC: 11.3 G/DL
HGB BLD-MCNC: 9 G/DL
IMM GRANULOCYTES # BLD AUTO: 0.02 X10(3) UL (ref 0–1)
IMM GRANULOCYTES NFR BLD: 0.3 %
INR BLD: 1.05 (ref 0.8–1.2)
LIPASE SERPL-CCNC: 17 U/L (ref 13–75)
LYMPHOCYTES # BLD AUTO: 1.31 X10(3) UL (ref 1–4)
LYMPHOCYTES NFR BLD AUTO: 21.8 %
MCH RBC QN AUTO: 31.6 PG (ref 26–34)
MCH RBC QN AUTO: 32.5 PG (ref 26–34)
MCHC RBC AUTO-ENTMCNC: 35 G/DL (ref 31–37)
MCHC RBC AUTO-ENTMCNC: 35.8 G/DL (ref 31–37)
MCV RBC AUTO: 90.2 FL
MCV RBC AUTO: 90.7 FL
MONOCYTES # BLD AUTO: 0.81 X10(3) UL (ref 0.1–1)
MONOCYTES NFR BLD AUTO: 13.5 %
NEUTROPHILS # BLD AUTO: 3.63 X10 (3) UL (ref 1.5–7.7)
NEUTROPHILS # BLD AUTO: 3.63 X10(3) UL (ref 1.5–7.7)
NEUTROPHILS NFR BLD AUTO: 60.4 %
OSMOLALITY SERPL CALC.SUM OF ELEC: 269 MOSM/KG (ref 275–295)
PLATELET # BLD AUTO: 314 10(3)UL (ref 150–450)
PLATELET # BLD AUTO: 331 10(3)UL (ref 150–450)
POTASSIUM SERPL-SCNC: 4 MMOL/L (ref 3.5–5.1)
PROT SERPL-MCNC: 6.7 G/DL (ref 6.4–8.2)
PROTHROMBIN TIME: 13.7 SECONDS (ref 11.6–14.8)
RBC # BLD AUTO: 3.11 X10(6)UL
RBC # BLD AUTO: 3.58 X10(6)UL
RH BLOOD TYPE: POSITIVE
SODIUM SERPL-SCNC: 130 MMOL/L (ref 136–145)
WBC # BLD AUTO: 6 X10(3) UL (ref 4–11)
WBC # BLD AUTO: 6.7 X10(3) UL (ref 4–11)

## 2024-03-20 PROCEDURE — 99223 1ST HOSP IP/OBS HIGH 75: CPT | Performed by: HOSPITALIST

## 2024-03-20 RX ORDER — POLYETHYLENE GLYCOL 3350 17 G/17G
17 POWDER, FOR SOLUTION ORAL DAILY PRN
Status: DISCONTINUED | OUTPATIENT
Start: 2024-03-20 | End: 2024-03-28

## 2024-03-20 RX ORDER — ENEMA 19; 7 G/133ML; G/133ML
1 ENEMA RECTAL ONCE AS NEEDED
Status: DISCONTINUED | OUTPATIENT
Start: 2024-03-20 | End: 2024-03-28

## 2024-03-20 RX ORDER — FLUTICASONE FUROATE AND VILANTEROL 100; 25 UG/1; UG/1
1 POWDER RESPIRATORY (INHALATION) DAILY
Status: DISCONTINUED | OUTPATIENT
Start: 2024-03-21 | End: 2024-03-28

## 2024-03-20 RX ORDER — METOCLOPRAMIDE HYDROCHLORIDE 5 MG/ML
5 INJECTION INTRAMUSCULAR; INTRAVENOUS EVERY 8 HOURS PRN
Status: DISCONTINUED | OUTPATIENT
Start: 2024-03-20 | End: 2024-03-28

## 2024-03-20 RX ORDER — ACETAMINOPHEN 500 MG
1000 TABLET ORAL ONCE
Status: COMPLETED | OUTPATIENT
Start: 2024-03-20 | End: 2024-03-20

## 2024-03-20 RX ORDER — SENNOSIDES 8.6 MG
17.2 TABLET ORAL NIGHTLY PRN
Status: DISCONTINUED | OUTPATIENT
Start: 2024-03-20 | End: 2024-03-28

## 2024-03-20 RX ORDER — CLONAZEPAM 0.5 MG/1
0.5 TABLET ORAL 2 TIMES DAILY PRN
Status: DISCONTINUED | OUTPATIENT
Start: 2024-03-20 | End: 2024-03-28

## 2024-03-20 RX ORDER — SODIUM CHLORIDE 9 MG/ML
INJECTION, SOLUTION INTRAVENOUS ONCE
Status: DISCONTINUED | OUTPATIENT
Start: 2024-03-20 | End: 2024-03-28

## 2024-03-20 RX ORDER — ACETAMINOPHEN 500 MG
500 TABLET ORAL EVERY 6 HOURS PRN
Status: DISCONTINUED | OUTPATIENT
Start: 2024-03-20 | End: 2024-03-28

## 2024-03-20 RX ORDER — CLONAZEPAM 0.5 MG/1
0.5 TABLET ORAL ONCE
Status: COMPLETED | OUTPATIENT
Start: 2024-03-20 | End: 2024-03-20

## 2024-03-20 RX ORDER — ONDANSETRON 2 MG/ML
4 INJECTION INTRAMUSCULAR; INTRAVENOUS EVERY 6 HOURS PRN
Status: DISCONTINUED | OUTPATIENT
Start: 2024-03-20 | End: 2024-03-28

## 2024-03-20 RX ORDER — ONDANSETRON 2 MG/ML
4 INJECTION INTRAMUSCULAR; INTRAVENOUS ONCE
Status: COMPLETED | OUTPATIENT
Start: 2024-03-20 | End: 2024-03-20

## 2024-03-20 RX ORDER — MIRTAZAPINE 15 MG/1
30 TABLET, FILM COATED ORAL NIGHTLY
Status: DISCONTINUED | OUTPATIENT
Start: 2024-03-20 | End: 2024-03-28

## 2024-03-20 RX ORDER — ACETAMINOPHEN 500 MG
500 TABLET ORAL EVERY 4 HOURS PRN
Status: DISCONTINUED | OUTPATIENT
Start: 2024-03-20 | End: 2024-03-28

## 2024-03-20 RX ORDER — LOSARTAN POTASSIUM 50 MG/1
50 TABLET ORAL EVERY MORNING
Status: DISCONTINUED | OUTPATIENT
Start: 2024-03-20 | End: 2024-03-28

## 2024-03-20 RX ORDER — BISACODYL 10 MG
10 SUPPOSITORY, RECTAL RECTAL
Status: DISCONTINUED | OUTPATIENT
Start: 2024-03-20 | End: 2024-03-28

## 2024-03-20 RX ORDER — ONDANSETRON 2 MG/ML
4 INJECTION INTRAMUSCULAR; INTRAVENOUS EVERY 6 HOURS PRN
Status: DISCONTINUED | OUTPATIENT
Start: 2024-03-20 | End: 2024-03-20

## 2024-03-20 RX ORDER — SODIUM CHLORIDE 9 MG/ML
INJECTION, SOLUTION INTRAVENOUS CONTINUOUS
Status: DISCONTINUED | OUTPATIENT
Start: 2024-03-20 | End: 2024-03-22

## 2024-03-20 NOTE — ED PROVIDER NOTES
Patient Seen in: [facility] Emergency Department      History     Chief Complaint   Patient presents with   • GI Bleeding     Stated Complaint: gi bleed    Subjective:   HPI    78-year-old female with a past medical history as below including hypertension, hyperlipidemia, COPD, CKD, GERD presents with dark tarry stools x 4 yesterday with some red blood when wiping.  She states this morning she had a bowel movement that was bloody and looked more bright red.  She states she has chronic nausea from gastroparesis but denies any vomiting.  She denies abdominal pain.  Denies feeling lightheaded or dizzy.    Objective:   Past Medical History:   Diagnosis Date   • Abdominal distention    • Abdominal pain    • Acquired hypothyroidism 03/01/2018   • Anxiety    • Asthma ([facility])    • Belching    • Bloating    • Chronic rhinitis    • CKD (chronic kidney disease) stage 3, GFR 30-59 ml/min ([facility]) 07/09/2019   • Constipation    • COPD (chronic obstructive pulmonary disease) ([facility])     NO OXYGEN    • Depression    • Diarrhea, unspecified    • Diverticulosis of large intestine    • Emphysema    • Esophageal reflux    • Fatigue    • Feeling lonely    • Flatulence/gas pain/belching    • Food intolerance    • H/O bronchitis    • Hay fever    • Hemorrhoids    • High blood pressure    • History of depression    • Hypercholesterolemia    • Hypertension 01/19/2023   • Itch of skin    • Migraines    • Mild intermittent asthma without complication ([facility])    • Nausea    • Night sweats    • Pneumonia 03/2018   • PONV (postoperative nausea and vomiting)    • Pure hypercholesterolemia 12/15/2004   • Rash    • Reflux    • Sleep disturbance    • Stress    • Uncomfortable fullness after meals    • Visual impairment     glasses   • Vomiting    • Wears glasses               No pertinent past surgical history.              No pertinent social history.            Review of Systems    Positive for stated complaint: gi bleed  Other systems are as noted  in HPI.  Constitutional and vital signs reviewed.      All other systems reviewed and negative except as noted above.    Physical Exam     ED Triage Vitals   BP 03/20/24 0624 140/68   Pulse 03/20/24 0622 74   Resp 03/20/24 0622 16   Temp 03/20/24 0628 97.7 °F (36.5 °C)   Temp src 03/20/24 0628 Temporal   SpO2 03/20/24 0622 100 %   O2 Device 03/20/24 0622 None (Room air)       Current:/68   Pulse 74   Temp 97.7 °F (36.5 °C) (Temporal)   Resp 16   Wt 41.7 kg   SpO2 100%   BMI 17.97 kg/m²         Physical Exam  Vitals and nursing note reviewed. Exam conducted with a chaperone present.   Constitutional:       Appearance: She is well-developed.   HENT:      Head: Normocephalic and atraumatic.      Mouth/Throat:      Mouth: Mucous membranes are moist.   Eyes:      General: No scleral icterus.  Cardiovascular:      Rate and Rhythm: Normal rate and regular rhythm.   Pulmonary:      Effort: Pulmonary effort is normal.      Breath sounds: Normal breath sounds.   Abdominal:      General: Bowel sounds are normal. There is no distension.      Palpations: Abdomen is soft.      Tenderness: There is no abdominal tenderness.   Genitourinary:     Comments: Rectal: Dark red blood  Skin:     General: Skin is warm and dry.   Neurological:      General: No focal deficit present.      Mental Status: She is alert and oriented to person, place, and time.      Cranial Nerves: No cranial nerve deficit.      Motor: No weakness.   Psychiatric:         Mood and Affect: Mood normal.         Behavior: Behavior normal.               ED Course     Labs Reviewed   CBC W/ DIFFERENTIAL - Abnormal; Notable for the following components:       Result Value    RBC 3.58 (*)     HGB 11.3 (*)     HCT 32.3 (*)     All other components within normal limits   CBC WITH DIFFERENTIAL WITH PLATELET    Narrative:     The following orders were created for panel order CBC With Differential With Platelet.  Procedure                               Abnormality          Status                     ---------                               -----------         ------                     CBC W/ DIFFERENTIAL[[id number]]          Abnormal            Final result                 Please view results for these tests on the individual orders.   COMP METABOLIC PANEL (14)   PTT, ACTIVATED   PROTHROMBIN TIME (PT)   LIPASE   TYPE AND SCREEN    Narrative:     The following orders were created for panel order Type and screen.  Procedure                               Abnormality         Status                     ---------                               -----------         ------                     ABORH (Blood Type)[[id number]]                               In process                 Antibody Screen[[id number]]                                  In process                   Please view results for these tests on the individual orders.   ABORH (BLOOD TYPE)   ANTIBODY SCREEN   RAINBOW DRAW BLUE   RAINBOW DRAW GOLD                      [facility]   78-year-old female with a past medical history as below including hypertension, hyperlipidemia, COPD, CKD, GERD presents with dark tarry stools x 4 yesterday with dark red blood today.    Differential includes but is not limited to GI bleed, PUD, anemia      Medical Decision Making      Disposition and Plan     Clinical Impression:  No diagnosis found.     Disposition:  There is no disposition on file for this visit.  There is no disposition time on file for this visit.    Follow-up:  No follow-up provider specified.        Medications Prescribed:  Current Discharge Medication List

## 2024-03-20 NOTE — H&P
[facility]     Bibi Diaz Patient Status:  Inpatient    1945 MRN [id number]   Location [facility] 3NE-A Attending Marcela Dangelo MD   Hosp Day # 0 PCP Rick Shannon DO     Chief Complaint: blood mixed with the stoolx2 days    Subjective:    History of Present Illness:     Bibi Diaz is a 78 year old female with melena for 2 days, also bright blood per rectum for 2 days, about 4 bm/day.She is c/o passing more blood per rectumtoday at least 4-5 times, she feels nauseated dizzy and she is c/o low abd pain.  Patient has history of hypertension hyperlipidemia COPD chronic kidney disease 3 anxiety depression anemia and she is s/p hiatal hernia repair in 2023 with large paraesophageal hernia and gastroparesis.  She presents to emergency room with lower GI bleeding 2 days history of passing red blood per her rectum about 5 times daily with acute onset.  EGD done 2 weeks ago revealed normal esophagus and postsurgical changes.  Last colonoscopy was in  that showed severe diverticulosis and internal hemorrhoids.  Her hemoglobin 11.3 today.    History/Other:    Past Medical History:  Past Medical History:   Diagnosis Date    Abdominal distention     Abdominal pain     Acquired hypothyroidism 2018    Anxiety     Asthma ([facility])     Belching     Bloating     Chronic rhinitis     CKD (chronic kidney disease) stage 3, GFR 30-59 ml/min ([facility]) 2019    Constipation     COPD (chronic obstructive pulmonary disease) ([facility])     NO OXYGEN     Depression     Diarrhea, unspecified     Diverticulosis of large intestine     Emphysema     Esophageal reflux     Fatigue     Feeling lonely     Flatulence/gas pain/belching     Food intolerance     H/O bronchitis     Hay fever     Hemorrhoids     High blood pressure     History of depression     Hypercholesterolemia     Hypertension 2023    Itch of skin     Migraines     Mild intermittent asthma without complication  (HCC)     Nausea     Night sweats     Pneumonia 03/2018    PONV (postoperative nausea and vomiting)     Pure hypercholesterolemia 12/15/2004    Rash     Reflux     Sleep disturbance     Stress     Uncomfortable fullness after meals     Visual impairment     glasses    Vomiting     Wears glasses      Past Surgical History:   Past Surgical History:   Procedure Laterality Date    CATARACT      COLONOSCOPY      COLONOSCOPY & POLYPECTOMY  09/2014    multiple polyps; tics; repeat 3 yrs    COLONOSCOPY,REMV LESN,SNARE N/A 09/22/2014    Procedure: ESOPHAGOGASTRODUODENOSCOPY, COLONOSCOPY, POSSIBLE BIOPSY, POSSIBLE POLYPECTOMY [id number];  Surgeon: Slade Ivan MD;  Location: [facility]    CORRECT BUNION,SIMPLE Bilateral     CYSTOTOMY,EXCIS VESICAL NECK Left     EGD      GASTRO - DMG  09/2014    stricture; HH    OOPHORECTOMY Bilateral 2017    OTHER SURGICAL HISTORY  02/27/2023    Robotic repair of hiatal hernia and Nissen fundoplication    PATIENT DOCUMENTED NOT TO HAVE EXPERIENCED ANY OF THE FOLLOWING EVENTS N/A 09/22/2014    Procedure: ESOPHAGOGASTRODUODENOSCOPY, COLONOSCOPY, POSSIBLE BIOPSY, POSSIBLE POLYPECTOMY [id number];  Surgeon: Slade Ivan MD;  Location: [facility]    PATIENT WITHOUGH PREOPERATIVE ORDER FOR IV ANTIBIOTIC SURGICAL SITE INFECTION PROPHYLAXIS. N/A 09/22/2014    Procedure: ESOPHAGOGASTRODUODENOSCOPY, COLONOSCOPY, POSSIBLE BIOPSY, POSSIBLE POLYPECTOMY [id number];  Surgeon: Slade Ivan MD;  Location: [facility]    REPAIR ING HERNIA,5+Y/O,REDUCIBL      UPPER GI ENDOSCOPY,DIAGNOSIS N/A 09/22/2014    Procedure: ESOPHAGOGASTRODUODENOSCOPY, COLONOSCOPY, POSSIBLE BIOPSY, POSSIBLE POLYPECTOMY [id number];  Surgeon: Slade Ivan MD;  Location: [facility]      Family History:   Family History   Problem Relation Age of Onset    Colon Cancer Mother     Other (Other) Mother     Other (copd) Mother     Alcohol and Other Disorders Associated Father     Other (Other) Father      Other (emph) Father     Alcohol and Other Disorders Associated Son     Hypertension Sister     Prostate Cancer Brother     Hypertension Brother      Social History:    reports that she quit smoking about 43 years ago. Her smoking use included cigarettes. She has never used smokeless tobacco. She reports that she does not drink alcohol and does not use drugs.     Allergies:   Allergies   Allergen Reactions    Avelox [Moxifloxacin Hcl In Nacl] SWELLING    Avelox [Moxifloxacin Hydrochloride] TONGUE SWELLING     \"TABS\"    Amoxicillin NAUSEA AND VOMITING     Vomiting.    Statins Myopathy    Sulfa Antibiotics RASH and ITCHING    Dander OTHER (SEE COMMENTS)     \"Animals\": runny nose, watery eyes, itching    Diphenhydramine Runny nose     \"Animals\": runny nose, watery eyes, itching    Dust Runny nose    Latex RASH    Sulfa Drugs Cross Reactors RASH and ITCHING       Medications:    Current Facility-Administered Medications on File Prior to Encounter   Medication Dose Route Frequency Provider Last Rate Last Admin    [COMPLETED] sodium chloride 0.9% infusion   Intravenous Once Xiang Hernandez MD   Stopped at 03/13/24 1104    [COMPLETED] ondansetron (Zofran) 4 MG/2ML injection 4 mg  4 mg Intravenous Once Xiang Hernandez MD   4 mg at 03/13/24 1052    [COMPLETED] iopamidol 76% (ISOVUE-370) injection for power injector  65 mL Intravenous ONCE PRN Jeanmarie Milligan MD   65 mL at 02/27/24 0940    [COMPLETED] sodium chloride 0.9% infusion   Intravenous Once Jeanmarie Milligan MD   Stopped at 02/27/24 1110     Current Outpatient Medications on File Prior to Encounter   Medication Sig Dispense Refill    ondansetron 4 MG Oral Tablet Dispersible Take 1 tablet (4 mg total) by mouth every 4 (four) hours as needed for Nausea. 10 tablet 0    clonazePAM 0.5 MG Oral Tab Take 1 tablet (0.5 mg total) by mouth 2 (two) times daily as needed. 60 tablet 0    meclizine 12.5 MG Oral Tab Take 1 tablet (12.5 mg total) by mouth 3 (three) times daily as  needed. 30 tablet 0    losartan 50 MG Oral Tab Take 1 tablet (50 mg total) by mouth every morning. 90 tablet 0    HYDROcodone-acetaminophen (NORCO) 5-325 MG Oral Tab Take 1 tablet by mouth every 8 (eight) hours as needed for Pain. 20 tablet 0    mirtazapine 30 MG Oral Tab Take 1 tablet (30 mg total) by mouth nightly. 90 tablet 1    Cholecalciferol (VITAMIN D-3 OR) Take by mouth.      polyethylene glycol, PEG 3350, 17 g Oral Powd Pack Take 17 g by mouth daily.      DUPIXENT 300 MG/2ML Subcutaneous Solution Prefilled Syringe       SYMBICORT 160-4.5 MCG/ACT Inhalation Aerosol INHALE 2 PUFFS BY MOUTH TWICE DAILY 10.2 g 11    clobetasol 0.05 % External Ointment       TRIAMCINOLONE 0.1 % External Cream APPLY TOPICALLY TO THE AFFECTED AREA TWICE DAILY AS NEEDED (Patient taking differently: as needed.) 60 g 3    acetaminophen 500 MG Oral Tab Take 1 tablet (500 mg total) by mouth every 6 (six) hours as needed for Pain.      Albuterol Sulfate HFA (PROAIR HFA) 108 (90 Base) MCG/ACT Inhalation Aero Soln INHALE 2 PUFFS BY MOUTH EVERY 6 HOURS AS NEEDED FOR WHEEZING 8.5 g 2       Review of Systems:   A comprehensive review of systems was completed.    Pertinent positives and negatives noted in the HPI.    Objective:   Physical Exam:    /60   Pulse 65   Temp 97.7 °F (36.5 °C) (Temporal)   Resp 20   Wt 92 lb (41.7 kg)   SpO2 99%   BMI 17.97 kg/m²   General: No acute distress, Alert  Respiratory: No rhonchi, no wheezes  Cardiovascular: S1, S2. Regular rate and rhythm  Abdomen: Soft, Non-tender, non-distended, positive bowel sounds  Neuro: No new focal deficits  Extremities: No edema      Results:    Labs:      Labs Last 24 Hours:    Recent Labs   Lab 03/20/24  0631   RBC 3.58*   HGB 11.3*   HCT 32.3*   MCV 90.2   MCH 31.6   MCHC 35.0   RDW 11.9   NEPRELIM 3.63   WBC 6.0   .0       Recent Labs   Lab 03/20/24  0631   *   BUN 8*   CREATSERUM 0.80   EGFRCR 75   CA 9.7   ALB 3.8   *   K 4.0      CO2  23.0   ALKPHO 78   AST 21   ALT 16   BILT 0.4   TP 6.7       Lab Results   Component Value Date    PT 12.6 01/31/2013    PT 13.2 08/02/2011    INR 1.05 03/20/2024    INR 0.99 12/29/2022    INR 1.09 03/01/2018       No results for input(s): \"TROP\", \"TROPHS\", \"CK\" in the last 168 hours.    No results for input(s): \"TROP\", \"PBNP\" in the last 168 hours.    No results for input(s): \"PCT\" in the last 168 hours.    Imaging: Imaging data reviewed in Epic.    Assessment & Plan:      # 78 years old female hypertension hyperlipidemia COPD presents with lower GI bleeding  -Colonoscopy in a.m. to assess for sources of bleeding    # Paraesophageal hiatal hernia and chronic gastroesophageal reflux disease status post robotic repair and fundoplication with general anesthesia February 27, 2023    # History of hypothyroidism anxiety and depression          Plan of care discussed with patient and emergency room physician    Marcela Dangelo MD    Supplementary Documentation:     The 21st Century Cures Act makes medical notes like these available to patients in the interest of transparency. Please be advised this is a medical document. Medical documents are intended to carry relevant information, facts as evident, and the clinical opinion of the practitioner. The medical note is intended as peer to peer communication and may appear blunt or direct. It is written in medical language and may contain abbreviations or verbiage that are unfamiliar.               **Certification      PHYSICIAN Certification of Need for Inpatient Hospitalization - Initial Certification    Patient will require inpatient services that will reasonably be expected to span two midnight's based on the clinical documentation in H+P.   Based on patients current state of illness, I anticipate that, after discharge, patient will require TBD.

## 2024-03-20 NOTE — ED INITIAL ASSESSMENT (HPI)
Pt arrives to ed w c/o black, tarry stools w bright red blood. Pt reports having this BM about 30 mins pta. +dizziness. Pt reports \"stomach ache that might be gas\". Denies blood thinners.

## 2024-03-20 NOTE — ED QUICK NOTES
Orders for admission, patient is aware of plan and ready to go upstairs. Any questions, please call ED RN Dana at extension [phone].     Patient Covid vaccination status: Fully vaccinated     COVID Test Ordered in ED: None    COVID Suspicion at Admission: N/A    Running Infusions:      Mental Status/LOC at time of transport: A&Ox4    Other pertinent information: ambulatory  CIWA score: N/A   NIH score:  N/A

## 2024-03-20 NOTE — ED PROVIDER NOTES
Patient Seen in: [facility] Emergency Department      History     Chief Complaint   Patient presents with    GI Bleeding     Stated Complaint: gi bleed    Subjective:   HPI    78-year-old female with a past medical history as below including hypertension, hyperlipidemia, COPD, CKD, GERD presents with dark tarry stools x 4 yesterday with some red blood when wiping. She states this morning she had a bowel movement that was bloody and looked more bright red. She states she has chronic nausea from gastroparesis but denies any vomiting. She denies abdominal pain. Denies feeling lightheaded or dizzy.     Objective:   Past Medical History:   Diagnosis Date    Abdominal distention     Abdominal pain     Acquired hypothyroidism 03/01/2018    Anxiety     Asthma ([facility])     Belching     Bloating     Chronic rhinitis     CKD (chronic kidney disease) stage 3, GFR 30-59 ml/min ([facility]) 07/09/2019    Constipation     COPD (chronic obstructive pulmonary disease) ([facility])     NO OXYGEN     Depression     Diarrhea, unspecified     Diverticulosis of large intestine     Emphysema     Esophageal reflux     Fatigue     Feeling lonely     Flatulence/gas pain/belching     Food intolerance     H/O bronchitis     Hay fever     Hemorrhoids     High blood pressure     History of depression     Hypercholesterolemia     Hypertension 01/19/2023    Itch of skin     Migraines     Mild intermittent asthma without complication ([facility])     Nausea     Night sweats     Pneumonia 03/2018    PONV (postoperative nausea and vomiting)     Pure hypercholesterolemia 12/15/2004    Rash     Reflux     Sleep disturbance     Stress     Uncomfortable fullness after meals     Visual impairment     glasses    Vomiting     Wears glasses               No pertinent past surgical history.              No pertinent social history.            Review of Systems    Positive for stated complaint: gi bleed  Other systems are as noted in HPI.  Constitutional and vital signs  reviewed.      All other systems reviewed and negative except as noted above.    Physical Exam     ED Triage Vitals   BP 03/20/24 0624 140/68   Pulse 03/20/24 0622 74   Resp 03/20/24 0622 16   Temp 03/20/24 0628 97.7 °F (36.5 °C)   Temp src 03/20/24 0628 Temporal   SpO2 03/20/24 0622 100 %   O2 Device 03/20/24 0622 None (Room air)       Current:/68   Pulse 74   Temp 97.7 °F (36.5 °C) (Temporal)   Resp 16   Wt 41.7 kg   SpO2 100%   BMI 17.97 kg/m²         Physical Exam  Vitals and nursing note reviewed. Exam conducted with a chaperone present.   Constitutional:       Appearance: She is well-developed.   HENT:      Head: Normocephalic and atraumatic.      Mouth/Throat:      Mouth: Mucous membranes are moist.   Eyes:      General: No scleral icterus.  Cardiovascular:      Rate and Rhythm: Normal rate and regular rhythm.   Pulmonary:      Effort: Pulmonary effort is normal.      Breath sounds: Normal breath sounds.   Abdominal:      General: Bowel sounds are normal. There is no distension.      Palpations: Abdomen is soft.      Tenderness: There is no abdominal tenderness.   Genitourinary:     Comments: Rectal: Dark red blood  Skin:     General: Skin is warm and dry.   Neurological:      General: No focal deficit present.      Mental Status: She is alert and oriented to person, place, and time.      Cranial Nerves: No cranial nerve deficit.      Motor: No weakness.   Psychiatric:         Mood and Affect: Mood normal.         Behavior: Behavior normal.               ED Course     Labs Reviewed   COMP METABOLIC PANEL (14) - Abnormal; Notable for the following components:       Result Value    Glucose 102 (*)     Sodium 130 (*)     BUN 8 (*)     Calculated Osmolality 269 (*)     All other components within normal limits   CBC W/ DIFFERENTIAL - Abnormal; Notable for the following components:    RBC 3.58 (*)     HGB 11.3 (*)     HCT 32.3 (*)     All other components within normal limits   PTT, ACTIVATED - Normal    PROTHROMBIN TIME (PT) - Normal   LIPASE - Normal   CBC WITH DIFFERENTIAL WITH PLATELET    Narrative:     The following orders were created for panel order CBC With Differential With Platelet.  Procedure                               Abnormality         Status                     ---------                               -----------         ------                     CBC W/ DIFFERENTIAL[[id number]]          Abnormal            Final result                 Please view results for these tests on the individual orders.   TYPE AND SCREEN    Narrative:     The following orders were created for panel order Type and screen.  Procedure                               Abnormality         Status                     ---------                               -----------         ------                     ABORH (Blood Type)[[id number]]                               Final result               Antibody Screen[[id number]]                                  Final result                 Please view results for these tests on the individual orders.   ABORH (BLOOD TYPE)   ANTIBODY SCREEN   RAINBOW DRAW BLUE   RAINBOW DRAW GOLD                      [facility]   78-year-old female with a past medical history as below including hypertension, hyperlipidemia, COPD, CKD, GERD presents with dark tarry stools x 4 yesterday with dark red blood today.     Differential includes but is not limited to GI bleed, PUD, anemia    Labs show hemoglobin 11.3, minimally decreased from previous 11.8 on 3/13/2024.  Labs also show hyponatremia sodium of 130, normal renal function.  Treated with normal saline bolus.  Will admit for further treatment.  Patient given Protonix 40 mg IV.    Discussed with hospitalist Dr. Dangelo and [facility] Dr. Ashford.    Medical Decision Making  Amount and/or Complexity of Data Reviewed  External Data Reviewed: labs.     Details: See [facility]  Labs: ordered. Decision-making details documented in ED Course.  Discussion of management or test  interpretation with external provider(s): Hospitalist, GI    Risk  Decision regarding hospitalization.        Disposition and Plan     Clinical Impression:  1. Gastrointestinal hemorrhage, unspecified gastrointestinal hemorrhage type         Disposition:  Admit  3/20/2024  7:40 am    Follow-up:  No follow-up provider specified.        Medications Prescribed:  Current Discharge Medication List                            Hospital Problems       Present on Admission  Date Reviewed: 3/14/2024            ICD-10-CM Noted POA    * (Principal) Gastrointestinal hemorrhage, unspecified gastrointestinal hemorrhage type K92.2 3/20/2024 Unknown

## 2024-03-20 NOTE — ED QUICK NOTES
Pt c/o nausea at this time, pt also reporting feeling a little anxious after having a bowel movement. Pt reports she took half a tablet of klonopin at home and is requesting to take some more. ED MD notified. Verbal order to medicate with zofran and klonopin.

## 2024-03-20 NOTE — PLAN OF CARE
NURSING ADMISSION NOTE      Patient admitted via  Cart  Oriented to room.  Safety precautions initiated.  Bed in low position.  Call light in reach.    Admission navigator completed. Pt A&Ox4. On RA. GI to see. Hospitalist paged for admit orders.

## 2024-03-20 NOTE — CONSULTS
[facility]                       Gastroenterology Consultation-[facility] Gastroenterology    Bibi Diaz Patient Status:  Inpatient    1945 MRN [id number]   Location [facility] 3NE-A Attending Marcela Dangelo MD   Hosp Day # 0 PCP Rick Shannon DO     Reason for consultation: GI bleeding   HPI: This is a 78 yr-old female with PMhx that includes HTN, dyslipidemia, COPD (no chronic oxygen), CKD, anxiety/depression, anemia, large paraesophageal hernia s/p robotic repair of hiatal hernia with mesh + robotic loose Nissen fundoplication (2023; Dr Friedman), and gastroparesis who presented to ER today with GI bleeding.   Pt reports a 2 day hx of red blood per rectum. No diarrhea or loose stools with bleeding. Frequency is at least 5x daily since onset and symptoms progressed to dizziness and diffuse abd ache. Pt with chronic nausea and denies worsening symptoms. No fevers or chills. She reports ongoing wt loss d/t poor PO intake from her nausea, bloating with solid food intake. No NSAIDs, anticoagulants, or antiplatelet agents.   EGD 3/6/2024 (Dr Esposito )revealed normal esophagus, irregular Z-line, post-surgical changes c/w fundoplication with paraesophageal hernia and twisted fundoplication with esophageal dilation to 20 mm  Colonoscopy  (Dr Squires) revealed colon polyps (2-10 mm), severe diverticulosis, and internal hemorrhoids.   Hgb 11.3 from 11.8 last week and a baseline of 11's, normal plt (331), normal INR (1.05), normal BUN/creat   PMHx:   Past Medical History:   Diagnosis Date    Abdominal distention     Abdominal pain     Acquired hypothyroidism 2018    Anxiety     Asthma ([facility])     Belching     Bloating     Chronic rhinitis     CKD (chronic kidney disease) stage 3, GFR 30-59 ml/min ([facility]) 2019    Constipation     COPD (chronic obstructive pulmonary disease) ([facility])     NO OXYGEN     Depression     Diarrhea, unspecified     Diverticulosis of large intestine     Emphysema      Esophageal reflux     Fatigue     Feeling lonely     Flatulence/gas pain/belching     Food intolerance     H/O bronchitis     Hay fever     Hemorrhoids     High blood pressure     History of depression     Hypercholesterolemia     Hypertension 01/19/2023    Itch of skin     Migraines     Mild intermittent asthma without complication (HCC)     Nausea     Night sweats     Pneumonia 03/2018    PONV (postoperative nausea and vomiting)     Pure hypercholesterolemia 12/15/2004    Rash     Reflux     Sleep disturbance     Stress     Uncomfortable fullness after meals     Visual impairment     glasses    Vomiting     Wears glasses                 PSHx:   Past Surgical History:   Procedure Laterality Date    CATARACT      COLONOSCOPY      COLONOSCOPY & POLYPECTOMY  09/2014    multiple polyps; tics; repeat 3 yrs    COLONOSCOPY,REMV LESN,SNARE N/A 09/22/2014    Procedure: ESOPHAGOGASTRODUODENOSCOPY, COLONOSCOPY, POSSIBLE BIOPSY, POSSIBLE POLYPECTOMY 27238,37061;  Surgeon: Slade Ivan MD;  Location: [facility]    CORRECT BUNION,SIMPLE Bilateral     CYSTOTOMY,EXCIS VESICAL NECK Left     EGD      GASTRO - DMG  09/2014    stricture; HH    OOPHORECTOMY Bilateral 2017    OTHER SURGICAL HISTORY  02/27/2023    Robotic repair of hiatal hernia and Nissen fundoplication    PATIENT DOCUMENTED NOT TO HAVE EXPERIENCED ANY OF THE FOLLOWING EVENTS N/A 09/22/2014    Procedure: ESOPHAGOGASTRODUODENOSCOPY, COLONOSCOPY, POSSIBLE BIOPSY, POSSIBLE POLYPECTOMY 98308,[id number];  Surgeon: Slade Ivan MD;  Location: [facility]    PATIENT WITHOUGH PREOPERATIVE ORDER FOR IV ANTIBIOTIC SURGICAL SITE INFECTION PROPHYLAXIS. N/A 09/22/2014    Procedure: ESOPHAGOGASTRODUODENOSCOPY, COLONOSCOPY, POSSIBLE BIOPSY, POSSIBLE POLYPECTOMY [id number];  Surgeon: Slade Ivan MD;  Location: [facility]    REPAIR ING HERNIA,5+Y/O,REDUCIBL      UPPER GI ENDOSCOPY,DIAGNOSIS N/A 09/22/2014    Procedure: ESOPHAGOGASTRODUODENOSCOPY,  COLONOSCOPY, POSSIBLE BIOPSY, POSSIBLE POLYPECTOMY [id number];  Surgeon: Slade Ivan MD;  Location: [facility]     Medications:    [COMPLETED] pantoprazole (Protonix) 40 mg in sodium chloride 0.9% PF 10 mL IV push  40 mg Intravenous Once    [COMPLETED] sodium chloride 0.9 % IV bolus 1,000 mL  1,000 mL Intravenous Once    [COMPLETED] acetaminophen (Tylenol Extra Strength) tab 1,000 mg  1,000 mg Oral Once    [COMPLETED] ondansetron (Zofran) 4 MG/2ML injection 4 mg  4 mg Intravenous Once    [COMPLETED] clonazePAM (KlonoPIN) tab 0.5 mg  0.5 mg Oral Once    acetaminophen (Tylenol Extra Strength) tab 500 mg  500 mg Oral Q6H PRN    clonazePAM (KlonoPIN) tab 0.5 mg  0.5 mg Oral BID PRN    losartan (Cozaar) tab 50 mg  50 mg Oral QAM    mirtazapine (Remeron) tab 30 mg  30 mg Oral Nightly    [START ON 3/21/2024] fluticasone furoate-vilanterol (Breo Ellipta) 100-25 MCG/ACT inhaler 1 puff  1 puff Inhalation Daily    ondansetron (Zofran) 4 MG/2ML injection 4 mg  4 mg Intravenous Q6H PRN    sodium chloride 0.9% infusion   Intravenous Continuous    acetaminophen (Tylenol Extra Strength) tab 500 mg  500 mg Oral Q4H PRN    metoclopramide (Reglan) 5 mg/mL injection 5 mg  5 mg Intravenous Q8H PRN    polyethylene glycol (PEG 3350) (Miralax) 17 g oral packet 17 g  17 g Oral Daily PRN    sennosides (Senokot) tab 17.2 mg  17.2 mg Oral Nightly PRN    bisacodyl (Dulcolax) 10 MG rectal suppository 10 mg  10 mg Rectal Daily PRN    fleet enema (Fleet) 7-19 GM/118ML rectal enema 133 mL  1 enema Rectal Once PRN    pantoprazole (Protonix) 40 mg in sodium chloride 0.9% PF 10 mL IV push  40 mg Intravenous Q12H     Allergies:   Allergies   Allergen Reactions    Avelox [Moxifloxacin Hcl In Nacl] SWELLING    Avelox [Moxifloxacin Hydrochloride] TONGUE SWELLING     \"TABS\"    Amoxicillin NAUSEA AND VOMITING     Vomiting.    Statins Myopathy    Sulfa Antibiotics RASH and ITCHING    Dander OTHER (SEE COMMENTS)     \"Animals\": runny nose, watery  eyes, itching    Diphenhydramine Runny nose     \"Animals\": runny nose, watery eyes, itching    Dust Runny nose    Latex RASH    Sulfa Drugs Cross Reactors RASH and ITCHING     Social HX:   Social History     Socioeconomic History    Marital status:    Tobacco Use    Smoking status: Former     Years: 10     Types: Cigarettes     Quit date: 1980     Years since quittin.5    Smokeless tobacco: Never    Tobacco comments:      1/2 pack per day. Social history shows \"Tobacco Use: Active\" and \"Never Smoker: Active\"   Vaping Use    Vaping Use: Never used   Substance and Sexual Activity    Alcohol use: No    Drug use: No   Other Topics Concern    Caffeine Concern Yes     Comment: \"1 cup of coffee and tea, and can of pop daily\"    Exercise Yes     Comment: walking   Social History Narrative    ** Merged History Encounter **          Social Determinants of Health     Financial Resource Strain: Low Risk  (2023)    Financial Resource Strain     Difficulty of Paying Living Expenses: Not hard at all     Med Affordability: No   Food Insecurity: No Food Insecurity (3/20/2024)    Food Insecurity     Food Insecurity: Never true   Transportation Needs: No Transportation Needs (3/20/2024)    Transportation Needs     Lack of Transportation: No   Physical Activity: Inactive (2022)    Exercise Vital Sign     Days of Exercise per Week: 0 days     Minutes of Exercise per Session: 0 min   Stress: Stress Concern Present (2024)    Stress     Feeling of Stress : Yes    Social Connections   Housing Stability: Low Risk  (3/20/2024)    Housing Stability     Housing Instability: No      FamHx: The patient has no family history of colon cancer or other gastrointestinal malignancies;  No family history of ulcer disease, or inflammatory bowel disease  ROS:  In addition to the pertinent positives described above:            Infectious Disease:  No chronic infections or recent fevers prior to the acute illness             Cardiovascular: + HTN, dyslipidemia             Respiratory: + COPD            Hematologic: The patient reports no easy bruising, frequent gum bleeding or nose bleeding;  + chronic anemia            Dermatologic: The patient reports no recent rashes or chronic skin disorders            Rheumatologic: The patient reports no history of chronic arthritis, myalgias, arthralgias            Genitourinary:  + CKD           Psychiatric: + depression, anxiety           Oncologic: The patient reports no history of prior solid tumor or hematologic malignancy           ENT: The patient reports no hoarseness of voice, hearing loss, sinus congestion, tinnitus           Neurologic: The patient reports no history of seizure, stroke, or frequent headaches  PE: /64 (BP Location: Left arm)   Pulse 61   Temp 97.9 °F (36.6 °C) (Oral)   Resp 19   Wt 92 lb (41.7 kg)   SpO2 99%   BMI 17.97 kg/m²   Gen: AAO x 3, able to speak in complete sentences  HENT: EOMI, PERRL, oropharynx is clear with moist mucosal membranes  Eyes: Sclerae are anicteric  Neck:  Supple without nuchal rigidity  CV: Regular rate and rhythm, with normal S1 and S2  Resp: Clear to auscultation bilaterally without wheezes; rubs, rhonchi, or rales  Abdomen: Soft, non-tender, non-distended with the presence of bowel sounds; No hepatosplenomegaly; no rebound or guarding; No ascites is clinically apparent; no tympany to percussion  Ext: No peripheral edema or cyanosis  Skin: Warm and dry  Psychiatric: Appropriate mood and congruent affect without obvious depression or anxiety  Labs:   Lab Results   Component Value Date    WBC 6.0 03/20/2024    HGB 11.3 03/20/2024    HCT 32.3 03/20/2024    .0 03/20/2024    CREATSERUM 0.80 03/20/2024    BUN 8 03/20/2024     03/20/2024    K 4.0 03/20/2024     03/20/2024    CO2 23.0 03/20/2024     03/20/2024    CA 9.7 03/20/2024    ALB 3.8 03/20/2024    ALKPHO 78 03/20/2024    BILT 0.4 03/20/2024    AST 21  03/20/2024    ALT 16 03/20/2024    PTT 26.9 03/20/2024    INR 1.05 03/20/2024    PTP 13.7 03/20/2024    LIP 17 03/20/2024     Recent Labs   Lab 03/20/24  0631   *   BUN 8*   CREATSERUM 0.80   CA 9.7   *   K 4.0      CO2 23.0     Recent Labs   Lab 03/20/24  0631   RBC 3.58*   HGB 11.3*   HCT 32.3*   MCV 90.2   MCH 31.6   MCHC 35.0   RDW 11.9   NEPRELIM 3.63   WBC 6.0   .0       Recent Labs   Lab 03/20/24  0631   ALT 16   AST 21       Imaging:   Colonoscopy: 8/2018: Dr Squires  Indication: hx of polyps  Colonoscopy with polypectomy - Snare polypectomy     Indications:  Personal Hx Colon Polyps.     Findings:  10 mm semi-sessile polyp in the transverse colon.  This was removed with a hot snare   polypectomy.  2-3 mm sessile polyp in the sigmoid colon.  This was removed with a cold forceps   polypectomy.  Severe diverticulosis in the sigmoid colon and mild diverticulosis in the ascending   colon.  Grade 2-3 internal hemorrhoids.  ___________________________________________________    Impression: 78 yr-old female with hx of HTN, dyslipidemia, COPD (no chronic oxygen), CKD, anxiety/depression, anemia, large paraesophageal hernia s/p robotic repair of hiatal hernia with mesh + robotic loose Nissen fundoplication (2/2023; Dr Friedman), and gastroparesis admitted today with GI bleeding. Hgb 11.3 from 11.8 last week and a baseline of 11's, normal plt (331), normal INR (1.05), normal BUN/creat   Will plan for colonoscopy in AM to assess for sources of bleeding including AVMs, polyps, neoplasm, IBD, and hemorrhoids.   The risks, benefits, alternatives of the procedure including the risks of anesthesia, bleeding, perforation, missed lesions, need for surgery, and infection were discussed with the patient. She expressed understanding of the risks and was agreeable to proceed.    Recommendations:     Colonoscopy under MAC in AM  Clear liquid diet today; NPO after midnight  Golytely 4000 ml tonight, split  prep (each dose to be completed in 1.5-2 hours)  Serial Hgb monitoring transfusing as needed to maintain Hgb >7  CBC, BMP, Mg in AM      Thank you for the consultation, we will follow the patient with you.  Attending addendum (DR DERECK Ashford) to follow later today and provide formal, final recommendations at that time   Yasmine ZEENAT Dolan  1:59 PM  3/20/2024  [facility]astroenter[facility]  [phone]    GI attending addendum:    I have personally seen and examined this patient and agree with above nurse practitioner's assessment and recommendations.     Briefly, patient is a 78-year-old female with chronic medical conditions including hypertension, hyperlipidemia, COPD, CKD, large paraesophageal hernia status post robotic repair of hernia with mesh and Nissen fundoplication in February 2023 and gastroparesis that presented to the ER with hematochezia.  Noted bright red blood per rectum.  Her last colonoscopy was in 2018 as above with polyps, diverticulosis, and hemorrhoids.  Hemoglobin was 11.3 and near her recent baseline.  On physical exam, patient was resting comfortably in bed.  Her abdomen is soft, nontender, nondistended.  Patient with hematochezia.  Will need to rule out diverticular bleed, polyps, masses, hemorrhoids.  She is noted to be anemic and hemoglobin is dropped slightly since admission.  Will plan for colonoscopy tomorrow.  He may have clear liquids today and n.p.o. after midnight.  Continue to monitor hemoglobin and transfuse for hemoglobin less than 7.  Thank you for the consultation.  Will continue to follow along with you.    Delon Ashford,   9:14 PM  3/20/2024  [facility]enter[facility]  [phone]

## 2024-03-21 ENCOUNTER — APPOINTMENT (OUTPATIENT)
Dept: CT IMAGING | Facility: HOSPITAL | Age: 79
End: 2024-03-21
Attending: NURSE PRACTITIONER
Payer: MEDICARE

## 2024-03-21 ENCOUNTER — PATIENT OUTREACH (OUTPATIENT)
Dept: CASE MANAGEMENT | Age: 79
End: 2024-03-21

## 2024-03-21 ENCOUNTER — ANESTHESIA (OUTPATIENT)
Dept: ENDOSCOPY | Facility: HOSPITAL | Age: 79
End: 2024-03-21
Payer: MEDICARE

## 2024-03-21 ENCOUNTER — ANESTHESIA EVENT (OUTPATIENT)
Dept: ENDOSCOPY | Facility: HOSPITAL | Age: 79
End: 2024-03-21
Payer: MEDICARE

## 2024-03-21 LAB
ANION GAP SERPL CALC-SCNC: 3 MMOL/L (ref 0–18)
BASOPHILS # BLD AUTO: 0.03 X10(3) UL (ref 0–0.2)
BASOPHILS # BLD AUTO: 0.05 X10(3) UL (ref 0–0.2)
BASOPHILS NFR BLD AUTO: 0.5 %
BASOPHILS NFR BLD AUTO: 0.9 %
BUN BLD-MCNC: 9 MG/DL (ref 9–23)
CALCIUM BLD-MCNC: 8.3 MG/DL (ref 8.5–10.1)
CHLORIDE SERPL-SCNC: 114 MMOL/L (ref 98–112)
CO2 SERPL-SCNC: 22 MMOL/L (ref 21–32)
CREAT BLD-MCNC: 0.6 MG/DL
EGFRCR SERPLBLD CKD-EPI 2021: 91 ML/MIN/1.73M2 (ref 60–?)
EOSINOPHIL # BLD AUTO: 0.06 X10(3) UL (ref 0–0.7)
EOSINOPHIL # BLD AUTO: 0.06 X10(3) UL (ref 0–0.7)
EOSINOPHIL NFR BLD AUTO: 1 %
EOSINOPHIL NFR BLD AUTO: 1.1 %
ERYTHROCYTE [DISTWIDTH] IN BLOOD BY AUTOMATED COUNT: 12.1 %
ERYTHROCYTE [DISTWIDTH] IN BLOOD BY AUTOMATED COUNT: 12.6 %
GLUCOSE BLD-MCNC: 96 MG/DL (ref 70–99)
HCT VFR BLD AUTO: 17.5 %
HCT VFR BLD AUTO: 19.4 %
HGB BLD-MCNC: 6 G/DL
HGB BLD-MCNC: 6.4 G/DL
HGB BLD-MCNC: 6.4 G/DL
HGB BLD-MCNC: 8.1 G/DL
HGB BLD-MCNC: 8.3 G/DL
IMM GRANULOCYTES # BLD AUTO: 0.02 X10(3) UL (ref 0–1)
IMM GRANULOCYTES # BLD AUTO: 0.02 X10(3) UL (ref 0–1)
IMM GRANULOCYTES NFR BLD: 0.3 %
IMM GRANULOCYTES NFR BLD: 0.4 %
LYMPHOCYTES # BLD AUTO: 0.77 X10(3) UL (ref 1–4)
LYMPHOCYTES # BLD AUTO: 0.97 X10(3) UL (ref 1–4)
LYMPHOCYTES NFR BLD AUTO: 14.6 %
LYMPHOCYTES NFR BLD AUTO: 15.8 %
MAGNESIUM SERPL-MCNC: 2 MG/DL (ref 1.6–2.6)
MCH RBC QN AUTO: 30.9 PG (ref 26–34)
MCH RBC QN AUTO: 31.6 PG (ref 26–34)
MCHC RBC AUTO-ENTMCNC: 33 G/DL (ref 31–37)
MCHC RBC AUTO-ENTMCNC: 34.3 G/DL (ref 31–37)
MCV RBC AUTO: 92.1 FL
MCV RBC AUTO: 93.7 FL
MONOCYTES # BLD AUTO: 0.63 X10(3) UL (ref 0.1–1)
MONOCYTES # BLD AUTO: 0.82 X10(3) UL (ref 0.1–1)
MONOCYTES NFR BLD AUTO: 11.9 %
MONOCYTES NFR BLD AUTO: 13.4 %
NEUTROPHILS # BLD AUTO: 3.76 X10 (3) UL (ref 1.5–7.7)
NEUTROPHILS # BLD AUTO: 3.76 X10(3) UL (ref 1.5–7.7)
NEUTROPHILS # BLD AUTO: 4.23 X10 (3) UL (ref 1.5–7.7)
NEUTROPHILS # BLD AUTO: 4.23 X10(3) UL (ref 1.5–7.7)
NEUTROPHILS NFR BLD AUTO: 69 %
NEUTROPHILS NFR BLD AUTO: 71.1 %
OSMOLALITY SERPL CALC.SUM OF ELEC: 287 MOSM/KG (ref 275–295)
PLATELET # BLD AUTO: 161 10(3)UL (ref 150–450)
PLATELET # BLD AUTO: 231 10(3)UL (ref 150–450)
POTASSIUM SERPL-SCNC: 3.6 MMOL/L (ref 3.5–5.1)
RBC # BLD AUTO: 1.9 X10(6)UL
RBC # BLD AUTO: 2.07 X10(6)UL
SODIUM SERPL-SCNC: 139 MMOL/L (ref 136–145)
WBC # BLD AUTO: 5.3 X10(3) UL (ref 4–11)
WBC # BLD AUTO: 6.1 X10(3) UL (ref 4–11)

## 2024-03-21 PROCEDURE — 0DJD8ZZ INSPECTION OF LOWER INTESTINAL TRACT, VIA NATURAL OR ARTIFICIAL OPENING ENDOSCOPIC: ICD-10-PCS | Performed by: STUDENT IN AN ORGANIZED HEALTH CARE EDUCATION/TRAINING PROGRAM

## 2024-03-21 PROCEDURE — 99233 SBSQ HOSP IP/OBS HIGH 50: CPT | Performed by: HOSPITALIST

## 2024-03-21 PROCEDURE — 30233N1 TRANSFUSION OF NONAUTOLOGOUS RED BLOOD CELLS INTO PERIPHERAL VEIN, PERCUTANEOUS APPROACH: ICD-10-PCS | Performed by: STUDENT IN AN ORGANIZED HEALTH CARE EDUCATION/TRAINING PROGRAM

## 2024-03-21 PROCEDURE — 74174 CTA ABD&PLVS W/CONTRAST: CPT | Performed by: NURSE PRACTITIONER

## 2024-03-21 RX ORDER — HYDROMORPHONE HYDROCHLORIDE 1 MG/ML
0.6 INJECTION, SOLUTION INTRAMUSCULAR; INTRAVENOUS; SUBCUTANEOUS EVERY 5 MIN PRN
Status: DISCONTINUED | OUTPATIENT
Start: 2024-03-21 | End: 2024-03-21 | Stop reason: HOSPADM

## 2024-03-21 RX ORDER — SODIUM CHLORIDE, SODIUM LACTATE, POTASSIUM CHLORIDE, CALCIUM CHLORIDE 600; 310; 30; 20 MG/100ML; MG/100ML; MG/100ML; MG/100ML
INJECTION, SOLUTION INTRAVENOUS CONTINUOUS PRN
Status: DISCONTINUED | OUTPATIENT
Start: 2024-03-21 | End: 2024-03-21 | Stop reason: SURG

## 2024-03-21 RX ORDER — NALOXONE HYDROCHLORIDE 0.4 MG/ML
0.08 INJECTION, SOLUTION INTRAMUSCULAR; INTRAVENOUS; SUBCUTANEOUS AS NEEDED
Status: DISCONTINUED | OUTPATIENT
Start: 2024-03-21 | End: 2024-03-21 | Stop reason: HOSPADM

## 2024-03-21 RX ORDER — LIDOCAINE HYDROCHLORIDE 10 MG/ML
INJECTION, SOLUTION EPIDURAL; INFILTRATION; INTRACAUDAL; PERINEURAL AS NEEDED
Status: DISCONTINUED | OUTPATIENT
Start: 2024-03-21 | End: 2024-03-21 | Stop reason: SURG

## 2024-03-21 RX ORDER — HYDROMORPHONE HYDROCHLORIDE 1 MG/ML
0.2 INJECTION, SOLUTION INTRAMUSCULAR; INTRAVENOUS; SUBCUTANEOUS EVERY 5 MIN PRN
Status: DISCONTINUED | OUTPATIENT
Start: 2024-03-21 | End: 2024-03-21 | Stop reason: HOSPADM

## 2024-03-21 RX ORDER — PHENYLEPHRINE HCL 10 MG/ML
VIAL (ML) INJECTION AS NEEDED
Status: DISCONTINUED | OUTPATIENT
Start: 2024-03-21 | End: 2024-03-21 | Stop reason: SURG

## 2024-03-21 RX ORDER — HYDROMORPHONE HYDROCHLORIDE 1 MG/ML
0.4 INJECTION, SOLUTION INTRAMUSCULAR; INTRAVENOUS; SUBCUTANEOUS EVERY 5 MIN PRN
Status: DISCONTINUED | OUTPATIENT
Start: 2024-03-21 | End: 2024-03-21 | Stop reason: HOSPADM

## 2024-03-21 RX ORDER — MULTIPLE VITAMINS W/ MINERALS TAB 9MG-400MCG
1 TAB ORAL DAILY
Status: DISCONTINUED | OUTPATIENT
Start: 2024-03-22 | End: 2024-03-28

## 2024-03-21 RX ORDER — SODIUM CHLORIDE, SODIUM LACTATE, POTASSIUM CHLORIDE, CALCIUM CHLORIDE 600; 310; 30; 20 MG/100ML; MG/100ML; MG/100ML; MG/100ML
INJECTION, SOLUTION INTRAVENOUS CONTINUOUS
Status: DISCONTINUED | OUTPATIENT
Start: 2024-03-21 | End: 2024-03-22

## 2024-03-21 RX ORDER — SODIUM CHLORIDE 9 MG/ML
INJECTION, SOLUTION INTRAVENOUS ONCE
Status: COMPLETED | OUTPATIENT
Start: 2024-03-21 | End: 2024-03-21

## 2024-03-21 RX ADMIN — PHENYLEPHRINE HCL 100 MCG: 10 MG/ML VIAL (ML) INJECTION at 13:40:00

## 2024-03-21 RX ADMIN — LIDOCAINE HYDROCHLORIDE 25 MG: 10 INJECTION, SOLUTION EPIDURAL; INFILTRATION; INTRACAUDAL; PERINEURAL at 13:27:00

## 2024-03-21 RX ADMIN — PHENYLEPHRINE HCL 100 MCG: 10 MG/ML VIAL (ML) INJECTION at 13:45:00

## 2024-03-21 RX ADMIN — SODIUM CHLORIDE, SODIUM LACTATE, POTASSIUM CHLORIDE, CALCIUM CHLORIDE: 600; 310; 30; 20 INJECTION, SOLUTION INTRAVENOUS at 13:19:00

## 2024-03-21 NOTE — PLAN OF CARE
Patient alert and oriented x4. On RA.  NSR on tele.  Stools clear this am per noc shift.  Hgb 6.4.  per tech brief has some blood clots, GI notified.  1unit PRBCs ordered.  Consent signed.  Plan for colonoscopy at 1300.  Resting comfortably in bed.    1100:  transfusing 1 unit PRBC.  No adverse reaction noted.    1500:  per tech, bleeding noted, post colonoscopy. Family requesting md paged.  Stat CT.    1600:  repeat hgb 8.3

## 2024-03-21 NOTE — CDS QUERY
DOCUMENTATION CLARIFICATION FORM    Dr. Dangelo :  Can you please further clarify in the medical record the diagnosis associated with a BMI of 17.97:  (  x ) Underweight    (   ) Other condition (please specify):  (   ) None of the above / Not applicable    Documentation from the Medical Record:     118/60  Pulse 65  Temp 97.7 °F (36.5 °C) (Temporal)  Resp 20  Wt 92 lb (41.7 kg)  SpO2 99%      BMI 17.97 kg/m    H&P: p/w lower gi bleeding - colonoscopy in am.  paraesophageal hiatal hernia and chronic gastroesophageal reflux disease s/p robotic repair and fundoplication          For questions regarding this query, please contact Clinical :  Ynes Solis RN, CDS @ [employer] [phone] or email: [email or web address]  Thank you    THIS FORM IS A PERMANENT PART OF THE MEDICAL RECORD

## 2024-03-21 NOTE — OPERATIVE REPORT
Colonoscopy Operative Report  Patient Name: Bibi Diaz  YOB: 1945  MRN: [id number]  Procedure: Colonoscopy  Date of Service: 3/21/2024  Pre-operative Diagnosis & Indication: Hematochezia, anemia  Post-operative Diagnosis:   Old blood and blood clots noted in the colon predominantly on the left side with some reflux old blood back to the cecum.  No active bleeding noted  Left-sided diverticulosis.  Suspect this is the source of bleeding  Hemorrhoids  Attending Endoscopist: Delon Ashford D.O.  Informed Consent: The planned procedure(s), the explanation of the procedure, its expected benefits, the potential complications and risks and possible alternatives and their benefits and risks were discussed with the patient or the patient's surrogate. The discussion of risks, not limited to but including bleeding, infection, perforation, adverse effects from anesthesia, need for emergency surgery, medication effects, cardiac arrhythmias, missed polyps, and aspiration and death, were discussed with patient.  Pt and/or surrogate understood the proposed procedure(s), its risks, benefits and alternatives and wish to proceed with procedure(s). All questions answered in full.  After all questions were answered to their satisfaction, a signed, informed, and witnessed consent was obtained.  A TIME OUT WAS COMPLETED prior to the procedure to confirm the patient, procedure and complete endoscopy safety procedure.   Sedation: Monitored Anesthesia Care; ASA class per anesthesiology team   Monitoring: Pulsoximetry, pulse, respirations, and blood pressure, vitals were monitored throughout the entire procedure under monitored anesthesia care.   Preparation Quality:  [place] Bowel Prep Score: 4  [Right 2/ Transverse 2/ Left 1 ]   Procedure: After achieving adequate sedation, and placing the patient in the left lateral decubitus position, a digital rectal examination was performed.  The lubricated tip of the adult  colonoscope was then introduced into the rectum and advanced to the cecum.  The appendiceal orifice and ileocecal valve were clearly and distinctly visualized, thus verifying the cecum.  The terminal ileum was intubated.  The endoscope was then carefully withdrawn from the patient with careful visualization of the colonic mucosa to the best of my ability, with bowel preparation quality as noted above.  Air was suctioned to the best of my ability, during withdrawal of the endoscope.  When the endoscope reached the rectum, it was placed in a retroflexed position, and the rectal bulb was thus visualized.  The endoscope was righted, and air was suctioned from the colon to the best of my ability, as it was during withdrawn from the colon.  The endoscope was then removed from the patient.  The patient tolerated the procedure without apparent procedural complications.  The patient left the procedure room in stable condition for recovery.  Toleration: Patient tolerated procedure well  Complications: No immediate complications  Technical Difficulty: The procedure was not technically difficult   Estimated Blood Loss: Minimal, less than 5mL of estimated blood loss.   Findings and Therapeutics:  Terminal Ileum: The entire examined ileum was normal.  No active bleeding or significant old blood was noted in the ileum.  Colon: Old blood and blood clots noted in the colon predominantly on the left side with some reflux old blood back to the cecum.  No active bleeding noted. There were no signs of polyps or masses, although exam is limited due to bleeding. No signs of inflammation, ulceration or erosions. There was severe diverticulosis noted throughout the left colon and suspect this was the source of her bleed.   Rectum: There were moderate sized, internal hemorrhoids seen on retroflexion.   Recommendations:   Post Colonoscopy/polypectomy precautions, watch for bleeding, infection, perforation, adverse drug reactions.   Clear  liquid diet  Continue to monitor for signs of overt bleeding.  If any further bleeding occurs recommend stat CT angiogram of the abdomen pelvis and possible IR consult for angiogram given suspected source of left-sided diverticular bleed.  If recurrent bleeding and no source is found over the next several days then may need to consider surgical consultation.  Continue to monitor hemoglobin and transfuse for hemoglobin less than 7.    Delon Ashford DO  3/21/2024  2:03 PM

## 2024-03-21 NOTE — PROGRESS NOTES
CCM reviewed chart - patient currently admitted.     CCM will follow up with patient upon discharge.         Medical record reviewed including recent office visits and test results.    Chart review - 3 min

## 2024-03-21 NOTE — ANESTHESIA PREPROCEDURE EVALUATION
PRE-OP EVALUATION    Patient Name: Bibi Diaz    Admit Diagnosis: Gastrointestinal hemorrhage, unspecified gastrointestinal hemorrhage type [K92.2]    Pre-op Diagnosis: GI Bleed    COLONOSCOPY    Anesthesia Procedure: COLONOSCOPY    Surgeon(s) and Role:     * Delon Ashford,  - Jose    Pre-op vitals reviewed.  Temp: 97.8 °F (36.6 °C)  Pulse: 74  Resp: 14  BP: 95/55  SpO2: 99 %  Body mass index is 17.97 kg/m².    Current medications reviewed.  Hospital Medications:  • [COMPLETED] sodium chloride 0.9% infusion   Intravenous Once   • [COMPLETED] pantoprazole (Protonix) 40 mg in sodium chloride 0.9% PF 10 mL IV push  40 mg Intravenous Once   • [COMPLETED] sodium chloride 0.9 % IV bolus 1,000 mL  1,000 mL Intravenous Once   • [COMPLETED] acetaminophen (Tylenol Extra Strength) tab 1,000 mg  1,000 mg Oral Once   • [COMPLETED] ondansetron (Zofran) 4 MG/2ML injection 4 mg  4 mg Intravenous Once   • [COMPLETED] clonazePAM (KlonoPIN) tab 0.5 mg  0.5 mg Oral Once   • acetaminophen (Tylenol Extra Strength) tab 500 mg  500 mg Oral Q6H PRN   • clonazePAM (KlonoPIN) tab 0.5 mg  0.5 mg Oral BID PRN   • losartan (Cozaar) tab 50 mg  50 mg Oral QAM   • mirtazapine (Remeron) tab 30 mg  30 mg Oral Nightly   • fluticasone furoate-vilanterol (Breo Ellipta) 100-25 MCG/ACT inhaler 1 puff  1 puff Inhalation Daily   • ondansetron (Zofran) 4 MG/2ML injection 4 mg  4 mg Intravenous Q6H PRN   • sodium chloride 0.9% infusion   Intravenous Continuous   • acetaminophen (Tylenol Extra Strength) tab 500 mg  500 mg Oral Q4H PRN   • metoclopramide (Reglan) 5 mg/mL injection 5 mg  5 mg Intravenous Q8H PRN   • polyethylene glycol (PEG 3350) (Miralax) 17 g oral packet 17 g  17 g Oral Daily PRN   • sennosides (Senokot) tab 17.2 mg  17.2 mg Oral Nightly PRN   • bisacodyl (Dulcolax) 10 MG rectal suppository 10 mg  10 mg Rectal Daily PRN   • fleet enema (Fleet) 7-19 GM/118ML rectal enema 133 mL  1 enema Rectal Once PRN   • pantoprazole  (Protonix) 40 mg in sodium chloride 0.9% PF 10 mL IV push  40 mg Intravenous Q12H   • [COMPLETED] polyethylene glycol-electrolyte (Golytely) 236 g oral solution 4,000 mL  4,000 mL Oral Once   • [COMPLETED] sodium chloride 0.9 % IV bolus 500 mL  500 mL Intravenous Once   • sodium chloride 0.9% infusion   Intravenous Once       Outpatient Medications:     Medications Prior to Admission   Medication Sig Dispense Refill Last Dose   • [] ondansetron 4 MG Oral Tablet Dispersible Take 1 tablet (4 mg total) by mouth every 4 (four) hours as needed for Nausea. 10 tablet 0 3/20/2024   • clonazePAM 0.5 MG Oral Tab Take 1 tablet (0.5 mg total) by mouth 2 (two) times daily as needed. 60 tablet 0 3/20/2024   • meclizine 12.5 MG Oral Tab Take 1 tablet (12.5 mg total) by mouth 3 (three) times daily as needed. 30 tablet 0 Past Week   • losartan 50 MG Oral Tab Take 1 tablet (50 mg total) by mouth every morning. 90 tablet 0 3/19/2024   • HYDROcodone-acetaminophen (NORCO) 5-325 MG Oral Tab Take 1 tablet by mouth every 8 (eight) hours as needed for Pain. 20 tablet 0 Past Month   • mirtazapine 30 MG Oral Tab Take 1 tablet (30 mg total) by mouth nightly. 90 tablet 1 3/19/2024   • Cholecalciferol (VITAMIN D-3 OR) Take by mouth.   3/19/2024   • polyethylene glycol, PEG 3350, 17 g Oral Powd Pack Take 17 g by mouth daily.   Past Week   • DUPIXENT 300 MG/2ML Subcutaneous Solution Prefilled Syringe    3/19/2024   • SYMBICORT 160-4.5 MCG/ACT Inhalation Aerosol INHALE 2 PUFFS BY MOUTH TWICE DAILY 10.2 g 11 3/20/2024   • clobetasol 0.05 % External Ointment    Past Week   • TRIAMCINOLONE 0.1 % External Cream APPLY TOPICALLY TO THE AFFECTED AREA TWICE DAILY AS NEEDED (Patient taking differently: as needed.) 60 g 3 Past Month   • acetaminophen 500 MG Oral Tab Take 1 tablet (500 mg total) by mouth every 6 (six) hours as needed for Pain.   3/20/2024   • Albuterol Sulfate HFA (PROAIR HFA) 108 (90 Base) MCG/ACT Inhalation Aero Soln INHALE 2 PUFFS  BY MOUTH EVERY 6 HOURS AS NEEDED FOR WHEEZING 8.5 g 2        Allergies: Avelox [moxifloxacin hcl in nacl], Avelox [moxifloxacin hydrochloride], Amoxicillin, Statins, Sulfa antibiotics, Dander, Diphenhydramine, Dust, Latex, and Sulfa drugs cross reactors      Anesthesia Evaluation    Patient summary reviewed.    Anesthetic Complications           GI/Hepatic/Renal      (+) GERD                           Cardiovascular                  (+) hypertension                                     Endo/Other                                  Pulmonary      (+) asthma  (+) COPD                   Neuro/Psych      (+) depression           (+) neuromuscular disease                   Past Surgical History:   Procedure Laterality Date   • CATARACT     • COLONOSCOPY     • COLONOSCOPY & POLYPECTOMY  09/2014    multiple polyps; tics; repeat 3 yrs   • COLONOSCOPY,REMV LESN,SNARE N/A 09/22/2014    Procedure: ESOPHAGOGASTRODUODENOSCOPY, COLONOSCOPY, POSSIBLE BIOPSY, POSSIBLE POLYPECTOMY [id number];  Surgeon: Slade Ivan MD;  Location: [facility]   • CORRECT BUNION,SIMPLE Bilateral    • CYSTOTOMY,EXCIS VESICAL NECK Left    • EGD     • GASTRO - DMG  09/2014    stricture; HH   • OOPHORECTOMY Bilateral 2017   • OTHER SURGICAL HISTORY  02/27/2023    Robotic repair of hiatal hernia and Nissen fundoplication   • PATIENT DOCUMENTED NOT TO HAVE EXPERIENCED ANY OF THE FOLLOWING EVENTS N/A 09/22/2014    Procedure: ESOPHAGOGASTRODUODENOSCOPY, COLONOSCOPY, POSSIBLE BIOPSY, POSSIBLE POLYPECTOMY [id number];  Surgeon: Slade Ivan MD;  Location: [facility]   • PATIENT WITHOUGH PREOPERATIVE ORDER FOR IV ANTIBIOTIC SURGICAL SITE INFECTION PROPHYLAXIS. N/A 09/22/2014    Procedure: ESOPHAGOGASTRODUODENOSCOPY, COLONOSCOPY, POSSIBLE BIOPSY, POSSIBLE POLYPECTOMY [id number];  Surgeon: Slade Ivan MD;  Location: [facility]   • REPAIR ING HERNIA,5+Y/O,REDUCIBL     • UPPER GI ENDOSCOPY,DIAGNOSIS N/A 09/22/2014    Procedure:  ESOPHAGOGASTRODUODENOSCOPY, COLONOSCOPY, POSSIBLE BIOPSY, POSSIBLE POLYPECTOMY [id number];  Surgeon: Slade Ivan MD;  Location: [facility]     Social History     Socioeconomic History   • Marital status:    Tobacco Use   • Smoking status: Former     Years: 10     Types: Cigarettes     Quit date: 1980     Years since quittin.5   • Smokeless tobacco: Never   • Tobacco comments:      1/2 pack per day. Social history shows \"Tobacco Use: Active\" and \"Never Smoker: Active\"   Vaping Use   • Vaping Use: Never used   Substance and Sexual Activity   • Alcohol use: No   • Drug use: No   Other Topics Concern   • Caffeine Concern Yes     Comment: \"1 cup of coffee and tea, and can of pop daily\"   • Exercise Yes     Comment: walking     History   Drug Use No     Available pre-op labs reviewed.  Lab Results   Component Value Date    WBC 5.3 2024    RBC 2.07 (L) 2024    HGB 6.4 (LL) 2024    HGB 6.4 (LL) 2024    HCT 19.4 (L) 2024    MCV 93.7 2024    MCH 30.9 2024    MCHC 33.0 2024    RDW 12.1 2024    .0 2024     Lab Results   Component Value Date     2024    K 3.6 2024     (H) 2024    CO2 22.0 2024    BUN 9 2024    CREATSERUM 0.60 2024    GLU 96 2024    CA 8.3 (L) 2024     Lab Results   Component Value Date    INR 1.05 2024         Airway      Mallampati: I  Mouth opening: >3 FB  TM distance: > 6 cm  Neck ROM: full Cardiovascular    Cardiovascular exam normal.  Rhythm: regular  Rate: normal     Dental             Pulmonary    Pulmonary exam normal.                 Other findings        ASA: 3   Plan: MAC  NPO status verified and patient meets guidelines.          Plan/risks discussed with: patient            Present on Admission:  **None**

## 2024-03-21 NOTE — ANESTHESIA POSTPROCEDURE EVALUATION
[facility] Patient Status:  Inpatient   Age/Gender 78 year old female MRN [id number]   Location [facility] Attending Marcela Dangelo MD   Hosp Day # 1 PCP Rick Shannon DO       Anesthesia Post-op Note    COLONOSCOPY    Procedure Summary       Date: 03/21/24 Room / Location:  ENDOSCOPY 03 / [facility]    Anesthesia Start: 1322 Anesthesia Stop: 1403    Procedure: COLONOSCOPY Diagnosis: (diverticulosis, hemorrhoids, old blood throughtout colon)    Surgeons: Delon Ashford DO Anesthesiologist: Claude Rojas MD    Anesthesia Type: MAC ASA Status: 3            Anesthesia Type: MAC    Vitals Value Taken Time   BP 97/29 03/21/24 1403   Temp 97.2 °F (36.2 °C) 03/21/24 1403   Pulse 66 03/21/24 1404   Resp 14 03/21/24 1404   SpO2 100 % 03/21/24 1404   Vitals shown include unfiled device data.    Patient Location: Endoscopy    Anesthesia Type: MAC    Airway Patency: patent    Postop Pain Control: adequate    Mental Status: mildly sedated but able to meaningfully participate in the post-anesthesia evaluation    Nausea/Vomiting: none    Cardiopulmonary/Hydration status: stable euvolemic    Complications: no apparent anesthesia related complications    Postop vital signs: stable    Dental Exam: Unchanged from Preop

## 2024-03-21 NOTE — PROGRESS NOTES
[facility]   part of [facility]     Hospitalist Progress Note     Bibi Diaz Patient Status:  Inpatient    1945 MRN [id number]   Location [facility] 3NE-A Attending Marcela Dangelo MD   Hosp Day # 1 PCP Rick Shannon DO     Chief Complaint: rectal bleeding    Subjective:     Patient had another episode of rectal bleeding today    Objective:    Review of Systems:   A comprehensive review of systems was completed; pertinent positive and negatives stated in subjective.    Vital signs:  Temp:  [97.1 °F (36.2 °C)-98.6 °F (37 °C)] 98.6 °F (37 °C)  Pulse:  [57-72] 71  Resp:  [14-20] 14  BP: ()/(47-65) 117/65  SpO2:  [97 %-100 %] 99 %    Physical Exam:    General: No acute distress  Respiratory: No wheezes, no rhonchi  Cardiovascular: S1, S2, regular rate and rhythm  Abdomen: Soft, Non-tender, non-distended, positive bowel sounds  Neuro: No new focal deficits.   Extremities: No edema      Diagnostic Data:    Labs:  Recent Labs   Lab 24  0631 24  1420 24  2141 24  0019 24  0920   WBC 6.0 6.7  --   --  5.3   HGB 11.3* 10.1* 9.0* 8.1* 6.4*  6.4*   MCV 90.2 90.7  --   --  93.7   .0 314.0  --   --  231.0   INR 1.05  --   --   --   --        Recent Labs   Lab 24  0631 24  0920   * 96   BUN 8* 9   CREATSERUM 0.80 0.60   CA 9.7 8.3*   ALB 3.8  --    * 139   K 4.0 3.6    114*   CO2 23.0 22.0   ALKPHO 78  --    AST 21  --    ALT 16  --    BILT 0.4  --    TP 6.7  --        Estimated Creatinine Clearance: 50 mL/min (based on SCr of 0.6 mg/dL).    No results for input(s): \"TROP\", \"TROPHS\", \"CK\" in the last 168 hours.    Recent Labs   Lab 24  0631   PTP 13.7   INR 1.05                  Microbiology    No results found for this visit on 24.      Imaging: Reviewed in Epic.    Medications:    sodium chloride   Intravenous Once    losartan  50 mg Oral QAM    mirtazapine  30 mg Oral Nightly    fluticasone furoate-vilanterol  1 puff  Inhalation Daily    pantoprazole  40 mg Intravenous Q12H    sodium chloride   Intravenous Once       Assessment & Plan:      # 78 years old female hypertension hyperlipidemia COPD presents with lower GI bleeding  -Colonoscopy today to assess for sources of bleeding  -anemia hb down to 6.4  -transfuse     # Paraesophageal hiatal hernia and chronic gastroesophageal reflux disease status post robotic repair and fundoplication with general anesthesia February 27, 2023     # History of hypothyroidism anxiety and depression    #HTN    #COPD         Marcela Dangelo MD    Supplementary Documentation:     Quality:  DVT Mechanical Prophylaxis:   SCDs, Early ambuation  DVT Pharmacologic Prophylaxis   Medication   None                Code Status: Full Code  Flood: No urinary catheter in place  Flood Duration (in days):   Central line:    WOLF:     Discharge is dependent on: progress  At this point Ms. Diaz is expected to be discharge to: home    The 21st Century Cures Act makes medical notes like these available to patients in the interest of transparency. Please be advised this is a medical document. Medical documents are intended to carry relevant information, facts as evident, and the clinical opinion of the practitioner. The medical note is intended as peer to peer communication and may appear blunt or direct. It is written in medical language and may contain abbreviations or verbiage that are unfamiliar.

## 2024-03-21 NOTE — DIETARY NOTE
[facility]   [facility]    NUTRITION ASSESSMENT    Pt does not meet malnutrition criteria at this time.      NUTRITION INTERVENTION:    Meal and Snacks - Monitor and encourage adequate PO intake.   Medical Food Supplements - Will evaluate need when diet is advanced. Rationale/use for oral supplements discussed.  Vitamin and Mineral Supplements - adding Multivitamin with minerals  Coordination of Nutrition Care - GI/Surgery consult for nutrition support/diet advancement       PATIENT STATUS: 03/21/24 78 year old female admitted w/ Gastrointestinal hemorrhage. PMH includes hypertension hyperlipidemia COPD chronic kidney disease 3 anxiety depression anemia and she is s/p hiatal hernia repair in February 2023 with large paraesophageal hernia and gastroparesis. Pt seen for low BMI of 17.97. Pt NPO at this time, plans for colonoscopy today. Pt reports fair appetite PTA. Was following full liquid diet for the past few months. No n/v/d. Last BM 3/21. Wt stable per EMR. Noted moderate muscle depletion Clavicle region. No questions at this time. Will continue to monitor.       ANTHROPOMETRICS:  Ht:  152.4 cm   Wt: 41.7 kg (92 lb).   BMI: Body mass index is 17.97 kg/m².  IBW: 45.5 kg      WEIGHT HISTORY:   Weight loss: No    Wt Readings from Last 10 Encounters:   03/20/24 41.7 kg (92 lb)   03/14/24 42 kg (92 lb 9.6 oz)   03/13/24 44 kg (97 lb)   03/06/24 41.7 kg (92 lb)   03/04/24 42.5 kg (93 lb 12.8 oz)   02/27/24 41.7 kg (92 lb)   02/19/24 42.1 kg (92 lb 12.8 oz)   02/16/24 42.4 kg (93 lb 6.4 oz)   01/22/24 41.3 kg (91 lb)   01/09/24 41.6 kg (91 lb 12.8 oz)        NUTRITION:  Diet:       Procedures    NPO      Food Allergies: No  Cultural/Ethnic/Episcopalian Preferences Addressed: Yes    Percent Meals Eaten (last 3 days)       None            GI system review:  rectal bleeding  Last BM: 3/21  Skin and wounds: WNL    NUTRITION RELATED PHYSICAL FINDINGS:     1. Body Fat/Muscle Mass: moderate muscle depletion  Clavicle region     2. Fluid Accumulation: none per RN documentation     NUTRITION PRESCRIPTION: 41.7 kg  Calories: 5821-4399 calories/day (35-40 kcal/kg)  Protein: 63-83 grams protein/day (1.5-2.0 grams protein per kg)  Fluid: ~1 ml/kcal or per MD discretion    NUTRITION DIAGNOSIS/PROBLEM:  Inadequate energy intake related to physiological causes as evidenced by documented/reported insufficient oral intake, loss of fat mass, loss of muscle mass, and low BMI      MONITOR AND EVALUATE/NUTRITION GOALS:  Return to PO intake or advance diet in 24-48 hrs - New  Start alternative nutrition in 24-48 hrs if diet is not able to advance- New      MEDICATIONS:  Protonix, NS @75ml/hr    LABS:  Reviewed    Pt is at High nutrition risk      Alfredo Iqbal  Dietetic intern

## 2024-03-21 NOTE — PROGRESS NOTES
A+Ox4, anxious  RA  Tele SR  IV NS infusing at 75ml/hr  Previous stool clear. Last stool bloody.  Earlier in shift patient became hypotensive, SBP 70s.  500cc bolus given x 1.  Trending hgb. Last 8.1.  No c/o nausea/vomiting  NPO for colonoscopy today

## 2024-03-22 ENCOUNTER — APPOINTMENT (OUTPATIENT)
Dept: CT IMAGING | Facility: HOSPITAL | Age: 79
End: 2024-03-22
Attending: STUDENT IN AN ORGANIZED HEALTH CARE EDUCATION/TRAINING PROGRAM
Payer: MEDICARE

## 2024-03-22 LAB
BLOOD TYPE BARCODE: 5100
HGB BLD-MCNC: 6.6 G/DL
HGB BLD-MCNC: 7 G/DL
HGB BLD-MCNC: 7.1 G/DL
HGB BLD-MCNC: 7.2 G/DL
POTASSIUM SERPL-SCNC: 3.8 MMOL/L (ref 3.5–5.1)
UNIT VOLUME: 350 ML

## 2024-03-22 PROCEDURE — 99233 SBSQ HOSP IP/OBS HIGH 50: CPT | Performed by: HOSPITALIST

## 2024-03-22 PROCEDURE — 74174 CTA ABD&PLVS W/CONTRAST: CPT | Performed by: STUDENT IN AN ORGANIZED HEALTH CARE EDUCATION/TRAINING PROGRAM

## 2024-03-22 RX ORDER — SODIUM CHLORIDE 9 MG/ML
INJECTION, SOLUTION INTRAVENOUS ONCE
Status: DISCONTINUED | OUTPATIENT
Start: 2024-03-22 | End: 2024-03-28

## 2024-03-22 RX ORDER — POTASSIUM CHLORIDE 14.9 MG/ML
20 INJECTION INTRAVENOUS ONCE
Status: COMPLETED | OUTPATIENT
Start: 2024-03-22 | End: 2024-03-22

## 2024-03-22 RX ORDER — CLONAZEPAM 0.5 MG/1
0.5 TABLET ORAL ONCE
Status: COMPLETED | OUTPATIENT
Start: 2024-03-22 | End: 2024-03-22

## 2024-03-22 NOTE — PROGRESS NOTES
[facility]                       Gastroenterology Follow up Note - [facility]    Bibi Diza Patient Status:  Inpatient    1945 MRN [id number]   Location [facility] 3NE-A Attending Marcela Dangelo MD   Hosp Day # 2 PCP Rick Shannon DO     Reason for consultation: Hematochezia, anemia  Subjective: Patient seen and examined.  She continues to pass bloody stools with clots.  Review of Systems:   10 point ROS completed and was negative, except for pertinent positive and negatives stated in subjective.    For PMH, PSH, FHx and SHx- please see initial consult note.     Objective: /90   Pulse 75   Temp 97.9 °F (36.6 °C) (Oral)   Resp 18   Wt 92 lb (41.7 kg)   SpO2 99%   BMI 17.97 kg/m²   Gen: AAO x 3, able to speak in complete sentences.  Son and granddaughter present in room  Abdomen: Soft, non-tender, non-distended with the presence of bowel sounds; No hepatosplenomegaly; no rebound or guarding; No ascites is clinically apparent; no tympany to percussion  Ext: No peripheral edema or cyanosis  Skin: Warm and dry  Psychiatric: Appropriate mood and congruent affect without obvious depression or anxiety  Labs:   Lab Results   Component Value Date    WBC 6.1 2024    HGB 6.6 2024    HCT 17.5 2024    .0 2024    K 3.8 2024     Recent Labs   Lab 24  0631 24  0920 24  0250   * 96  --    BUN 8* 9  --    CREATSERUM 0.80 0.60  --    CA 9.7 8.3*  --    * 139  --    K 4.0 3.6 3.8    114*  --    CO2 23.0 22.0  --      Recent Labs   Lab 24  2240 24  0250 24  0738   RBC 1.90*  --   --    HGB 6.0*   < > 6.6*   HCT 17.5*  --   --    MCV 92.1  --   --    MCH 31.6  --   --    MCHC 34.3  --   --    RDW 12.6  --   --    NEPRELIM 4.23  --   --    WBC 6.1  --   --    .0  --   --     < > = values in this interval not displayed.       Recent Labs   Lab 24  0631   ALT 16   AST 21        Assessment:  Impression: 78 yr-old female with hx of HTN, dyslipidemia, COPD (no chronic oxygen), CKD, anxiety/depression, anemia, large paraesophageal hernia s/p robotic repair of hiatal hernia with mesh + robotic loose Nissen fundoplication (2/2023; Dr Friedman), and gastroparesis admitted with GI bleeding. Hgb 11.3 from 11.8 last week and a baseline of 11's, normal plt (331), normal INR (1.05), normal BUN/creat.  Hemoglobin has continued to downtrend and was 6.0 last night and has received total of 3 units PRBC since admission.  She continues to have bleeding and colonoscopy March 21, 2024 with old blood noted throughout and suspected diverticular bleed in the left colon.  CT angiogram 4/24/2024 was negative for acute bleed.  Plan:  Clear liquid diet  Repeat stat CTA of the abdomen pelvis today given continued bleeding.  If positive would recommend IR consultation for potential intervention.  If negative then consult surgery in case surgical intervention is needed  Continue to monitor hemoglobin every 8 hours and transfuse for hemoglobin less than 7    Thank you for allowing us to participate in patient's care. Please call us with any questions or concerns.  The GI consult service will continue to follow.     Delon Ashford DO  [facility]  [phone]

## 2024-03-22 NOTE — PROGRESS NOTES
[facility]   part of [facility]     Hospitalist Progress Note     Bibi Diaz Patient Status:  Inpatient    1945 MRN [id number]   Location [facility] 3NE-A Attending Marcela Dangelo MD   Hosp Day # 2 PCP Rick Shannon DO     Chief Complaint: rectal bleeding    Subjective:     Patient denies abd pain rectal bleeding    Objective:    Review of Systems:   A comprehensive review of systems was completed; pertinent positive and negatives stated in subjective.    Vital signs:  Temp:  [97.2 °F (36.2 °C)-98.8 °F (37.1 °C)] 98.2 °F (36.8 °C)  Pulse:  [55-87] 71  Resp:  [14-20] 16  BP: ()/(29-63) 98/56  SpO2:  [95 %-100 %] 97 %    Physical Exam:    General: No acute distress  Respiratory: No wheezes, no rhonchi  Cardiovascular: S1, S2, regular rate and rhythm  Abdomen: Soft, Non-tender, non-distended, positive bowel sounds  Neuro: No new focal deficits.   Extremities: No edema      Diagnostic Data:    Labs:  Recent Labs   Lab 24  0631 24  1420 24  2141 24  0920 24  1601 24  2240 24  0250 24  0738   WBC 6.0 6.7  --  5.3  --  6.1  --   --    HGB 11.3* 10.1*   < > 6.4*  6.4* 8.3* 6.0* 7.2* 6.6*   MCV 90.2 90.7  --  93.7  --  92.1  --   --    .0 314.0  --  231.0  --  161.0  --   --    INR 1.05  --   --   --   --   --   --   --     < > = values in this interval not displayed.       Recent Labs   Lab 24  0631 24  0920 24  0250   * 96  --    BUN 8* 9  --    CREATSERUM 0.80 0.60  --    CA 9.7 8.3*  --    ALB 3.8  --   --    * 139  --    K 4.0 3.6 3.8    114*  --    CO2 23.0 22.0  --    ALKPHO 78  --   --    AST 21  --   --    ALT 16  --   --    BILT 0.4  --   --    TP 6.7  --   --        Estimated Creatinine Clearance: 50 mL/min (based on SCr of 0.6 mg/dL).    No results for input(s): \"TROP\", \"TROPHS\", \"CK\" in the last 168 hours.    Recent Labs   Lab 24  0631   PTP 13.7   INR 1.05                   Microbiology    No results found for this visit on 03/20/24.      Imaging: Reviewed in Epic.    Medications:    potassium chloride  20 mEq Intravenous Once    multivitamin with minerals  1 tablet Oral Daily    losartan  50 mg Oral QAM    mirtazapine  30 mg Oral Nightly    fluticasone furoate-vilanterol  1 puff Inhalation Daily    pantoprazole  40 mg Intravenous Q12H    sodium chloride   Intravenous Once       Assessment & Plan:      # 78 years old female hypertension hyperlipidemia COPD presents with lower GI bleeding  -Colonoscopy today to assess for sources of bleeding  -anemia hb down to 6.4 7.2 and 6.6  -transfuse     # Paraesophageal hiatal hernia and chronic gastroesophageal reflux disease status post robotic repair and fundoplication with general anesthesia February 27, 2023     # History of hypothyroidism anxiety and depression    #HTN    #COPD    Old blood and blood clots noted in the colon predominantly on the left side with some reflux old blood back to the cecum.  No active bleeding noted  Left-sided diverticulosis.  Suspect this is the source of bleeding  Hemorrhoids         Marcela Dangelo MD    Supplementary Documentation:     Quality:  DVT Mechanical Prophylaxis:   SCDs, Early ambuation  DVT Pharmacologic Prophylaxis   Medication   None                Code Status: Full Code  Flood: No urinary catheter in place  Flood Duration (in days):   Central line:    WOLF:     Discharge is dependent on: progress  At this point Ms. Diaz is expected to be discharge to: home    The 21st Century Cures Act makes medical notes like these available to patients in the interest of transparency. Please be advised this is a medical document. Medical documents are intended to carry relevant information, facts as evident, and the clinical opinion of the practitioner. The medical note is intended as peer to peer communication and may appear blunt or direct. It is written in medical language and may contain  abbreviations or verbiage that are unfamiliar.

## 2024-03-22 NOTE — PROGRESS NOTES
A+Ox4  RA  Tele SR. Had 8 seconds of SVT in 140s earlier. Patient asymptomatic. MD aware. No new orders.  Hypotensive, SBP 80s. Order for 500cc NS bolus x 1 and stat CBC.   Hgb 6.0, order for 1 unit PRBC  Stool still bloody with clots x 2  Relayed CT abd results to GI MD. No new orders.  No c/o pain or s/s of distress  Safety/fall precautions in place  Updated patient on POC

## 2024-03-22 NOTE — PLAN OF CARE
Assumed care at 0730  A/O x 4  RA  NSR on tele  Slightly hypotensive, losartan held.   No VTE  Hgb q8  K replaced this AM via IV  CLD  0.9 2 75 ml/hr in L AC PIV  Monitoring for blood BMs / blood clots.   Throat pain - reports feeling dry and scratchy - PRN cepacol given per MAR  All needs met. Pt and pts family updated, will continue with plan of care.     1700: pt now reporting 5/10 LLQ pain. Denies need for pain medication.     Problem: GASTROINTESTINAL - ADULT  Goal: Minimal or absence of nausea and vomiting  Description: INTERVENTIONS:  - Maintain adequate hydration with IV or PO as ordered and tolerated  - Nasogastric tube to low intermittent suction as ordered  - Evaluate effectiveness of ordered antiemetic medications  - Provide nonpharmacologic comfort measures as appropriate  - Advance diet as tolerated, if ordered  - Obtain nutritional consult as needed  - Evaluate fluid balance  Outcome: Progressing  Goal: Maintains or returns to baseline bowel function  Description: INTERVENTIONS:  - Assess bowel function  - Maintain adequate hydration with IV or PO as ordered and tolerated  - Evaluate effectiveness of GI medications  - Encourage mobilization and activity  - Obtain nutritional consult as needed  - Establish a toileting routine/schedule  - Consider collaborating with pharmacy to review patient's medication profile  Outcome: Progressing     Problem: HEMATOLOGIC - ADULT  Goal: Maintains hematologic stability  Description: INTERVENTIONS  - Assess for signs and symptoms of bleeding or hemorrhage  - Monitor labs and vital signs for trends  - Administer supportive blood products/factors, fluids and medications as ordered and appropriate  - Administer supportive blood products/factors as ordered and appropriate  Outcome: Progressing  Goal: Free from bleeding injury  Description: (Example usage: patient with low platelets)  INTERVENTIONS:  - Avoid intramuscular injections, enemas and rectal medication  administration  - Ensure safe mobilization of patient  - Hold pressure on venipuncture sites to achieve adequate hemostasis  - Assess for signs and symptoms of internal bleeding  - Monitor lab trends  - Patient is to report abnormal signs of bleeding to staff  - Avoid use of toothpicks and dental floss  - Use electric shaver for shaving  - Use soft bristle tooth brush  - Limit straining and forceful nose blowing  Outcome: Progressing

## 2024-03-23 LAB
BASOPHILS # BLD AUTO: 0.06 X10(3) UL (ref 0–0.2)
BASOPHILS NFR BLD AUTO: 0.7 %
EOSINOPHIL # BLD AUTO: 0.34 X10(3) UL (ref 0–0.7)
EOSINOPHIL NFR BLD AUTO: 3.7 %
ERYTHROCYTE [DISTWIDTH] IN BLOOD BY AUTOMATED COUNT: 15.4 %
HCT VFR BLD AUTO: 26.3 %
HGB BLD-MCNC: 6.6 G/DL
HGB BLD-MCNC: 8.6 G/DL
HGB BLD-MCNC: 9.1 G/DL
IMM GRANULOCYTES # BLD AUTO: 0.03 X10(3) UL (ref 0–1)
IMM GRANULOCYTES NFR BLD: 0.3 %
LYMPHOCYTES # BLD AUTO: 0.86 X10(3) UL (ref 1–4)
LYMPHOCYTES NFR BLD AUTO: 9.5 %
MCH RBC QN AUTO: 30.3 PG (ref 26–34)
MCHC RBC AUTO-ENTMCNC: 34.6 G/DL (ref 31–37)
MCV RBC AUTO: 87.7 FL
MONOCYTES # BLD AUTO: 0.82 X10(3) UL (ref 0.1–1)
MONOCYTES NFR BLD AUTO: 9 %
NEUTROPHILS # BLD AUTO: 6.96 X10 (3) UL (ref 1.5–7.7)
NEUTROPHILS # BLD AUTO: 6.96 X10(3) UL (ref 1.5–7.7)
NEUTROPHILS NFR BLD AUTO: 76.8 %
PLATELET # BLD AUTO: 127 10(3)UL (ref 150–450)
POTASSIUM SERPL-SCNC: 4 MMOL/L (ref 3.5–5.1)
RBC # BLD AUTO: 3 X10(6)UL
WBC # BLD AUTO: 9.1 X10(3) UL (ref 4–11)

## 2024-03-23 PROCEDURE — 99233 SBSQ HOSP IP/OBS HIGH 50: CPT | Performed by: HOSPITALIST

## 2024-03-23 RX ORDER — SODIUM CHLORIDE 9 MG/ML
INJECTION, SOLUTION INTRAVENOUS ONCE
Status: COMPLETED | OUTPATIENT
Start: 2024-03-23 | End: 2024-03-23

## 2024-03-23 RX ORDER — MINERAL OIL AND PETROLATUM 150; 830 MG/G; MG/G
OINTMENT OPHTHALMIC 3 TIMES DAILY
Status: DISCONTINUED | OUTPATIENT
Start: 2024-03-23 | End: 2024-03-24

## 2024-03-23 NOTE — PROGRESS NOTES
[facility]   part of [facility]     Hospitalist Progress Note     Bibi Diaz Patient Status:  Inpatient    1945 MRN [id number]   Location [facility] 3NE-A Attending Marcela Dangelo MD   Hosp Day # 3 PCP Rick Shannon DO     Chief Complaint: rectal bleeding    Subjective:     Patient denies abd pain rectal bleeding    Objective:    Review of Systems:   A comprehensive review of systems was completed; pertinent positive and negatives stated in subjective.    Vital signs:  Temp:  [97.8 °F (36.6 °C)-98.7 °F (37.1 °C)] 98.6 °F (37 °C)  Pulse:  [68-88] 72  Resp:  [16-18] 16  BP: (100-119)/(43-90) 109/67  SpO2:  [84 %-100 %] 95 %    Physical Exam:    General: No acute distress  Respiratory: No wheezes, no rhonchi  Cardiovascular: S1, S2, regular rate and rhythm  Abdomen: Soft, Non-tender, non-distended, positive bowel sounds  Neuro: No new focal deficits.   Extremities: No edema      Diagnostic Data:    Labs:  Recent Labs   Lab 24  0631 24  1420 24  2141 24  0920 24  1601 24  2240 24  0250 24  0738 24  1432 24  1857 24  0001 24  0604   WBC 6.0 6.7  --  5.3  --  6.1  --   --   --   --   --  9.1   HGB 11.3* 10.1*   < > 6.4*  6.4*   < > 6.0*   < > 6.6* 7.0* 7.1* 6.6* 9.1*   MCV 90.2 90.7  --  93.7  --  92.1  --   --   --   --   --  87.7   .0 314.0  --  231.0  --  161.0  --   --   --   --   --  127.0*   INR 1.05  --   --   --   --   --   --   --   --   --   --   --     < > = values in this interval not displayed.       Recent Labs   Lab 24  0631 24  0920 24  0250 24  0604   * 96  --   --    BUN 8* 9  --   --    CREATSERUM 0.80 0.60  --   --    CA 9.7 8.3*  --   --    ALB 3.8  --   --   --    * 139  --   --    K 4.0 3.6 3.8 4.0    114*  --   --    CO2 23.0 22.0  --   --    ALKPHO 78  --   --   --    AST 21  --   --   --    ALT 16  --   --   --    BILT 0.4  --   --   --    TP 6.7   --   --   --        Estimated Creatinine Clearance: 50 mL/min (based on SCr of 0.6 mg/dL).    No results for input(s): \"TROP\", \"TROPHS\", \"CK\" in the last 168 hours.    Recent Labs   Lab 03/20/24  0631   PTP 13.7   INR 1.05                  Microbiology    No results found for this visit on 03/20/24.      Imaging: Reviewed in Epic.    Medications:    sodium chloride   Intravenous Once    multivitamin with minerals  1 tablet Oral Daily    losartan  50 mg Oral QAM    mirtazapine  30 mg Oral Nightly    fluticasone furoate-vilanterol  1 puff Inhalation Daily    pantoprazole  40 mg Intravenous Q12H    sodium chloride   Intravenous Once       Assessment & Plan:      # 78 years old female hypertension hyperlipidemia COPD presents with lower GI bleeding  -Colonoscopy today to assess for sources of bleeding  -anemia hb down to 6.4 7.2 and 6.6  -transfuse     # Paraesophageal hiatal hernia and chronic gastroesophageal reflux disease status post robotic repair and fundoplication with general anesthesia February 27, 2023     # History of hypothyroidism anxiety and depression    #HTN    #COPD    Old blood and blood clots noted in the colon predominantly on the left side with some reflux old blood back to the cecum.  No active bleeding noted  Left-sided diverticulosis.  Suspect this is the source of bleeding  Hemorrhoids         Marcela Dangelo MD    Supplementary Documentation:     Quality:  DVT Mechanical Prophylaxis:   SCDs, Early ambuation  DVT Pharmacologic Prophylaxis   Medication   None                Code Status: Full Code  Flood: No urinary catheter in place  Flood Duration (in days):   Central line:    WOLF:     Discharge is dependent on: progress  At this point Ms. Diaz is expected to be discharge to: home    The 21st Century Cures Act makes medical notes like these available to patients in the interest of transparency. Please be advised this is a medical document. Medical documents are intended to carry relevant  information, facts as evident, and the clinical opinion of the practitioner. The medical note is intended as peer to peer communication and may appear blunt or direct. It is written in medical language and may contain abbreviations or verbiage that are unfamiliar.

## 2024-03-23 NOTE — PLAN OF CARE
Pt is aox4. States mild pain, no medication requested.  IV saline locked. Medicated per orders. Pt had 1 bloody BM overnight.  HGb overnight 6.6.  MD notified and orders rcvd for one unit of PRBC.  One unit given.  Afebrile. SB on tele.  Problem: GASTROINTESTINAL - ADULT  Goal: Minimal or absence of nausea and vomiting  Description: INTERVENTIONS:  - Maintain adequate hydration with IV or PO as ordered and tolerated  - Nasogastric tube to low intermittent suction as ordered  - Evaluate effectiveness of ordered antiemetic medications  - Provide nonpharmacologic comfort measures as appropriate  - Advance diet as tolerated, if ordered  - Obtain nutritional consult as needed  - Evaluate fluid balance  Outcome: Progressing  Goal: Maintains or returns to baseline bowel function  Description: INTERVENTIONS:  - Assess bowel function  - Maintain adequate hydration with IV or PO as ordered and tolerated  - Evaluate effectiveness of GI medications  - Encourage mobilization and activity  - Obtain nutritional consult as needed  - Establish a toileting routine/schedule  - Consider collaborating with pharmacy to review patient's medication profile  Outcome: Not Progressing     Problem: HEMATOLOGIC - ADULT  Goal: Maintains hematologic stability  Description: INTERVENTIONS  - Assess for signs and symptoms of bleeding or hemorrhage  - Monitor labs and vital signs for trends  - Administer supportive blood products/factors, fluids and medications as ordered and appropriate  - Administer supportive blood products/factors as ordered and appropriate  Outcome: Not Progressing  Goal: Free from bleeding injury  Description: (Example usage: patient with low platelets)  INTERVENTIONS:  - Avoid intramuscular injections, enemas and rectal medication administration  - Ensure safe mobilization of patient  - Hold pressure on venipuncture sites to achieve adequate hemostasis  - Assess for signs and symptoms of internal bleeding  - Monitor lab  trends  - Patient is to report abnormal signs of bleeding to staff  - Avoid use of toothpicks and dental floss  - Use electric shaver for shaving  - Use soft bristle tooth brush  - Limit straining and forceful nose blowing  Outcome: Progressing

## 2024-03-23 NOTE — PROGRESS NOTES
[facility]  GASTROENTEROLOGY PROGRESS NOTE   [facility]astroenterology     Bibi Diaz Patient Status:  Inpatient    1945 MRN [id number]   Location [facility] 3NE-A Attending Marcela Dangelo MD   Hosp Day # 3 PCP Rick Shannon DO       Reason for Consultation:   Hematochezia   Anemia     Subjective:   Pt feels better, had small volume blood w/ stool this am - less, and no further overt GI bleeding  Denies any n/v or abd pain.     Patient Active Problem List   Diagnosis    Major depressive disorder, recurrent episode, moderate (HCC)    Esophageal reflux    Chronic obstructive pulmonary disease with acute lower respiratory infection (HCC)    Acquired hypothyroidism    Memory deficit    Aortic atherosclerosis (HCC)    Diverticulosis of colon    Dizziness and giddiness    Dysthymic disorder    Lichen sclerosus    Pure hypercholesterolemia    Vitamin D deficiency    Vitiligo    Severe episode of recurrent major depressive disorder, without psychotic features (HCC)    Paraesophageal hernia    Subclinical hypothyroidism    Hypertension    Protein-calorie malnutrition, unspecified severity (HCC)    Gastrointestinal hemorrhage, unspecified gastrointestinal hemorrhage type       PMH, PSH, Fhx, Shx as per initial consult note    Review of Systems    12 point Review of Systems negative unless otherwise mentioned in Subjective.     Physical Exam  /67 (BP Location: Right arm)   Pulse 72   Temp 97.1 °F (36.2 °C)   Resp 16   Wt 91 lb 14.9 oz (41.7 kg)   SpO2 95%   BMI 17.95 kg/m²   Body mass index is 17.95 kg/m².      Gen: No acute distress  Resp: no respiratory distress  Abd: Soft, non-tender, non-distended. No rebound tenderness, no guarding.   Neuro: Aox3.     Diagnostic Data:      Labs:  Recent Labs   Lab 24  0631 24  1420 24  2141 24  0920 24  1601 24  2240 24  0250 24  0738 24  1432 24  1857 24  0001 24  0604   WBC 6.0 6.7   --  5.3  --  6.1  --   --   --   --   --  9.1   HGB 11.3* 10.1*   < > 6.4*  6.4*   < > 6.0*   < > 6.6* 7.0* 7.1* 6.6* 9.1*   MCV 90.2 90.7  --  93.7  --  92.1  --   --   --   --   --  87.7   .0 314.0  --  231.0  --  161.0  --   --   --   --   --  127.0*   INR 1.05  --   --   --   --   --   --   --   --   --   --   --     < > = values in this interval not displayed.       Recent Labs   Lab 03/20/24  0631 03/21/24  0920 03/22/24  0250 03/23/24  0604   * 96  --   --    BUN 8* 9  --   --    CREATSERUM 0.80 0.60  --   --    CA 9.7 8.3*  --   --    ALB 3.8  --   --   --    * 139  --   --    K 4.0 3.6 3.8 4.0    114*  --   --    CO2 23.0 22.0  --   --    ALKPHO 78  --   --   --    AST 21  --   --   --    ALT 16  --   --   --    BILT 0.4  --   --   --    TP 6.7  --   --   --        Recent Labs   Lab 03/20/24  0631   PTP 13.7   INR 1.05            No data recorded        Imaging: Imaging data reviewed in Epic.    Medications:    mineral oil-white petrolatum   Both Eyes TID    sodium chloride   Intravenous Once    multivitamin with minerals  1 tablet Oral Daily    losartan  50 mg Oral QAM    mirtazapine  30 mg Oral Nightly    fluticasone furoate-vilanterol  1 puff Inhalation Daily    pantoprazole  40 mg Intravenous Q12H    sodium chloride   Intravenous Once       Allergies:  Allergies   Allergen Reactions    Avelox [Moxifloxacin Hcl In Nacl] SWELLING    Avelox [Moxifloxacin Hydrochloride] TONGUE SWELLING     \"TABS\"    Amoxicillin NAUSEA AND VOMITING     Vomiting.    Statins Myopathy    Sulfa Antibiotics RASH and ITCHING    Dander OTHER (SEE COMMENTS)     \"Animals\": runny nose, watery eyes, itching    Diphenhydramine Runny nose     \"Animals\": runny nose, watery eyes, itching    Dust Runny nose    Latex RASH    Sulfa Drugs Cross Reactors RASH and ITCHING       Imaging:  I have personally reviewed all pertinent available imaging.       ASSESSMENT / PLAN:     Bibi Diaz is a 78 year old female  with HTN, HLD, COPD, large paraesophageal hernia, GI consult for suspected diverticular bleeding.     Self limited, as bleeding has slowed down, and now Hg 9.1 s/p pRBC transfusion.   Ok to continue clear liquid diet and monitor clinical symptoms.   CTA 3/22 neg for active extravasation       Recommendations:   .Check CBC q12hrs, transfuse pRBC if Hg < 7  If large volume GI bleeding, notify GI service - for CTA vs. Tagged RBC scan vs. Flex sig and timing  Consult general surgery, given prior large volume bleeding  Clear liquid diet      Jin Clark MD  [facility]

## 2024-03-23 NOTE — PLAN OF CARE
Assumed care at 0730  A/O x 4  RA  NSR on tele  VSS  Denies pain  Cardiac EP no needs  Trending hgb q8 - 9.1 this AM.   CLD tolerating well  L AC PIV saline locked  BM with slight blood today, improved from yesterday. Urinating.   No VTE  No nausea today.   All needs met. Pt updated. Will continue with plan of care.     1800: pt now reporting cough accompanied by occasional sore throat relieved with throat lozenge. Productive cough with light yellow sputum noted. MD paged. Order for IS and sputum culture placed.     Problem: GASTROINTESTINAL - ADULT  Goal: Minimal or absence of nausea and vomiting  Description: INTERVENTIONS:  - Maintain adequate hydration with IV or PO as ordered and tolerated  - Nasogastric tube to low intermittent suction as ordered  - Evaluate effectiveness of ordered antiemetic medications  - Provide nonpharmacologic comfort measures as appropriate  - Advance diet as tolerated, if ordered  - Obtain nutritional consult as needed  - Evaluate fluid balance  Outcome: Progressing  Goal: Maintains or returns to baseline bowel function  Description: INTERVENTIONS:  - Assess bowel function  - Maintain adequate hydration with IV or PO as ordered and tolerated  - Evaluate effectiveness of GI medications  - Encourage mobilization and activity  - Obtain nutritional consult as needed  - Establish a toileting routine/schedule  - Consider collaborating with pharmacy to review patient's medication profile  Outcome: Progressing     Problem: HEMATOLOGIC - ADULT  Goal: Maintains hematologic stability  Description: INTERVENTIONS  - Assess for signs and symptoms of bleeding or hemorrhage  - Monitor labs and vital signs for trends  - Administer supportive blood products/factors, fluids and medications as ordered and appropriate  - Administer supportive blood products/factors as ordered and appropriate  Outcome: Progressing  Goal: Free from bleeding injury  Description: (Example usage: patient with low  platelets)  INTERVENTIONS:  - Avoid intramuscular injections, enemas and rectal medication administration  - Ensure safe mobilization of patient  - Hold pressure on venipuncture sites to achieve adequate hemostasis  - Assess for signs and symptoms of internal bleeding  - Monitor lab trends  - Patient is to report abnormal signs of bleeding to staff  - Avoid use of toothpicks and dental floss  - Use electric shaver for shaving  - Use soft bristle tooth brush  - Limit straining and forceful nose blowing  Outcome: Progressing

## 2024-03-24 ENCOUNTER — APPOINTMENT (OUTPATIENT)
Dept: NUCLEAR MEDICINE | Facility: HOSPITAL | Age: 79
End: 2024-03-24
Attending: STUDENT IN AN ORGANIZED HEALTH CARE EDUCATION/TRAINING PROGRAM
Payer: MEDICARE

## 2024-03-24 LAB
BLOOD TYPE BARCODE: 5100
HGB BLD-MCNC: 7.6 G/DL
HGB BLD-MCNC: 8.1 G/DL
HGB BLD-MCNC: 8.3 G/DL
UNIT VOLUME: 350 ML

## 2024-03-24 PROCEDURE — 99233 SBSQ HOSP IP/OBS HIGH 50: CPT | Performed by: HOSPITALIST

## 2024-03-24 PROCEDURE — 78278 ACUTE GI BLOOD LOSS IMAGING: CPT | Performed by: STUDENT IN AN ORGANIZED HEALTH CARE EDUCATION/TRAINING PROGRAM

## 2024-03-24 PROCEDURE — 99222 1ST HOSP IP/OBS MODERATE 55: CPT | Performed by: STUDENT IN AN ORGANIZED HEALTH CARE EDUCATION/TRAINING PROGRAM

## 2024-03-24 NOTE — PROGRESS NOTES
Pt A&Ox4 Room Air  Resting in bed  Denies pain at this time  NSR on Tele  CLD  Monitoring HGB Q8  Bloody Stool x2  Safety precaution maintained  Plan of Care On-going.     0023 Hgb 8.1 Next Draw 0800.

## 2024-03-24 NOTE — PROGRESS NOTES
[facility]  GASTROENTEROLOGY PROGRESS NOTE   [facility]astroenterology     Bibi Diaz Patient Status:  Inpatient    1945 MRN [id number]   Location [facility] 3NE-A Attending Marcela Dangelo MD   Hosp Day # 4 PCP Rick Shannon DO       Reason for Consultation:   Hematochezia   Anemia     Subjective:   Had 2BMs overnight with bleeding. During my evaluation patient denied any abd pain, n/v. She also stated she was passing flatus.   Later today, after evaluation - notified by RN additional bloody BM - tagged RBC ordered.     Patient Active Problem List   Diagnosis    Major depressive disorder, recurrent episode, moderate (HCC)    Esophageal reflux    Chronic obstructive pulmonary disease with acute lower respiratory infection (HCC)    Acquired hypothyroidism    Memory deficit    Aortic atherosclerosis (HCC)    Diverticulosis of colon    Dizziness and giddiness    Dysthymic disorder    Lichen sclerosus    Pure hypercholesterolemia    Vitamin D deficiency    Vitiligo    Severe episode of recurrent major depressive disorder, without psychotic features (HCC)    Paraesophageal hernia    Subclinical hypothyroidism    Hypertension    Protein-calorie malnutrition, unspecified severity (HCC)    Gastrointestinal hemorrhage, unspecified gastrointestinal hemorrhage type       PMH, PSH, Fhx, Shx as per initial consult note    Review of Systems    12 point Review of Systems negative unless otherwise mentioned in Subjective.     Physical Exam  /63 (BP Location: Right arm)   Pulse 61   Temp 98.3 °F (36.8 °C) (Oral)   Resp 18   Wt 91 lb 14.9 oz (41.7 kg)   SpO2 97%   BMI 17.95 kg/m²   Body mass index is 17.95 kg/m².      Gen: No acute distress  Resp: no respiratory distress  Abd: Soft, non-tender, non-distended. No rebound tenderness, no guarding.   Neuro: Aox3.     Diagnostic Data:      Labs:  Recent Labs   Lab 24  0631 24  1420 24  2141 24  0920 24  1601 24  2240  03/22/24  0250 03/23/24  0001 03/23/24  0604 03/23/24  1626 03/23/24  2354 03/24/24  1116   WBC 6.0 6.7  --  5.3  --  6.1  --   --  9.1  --   --   --    HGB 11.3* 10.1*   < > 6.4*  6.4*   < > 6.0*   < > 6.6* 9.1* 8.6* 8.1* 7.6*   MCV 90.2 90.7  --  93.7  --  92.1  --   --  87.7  --   --   --    .0 314.0  --  231.0  --  161.0  --   --  127.0*  --   --   --    INR 1.05  --   --   --   --   --   --   --   --   --   --   --     < > = values in this interval not displayed.       Recent Labs   Lab 03/20/24  0631 03/21/24  0920 03/22/24  0250 03/23/24  0604   * 96  --   --    BUN 8* 9  --   --    CREATSERUM 0.80 0.60  --   --    CA 9.7 8.3*  --   --    ALB 3.8  --   --   --    * 139  --   --    K 4.0 3.6 3.8 4.0    114*  --   --    CO2 23.0 22.0  --   --    ALKPHO 78  --   --   --    AST 21  --   --   --    ALT 16  --   --   --    BILT 0.4  --   --   --    TP 6.7  --   --   --        Recent Labs   Lab 03/20/24  0631   PTP 13.7   INR 1.05            No data recorded        Imaging: Imaging data reviewed in Epic.    Medications:    mineral oil-white petrolatum   Both Eyes TID    sodium chloride   Intravenous Once    multivitamin with minerals  1 tablet Oral Daily    losartan  50 mg Oral QAM    mirtazapine  30 mg Oral Nightly    fluticasone furoate-vilanterol  1 puff Inhalation Daily    pantoprazole  40 mg Intravenous Q12H    sodium chloride   Intravenous Once       Allergies:  Allergies   Allergen Reactions    Avelox [Moxifloxacin Hcl In Nacl] SWELLING    Avelox [Moxifloxacin Hydrochloride] TONGUE SWELLING     \"TABS\"    Amoxicillin NAUSEA AND VOMITING     Vomiting.    Statins Myopathy    Sulfa Antibiotics RASH and ITCHING    Dander OTHER (SEE COMMENTS)     \"Animals\": runny nose, watery eyes, itching    Diphenhydramine Runny nose     \"Animals\": runny nose, watery eyes, itching    Dust Runny nose    Latex RASH    Sulfa Drugs Cross Reactors RASH and ITCHING       Imaging:  I have personally reviewed  all pertinent available imaging.       ASSESSMENT / PLAN:     Bibi Diaz is a 78 year old female with HTN, HLD, COPD, large paraesophageal hernia, GI consult for suspected diverticular bleeding.     Self limited, as bleeding has slowed down, and now Hg 9.1 s/p pRBC transfusion.   Ok to continue clear liquid diet and monitor clinical symptoms.   CTA 3/22 neg for active extravasation   Doing well, overnight bloody Bms, self limited - then after evaluation, had bloody BM.       Recommendations:   Tagged RBC scan now  Pending above, consider IR angio for embolization vs. Flex sig   Check CBC q12hrs, transfuse pRBC if Hg < 7  General surgery consult   Keep NPO for now; IVF, analgesia, anti-emetics per primary team      Jin Clark MD  [facility]

## 2024-03-24 NOTE — PROGRESS NOTES
General surgery progress    General surgery consulted for diverticular bleeding  Patient not seen and examined  Hemoglobin has been stable, 8.5 today  Patient is otherwise hemodynamically stable  Episodes of hematochezia improving and becoming less  No acute surgical invention at this time  I will evaluate patient tomorrow  Full consult note to follow at that time    Ina Rowan MD  [facility]  3/23/2024  7:06 PM

## 2024-03-24 NOTE — PLAN OF CARE
Assumed care at 0730  A/O x 4. Pale. Tearful. Anxious - PRN klonopin given per MAR.   RA  NSR on tele   VSS - losartan given this AM as scheduled.   Pt with 2 large bright red bloody BMs this AM. GI and gen surg updated. NPO now. Plan for GI blood loss study.   Brief in place  Hgb q8  Cardiac EP no needs  Sputum sample collected and sent to lab  Complains of headache and sore throat - PRN tylenol and lozenge given per MAR  L AC PIV saline locked  Using IS at bedside  All needs met. Pt updated. Will continue with plan of care.     1500: pt changed, noted redness to sacral area. Pt states this is new to her, does not normally experience this at home.    Problem: GASTROINTESTINAL - ADULT  Goal: Minimal or absence of nausea and vomiting  Description: INTERVENTIONS:  - Maintain adequate hydration with IV or PO as ordered and tolerated  - Nasogastric tube to low intermittent suction as ordered  - Evaluate effectiveness of ordered antiemetic medications  - Provide nonpharmacologic comfort measures as appropriate  - Advance diet as tolerated, if ordered  - Obtain nutritional consult as needed  - Evaluate fluid balance  Outcome: Progressing  Goal: Maintains or returns to baseline bowel function  Description: INTERVENTIONS:  - Assess bowel function  - Maintain adequate hydration with IV or PO as ordered and tolerated  - Evaluate effectiveness of GI medications  - Encourage mobilization and activity  - Obtain nutritional consult as needed  - Establish a toileting routine/schedule  - Consider collaborating with pharmacy to review patient's medication profile  Outcome: Progressing     Problem: HEMATOLOGIC - ADULT  Goal: Maintains hematologic stability  Description: INTERVENTIONS  - Assess for signs and symptoms of bleeding or hemorrhage  - Monitor labs and vital signs for trends  - Administer supportive blood products/factors, fluids and medications as ordered and appropriate  - Administer supportive blood products/factors as  ordered and appropriate  Outcome: Progressing  Goal: Free from bleeding injury  Description: (Example usage: patient with low platelets)  INTERVENTIONS:  - Avoid intramuscular injections, enemas and rectal medication administration  - Ensure safe mobilization of patient  - Hold pressure on venipuncture sites to achieve adequate hemostasis  - Assess for signs and symptoms of internal bleeding  - Monitor lab trends  - Patient is to report abnormal signs of bleeding to staff  - Avoid use of toothpicks and dental floss  - Use electric shaver for shaving  - Use soft bristle tooth brush  - Limit straining and forceful nose blowing  Outcome: Progressing

## 2024-03-25 LAB
GLUCOSE BLD-MCNC: 102 MG/DL (ref 70–99)
GLUCOSE BLD-MCNC: 107 MG/DL (ref 70–99)
GLUCOSE BLD-MCNC: 92 MG/DL (ref 70–99)
GLUCOSE BLD-MCNC: 94 MG/DL (ref 70–99)
HGB BLD-MCNC: 8.1 G/DL
HGB BLD-MCNC: 8.5 G/DL
HGB BLD-MCNC: 8.6 G/DL

## 2024-03-25 PROCEDURE — 99232 SBSQ HOSP IP/OBS MODERATE 35: CPT | Performed by: HOSPITALIST

## 2024-03-25 PROCEDURE — 99232 SBSQ HOSP IP/OBS MODERATE 35: CPT | Performed by: STUDENT IN AN ORGANIZED HEALTH CARE EDUCATION/TRAINING PROGRAM

## 2024-03-25 NOTE — DIETARY NOTE
[facility]   part of [facility]    NUTRITION ASSESSMENT    Pt does not meet malnutrition criteria at this time.      NUTRITION INTERVENTION:    Meal and Snacks - Monitor and encourage adequate PO intake.   Medical Food Supplements - [place] instant breakfast BID. Rationale/use for oral supplements discussed.  Vitamin and Mineral Supplements - adding Multivitamin with minerals  Coordination of Nutrition Care - GI/Surgery consult for nutrition support/diet advancement       PATIENT STATUS:   3/25 - Pt feels well, just finished some clear liquids and feels hungry. Per GI - ADAT.  Pt notes she normally only follows liquid diet at home for gastroparesis. She has been doing this for about a year.  She notes she has eliminated all fat and fiber with good result. She notes initial wt loss which has leveled off lately.  Offered CIB while in hospital, which she normally takes, and she is agreeable. Pt notes no s/s of EFAD.  Advised MVI, pt agreeable. She has no questions/concerns at this time.    03/21/24 78 year old female admitted w/ Gastrointestinal hemorrhage. PMH includes hypertension hyperlipidemia COPD chronic kidney disease 3 anxiety depression anemia and she is s/p hiatal hernia repair in February 2023 with large paraesophageal hernia and gastroparesis. Pt seen for low BMI of 17.97. Pt NPO at this time, plans for colonoscopy today. Pt reports fair appetite PTA. Was following full liquid diet for the past few months. No n/v/d. Last BM 3/21. Wt stable per EMR. Noted moderate muscle depletion Clavicle region. No questions at this time. Will continue to monitor.       ANTHROPOMETRICS:  Ht:  152.4 cm   Wt: 41.7 kg (91 lb 14.9 oz).   BMI: Body mass index is 17.95 kg/m².  IBW: 45.5 kg      WEIGHT HISTORY:   Weight loss: No    Wt Readings from Last 10 Encounters:   03/23/24 41.7 kg (91 lb 14.9 oz)   03/14/24 42 kg (92 lb 9.6 oz)   03/13/24 44 kg (97 lb)   03/06/24 41.7 kg (92 lb)   03/04/24 42.5 kg (93 lb 12.8  oz)   02/27/24 41.7 kg (92 lb)   02/19/24 42.1 kg (92 lb 12.8 oz)   02/16/24 42.4 kg (93 lb 6.4 oz)   01/22/24 41.3 kg (91 lb)   01/09/24 41.6 kg (91 lb 12.8 oz)        NUTRITION:  Diet:       Procedures    Full liquid diet Is Patient on Accuchecks? No      Food Allergies: No  Cultural/Ethnic/[place] Preferences Addressed: Yes    Percent Meals Eaten (last 3 days)       Date/Time Percent Meals Eaten (%)    03/22/24 0900 100 %    03/23/24 1200 75 %    03/23/24 1800 75 %    03/24/24 1519 0 %            GI system review: WNL Last BM: 3/24  Skin and wounds: WNL    NUTRITION RELATED PHYSICAL FINDINGS:     1. Body Fat/Muscle Mass: moderate muscle depletion Clavicle region     2. Fluid Accumulation: none per RN documentation     NUTRITION PRESCRIPTION: 41.7 kg  Calories: 8354-7460 calories/day (35-40 kcal/kg)  Protein: 63-83 grams protein/day (1.5-2.0 grams protein per kg)  Fluid: ~1 ml/kcal or per MD discretion    NUTRITION DIAGNOSIS/PROBLEM:  Inadequate energy intake related to physiological causes as evidenced by documented/reported insufficient oral intake, loss of fat mass, loss of muscle mass, and low BMI      MONITOR AND EVALUATE/NUTRITION GOALS:  PO intake of 75% of meals TID - New  PO intake of 75% of oral nutrition supplement/s - New  Weight stable within 1 to 2 lbs during admission - New  Return to PO intake or advance diet in 24-48 hrs - Resolved  Start alternative nutrition in 24-48 hrs if diet is not able to advance- Resolved      MEDICATIONS:  Remeron, MVI, Protonix,     LABS:  Reviewed    Pt is at Moderate nutrition risk      Deya Trejo RD, LDN, CNSC  Clinical Dietitian  Phone [phone]

## 2024-03-25 NOTE — CONSULTS
[facility]  Report of Surgical Consultation with History and Physical Exam    Bibi Diaz Patient Status:  Inpatient    1945 MRN [id number]   Location [facility] 3NE-A Attending Marcela Dangelo MD   Hosp Day # 5 PCP Rick Shannon DO     Reason for Surgical Consultation: I am seeing this patient in consultation at the request of Dr. Clark for diverticular bleed    History of Present Illness:  Bibi Diaz is a a(n) 78 year old female who presented to hospital with blood per rectum for the past 2 days.  She reported associated nausea and dizziness.  She presents to the emergency room for further evaluation.  In the emergency room, she was found to be normotensive.  Her hemoglobin at that time was 11.  She was admitted for further evaluation.  During the hospitalization, patient continued to have multiple episodes of blood per rectum.  She underwent multiple CTAs which did not show any active extravasation.  GI was consulted and took patient for a colonoscopy which just showed old blood and clot, nothing active and bleeding.  She did have left-sided diverticulosis.  Patient has received a total of 1 unit of PRBCs during hospitalization.  General surgery was consulted for further evaluation.    Today, patient reports feeling well.  She denies any nausea or vomiting.  She denies any abdominal pain.  She did have a bloody bowel movement early in the morning.  She denies having any bowel movement since.        History:  Past Medical History:   Diagnosis Date    Abdominal distention     Abdominal pain     Acquired hypothyroidism 2018    Anxiety     Asthma ([facility])     Belching     Bloating     Chronic rhinitis     CKD (chronic kidney disease) stage 3, GFR 30-59 ml/min ([facility]ly [facility]) 2019    Constipation     COPD (chronic obstructive pulmonary disease) (Formerly [facility])     NO OXYGEN     Depression     Diarrhea, unspecified     Diverticulosis of large intestine     Emphysema     Esophageal reflux     Fatigue      Feeling lonely     Flatulence/gas pain/belching     Food intolerance     H/O bronchitis     Hay fever     Hemorrhoids     High blood pressure     History of depression     Hypercholesterolemia     Hypertension 01/19/2023    Itch of skin     Migraines     Mild intermittent asthma without complication (HCC)     Nausea     Night sweats     Pneumonia 03/2018    PONV (postoperative nausea and vomiting)     Pure hypercholesterolemia 12/15/2004    Rash     Reflux     Sleep disturbance     Stress     Uncomfortable fullness after meals     Visual impairment     glasses    Vomiting     Wears glasses        Past Surgical History:   Procedure Laterality Date    CATARACT      COLONOSCOPY      COLONOSCOPY N/A 3/21/2024    Procedure: COLONOSCOPY;  Surgeon: Delon Ashford DO;  Location:  ENDOSCOPY    COLONOSCOPY & POLYPECTOMY  09/2014    multiple polyps; tics; repeat 3 yrs    COLONOSCOPY,REMV LESN,SNARE N/A 09/22/2014    Procedure: ESOPHAGOGASTRODUODENOSCOPY, COLONOSCOPY, POSSIBLE BIOPSY, POSSIBLE POLYPECTOMY [id number];  Surgeon: Slade Ivan MD;  Location: [facility]    CORRECT BUNION,SIMPLE Bilateral     CYSTOTOMY,EXCIS VESICAL NECK Left     EGD      GASTRO - DMG  09/2014    stricture; HH    OOPHORECTOMY Bilateral 2017    OTHER SURGICAL HISTORY  02/27/2023    Robotic repair of hiatal hernia and Nissen fundoplication    PATIENT DOCUMENTED NOT TO HAVE EXPERIENCED ANY OF THE FOLLOWING EVENTS N/A 09/22/2014    Procedure: ESOPHAGOGASTRODUODENOSCOPY, COLONOSCOPY, POSSIBLE BIOPSY, POSSIBLE POLYPECTOMY [id number];  Surgeon: Slade Ivan MD;  Location: [facility]    PATIENT WITHOUGH PREOPERATIVE ORDER FOR IV ANTIBIOTIC SURGICAL SITE INFECTION PROPHYLAXIS. N/A 09/22/2014    Procedure: ESOPHAGOGASTRODUODENOSCOPY, COLONOSCOPY, POSSIBLE BIOPSY, POSSIBLE POLYPECTOMY [id number];  Surgeon: Slade Ivan MD;  Location: [facility]    REPAIR ING HERNIA,5+Y/O,REDUCIBL      UPPER GI ENDOSCOPY,DIAGNOSIS N/A  09/22/2014    Procedure: ESOPHAGOGASTRODUODENOSCOPY, COLONOSCOPY, POSSIBLE BIOPSY, POSSIBLE POLYPECTOMY [id number];  Surgeon: Slade Ivan MD;  Location: [facility]       Family History   Problem Relation Age of Onset    Colon Cancer Mother     Other (Other) Mother     Other (copd) Mother     Alcohol and Other Disorders Associated Father     Other (Other) Father     Other (emph) Father     Alcohol and Other Disorders Associated Son     Hypertension Sister     Prostate Cancer Brother     Hypertension Brother         reports that she quit smoking about 43 years ago. Her smoking use included cigarettes. She has never used smokeless tobacco. She reports that she does not drink alcohol and does not use drugs.      Allergies:  Allergies   Allergen Reactions    Avelox [Moxifloxacin Hcl In Nacl] SWELLING    Avelox [Moxifloxacin Hydrochloride] TONGUE SWELLING     \"TABS\"    Amoxicillin NAUSEA AND VOMITING     Vomiting.    Statins Myopathy    Sulfa Antibiotics RASH and ITCHING    Dander OTHER (SEE COMMENTS)     \"Animals\": runny nose, watery eyes, itching    Diphenhydramine Runny nose     \"Animals\": runny nose, watery eyes, itching    Dust Runny nose    Latex RASH    Sulfa Drugs Cross Reactors RASH and ITCHING         Medications:    Current Facility-Administered Medications:     glycerin-hypromellose- (Artifical Tears) 0.2-0.2-1 % ophthalmic solution 1 drop, 1 drop, Both Eyes, QID PRN    benzocaine-menthol (Cepacol) lozenge 1 lozenge, 1 lozenge, Oral, PRN    sodium chloride 0.9% infusion, , Intravenous, Once    multivitamin with minerals (Thera M Plus) tab 1 tablet, 1 tablet, Oral, Daily    acetaminophen (Tylenol Extra Strength) tab 500 mg, 500 mg, Oral, Q6H PRN    clonazePAM (KlonoPIN) tab 0.5 mg, 0.5 mg, Oral, BID PRN    losartan (Cozaar) tab 50 mg, 50 mg, Oral, QAM    mirtazapine (Remeron) tab 30 mg, 30 mg, Oral, Nightly    fluticasone furoate-vilanterol (Breo Ellipta) 100-25 MCG/ACT inhaler 1 puff, 1  puff, Inhalation, Daily    ondansetron (Zofran) 4 MG/2ML injection 4 mg, 4 mg, Intravenous, Q6H PRN    acetaminophen (Tylenol Extra Strength) tab 500 mg, 500 mg, Oral, Q4H PRN    metoclopramide (Reglan) 5 mg/mL injection 5 mg, 5 mg, Intravenous, Q8H PRN    polyethylene glycol (PEG 3350) (Miralax) 17 g oral packet 17 g, 17 g, Oral, Daily PRN    sennosides (Senokot) tab 17.2 mg, 17.2 mg, Oral, Nightly PRN    bisacodyl (Dulcolax) 10 MG rectal suppository 10 mg, 10 mg, Rectal, Daily PRN    fleet enema (Fleet) 7-19 GM/118ML rectal enema 133 mL, 1 enema, Rectal, Once PRN    pantoprazole (Protonix) 40 mg in sodium chloride 0.9% PF 10 mL IV push, 40 mg, Intravenous, Q12H    sodium chloride 0.9% infusion, , Intravenous, Once      Review of Systems:  The review of systems was negative other than the above listed HPI and past medical history including the HEENT, Heart, Lungs, GI, , Neuro, Musculoskeletal, Hematologic, Endocrine and Psych.       Physical Exam:  Blood pressure 124/61, pulse 64, temperature 99 °F (37.2 °C), temperature source Oral, resp. rate 18, weight 91 lb 14.9 oz (41.7 kg), SpO2 99%, not currently breastfeeding.  General: Alert, orientated x3.  Cooperative.  No apparent distress.  HEENT: Exam is unremarkable.  Without scleral icterus.  Mucous membranes are moist. EOM are WNL.  Neck: No tenderness to palpitation.  Full range of motion to flexion and extension, lateral rotation and lateral flexion of cervical spine.  No JVD. Supple.   Lungs: Bilateral chest rise. Good excursion of the diaphragms. No secondary use of accessory respiratory musculature.  Cardiac: Regular rate and rhythm. No murmur.  Abdomen: Soft, nondistended, nontender  Extremities:  No lower extremity edema noted.  Without clubbing or cyanosis.  2+ pulses x4  Skin: Normal texture and turgor.      Laboratory Data and Relevant Imaging:  Recent Labs   Lab 03/21/24  0920 03/21/24  1601 03/21/24  2240 03/22/24  0250 03/23/24  0604 03/23/24  1620  03/24/24 2009 03/25/24  0001 03/25/24  0759   RBC 2.07*  --  1.90*  --  3.00*  --   --   --   --    HGB 6.4*  6.4*   < > 6.0*   < > 9.1*   < > 8.3* 8.1* 8.6*   HCT 19.4*  --  17.5*  --  26.3*  --   --   --   --    MCV 93.7  --  92.1  --  87.7  --   --   --   --    MCH 30.9  --  31.6  --  30.3  --   --   --   --    MCHC 33.0  --  34.3  --  34.6  --   --   --   --    RDW 12.1  --  12.6  --  15.4  --   --   --   --    NEPRELIM 3.76  --  4.23  --  6.96  --   --   --   --    WBC 5.3  --  6.1  --  9.1  --   --   --   --    .0  --  161.0  --  127.0*  --   --   --   --     < > = values in this interval not displayed.       Recent Labs   Lab 03/20/24  0631 03/21/24  0920 03/22/24  0250 03/23/24  0604   * 96  --   --    BUN 8* 9  --   --    CREATSERUM 0.80 0.60  --   --    CA 9.7 8.3*  --   --    ALB 3.8  --   --   --    * 139  --   --    K 4.0 3.6 3.8 4.0    114*  --   --    CO2 23.0 22.0  --   --    ALKPHO 78  --   --   --    AST 21  --   --   --    ALT 16  --   --   --    BILT 0.4  --   --   --    TP 6.7  --   --   --          Recent Labs   Lab 03/20/24  0631   PTP 13.7   INR 1.05   PTT 26.9       Imaging  NM GI BLOOD LOSS STUDY  (CPT=78278)    Result Date: 3/24/2024  CONCLUSION:  Nuclear medicine labeled red cells study is negative for any evidence of active GI hemorrhage.   LOCATION:  [facility]   Dictated by (CST): Jesus Lindo MD on 3/24/2024 at 7:54 PM     Finalized by (CST): Jesus Lindo MD on 3/24/2024 at 7:55 PM          Impression/Plan:  Patient Active Problem List   Diagnosis    Major depressive disorder, recurrent episode, moderate (HCC)    Esophageal reflux    Chronic obstructive pulmonary disease with acute lower respiratory infection (HCC)    Acquired hypothyroidism    Memory deficit    Aortic atherosclerosis (HCC)    Diverticulosis of colon    Dizziness and giddiness    Dysthymic disorder    Lichen sclerosus    Pure hypercholesterolemia    Vitamin D deficiency    Vitiligo    Severe  episode of recurrent major depressive disorder, without psychotic features (HCC)    Paraesophageal hernia    Subclinical hypothyroidism    Hypertension    Protein-calorie malnutrition, unspecified severity (HCC)    Gastrointestinal hemorrhage, unspecified gastrointestinal hemorrhage type       78 year old female with most likely diverticular bleed    No acute surgical intervention  Patient has undergone CTA as well as colonoscopy without any localization  Patient did have another episode of bloody bowel movement today and was taken for an bleeding scan which was also negative  Patient can have clear liquid diet  Agree with GI, if patient has any more episodes of bloody bowel movement recommend that she be taken for another stat scan or scope  Patient has any localization of the bleeding, surgical intervention may be warranted  Until then, no intervention  Transfuse for hemoglobin less than 7  Surgery will continue to follow  Rest of care per primary    Thank you for letting me participate in the care of this patient    Ina Rowan MD  [facility] General Surgery

## 2024-03-25 NOTE — PROGRESS NOTES
[facility]   part of [facility]     Hospitalist Progress Note     Bibi Diaz Patient Status:  Inpatient    1945 MRN [id number]   Location [facility] 3NE-A Attending Marcela Dangelo MD   Hosp Day # 5 PCP Rick Shannon DO     Chief Complaint: rectal bleeding    Subjective:     Patient denies abd pain   rectal bleeding last night    Objective:    Review of Systems:   A comprehensive review of systems was completed; pertinent positive and negatives stated in subjective.    Vital signs:  Temp:  [97.6 °F (36.4 °C)-99 °F (37.2 °C)] 97.6 °F (36.4 °C)  Pulse:  [58-68] 62  Resp:  [18] 18  BP: (103-140)/(56-93) 120/64  SpO2:  [97 %-99 %] 99 %    Physical Exam:    General: No acute distress  Respiratory: No wheezes, no rhonchi  Cardiovascular: S1, S2, regular rate and rhythm  Abdomen: Soft, Non-tender, non-distended, positive bowel sounds  Neuro: No new focal deficits.   Extremities: No edema      Diagnostic Data:    Labs:  Recent Labs   Lab 24  0631 24  1420 24  2141 24  0920 24  1601 24  2240 24  0250 24  0604 24  1626 24  2354 24  1116 24  0001 24  0759   WBC 6.0 6.7  --  5.3  --  6.1  --  9.1  --   --   --   --   --   --    HGB 11.3* 10.1*   < > 6.4*  6.4*   < > 6.0*   < > 9.1*   < > 8.1* 7.6* 8.3* 8.1* 8.6*   MCV 90.2 90.7  --  93.7  --  92.1  --  87.7  --   --   --   --   --   --    .0 314.0  --  231.0  --  161.0  --  127.0*  --   --   --   --   --   --    INR 1.05  --   --   --   --   --   --   --   --   --   --   --   --   --     < > = values in this interval not displayed.       Recent Labs   Lab 24  0631 24  0920 24  0250 24  0604   * 96  --   --    BUN 8* 9  --   --    CREATSERUM 0.80 0.60  --   --    CA 9.7 8.3*  --   --    ALB 3.8  --   --   --    * 139  --   --    K 4.0 3.6 3.8 4.0    114*  --   --    CO2 23.0 22.0  --   --    ALKPHO 78  --   --    --    AST 21  --   --   --    ALT 16  --   --   --    BILT 0.4  --   --   --    TP 6.7  --   --   --        Estimated Creatinine Clearance: 50 mL/min (based on SCr of 0.6 mg/dL).    No results for input(s): \"TROP\", \"TROPHS\", \"CK\" in the last 168 hours.    Recent Labs   Lab 03/20/24  0631   PTP 13.7   INR 1.05                  Microbiology    No results found for this visit on 03/20/24.      Imaging: Reviewed in Epic.    Medications:    sodium chloride   Intravenous Once    multivitamin with minerals  1 tablet Oral Daily    losartan  50 mg Oral QAM    mirtazapine  30 mg Oral Nightly    fluticasone furoate-vilanterol  1 puff Inhalation Daily    pantoprazole  40 mg Intravenous Q12H    sodium chloride   Intravenous Once       Assessment & Plan:      # 78 years old female hypertension hyperlipidemia COPD presents with lower GI bleeding  -Colonoscopy  -anemia   -transfused  -rectal bleeding again last night  -tagged rbc scan negative     # Paraesophageal hiatal hernia and chronic gastroesophageal reflux disease status post robotic repair and fundoplication with general anesthesia February 27, 2023     # History of hypothyroidism anxiety and depression    #HTN    #COPD    Old blood and blood clots noted in the colon predominantly on the left side with some reflux old blood back to the cecum.  No active bleeding noted  Left-sided diverticulosis.  Suspect this is the source of bleeding  Hemorrhoids         Marcela Dangelo MD    Supplementary Documentation:     Quality:  DVT Mechanical Prophylaxis:   SCDs, Early ambuation  DVT Pharmacologic Prophylaxis   Medication   None                Code Status: Full Code  Flood: No urinary catheter in place  Flood Duration (in days):   Central line:    WOLF:     Discharge is dependent on: progress  At this point Ms. Diaz is expected to be discharge to: home    The 21st Century Cures Act makes medical notes like these available to patients in the interest of transparency. Please be  advised this is a medical document. Medical documents are intended to carry relevant information, facts as evident, and the clinical opinion of the practitioner. The medical note is intended as peer to peer communication and may appear blunt or direct. It is written in medical language and may contain abbreviations or verbiage that are unfamiliar.

## 2024-03-25 NOTE — PROGRESS NOTES
[facility]  GASTROENTEROLOGY PROGRESS NOTE   [facility]     Bibi Diaz Patient Status:  Inpatient    1945 MRN [id number]   Location [facility] 3NE-A Attending Marcela Dangelo MD   Hosp Day # 5 PCP Rick Shannon DO       Reason for Consultation:   Hematochezia   Anemia     Subjective:   Tagged RBC scan yest neg.   No further overt GI bleeding.   Denies any n/v. No abd pain.     Patient Active Problem List   Diagnosis    Major depressive disorder, recurrent episode, moderate (HCC)    Esophageal reflux    Chronic obstructive pulmonary disease with acute lower respiratory infection (HCC)    Acquired hypothyroidism    Memory deficit    Aortic atherosclerosis (HCC)    Diverticulosis of colon    Dizziness and giddiness    Dysthymic disorder    Lichen sclerosus    Pure hypercholesterolemia    Vitamin D deficiency    Vitiligo    Severe episode of recurrent major depressive disorder, without psychotic features (HCC)    Paraesophageal hernia    Subclinical hypothyroidism    Hypertension    Protein-calorie malnutrition, unspecified severity (HCC)    Gastrointestinal hemorrhage, unspecified gastrointestinal hemorrhage type       PMH, PSH, Fhx, Shx as per initial consult note    Review of Systems    12 point Review of Systems negative unless otherwise mentioned in Subjective.     Physical Exam  /61 (BP Location: Right arm)   Pulse 64   Temp 99 °F (37.2 °C) (Oral)   Resp 18   Wt 91 lb 14.9 oz (41.7 kg)   SpO2 99%   BMI 17.95 kg/m²   Body mass index is 17.95 kg/m².      Gen: No acute distress  Resp: no respiratory distress  Abd: Soft, non-tender, non-distended. No rebound tenderness, no guarding.   Neuro: Aox3.     Diagnostic Data:      Labs:  Recent Labs   Lab 24  0631 24  1420 24  2141 24  0920 24  1601 24  2240 24  0250 24  0604 24  1626 24  2354 24  1116 24  0001 24  0759   WBC 6.0 6.7  --   5.3  --  6.1  --  9.1  --   --   --   --   --   --    HGB 11.3* 10.1*   < > 6.4*  6.4*   < > 6.0*   < > 9.1*   < > 8.1* 7.6* 8.3* 8.1* 8.6*   MCV 90.2 90.7  --  93.7  --  92.1  --  87.7  --   --   --   --   --   --    .0 314.0  --  231.0  --  161.0  --  127.0*  --   --   --   --   --   --    INR 1.05  --   --   --   --   --   --   --   --   --   --   --   --   --     < > = values in this interval not displayed.       Recent Labs   Lab 03/20/24  0631 03/21/24  0920 03/22/24  0250 03/23/24  0604   * 96  --   --    BUN 8* 9  --   --    CREATSERUM 0.80 0.60  --   --    CA 9.7 8.3*  --   --    ALB 3.8  --   --   --    * 139  --   --    K 4.0 3.6 3.8 4.0    114*  --   --    CO2 23.0 22.0  --   --    ALKPHO 78  --   --   --    AST 21  --   --   --    ALT 16  --   --   --    BILT 0.4  --   --   --    TP 6.7  --   --   --        Recent Labs   Lab 03/20/24  0631   PTP 13.7   INR 1.05            No data recorded        Imaging: Imaging data reviewed in Epic.    Medications:    sodium chloride   Intravenous Once    multivitamin with minerals  1 tablet Oral Daily    losartan  50 mg Oral QAM    mirtazapine  30 mg Oral Nightly    fluticasone furoate-vilanterol  1 puff Inhalation Daily    pantoprazole  40 mg Intravenous Q12H    sodium chloride   Intravenous Once       Allergies:  Allergies   Allergen Reactions    Avelox [Moxifloxacin Hcl In Nacl] SWELLING    Avelox [Moxifloxacin Hydrochloride] TONGUE SWELLING     \"TABS\"    Amoxicillin NAUSEA AND VOMITING     Vomiting.    Statins Myopathy    Sulfa Antibiotics RASH and ITCHING    Dander OTHER (SEE COMMENTS)     \"Animals\": runny nose, watery eyes, itching    Diphenhydramine Runny nose     \"Animals\": runny nose, watery eyes, itching    Dust Runny nose    Latex RASH    Sulfa Drugs Cross Reactors RASH and ITCHING   Study Result    Narrative   PROCEDURE:  NM GI BLOOD LOSS STUDY  (CPT=78278)     COMPARISON:  None.     INDICATION:       TECHNIQUE:  25.6 mCi Tc99m  autologously labelled RBC's were re-injected intravenously, and dynamic images of the abdomen were obtained in the anterior projection for at least one hour.     FINDINGS:    There is normal activity in blood pool.  There is no evidence of extravasation of radiopharmaceutical into bowel.                   Impression   CONCLUSION:  Nuclear medicine labeled red cells study is negative for any evidence of active GI hemorrhage.        LOCATION:  [facility]        Dictated by (CST): Jesus Lindo MD on 3/24/2024 at 7:54 PM      Finalized by (CST): Jesus Lindo MD on 3/24/2024 at 7:55 PM         Imaging:  I have personally reviewed all pertinent available imaging.       Informed Consent:   The planned procedure(s), the explanation of the procedure, its expected benefits, the potential complications and risks, and possible alternatives (including their benefits and risks) were discussed with the patient in full. The discussion of risks, not limited to but including bleeding, infection, perforation or tear in the gastrointestinal tract,  adverse effects from anesthesia, and possible prolonged hospitalization, emergency surgery, risk of morbidity or mortality, and risk of missed lesion(s) were discussed with patient. Pt understands the missed rate of colonoscopy of polyps, lesions of 5-10% even in the best of circumstances and that although this is an accurate test, there are limitations to colonoscopy. Patient understood the proposed procedure(s), and elected to proceed with the procedure(s) with possible intervention (such as polypectomy, biopsy, control of bleeding, etc.) and its risks, benefits and alternatives and wish to proceed with procedure(s). All questions answered in full to patient's satisfaction. Ample time was given to patient to ask all questions in full.       ASSESSMENT / PLAN:     Bibi Diaz is a 78 year old female with HTN, HLD, COPD, large paraesophageal hernia, GI consult for suspected diverticular  bleeding.     -CTA 3/22 neg for active extravasation. Tagged RBC scan 3/25 - neg.   -Hg uptrending however, Intermittent nature of diverticular bleeding, no bleeding overnight. If recurrent overt GI bleeding, consider flex sig vs. Colonoscopy tomorrow - pt agreeable to this plan, informed consent as above. Multiple imaging studies completed.         Recommendations:   If patient has recurrent overt GI bleeding, plan for flex sig vs. Colonoscopy tomorrow. Notify GI service if any overt GI bleeding.   Check CBC q12hrs, transfuse pRBC if Hg < 7  General surgery consult   Ok for clear liquid diet, adat. IVF, analgesia, anti-emetics per primary team      Jin Clark MD  [facility]      Addendum - spoke to general surgery, Dr Rowan about this case/plan of care, who is in agreement with this plan.

## 2024-03-25 NOTE — PROGRESS NOTES
[facility]  General Surgery Progress Note    Bibi Diaz Patient Status:  Inpatient    1945 MRN [id number]   Location [facility] 3NE-A Attending Marcela Dangelo MD   Hosp Day # 5 PCP Rick Shannon DO     Subjective:  No acute events overnight.  Patient denies any bowel movements overnight.  Hemoglobin today is up from 7.6-8.6, no transfusion overnight.  Patient states she has been n.p.o.  She denies any other symptoms.    Objective/Physical Exam:      Intake/Output Summary (Last 24 hours) at 3/25/2024 1204  Last data filed at 3/25/2024 1000  Gross per 24 hour   Intake 7976.67 ml   Output 202 ml   Net 7774.67 ml       Vital Signs:  Blood pressure 120/64, pulse 62, temperature 97.6 °F (36.4 °C), temperature source Oral, resp. rate 18, weight 91 lb 14.9 oz (41.7 kg), SpO2 99%, not currently breastfeeding.    General: Alert, orientated x3.  Cooperative.  No apparent distress.  HEENT: Exam is unremarkable.  Without scleral icterus.  Mucous membranes are moist. Oropharynx is clear.  Neck: No JVD. Supple.   Lungs: Non labored breathing, equal chest rise  Cardiac: Regular rate and rhythm. No murmur.  Abdomen:  Soft, non-distended, non-tender, with no rebound or guarding.  No peritoneal signs.   Extremities:  No lower extremity edema noted.  Without clubbing or cyanosis.  2+ pulses x4, motor and sensation grossly intact      Labs:  Lab Results   Component Value Date    HGB 8.6 2024             Images:  NM GI BLOOD LOSS STUDY  (CPT=78278)    Result Date: 3/24/2024  CONCLUSION:  Nuclear medicine labeled red cells study is negative for any evidence of active GI hemorrhage.   LOCATION:  [place]   Dictated by (CST): Jesus Lindo MD on 3/24/2024 at 7:54 PM     Finalized by (CST): Jesus Lindo MD on 3/24/2024 at 7:55 PM        Assessment/Plan:  Patient Active Problem List   Diagnosis    Major depressive disorder, recurrent episode, moderate (HCC)    Esophageal reflux    Chronic obstructive pulmonary  disease with acute lower respiratory infection (HCC)    Acquired hypothyroidism    Memory deficit    Aortic atherosclerosis (HCC)    Diverticulosis of colon    Dizziness and giddiness    Dysthymic disorder    Lichen sclerosus    Pure hypercholesterolemia    Vitamin D deficiency    Vitiligo    Severe episode of recurrent major depressive disorder, without psychotic features (HCC)    Paraesophageal hernia    Subclinical hypothyroidism    Hypertension    Protein-calorie malnutrition, unspecified severity (HCC)    Gastrointestinal hemorrhage, unspecified gastrointestinal hemorrhage type       78 year old female with diverticular bleeding    Hemoglobin stable and increased without blood transfusion  Patient has not had a bowel movement today  Recommend restarting clear liquids  No acute surgical intervention  If patient does have another episode of blood in her stool, it could potentially be old blood versus active bleeding  GI is on board and available to do the scope, appreciate their recommendations  Surgery will continue to follow  Rest of care per primary    Ina Rowan MD  [facility] [place] [facility]  3/25/2024  12:04 PM

## 2024-03-25 NOTE — PLAN OF CARE
Assumed care at 0700  A&OX3  RA   Cardiac Replacement Protocol  Clear Liquid Diet   NSR  HGB Q 8 hr   VS Stable  Removed Left Forearm PIV  Right Forearm PIV SL  Klonopin given for anxiety PRN       Problem: GASTROINTESTINAL - ADULT  Goal: Minimal or absence of nausea and vomiting  Description: INTERVENTIONS:  - Maintain adequate hydration with IV or PO as ordered and tolerated  - Nasogastric tube to low intermittent suction as ordered  - Evaluate effectiveness of ordered antiemetic medications  - Provide nonpharmacologic comfort measures as appropriate  - Advance diet as tolerated, if ordered  - Obtain nutritional consult as needed  - Evaluate fluid balance  Outcome: Progressing  Goal: Maintains or returns to baseline bowel function  Description: INTERVENTIONS:  - Assess bowel function  - Maintain adequate hydration with IV or PO as ordered and tolerated  - Evaluate effectiveness of GI medications  - Encourage mobilization and activity  - Obtain nutritional consult as needed  - Establish a toileting routine/schedule  - Consider collaborating with pharmacy to review patient's medication profile  Outcome: Progressing     Problem: HEMATOLOGIC - ADULT  Goal: Maintains hematologic stability  Description: INTERVENTIONS  - Assess for signs and symptoms of bleeding or hemorrhage  - Monitor labs and vital signs for trends  - Administer supportive blood products/factors, fluids and medications as ordered and appropriate  - Administer supportive blood products/factors as ordered and appropriate  Outcome: Progressing  Goal: Free from bleeding injury  Description: (Example usage: patient with low platelets)  INTERVENTIONS:  - Avoid intramuscular injections, enemas and rectal medication administration  - Ensure safe mobilization of patient  - Hold pressure on venipuncture sites to achieve adequate hemostasis  - Assess for signs and symptoms of internal bleeding  - Monitor lab trends  - Patient is to report abnormal signs of  bleeding to staff  - Avoid use of toothpicks and dental floss  - Use electric shaver for shaving  - Use soft bristle tooth brush  - Limit straining and forceful nose blowing  Outcome: Progressing

## 2024-03-25 NOTE — PLAN OF CARE
Pt A&Ox4 Room Air  Resting in bed  Denies pain at this time  NSR on Tele  NPO at this Time.   Monitoring HGB Q8  Pt Hgb 8.3, Next check 0400  Sputum Cx pending   GI, Gen Surg following case.   Blood Loss Study completed ( Nuclear medicine labeled red cells study is negative for any evidence of active GI hemorrhage.   Safety precaution maintained  Plan of Care On-going.     Problem: GASTROINTESTINAL - ADULT  Goal: Minimal or absence of nausea and vomiting  Description: INTERVENTIONS:  - Maintain adequate hydration with IV or PO as ordered and tolerated  - Nasogastric tube to low intermittent suction as ordered  - Evaluate effectiveness of ordered antiemetic medications  - Provide nonpharmacologic comfort measures as appropriate  - Advance diet as tolerated, if ordered  - Obtain nutritional consult as needed  - Evaluate fluid balance  Outcome: Progressing  Goal: Maintains or returns to baseline bowel function  Description: INTERVENTIONS:  - Assess bowel function  - Maintain adequate hydration with IV or PO as ordered and tolerated  - Evaluate effectiveness of GI medications  - Encourage mobilization and activity  - Obtain nutritional consult as needed  - Establish a toileting routine/schedule  - Consider collaborating with pharmacy to review patient's medication profile  Outcome: Progressing     Problem: HEMATOLOGIC - ADULT  Goal: Maintains hematologic stability  Description: INTERVENTIONS  - Assess for signs and symptoms of bleeding or hemorrhage  - Monitor labs and vital signs for trends  - Administer supportive blood products/factors, fluids and medications as ordered and appropriate  - Administer supportive blood products/factors as ordered and appropriate  Outcome: Progressing  Goal: Free from bleeding injury  Description: (Example usage: patient with low platelets)  INTERVENTIONS:  - Avoid intramuscular injections, enemas and rectal medication administration  - Ensure safe mobilization of patient  - Hold  pressure on venipuncture sites to achieve adequate hemostasis  - Assess for signs and symptoms of internal bleeding  - Monitor lab trends  - Patient is to report abnormal signs of bleeding to staff  - Avoid use of toothpicks and dental floss  - Use electric shaver for shaving  - Use soft bristle tooth brush  - Limit straining and forceful nose blowing  Outcome: Progressing

## 2024-03-25 NOTE — PROGRESS NOTES
[facility]   part of [facility]     Hospitalist Progress Note     Bibi Diaz Patient Status:  Inpatient    1945 MRN [id number]   Location [facility] 3NE-A Attending Marcela Dangelo MD   Hosp Day # 4 PCP Rick Shannon DO     Chief Complaint: rectal bleeding    Subjective:     Patient denies abd pain   rectal bleeding last night    Objective:    Review of Systems:   A comprehensive review of systems was completed; pertinent positive and negatives stated in subjective.    Vital signs:  Temp:  [97.8 °F (36.6 °C)-98.8 °F (37.1 °C)] 97.8 °F (36.6 °C)  Pulse:  [58-68] 58  Resp:  [18] 18  BP: ()/(55-63) 140/56  SpO2:  [96 %-98 %] 97 %    Physical Exam:    General: No acute distress  Respiratory: No wheezes, no rhonchi  Cardiovascular: S1, S2, regular rate and rhythm  Abdomen: Soft, Non-tender, non-distended, positive bowel sounds  Neuro: No new focal deficits.   Extremities: No edema      Diagnostic Data:    Labs:  Recent Labs   Lab 24  0631 24  1420 24  2141 24  0920 24  1601 24  2240 24  0250 24  0604 24  1626 24  2354 24  1116 24   WBC 6.0 6.7  --  5.3  --  6.1  --  9.1  --   --   --   --    HGB 11.3* 10.1*   < > 6.4*  6.4*   < > 6.0*   < > 9.1* 8.6* 8.1* 7.6* 8.3*   MCV 90.2 90.7  --  93.7  --  92.1  --  87.7  --   --   --   --    .0 314.0  --  231.0  --  161.0  --  127.0*  --   --   --   --    INR 1.05  --   --   --   --   --   --   --   --   --   --   --     < > = values in this interval not displayed.       Recent Labs   Lab 24  0631 24  0920 24  0250 24  0604   * 96  --   --    BUN 8* 9  --   --    CREATSERUM 0.80 0.60  --   --    CA 9.7 8.3*  --   --    ALB 3.8  --   --   --    * 139  --   --    K 4.0 3.6 3.8 4.0    114*  --   --    CO2 23.0 22.0  --   --    ALKPHO 78  --   --   --    AST 21  --   --   --    ALT 16  --   --   --    BILT 0.4  --   --   --     TP 6.7  --   --   --        Estimated Creatinine Clearance: 50 mL/min (based on SCr of 0.6 mg/dL).    No results for input(s): \"TROP\", \"TROPHS\", \"CK\" in the last 168 hours.    Recent Labs   Lab 03/20/24  0631   PTP 13.7   INR 1.05                  Microbiology    No results found for this visit on 03/20/24.      Imaging: Reviewed in Epic.    Medications:    sodium chloride   Intravenous Once    multivitamin with minerals  1 tablet Oral Daily    losartan  50 mg Oral QAM    mirtazapine  30 mg Oral Nightly    fluticasone furoate-vilanterol  1 puff Inhalation Daily    pantoprazole  40 mg Intravenous Q12H    sodium chloride   Intravenous Once       Assessment & Plan:      # 78 years old female hypertension hyperlipidemia COPD presents with lower GI bleeding  -Colonoscopy  -anemia   -transfused  -rectal bleeding again last night  -tagged rbc scan negative     # Paraesophageal hiatal hernia and chronic gastroesophageal reflux disease status post robotic repair and fundoplication with general anesthesia February 27, 2023     # History of hypothyroidism anxiety and depression    #HTN    #COPD    Old blood and blood clots noted in the colon predominantly on the left side with some reflux old blood back to the cecum.  No active bleeding noted  Left-sided diverticulosis.  Suspect this is the source of bleeding  Hemorrhoids         aMrcela Dangelo MD    Supplementary Documentation:     Quality:  DVT Mechanical Prophylaxis:   SCDs, Early ambuation  DVT Pharmacologic Prophylaxis   Medication   None                Code Status: Full Code  Flood: No urinary catheter in place  Flood Duration (in days):   Central line:    WOLF:     Discharge is dependent on: progress  At this point Ms. Diaz is expected to be discharge to: home    The 21st Century Cures Act makes medical notes like these available to patients in the interest of transparency. Please be advised this is a medical document. Medical documents are intended to carry relevant  information, facts as evident, and the clinical opinion of the practitioner. The medical note is intended as peer to peer communication and may appear blunt or direct. It is written in medical language and may contain abbreviations or verbiage that are unfamiliar.

## 2024-03-26 LAB
HGB BLD-MCNC: 7.7 G/DL
HGB BLD-MCNC: 8.4 G/DL
HGB BLD-MCNC: 9.4 G/DL

## 2024-03-26 PROCEDURE — 99232 SBSQ HOSP IP/OBS MODERATE 35: CPT | Performed by: STUDENT IN AN ORGANIZED HEALTH CARE EDUCATION/TRAINING PROGRAM

## 2024-03-26 PROCEDURE — 99232 SBSQ HOSP IP/OBS MODERATE 35: CPT | Performed by: HOSPITALIST

## 2024-03-26 RX ORDER — SIMETHICONE 125 MG
125 TABLET,CHEWABLE ORAL 4 TIMES DAILY PRN
Status: DISCONTINUED | OUTPATIENT
Start: 2024-03-26 | End: 2024-03-28

## 2024-03-26 NOTE — PROGRESS NOTES
11/03/22 1946   Clinical Encounter Type   Visited With Patient   Taxonomy   Intended Effects Aligning care plan with patient's values   Methods Offer support   Interventions Ask guided questions; Active listening      responded to request for completion of advanced directive.  verified with nurse that patient is decisional.  visited with patient in room. Patient advised that she would like sleeping pill by 8pm and now would not be a good time for discussion.  made nurse aware that patient wanted sleeping pill. Spiritual Care can be requested via an Epic consult or by pager at [phone]. Tracie Angelucci, M. Div  Extension: [phone] Right ring finger mass excision   DOS 3/13/24  Pain 0/10   Denies taking anything for pain   Aggravating: pressure   Suture removed, steri placed

## 2024-03-26 NOTE — PLAN OF CARE
Assumed patient care at 0730. A/Ox4. Room air. Normal sinus on tele. Electrolyte protocol (cardiac). Full liquid diet, Advanced as tolerated. Ambulates stand by, Up with meals. LAC SL. HGB Q8. All safety precautions are in place.     Problem: GASTROINTESTINAL - ADULT  Goal: Minimal or absence of nausea and vomiting  Description: INTERVENTIONS:  - Maintain adequate hydration with IV or PO as ordered and tolerated  - Nasogastric tube to low intermittent suction as ordered  - Evaluate effectiveness of ordered antiemetic medications  - Provide nonpharmacologic comfort measures as appropriate  - Advance diet as tolerated, if ordered  - Obtain nutritional consult as needed  - Evaluate fluid balance  Outcome: Progressing  Goal: Maintains or returns to baseline bowel function  Description: INTERVENTIONS:  - Assess bowel function  - Maintain adequate hydration with IV or PO as ordered and tolerated  - Evaluate effectiveness of GI medications  - Encourage mobilization and activity  - Obtain nutritional consult as needed  - Establish a toileting routine/schedule  - Consider collaborating with pharmacy to review patient's medication profile  Outcome: Progressing     Problem: HEMATOLOGIC - ADULT  Goal: Maintains hematologic stability  Description: INTERVENTIONS  - Assess for signs and symptoms of bleeding or hemorrhage  - Monitor labs and vital signs for trends  - Administer supportive blood products/factors, fluids and medications as ordered and appropriate  - Administer supportive blood products/factors as ordered and appropriate  Outcome: Progressing  Goal: Free from bleeding injury  Description: (Example usage: patient with low platelets)  INTERVENTIONS:  - Avoid intramuscular injections, enemas and rectal medication administration  - Ensure safe mobilization of patient  - Hold pressure on venipuncture sites to achieve adequate hemostasis  - Assess for signs and symptoms of internal bleeding  - Monitor lab trends  - Patient  is to report abnormal signs of bleeding to staff  - Avoid use of toothpicks and dental floss  - Use electric shaver for shaving  - Use soft bristle tooth brush  - Limit straining and forceful nose blowing  Outcome: Progressing

## 2024-03-26 NOTE — PROGRESS NOTES
[facility]  GASTROENTEROLOGY PROGRESS NOTE   [facility]     Bibi Diaz Patient Status:  Inpatient    1945 MRN [id number]   Location [facility] 3NE-A Attending Marcela Dangelo MD   Hosp Day # 6 PCP Rick Shannon DO       Reason for Consultation:   Hematochezia   Anemia     Subjective:   No overt GI bleeding overnight or this am  Ate solid food this am without n/v or abd pain    Patient Active Problem List   Diagnosis    Major depressive disorder, recurrent episode, moderate (HCC)    Esophageal reflux    Chronic obstructive pulmonary disease with acute lower respiratory infection (HCC)    Acquired hypothyroidism    Memory deficit    Aortic atherosclerosis (HCC)    Diverticulosis of colon    Dizziness and giddiness    Dysthymic disorder    Lichen sclerosus    Pure hypercholesterolemia    Vitamin D deficiency    Vitiligo    Severe episode of recurrent major depressive disorder, without psychotic features (HCC)    Paraesophageal hernia    Subclinical hypothyroidism    Hypertension    Protein-calorie malnutrition, unspecified severity (HCC)    Gastrointestinal hemorrhage, unspecified gastrointestinal hemorrhage type       PMH, PSH, Fhx, Shx as per initial consult note    Review of Systems    12 point Review of Systems negative unless otherwise mentioned in Subjective.     Physical Exam  /60 (BP Location: Left arm)   Pulse 66   Temp 97.4 °F (36.3 °C) (Oral)   Resp 16   Wt 91 lb 14.9 oz (41.7 kg)   SpO2 97%   BMI 17.95 kg/m²   Body mass index is 17.95 kg/m².      Gen: No acute distress  Resp: no respiratory distress  Abd: Soft, non-tender, non-distended. No rebound tenderness, no guarding.   Neuro: Aox3.     Diagnostic Data:      Labs:  Recent Labs   Lab 24  0631 24  1420 24  2141 24  0920 24  1601 24  2240 24  0250 24  0604 24  1626 24  0001 24  0759 24  1609 24  0013 24  0822   WBC 6.0 6.7   --  5.3  --  6.1  --  9.1  --   --   --   --   --   --    HGB 11.3* 10.1*   < > 6.4*  6.4*   < > 6.0*   < > 9.1*   < > 8.1* 8.6* 8.5* 8.4* 9.4*   MCV 90.2 90.7  --  93.7  --  92.1  --  87.7  --   --   --   --   --   --    .0 314.0  --  231.0  --  161.0  --  127.0*  --   --   --   --   --   --    INR 1.05  --   --   --   --   --   --   --   --   --   --   --   --   --     < > = values in this interval not displayed.       Recent Labs   Lab 03/20/24  0631 03/21/24  0920 03/22/24  0250 03/23/24  0604   * 96  --   --    BUN 8* 9  --   --    CREATSERUM 0.80 0.60  --   --    CA 9.7 8.3*  --   --    ALB 3.8  --   --   --    * 139  --   --    K 4.0 3.6 3.8 4.0    114*  --   --    CO2 23.0 22.0  --   --    ALKPHO 78  --   --   --    AST 21  --   --   --    ALT 16  --   --   --    BILT 0.4  --   --   --    TP 6.7  --   --   --        Recent Labs   Lab 03/20/24  0631   PTP 13.7   INR 1.05            No data recorded        Imaging: Imaging data reviewed in Epic.    Medications:    sodium chloride   Intravenous Once    multivitamin with minerals  1 tablet Oral Daily    losartan  50 mg Oral QAM    mirtazapine  30 mg Oral Nightly    fluticasone furoate-vilanterol  1 puff Inhalation Daily    pantoprazole  40 mg Intravenous Q12H    sodium chloride   Intravenous Once       Allergies:  Allergies   Allergen Reactions    Avelox [Moxifloxacin Hcl In Nacl] SWELLING    Avelox [Moxifloxacin Hydrochloride] TONGUE SWELLING     \"TABS\"    Amoxicillin NAUSEA AND VOMITING     Vomiting.    Statins Myopathy    Sulfa Antibiotics RASH and ITCHING    Dander OTHER (SEE COMMENTS)     \"Animals\": runny nose, watery eyes, itching    Diphenhydramine Runny nose     \"Animals\": runny nose, watery eyes, itching    Dust Runny nose    Latex RASH    Sulfa Drugs Cross Reactors RASH and ITCHING   Study Result    Narrative   PROCEDURE:  NM GI BLOOD LOSS STUDY  (CPT=78278)     COMPARISON:  None.     INDICATION:       TECHNIQUE:  25.6 mCi  Tc99m autologously labelled RBC's were re-injected intravenously, and dynamic images of the abdomen were obtained in the anterior projection for at least one hour.     FINDINGS:    There is normal activity in blood pool.  There is no evidence of extravasation of radiopharmaceutical into bowel.                   Impression   CONCLUSION:  Nuclear medicine labeled red cells study is negative for any evidence of active GI hemorrhage.        LOCATION:  [facility]        Dictated by (CST): Jesus Lindo MD on 3/24/2024 at 7:54 PM      Finalized by (CST): Jesus Lindo MD on 3/24/2024 at 7:55 PM         Imaging:  I have personally reviewed all pertinent available imaging.         ASSESSMENT / PLAN:     Bibi Diaz is a 78 year old female with HTN, HLD, COPD, large paraesophageal hernia, GI consult for suspected diverticular bleeding.     #Hematochezia - resolved.   -CTA 3/22 neg for active extravasation. Tagged RBC scan 3/25 - neg. Hg stable.   -More c/w nature with intermittent diverticular bleeding, which is self limited as further overt GI bleeding yesterday or overnight.       Recommendations:   High fiber diet   Check CBC q12hrs, transfuse pRBC if Hg < 7  OK to advance diet  IVF, analgesia, anti-emetics per primary team      GI will signoff, please notify our service if any overt GI bleeding. D/w primary team, Dr Dangelo.      Jin Clark MD  [facility]

## 2024-03-26 NOTE — PROGRESS NOTES
[facility]  Progress Note    Bibi Diaz Patient Status:  Inpatient    1945 MRN [id number]   Location [facility] 3NE-A Attending Marcela Dangelo MD   Hosp Day # 6 PCP Rick Shannon DO     Subjective:  She is seen and examined resting in bed. She reports feeling \"pretty good\" today. She reports she had 1 bowel movement today that was black. Reports no bright red blood noted. She reports tolerating full liquids without nausea or vomiting. She reports occasional abdominal bloating and gas pain.     Hemoglobin 9.4 today, increased from 8.5 yesterday.     Objective/Physical Exam:  /60 (BP Location: Left arm)   Pulse 66   Temp 97.4 °F (36.3 °C) (Oral)   Resp 16   Wt 91 lb 14.9 oz   SpO2 97%   BMI 17.95 kg/m²     Intake/Output Summary (Last 24 hours) at 3/26/2024 1208  Last data filed at 3/26/2024 0800  Gross per 24 hour   Intake 360 ml   Output --   Net 360 ml         General: Awake, Alert,   Cooperative.  No apparent distress.  HEENT: Normocephalic, atraumatic  Pulm: no respiratory distress, no increased work of breathing  Abdomen:  Soft, non-distended,non-tender to palpation, with no rebound or guarding.  No peritoneal signs.  Extremities: No lower extremity edema  Skin: warm, dry. No jaundice.     Labs:  Lab Results   Component Value Date    HGB 9.4 2024      Lab Results   Component Value Date    PT 12.6 2013    PT 13.2 2011    INR 1.05 2024    INR 0.99 2022    INR 1.09 2018               Assessment:  Patient Active Problem List   Diagnosis    Major depressive disorder, recurrent episode, moderate (HCC)    Esophageal reflux    Chronic obstructive pulmonary disease with acute lower respiratory infection (HCC)    Acquired hypothyroidism    Memory deficit    Aortic atherosclerosis (HCC)    Diverticulosis of colon    Dizziness and giddiness    Dysthymic disorder    Lichen sclerosus    Pure hypercholesterolemia    Vitamin D deficiency    Vitiligo    Severe  episode of recurrent major depressive disorder, without psychotic features (HCC)    Paraesophageal hernia    Subclinical hypothyroidism    Hypertension    Protein-calorie malnutrition, unspecified severity (HCC)    Gastrointestinal hemorrhage, unspecified gastrointestinal hemorrhage type       Diverticular bleeding    Plan:  No acute surgical intervention   On a full liquid diet. Okay to advance diet as tolerated from a general surgery standpoint  Analgesics and antiemetics as needed  Continue to monitor hemoglobin  Continue to monitor bowel function.   Anticipate discharge home tomorrow if continues to do well  DVT prophylaxis with SCDs given concern for diverticular bleed  GI prophylaxis with protonix       Myra Mustafa PA-C  3/26/2024  12:08 PM    ADDENDUM:     Patient was seen and examined by me. I agree with the above note.  No acute events overnight.  Patient had a bowel movement that was black but no evidence of active bleeding.  Hemoglobin remained stable at 9.4 from 8.5.  She denies any other issues.    General: Alert, orientated x3. Cooperative. No apparent distress.  Pulmonary: non labored breathing, effort normal  Abdomen: Soft, non-distended, nontender to palpation  Extremities: warm, no edema or cyanosis        A/P 78 year old female with GI bleed most likely from diverticulosis     Overall, patient is doing well  Her latest bowel movement was black without any bright red blood or signs of active bleeding  Hemoglobin continues to trend upward without transfusion  No acute surgical intervention  Recommend patient advancing her diet to soft  She should stay until she has another bowel movement to confirm resolution of the bleeding  Patient will be cleared from a surgical standpoint as long as her bowel movements are brown and without signs of bleeding  Surgery will sign off at this time  Please page with any questions or concerns, especially any change in patient's condition    Thank you for letting  me participate in the care of this patient     This note was initiated by Myra Mustafa PA-C.  The PA saw the patient in conjunction with me. The PA performed a history, exam, and developed the assessment and plan. I agree with the mentioned assessment and plan and have provided further documentation of my own, if necessary.       Ina Rowan MD  [facility] General Surgery  3/26/2024  1:52 PM

## 2024-03-26 NOTE — PROGRESS NOTES
[facility]   part of [facility]     Hospitalist Progress Note     Bibi Diaz Patient Status:  Inpatient    1945 MRN [id number]   Location [facility] 3NE-A Attending Marcela Dangelo MD   Hosp Day # 6 PCP Rick Shannon DO     Chief Complaint: rectal bleeding    Subjective:     Patient denies abd pain    No rectal bleeding last night    Objective:    Review of Systems:   A comprehensive review of systems was completed; pertinent positive and negatives stated in subjective.    Vital signs:  Temp:  [97.6 °F (36.4 °C)-99 °F (37.2 °C)] 97.7 °F (36.5 °C)  Pulse:  [61-68] 66  Resp:  [18] 18  BP: (119-130)/(61-69) 130/69  SpO2:  [97 %-100 %] 97 %    Physical Exam:    General: No acute distress  Respiratory: No wheezes, no rhonchi  Cardiovascular: S1, S2, regular rate and rhythm  Abdomen: Soft, Non-tender, non-distended, positive bowel sounds  Neuro: No new focal deficits.   Extremities: No edema      Diagnostic Data:    Labs:  Recent Labs   Lab 24  0631 24  1420 24  2141 24  0920 24  1601 24  2240 24  0250 24  0604 24  1626 24  0001 24  0759 24  1609 24  0013   WBC 6.0 6.7  --  5.3  --  6.1  --  9.1  --   --   --   --   --   --    HGB 11.3* 10.1*   < > 6.4*  6.4*   < > 6.0*   < > 9.1*   < > 8.3* 8.1* 8.6* 8.5* 8.4*   MCV 90.2 90.7  --  93.7  --  92.1  --  87.7  --   --   --   --   --   --    .0 314.0  --  231.0  --  161.0  --  127.0*  --   --   --   --   --   --    INR 1.05  --   --   --   --   --   --   --   --   --   --   --   --   --     < > = values in this interval not displayed.       Recent Labs   Lab 24  0631 24  0920 24  0250 24  06   * 96  --   --    BUN 8* 9  --   --    CREATSERUM 0.80 0.60  --   --    CA 9.7 8.3*  --   --    ALB 3.8  --   --   --    * 139  --   --    K 4.0 3.6 3.8 4.0    114*  --   --    CO2 23.0 22.0  --   --    ALKPHO 78  --    --   --    AST 21  --   --   --    ALT 16  --   --   --    BILT 0.4  --   --   --    TP 6.7  --   --   --        Estimated Creatinine Clearance: 50 mL/min (based on SCr of 0.6 mg/dL).    No results for input(s): \"TROP\", \"TROPHS\", \"CK\" in the last 168 hours.    Recent Labs   Lab 03/20/24  0631   PTP 13.7   INR 1.05                  Microbiology    No results found for this visit on 03/20/24.      Imaging: Reviewed in Epic.    Medications:    sodium chloride   Intravenous Once    multivitamin with minerals  1 tablet Oral Daily    losartan  50 mg Oral QAM    mirtazapine  30 mg Oral Nightly    fluticasone furoate-vilanterol  1 puff Inhalation Daily    pantoprazole  40 mg Intravenous Q12H    sodium chloride   Intravenous Once       Assessment & Plan:      # 78 years old female hypertension hyperlipidemia COPD presents with lower GI bleeding  -Colonoscopy done no source of bleeding  -anemia stable hb 8.4  -transfused  -no rectal bleeding again last night  -tagged rbc scan negative     # Paraesophageal hiatal hernia and chronic gastroesophageal reflux disease status post robotic repair and fundoplication with general anesthesia February 27, 2023     # History of hypothyroidism anxiety and depression    #HTN    #COPD    Old blood and blood clots noted in the colon predominantly on the left side with some reflux old blood back to the cecum.  No active bleeding noted  Left-sided diverticulosis.  Suspect this is the source of bleeding  Hemorrhoids         Marcela Dangelo MD    Supplementary Documentation:     Quality:  DVT Mechanical Prophylaxis:   SCDs, Early ambuation  DVT Pharmacologic Prophylaxis   Medication   None                Code Status: Full Code  Flood: No urinary catheter in place  Flood Duration (in days):   Central line:    WOLF:     Discharge is dependent on: progress  At this point Ms. Diaz is expected to be discharge to: home    The 21st Century Cures Act makes medical notes like these available to patients  in the interest of transparency. Please be advised this is a medical document. Medical documents are intended to carry relevant information, facts as evident, and the clinical opinion of the practitioner. The medical note is intended as peer to peer communication and may appear blunt or direct. It is written in medical language and may contain abbreviations or verbiage that are unfamiliar.

## 2024-03-26 NOTE — PLAN OF CARE
A&O x4. VSS on RA. . Good relief for sore throat with cepacol lozenges PRN. Up with SBA. Voids freely. Declines SCDs. Left AC IV SL. IV protonix as ordered. Q8 Hgb. Reviewed POC, pain management, and fall precautions with pt. Bed alarm on w/bed in lowest position. Pt reminded to use call light. Verbalized understanding. Plan to dc home when medically cleared, possibly tomorrow

## 2024-03-27 LAB
HGB BLD-MCNC: 7.9 G/DL
HGB BLD-MCNC: 8.3 G/DL
HGB BLD-MCNC: 8.9 G/DL

## 2024-03-27 PROCEDURE — 99232 SBSQ HOSP IP/OBS MODERATE 35: CPT | Performed by: STUDENT IN AN ORGANIZED HEALTH CARE EDUCATION/TRAINING PROGRAM

## 2024-03-27 NOTE — DISCHARGE INSTRUCTIONS
For assistance in finding in home care please call:  [facility] at [facility] [phone] Email [email or web address]  [facility] [phone]  [employer]  [phone]    Sometimes managing your health at home requires assistance.  The [facility] team has recognized your preference to use [facility].  They can be reached by phone at [phone].  The fax number for your reference is [phone].. A representative from the home health agency will contact you or your family to schedule your first visit.

## 2024-03-27 NOTE — PLAN OF CARE
Pt a&0x4, denies pain at this time, RA, endorses intermittent nausea, PIV saline locked, up with assist.  Pt. worked with therapy and sat on the toilet and tried to pass a BM on the toilet. Pt endorses dizziness, feels like she is going to pass out, and feels \"hot\". Pt escorted back to bed. VSS stable.  Dr. Blanco paged to notify pt had near syncopal episode.   Safety precautions are in place.     Problem: GASTROINTESTINAL - ADULT  Goal: Minimal or absence of nausea and vomiting  Description: INTERVENTIONS:  - Maintain adequate hydration with IV or PO as ordered and tolerated  - Nasogastric tube to low intermittent suction as ordered  - Evaluate effectiveness of ordered antiemetic medications  - Provide nonpharmacologic comfort measures as appropriate  - Advance diet as tolerated, if ordered  - Obtain nutritional consult as needed  - Evaluate fluid balance  Outcome: Progressing  Goal: Maintains or returns to baseline bowel function  Description: INTERVENTIONS:  - Assess bowel function  - Maintain adequate hydration with IV or PO as ordered and tolerated  - Evaluate effectiveness of GI medications  - Encourage mobilization and activity  - Obtain nutritional consult as needed  - Establish a toileting routine/schedule  - Consider collaborating with pharmacy to review patient's medication profile  Outcome: Progressing     Problem: HEMATOLOGIC - ADULT  Goal: Maintains hematologic stability  Description: INTERVENTIONS  - Assess for signs and symptoms of bleeding or hemorrhage  - Monitor labs and vital signs for trends  - Administer supportive blood products/factors, fluids and medications as ordered and appropriate  - Administer supportive blood products/factors as ordered and appropriate  Outcome: Progressing  Goal: Free from bleeding injury  Description: (Example usage: patient with low platelets)  INTERVENTIONS:  - Avoid intramuscular injections, enemas and rectal medication administration  - Ensure safe mobilization  of patient  - Hold pressure on venipuncture sites to achieve adequate hemostasis  - Assess for signs and symptoms of internal bleeding  - Monitor lab trends  - Patient is to report abnormal signs of bleeding to staff  - Avoid use of toothpicks and dental floss  - Use electric shaver for shaving  - Use soft bristle tooth brush  - Limit straining and forceful nose blowing  Outcome: Progressing

## 2024-03-27 NOTE — SLP NOTE
ADULT SWALLOWING EVALUATION    ASSESSMENT    ASSESSMENT/OVERALL IMPRESSION:  Orders were received for a bedside swallowing evaluation as patient on a modified diet. Patient with history of paraesophageal hiatal hernia, chronic GERD, and gastroparesis. Patient is on a full liquid diet as she can not tolerate solids due to digestive issues.   Oral motor mechanism exam revealed functional oral range, rate, and strength of oral musculature, intact dentition, and clear vocal quality. Strong cough on command.    Assessed patient with thin liquids and puree (Full liquid-applesauce) via spoon, cup, and straw.   Oral phase revealed functional oral acceptance, retrieval and containment with timely and complete clearing of the oral cavity.   Pharyngeal phase revealed an apparent timely initiation of the pharyngeal phase with functional hyolaryngeal elevation on palpation. No coughing or throat clearing. Patient presents with functional oral phase and apparent functional pharyngeal phase of swallow.   Patient with safe and efficient po intake for current po diet. No further skilled swallowing intervention is warranted.       Mathur Assessment of Swallow Function Score: No abnormality detected    RECOMMENDATIONS   Diet Recommendations - Solids:  (Full Liquid)  Diet Recommendations - Liquids: Thin Liquids    Aspiration Precautions: Upright position  Medication Administration Recommendations: No restrictions  Treatment Plan/Recommendations: No further inpatient SLP service warranted    HISTORY   MEDICAL HISTORY  Reason for Referral: Stroke protocol    Problem List  Principal Problem:    Gastrointestinal hemorrhage, unspecified gastrointestinal hemorrhage type      Past Medical History  Past Medical History:   Diagnosis Date    Abdominal distention     Abdominal pain     Acquired hypothyroidism 03/01/2018    Anxiety     Asthma (HCC)     Belching     Bloating     Chronic rhinitis     CKD (chronic kidney disease) stage 3, GFR 30-59  ml/min ([facility]) 07/09/2019    Constipation     COPD (chronic obstructive pulmonary disease) ([facility])     NO OXYGEN     Depression     Diarrhea, unspecified     Diverticulosis of large intestine     Emphysema     Esophageal reflux     Fatigue     Feeling lonely     Flatulence/gas pain/belching     Food intolerance     H/O bronchitis     Hay fever     Hemorrhoids     High blood pressure     History of depression     Hypercholesterolemia     Hypertension 01/19/2023    Itch of skin     Migraines     Mild intermittent asthma without complication ([facility])     Nausea     Night sweats     Pneumonia 03/2018    PONV (postoperative nausea and vomiting)     Pure hypercholesterolemia 12/15/2004    Rash     Reflux     Sleep disturbance     Stress     Uncomfortable fullness after meals     Visual impairment     glasses    Vomiting     Wears glasses        Prior Living Situation: Home alone  Diet Prior to Admission: Thin liquids (full liquid solid diet as patient with gastroparesis limiting po options.)  Precautions: None    Patient/Family Goals: To eat and drink    SWALLOWING HISTORY  Current Diet Consistency: Thin liquids (Full liquids)  Dysphagia History: No past history  Imaging Results:     SUBJECTIVE       OBJECTIVE   ORAL MOTOR EXAMINATION  Dentition: Natural;Functional  Symmetry: Within Functional Limits  Strength: Within Functional Limits  Tone: Within Functional Limits  Range of Motion: Within Functional Limits  Rate of Motion: Within Functional Limits    Voice Quality: Clear  Respiratory Status: Unlabored  Consistencies Trialed: Thin liquids;Puree  Method of Presentation: Self presentation;Spoon;Cup;Single sips;Consecutive swallows  Patient Positioning: Upright;Midline (in bed)    Oral Phase of Swallow: Within Functional Limits                      Pharyngeal Phase of Swallow: Within Functional Limits           (Please note: Silent aspiration cannot be evaluated clinically. Videofluoroscopic Swallow Study is required to rule-out  silent aspiration.)    Esophageal Phase of Swallow: No complaints consistent with possible esophageal involvement  Comments:     FOLLOW UP  Treatment Plan/Recommendations: No further inpatient SLP service warranted     Follow Up Needed (Documentation Required): No       Thank you for your referral.   If you have any questions, please contact Jesica MILLS SLP

## 2024-03-27 NOTE — DISCHARGE SUMMARY
[facility]     Bibi Diaz Patient Status:  Inpatient    1945 MRN [id number]   Location [facility] 3NE-A Attending Margot Blanco MD   Hosp Day # 7 PCP Rick Shannon DO     Date of Admission: 3/20/2024  Date of Discharge:   3/28/2024    Discharge Disposition: Home or Self Care    Discharge Diagnosis:    # 78 years old female hypertension hyperlipidemia COPD presents with lower GI bleeding  -Colonoscopy done no source of bleeding  -anemia stable hb 8.4  -transfused  -no rectal bleeding again last night  -tagged rbc scan negative     # Paraesophageal hiatal hernia and chronic gastroesophageal reflux disease status post robotic repair and fundoplication with general anesthesia 2023     # History of hypothyroidism anxiety and depression     #HTN     #COPD    History of Present Illness:   Bibi Diaz is a 78 year old female with melena for 2 days, also bright blood per rectum for 2 days, about 4 bm/day.She is c/o passing more blood per rectumtoday at least 4-5 times, she feels nauseated dizzy and she is c/o low abd pain.  Patient has history of hypertension hyperlipidemia COPD chronic kidney disease 3 anxiety depression anemia and she is s/p hiatal hernia repair in 2023 with large paraesophageal hernia and gastroparesis.  She presents to emergency room with lower GI bleeding 2 days history of passing red blood per her rectum about 5 times daily with acute onset.  EGD done 2 weeks ago revealed normal esophagus and postsurgical changes.  Last colonoscopy was in 2018 that showed severe diverticulosis and internal hemorrhoids.  Her hemoglobin 11.3 today.     Brief Synopsis:     Pt admitted, GI consulted and surgery - CTA 3/22/24 negative for extravasation, and tagged RBC 3/25/24 negative- jon GI suspicion for intermittent diverticular bleed.   DC  in stable condiiton.     Lace+ Score: 74  59-90 High Risk  29-58 Medium Risk  0-28   Low Risk  Patient was  referred to the [facility].    TCM Follow-Up Recommendation:  LACE > 58: High Risk of readmission after discharge from the hospital.      Procedures during hospitalization:   As above     Incidental or significant findings and recommendations (brief descriptions):  no    Lab/Test results pending at Discharge:   no    Consultants:   GI, Surgery     Discharge Medication List:     Discharge Medications        CHANGE how you take these medications        Instructions Prescription details   triamcinolone 0.1 % Crea  Commonly known as: Kenalog  What changed:   how much to take  how to take this  when to take this      APPLY TOPICALLY TO THE AFFECTED AREA TWICE DAILY AS NEEDED   Quantity: 60 g  Refills: 3            CONTINUE taking these medications        Instructions Prescription details   acetaminophen 500 MG Tabs  Commonly known as: Tylenol Extra Strength      Take 1 tablet (500 mg total) by mouth every 6 (six) hours as needed for Pain.   Refills: 0     albuterol 108 (90 Base) MCG/ACT Aers  Commonly known as: ProAir HFA      INHALE 2 PUFFS BY MOUTH EVERY 6 HOURS AS NEEDED FOR WHEEZING   Quantity: 8.5 g  Refills: 2     clobetasol 0.05 % Oint  Commonly known as: Temovate       Refills: 0     clonazePAM 0.5 MG Tabs  Commonly known as: KlonoPIN      Take 1 tablet (0.5 mg total) by mouth 2 (two) times daily as needed.   Quantity: 60 tablet  Refills: 0     Dupixent 300 MG/2ML Sosy  Generic drug: Dupilumab       Refills: 0     HYDROcodone-acetaminophen 5-325 MG Tabs  Commonly known as: Norco      Take 1 tablet by mouth every 8 (eight) hours as needed for Pain.   Quantity: 20 tablet  Refills: 0     losartan 50 MG Tabs  Commonly known as: Cozaar      Take 1 tablet (50 mg total) by mouth every morning.   Quantity: 90 tablet  Refills: 0     meclizine 12.5 MG Tabs  Commonly known as: Antivert      Take 1 tablet (12.5 mg total) by mouth 3 (three) times daily as needed.   Quantity: 30 tablet  Refills: 0      mirtazapine 30 MG Tabs  Commonly known as: Remeron      Take 1 tablet (30 mg total) by mouth nightly.   Quantity: 90 tablet  Refills: 1     polyethylene glycol (PEG 3350) 17 g Pack  Commonly known as: Miralax      Take 17 g by mouth daily.   Refills: 0     Symbicort 160-4.5 MCG/ACT Aero  Generic drug: Budesonide-Formoterol Fumarate      INHALE 2 PUFFS BY MOUTH TWICE DAILY   Quantity: 10.2 g  Refills: 11     VITAMIN D-3 OR      Take by mouth.   Refills: 0            STOP taking these medications      ondansetron 4 MG Tbdp  Commonly known as: Zofran-ODT                 [place]    Follow-up appointment:   No follow-up provider specified.  Appointments for Next 30 Days 3/27/2024 - 2024      None            Vital signs:  Temp:  [97.5 °F (36.4 °C)-97.9 °F (36.6 °C)] 97.8 °F (36.6 °C)  Pulse:  [59-74] 74  Resp:  [16-18] 18  BP: (103-133)/(43-67) 133/67  SpO2:  [96 %-97 %] 96 %    Physical Exam:    General: No acute distress   Lungs: clear to auscultation  Cardiovascular: S1, S2  Abdomen: Soft      -----------------------------------------------------------------------------------------------  PATIENT DISCHARGE INSTRUCTIONS: See electronic chart    Margot Blanco MD    Total time spent on discharge plannin minutes     The  [place] Cures Act makes medical notes like these available to patients in the interest of transparency. Please be advised this is a medical document. Medical documents are intended to carry relevant information, facts as evident, and the clinical opinion of the practitioner. The medical note is intended as peer to peer communication and may appear blunt or direct. It is written in medical language and may contain abbreviations or verbiage that are unfamiliar.

## 2024-03-27 NOTE — CM/SW NOTE
03/27/24 1600   CM/SW Referral Data   Referral Source Social Work (self-referral)   Reason for Referral Discharge planning   Informant Patient;EMR;Clinical Staff Member   Medical Hx   Does patient have an established PCP? Yes   Patient Info   Patient's Current Mental Status at Time of Assessment Alert;Oriented   Patient's Home Environment Condo/Apt no elevator   Patient lives with Alone   Patient Status Prior to Admission   Independent with ADLs and Mobility Yes   Discharge Needs   Anticipated D/C needs Home health care   Choice of Post-Acute Provider   Informed patient of right to choose their preferred provider Yes   List of appropriate post-acute services provided to patient/family with quality data Yes   Patient/family choice [facility]   Information given to Patient     HOME SITUATION  Type of Home: Apartment   Home Layout: One level  Stairs to Enter : 0     Lives With: Alone (Reports 2 nieces in [place] may be able to help)  Drives: No ([facility]  Patient Owned Equipment: None  Patient Regularly Uses: Glasses     Prior Level of [place] per PT eval: Pt typically indep with ADLs and mobility    Pt is a 79 y/o female admitted with GI hemorrhage. Noted PT is anticipating pt will benefit from HH services at discharge. HH referrals sent in AIDIN. SW met with pt who is alert and oriented at bedside to discuss discharge planning.     Pt lives alone and is typically independent with ADLs. SW discussed caregiving and offered resources. Pt stated her niece will stay with her for a few days at discharge. [facility] information on AVS. SW discussed HH services, pt agreeable. HH choice list provided to pt and pt selected [facility]. Pt denied any further needs at this time.    [facility] reserved in AIDIN and contact information placed on AVS. SW will continue to follow.     KELSEY Mack  Discharge Planner

## 2024-03-27 NOTE — PLAN OF CARE
Pt is A&O x4. VSS- NSR on tele. RA. Re-educated and encouraged use of IS while awake. Pt denies pain/nausea/SOB. PIV to RAC is SL. Hgb q8hr. No further signs of bleeding. Pt tolerating full liquids.   PT/OT/SLP to eval.       Problem: GASTROINTESTINAL - ADULT  Goal: Minimal or absence of nausea and vomiting  Description: INTERVENTIONS:  - Maintain adequate hydration with IV or PO as ordered and tolerated  - Nasogastric tube to low intermittent suction as ordered  - Evaluate effectiveness of ordered antiemetic medications  - Provide nonpharmacologic comfort measures as appropriate  - Advance diet as tolerated, if ordered  - Obtain nutritional consult as needed  - Evaluate fluid balance  Outcome: Progressing  Goal: Maintains or returns to baseline bowel function  Description: INTERVENTIONS:  - Assess bowel function  - Maintain adequate hydration with IV or PO as ordered and tolerated  - Evaluate effectiveness of GI medications  - Encourage mobilization and activity  - Obtain nutritional consult as needed  - Establish a toileting routine/schedule  - Consider collaborating with pharmacy to review patient's medication profile  Outcome: Progressing     Problem: HEMATOLOGIC - ADULT  Goal: Maintains hematologic stability  Description: INTERVENTIONS  - Assess for signs and symptoms of bleeding or hemorrhage  - Monitor labs and vital signs for trends  - Administer supportive blood products/factors, fluids and medications as ordered and appropriate  - Administer supportive blood products/factors as ordered and appropriate  Outcome: Progressing  Goal: Free from bleeding injury  Description: (Example usage: patient with low platelets)  INTERVENTIONS:  - Avoid intramuscular injections, enemas and rectal medication administration  - Ensure safe mobilization of patient  - Hold pressure on venipuncture sites to achieve adequate hemostasis  - Assess for signs and symptoms of internal bleeding  - Monitor lab trends  - Patient is to  report abnormal signs of bleeding to staff  - Avoid use of toothpicks and dental floss  - Use electric shaver for shaving  - Use soft bristle tooth brush  - Limit straining and forceful nose blowing  Outcome: Progressing

## 2024-03-27 NOTE — OCCUPATIONAL THERAPY NOTE
OCCUPATIONAL THERAPY EVALUATION - INPATIENT     Room Number: 3607/3607-A  Evaluation Date: 3/27/2024  Type of Evaluation: Initial  Presenting Problem: GI hemorrhage    Physician Order: IP Consult to Occupational Therapy  Reason for Therapy: ADL/IADL Dysfunction and Discharge Planning    OCCUPATIONAL THERAPY ASSESSMENT   Patient is currently functioning below baseline with toileting, bathing, transfers, and dynamic standing balance. Prior to admission, patient's baseline is independent, living alone, managing all IADLs except driving and shopping.  Patient is requiring contact guard assist and minimal assist as a result of the following impairments: decreased endurance and impaired standing balance. Occupational Therapy will continue to follow for duration of hospitalization.    Patient will benefit from continued skilled OT Services at discharge to promote functional independence in home.  Anticipate patient will return home with home health OT      History Related to Current Admission: Patient is a 78 year old female admitted on 3/20/2024 with Presenting Problem: GI hemorrhage. Co-Morbidities : HTN, HLD, COPD, CKD3, anxiety/depression  Patietnt admitted from home with dark tarry stools. Underwent colonoscopy on 3/21/24. CT ab/pelvis negative for extravasation. No acute surgical intervention planned at this time.       WEIGHT BEARING RESTRICTION  Weight Bearing Restriction: None                Recommendations for nursing staff:   Transfers: 1 person, RW, gait belt  Toileting location: bathroom     EVALUATION SESSION:  Patient Start of Session: SF position, agreeable to therapy  FUNCTIONAL TRANSFER ASSESSMENT  Sit to Stand: Edge of Bed  Edge of Bed: Contact Guard Assist    BED MOBILITY  Supine to Sit : Supervision  Scooting: SUP    BALANCE ASSESSMENT     FUNCTIONAL ADL ASSESSMENT  Eating: Independent  Grooming Seated: Independent  LB Dressing Seated: Supervision  LB Dressing Standing: Contact Guard  Assist      ACTIVITY TOLERANCE:  97% on room air           BP: 101/58  BP Location: Right arm  BP Method: Automatic  Patient Position: Lying    O2 SATURATIONS       COGNITION  Overall Cognitive Status:  WFL - within functional limits    Upper Extremity   ROM: within functional limits   Strength: within functional limits   Coordination  Gross motor: wfl  Fine motor: wfl  Sensation: denied deficits    EDUCATION PROVIDED  Patient: Role of Occupational Therapy; Plan of Care; Discharge Recommendations; Fall Prevention; DME Recommendations  Patient's Response to Education: Verbalized Understanding    Equipment used: gait belt, RW  Demonstrates functional use, Would benefit from additional trial yes     Therapist comments: OT ed patient on POC and goals. SUP to IND for spine to sit. Able to use hip+knee flexion to don socks at edge of bed without difficulty.  Stood up with unsteadiness noted and minimal posterior LOB x3. RW provided. OT cued pt for safe RW integration for functional mobility to bathroom. Patient required MOD A to pull brief from waist to knees. After sitting for about 1-2 minutes on toilet, the patient reported that she feels hot and dizzy, like she might pass out. RN contacted and arrived to room. BP unable to read. Patient assisted with MOD A x2 for squat pivot to recliner from toilet, wheeled to edge of bed, and assisted to supine with MOD A x2. /58. OT educated patient on benefit of increased support at home especially for first few days. OT also recommended bedside commode. Patient voiced understanding.     Patient End of Session: All patient questions and concerns addressed;RN aware of session/findings;Call light within reach;Needs met;With  staff;In bed;Alarm set    OCCUPATIONAL PROFILE    HOME SITUATION  Type of Home: Apartment  Home Layout: One level  Lives With: Alone (Reports 2 nieces in [place] may be able to help)    Toilet and Equipment: Standard height toilet  Shower/Tub and  Equipment: Shower chair;Tub-shower combo       Occupation/Status: retired     Drives: No ([facility] transportation)  Patient Regularly Uses: Glasses    Prior Level of Function:   Lives alone in [facility], does not drive. Grand daughter assists with driving and shopping. Patient does not cook since she is on a liquid diet at baseline due to gastroparesis.     SUBJECTIVE   Participatory, but upset after developing symptoms   \"I thought I was doing so much better!\"    PAIN ASSESSMENT  Rating: Unable to rate  Location: abdominal pain when straining  Management Techniques: Nurse notified;Relaxation;Repositioning    OBJECTIVE  Precautions: Bed/chair alarm  Fall Risk: High fall risk      ASSESSMENTS    AM-PAC ‘6-Clicks’ Inpatient Daily Activity Short Form  -   Putting on and taking off regular lower body clothing?: A Little  -   Bathing (including washing, rinsing, drying)?: A Little  -   Toileting, which includes using toilet, bedpan or urinal? : A Little  -   Putting on and taking off regular upper body clothing?: None  -   Taking care of personal grooming such as brushing teeth?: None  -   Eating meals?: None    AM-PAC Score:  Score: 21  Approx Degree of Impairment: 32.79%  Standardized Score (AM-PAC Scale): 44.27    ADDITIONAL TESTS     NEUROLOGICAL FINDINGS      COGNITION ASSESSMENTS       PLAN  OT Treatment Plan: ADL training;Functional transfer training;Endurance training;Energy conservation/work simplification techniques;Equipment eval/education;Patient/Family training;Patient/Family education;Compensatory technique education  Rehab Potential : Good  Frequency: 3-5x/week  Number of Visits to Meet Established Goals: 3    ADL Goals   Patient will perform grooming: with supervision and while standing at sink  Patient will perform upper body dressing:  with supervision  Patient will perform lower body dressing:  with supervision and with adaptive equipment PRN  Patient will perform toileting: with  supervision    Functional Transfer Goals  Patient will transfer from supine to sit:  with supervision  Patient will transfer from sit to stand:  with supervision  Patient will transfer to toilet:  with supervision    Additional Goals  Patient will describe at least  2 energy conservation methods to be used during ADLs and IADLs    Therapist Goals  PHQ9 screening    Patient Evaluation Complexity Level:   Occupational Profile/Medical History MODERATE - Expanded review of history including review of medical or therapy record   Specific performance deficits impacting engagement in ADL/IADL LOW  1 - 3 performance deficits    Client Assessment/Performance Deficits MODERATE - Comorbidities and min to mod modifications of tasks    Clinical Decision Making MODERATE - Analysis of occupational profile, detailed assessments, several treatment options    Overall Complexity LOW     OT Session Time: 18 minutes  Self-Care Home Management: 5 minutes  Therapeutic Activity: 8 minutes

## 2024-03-28 VITALS
DIASTOLIC BLOOD PRESSURE: 85 MMHG | TEMPERATURE: 98 F | SYSTOLIC BLOOD PRESSURE: 134 MMHG | HEART RATE: 72 BPM | BODY MASS INDEX: 18 KG/M2 | OXYGEN SATURATION: 96 % | WEIGHT: 91.94 LBS | RESPIRATION RATE: 20 BRPM

## 2024-03-28 LAB
HGB BLD-MCNC: 7.8 G/DL
HGB BLD-MCNC: 8.4 G/DL
HGB BLD-MCNC: 9.2 G/DL

## 2024-03-28 PROCEDURE — 99239 HOSP IP/OBS DSCHRG MGMT >30: CPT | Performed by: STUDENT IN AN ORGANIZED HEALTH CARE EDUCATION/TRAINING PROGRAM

## 2024-03-28 RX ORDER — SORBITOL SOLUTION 70 %
30 SOLUTION, ORAL MISCELLANEOUS ONCE
Status: COMPLETED | OUTPATIENT
Start: 2024-03-28 | End: 2024-03-28

## 2024-03-28 NOTE — PHYSICAL THERAPY NOTE
PHYSICAL THERAPY TREATMENT NOTE - INPATIENT    Room Number: 3607/3607-A     Session: 1     Number of Visits to Meet Established Goals: 4    Presenting Problem: GIB, hematochezia s/p colonoscopy 3/21  Co-Morbidities : HTN, HLD, COPD, CKD3, anxiety/depression    ASSESSMENT   Patient demonstrates good  progress this session, goals  updated to reflect patient performance.    Patient continues to function near baseline with bed mobility, transfers, gait, and standing prolonged periods.  Contributing factors to remaining limitations include impaired dynamic balance.  Next session anticipate patient to progress gait.  Physical Therapy will continue to follow patient for duration of hospitalization.    Patient continues to benefit from continued skilled PT services: at discharge to promote functional independence in home.  Anticipate patient will return home with home health PT.    PLAN  PT Treatment Plan: Bed mobility;Body mechanics;Endurance;Energy conservation;Patient education;Family education;Gait training;Transfer training;Balance training;Strengthening  Rehab Potential : Good  Frequency (Obs): 3x/week    CURRENT GOALS   Goal #1 Patient is able to demonstrate supine - sit EOB @ level: modified independent  MET   Goal #2 Patient is able to demonstrate transfers EOB to/from Chair/Wheelchair at assistance level: modified independent   MET   Goal #3 Patient is able to ambulate 150 feet with assist device:  LRAD  at assistance level: supervision   ongoing   Goal #4     Goal #5     Goal #6     Goal Comments: Goals established on 3/27/2024   3/28/2024 goals updated as noted above     PHYSICAL THERAPY MEDICAL/SOCIAL HISTORY  History related to current admission: Patient is a 78 year old female admitted on 3/20/2024 from home for black/tarry stools.  Pt diagnosed with GIB, hematochezia.        HOME SITUATION  Type of Home: Apartment   Home Layout: One level  Stairs to Enter : 0     Lives With: Alone (Reports 2 nieces in  [place] may be able to help)  Drives: No ([facility] provides transportation)  Patient Owned Equipment: None  Patient Regularly Uses: Glasses     Prior Level of [place]: Pt typically indep with ADLs and mobil        SUBJECTIVE  \"I'm glad you came\"   \"I feel better\"    OBJECTIVE  Precautions: Bed/chair alarm    WEIGHT BEARING RESTRICTION  Weight Bearing Restriction: None                PAIN ASSESSMENT   Ratin          BALANCE                                                                                                                       Static Sitting: Good  Dynamic Sitting: Good           Static Standing: Fair -  Dynamic Standing: Fair -    ACTIVITY TOLERANCE  Pulse: 76  Heart Rate Source: Monitor     BP: 131/65  BP Location: Right arm  BP Method: Automatic  Patient Position: Standing    O2 WALK  Oxygen Therapy  SPO2% on Room Air at Rest: 99  SPO2% Ambulation on Room Air: 98      AM-PAC '6-Clicks' INPATIENT SHORT FORM - BASIC MOBILITY  How much difficulty does the patient currently have...  Patient Difficulty: Turning over in bed (including adjusting bedclothes, sheets and blankets)?: None   Patient Difficulty: Sitting down on and standing up from a chair with arms (e.g., wheelchair, bedside commode, etc.): None   Patient Difficulty: Moving from lying on back to sitting on the side of the bed?: None   How much help from another person does the patient currently need...   Help from Another: Moving to and from a bed to a chair (including a wheelchair)?: A Little   Help from Another: Need to walk in hospital room?: A Little   Help from Another: Climbing 3-5 steps with a railing?: A Little       AM-PAC Score:  Raw Score: 21   Approx Degree of Impairment: 28.97%   Standardized Score (AM-PAC Scale): 50.25   CMS Modifier (G-Code): CJ    FUNCTIONAL ABILITY STATUS  Gait Assessment   Functional Mobility/Gait Assessment  Gait Assistance: Supervision  Distance (ft): 100  Assistive Device: Rolling  walker  Pattern: Within Functional Limits (slow gait speed)    Skilled Therapy Provided    Bed Mobility:  Rolling: ind   Supine<>Sit: ind   Sit<>Supine: ind     Transfer Mobility:  Sit<>Stand: ind   Stand<>Sit: sba   Gait: sba, no LOB, slow gait speed, trial sans walker with increased lateral sway, recommend r/w at this time.     Therapist's Comments: Pt presents supine in bed, agreeable to PT.   BP  Supine: 130/62  Sittin/70  Standin/65  Pt educated on walker use, recommend walker for home. RN notified. Encouraged pt to ambulate in halls with staff 3 x day, use bathroom instead of purewick. Pt verbalizes understanding.         Patient End of Session: In bed;Needs met;Call light within reach;RN aware of session/findings;All patient questions and concerns addressed    PT Session Time: 25 minutes  Gait Training: 10 minutes  Therapeutic Activity: 10 minutes

## 2024-03-28 NOTE — DIETARY NOTE
[facility]   part of [facility]    NUTRITION ASSESSMENT    Pt does not meet malnutrition criteria at this time.      NUTRITION INTERVENTION:    Meal and Snacks - Monitor and encourage adequate PO intake.   Medical Food Supplements - [place] instant breakfast BID. Rationale/use for oral supplements discussed.  Vitamin and Mineral Supplements - adding Multivitamin with minerals  Coordination of Nutrition Care - GI/Surgery consult for nutrition support/diet advancement       PATIENT STATUS:   3/28 - Pt doing well on Full Liquid diet (pt typical diet at home). Drinking CIB and tolerating well. No n/v. No BM - receiving miralax.  If no s/s of GIB, possible discharge this date.      3/25 - Pt feels well, just finished some clear liquids and feels hungry. Per GI - ADAT.  Pt notes she normally only follows liquid diet at home for gastroparesis. She has been doing this for about a year.  She notes she has eliminated all fat and fiber with good result. She notes initial wt loss which has leveled off lately.  Offered CIB while in hospital, which she normally takes, and she is agreeable. Pt notes no s/s of EFAD.  Advised MVI, pt agreeable. She has no questions/concerns at this time.    03/21/24 78 year old female admitted w/ Gastrointestinal hemorrhage. PMH includes hypertension hyperlipidemia COPD chronic kidney disease 3 anxiety depression anemia and she is s/p hiatal hernia repair in February 2023 with large paraesophageal hernia and gastroparesis. Pt seen for low BMI of 17.97. Pt NPO at this time, plans for colonoscopy today. Pt reports fair appetite PTA. Was following full liquid diet for the past few months. No n/v/d. Last BM 3/21. Wt stable per EMR. Noted moderate muscle depletion Clavicle region. No questions at this time. Will continue to monitor.       ANTHROPOMETRICS:  Ht:  152.4 cm   Wt: 41.7 kg (91 lb 14.9 oz).   BMI: Body mass index is 17.95 kg/m².  IBW: 45.5 kg      WEIGHT HISTORY:   Weight loss:  No    Wt Readings from Last 10 Encounters:   03/23/24 41.7 kg (91 lb 14.9 oz)   03/14/24 42 kg (92 lb 9.6 oz)   03/13/24 44 kg (97 lb)   03/06/24 41.7 kg (92 lb)   03/04/24 42.5 kg (93 lb 12.8 oz)   02/27/24 41.7 kg (92 lb)   02/19/24 42.1 kg (92 lb 12.8 oz)   02/16/24 42.4 kg (93 lb 6.4 oz)   01/22/24 41.3 kg (91 lb)   01/09/24 41.6 kg (91 lb 12.8 oz)        NUTRITION:  Diet:       Procedures    Full liquid diet Is Patient on Accuchecks? No      Food Allergies: No  Cultural/Ethnic/Mu-ism Preferences Addressed: Yes    Percent Meals Eaten (last 3 days)       Date/Time Percent Meals Eaten (%)    03/26/24 0800 100 %    03/26/24 1300 100 %    03/26/24 1800 100 %            GI system review: WNL Last BM: 3/26  Skin and wounds: WNL    NUTRITION RELATED PHYSICAL FINDINGS:     1. Body Fat/Muscle Mass: moderate muscle depletion Clavicle region     2. Fluid Accumulation: none per RN documentation     NUTRITION PRESCRIPTION: 41.7 kg  Calories: 1981-1576 calories/day (35-40 kcal/kg)  Protein: 63-83 grams protein/day (1.5-2.0 grams protein per kg)  Fluid: ~1 ml/kcal or per MD discretion    NUTRITION DIAGNOSIS/PROBLEM:  Inadequate energy intake related to physiological causes as evidenced by documented/reported insufficient oral intake, loss of fat mass, loss of muscle mass, and low BMI      MONITOR AND EVALUATE/NUTRITION GOALS:  PO intake of 75% of meals TID - Met, continues  PO intake of 75% of oral nutrition supplement/s - Met, continues  Weight stable within 1 to 2 lbs during admission - Ongoing      MEDICATIONS:  Remeron, MVI, Protonix,     LABS:  Reviewed    Pt is at Moderate nutrition risk      Deya Trejo RD, LDN, [facility]  Clinical Dietitian  Phone [phone]

## 2024-03-28 NOTE — PLAN OF CARE
Pt is A&O x4. Pleasant. VSS- NSR on tele. RA. Encouraged use of IS while awake. PIV to RAC is SL. IV protonix q12. No further signs of bleeding. Hgb remains stable. PRN simethicone given for gas pain. Pt tolerating FLD.  PT rec HH PT.        Problem: GASTROINTESTINAL - ADULT  Goal: Minimal or absence of nausea and vomiting  Description: INTERVENTIONS:  - Maintain adequate hydration with IV or PO as ordered and tolerated  - Nasogastric tube to low intermittent suction as ordered  - Evaluate effectiveness of ordered antiemetic medications  - Provide nonpharmacologic comfort measures as appropriate  - Advance diet as tolerated, if ordered  - Obtain nutritional consult as needed  - Evaluate fluid balance  Outcome: Progressing  Goal: Maintains or returns to baseline bowel function  Description: INTERVENTIONS:  - Assess bowel function  - Maintain adequate hydration with IV or PO as ordered and tolerated  - Evaluate effectiveness of GI medications  - Encourage mobilization and activity  - Obtain nutritional consult as needed  - Establish a toileting routine/schedule  - Consider collaborating with pharmacy to review patient's medication profile  Outcome: Progressing     Problem: HEMATOLOGIC - ADULT  Goal: Maintains hematologic stability  Description: INTERVENTIONS  - Assess for signs and symptoms of bleeding or hemorrhage  - Monitor labs and vital signs for trends  - Administer supportive blood products/factors, fluids and medications as ordered and appropriate  - Administer supportive blood products/factors as ordered and appropriate  Outcome: Progressing  Goal: Free from bleeding injury  Description: (Example usage: patient with low platelets)  INTERVENTIONS:  - Avoid intramuscular injections, enemas and rectal medication administration  - Ensure safe mobilization of patient  - Hold pressure on venipuncture sites to achieve adequate hemostasis  - Assess for signs and symptoms of internal bleeding  - Monitor lab trends  -  Patient is to report abnormal signs of bleeding to staff  - Avoid use of toothpicks and dental floss  - Use electric shaver for shaving  - Use soft bristle tooth brush  - Limit straining and forceful nose blowing  Outcome: Progressing

## 2024-03-28 NOTE — PROGRESS NOTES
[facility]   part of [facility]     Hospitalist Progress Note     Bibi Diaz Patient Status:  Inpatient    1945 MRN [id number]   Location [facility] 3NE-A Attending Margot Blanco MD   Hosp Day # 8 PCP Rick Shannon DO     Chief Complaint: GIB     Subjective:     Patient  seen in AM on 3/27 doing well, hd presyncopal episode while on commode and DC deferred    Objective:    Review of Systems:   A comprehensive review of systems was completed; pertinent positive and negatives stated in subjective.    Vital signs:  Temp:  [98 °F (36.7 °C)-98.6 °F (37 °C)] 98.1 °F (36.7 °C)  Pulse:  [64-96] 64  Resp:  [16-18] 18  BP: ()/(46-72) 125/58  SpO2:  [96 %-99 %] 98 %    Physical Exam:    General: No acute distress  Respiratory: No wheezes, no rhonchi  Cardiovascular: S1, S2, regular rate and rhythm  Abdomen: Soft, Non-tender, non-distended, positive bowel sounds  Neuro: No new focal deficits.   Extremities: No edema      Diagnostic Data:    Labs:  Recent Labs   Lab 24  2240 24  0250 24  0604 24  1626 24  0022 24  0848 24  1607 24  0050 24  0856   WBC 6.1  --  9.1  --   --   --   --   --   --    HGB 6.0*   < > 9.1*   < > 7.9* 8.9* 8.3* 7.8* 9.2*   MCV 92.1  --  87.7  --   --   --   --   --   --    .0  --  127.0*  --   --   --   --   --   --     < > = values in this interval not displayed.       Recent Labs   Lab 24  0250 24  0604   K 3.8 4.0       CrCl cannot be calculated (Patient's most recent lab result is older than the maximum 7 days allowed.).    No results for input(s): \"TROP\", \"TROPHS\", \"CK\" in the last 168 hours.    No results for input(s): \"PTP\", \"INR\" in the last 168 hours.               Microbiology    No results found for this visit on 24.      Imaging: Reviewed in Epic.    Medications:    sodium chloride   Intravenous Once    multivitamin with minerals  1 tablet Oral Daily    losartan  50 mg Oral QAM     mirtazapine  30 mg Oral Nightly    fluticasone furoate-vilanterol  1 puff Inhalation Daily    pantoprazole  40 mg Intravenous Q12H    sodium chloride   Intravenous Once       Assessment & Plan:      #LGIB  No  bleeding on colonoscopy   Neg Tagged RBC scan   Possible intermittent diverticular bleed  #Presyncope on 3/17   Suspect vagal due to straining from BM  #Hypertension  3COPD      Margot Blanco MD    Supplementary Documentation:     Quality:  DVT Mechanical Prophylaxis:   SCDs, Early ambuation  DVT Pharmacologic Prophylaxis   Medication   None                Code Status: Full Code  Flood: External urinary catheter in place  Flood Duration (in days):   Central line:    WOLF: 3/28/2024    Discharge is dependent on: clinical   At this point Ms. Diaz is expected to be discharge to: home    The 21st Century Cures Act makes medical notes like these available to patients in the interest of transparency. Please be advised this is a medical document. Medical documents are intended to carry relevant information, facts as evident, and the clinical opinion of the practitioner. The medical note is intended as peer to peer communication and may appear blunt or direct. It is written in medical language and may contain abbreviations or verbiage that are unfamiliar.

## 2024-03-28 NOTE — PLAN OF CARE
Assumed patient care at 0730. A/Ox4. Room air. Normal sinus on tele. Electrolyte protocol (cardiac). Orthostatics completed, Positive results reported to MD. 500 fluid bolus given per MAR. Tolerating diet. Miralax given prn per MAR. Ambulates stand by, Up with meals. RAC SL. HGB Q8. All safety precautions are in place.     Problem: GASTROINTESTINAL - ADULT  Goal: Minimal or absence of nausea and vomiting  Description: INTERVENTIONS:  - Maintain adequate hydration with IV or PO as ordered and tolerated  - Nasogastric tube to low intermittent suction as ordered  - Evaluate effectiveness of ordered antiemetic medications  - Provide nonpharmacologic comfort measures as appropriate  - Advance diet as tolerated, if ordered  - Obtain nutritional consult as needed  - Evaluate fluid balance  Outcome: Progressing  Goal: Maintains or returns to baseline bowel function  Description: INTERVENTIONS:  - Assess bowel function  - Maintain adequate hydration with IV or PO as ordered and tolerated  - Evaluate effectiveness of GI medications  - Encourage mobilization and activity  - Obtain nutritional consult as needed  - Establish a toileting routine/schedule  - Consider collaborating with pharmacy to review patient's medication profile  Outcome: Progressing     Problem: HEMATOLOGIC - ADULT  Goal: Maintains hematologic stability  Description: INTERVENTIONS  - Assess for signs and symptoms of bleeding or hemorrhage  - Monitor labs and vital signs for trends  - Administer supportive blood products/factors, fluids and medications as ordered and appropriate  - Administer supportive blood products/factors as ordered and appropriate  Outcome: Progressing  Goal: Free from bleeding injury  Description: (Example usage: patient with low platelets)  INTERVENTIONS:  - Avoid intramuscular injections, enemas and rectal medication administration  - Ensure safe mobilization of patient  - Hold pressure on venipuncture sites to achieve adequate  hemostasis  - Assess for signs and symptoms of internal bleeding  - Monitor lab trends  - Patient is to report abnormal signs of bleeding to staff  - Avoid use of toothpicks and dental floss  - Use electric shaver for shaving  - Use soft bristle tooth brush  - Limit straining and forceful nose blowing  Outcome: Progressing

## 2024-03-28 NOTE — PROGRESS NOTES
[facility]   part of [facility]     Hospitalist Progress Note     Bibi Diaz Patient Status:  Inpatient    1945 MRN [id number]   Location [facility] 3NE-A Attending Margot Blanco MD   Hosp Day # 8 PCP Rick Shannon DO     Chief Complaint: GIB     Subjective:     Patient on 3/28 feels better. DC planning questions answered.     Objective:    Review of Systems:   A comprehensive review of systems was completed; pertinent positive and negatives stated in subjective.    Vital signs:  Temp:  [98 °F (36.7 °C)-98.6 °F (37 °C)] 98.1 °F (36.7 °C)  Pulse:  [64-96] 71  Resp:  [16-18] 18  BP: ()/(46-72) 125/58  SpO2:  [96 %-99 %] 98 %    Physical Exam:    General: No acute distress  Respiratory: No wheezes, no rhonchi  Cardiovascular: S1, S2, regular rate and rhythm  Abdomen: Soft, Non-tender, non-distended, positive bowel sounds  Neuro: No new focal deficits.   Extremities: No edema      Diagnostic Data:    Labs:  Recent Labs   Lab 24  2240 24  0250 24  0604 24  1626 24  0022 24  0848 24  1607 24  0050 24  0856   WBC 6.1  --  9.1  --   --   --   --   --   --    HGB 6.0*   < > 9.1*   < > 7.9* 8.9* 8.3* 7.8* 9.2*   MCV 92.1  --  87.7  --   --   --   --   --   --    .0  --  127.0*  --   --   --   --   --   --     < > = values in this interval not displayed.       Recent Labs   Lab 24  0250 24  0604   K 3.8 4.0       CrCl cannot be calculated (Patient's most recent lab result is older than the maximum 7 days allowed.).    No results for input(s): \"TROP\", \"TROPHS\", \"CK\" in the last 168 hours.    No results for input(s): \"PTP\", \"INR\" in the last 168 hours.               Microbiology    No results found for this visit on 24.      Imaging: Reviewed in Epic.    Medications:    sodium chloride   Intravenous Once    multivitamin with minerals  1 tablet Oral Daily    losartan  50 mg Oral QAM    mirtazapine  30 mg Oral Nightly     fluticasone furoate-vilanterol  1 puff Inhalation Daily    pantoprazole  40 mg Intravenous Q12H    sodium chloride   Intravenous Once       Assessment & Plan:      #LGIB  No  bleeding on colonoscopy   Neg Tagged RBC scan   Possible intermittent diverticular bleed  #Presyncope on 3/17   Suspect vagal due to straining from BM  Miralax  #Hypertension  Ortho hypotension on 3/28 s/p NSS bolus, repeat VS  #COPD  #anxiety/depression     Attempted to call niece x2 on 3/28 no answer / forwarded to .   Pt medically clear for DC  Margot Blanco MD    Supplementary Documentation:     Quality:  DVT Mechanical Prophylaxis:   SCDs, Early ambuation  DVT Pharmacologic Prophylaxis   Medication   None                Code Status: Full Code  Flood: External urinary catheter in place  Flood Duration (in days):   Central line:    WOLF: 3/28/2024    Discharge is dependent on: clinical   At this point Ms. Diaz is expected to be discharge to: home    The 21st Century Cures Act makes medical notes like these available to patients in the interest of transparency. Please be advised this is a medical document. Medical documents are intended to carry relevant information, facts as evident, and the clinical opinion of the practitioner. The medical note is intended as peer to peer communication and may appear blunt or direct. It is written in medical language and may contain abbreviations or verbiage that are unfamiliar.

## 2024-03-28 NOTE — PROGRESS NOTES
03/28/24 1004 03/28/24 1006 03/28/24 1007   Vitals   /46 112/68 127/72   MAP (mmHg) 68 82 90   BP Location Left arm Left arm Left arm   BP Method Automatic Automatic Automatic   Patient Position Lying Sitting Standing

## 2024-03-29 ENCOUNTER — PATIENT OUTREACH (OUTPATIENT)
Dept: CASE MANAGEMENT | Age: 79
End: 2024-03-29

## 2024-03-29 ENCOUNTER — TELEPHONE (OUTPATIENT)
Dept: FAMILY MEDICINE CLINIC | Facility: CLINIC | Age: 79
End: 2024-03-29

## 2024-03-29 PROCEDURE — 1159F MED LIST DOCD IN RCRD: CPT

## 2024-03-29 PROCEDURE — 1111F DSCHRG MED/CURRENT MED MERGE: CPT

## 2024-03-29 NOTE — TELEPHONE ENCOUNTER
UJLIO, Spoke to patient for TCM today.  Patient states that she will call office directly to schedule appt at a later time.  TCM appointment recommended by 4/4/24 as patient is a High risk for readmission.      BOOK BY DATE (last date for TCM): 4/11/24

## 2024-03-29 NOTE — PROGRESS NOTES
Initial Post Discharge Follow Up   Discharge Date: 3/28/24  Contact Date: 3/29/2024    Consent Verification:  Assessment Completed With: Patient  HIPAA Verified?  Yes    Discharge Dx:   Gastrointestinal hemorrhage, unspecified gastrointestinal hemorrhage type     General:   How have you been since your discharge from the hospital? I'm much better but still weak. I did have nausea in the middle of the night. I'm feeling better this morning.   When you were leaving the hospital were your discharge instructions reviewed with you? Yes  How well were your discharge instructions explained to you?   On a scale of 1-5   1- Very Poor and 5- Very well   Very Well  Do you have any questions about your discharge instructions?  No  Before leaving the hospital was your diagnoses explained to you? Yes  Do you have any questions about your diagnoses? No  Are you able to perform normal daily activities of living as you have prior to your hospital stay (dressing, bathing, ambulating to the bathroom, etc)? yes  (NCM) Was patient given a different diet per AVS? no      Medications:   Current Outpatient Medications   Medication Sig Dispense Refill    clonazePAM 0.5 MG Oral Tab Take 1 tablet (0.5 mg total) by mouth 2 (two) times daily as needed. 60 tablet 0    meclizine 12.5 MG Oral Tab Take 1 tablet (12.5 mg total) by mouth 3 (three) times daily as needed. 30 tablet 0    losartan 50 MG Oral Tab Take 1 tablet (50 mg total) by mouth every morning. 90 tablet 0    HYDROcodone-acetaminophen (NORCO) 5-325 MG Oral Tab Take 1 tablet by mouth every 8 (eight) hours as needed for Pain. 20 tablet 0    mirtazapine 30 MG Oral Tab Take 1 tablet (30 mg total) by mouth nightly. 90 tablet 1    Cholecalciferol (VITAMIN D-3 OR) Take by mouth.      polyethylene glycol, PEG 3350, 17 g Oral Powd Pack Take 17 g by mouth daily.      DUPIXENT 300 MG/2ML Subcutaneous Solution Prefilled Syringe       SYMBICORT 160-4.5 MCG/ACT Inhalation Aerosol INHALE 2 PUFFS BY  MOUTH TWICE DAILY 10.2 g 11    clobetasol 0.05 % External Ointment       TRIAMCINOLONE 0.1 % External Cream APPLY TOPICALLY TO THE AFFECTED AREA TWICE DAILY AS NEEDED (Patient taking differently: as needed.) 60 g 3    Albuterol Sulfate HFA (PROAIR HFA) 108 (90 Base) MCG/ACT Inhalation Aero Soln INHALE 2 PUFFS BY MOUTH EVERY 6 HOURS AS NEEDED FOR WHEEZING 8.5 g 2    acetaminophen 500 MG Oral Tab Take 1 tablet (500 mg total) by mouth every 6 (six) hours as needed for Pain.       Were there any changes to your current medication(s) noted on the AVS? Yes  If so, were these medication changes discussed with you prior to leaving the hospital? Yes  If a new medication was prescribed:  n/a  Let's go over your medications together to make sure we are not missing anything. Medications Reviewed  Are there any reasons that keep you from taking your medication as prescribed? No  Are you having any concerns with constipation? No      Discharge medications reviewed/discussed/and reconciled against outpatient medications with patient.  Any changes or updates to medications sent to PCP.  Patient Acknowledged     Referrals/orders at D/C:  Referrals/orders placed at D/C? yes  What services:   Home health   (If HH was ordered) Has HH been set up?  No, [facility] advised that [facility] should contact her within 48 hrs of discharge. If they do not, [facility] advised that she call them at the number listed on her AVS. She v/u.      DME ordered at D/C? No      Discharge orders, AVS reviewed and discussed with patient. Any changes or updates to orders sent to PCP.  Patient Acknowledged      SDOH:   Transportation Needs: No Transportation Needs (3/20/2024)    Transportation Needs     Lack of Transportation: No     Financial Resource Strain: Low Risk  (4/20/2023)    Financial Resource Strain     Difficulty of Paying Living Expenses: Not hard at all     Med Affordability: No       Follow up appointments:          TCC  Was TCC ordered: No      PCP (If no  TCC appointment)  Does patient already have a PCP appointment scheduled? No  NCM Attempted to schedule PCP office TCM appointment with patient   If no appointment scheduled: Explain-pt states that she just got home yesterday and needs sometime to recover. She states that she will call office at a later time to schedule appt. NCM sent TE to PCP office.     Specialist    Does the patient have any other follow up appointment(s) needing to be scheduled? No      Is there any reason as to why you cannot make your appointment(s)?  No     Needs post D/C:   Now that you are home, are there any needs or concerns you need addressed before your next visit with your PCP?  (DME, meds, questions, etc.): No    Interventions by NCM:   NCM reviewed discharge instructions and when to seek medical attention with the patient. She states that although she still feels weak and gets lightheaded at times, she is much better than she was and is improving. She states that she received a laxative yesterday and did have diarrhea from it in the hospital. She has not had any since being discharged. She states that she did wake up in the middle of the night with nausea but that has resolved now and she feels better. She currently denied having any fever, n/v/c/d, sob, HA or any new or worsening symptoms. Med review completed. She was quick to end the call and denied having any questions or concerns.       CCM referral placed:    Not Applicable, pt is enrolled    BOOK BY DATE: 4/11/24

## 2024-03-29 NOTE — PAYOR COMM NOTE
Discharge Notification    Patient Name: DENIS ARELLANO  Payor: [employer]  Subscriber #: [id number]  Authorization Number: [id number]  Admit Date/Time: 3/20/2024 6:19 AM  Discharge Date/Time: 3/28/2024 8:00 PM

## 2024-03-29 NOTE — CM/SW NOTE
AVS attached to [facility] referral for [facility]. Notified [facility] pt has discharged.    KELSEY Mack  Discharge Planner

## 2024-03-29 NOTE — PROGRESS NOTES
NURSING DISCHARGE NOTE    Discharged Home via Wheelchair.  Accompanied by Family member and RN  Belongings Taken by patient/family    Pt and her family member educated with AVS and taken down by wheelchair.

## 2024-04-02 ENCOUNTER — TELEPHONE (OUTPATIENT)
Dept: PHYSICAL MEDICINE AND REHAB | Facility: CLINIC | Age: 79
End: 2024-04-02

## 2024-04-02 ENCOUNTER — TELEPHONE (OUTPATIENT)
Dept: FAMILY MEDICINE CLINIC | Facility: CLINIC | Age: 79
End: 2024-04-02

## 2024-04-02 NOTE — TELEPHONE ENCOUNTER
Patient's niece (Bailey Tom) called to request that patient's condition be documented in her chart for an upcoming new patient appointment she has on 2024    Patient had appointment with Dr. Laguerre on 3/20/2024 but had    Patient's pain level witnessed:  Has seen patient scream into a pillow from pain  Slight tiny movement sends patient into pain. Pain is very intermittent.   Right leg to knee area. Numbness reported to hands and feet- especially right foot , right leg and right hand.   10/10 - patient was seen at ER 3-4 months - crawling  Pain:  Right hip     Patient herself on phone now:  Reports she had to call 911 for ambulance but the pain lasted about 3 hours. Took Lagrange at home. Patient states that pain causes her to scream out loud and the ED told her to stop screaming and was advised to stop screaming.    Patient states the pain makes the numbness more intense. Pain radiates down the right leg to about the knee.   Patient reports the pain comes on suddenly and stops suddenly.  This time she states an ache remained. The numbness went down the whole leg this time.      CT scan performed    Patient reports this has been going on for about 3 months.    NOTE:  Patient was seen at ED on 2023 for hip pain    RIGHT HIP- note the ED visit from 2023 notes Left hip, but also right hip.  It is the patient's RIGHT hip and side that is experiencing the symptoms.    [id number]  XR of HIP/PELVIS   2024 CT of abd & pelvis & XR hip/pelvis  3/21/2024 & 3/22/2024 CTA abd/pelvis

## 2024-04-02 NOTE — TELEPHONE ENCOUNTER
Called and left message for Khushi. Called Yareli - hung up.  Severe right hip pain.   Called and spoke to Bailey about severe pain in right hip. Pain comes and goes. Had appt to see Ortho - was in [facility] at the time. Numbness in hands and feet when she has pain.   Patient being rescheduled to see Dr. Laguerre. Pain episode was over in a few minutes.

## 2024-04-02 NOTE — TELEPHONE ENCOUNTER
PT IS A LOT OF PAIN. SHE IS SCREAMING AND CRYING IN PAIN.  RIGHT PIT PAIN.  DOESN'T WANT TO GO TO ER

## 2024-04-03 ENCOUNTER — PATIENT OUTREACH (OUTPATIENT)
Dept: CASE MANAGEMENT | Age: 79
End: 2024-04-03

## 2024-04-03 NOTE — PROGRESS NOTES
Attempted to contact pt for monthly outreach no answer left detailed message for pt to call back.     Outreach to patient to complete monthly follow up     Reviewed pt's chart including recent visits.     -Care everywhere updated.    -Immunization reconciliation completed. No updates available.    Pt is due for:     Health Maintenance   Topic Date Due    Zoster Vaccines (2 of 2) 09/09/2020    COVID-19 Vaccine (4 - 2023-24 season) 09/01/2023    Colorectal Cancer Screening  03/21/2029    Influenza Vaccine  Completed    DEXA Scan  Completed    MA Annual Health Assessment  Completed    Annual Depression Screening  Completed    Fall Risk Screening (Annual)  Completed    Pneumococcal Vaccine: 65+ Years  Completed         Future Appointments   Date Time Provider Department Center   4/4/2024  1:45 PM Eliana Shannon, DO [facility]   4/11/2024  8:30 AM Eliana Shannon, DO [facility]   5/1/2024  8:00 AM [facility] X[facility] (MISC FLUOROSCOPY) [facility]       Total time -  5 min  Total Monthly time- 5 min

## 2024-04-04 ENCOUNTER — OFFICE VISIT (OUTPATIENT)
Dept: PHYSICAL MEDICINE AND REHAB | Facility: CLINIC | Age: 79
End: 2024-04-04
Payer: COMMERCIAL

## 2024-04-04 ENCOUNTER — HOSPITAL ENCOUNTER (OUTPATIENT)
Dept: GENERAL RADIOLOGY | Age: 79
Discharge: HOME | End: 2024-04-04
Attending: PHYSICAL MEDICINE & REHABILITATION
Payer: MEDICARE

## 2024-04-04 VITALS — BODY MASS INDEX: 17.87 KG/M2 | RESPIRATION RATE: 20 BRPM | WEIGHT: 91 LBS | HEIGHT: 60 IN

## 2024-04-04 DIAGNOSIS — M54.16 RIGHT LUMBAR RADICULOPATHY: Primary | ICD-10-CM

## 2024-04-04 DIAGNOSIS — M54.16 RIGHT LUMBAR RADICULOPATHY: ICD-10-CM

## 2024-04-04 PROCEDURE — 1160F RVW MEDS BY RX/DR IN RCRD: CPT | Performed by: PHYSICAL MEDICINE & REHABILITATION

## 2024-04-04 PROCEDURE — 1126F AMNT PAIN NOTED NONE PRSNT: CPT | Performed by: PHYSICAL MEDICINE & REHABILITATION

## 2024-04-04 PROCEDURE — 1159F MED LIST DOCD IN RCRD: CPT | Performed by: PHYSICAL MEDICINE & REHABILITATION

## 2024-04-04 PROCEDURE — 3008F BODY MASS INDEX DOCD: CPT | Performed by: PHYSICAL MEDICINE & REHABILITATION

## 2024-04-04 PROCEDURE — 72114 X-RAY EXAM L-S SPINE BENDING: CPT | Performed by: PHYSICAL MEDICINE & REHABILITATION

## 2024-04-04 PROCEDURE — 99204 OFFICE O/P NEW MOD 45 MIN: CPT | Performed by: PHYSICAL MEDICINE & REHABILITATION

## 2024-04-04 PROCEDURE — 1111F DSCHRG MED/CURRENT MED MERGE: CPT | Performed by: PHYSICAL MEDICINE & REHABILITATION

## 2024-04-04 RX ORDER — GABAPENTIN 300 MG/1
CAPSULE ORAL
Qty: 90 CAPSULE | Refills: 0 | Status: SHIPPED | OUTPATIENT
Start: 2024-04-04

## 2024-04-04 NOTE — PATIENT INSTRUCTIONS
-Start Gabapentin 300mg three times daily  -MRI of the lumbar spine and follow up after  -Xray of the lumbar spine on the way out  -Start PT and home exercises

## 2024-04-08 NOTE — PROGRESS NOTES
[facility]  NEW PATIENT EVALUATION    Consultation as a request of Dr. Cabrera      HISTORY OF PRESENT ILLNESS:     Chief Complaint   Patient presents with    New Patient     New R handed patient Rfd by Dr. Rick Shannon for R hip pain. XR 2/27/24. Denies low back pain. Acute onset December 2023. Denies trigger or injury. Bothersome since then. Locates to side of R hip+ radiation  with N/T to lateral aspect of RLE. With pain, rates it 10/10. Describes the pain as \"sharp and stabbing\" then goes to \"throbbing\". Episodes last +2 hours with each flare.  Area \"always feel sore\". CPL 0/10       The patient is a 78 year old female with significant past medical history of asthma, anxiety, CKD, COPD, depression, GERD, fatigue, bronchitis, hypertension, migraine headaches who presents with right hip pain.  Pain has been ongoing since December 2023.  Pain has been sharp and located in the right hip with some radiation down the right leg with numbness tingling sensation.  She denies any loss of bowel bladder control, any saddle anesthesia, any fevers chills or weight loss.  Pain is rated 10 out of 10 when it is severe however at the moment it is manageable.  She has not had any imaging or other treatment.  She denies any weakness.  When the episode happens it can be lasting for 2 hours.    PHYSICAL EXAM:   Resp 20   Ht 60\"   Wt 91 lb (41.3 kg)   BMI 17.77 kg/m²     Gait  Able to toe walk and heel walk without any difficulty    LUMBAR SPINE:  Inspection: no erythema, swelling, or obvious deformity.  Their iliac crest and shoulder heights are symmetrical.     Palpation: Non tender to palpation of the spinous process.   ROM: Restricted in forward flexion with reproduction of pain  Strength: 5/5 in bilateral lower extremities except 4 out of 5 right lower extremity  Sensation: Intact to light touch in all dermatomes of the lower extremities but decreased in the foot  right-sided  Reflexes: 1/4 at L4 and S1  Facet Loading: no specific facet pain  Slump test: Positive for pain symptoms for radicular pain symptoms    IMAGING:     X-ray lumbar spine completed on 4/4/2024 is notable for multilevel degenerative changes with no evidence of instability or acute osseous abnormality.    All imaging results were reviewed and discussed with patient.      ASSESSMENT/PLAN:     1. Right lumbar radiculopathy        Bibi Diaz is a 78-year-old female presenting today for evaluation of right lumbar radiculopathy.  Have recommended x-ray and MRI imaging of the lumbar spine given the weakness and severity of her symptoms.  I recommended starting gabapentin 300 mg 3 times daily.  I recommend starting PT program with home exercises and following up after MRI imaging is completed so we can discuss further treatment options including possible lumbar epidural steroid injection.    The patient verbalized understanding with the plan and was in agreement. All questions/concerns were addressed and there were no barriers to learning.  Please note Dragon dictation software was used to dictate this note and may result in inadvertent typos.    Eliana Shannon DO, FAAPMR & CAQSM  Physical Medicine and Rehabilitation  Sports and Spine Medicine    PAST MEDICAL HISTORY:     Past Medical History:   Diagnosis Date    Abdominal distention     Abdominal pain     Acquired hypothyroidism 03/01/2018    Anxiety     Asthma ([facility])     Belching     Bloating     Chronic rhinitis     CKD (chronic kidney disease) stage 3, GFR 30-59 ml/min ([facility]) 07/09/2019    Constipation     COPD (chronic obstructive pulmonary disease) ([facility])     NO OXYGEN     Depression     Diarrhea, unspecified     Diverticulosis of large intestine     Emphysema     Esophageal reflux     Fatigue     Feeling lonely     Flatulence/gas pain/belching     Food intolerance     H/O bronchitis     Hay fever     Hemorrhoids     High blood pressure     History of  depression     Hypercholesterolemia     Hypertension 01/19/2023    Itch of skin     Migraines     Mild intermittent asthma without complication (HCC)     Nausea     Night sweats     Pneumonia 03/2018    PONV (postoperative nausea and vomiting)     Pure hypercholesterolemia 12/15/2004    Rash     Reflux     Sleep disturbance     Stress     Uncomfortable fullness after meals     Visual impairment     glasses    Vomiting     Wears glasses          PAST SURGICAL HISTORY:     Past Surgical History:   Procedure Laterality Date    CATARACT      COLONOSCOPY      COLONOSCOPY N/A 3/21/2024    Procedure: COLONOSCOPY;  Surgeon: Delon Ashford DO;  Location:  ENDOSCOPY    COLONOSCOPY & POLYPECTOMY  09/2014    multiple polyps; tics; repeat 3 yrs    COLONOSCOPY,REMV LESN,SNARE N/A 09/22/2014    Procedure: ESOPHAGOGASTRODUODENOSCOPY, COLONOSCOPY, POSSIBLE BIOPSY, POSSIBLE POLYPECTOMY [id number];  Surgeon: Slade Ivan MD;  Location: [facility]    CORRECT BUNION,SIMPLE Bilateral     CYSTOTOMY,EXCIS VESICAL NECK Left     EGD      GASTRO - DMG  09/2014    stricture; HH    OOPHORECTOMY Bilateral 2017    OTHER SURGICAL HISTORY  02/27/2023    Robotic repair of hiatal hernia and Nissen fundoplication    PATIENT DOCUMENTED NOT TO HAVE EXPERIENCED ANY OF THE FOLLOWING EVENTS N/A 09/22/2014    Procedure: ESOPHAGOGASTRODUODENOSCOPY, COLONOSCOPY, POSSIBLE BIOPSY, POSSIBLE POLYPECTOMY [id number];  Surgeon: Slade Ivan MD;  Location: [facility]    PATIENT WITHOUGH PREOPERATIVE ORDER FOR IV ANTIBIOTIC SURGICAL SITE INFECTION PROPHYLAXIS. N/A 09/22/2014    Procedure: ESOPHAGOGASTRODUODENOSCOPY, COLONOSCOPY, POSSIBLE BIOPSY, POSSIBLE POLYPECTOMY [id number];  Surgeon: Slade Ivan MD;  Location: [facility]    REPAIR ING HERNIA,5+Y/O,REDUCIBL      UPPER GI ENDOSCOPY,DIAGNOSIS N/A 09/22/2014    Procedure: ESOPHAGOGASTRODUODENOSCOPY, COLONOSCOPY, POSSIBLE BIOPSY, POSSIBLE POLYPECTOMY [id number];  Surgeon:  Slade Ivan MD;  Location: [facility]         CURRENT MEDICATIONS:     Current Outpatient Medications   Medication Sig Dispense Refill    gabapentin 300 MG Oral Cap Start with night time dose only. If well tolerated, increase to two times daily. If well tolerated, increase to three times daily. 90 capsule 0    clonazePAM 0.5 MG Oral Tab Take 1 tablet (0.5 mg total) by mouth 2 (two) times daily as needed. 60 tablet 0    meclizine 12.5 MG Oral Tab Take 1 tablet (12.5 mg total) by mouth 3 (three) times daily as needed. 30 tablet 0    losartan 50 MG Oral Tab Take 1 tablet (50 mg total) by mouth every morning. 90 tablet 0    HYDROcodone-acetaminophen (NORCO) 5-325 MG Oral Tab Take 1 tablet by mouth every 8 (eight) hours as needed for Pain. 20 tablet 0    mirtazapine 30 MG Oral Tab Take 1 tablet (30 mg total) by mouth nightly. 90 tablet 1    Cholecalciferol (VITAMIN D-3 OR) Take by mouth.      polyethylene glycol, PEG 3350, 17 g Oral Powd Pack Take 17 g by mouth daily.      DUPIXENT 300 MG/2ML Subcutaneous Solution Prefilled Syringe       SYMBICORT 160-4.5 MCG/ACT Inhalation Aerosol INHALE 2 PUFFS BY MOUTH TWICE DAILY 10.2 g 11    clobetasol 0.05 % External Ointment       TRIAMCINOLONE 0.1 % External Cream APPLY TOPICALLY TO THE AFFECTED AREA TWICE DAILY AS NEEDED (Patient taking differently: as needed.) 60 g 3    Albuterol Sulfate HFA (PROAIR HFA) 108 (90 Base) MCG/ACT Inhalation Aero Soln INHALE 2 PUFFS BY MOUTH EVERY 6 HOURS AS NEEDED FOR WHEEZING 8.5 g 2    acetaminophen 500 MG Oral Tab Take 1 tablet (500 mg total) by mouth every 6 (six) hours as needed for Pain.           ALLERGIES:     Allergies   Allergen Reactions    Avelox [Moxifloxacin Hcl In Nacl] SWELLING    Avelox [Moxifloxacin Hydrochloride] TONGUE SWELLING     \"TABS\"    Amoxicillin NAUSEA AND VOMITING     Vomiting.    Statins Myopathy    Sulfa Antibiotics RASH and ITCHING    Dander OTHER (SEE COMMENTS)     \"Animals\": runny nose, watery eyes,  itching    Diphenhydramine Runny nose     \"Animals\": runny nose, watery eyes, itching    Dust Runny nose    Latex RASH    Sulfa Drugs Cross Reactors RASH and ITCHING         FAMILY HISTORY:     Family History   Problem Relation Age of Onset    Colon Cancer Mother     Other (Other) Mother     Other (copd) Mother     Alcohol and Other Disorders Associated Father     Other (Other) Father     Other (emph) Father     Alcohol and Other Disorders Associated Son     Hypertension Sister     Prostate Cancer Brother     Hypertension Brother           SOCIAL HISTORY:     Social History     Socioeconomic History    Marital status:    Tobacco Use    Smoking status: Former     Years: 10     Types: Cigarettes     Quit date: 1980     Years since quittin.5    Smokeless tobacco: Never    Tobacco comments:      1/2 pack per day. Social history shows \"Tobacco Use: Active\" and \"Never Smoker: Active\"   Vaping Use    Vaping Use: Never used   Substance and Sexual Activity    Alcohol use: No    Drug use: No   Other Topics Concern    Caffeine Concern Yes     Comment: \"1 cup of coffee and tea, and can of pop daily\"    Exercise Yes     Comment: walking   Social History Narrative    ** Merged History Encounter **          Social Determinants of Health     Financial Resource Strain: Low Risk  (2023)    Financial Resource Strain     Difficulty of Paying Living Expenses: Not hard at all     Med Affordability: No   Food Insecurity: No Food Insecurity (3/20/2024)    Food Insecurity     Food Insecurity: Never true   Transportation Needs: No Transportation Needs (3/20/2024)    Transportation Needs     Lack of Transportation: No   Physical Activity: Inactive (2022)    Exercise Vital Sign     Days of Exercise per Week: 0 days     Minutes of Exercise per Session: 0 min   Stress: Stress Concern Present (2024)    Stress     Feeling of Stress : Yes    Social Connections   Housing Stability: Low Risk  (3/20/2024)    Housing  Stability     Housing Instability: No          REVIEW OF SYSTEMS:   No patient-reported data collected this visit.      PHYSICAL EXAM:   General: No immediate distress  Head: Normocephalic/ Atraumatic  Eyes: Extra-occular movements intact.   Ears: No auricular hematoma or deformities  Mouth: No lesions or ulcerations  Heart: peripheral pulses intact. Normal capillary refill.   Lungs: Non-labored respirations  Abdomen: No abdominal guarding  Extremities: No lower extremity edema bilaterally   Skin: No lesions noted   Cognition: alert & oriented x 3, attentive, able to follow 2 step commands, comprehention intact, spontaneous speech intact  Psychiatric: Mood and affect appropriate      LABS:     Lab Results   Component Value Date     02/28/2013    A1C 5.6 02/28/2013     Lab Results   Component Value Date    WBC 9.1 03/23/2024    RBC 3.00 (L) 03/23/2024    HGB 8.4 (L) 03/28/2024    HCT 26.3 (L) 03/23/2024    MCV 87.7 03/23/2024    MCH 30.3 03/23/2024    MCHC 34.6 03/23/2024    RDW 15.4 03/23/2024    .0 (L) 03/23/2024    MPV 9.4 02/28/2013     Lab Results   Component Value Date    GLU 96 03/21/2024    BUN 9 03/21/2024    BUNCREA 14 01/12/2022    CREATSERUM 0.60 03/21/2024    ANIONGAP 3 03/21/2024    GFR 53 (L) 12/12/2017    GFRNAA 65 07/14/2022    GFRAA 75 07/14/2022    CA 8.3 (L) 03/21/2024    OSMOCALC 287 03/21/2024    ALKPHO 78 03/20/2024    AST 21 03/20/2024    ALT 16 03/20/2024    BILT 0.4 03/20/2024    TP 6.7 03/20/2024    ALB 3.8 03/20/2024    GLOBULIN 2.9 03/20/2024    AGRATIO 1.9 01/12/2022     03/21/2024    K 4.0 03/23/2024     (H) 03/21/2024    CO2 22.0 03/21/2024     Lab Results   Component Value Date    PTP 13.7 03/20/2024    PT 12.6 01/31/2013    INR 1.05 03/20/2024     Lab Results   Component Value Date    VITD 34.9 05/01/2023

## 2024-04-09 RX ORDER — BUDESONIDE AND FORMOTEROL FUMARATE DIHYDRATE 160; 4.5 UG/1; UG/1
AEROSOL RESPIRATORY (INHALATION)
Qty: 10.2 G | Refills: 11 | Status: SHIPPED | OUTPATIENT
Start: 2024-04-09

## 2024-04-10 ENCOUNTER — HOSPITAL ENCOUNTER (OUTPATIENT)
Dept: MRI IMAGING | Age: 79
Discharge: HOME | End: 2024-04-10
Attending: PHYSICAL MEDICINE & REHABILITATION
Payer: MEDICARE

## 2024-04-10 DIAGNOSIS — M54.16 RIGHT LUMBAR RADICULOPATHY: ICD-10-CM

## 2024-04-10 PROCEDURE — 72148 MRI LUMBAR SPINE W/O DYE: CPT | Performed by: PHYSICAL MEDICINE & REHABILITATION

## 2024-04-16 DIAGNOSIS — F41.9 ANXIETY AND DEPRESSION: ICD-10-CM

## 2024-04-16 DIAGNOSIS — F32.A ANXIETY AND DEPRESSION: ICD-10-CM

## 2024-04-16 NOTE — TELEPHONE ENCOUNTER
A refill request was received for:  Requested Prescriptions     Pending Prescriptions Disp Refills    CLONAZEPAM 0.5 MG Oral Tab [Pharmacy Med Name: CLONAZEPAM 0.5MG TABLETS] 60 tablet 0     Sig: TAKE 1 TABLET(0.5 MG) BY MOUTH TWICE DAILY AS NEEDED       Last refill date:   2/28/2024    Last office visit: 3/14/2024    Follow up due:  Future Appointments   Date Time Provider Department Center   4/18/2024  1:45 PM Eliana Shannon,  [facility]   5/1/2024  8:00 AM  XR RM1 (MISC FLUOROSCOPY)  [facility]

## 2024-04-17 RX ORDER — CLONAZEPAM 0.5 MG/1
0.5 TABLET ORAL 2 TIMES DAILY PRN
Qty: 60 TABLET | Refills: 0 | Status: SHIPPED | OUTPATIENT
Start: 2024-04-17

## 2024-04-18 ENCOUNTER — OFFICE VISIT (OUTPATIENT)
Dept: PHYSICAL MEDICINE AND REHAB | Facility: CLINIC | Age: 79
End: 2024-04-18
Payer: COMMERCIAL

## 2024-04-18 VITALS
OXYGEN SATURATION: 98 % | HEIGHT: 60 IN | RESPIRATION RATE: 18 BRPM | WEIGHT: 91 LBS | BODY MASS INDEX: 17.87 KG/M2 | HEART RATE: 98 BPM

## 2024-04-18 DIAGNOSIS — M54.16 RIGHT LUMBAR RADICULOPATHY: Primary | ICD-10-CM

## 2024-04-18 PROCEDURE — 1160F RVW MEDS BY RX/DR IN RCRD: CPT | Performed by: PHYSICAL MEDICINE & REHABILITATION

## 2024-04-18 PROCEDURE — 1111F DSCHRG MED/CURRENT MED MERGE: CPT | Performed by: PHYSICAL MEDICINE & REHABILITATION

## 2024-04-18 PROCEDURE — 99214 OFFICE O/P EST MOD 30 MIN: CPT | Performed by: PHYSICAL MEDICINE & REHABILITATION

## 2024-04-18 PROCEDURE — 3008F BODY MASS INDEX DOCD: CPT | Performed by: PHYSICAL MEDICINE & REHABILITATION

## 2024-04-18 PROCEDURE — 1159F MED LIST DOCD IN RCRD: CPT | Performed by: PHYSICAL MEDICINE & REHABILITATION

## 2024-04-18 NOTE — PATIENT INSTRUCTIONS
-Gabapentin to be continued at nighttime  -Start PT and home exercises  -Follow up in 4 weeks  -If no better, will discuss injection

## 2024-04-22 NOTE — PROGRESS NOTES
[facility]  OFFICE FOLLOW UP EVALUATION      HISTORY OF PRESENT ILLNESS:     Chief Complaint   Patient presents with    Follow - Up     Pt is coming in for MRI F/U of right low back pain, Pt states that she is still having some pain/ soreness In right hip foot and ankle        Patient is following up for sided low back pain with radiation of the right leg.  She had MRI imaging of the lumbar spine completed which she is here to review today.  Been attending physical therapy or doing home exercises but is scheduled to start this soon.  She denies any changes in strength or bowel bladder.  Still has functional limitations as result of her pain.  Pain is worsened with any prolonged sitting but also standing and increased activity.    PHYSICAL EXAM:   Pulse 98   Resp 18   Ht 60\"   Wt 91 lb (41.3 kg)   SpO2 98%   BMI 17.77 kg/m²     Gait  Able to toe walk and heel walk without any difficulty     LUMBAR SPINE:  Inspection: no erythema, swelling, or obvious deformity.  Their iliac crest and shoulder heights are symmetrical.     Palpation: Non tender to palpation of the spinous process.   ROM: Restricted in forward flexion with reproduction of pain  Strength: 5/5 in bilateral lower extremities   Sensation: Intact to light touch in all dermatomes of the lower extremities but decreased in the foot right-sided  Reflexes: 1/4 at L4 and S1  Facet Loading: no specific facet pain  Slump test: Positive for pain symptoms for radicular pain symptoms    IMAGING:     MRI lumbar spine completed on 4/10/2024 was personally reviewed which is notable for multilevel degenerative disc disease most pronounced at L3-4 with a degenerative disc bulge resulting in mild central canal and moderate bilateral foraminal as well as subarticular zone narrowing.  There is a broad-based degenerative disc bulge at L4-5 with moderate right and mild left neuroforaminal narrowing.  There is a mild  degenerative disc bulge at L5-S1 mild bilateral neuroforaminal narrowing.  There is facet arthropathy throughout the lower lumbar facet joints.    All imaging results were reviewed and discussed with patient.      ASSESSMENT/PLAN:     1. Right lumbar radiculopathy        Bibi Diaz is a 78 year old female following up for right lumbar radiculopathy.  An epidural injection was discussed with her today however she has deferred.  I advised her to start the PT program which we have already discussed with home exercises follow-up after PT is completed in 4 weeks.  I recommend that she continue gabapentin at nighttime which has been helpful with her symptoms.  If she is no better in 4 weeks we will discuss the epidural injection in further detail.      The patient verbalized understanding with the plan and was in agreement. All questions/concerns were addressed and there were no barriers to learning.  Please note Dragon dictation software was used to dictate this note and may result in inadvertent typos.    Eliana Shannon DO, FAAPMR & CAQSM  Physical Medicine and Rehabilitation  Sports and Spine Medicine    PAST MEDICAL HISTORY:     Past Medical History:    Abdominal distention    Abdominal pain    Acquired hypothyroidism    Anxiety    Asthma (HCC)    Belching    Bloating    Chronic rhinitis    CKD (chronic kidney disease) stage 3, GFR 30-59 ml/min (formerly [facility])    Constipation    COPD (chronic obstructive pulmonary disease) (formerly [facility])    NO OXYGEN     Depression    Diarrhea, unspecified    Diverticulosis of large intestine    Emphysema    Esophageal reflux    Fatigue    Feeling lonely    Flatulence/gas pain/belching    Food intolerance    H/O bronchitis    Hay fever    Hemorrhoids    High blood pressure    History of depression    Hypercholesterolemia    Hypertension    Itch of skin    Migraines    Mild intermittent asthma without complication (formerly [facility])    Nausea    Night sweats    Pneumonia    PONV (postoperative nausea and  vomiting)    Pure hypercholesterolemia    Rash    Reflux    Sleep disturbance    Stress    Uncomfortable fullness after meals    Visual impairment    glasses    Vomiting    Wears glasses         PAST SURGICAL HISTORY:     Past Surgical History:   Procedure Laterality Date    Cataract      Colonoscopy      Colonoscopy N/A 3/21/2024    Procedure: COLONOSCOPY;  Surgeon: Delon Ashford DO;  Location:  ENDOSCOPY    Colonoscopy & polypectomy  09/2014    multiple polyps; tics; repeat 3 yrs    Colonoscopy,remv lesn,snare N/A 09/22/2014    Procedure: ESOPHAGOGASTRODUODENOSCOPY, COLONOSCOPY, POSSIBLE BIOPSY, POSSIBLE POLYPECTOMY 67684,82863;  Surgeon: Slade Ivan MD;  Location: [facility]    Correct bunion,simple Bilateral     Cystotomy,excis vesical neck Left     Egd      Gastro - dmg  09/2014    stricture; HH    Oophorectomy Bilateral 2017    Other surgical history  02/27/2023    Robotic repair of hiatal hernia and Nissen fundoplication    Patient documented not to have experienced any of the following events N/A 09/22/2014    Procedure: ESOPHAGOGASTRODUODENOSCOPY, COLONOSCOPY, POSSIBLE BIOPSY, POSSIBLE POLYPECTOMY 36214,66427;  Surgeon: Slade Ivan MD;  Location: [facility]    Patient withough preoperative order for iv antibiotic surgical site infection prophylaxis. N/A 09/22/2014    Procedure: ESOPHAGOGASTRODUODENOSCOPY, COLONOSCOPY, POSSIBLE BIOPSY, POSSIBLE POLYPECTOMY 75060,52649;  Surgeon: Slade Ivan MD;  Location: [facility]    Repair ing hernia,5+y/o,reducibl      Upper gi endoscopy,diagnosis N/A 09/22/2014    Procedure: ESOPHAGOGASTRODUODENOSCOPY, COLONOSCOPY, POSSIBLE BIOPSY, POSSIBLE POLYPECTOMY 63464,72818;  Surgeon: Slade Ivan MD;  Location: [facility]         CURRENT MEDICATIONS:     Current Outpatient Medications   Medication Sig Dispense Refill    clonazePAM 0.5 MG Oral Tab Take 1 tablet (0.5 mg total) by mouth 2 (two) times daily as needed. 60 tablet  0    SYMBICORT 160-4.5 MCG/ACT Inhalation Aerosol INHALE 2 PUFFS BY MOUTH TWICE DAILY 10.2 g 11    gabapentin 300 MG Oral Cap Start with night time dose only. If well tolerated, increase to two times daily. If well tolerated, increase to three times daily. 90 capsule 0    meclizine 12.5 MG Oral Tab Take 1 tablet (12.5 mg total) by mouth 3 (three) times daily as needed. 30 tablet 0    losartan 50 MG Oral Tab Take 1 tablet (50 mg total) by mouth every morning. 90 tablet 0    HYDROcodone-acetaminophen (NORCO) 5-325 MG Oral Tab Take 1 tablet by mouth every 8 (eight) hours as needed for Pain. 20 tablet 0    mirtazapine 30 MG Oral Tab Take 1 tablet (30 mg total) by mouth nightly. 90 tablet 1    Cholecalciferol (VITAMIN D-3 OR) Take by mouth.      polyethylene glycol, PEG 3350, 17 g Oral Powd Pack Take 17 g by mouth daily.      DUPIXENT 300 MG/2ML Subcutaneous Solution Prefilled Syringe       clobetasol 0.05 % External Ointment       TRIAMCINOLONE 0.1 % External Cream APPLY TOPICALLY TO THE AFFECTED AREA TWICE DAILY AS NEEDED (Patient taking differently: as needed.) 60 g 3    Albuterol Sulfate HFA (PROAIR HFA) 108 (90 Base) MCG/ACT Inhalation Aero Soln INHALE 2 PUFFS BY MOUTH EVERY 6 HOURS AS NEEDED FOR WHEEZING 8.5 g 2    acetaminophen 500 MG Oral Tab Take 1 tablet (500 mg total) by mouth every 6 (six) hours as needed for Pain.           ALLERGIES:     Allergies   Allergen Reactions    Avelox [Moxifloxacin Hcl In Nacl] SWELLING    Avelox [Moxifloxacin Hydrochloride] TONGUE SWELLING     \"TABS\"    Amoxicillin NAUSEA AND VOMITING     Vomiting.    Statins Myopathy    Sulfa Antibiotics RASH and ITCHING    Dander OTHER (SEE COMMENTS)     \"Animals\": runny nose, watery eyes, itching    Diphenhydramine Runny nose     \"Animals\": runny nose, watery eyes, itching    Dust Runny nose    Latex RASH    Sulfa Drugs Cross Reactors RASH and ITCHING         FAMILY HISTORY:     Family History   Problem Relation Age of Onset    Colon Cancer  Mother     Other (Other) Mother     Other (copd) Mother     Alcohol and Other Disorders Associated Father     Other (Other) Father     Other (emph) Father     Alcohol and Other Disorders Associated Son     Hypertension Sister     Prostate Cancer Brother     Hypertension Brother           SOCIAL HISTORY:     Social History     Socioeconomic History    Marital status:    Tobacco Use    Smoking status: Former     Current packs/day: 0.00     Types: Cigarettes     Start date: 1970     Quit date: 1980     Years since quittin.6    Smokeless tobacco: Never    Tobacco comments:      1/2 pack per day. Social history shows \"Tobacco Use: Active\" and \"Never Smoker: Active\"   Vaping Use    Vaping status: Never Used   Substance and Sexual Activity    Alcohol use: No    Drug use: No   Other Topics Concern    Caffeine Concern Yes     Comment: \"1 cup of coffee and tea, and can of pop daily\"    Exercise Yes     Comment: walking   Social History Narrative    ** Merged History Encounter **          Social Determinants of Health     Financial Resource Strain: Low Risk  (2023)    Financial Resource Strain     Difficulty of Paying Living Expenses: Not hard at all     Med Affordability: No   Food Insecurity: No Food Insecurity (3/20/2024)    Food Insecurity     Food Insecurity: Never true   Transportation Needs: No Transportation Needs (3/20/2024)    Transportation Needs     Lack of Transportation: No   Physical Activity: Inactive (2022)    Exercise Vital Sign     Days of Exercise per Week: 0 days     Minutes of Exercise per Session: 0 min   Stress: Stress Concern Present (2024)    Stress     Feeling of Stress : Yes    Social Connections   Housing Stability: Low Risk  (3/20/2024)    Housing Stability     Housing Instability: No          REVIEW OF SYSTEMS:   A comprehensive 10 point review of systems was completed.  Pertinent positives and negatives noted in the the HPI.      LABS:     Lab Results    Component Value Date     02/28/2013    A1C 5.6 02/28/2013     Lab Results   Component Value Date    WBC 9.1 03/23/2024    RBC 3.00 (L) 03/23/2024    HGB 8.4 (L) 03/28/2024    HCT 26.3 (L) 03/23/2024    MCV 87.7 03/23/2024    MCH 30.3 03/23/2024    MCHC 34.6 03/23/2024    RDW 15.4 03/23/2024    .0 (L) 03/23/2024    MPV 9.4 02/28/2013     Lab Results   Component Value Date    GLU 96 03/21/2024    BUN 9 03/21/2024    BUNCREA 14 01/12/2022    CREATSERUM 0.60 03/21/2024    ANIONGAP 3 03/21/2024    GFR 53 (L) 12/12/2017    GFRNAA 65 07/14/2022    GFRAA 75 07/14/2022    CA 8.3 (L) 03/21/2024    OSMOCALC 287 03/21/2024    ALKPHO 78 03/20/2024    AST 21 03/20/2024    ALT 16 03/20/2024    BILT 0.4 03/20/2024    TP 6.7 03/20/2024    ALB 3.8 03/20/2024    GLOBULIN 2.9 03/20/2024    AGRATIO 1.9 01/12/2022     03/21/2024    K 4.0 03/23/2024     (H) 03/21/2024    CO2 22.0 03/21/2024     Lab Results   Component Value Date    PTP 13.7 03/20/2024    PT 12.6 01/31/2013    INR 1.05 03/20/2024     Lab Results   Component Value Date    VITD 34.9 05/01/2023

## 2024-04-30 ENCOUNTER — OFFICE VISIT (OUTPATIENT)
Dept: PHYSICAL MEDICINE AND REHAB | Facility: CLINIC | Age: 79
End: 2024-04-30
Payer: COMMERCIAL

## 2024-04-30 ENCOUNTER — TELEPHONE (OUTPATIENT)
Dept: PHYSICAL MEDICINE AND REHAB | Facility: CLINIC | Age: 79
End: 2024-04-30

## 2024-04-30 VITALS — WEIGHT: 91 LBS | HEIGHT: 60 IN | BODY MASS INDEX: 17.87 KG/M2 | OXYGEN SATURATION: 72 % | HEART RATE: 98 BPM

## 2024-04-30 DIAGNOSIS — M54.16 RIGHT LUMBAR RADICULOPATHY: Primary | ICD-10-CM

## 2024-04-30 PROCEDURE — 1160F RVW MEDS BY RX/DR IN RCRD: CPT | Performed by: PHYSICAL MEDICINE & REHABILITATION

## 2024-04-30 PROCEDURE — 3008F BODY MASS INDEX DOCD: CPT | Performed by: PHYSICAL MEDICINE & REHABILITATION

## 2024-04-30 PROCEDURE — 1159F MED LIST DOCD IN RCRD: CPT | Performed by: PHYSICAL MEDICINE & REHABILITATION

## 2024-04-30 PROCEDURE — 1126F AMNT PAIN NOTED NONE PRSNT: CPT | Performed by: PHYSICAL MEDICINE & REHABILITATION

## 2024-04-30 PROCEDURE — 99214 OFFICE O/P EST MOD 30 MIN: CPT | Performed by: PHYSICAL MEDICINE & REHABILITATION

## 2024-04-30 NOTE — TELEPHONE ENCOUNTER
Initiated authorization for Right L4 and L5 Transforaminal Epidural Steroid Injections CPT 14629, 68429 dx:M54.16 to be done at [facility] with [facility]  Status: Notification or Prior Authorization is not required for the requested services valid 4/30/24-7/29/24  You are not required to submit a notification/prior authorization based on the information provided. If you have general questions about the prior authorization requirements, visit [email or web address] > Clinician Resources > Advance and Admission Notification Requirements. The number above acknowledges your notification. Please write this reference number down for future reference. If you would like to request an organization determination, please call us at [phone].  Decision ID #: [id number]    Per Dr. Shiva valencia/ STACY

## 2024-04-30 NOTE — PROGRESS NOTES
[facility]  OFFICE FOLLOW UP EVALUATION      HISTORY OF PRESENT ILLNESS:     Chief Complaint   Patient presents with    Follow - Up     LOV 4/18/24. Patient f/u on R hip. C/o soreness. PT is scheduled to start early May. Take Gabapentin with no relief. Pain 0/10. Patient states on Sunday she had a flare up and had a 10/10 pain. Denies N/T. Denies weakness.       Patient is following up for right-sided hip and leg pain.  She states that she is having severe symptoms and Sunday she had a real bad flareup for 2 hours where she was in tears.  She rates it to be 10 out of 10.  She denies any change in strength.  It is intermittent in nature but when the patient does, it is very debilitating.  She has been taking gabapentin once daily and cannot increase it to 3 times daily due to side effects.  PT is scheduled to start in early May.    PHYSICAL EXAM:   Pulse 98   Ht 60\"   Wt 91 lb (41.3 kg)   SpO2 (!) 72%   BMI 17.77 kg/m²     Gait  Able to toe walk and heel walk without any difficulty     LUMBAR SPINE:  Inspection: no erythema, swelling, or obvious deformity.  Their iliac crest and shoulder heights are symmetrical.     Palpation: Non tender to palpation of the spinous process.   ROM: Restricted in forward flexion with reproduction of pain  Strength: 5/5 in bilateral lower extremities   Sensation: Intact to light touch in all dermatomes of the lower extremities but decreased in the foot right-sided  Reflexes: 1/4 at L4 and S1  Facet Loading: no specific facet pain  Slump test: Positive for pain symptoms for radicular pain symptoms    IMAGING:     MRI lumbar spine completed on 4/10/2024 was personally reviewed which is notable for multilevel degenerative disc disease most pronounced at L3-4 with a degenerative disc bulge resulting in mild central canal and moderate bilateral foraminal as well as subarticular zone narrowing. There is a broad-based degenerative disc bulge  at L4-5 with moderate right and mild left neuroforaminal narrowing. There is a mild degenerative disc bulge at L5-S1 mild bilateral neuroforaminal narrowing. There is facet arthropathy throughout the lower lumbar facet joints.     All imaging results were reviewed and discussed with patient.      ASSESSMENT/PLAN:     1. Right lumbar radiculopathy        Bibi Diaz is a 78 year old female following up for persistent right-sided low back pain with right lumbar radiculopathy.  She does have foraminal disc at right L4-5 and I believe this may be symptomatic.  I recommend a right L4 and L5 transforaminal epidural steroid injection under fluoroscopy guidance under IV conscious sedation.  Advised patient my office will reach out to her once the injections approved.  She will continue gabapentin once daily as tolerated for now.  Will follow-up 2 weeks after the procedure.      The patient verbalized understanding with the plan and was in agreement. All questions/concerns were addressed and there were no barriers to learning.  Please note Dragon dictation software was used to dictate this note and may result in inadvertent typos.    Eliana Shannon DO, FAAPMR & CAQSM  Physical Medicine and Rehabilitation  Sports and Spine Medicine    PAST MEDICAL HISTORY:     Past Medical History:    Abdominal distention    Abdominal pain    Acquired hypothyroidism    Anxiety    Asthma (HCC)    Belching    Bloating    Chronic rhinitis    CKD (chronic kidney disease) stage 3, GFR 30-59 ml/min (HCC)    Constipation    COPD (chronic obstructive pulmonary disease) ([facility])    NO OXYGEN     Depression    Diarrhea, unspecified    Diverticulosis of large intestine    Emphysema    Esophageal reflux    Fatigue    Feeling lonely    Flatulence/gas pain/belching    Food intolerance    H/O bronchitis    Hay fever    Hemorrhoids    High blood pressure    History of depression    Hypercholesterolemia    Hypertension    Itch of skin    Migraines    Mild  intermittent asthma without complication (HCC)    Nausea    Night sweats    Pneumonia    PONV (postoperative nausea and vomiting)    Pure hypercholesterolemia    Rash    Reflux    Sleep disturbance    Stress    Uncomfortable fullness after meals    Visual impairment    glasses    Vomiting    Wears glasses         PAST SURGICAL HISTORY:     Past Surgical History:   Procedure Laterality Date    Cataract      Colonoscopy      Colonoscopy N/A 3/21/2024    Procedure: COLONOSCOPY;  Surgeon: Delon Ashford DO;  Location:  ENDOSCOPY    Colonoscopy & polypectomy  09/2014    multiple polyps; tics; repeat 3 yrs    Colonoscopy,remv lesn,snare N/A 09/22/2014    Procedure: ESOPHAGOGASTRODUODENOSCOPY, COLONOSCOPY, POSSIBLE BIOPSY, POSSIBLE POLYPECTOMY 86540,43906;  Surgeon: Slade Ivan MD;  Location: [facility]    Correct bunion,simple Bilateral     Cystotomy,excis vesical neck Left     Egd      Gastro - dmg  09/2014    stricture; HH    Oophorectomy Bilateral 2017    Other surgical history  02/27/2023    Robotic repair of hiatal hernia and Nissen fundoplication    Patient documented not to have experienced any of the following events N/A 09/22/2014    Procedure: ESOPHAGOGASTRODUODENOSCOPY, COLONOSCOPY, POSSIBLE BIOPSY, POSSIBLE POLYPECTOMY 43696,66966;  Surgeon: Slade Ivan MD;  Location: [facility]    Patient withough preoperative order for iv antibiotic surgical site infection prophylaxis. N/A 09/22/2014    Procedure: ESOPHAGOGASTRODUODENOSCOPY, COLONOSCOPY, POSSIBLE BIOPSY, POSSIBLE POLYPECTOMY 05698,67399;  Surgeon: Slade Ivan MD;  Location: [facility]    Repair ing hernia,5+y/o,reducibl      Upper gi endoscopy,diagnosis N/A 09/22/2014    Procedure: ESOPHAGOGASTRODUODENOSCOPY, COLONOSCOPY, POSSIBLE BIOPSY, POSSIBLE POLYPECTOMY 78956,17331;  Surgeon: Slade Ivan MD;  Location: [facility]         CURRENT MEDICATIONS:     Current Outpatient Medications   Medication Sig  Dispense Refill    clonazePAM 0.5 MG Oral Tab Take 1 tablet (0.5 mg total) by mouth 2 (two) times daily as needed. 60 tablet 0    SYMBICORT 160-4.5 MCG/ACT Inhalation Aerosol INHALE 2 PUFFS BY MOUTH TWICE DAILY 10.2 g 11    gabapentin 300 MG Oral Cap Start with night time dose only. If well tolerated, increase to two times daily. If well tolerated, increase to three times daily. 90 capsule 0    meclizine 12.5 MG Oral Tab Take 1 tablet (12.5 mg total) by mouth 3 (three) times daily as needed. 30 tablet 0    losartan 50 MG Oral Tab Take 1 tablet (50 mg total) by mouth every morning. 90 tablet 0    HYDROcodone-acetaminophen (NORCO) 5-325 MG Oral Tab Take 1 tablet by mouth every 8 (eight) hours as needed for Pain. 20 tablet 0    mirtazapine 30 MG Oral Tab Take 1 tablet (30 mg total) by mouth nightly. 90 tablet 1    Cholecalciferol (VITAMIN D-3 OR) Take by mouth.      polyethylene glycol, PEG 3350, 17 g Oral Powd Pack Take 17 g by mouth daily.      DUPIXENT 300 MG/2ML Subcutaneous Solution Prefilled Syringe       clobetasol 0.05 % External Ointment       TRIAMCINOLONE 0.1 % External Cream APPLY TOPICALLY TO THE AFFECTED AREA TWICE DAILY AS NEEDED (Patient taking differently: as needed.) 60 g 3    Albuterol Sulfate HFA (PROAIR HFA) 108 (90 Base) MCG/ACT Inhalation Aero Soln INHALE 2 PUFFS BY MOUTH EVERY 6 HOURS AS NEEDED FOR WHEEZING 8.5 g 2    acetaminophen 500 MG Oral Tab Take 1 tablet (500 mg total) by mouth every 6 (six) hours as needed for Pain.           ALLERGIES:     Allergies   Allergen Reactions    Avelox [Moxifloxacin Hcl In Nacl] SWELLING    Avelox [Moxifloxacin Hydrochloride] TONGUE SWELLING     \"TABS\"    Amoxicillin NAUSEA AND VOMITING     Vomiting.    Statins Myopathy    Sulfa Antibiotics RASH and ITCHING    Dander OTHER (SEE COMMENTS)     \"Animals\": runny nose, watery eyes, itching    Diphenhydramine Runny nose     \"Animals\": runny nose, watery eyes, itching    Dust Runny nose    Latex RASH    Sulfa Drugs  Cross Reactors RASH and ITCHING         FAMILY HISTORY:     Family History   Problem Relation Age of Onset    Colon Cancer Mother     Other (Other) Mother     Other (copd) Mother     Alcohol and Other Disorders Associated Father     Other (Other) Father     Other (emph) Father     Alcohol and Other Disorders Associated Son     Hypertension Sister     Prostate Cancer Brother     Hypertension Brother           SOCIAL HISTORY:     Social History     Socioeconomic History    Marital status:    Tobacco Use    Smoking status: Former     Current packs/day: 0.00     Types: Cigarettes     Start date: 1970     Quit date: 1980     Years since quittin.6    Smokeless tobacco: Never    Tobacco comments:      1/2 pack per day. Social history shows \"Tobacco Use: Active\" and \"Never Smoker: Active\"   Vaping Use    Vaping status: Never Used   Substance and Sexual Activity    Alcohol use: No    Drug use: No   Other Topics Concern    Caffeine Concern Yes     Comment: \"1 cup of coffee and tea, and can of pop daily\"    Exercise Yes     Comment: walking   Social History Narrative    ** Merged History Encounter **          Social Determinants of Health     Financial Resource Strain: Low Risk  (2023)    Financial Resource Strain     Difficulty of Paying Living Expenses: Not hard at all     Med Affordability: No   Food Insecurity: No Food Insecurity (3/20/2024)    Food Insecurity     Food Insecurity: Never true   Transportation Needs: No Transportation Needs (3/20/2024)    Transportation Needs     Lack of Transportation: No   Physical Activity: Inactive (2022)    Exercise Vital Sign     Days of Exercise per Week: 0 days     Minutes of Exercise per Session: 0 min   Stress: Stress Concern Present (2024)    Stress     Feeling of Stress : Yes    Social Connections   Housing Stability: Low Risk  (3/20/2024)    Housing Stability     Housing Instability: No          REVIEW OF SYSTEMS:   A comprehensive 10 point  review of systems was completed.  Pertinent positives and negatives noted in the the HPI.      LABS:     Lab Results   Component Value Date     02/28/2013    A1C 5.6 02/28/2013     Lab Results   Component Value Date    WBC 9.1 03/23/2024    RBC 3.00 (L) 03/23/2024    HGB 8.4 (L) 03/28/2024    HCT 26.3 (L) 03/23/2024    MCV 87.7 03/23/2024    MCH 30.3 03/23/2024    MCHC 34.6 03/23/2024    RDW 15.4 03/23/2024    .0 (L) 03/23/2024    MPV 9.4 02/28/2013     Lab Results   Component Value Date    GLU 96 03/21/2024    BUN 9 03/21/2024    BUNCREA 14 01/12/2022    CREATSERUM 0.60 03/21/2024    ANIONGAP 3 03/21/2024    GFR 53 (L) 12/12/2017    GFRNAA 65 07/14/2022    GFRAA 75 07/14/2022    CA 8.3 (L) 03/21/2024    OSMOCALC 287 03/21/2024    ALKPHO 78 03/20/2024    AST 21 03/20/2024    ALT 16 03/20/2024    BILT 0.4 03/20/2024    TP 6.7 03/20/2024    ALB 3.8 03/20/2024    GLOBULIN 2.9 03/20/2024    AGRATIO 1.9 01/12/2022     03/21/2024    K 4.0 03/23/2024     (H) 03/21/2024    CO2 22.0 03/21/2024     Lab Results   Component Value Date    PTP 13.7 03/20/2024    PT 12.6 01/31/2013    INR 1.05 03/20/2024     Lab Results   Component Value Date    VITD 34.9 05/01/2023

## 2024-05-01 NOTE — TELEPHONE ENCOUNTER
Patient has been scheduled for Right L4 and L5 Transforaminal Epidural Steroid Injections on 5/6/24 at the [facility] with Dr. Shannon.   -Anesthesia type:  IVCS  -Patient was advised that if he/she does receive the covid vaccine it needs to be at least 2 weeks before or after the injection.  -Medications and allergies reviewed.  -Patient reminded to hold NSAIDs (Ibuprofen, ASA 81, Aleve, Naproxen, Mobic, Diclofenac, Etodolac, Celebrex etc.) for 3 days prior to Lumbar MBB/Facet if BMI is greater than 35. For Cervical injections only hold multivitamins, Vitamin E, Fish Oil, Phentermine/Lomaira for 7 days prior to injection and NSAIDS.   mg to be held for 7 days prior to injections.  -If patient is receiving MAC/IVCS, weight loss oral/injectable medications will need to be held for 7 days prior to injection.  -Patient informed to fast 12 hours prior to procedure with IVCS/MAC.   -If on blood thinner, clearance has been received and approved to hold this medication by provider.   -Patient informed of [facility]'s  policy:  he/she will need a  to and from procedure and must be on site for their entirety of their visit, if their ride is unable to the procedure will be cancelled.   -[facility] is located in the [facility] 1st floor,  may park in the yellow/purple parking lot.  Patient verbalized understanding and agrees with plan.  Scheduled in Epic: Yes  Scheduled in Surgical Case: Yes  Follow up appointment made: NOV: 5/16/2024 Eliana Shannon DO

## 2024-05-02 ENCOUNTER — OFFICE VISIT (OUTPATIENT)
Dept: FAMILY MEDICINE CLINIC | Facility: CLINIC | Age: 79
End: 2024-05-02

## 2024-05-02 VITALS
HEIGHT: 60 IN | WEIGHT: 93 LBS | OXYGEN SATURATION: 96 % | SYSTOLIC BLOOD PRESSURE: 120 MMHG | HEART RATE: 58 BPM | DIASTOLIC BLOOD PRESSURE: 64 MMHG | RESPIRATION RATE: 16 BRPM | BODY MASS INDEX: 18.26 KG/M2

## 2024-05-02 DIAGNOSIS — R09.81 SINUS CONGESTION: ICD-10-CM

## 2024-05-02 DIAGNOSIS — R42 DIZZY: Primary | ICD-10-CM

## 2024-05-02 DIAGNOSIS — J45.30 MILD PERSISTENT ASTHMA WITHOUT COMPLICATION (HCC): ICD-10-CM

## 2024-05-02 DIAGNOSIS — I10 PRIMARY HYPERTENSION: ICD-10-CM

## 2024-05-02 PROCEDURE — 3078F DIAST BP <80 MM HG: CPT | Performed by: FAMILY MEDICINE

## 2024-05-02 PROCEDURE — 3074F SYST BP LT 130 MM HG: CPT | Performed by: FAMILY MEDICINE

## 2024-05-02 PROCEDURE — 1159F MED LIST DOCD IN RCRD: CPT | Performed by: FAMILY MEDICINE

## 2024-05-02 PROCEDURE — 3008F BODY MASS INDEX DOCD: CPT | Performed by: FAMILY MEDICINE

## 2024-05-02 PROCEDURE — 99214 OFFICE O/P EST MOD 30 MIN: CPT | Performed by: FAMILY MEDICINE

## 2024-05-02 PROCEDURE — 1160F RVW MEDS BY RX/DR IN RCRD: CPT | Performed by: FAMILY MEDICINE

## 2024-05-02 RX ORDER — FLUTICASONE PROPIONATE 50 MCG
2 SPRAY, SUSPENSION (ML) NASAL DAILY
Qty: 48 G | Refills: 0 | OUTPATIENT
Start: 2024-05-02

## 2024-05-02 RX ORDER — FLUTICASONE PROPIONATE 50 MCG
2 SPRAY, SUSPENSION (ML) NASAL DAILY
Qty: 1 EACH | Refills: 1 | Status: SHIPPED | OUTPATIENT
Start: 2024-05-02

## 2024-05-02 RX ORDER — ALBUTEROL SULFATE 90 UG/1
AEROSOL, METERED RESPIRATORY (INHALATION)
Qty: 8.5 G | Refills: 2 | Status: SHIPPED | OUTPATIENT
Start: 2024-05-02

## 2024-05-02 RX ORDER — LOSARTAN POTASSIUM 25 MG/1
25 TABLET ORAL DAILY
Qty: 90 TABLET | Refills: 1 | Status: SHIPPED | OUTPATIENT
Start: 2024-05-02

## 2024-05-06 ENCOUNTER — APPOINTMENT (OUTPATIENT)
Dept: SURGERY | Facility: CLINIC | Age: 79
End: 2024-05-06
Payer: COMMERCIAL

## 2024-05-07 ENCOUNTER — TELEPHONE (OUTPATIENT)
Dept: PHYSICAL THERAPY | Facility: HOSPITAL | Age: 79
End: 2024-05-07

## 2024-05-07 ENCOUNTER — TELEPHONE (OUTPATIENT)
Dept: FAMILY MEDICINE CLINIC | Facility: CLINIC | Age: 79
End: 2024-05-07

## 2024-05-07 ENCOUNTER — PATIENT OUTREACH (OUTPATIENT)
Dept: CASE MANAGEMENT | Age: 79
End: 2024-05-07

## 2024-05-07 DIAGNOSIS — F33.1 MAJOR DEPRESSIVE DISORDER, RECURRENT EPISODE, MODERATE (HCC): ICD-10-CM

## 2024-05-07 DIAGNOSIS — G89.29 CHRONIC PAIN OF BOTH EARS: Primary | ICD-10-CM

## 2024-05-07 DIAGNOSIS — E78.00 PURE HYPERCHOLESTEROLEMIA: ICD-10-CM

## 2024-05-07 DIAGNOSIS — R42 DIZZINESS: ICD-10-CM

## 2024-05-07 DIAGNOSIS — R42 DIZZINESS AND GIDDINESS: ICD-10-CM

## 2024-05-07 DIAGNOSIS — E03.9 ACQUIRED HYPOTHYROIDISM: ICD-10-CM

## 2024-05-07 DIAGNOSIS — F34.1 DYSTHYMIC DISORDER: ICD-10-CM

## 2024-05-07 DIAGNOSIS — H92.03 CHRONIC PAIN OF BOTH EARS: Primary | ICD-10-CM

## 2024-05-07 DIAGNOSIS — F33.2 SEVERE EPISODE OF RECURRENT MAJOR DEPRESSIVE DISORDER, WITHOUT PSYCHOTIC FEATURES (HCC): ICD-10-CM

## 2024-05-07 DIAGNOSIS — I15.9 SECONDARY HYPERTENSION: ICD-10-CM

## 2024-05-07 DIAGNOSIS — K92.2 GASTROINTESTINAL HEMORRHAGE, UNSPECIFIED GASTROINTESTINAL HEMORRHAGE TYPE: Primary | ICD-10-CM

## 2024-05-07 DIAGNOSIS — J44.0 CHRONIC OBSTRUCTIVE PULMONARY DISEASE WITH ACUTE LOWER RESPIRATORY INFECTION (HCC): ICD-10-CM

## 2024-05-07 RX ORDER — MIRTAZAPINE 30 MG/1
30 TABLET, FILM COATED ORAL NIGHTLY
Qty: 90 TABLET | Refills: 1 | Status: SHIPPED | OUTPATIENT
Start: 2024-05-07

## 2024-05-07 NOTE — PROGRESS NOTES
Spoke to Bibi for CCM.      Updates to patient care team/comments: Added Dr. Eliana Shannon   Patient reported changes in medications: no change   Med Adherence  Comment: Patient reported compliance.          Patient updates/concerns:      Spoke to patient for CCM .   Patient shares she has gone through a lot recently .   Patient recently had a few MRI which revealed a herniated disc. She had an a steroid injection for Right L4 and L5 Transforaminal Epidural Steroid Injections on 5/6/24 at the [facility] with Dr. Shannon. She will follow up in 2 weeks to see if she notices improvement. Patient stated it was not constant pain , describes her symptoms as  attacks from hip and go down to the leg that happened randomly and that is why she was screaming in pain. She reported she is taking gabapentin 300 mg  patient is only tolerating one pill a day due to side effects.     Patient referred to physical therapy - patient stated she is getting help from her Granddaughter  who will also be taking her to physical therapy once a week.      GI : Patient doing her best to eat what she can , still following gastroparesis diet. Patient had cream of wheat this morning and will have an instant breakfast in a few minutes. Patient stated she is no longer having any rectal bleeding .       Patient had a recent MRI for pressure in ears that caused a plugged feeling .  Patient stated it showed some sinus inflammation , she is using the nasal spray . Ear drops not helping . She is wondering if she can referred to an ENT .     BP : Pt has not checked Blood pressure  as her machine is too big for her arm and does not stay on. Patient has a 50 mg dose of losartan and was instructed to take 1/2 tablet (25 mg ) . She has not yet picked up new prescription , stated she will  soon.    BP today : 153/86 P -77- took 1/2 pill (25 mg ) Repeated after medication : 138/77 P- 70.    Patient will continue to monitor Blood pressure and call with any  elevated readings or questions.     Goals/Action Plan:    Active goal from previous outreach: improve GI symptoms/ mood        Patient reported progress towards goals:  patient had steroid injection , hoping to get get some pain relief. GI symptoms continue - patient declined procedure with Dr. Esposito as it may not give results and she prefers not to go through a surgery at this time.                - What: Gain strength -increase energy           - Where/When/How: Patient will start physical therapy and planning to work on exercises at home   Patient Reported Barriers and Concerns: fatigue, some dizziness.                    - Plan for overcoming barriers:  continue to monitor symptoms , follow up with PCP to advise .    Care Managers Interventions:    TE to PCP to advise on referral to ENT.  CCM listened and provided support.   CCM updated patient records from Care Everywhere.   CCM updated immunizations- no updates.   Continue to provide encouragement, support and education for healthy coping and diagnosis.          Future Appointments:   Future Appointments   Date Time Provider Department Center   5/8/2024 10:15 AM Borgehammar, Michael, PT, DPT, OCS, Cert MDT  [facility]   5/13/2024  9:30 AM Borgehammar, Michael, PT, DPT, OCS, Cert MDT  [facility]   5/16/2024 10:15 AM Eliana Shannon, DO [facility] [place] [facility]   5/20/2024  9:30 AM Borgehammar, Michael, PT, DPT, OCS, Cert MDT  [facility]   5/21/2024 12:00 PM Eliana Shannon, DO [facility]   5/28/2024 10:00 AM Borgehammar, Michael, PT, DPT, OCS, Cert MDT  [facility] [place]   6/3/2024  9:30 AM Borgehammar, Michael, PT, DPT, OCS, Cert MDT  [facility] [place]   6/10/2024  9:30 AM Borgehammar, Michael, PT, DPT, OCS, Cert MDT  [facility] [place]   6/17/2024  9:30 AM Borgehammar, Michael, PT, DPT, OCS, Cert MDT  [facility] [place]   6/24/2024  9:30 AM Borgehammar, Michael, PT, DPT, OCS, Cert MDT  [facility] [place]   7/1/2024  1:50 PM Jaspreet Burrows MD [facility]         Next Care Manager Follow Up Date: 1 month    Reason For Follow Up: review progress and or barriers towards patient's goals.     Time Spent This Encounter Total: 43 min medical record review, telephone communication, care plan updates where needed, education, goals, and action plan recreation/update. Provided acknowledgment and validation to patient's concerns.   Monthly Minute Total including today: 43  Physical assessment, complete health history, and need for CCM established by Rick Shannon DO.

## 2024-05-07 NOTE — TELEPHONE ENCOUNTER
A refill request was received for:  Requested Prescriptions     Pending Prescriptions Disp Refills    MIRTAZAPINE 30 MG Oral Tab [Pharmacy Med Name: MIRTAZAPINE 30MG TABLETS] 90 tablet 1     Sig: TAKE 1 TABLET(30 MG) BY MOUTH EVERY NIGHT       Last refill date:  11/8/2023     Last office visit: 5/2/2024    Follow up due:  Future Appointments   Date Time Provider Department Center   5/8/2024 10:15 AM Alon, Michael, PT, DPT, OCS, Cert MDT  [facility]   5/13/2024  9:30 AM Borgehammar, Michael, PT, DPT, OCS, Cert MDT  [facility]   5/16/2024 10:15 AM Eliana Shannon, DO [facility] [place] [facility]   5/20/2024  9:30 AM Borgehammar, Michael, PT, DPT, OCS, Cert MDT  [facility]   5/21/2024 12:00 PM Eliana Shannon, DO [facility]   5/28/2024 10:00 AM Borgehammar, Michael, PT, DPT, OCS, Cert MDT  [facility]   6/3/2024  9:30 AM Borgehammar, Michael, PT, DPT, OCS, Cert MDT  [facility] [place]   6/10/2024  9:30 AM Borgehammar, Michael, PT, DPT, OCS, Cert MDT  [facility] [place]   6/17/2024  9:30 AM Borgehammar, Michael, PT, DPT, OCS, Cert MDT  [facility] [place]   6/24/2024  9:30 AM Borgehammar, Michael, PT, DPT, OCS, Cert MDT  [facility]   7/1/2024  1:50 PM Jaspreet Burrows MD [facility]

## 2024-05-07 NOTE — PROGRESS NOTES
Subjective:   Patient ID: Bibi Diaz is a 78 year old female.    1. Dizzy.  Chronic.  Intermittent.  Hard to tell what's triggering it.  MRI did not show the cuase.    2. Primary hypertension.  Chronic.  Fluctuating.  At times low.    3. Sinus congestion/ on going for a few weeks.  4. Mild persistent asthma without complication (HCC) . Chronic, stable. No new symptoms.         History/Other:   Review of Systems   All other systems reviewed and are negative.    Current Outpatient Medications   Medication Sig Dispense Refill    fluticasone propionate 50 MCG/ACT Nasal Suspension 2 sprays by Each Nare route daily. 1 each 1    losartan 25 MG Oral Tab Take 1 tablet (25 mg total) by mouth daily. 90 tablet 1    albuterol (PROAIR HFA) 108 (90 Base) MCG/ACT Inhalation Aero Soln INHALE 2 PUFFS BY MOUTH EVERY 6 HOURS AS NEEDED FOR WHEEZING 8.5 g 2    clonazePAM 0.5 MG Oral Tab Take 1 tablet (0.5 mg total) by mouth 2 (two) times daily as needed. 60 tablet 0    SYMBICORT 160-4.5 MCG/ACT Inhalation Aerosol INHALE 2 PUFFS BY MOUTH TWICE DAILY 10.2 g 11    Cholecalciferol (VITAMIN D-3 OR) Take by mouth.      DUPIXENT 300 MG/2ML Subcutaneous Solution Prefilled Syringe       clobetasol 0.05 % External Ointment       TRIAMCINOLONE 0.1 % External Cream APPLY TOPICALLY TO THE AFFECTED AREA TWICE DAILY AS NEEDED (Patient taking differently: as needed.) 60 g 3    acetaminophen 500 MG Oral Tab Take 1 tablet (500 mg total) by mouth every 6 (six) hours as needed for Pain.      linaCLOtide (LINZESS) 72 MCG Oral Cap Take 1 tablet by mouth daily. 30 capsule 1    meclizine 12.5 MG Oral Tab Take 1 tablet (12.5 mg total) by mouth 3 (three) times daily as needed. 30 tablet 1    ondansetron 4 MG Oral Tablet Dispersible Take 1 tablet (4 mg total) by mouth every 4 (four) hours as needed for Nausea. 30 tablet 0    pantoprazole 40 MG Oral Tab EC Take 1 tablet (40 mg total) by mouth daily. (Patient not taking: Reported on 5/14/2024) 30 tablet 0     dicyclomine 20 MG Oral Tab Take 1 tablet (20 mg total) by mouth 4 (four) times daily as needed. 30 tablet 0    mirtazapine 30 MG Oral Tab Take 1 tablet (30 mg total) by mouth nightly. 90 tablet 1     Allergies:  Allergies   Allergen Reactions    Avelox [Moxifloxacin Hcl In Nacl] SWELLING    Avelox [Moxifloxacin Hydrochloride] TONGUE SWELLING     \"TABS\"    Amoxicillin NAUSEA AND VOMITING     Vomiting.    Statins Myopathy    Sulfa Antibiotics RASH and ITCHING    Dander OTHER (SEE COMMENTS)     \"Animals\": runny nose, watery eyes, itching    Diphenhydramine Runny nose     \"Animals\": runny nose, watery eyes, itching    Dust Runny nose    Latex RASH    Sulfa Drugs Cross Reactors RASH and ITCHING       Objective:   Physical Exam  Vitals reviewed.   Constitutional:       General: She is not in acute distress.     Appearance: She is well-developed. She is not diaphoretic.   HENT:      Head:      Comments: No sinus tenderness.     Right Ear: Tympanic membrane normal. There is no impacted cerumen.      Left Ear: Tympanic membrane normal. There is no impacted cerumen.   Eyes:      General: No scleral icterus.        Right eye: No discharge.         Left eye: No discharge.      Conjunctiva/sclera: Conjunctivae normal.   Neck:      Vascular: No carotid bruit.   Cardiovascular:      Rate and Rhythm: Normal rate and regular rhythm.      Heart sounds: Normal heart sounds. No murmur heard.     No friction rub. No gallop.   Pulmonary:      Effort: Pulmonary effort is normal. No respiratory distress.      Breath sounds: Normal breath sounds. No wheezing or rales.   Lymphadenopathy:      Cervical: No cervical adenopathy.         Assessment & Plan:   1. Dizzy    2. Primary hypertension    3. Sinus congestion    4. Mild persistent asthma without complication (HCC)      1. Dizzy  Try  meclazine.    2. Primary hypertension  - losartan 25 MG Oral Tab; Take 1 tablet (25 mg total) by mouth daily.  Dispense: 90 tablet; Refill: 1    3. Sinus  congestion  - fluticasone propionate 50 MCG/ACT Nasal Suspension; 2 sprays by Each Nare route daily.  Dispense: 1 each; Refill: 1    4. Mild persistent asthma without complication (HCC)  - albuterol (PROAIR HFA) 108 (90 Base) MCG/ACT Inhalation Aero Soln; INHALE 2 PUFFS BY MOUTH EVERY 6 HOURS AS NEEDED FOR WHEEZING  Dispense: 8.5 g; Refill: 2    Meds This Visit:  Requested Prescriptions     Signed Prescriptions Disp Refills    fluticasone propionate 50 MCG/ACT Nasal Suspension 1 each 1     Si sprays by Each Nare route daily.    losartan 25 MG Oral Tab 90 tablet 1     Sig: Take 1 tablet (25 mg total) by mouth daily.    albuterol (PROAIR HFA) 108 (90 Base) MCG/ACT Inhalation Aero Soln 8.5 g 2     Sig: INHALE 2 PUFFS BY MOUTH EVERY 6 HOURS AS NEEDED FOR WHEEZING       Imaging & Referrals:  None

## 2024-05-07 NOTE — TELEPHONE ENCOUNTER
Good morning Dr. Shannon.     I Spoke to patient for CCM ,     Patient would like to know if you an recommend an ENT , she would like to follow up on Ear pressure and stated the ear drops have not helped. She reports she is using the nasal spray as well but would prefer to follow up with a specialist .     Please advise. Thank you .

## 2024-05-08 ENCOUNTER — TELEPHONE (OUTPATIENT)
Dept: PHYSICAL MEDICINE AND REHAB | Facility: CLINIC | Age: 79
End: 2024-05-08

## 2024-05-08 ENCOUNTER — APPOINTMENT (OUTPATIENT)
Dept: PHYSICAL THERAPY | Age: 79
End: 2024-05-08
Attending: PHYSICAL MEDICINE & REHABILITATION
Payer: MEDICARE

## 2024-05-08 NOTE — TELEPHONE ENCOUNTER
Patient had Right L4 and L5 Transforaminal Epidural Steroid Injections on 5/6/24.     Spoke with granddaughter who stated patient is having the right hip pain still. Advised granddaughter that it is normal to have some increased pain after the injection and to give it up to the full 2 weeks after the injection to see if there is any improvement.     Granddaughter understood.     Follow up appointment 5/16.    Nothing further needed at this time.

## 2024-05-13 ENCOUNTER — TELEPHONE (OUTPATIENT)
Dept: PHYSICAL THERAPY | Age: 79
End: 2024-05-13

## 2024-05-13 ENCOUNTER — HOSPITAL ENCOUNTER (EMERGENCY)
Facility: HOSPITAL | Age: 79
Discharge: HOME | End: 2024-05-13
Attending: EMERGENCY MEDICINE

## 2024-05-13 ENCOUNTER — APPOINTMENT (OUTPATIENT)
Dept: CT IMAGING | Facility: HOSPITAL | Age: 79
End: 2024-05-13
Attending: EMERGENCY MEDICINE

## 2024-05-13 ENCOUNTER — APPOINTMENT (OUTPATIENT)
Dept: PHYSICAL THERAPY | Age: 79
End: 2024-05-13
Attending: PHYSICAL MEDICINE & REHABILITATION
Payer: MEDICARE

## 2024-05-13 VITALS
RESPIRATION RATE: 22 BRPM | OXYGEN SATURATION: 99 % | DIASTOLIC BLOOD PRESSURE: 65 MMHG | HEART RATE: 63 BPM | TEMPERATURE: 98 F | SYSTOLIC BLOOD PRESSURE: 158 MMHG

## 2024-05-13 DIAGNOSIS — R10.9 ABDOMINAL PAIN, ACUTE: Primary | ICD-10-CM

## 2024-05-13 DIAGNOSIS — R11.0 NAUSEA: ICD-10-CM

## 2024-05-13 LAB
ALBUMIN SERPL-MCNC: 3.5 G/DL (ref 3.4–5)
ALBUMIN/GLOB SERPL: 1.3 {RATIO} (ref 1–2)
ALP LIVER SERPL-CCNC: 74 U/L
ALT SERPL-CCNC: 16 U/L
ANION GAP SERPL CALC-SCNC: 10 MMOL/L (ref 0–18)
AST SERPL-CCNC: 18 U/L (ref 15–37)
BASOPHILS # BLD AUTO: 0.07 X10(3) UL (ref 0–0.2)
BASOPHILS NFR BLD AUTO: 0.7 %
BILIRUB SERPL-MCNC: 0.4 MG/DL (ref 0.1–2)
BILIRUB UR QL STRIP.AUTO: NEGATIVE
BUN BLD-MCNC: 11 MG/DL (ref 9–23)
CALCIUM BLD-MCNC: 8.6 MG/DL (ref 8.5–10.1)
CHLORIDE SERPL-SCNC: 100 MMOL/L (ref 98–112)
CLARITY UR REFRACT.AUTO: CLEAR
CO2 SERPL-SCNC: 21 MMOL/L (ref 21–32)
COLOR UR AUTO: COLORLESS
CREAT BLD-MCNC: 0.58 MG/DL
EGFRCR SERPLBLD CKD-EPI 2021: 92 ML/MIN/1.73M2 (ref 60–?)
EOSINOPHIL # BLD AUTO: 0.04 X10(3) UL (ref 0–0.7)
EOSINOPHIL NFR BLD AUTO: 0.4 %
ERYTHROCYTE [DISTWIDTH] IN BLOOD BY AUTOMATED COUNT: 13.2 %
GLOBULIN PLAS-MCNC: 2.8 G/DL (ref 2.8–4.4)
GLUCOSE BLD-MCNC: 89 MG/DL (ref 70–99)
GLUCOSE UR STRIP.AUTO-MCNC: NORMAL MG/DL
HCT VFR BLD AUTO: 29.3 %
HGB BLD-MCNC: 10.1 G/DL
IMM GRANULOCYTES # BLD AUTO: 0.03 X10(3) UL (ref 0–1)
IMM GRANULOCYTES NFR BLD: 0.3 %
KETONES UR STRIP.AUTO-MCNC: NEGATIVE MG/DL
LEUKOCYTE ESTERASE UR QL STRIP.AUTO: NEGATIVE
LIPASE SERPL-CCNC: 14 U/L (ref 13–75)
LYMPHOCYTES # BLD AUTO: 1.4 X10(3) UL (ref 1–4)
LYMPHOCYTES NFR BLD AUTO: 14.8 %
MCH RBC QN AUTO: 28.4 PG (ref 26–34)
MCHC RBC AUTO-ENTMCNC: 34.5 G/DL (ref 31–37)
MCV RBC AUTO: 82.3 FL
MONOCYTES # BLD AUTO: 1.04 X10(3) UL (ref 0.1–1)
MONOCYTES NFR BLD AUTO: 11 %
NEUTROPHILS # BLD AUTO: 6.85 X10 (3) UL (ref 1.5–7.7)
NEUTROPHILS # BLD AUTO: 6.85 X10(3) UL (ref 1.5–7.7)
NEUTROPHILS NFR BLD AUTO: 72.8 %
NITRITE UR QL STRIP.AUTO: NEGATIVE
OSMOLALITY SERPL CALC.SUM OF ELEC: 271 MOSM/KG (ref 275–295)
PH UR STRIP.AUTO: 8 [PH] (ref 5–8)
PLATELET # BLD AUTO: 333 10(3)UL (ref 150–450)
POTASSIUM SERPL-SCNC: 3.6 MMOL/L (ref 3.5–5.1)
PROT SERPL-MCNC: 6.3 G/DL (ref 6.4–8.2)
PROT UR STRIP.AUTO-MCNC: NEGATIVE MG/DL
RBC # BLD AUTO: 3.56 X10(6)UL
RBC UR QL AUTO: NEGATIVE
SODIUM SERPL-SCNC: 131 MMOL/L (ref 136–145)
SP GR UR STRIP.AUTO: 1.01 (ref 1–1.03)
TROPONIN I SERPL HS-MCNC: 5 NG/L
UROBILINOGEN UR STRIP.AUTO-MCNC: NORMAL MG/DL
WBC # BLD AUTO: 9.4 X10(3) UL (ref 4–11)

## 2024-05-13 PROCEDURE — 70450 CT HEAD/BRAIN W/O DYE: CPT | Performed by: EMERGENCY MEDICINE

## 2024-05-13 PROCEDURE — 85025 COMPLETE CBC W/AUTO DIFF WBC: CPT

## 2024-05-13 PROCEDURE — 99285 EMERGENCY DEPT VISIT HI MDM: CPT

## 2024-05-13 PROCEDURE — 93005 ELECTROCARDIOGRAM TRACING: CPT

## 2024-05-13 PROCEDURE — 83690 ASSAY OF LIPASE: CPT | Performed by: EMERGENCY MEDICINE

## 2024-05-13 PROCEDURE — C9113 INJ PANTOPRAZOLE SODIUM, VIA: HCPCS | Performed by: EMERGENCY MEDICINE

## 2024-05-13 PROCEDURE — 96375 TX/PRO/DX INJ NEW DRUG ADDON: CPT

## 2024-05-13 PROCEDURE — 74177 CT ABD & PELVIS W/CONTRAST: CPT | Performed by: EMERGENCY MEDICINE

## 2024-05-13 PROCEDURE — 84484 ASSAY OF TROPONIN QUANT: CPT | Performed by: EMERGENCY MEDICINE

## 2024-05-13 PROCEDURE — 85025 COMPLETE CBC W/AUTO DIFF WBC: CPT | Performed by: EMERGENCY MEDICINE

## 2024-05-13 PROCEDURE — 93010 ELECTROCARDIOGRAM REPORT: CPT

## 2024-05-13 PROCEDURE — 96374 THER/PROPH/DIAG INJ IV PUSH: CPT

## 2024-05-13 PROCEDURE — 80053 COMPREHEN METABOLIC PANEL: CPT

## 2024-05-13 PROCEDURE — 80053 COMPREHEN METABOLIC PANEL: CPT | Performed by: EMERGENCY MEDICINE

## 2024-05-13 PROCEDURE — 81003 URINALYSIS AUTO W/O SCOPE: CPT | Performed by: EMERGENCY MEDICINE

## 2024-05-13 RX ORDER — ONDANSETRON 2 MG/ML
4 INJECTION INTRAMUSCULAR; INTRAVENOUS ONCE
Status: COMPLETED | OUTPATIENT
Start: 2024-05-13 | End: 2024-05-13

## 2024-05-13 RX ORDER — ACETAMINOPHEN 500 MG
1000 TABLET ORAL ONCE
Status: COMPLETED | OUTPATIENT
Start: 2024-05-13 | End: 2024-05-13

## 2024-05-13 RX ORDER — DICYCLOMINE HCL 20 MG
20 TABLET ORAL 4 TIMES DAILY PRN
Qty: 30 TABLET | Refills: 0 | Status: SHIPPED | OUTPATIENT
Start: 2024-05-13 | End: 2024-06-12

## 2024-05-13 RX ORDER — KETOROLAC TROMETHAMINE 15 MG/ML
15 INJECTION, SOLUTION INTRAMUSCULAR; INTRAVENOUS ONCE
Status: COMPLETED | OUTPATIENT
Start: 2024-05-13 | End: 2024-05-13

## 2024-05-13 RX ORDER — PANTOPRAZOLE SODIUM 40 MG/1
40 TABLET, DELAYED RELEASE ORAL DAILY
Qty: 30 TABLET | Refills: 0 | Status: SHIPPED | OUTPATIENT
Start: 2024-05-13 | End: 2024-06-12

## 2024-05-13 RX ORDER — ONDANSETRON 4 MG/1
4 TABLET, ORALLY DISINTEGRATING ORAL EVERY 4 HOURS PRN
Qty: 10 TABLET | Refills: 0 | Status: SHIPPED | OUTPATIENT
Start: 2024-05-13 | End: 2024-05-14

## 2024-05-13 NOTE — ED QUICK NOTES
Patient c/o increased headache. Informed Dr. Anderson. He stated he would like to see patient's imaging prior to medication administration.

## 2024-05-13 NOTE — ED PROVIDER NOTES
Patient Seen in: [facility] Emergency Department      History     Chief Complaint   Patient presents with    Nausea/Vomiting/Diarrhea     Patient endorses nausea no vomiting \" I feel sick \" denies any vomiting, glucose was 91 with EMS, denies abdominal pain     Stated Complaint: sick to her stomach, nausea, shaking given zofran in route    Subjective:   HPI    78-year-old female with a past medical history as below including hypertension, hyperlipidemia, COPD, CKD, GERD and gastroparesis brought by EMS due to nausea and abdominal pain that started 2 days ago.  Patient states nausea has been continuous but abdominal pain she describes as a soreness has been intermittent.  She has difficulty localizing the pain but feels it is more in lower abdomen.  She felt constipated and used a suppository and did have small hard bowel movement.  She also reports that headache for the past 2 days.  Denies chest pain or shortness of breath.  Denies numbness or weakness of her extremities.  Denies visual changes.  Denies urinary symptoms.      Objective:   Past Medical History:    Abdominal distention    Abdominal pain    Acquired hypothyroidism    Anxiety    Asthma ([facility])    Belching    Bloating    Chronic rhinitis    CKD (chronic kidney disease) stage 3, GFR 30-59 ml/min ([facility])    Constipation    COPD (chronic obstructive pulmonary disease) ([facility])    NO OXYGEN     Depression    Diarrhea, unspecified    Diverticulosis of large intestine    Emphysema    Esophageal reflux    Fatigue    Feeling lonely    Flatulence/gas pain/belching    Food intolerance    H/O bronchitis    Hay fever    Hemorrhoids    High blood pressure    History of depression    Hypercholesterolemia    Hypertension    Itch of skin    Migraines    Mild intermittent asthma without complication ([facility])    Nausea    Night sweats    Pneumonia    PONV (postoperative nausea and vomiting)    Pure hypercholesterolemia    Rash    Reflux    Sleep disturbance    Stress     Uncomfortable fullness after meals    Visual impairment    glasses    Vomiting    Wears glasses              Past Surgical History:   Procedure Laterality Date    Cataract      Colonoscopy      Colonoscopy N/A 3/21/2024    Procedure: COLONOSCOPY;  Surgeon: Delon Ashford DO;  Location:  ENDOSCOPY    Colonoscopy & polypectomy  2014    multiple polyps; tics; repeat 3 yrs    Colonoscopy,remv lesn,snare N/A 2014    Procedure: ESOPHAGOGASTRODUODENOSCOPY, COLONOSCOPY, POSSIBLE BIOPSY, POSSIBLE POLYPECTOMY 45263,10681;  Surgeon: Slade Ivan MD;  Location: [facility]    Correct bunion,simple Bilateral     Cystotomy,excis vesical neck Left     Egd      Gastro - dmg  2014    stricture; HH    Oophorectomy Bilateral     Other surgical history  2023    Robotic repair of hiatal hernia and Nissen fundoplication    Patient documented not to have experienced any of the following events N/A 2014    Procedure: ESOPHAGOGASTRODUODENOSCOPY, COLONOSCOPY, POSSIBLE BIOPSY, POSSIBLE POLYPECTOMY 96450,78875;  Surgeon: Slade Ivan MD;  Location: [facility]    Patient withough preoperative order for iv antibiotic surgical site infection prophylaxis. N/A 2014    Procedure: ESOPHAGOGASTRODUODENOSCOPY, COLONOSCOPY, POSSIBLE BIOPSY, POSSIBLE POLYPECTOMY 48527,60028;  Surgeon: Slade Ivan MD;  Location: [facility]    Repair ing hernia,5+y/o,reducibl      Upper gi endoscopy,diagnosis N/A 2014    Procedure: ESOPHAGOGASTRODUODENOSCOPY, COLONOSCOPY, POSSIBLE BIOPSY, POSSIBLE POLYPECTOMY 51498,28543;  Surgeon: Slade Ivan MD;  Location: [facility]                Social History     Socioeconomic History    Marital status:    Tobacco Use    Smoking status: Former     Current packs/day: 0.00     Types: Cigarettes     Start date: 1970     Quit date: 1980     Years since quittin.6    Smokeless tobacco: Never    Tobacco comments:      1/2 pack  per day. Social history shows \"Tobacco Use: Active\" and \"Never Smoker: Active\"   Vaping Use    Vaping status: Never Used   Substance and Sexual Activity    Alcohol use: No    Drug use: No   Other Topics Concern    Caffeine Concern Yes     Comment: \"1 cup of coffee and tea, and can of pop daily\"    Exercise Yes     Comment: walking   Social History Narrative    ** Merged History Encounter **          Social Determinants of Health     Financial Resource Strain: Low Risk  (4/20/2023)    Financial Resource Strain     Difficulty of Paying Living Expenses: Not hard at all     Med Affordability: No   Food Insecurity: No Food Insecurity (3/20/2024)    Food Insecurity     Food Insecurity: Never true   Transportation Needs: No Transportation Needs (3/20/2024)    Transportation Needs     Lack of Transportation: No   Physical Activity: Inactive (4/21/2022)    Exercise Vital Sign     Days of Exercise per Week: 0 days     Minutes of Exercise per Session: 0 min   Stress: Stress Concern Present (2/13/2024)    Stress     Feeling of Stress : Yes    Social Connections   Housing Stability: Low Risk  (3/20/2024)    Housing Stability     Housing Instability: No              Review of Systems    Positive for stated complaint: sick to her stomach, nausea, shaking given zofran in route  Other systems are as noted in HPI.  Constitutional and vital signs reviewed.      All other systems reviewed and negative except as noted above.    Physical Exam     ED Triage Vitals [05/13/24 1616]   BP (!) 173/82   Pulse 70   Resp 20   Temp 97.8 °F (36.6 °C)   Temp src Oral   SpO2 100 %   O2 Device None (Room air)       Current Vitals:   Vital Signs  BP: 146/75  Pulse: 66  Resp: 24  Temp: 97.8 °F (36.6 °C)  Temp src: Oral  MAP (mmHg): 91    Oxygen Therapy  SpO2: 100 %  O2 Device: None (Room air)            Physical Exam  Vitals and nursing note reviewed.   Constitutional:       Appearance: She is well-developed.   HENT:      Head: Normocephalic and  atraumatic.      Mouth/Throat:      Mouth: Mucous membranes are moist.   Eyes:      General: No scleral icterus.  Cardiovascular:      Rate and Rhythm: Normal rate and regular rhythm.   Pulmonary:      Effort: Pulmonary effort is normal.      Breath sounds: Normal breath sounds.   Abdominal:      General: There is no distension.      Palpations: Abdomen is soft.      Tenderness: There is abdominal tenderness. There is no guarding or rebound.      Comments: Mild diffuse abdominal tenderness   Skin:     General: Skin is warm and dry.   Neurological:      General: No focal deficit present.      Mental Status: She is alert and oriented to person, place, and time.      Cranial Nerves: No cranial nerve deficit.      Motor: No weakness.   Psychiatric:         Mood and Affect: Mood normal.         Behavior: Behavior normal.               ED Course     Labs Reviewed   COMP METABOLIC PANEL (14) - Abnormal; Notable for the following components:       Result Value    Sodium 131 (*)     Calculated Osmolality 271 (*)     Total Protein 6.3 (*)     All other components within normal limits   CBC W/ DIFFERENTIAL - Abnormal; Notable for the following components:    RBC 3.56 (*)     HGB 10.1 (*)     HCT 29.3 (*)     Monocyte Absolute 1.04 (*)     All other components within normal limits   TROPONIN I HIGH SENSITIVITY - Normal   LIPASE - Normal   CBC WITH DIFFERENTIAL WITH PLATELET    Narrative:     The following orders were created for panel order CBC With Differential With Platelet.  Procedure                               Abnormality         Status                     ---------                               -----------         ------                     CBC W/ DIFFERENTIAL[[id number]]          Abnormal            Final result                 Please view results for these tests on the individual orders.   URINALYSIS WITH CULTURE REFLEX   RAINBOW DRAW LAVENDER   RAINBOW DRAW LIGHT GREEN   RAINBOW DRAW GOLD   RAINBOW DRAW BLUE      EKG    Rate, intervals and axes as noted on EKG Report.  Rate: 69  Rhythm: Sinus Rhythm  Reading: Normal sinus rhythm, LAD, inferior Q waves, no significant change from previous on 3/13/2024                 CT ABDOMEN+PELVIS(CONTRAST ONLY)(CPT=74177)    Result Date: 5/13/2024  CONCLUSION:  1. Specific etiology for abdominal pain is not evident from this study. 2. There is diverticulosis of colon without CT evidence of diverticulitis. 3. There has been previous Nissen fundoplication.   LOCATION:     Dictated by (CST): Jesus Lindo MD on 5/13/2024 at 6:17 PM     Finalized by (CST): Jesus Lindo MD on 5/13/2024 at 6:20 PM       CT BRAIN OR HEAD (29905)    Result Date: 5/13/2024  CONCLUSION:  There is chronic small vessel ischemic change and atrophy evident.  There is no specific evidence of an acute abnormality on the noncontrast CT of the head.    LOCATION:     Dictated by (CST): Jesus Lindo MD on 5/13/2024 at 6:11 PM     Finalized by (CST): Jesus Lindo MD on 5/13/2024 at 6:13 PM               MDM   78-year-old female with a past medical history as below including hypertension, hyperlipidemia, COPD, CKD, GERD and gastroparesis brought by EMS due to nausea and abdominal pain that started 2 days ago.      Differential includes but is not limited to gastritis, pancreatitis, gastroparesis, diverticulitis, appendicitis, UTI, less likely ICH    Labs show mildly low sodium 131 with otherwise normal renal function electrolytes.  WBC is normal at 9.4, patient has chronic anemia with hemoglobin at 10.1 increased from previous of 8.4 on 3/28/2024.  Lipase and LFTs are normal.    Independent interpretation of CT brain shows no evidence of bleed.  Radiology report reviewed as above noting no acute abnormality.   CT abdomen/pelvis also shows no acute specific etiology for abdominal pain.    Patient's pain is mainly in the epigastric area likely related to gastritis or gastroparesis.  Patient was treated with  Zofran and Protonix with improvement in symptoms.  Patient is able to tolerate p.o. fluids.  Will DC home to follow-up with her PCP and GI specialist.  Return precaution discussed.      Medical Decision Making  Amount and/or Complexity of Data Reviewed  External Data Reviewed: ECG.     Details: See MDM  Labs: ordered. Decision-making details documented in ED Course.  Radiology: ordered and independent interpretation performed. Decision-making details documented in ED Course.  ECG/medicine tests: ordered and independent interpretation performed. Decision-making details documented in ED Course.    Risk  Prescription drug management.        Disposition and Plan     Clinical Impression:  1. Abdominal pain, acute    2. Nausea         Disposition:  Discharge  5/13/2024  7:51 pm    Follow-up:  Dev Sanchez DO  [address]   [facility] [id number] [phone]    Schedule an appointment as soon as possible for a visit in 2 day(s)      Rick Shannon DO  [address] [place] [address]  [address]  [facility] [id number] [phone]    Schedule an appointment as soon as possible for a visit in 2 day(s)            Medications Prescribed:  Current Discharge Medication List        START taking these medications    Details   pantoprazole 40 MG Oral Tab EC Take 1 tablet (40 mg total) by mouth daily.  Qty: 30 tablet, Refills: 0      dicyclomine 20 MG Oral Tab Take 1 tablet (20 mg total) by mouth 4 (four) times daily as needed.  Qty: 30 tablet, Refills: 0      ondansetron 4 MG Oral Tablet Dispersible Take 1 tablet (4 mg total) by mouth every 4 (four) hours as needed for Nausea.  Qty: 10 tablet, Refills: 0

## 2024-05-13 NOTE — ED INITIAL ASSESSMENT (HPI)
Patient endorses nausea no vomiting \" I feel sick \" denies any vomiting, glucose was 91 with EMS, denies abdominal pain, patient stated she gave herself an enema at home \"it's not helping\"

## 2024-05-14 ENCOUNTER — OFFICE VISIT (OUTPATIENT)
Dept: FAMILY MEDICINE CLINIC | Facility: CLINIC | Age: 79
End: 2024-05-14

## 2024-05-14 VITALS
OXYGEN SATURATION: 100 % | BODY MASS INDEX: 18 KG/M2 | DIASTOLIC BLOOD PRESSURE: 70 MMHG | HEART RATE: 80 BPM | SYSTOLIC BLOOD PRESSURE: 142 MMHG | WEIGHT: 91 LBS | TEMPERATURE: 98 F

## 2024-05-14 DIAGNOSIS — R42 DIZZY: Primary | ICD-10-CM

## 2024-05-14 DIAGNOSIS — K59.00 CONSTIPATION, UNSPECIFIED CONSTIPATION TYPE: ICD-10-CM

## 2024-05-14 DIAGNOSIS — R11.0 NAUSEA: ICD-10-CM

## 2024-05-14 PROBLEM — D69.6 THROMBOCYTOPENIA (HCC): Chronic | Status: ACTIVE | Noted: 2024-01-01

## 2024-05-14 PROBLEM — D69.6 THROMBOCYTOPENIA (HCC): Chronic | Status: ACTIVE | Noted: 2024-05-14

## 2024-05-14 LAB
ATRIAL RATE: 69 BPM
P AXIS: 61 DEGREES
P-R INTERVAL: 174 MS
Q-T INTERVAL: 430 MS
QRS DURATION: 74 MS
QTC CALCULATION (BEZET): 460 MS
R AXIS: -63 DEGREES
T AXIS: 56 DEGREES
VENTRICULAR RATE: 69 BPM

## 2024-05-14 PROCEDURE — 3077F SYST BP >= 140 MM HG: CPT | Performed by: FAMILY MEDICINE

## 2024-05-14 PROCEDURE — 99214 OFFICE O/P EST MOD 30 MIN: CPT | Performed by: FAMILY MEDICINE

## 2024-05-14 PROCEDURE — 3078F DIAST BP <80 MM HG: CPT | Performed by: FAMILY MEDICINE

## 2024-05-14 PROCEDURE — 1160F RVW MEDS BY RX/DR IN RCRD: CPT | Performed by: FAMILY MEDICINE

## 2024-05-14 PROCEDURE — 1159F MED LIST DOCD IN RCRD: CPT | Performed by: FAMILY MEDICINE

## 2024-05-14 RX ORDER — LINACLOTIDE 72 UG/1
1 CAPSULE, GELATIN COATED ORAL DAILY
Qty: 30 CAPSULE | Refills: 1 | Status: SHIPPED | OUTPATIENT
Start: 2024-05-14

## 2024-05-14 RX ORDER — MECLIZINE HCL 12.5 MG/1
12.5 TABLET ORAL 3 TIMES DAILY PRN
Qty: 30 TABLET | Refills: 1 | Status: SHIPPED | OUTPATIENT
Start: 2024-05-14

## 2024-05-14 RX ORDER — ONDANSETRON 4 MG/1
4 TABLET, ORALLY DISINTEGRATING ORAL EVERY 4 HOURS PRN
Qty: 30 TABLET | Refills: 0 | Status: SHIPPED | OUTPATIENT
Start: 2024-05-14

## 2024-05-14 NOTE — PROGRESS NOTES
Subjective:   Patient ID: Bibi Diaz is a 78 year old female.    Since Saturday was feeling dizzy, nausea, shaky, chills, weak.  Went to ED had CT abd/pelvis, head, blood test all normal.    History/Other:   Review of Systems   All other systems reviewed and are negative.    Current Outpatient Medications   Medication Sig Dispense Refill    linaCLOtide (LINZESS) 72 MCG Oral Cap Take 1 tablet by mouth daily. 30 capsule 1    meclizine 12.5 MG Oral Tab Take 1 tablet (12.5 mg total) by mouth 3 (three) times daily as needed. 30 tablet 1    ondansetron 4 MG Oral Tablet Dispersible Take 1 tablet (4 mg total) by mouth every 4 (four) hours as needed for Nausea. 30 tablet 0    pantoprazole 40 MG Oral Tab EC Take 1 tablet (40 mg total) by mouth daily. (Patient not taking: Reported on 5/14/2024) 30 tablet 0    dicyclomine 20 MG Oral Tab Take 1 tablet (20 mg total) by mouth 4 (four) times daily as needed. 30 tablet 0    mirtazapine 30 MG Oral Tab Take 1 tablet (30 mg total) by mouth nightly. 90 tablet 1    fluticasone propionate 50 MCG/ACT Nasal Suspension 2 sprays by Each Nare route daily. 1 each 1    losartan 25 MG Oral Tab Take 1 tablet (25 mg total) by mouth daily. 90 tablet 1    albuterol (PROAIR HFA) 108 (90 Base) MCG/ACT Inhalation Aero Soln INHALE 2 PUFFS BY MOUTH EVERY 6 HOURS AS NEEDED FOR WHEEZING 8.5 g 2    clonazePAM 0.5 MG Oral Tab Take 1 tablet (0.5 mg total) by mouth 2 (two) times daily as needed. 60 tablet 0    SYMBICORT 160-4.5 MCG/ACT Inhalation Aerosol INHALE 2 PUFFS BY MOUTH TWICE DAILY 10.2 g 11    Cholecalciferol (VITAMIN D-3 OR) Take by mouth.      DUPIXENT 300 MG/2ML Subcutaneous Solution Prefilled Syringe       clobetasol 0.05 % External Ointment       TRIAMCINOLONE 0.1 % External Cream APPLY TOPICALLY TO THE AFFECTED AREA TWICE DAILY AS NEEDED (Patient taking differently: as needed.) 60 g 3    acetaminophen 500 MG Oral Tab Take 1 tablet (500 mg total) by mouth every 6 (six) hours as needed for  Pain.       Allergies:  Allergies   Allergen Reactions    Avelox [Moxifloxacin Hcl In Nacl] SWELLING    Avelox [Moxifloxacin Hydrochloride] TONGUE SWELLING     \"TABS\"    Amoxicillin NAUSEA AND VOMITING     Vomiting.    Statins Myopathy    Sulfa Antibiotics RASH and ITCHING    Dander OTHER (SEE COMMENTS)     \"Animals\": runny nose, watery eyes, itching    Diphenhydramine Runny nose     \"Animals\": runny nose, watery eyes, itching    Dust Runny nose    Latex RASH    Sulfa Drugs Cross Reactors RASH and ITCHING       Objective:   Physical Exam  Vitals reviewed.   Constitutional:       General: She is not in acute distress.     Appearance: She is well-developed. She is not diaphoretic.   Eyes:      General: No scleral icterus.        Right eye: No discharge.         Left eye: No discharge.      Conjunctiva/sclera: Conjunctivae normal.   Cardiovascular:      Rate and Rhythm: Normal rate and regular rhythm.      Heart sounds: Normal heart sounds. No murmur heard.     No friction rub. No gallop.   Pulmonary:      Effort: Pulmonary effort is normal. No respiratory distress.      Breath sounds: Normal breath sounds. No wheezing or rales.   Abdominal:      General: Bowel sounds are normal. There is no distension.      Palpations: Abdomen is soft.      Tenderness: There is no abdominal tenderness.       Assessment & Plan:   1. Dizzy    2. Constipation, unspecified constipation type    3. Nausea      1. Dizzy  - meclizine 12.5 MG Oral Tab; Take 1 tablet (12.5 mg total) by mouth 3 (three) times daily as needed.  Dispense: 30 tablet; Refill: 1    2. Constipation, unspecified constipation type  - linaCLOtide (LINZESS) 72 MCG Oral Cap; Take 1 tablet by mouth daily.  Dispense: 30 capsule; Refill: 1    3. Nausea  - ondansetron 4 MG Oral Tablet Dispersible; Take 1 tablet (4 mg total) by mouth every 4 (four) hours as needed for Nausea.  Dispense: 30 tablet; Refill: 0      Meds This Visit:  Requested Prescriptions     Signed Prescriptions  Disp Refills    linaCLOtide (LINZESS) 72 MCG Oral Cap 30 capsule 1     Sig: Take 1 tablet by mouth daily.    meclizine 12.5 MG Oral Tab 30 tablet 1     Sig: Take 1 tablet (12.5 mg total) by mouth 3 (three) times daily as needed.    ondansetron 4 MG Oral Tablet Dispersible 30 tablet 0     Sig: Take 1 tablet (4 mg total) by mouth every 4 (four) hours as needed for Nausea.       Imaging & Referrals:  None

## 2024-05-14 NOTE — PATIENT INSTRUCTIONS
Meclazine 12.5 mg now.  If that doesn't help then 6 hrs later take meclazine 25mg if that doesn't work then tomorrow or 6 hrs later try the dicyclomine.      Try Linzess for constipation.  We can double the dose after 1 week if needed.      We can consider over the counter milk of magnesia or glycerin rectal suppository for treatment of severe constipation.

## 2024-05-16 ENCOUNTER — OFFICE VISIT (OUTPATIENT)
Dept: PHYSICAL MEDICINE AND REHAB | Facility: CLINIC | Age: 79
End: 2024-05-16

## 2024-05-16 VITALS — HEIGHT: 60 IN | RESPIRATION RATE: 18 BRPM | WEIGHT: 91 LBS | BODY MASS INDEX: 17.87 KG/M2

## 2024-05-16 DIAGNOSIS — M54.16 RIGHT LUMBAR RADICULOPATHY: Primary | ICD-10-CM

## 2024-05-16 DIAGNOSIS — F33.1 MAJOR DEPRESSIVE DISORDER, RECURRENT EPISODE, MODERATE (HCC): ICD-10-CM

## 2024-05-16 DIAGNOSIS — I70.0 AORTIC ATHEROSCLEROSIS (HCC): ICD-10-CM

## 2024-05-16 PROCEDURE — 1160F RVW MEDS BY RX/DR IN RCRD: CPT | Performed by: PHYSICAL MEDICINE & REHABILITATION

## 2024-05-16 PROCEDURE — 1125F AMNT PAIN NOTED PAIN PRSNT: CPT | Performed by: PHYSICAL MEDICINE & REHABILITATION

## 2024-05-16 PROCEDURE — 1159F MED LIST DOCD IN RCRD: CPT | Performed by: PHYSICAL MEDICINE & REHABILITATION

## 2024-05-16 PROCEDURE — 99214 OFFICE O/P EST MOD 30 MIN: CPT | Performed by: PHYSICAL MEDICINE & REHABILITATION

## 2024-05-16 PROCEDURE — 3008F BODY MASS INDEX DOCD: CPT | Performed by: PHYSICAL MEDICINE & REHABILITATION

## 2024-05-16 RX ORDER — DULOXETIN HYDROCHLORIDE 30 MG/1
30 CAPSULE, DELAYED RELEASE ORAL DAILY
Qty: 30 CAPSULE | Refills: 0 | Status: SHIPPED | OUTPATIENT
Start: 2024-05-16

## 2024-05-16 NOTE — PROGRESS NOTES
[facility]  OFFICE FOLLOW UP EVALUATION      HISTORY OF PRESENT ILLNESS:     Chief Complaint   Patient presents with    Follow - Up     Returning patient. LOV 4/30/24. Here following up after 5/6/24 Right L4 and L5 Transforaminal Epidural Steroid Injections. Still with soreness to R hip/low back. Sx's are intermittent. Denies N/T. CPL 2/10. Still unable to attend PT at this time. Uses Advil prn.       Patient is following up for right-sided low back pain.  She states since the epidural injection she has noted good improvement.  She did have a few episodes of sharp pain down the leg however they have now resolved.  She is however nervous about the symptoms returning in the future.  She is dealing with gastroparesis and pain related to this.  She is unable to attend PT at this time but has been trying to do home exercises.  She rates her pain to be 2 out of 10.  She denies any changes in bowel bladder.    PHYSICAL EXAM:   Resp 18   Ht 60\"   Wt 91 lb (41.3 kg)   BMI 17.77 kg/m²       LUMBAR SPINE:  Inspection: no erythema, swelling, or obvious deformity.  Their iliac crest and shoulder heights are symmetrical.     Palpation: Non tender to palpation of the spinous process.   ROM: Restricted in forward flexion with reproduction of pain  Strength: 5/5 in bilateral lower extremities   Sensation: Intact to light touch in all dermatomes of the lower extremities but decreased in the foot right-sided  Reflexes: 1/4 at L4 and S1  Facet Loading: no specific facet pain  Slump test: Negative for pain symptoms for radicular pain symptoms    IMAGING:     MRI lumbar spine completed on 4/10/2024 was personally reviewed which is notable for multilevel degenerative disc disease most pronounced at L3-4 with a degenerative disc bulge resulting in mild central canal and moderate bilateral foraminal as well as subarticular zone narrowing. There is a broad-based degenerative disc bulge at  L4-5 with moderate right and mild left neuroforaminal narrowing. There is a mild degenerative disc bulge at L5-S1 mild bilateral neuroforaminal narrowing. There is facet arthropathy throughout the lower lumbar facet joints.     All imaging results were reviewed and discussed with patient.      ASSESSMENT/PLAN:     1. Right lumbar radiculopathy    2. Major depressive disorder, recurrent episode, moderate (HCC)    3. Aortic atherosclerosis (HCC)        Bibi Diaz is a 78 year old female following up for right-sided low back pain with lumbar radiculopathy.  She suffers with depression symptoms as well.  I believe she has heightened pain response as a result of some of this.  Recommend that she start Cymbalta 30 mg daily as well.  She has responded well to the epidural procedure overall.  Recommend that she continue home exercise program and follow-up with me in 6 weeks.  If she tolerates this well we can consider increasing the dosage in the near future as well.      The patient verbalized understanding with the plan and was in agreement. All questions/concerns were addressed and there were no barriers to learning.  Please note Dragon dictation software was used to dictate this note and may result in inadvertent typos.    Eliana Shannon DO, FAAPMR & CAQSM  Physical Medicine and Rehabilitation  Sports and Spine Medicine    PAST MEDICAL HISTORY:     Past Medical History:    Abdominal distention    Abdominal pain    Acquired hypothyroidism    Anxiety    Asthma ([facility])    Belching    Bloating    Chronic rhinitis    CKD (chronic kidney disease) stage 3, GFR 30-59 ml/min (HCC)    Constipation    COPD (chronic obstructive pulmonary disease) ([facility])    NO OXYGEN     Depression    Diarrhea, unspecified    Diverticulosis of large intestine    Emphysema    Esophageal reflux    Fatigue    Feeling lonely    Flatulence/gas pain/belching    Food intolerance    H/O bronchitis    Hay fever    Hemorrhoids    High blood pressure     History of depression    Hypercholesterolemia    Hypertension    Itch of skin    Migraines    Mild intermittent asthma without complication (HCC)    Nausea    Night sweats    Pneumonia    PONV (postoperative nausea and vomiting)    Pure hypercholesterolemia    Rash    Reflux    Sleep disturbance    Stress    Uncomfortable fullness after meals    Visual impairment    glasses    Vomiting    Wears glasses         PAST SURGICAL HISTORY:     Past Surgical History:   Procedure Laterality Date    Cataract      Colonoscopy      Colonoscopy N/A 3/21/2024    Procedure: COLONOSCOPY;  Surgeon: Delon Ashford DO;  Location:  ENDOSCOPY    Colonoscopy & polypectomy  09/2014    multiple polyps; tics; repeat 3 yrs    Colonoscopy,remv lesn,snare N/A 09/22/2014    Procedure: ESOPHAGOGASTRODUODENOSCOPY, COLONOSCOPY, POSSIBLE BIOPSY, POSSIBLE POLYPECTOMY [id number];  Surgeon: Slade Ivan MD;  Location: [facility]    Correct bunion,simple Bilateral     Cystotomy,excis vesical neck Left     Egd      Gastro - dmg  09/2014    stricture; HH    Oophorectomy Bilateral 2017    Other surgical history  02/27/2023    Robotic repair of hiatal hernia and Nissen fundoplication    Patient documented not to have experienced any of the following events N/A 09/22/2014    Procedure: ESOPHAGOGASTRODUODENOSCOPY, COLONOSCOPY, POSSIBLE BIOPSY, POSSIBLE POLYPECTOMY [id number];  Surgeon: Slade Ivan MD;  Location: [facility]    Patient withough preoperative order for iv antibiotic surgical site infection prophylaxis. N/A 09/22/2014    Procedure: ESOPHAGOGASTRODUODENOSCOPY, COLONOSCOPY, POSSIBLE BIOPSY, POSSIBLE POLYPECTOMY [id number];  Surgeon: Slade Ivan MD;  Location: [facility]    Repair ing hernia,5+y/o,reducibl      Upper gi endoscopy,diagnosis N/A 09/22/2014    Procedure: ESOPHAGOGASTRODUODENOSCOPY, COLONOSCOPY, POSSIBLE BIOPSY, POSSIBLE POLYPECTOMY [id number];  Surgeon: Slade Ivan MD;  Location:  [facility]         CURRENT MEDICATIONS:     Current Outpatient Medications   Medication Sig Dispense Refill    DULoxetine (CYMBALTA) 30 MG Oral Cap DR Particles Take 1 capsule (30 mg total) by mouth daily. 30 capsule 0    linaCLOtide (LINZESS) 72 MCG Oral Cap Take 1 tablet by mouth daily. 30 capsule 1    meclizine 12.5 MG Oral Tab Take 1 tablet (12.5 mg total) by mouth 3 (three) times daily as needed. 30 tablet 1    ondansetron 4 MG Oral Tablet Dispersible Take 1 tablet (4 mg total) by mouth every 4 (four) hours as needed for Nausea. 30 tablet 0    pantoprazole 40 MG Oral Tab EC Take 1 tablet (40 mg total) by mouth daily. 30 tablet 0    dicyclomine 20 MG Oral Tab Take 1 tablet (20 mg total) by mouth 4 (four) times daily as needed. 30 tablet 0    mirtazapine 30 MG Oral Tab Take 1 tablet (30 mg total) by mouth nightly. 90 tablet 1    fluticasone propionate 50 MCG/ACT Nasal Suspension 2 sprays by Each Nare route daily. 1 each 1    losartan 25 MG Oral Tab Take 1 tablet (25 mg total) by mouth daily. 90 tablet 1    albuterol (PROAIR HFA) 108 (90 Base) MCG/ACT Inhalation Aero Soln INHALE 2 PUFFS BY MOUTH EVERY 6 HOURS AS NEEDED FOR WHEEZING 8.5 g 2    clonazePAM 0.5 MG Oral Tab Take 1 tablet (0.5 mg total) by mouth 2 (two) times daily as needed. 60 tablet 0    SYMBICORT 160-4.5 MCG/ACT Inhalation Aerosol INHALE 2 PUFFS BY MOUTH TWICE DAILY 10.2 g 11    Cholecalciferol (VITAMIN D-3 OR) Take by mouth.      DUPIXENT 300 MG/2ML Subcutaneous Solution Prefilled Syringe       clobetasol 0.05 % External Ointment       TRIAMCINOLONE 0.1 % External Cream APPLY TOPICALLY TO THE AFFECTED AREA TWICE DAILY AS NEEDED (Patient taking differently: as needed.) 60 g 3    acetaminophen 500 MG Oral Tab Take 1 tablet (500 mg total) by mouth every 6 (six) hours as needed for Pain.           ALLERGIES:     Allergies   Allergen Reactions    Avelox [Moxifloxacin Hcl In Nacl] SWELLING    Avelox [Moxifloxacin Hydrochloride] TONGUE SWELLING      \"TABS\"    Amoxicillin NAUSEA AND VOMITING     Vomiting.    Statins Myopathy    Sulfa Antibiotics RASH and ITCHING    Dander OTHER (SEE COMMENTS)     \"Animals\": runny nose, watery eyes, itching    Diphenhydramine Runny nose     \"Animals\": runny nose, watery eyes, itching    Dust Runny nose    Latex RASH    Sulfa Drugs Cross Reactors RASH and ITCHING         FAMILY HISTORY:     Family History   Problem Relation Age of Onset    Colon Cancer Mother     Other (Other) Mother     Other (copd) Mother     Alcohol and Other Disorders Associated Father     Other (Other) Father     Other (emph) Father     Alcohol and Other Disorders Associated Son     Hypertension Sister     Prostate Cancer Brother     Hypertension Brother           SOCIAL HISTORY:     Social History     Socioeconomic History    Marital status:    Tobacco Use    Smoking status: Former     Current packs/day: 0.00     Types: Cigarettes     Start date: 1970     Quit date: 1980     Years since quittin.6    Smokeless tobacco: Never    Tobacco comments:      1/2 pack per day. Social history shows \"Tobacco Use: Active\" and \"Never Smoker: Active\"   Vaping Use    Vaping status: Never Used   Substance and Sexual Activity    Alcohol use: No    Drug use: No   Other Topics Concern    Caffeine Concern Yes     Comment: \"1 cup of coffee and tea, and can of pop daily\"    Exercise Yes     Comment: walking   Social History Narrative    ** Merged History Encounter **          Social Determinants of Health     Financial Resource Strain: Low Risk  (2023)    Financial Resource Strain     Difficulty of Paying Living Expenses: Not hard at all     Med Affordability: No   Food Insecurity: No Food Insecurity (3/20/2024)    Food Insecurity     Food Insecurity: Never true   Transportation Needs: No Transportation Needs (3/20/2024)    Transportation Needs     Lack of Transportation: No   Physical Activity: Inactive (2022)    Exercise Vital Sign     Days of  Exercise per Week: 0 days     Minutes of Exercise per Session: 0 min   Stress: Stress Concern Present (2/13/2024)    Stress     Feeling of Stress : Yes    Social Connections   Housing Stability: Low Risk  (3/20/2024)    Housing Stability     Housing Instability: No          REVIEW OF SYSTEMS:   A comprehensive 10 point review of systems was completed.  Pertinent positives and negatives noted in the the HPI.      LABS:     Lab Results   Component Value Date     02/28/2013    A1C 5.6 02/28/2013     Lab Results   Component Value Date    WBC 9.4 05/13/2024    RBC 3.56 (L) 05/13/2024    HGB 10.1 (L) 05/13/2024    HCT 29.3 (L) 05/13/2024    MCV 82.3 05/13/2024    MCH 28.4 05/13/2024    MCHC 34.5 05/13/2024    RDW 13.2 05/13/2024    .0 05/13/2024    MPV 9.4 02/28/2013     Lab Results   Component Value Date    GLU 89 05/13/2024    BUN 11 05/13/2024    BUNCREA 14 01/12/2022    CREATSERUM 0.58 05/13/2024    ANIONGAP 10 05/13/2024    GFR 53 (L) 12/12/2017    GFRNAA 65 07/14/2022    GFRAA 75 07/14/2022    CA 8.6 05/13/2024    OSMOCALC 271 (L) 05/13/2024    ALKPHO 74 05/13/2024    AST 18 05/13/2024    ALT 16 05/13/2024    BILT 0.4 05/13/2024    TP 6.3 (L) 05/13/2024    ALB 3.5 05/13/2024    GLOBULIN 2.8 05/13/2024    AGRATIO 1.9 01/12/2022     (L) 05/13/2024    K 3.6 05/13/2024     05/13/2024    CO2 21.0 05/13/2024     Lab Results   Component Value Date    PTP 13.7 03/20/2024    PT 12.6 01/31/2013    INR 1.05 03/20/2024     Lab Results   Component Value Date    VITD 34.9 05/01/2023

## 2024-05-17 ENCOUNTER — PATIENT OUTREACH (OUTPATIENT)
Dept: CASE MANAGEMENT | Age: 79
End: 2024-05-17

## 2024-05-17 NOTE — PROGRESS NOTES
1st attempt ER f/up apt request  No answer, [facility] to schedule apt  PCP -existing apt (5/14)  [facility] -unable to contact  Closing encounter

## 2024-05-20 ENCOUNTER — APPOINTMENT (OUTPATIENT)
Dept: PHYSICAL THERAPY | Age: 79
End: 2024-05-20
Attending: PHYSICAL MEDICINE & REHABILITATION
Payer: MEDICARE

## 2024-05-22 DIAGNOSIS — F41.9 ANXIETY AND DEPRESSION: ICD-10-CM

## 2024-05-22 DIAGNOSIS — F32.A ANXIETY AND DEPRESSION: ICD-10-CM

## 2024-05-22 RX ORDER — CLONAZEPAM 0.5 MG/1
0.5 TABLET ORAL 2 TIMES DAILY PRN
Qty: 55 TABLET | Refills: 0 | Status: SHIPPED | OUTPATIENT
Start: 2024-05-22

## 2024-05-22 NOTE — TELEPHONE ENCOUNTER
A refill request was received for:  Requested Prescriptions     Pending Prescriptions Disp Refills    CLONAZEPAM 0.5 MG Oral Tab [Pharmacy Med Name: CLONAZEPAM 0.5MG TABLETS] 60 tablet 0     Sig: TAKE 1 TABLET(0.5 MG) BY MOUTH TWICE DAILY AS NEEDED       Last refill date:  4/17/2024     Last office visit: 5/14/2024    Follow up due:  Future Appointments   Date Time Provider Department Center   6/27/2024  9:00 AM Eliana Shannon,  [facility]   7/1/2024  1:50 PM Jaspreet Burrows MD [facility] [place]

## 2024-05-28 ENCOUNTER — APPOINTMENT (OUTPATIENT)
Dept: PHYSICAL THERAPY | Age: 79
End: 2024-05-28
Attending: PHYSICAL MEDICINE & REHABILITATION
Payer: MEDICARE

## 2024-06-03 ENCOUNTER — APPOINTMENT (OUTPATIENT)
Dept: PHYSICAL THERAPY | Age: 79
End: 2024-06-03
Attending: PHYSICAL MEDICINE & REHABILITATION
Payer: MEDICARE

## 2024-06-10 ENCOUNTER — APPOINTMENT (OUTPATIENT)
Dept: CT IMAGING | Facility: HOSPITAL | Age: 79
DRG: 392 | End: 2024-06-10
Attending: EMERGENCY MEDICINE

## 2024-06-10 ENCOUNTER — HOSPITAL ENCOUNTER (INPATIENT)
Facility: HOSPITAL | Age: 79
LOS: 3 days | Discharge: HOME | DRG: 392 | End: 2024-06-14
Attending: EMERGENCY MEDICINE | Admitting: HOSPITALIST

## 2024-06-10 ENCOUNTER — TELEPHONE (OUTPATIENT)
Dept: PHYSICAL MEDICINE AND REHAB | Facility: CLINIC | Age: 79
End: 2024-06-10

## 2024-06-10 ENCOUNTER — APPOINTMENT (OUTPATIENT)
Dept: GENERAL RADIOLOGY | Facility: HOSPITAL | Age: 79
DRG: 392 | End: 2024-06-10
Attending: EMERGENCY MEDICINE

## 2024-06-10 ENCOUNTER — APPOINTMENT (OUTPATIENT)
Dept: PHYSICAL THERAPY | Age: 79
End: 2024-06-10
Attending: PHYSICAL MEDICINE & REHABILITATION
Payer: MEDICARE

## 2024-06-10 DIAGNOSIS — K57.92 ACUTE DIVERTICULITIS: Primary | ICD-10-CM

## 2024-06-10 PROBLEM — E87.1 HYPONATREMIA: Status: ACTIVE | Noted: 2024-01-01

## 2024-06-10 PROBLEM — E87.1 HYPONATREMIA: Status: ACTIVE | Noted: 2024-06-10

## 2024-06-10 LAB
ALBUMIN SERPL-MCNC: 3.4 G/DL (ref 3.4–5)
ALBUMIN/GLOB SERPL: 0.9 {RATIO} (ref 1–2)
ALP LIVER SERPL-CCNC: 81 U/L
ALT SERPL-CCNC: 17 U/L
ANION GAP SERPL CALC-SCNC: 10 MMOL/L (ref 0–18)
AST SERPL-CCNC: 15 U/L (ref 15–37)
BASOPHILS # BLD AUTO: 0.02 X10(3) UL (ref 0–0.2)
BASOPHILS NFR BLD AUTO: 0.2 %
BILIRUB SERPL-MCNC: 0.6 MG/DL (ref 0.1–2)
BILIRUB UR QL STRIP.AUTO: NEGATIVE
BUN BLD-MCNC: 12 MG/DL (ref 9–23)
CALCIUM BLD-MCNC: 9.2 MG/DL (ref 8.5–10.1)
CHLORIDE SERPL-SCNC: 93 MMOL/L (ref 98–112)
CLARITY UR REFRACT.AUTO: CLEAR
CO2 SERPL-SCNC: 22 MMOL/L (ref 21–32)
CREAT BLD-MCNC: 0.85 MG/DL
EGFRCR SERPLBLD CKD-EPI 2021: 70 ML/MIN/1.73M2 (ref 60–?)
EOSINOPHIL # BLD AUTO: 0 X10(3) UL (ref 0–0.7)
EOSINOPHIL NFR BLD AUTO: 0 %
ERYTHROCYTE [DISTWIDTH] IN BLOOD BY AUTOMATED COUNT: 14.1 %
FLUAV + FLUBV RNA SPEC NAA+PROBE: NEGATIVE
FLUAV + FLUBV RNA SPEC NAA+PROBE: NEGATIVE
GLOBULIN PLAS-MCNC: 3.6 G/DL (ref 2.8–4.4)
GLUCOSE BLD-MCNC: 142 MG/DL (ref 70–99)
GLUCOSE UR STRIP.AUTO-MCNC: NORMAL MG/DL
HCT VFR BLD AUTO: 28.2 %
HGB BLD-MCNC: 9.8 G/DL
IMM GRANULOCYTES # BLD AUTO: 0.03 X10(3) UL (ref 0–1)
IMM GRANULOCYTES NFR BLD: 0.3 %
KETONES UR STRIP.AUTO-MCNC: NEGATIVE MG/DL
LACTATE SERPL-SCNC: 1.8 MMOL/L (ref 0.4–2)
LEUKOCYTE ESTERASE UR QL STRIP.AUTO: NEGATIVE
LYMPHOCYTES # BLD AUTO: 0.82 X10(3) UL (ref 1–4)
LYMPHOCYTES NFR BLD AUTO: 7.6 %
MCH RBC QN AUTO: 27.9 PG (ref 26–34)
MCHC RBC AUTO-ENTMCNC: 34.8 G/DL (ref 31–37)
MCV RBC AUTO: 80.3 FL
MONOCYTES # BLD AUTO: 0.79 X10(3) UL (ref 0.1–1)
MONOCYTES NFR BLD AUTO: 7.4 %
NEUTROPHILS # BLD AUTO: 9.07 X10 (3) UL (ref 1.5–7.7)
NEUTROPHILS # BLD AUTO: 9.07 X10(3) UL (ref 1.5–7.7)
NEUTROPHILS NFR BLD AUTO: 84.5 %
NITRITE UR QL STRIP.AUTO: NEGATIVE
OSMOLALITY SERPL CALC.SUM OF ELEC: 262 MOSM/KG (ref 275–295)
PH UR STRIP.AUTO: 6.5 [PH] (ref 5–8)
PLATELET # BLD AUTO: 311 10(3)UL (ref 150–450)
POTASSIUM SERPL-SCNC: 4 MMOL/L (ref 3.5–5.1)
PROT SERPL-MCNC: 7 G/DL (ref 6.4–8.2)
PROT UR STRIP.AUTO-MCNC: NEGATIVE MG/DL
RBC # BLD AUTO: 3.51 X10(6)UL
RBC UR QL AUTO: NEGATIVE
RSV RNA SPEC NAA+PROBE: NEGATIVE
SARS-COV-2 RNA RESP QL NAA+PROBE: NOT DETECTED
SODIUM SERPL-SCNC: 125 MMOL/L (ref 136–145)
SP GR UR STRIP.AUTO: 1.01 (ref 1–1.03)
TROPONIN I SERPL HS-MCNC: 3 NG/L
UROBILINOGEN UR STRIP.AUTO-MCNC: NORMAL MG/DL
WBC # BLD AUTO: 10.7 X10(3) UL (ref 4–11)

## 2024-06-10 PROCEDURE — 71045 X-RAY EXAM CHEST 1 VIEW: CPT | Performed by: EMERGENCY MEDICINE

## 2024-06-10 PROCEDURE — 74177 CT ABD & PELVIS W/CONTRAST: CPT | Performed by: EMERGENCY MEDICINE

## 2024-06-10 PROCEDURE — 99223 1ST HOSP IP/OBS HIGH 75: CPT | Performed by: HOSPITALIST

## 2024-06-10 RX ORDER — MELATONIN
3 NIGHTLY PRN
Status: DISCONTINUED | OUTPATIENT
Start: 2024-06-10 | End: 2024-06-14

## 2024-06-10 RX ORDER — SODIUM CHLORIDE 9 MG/ML
1000 INJECTION, SOLUTION INTRAVENOUS ONCE
Status: COMPLETED | OUTPATIENT
Start: 2024-06-10 | End: 2024-06-10

## 2024-06-10 RX ORDER — MORPHINE SULFATE 4 MG/ML
4 INJECTION, SOLUTION INTRAMUSCULAR; INTRAVENOUS EVERY 2 HOUR PRN
Status: DISCONTINUED | OUTPATIENT
Start: 2024-06-10 | End: 2024-06-14

## 2024-06-10 RX ORDER — CLONAZEPAM 0.5 MG/1
0.5 TABLET ORAL 2 TIMES DAILY PRN
Status: DISCONTINUED | OUTPATIENT
Start: 2024-06-10 | End: 2024-06-14

## 2024-06-10 RX ORDER — HYDROCODONE BITARTRATE AND ACETAMINOPHEN 5; 325 MG/1; MG/1
1 TABLET ORAL EVERY 4 HOURS PRN
Status: DISCONTINUED | OUTPATIENT
Start: 2024-06-10 | End: 2024-06-14

## 2024-06-10 RX ORDER — LOSARTAN POTASSIUM 25 MG/1
25 TABLET ORAL DAILY
Status: DISCONTINUED | OUTPATIENT
Start: 2024-06-10 | End: 2024-06-11

## 2024-06-10 RX ORDER — ACETAMINOPHEN 500 MG
1000 TABLET ORAL EVERY 4 HOURS PRN
Status: DISCONTINUED | OUTPATIENT
Start: 2024-06-10 | End: 2024-06-14

## 2024-06-10 RX ORDER — MECLIZINE HCL 12.5 MG/1
12.5 TABLET ORAL 3 TIMES DAILY PRN
Status: DISCONTINUED | OUTPATIENT
Start: 2024-06-10 | End: 2024-06-14

## 2024-06-10 RX ORDER — BENZONATATE 200 MG/1
200 CAPSULE ORAL 3 TIMES DAILY PRN
Status: DISCONTINUED | OUTPATIENT
Start: 2024-06-10 | End: 2024-06-14

## 2024-06-10 RX ORDER — ENEMA 19; 7 G/133ML; G/133ML
1 ENEMA RECTAL ONCE AS NEEDED
Status: DISCONTINUED | OUTPATIENT
Start: 2024-06-10 | End: 2024-06-14

## 2024-06-10 RX ORDER — SODIUM CHLORIDE 9 MG/ML
INJECTION, SOLUTION INTRAVENOUS CONTINUOUS
Status: DISCONTINUED | OUTPATIENT
Start: 2024-06-10 | End: 2024-06-11

## 2024-06-10 RX ORDER — SENNOSIDES 8.6 MG
17.2 TABLET ORAL NIGHTLY PRN
Status: DISCONTINUED | OUTPATIENT
Start: 2024-06-10 | End: 2024-06-14

## 2024-06-10 RX ORDER — ENOXAPARIN SODIUM 100 MG/ML
40 INJECTION SUBCUTANEOUS DAILY
Status: DISCONTINUED | OUTPATIENT
Start: 2024-06-11 | End: 2024-06-14

## 2024-06-10 RX ORDER — FLUTICASONE FUROATE AND VILANTEROL 200; 25 UG/1; UG/1
1 POWDER RESPIRATORY (INHALATION) DAILY
Status: DISCONTINUED | OUTPATIENT
Start: 2024-06-10 | End: 2024-06-14

## 2024-06-10 RX ORDER — HYDROCODONE BITARTRATE AND ACETAMINOPHEN 5; 325 MG/1; MG/1
2 TABLET ORAL EVERY 4 HOURS PRN
Status: DISCONTINUED | OUTPATIENT
Start: 2024-06-10 | End: 2024-06-14

## 2024-06-10 RX ORDER — HYDROMORPHONE HYDROCHLORIDE 1 MG/ML
0.5 INJECTION, SOLUTION INTRAMUSCULAR; INTRAVENOUS; SUBCUTANEOUS EVERY 30 MIN PRN
Status: COMPLETED | OUTPATIENT
Start: 2024-06-10 | End: 2024-06-12

## 2024-06-10 RX ORDER — PANTOPRAZOLE SODIUM 40 MG/1
40 TABLET, DELAYED RELEASE ORAL DAILY
Status: DISCONTINUED | OUTPATIENT
Start: 2024-06-10 | End: 2024-06-14

## 2024-06-10 RX ORDER — ACETAMINOPHEN 500 MG
1000 TABLET ORAL ONCE
Status: COMPLETED | OUTPATIENT
Start: 2024-06-10 | End: 2024-06-10

## 2024-06-10 RX ORDER — MORPHINE SULFATE 2 MG/ML
1 INJECTION, SOLUTION INTRAMUSCULAR; INTRAVENOUS EVERY 2 HOUR PRN
Status: DISCONTINUED | OUTPATIENT
Start: 2024-06-10 | End: 2024-06-14

## 2024-06-10 RX ORDER — DULOXETIN HYDROCHLORIDE 30 MG/1
30 CAPSULE, DELAYED RELEASE ORAL DAILY
Status: DISCONTINUED | OUTPATIENT
Start: 2024-06-10 | End: 2024-06-14

## 2024-06-10 RX ORDER — MIRTAZAPINE 15 MG/1
30 TABLET, FILM COATED ORAL NIGHTLY
Status: DISCONTINUED | OUTPATIENT
Start: 2024-06-10 | End: 2024-06-14

## 2024-06-10 RX ORDER — ACETAMINOPHEN 325 MG/1
650 TABLET ORAL EVERY 4 HOURS PRN
Status: DISCONTINUED | OUTPATIENT
Start: 2024-06-10 | End: 2024-06-14

## 2024-06-10 RX ORDER — METOCLOPRAMIDE HYDROCHLORIDE 5 MG/ML
5 INJECTION INTRAMUSCULAR; INTRAVENOUS EVERY 8 HOURS PRN
Status: DISCONTINUED | OUTPATIENT
Start: 2024-06-10 | End: 2024-06-14

## 2024-06-10 RX ORDER — BISACODYL 10 MG
10 SUPPOSITORY, RECTAL RECTAL
Status: DISCONTINUED | OUTPATIENT
Start: 2024-06-10 | End: 2024-06-14

## 2024-06-10 RX ORDER — MORPHINE SULFATE 2 MG/ML
2 INJECTION, SOLUTION INTRAMUSCULAR; INTRAVENOUS EVERY 2 HOUR PRN
Status: DISCONTINUED | OUTPATIENT
Start: 2024-06-10 | End: 2024-06-14

## 2024-06-10 RX ORDER — ONDANSETRON 2 MG/ML
4 INJECTION INTRAMUSCULAR; INTRAVENOUS EVERY 6 HOURS PRN
Status: DISCONTINUED | OUTPATIENT
Start: 2024-06-10 | End: 2024-06-12

## 2024-06-10 RX ORDER — ECHINACEA PURPUREA EXTRACT 125 MG
1 TABLET ORAL
Status: DISCONTINUED | OUTPATIENT
Start: 2024-06-10 | End: 2024-06-14

## 2024-06-10 RX ORDER — ALBUTEROL SULFATE 90 UG/1
2 AEROSOL, METERED RESPIRATORY (INHALATION) EVERY 4 HOURS PRN
Status: DISCONTINUED | OUTPATIENT
Start: 2024-06-10 | End: 2024-06-14

## 2024-06-10 RX ORDER — ONDANSETRON 2 MG/ML
4 INJECTION INTRAMUSCULAR; INTRAVENOUS ONCE
Status: COMPLETED | OUTPATIENT
Start: 2024-06-10 | End: 2024-06-10

## 2024-06-10 RX ORDER — FLUTICASONE PROPIONATE 50 MCG
2 SPRAY, SUSPENSION (ML) NASAL DAILY
Status: DISCONTINUED | OUTPATIENT
Start: 2024-06-10 | End: 2024-06-14

## 2024-06-10 RX ORDER — POLYETHYLENE GLYCOL 3350 17 G/17G
17 POWDER, FOR SOLUTION ORAL DAILY PRN
Status: DISCONTINUED | OUTPATIENT
Start: 2024-06-10 | End: 2024-06-14

## 2024-06-10 NOTE — ED INITIAL ASSESSMENT (HPI)
Patient to the ED via [facility] for c/o pelvic pain, urinary retention, and weakness since yesterday. 10/10 pain, got 40mcg fentanyl and 4mg zofran enroute with no relief. Patient urinated this AM but states it was hard to go and painful.

## 2024-06-10 NOTE — ED PROVIDER NOTES
Patient Seen in: [facility]      History     Chief Complaint   Patient presents with    Abdomen/Flank Pain     Stated Complaint: abd pain, feels like something is in her vagina and cant pee    Subjective:   HPI    78-year-old female comes to the hospital complaint of having difficulty with abdominal pain worsening over the last 24 hours.  She feels that she is unable to urinate that she needs to but denies any dysuria.  She does arrive with a fever.  She denies any headaches.  Denies any this.  She is no chest pain or shortness of breath.  She does have some nausea and that she is having and is spitting up phlegm.  She denies any diarrhea.  She is no flank pain.  She is denying any other specific complaints this time.    Objective:   Past Medical History:    Abdominal distention    Abdominal pain    Acquired hypothyroidism    Anxiety    Asthma ([facility])    Belching    Bloating    Chronic rhinitis    CKD (chronic kidney disease) stage 3, GFR 30-59 ml/min ([facility])    Constipation    COPD (chronic obstructive pulmonary disease) ([facility])    NO OXYGEN     Depression    Diarrhea, unspecified    Diverticulosis of large intestine    Emphysema    Esophageal reflux    Fatigue    Feeling lonely    Flatulence/gas pain/belching    Food intolerance    H/O bronchitis    Hay fever    Hemorrhoids    High blood pressure    History of depression    Hypercholesterolemia    Hypertension    Itch of skin    Migraines    Mild intermittent asthma without complication ([facility])    Nausea    Night sweats    Pneumonia    PONV (postoperative nausea and vomiting)    Pure hypercholesterolemia    Rash    Reflux    Sleep disturbance    Stress    Uncomfortable fullness after meals    Visual impairment    glasses    Vomiting    Wears glasses              Past Surgical History:   Procedure Laterality Date    Cataract      Colonoscopy      Colonoscopy N/A 3/21/2024    Procedure: COLONOSCOPY;  Surgeon: Delon Ashford DO;  Location:   ENDOSCOPY    Colonoscopy & polypectomy  2014    multiple polyps; tics; repeat 3 yrs    Colonoscopy,remv lesn,snare N/A 2014    Procedure: ESOPHAGOGASTRODUODENOSCOPY, COLONOSCOPY, POSSIBLE BIOPSY, POSSIBLE POLYPECTOMY 81703,70230;  Surgeon: Slade Ivan MD;  Location: [facility]    Correct bunion,simple Bilateral     Cystotomy,excis vesical neck Left     Egd      Gastro - dmg  2014    stricture; HH    Oophorectomy Bilateral     Other surgical history  2023    Robotic repair of hiatal hernia and Nissen fundoplication    Patient documented not to have experienced any of the following events N/A 2014    Procedure: ESOPHAGOGASTRODUODENOSCOPY, COLONOSCOPY, POSSIBLE BIOPSY, POSSIBLE POLYPECTOMY 01792,68968;  Surgeon: Slade Ivan MD;  Location: [facility]    Patient withough preoperative order for iv antibiotic surgical site infection prophylaxis. N/A 2014    Procedure: ESOPHAGOGASTRODUODENOSCOPY, COLONOSCOPY, POSSIBLE BIOPSY, POSSIBLE POLYPECTOMY 41985,56847;  Surgeon: Slade Ivan MD;  Location: [facility]    Repair ing hernia,5+y/o,reducibl      Upper gi endoscopy,diagnosis N/A 2014    Procedure: ESOPHAGOGASTRODUODENOSCOPY, COLONOSCOPY, POSSIBLE BIOPSY, POSSIBLE POLYPECTOMY 98836,54198;  Surgeon: Slade Ivan MD;  Location: [facility]                Social History     Socioeconomic History    Marital status:    Tobacco Use    Smoking status: Former     Current packs/day: 0.00     Types: Cigarettes     Start date: 1970     Quit date: 1980     Years since quittin.7    Smokeless tobacco: Never    Tobacco comments:      1/2 pack per day. Social history shows \"Tobacco Use: Active\" and \"Never Smoker: Active\"   Vaping Use    Vaping status: Never Used   Substance and Sexual Activity    Alcohol use: No    Drug use: No   Other Topics Concern    Caffeine Concern Yes     Comment: \"1 cup of coffee and tea, and can of pop daily\"     Exercise Yes     Comment: walking   Social History Narrative    ** Merged History Encounter **          Social Determinants of Health     Financial Resource Strain: Low Risk  (4/20/2023)    Financial Resource Strain     Difficulty of Paying Living Expenses: Not hard at all     Med Affordability: No   Food Insecurity: No Food Insecurity (3/20/2024)    Food Insecurity     Food Insecurity: Never true   Transportation Needs: No Transportation Needs (3/20/2024)    Transportation Needs     Lack of Transportation: No   Physical Activity: Inactive (4/21/2022)    Exercise Vital Sign     Days of Exercise per Week: 0 days     Minutes of Exercise per Session: 0 min   Stress: Stress Concern Present (2/13/2024)    Stress     Feeling of Stress : Yes    Social Connections   Housing Stability: Low Risk  (3/20/2024)    Housing Stability     Housing Instability: No              Review of Systems    Positive for stated complaint: abd pain, feels like something is in her vagina and cant pee  Other systems are as noted in HPI.  Constitutional and vital signs reviewed.      All other systems reviewed and negative except as noted above.    Physical Exam     ED Triage Vitals [06/10/24 1630]   /60   Pulse 69   Resp 20   Temp (!) 102 °F (38.9 °C)   Temp src Temporal   SpO2 100 %   O2 Device None (Room air)       Current Vitals:   Vital Signs  BP: 123/64  Pulse: 80  Resp: 19  Temp: (!) 101.7 °F (38.7 °C)  Temp src: Temporal  MAP (mmHg): (!) 122    Oxygen Therapy  SpO2: 99 %  O2 Device: None (Room air)            Physical Exam    HEENT : NCAT, EOMI, PEERL,  neck supple, no JVD, trachea midline, No LAD  Heart: S1S2 normal. No murmurs, regular rate and rhythm  Lungs: Clear to auscultation bilaterally  Abdomen: Soft abdominal region is tender nondistended normal active bowel sounds without rebound, guarding or masses noted  Back nontender without CVA tenderness  Extremity no clubbing, cyanosis or edema noted.  Full range of motion noted  without tenderness  Neuro: No focal deficits noted    All measures to prevent infection transmission during my interaction with the patient were taken.  The patient was already wearing droplet mask on my arrival to the room.  Personal protective equipment including a droplet mask as well as gloves were worn throughout the duration of my exam.  Hand washing was performed prior to and after the exam.  Stethoscope and equipment used during my examination was cleaned with a super Sani cloth germicidal wipe following the exam.    ED Course     Labs Reviewed   COMP METABOLIC PANEL (14) - Abnormal; Notable for the following components:       Result Value    Glucose 142 (*)     Sodium 125 (*)     Chloride 93 (*)     Calculated Osmolality 262 (*)     A/G Ratio 0.9 (*)     All other components within normal limits   CBC W/ DIFFERENTIAL - Abnormal; Notable for the following components:    RBC 3.51 (*)     HGB 9.8 (*)     HCT 28.2 (*)     Neutrophil Absolute Prelim 9.07 (*)     Neutrophil Absolute 9.07 (*)     Lymphocyte Absolute 0.82 (*)     All other components within normal limits   TROPONIN I HIGH SENSITIVITY - Normal   LACTIC ACID, PLASMA - Normal   SARS-COV-2/FLU A AND B/RSV BY PCR (GENEXPERT) - Normal    Narrative:     This test is intended for the qualitative detection and differentiation of SARS-CoV-2, influenza A, influenza B, and respiratory syncytial virus (RSV) viral RNA in nasopharyngeal or nares swabs from individuals suspected of respiratory viral infection consistent with COVID-19 by their healthcare provider. Signs and symptoms of respiratory viral infection due to SARS-CoV-2, influenza, and RSV can be similar.    Test performed using the Xpert Xpress SARS-CoV-2/FLU/RSV (real time RT-PCR)  assay on the v[employer] instrument, [employer], [place] [address].   This test is being used under the Food and Drug Administration's Emergency Use Authorization.    The authorized Fact Sheet for Healthcare Providers for this  assay is available upon request from the laboratory.   CBC WITH DIFFERENTIAL WITH PLATELET    Narrative:     The following orders were created for panel order CBC With Differential With Platelet.  Procedure                               Abnormality         Status                     ---------                               -----------         ------                     CBC W/ DIFFERENTIAL[[id number]]          Abnormal            Final result                 Please view results for these tests on the individual orders.   URINALYSIS WITH CULTURE REFLEX   BLOOD CULTURE   BLOOD CULTURE     EKG    Rate, intervals and axes as noted on EKG Report.  Rate: 82  Rhythm: Sinus Rhythm  Reading: , QRS of 80, patient is left anterior fascicular block without acute ischemia.              ED Course as of 06/10/24 2041  ------------------------------------------------------------  Time: 06/10 2039  Comment: While here the patient's urinalysis was negative.  Her CBC had a hemoglobin of 9.8 which for her is stable.  Her sodium was 125.  Troponin was negative her lactic acid level of 1.8.  Her COVID influenza and RSV were negative.  Blood cultures x 2 were taken.  She was given combination of Tylenol for her fever as well as started on IV fluid.  She given Zofran for nausea as well as Dilaudid for pain.  She was given Zosyn for acute diverticulitis.  The patient had a chest x-ray done here that I personally interpreted showed no acute cardiopulmonary process.  Read the radiology report as well patient is CT of the abdomen pelvis consistent with acute diverticulitis.  The pain is still present and the patient appears frail and at this time patient will be admitted for IV antibiotics and further management.     CT ABDOMEN+PELVIS(CONTRAST ONLY)(CPT=74177)    Result Date: 6/10/2024  PROCEDURE:  CT ABDOMEN+PELVIS (CONTRAST ONLY) (CPT=74177)  COMPARISON:  [facility] , CT, CT ABDOMEN+PELVIS(CONTRAST ONLY)(CPT=74177), 5/13/2024, 6:01 PM.   [facility] , CT, CTA ABD/PEL (CPT=74174), 3/22/2024, 2:29 PM.  [facility] , CT, CTA ABD/PEL (CPT=74174), 3/21/2024, 6:04 PM.  [facility]PELV[facility](CONTRAST ONLY)(CPT=74177), 2/27/2024, 9:28 AM.  INDICATIONS:  abd pain, feels like something is in her vagina and cant pee  TECHNIQUE:  CT scanning was performed from the dome of the diaphragm to the pubic symphysis with non-ionic intravenous contrast material. Post contrast coronal MPR imaging was performed.  Dose reduction techniques were used. Dose information is transmitted to the ACR (American College of Radiology) NRDR (National Radiology Data Registry) which includes the Dose Index Registry.  PATIENT STATED HISTORY:(As transcribed by Technologist)  c/o pelvic pain, urinary retention, and weakness since yesterday.   CONTRAST USED:  60cc of Isovue 370  FINDINGS:  LIVER:  No enlargement, atrophy, abnormal density, or significant focal lesion.  BILIARY:  No visible dilatation or calcification.  PANCREAS:  No lesion, fluid collection, ductal dilatation, or atrophy.  SPLEEN:  No enlargement or focal lesion.  KIDNEYS:  No mass, obstruction, or calcification.  ADRENALS:  No mass or enlargement.  AORTA/VASCULAR:  No aneurysm or dissection.  There is moderate to severe atherosclerotic plaque at the origin of the SMA.  There is moderate plaque at the origin of the celiac axis.  There is mild plaque at the origin of the REX. RETROPERITONEUM:  No mass or adenopathy.  BOWEL/MESENTERY:  There is significant inflammatory changes surrounding the sigmoid colon and regions of several diverticula consistent with acute diverticulitis.  There is no definite drainable abscess collection at this time period the inflammatory change does abut the left aspect of the urinary bladder and causes some deviation of the bladder to the right of midline.  Main of the left colon demonstrates several diverticula.  There is moderate stool throughout the colon consistent constipation.  The appendix  is unremarkable.  Visualized small bowel is within normal limits.  Postsurgical changes of Nissen. ABDOMINAL WALL:  No mass or hernia.  URINARY BLADDER:  No visible focal wall thickening, lesion, or calculus.  PELVIC NODES:  No adenopathy.  PELVIC ORGANS:  No visible mass.  Pelvic organs appropriate for patient age.  BONES:  No bony lesion or fracture.  Mild to moderate degenerative changes of the spine. LUNG BASES:  No visible pulmonary or pleural disease.             CONCLUSION:   Acute diverticulitis involving the sigmoid colon without a drainable abscess at this time.  There is mild mass effect on the urinary bladder due to the inflammation.   LOCATION:  [facility]   Dictated by (CST): Saurabh Can MD on 6/10/2024 at 8:07 PM     Finalized by (CST): Saurabh Can MD on 6/10/2024 at 8:13 PM       XR CHEST AP PORTABLE  (CPT=71045)    Result Date: 6/10/2024  PROCEDURE:  XR CHEST AP PORTABLE  (CPT=71045)  TECHNIQUE:  AP chest radiograph was obtained.  COMPARISON:  [facility] , CT, CT ABDOMEN+PELVIS(CONTRAST ONLY)(CPT=74177), 5/13/2024, 6:01 PM.  [facility] , XR, XR CHEST AP PORTABLE  (CPT=71045), 11/03/2022, 10:47 AM.  INDICATIONS:  abd pain, feels like something is in her vagina and cant pee  PATIENT STATED HISTORY: (As transcribed by Technologist)     FINDINGS:  The heart is normal in size.  The lungs are clear.  There are no pleural effusions.  The bones are unremarkable.            CONCLUSION:  No acute disease.    LOCATION:  [facility]      Dictated by (CST): Saurabh Can MD on 6/10/2024 at 5:33 PM     Finalized by (CST): Saurabh Can MD on 6/10/2024 at 5:34 PM       CT ABDOMEN+PELVIS(CONTRAST ONLY)(CPT=74177)    Result Date: 5/13/2024  PROCEDURE:  CT ABDOMEN+PELVIS (CONTRAST ONLY) (CPT=74177)  COMPARISON:  [facility] , CT, CTA ABD/PEL (CPT=74174), 3/22/2024, 2:29 PM.  INDICATIONS:  lower abd pain/nausea  TECHNIQUE:  CT scanning was performed from the dome of the diaphragm to the pubic symphysis with non-ionic intravenous  contrast material. Post contrast coronal MPR imaging was performed.  Dose reduction techniques were used. Dose information is transmitted to the ACR (American College of Radiology) NRDR (National Radiology Data Registry) which includes the Dose Index Registry.  PATIENT STATED HISTORY:(As transcribed by Technologist)  Patient with nausea and constipation.   CONTRAST USED:  100cc of Isovue 370  FINDINGS:  LIVER:  No enlargement, atrophy, abnormal density, or significant focal lesion.  BILIARY:  No visible dilatation or calcification.  PANCREAS:  There is marked atrophy of the pancreatic head. SPLEEN:  No enlargement or focal lesion.  KIDNEYS:  No mass, obstruction, or calcification.  ADRENALS:  No mass or enlargement.  AORTA/VASCULAR:  Aorta is diffusely atherosclerotic but not aneurysmal. RETROPERITONEUM:  No mass or adenopathy.  BOWEL/MESENTERY:  There is diverticulosis of the colon.  There is no CT evidence of diverticulitis.  There is postoperative change from a prior Nissen fundoplication. ABDOMINAL WALL:  No mass or hernia.  URINARY BLADDER:  No visible focal wall thickening, lesion, or calculus.  PELVIC NODES:  No adenopathy.  PELVIC ORGANS:  No visible mass.  Pelvic organs appropriate for patient age.  BONES:  No bony lesion or fracture.  LUNG BASES:  No visible pulmonary or pleural disease.  OTHER:  Negative.             CONCLUSION:  1. Specific etiology for abdominal pain is not evident from this study. 2. There is diverticulosis of colon without CT evidence of diverticulitis. 3. There has been previous Nissen fundoplication.   LOCATION:  42   Dictated by (CST): Jesus Lindo MD on 5/13/2024 at 6:17 PM     Finalized by (CST): Jesus Lindo MD on 5/13/2024 at 6:20 PM       CT BRAIN OR HEAD (27546)    Result Date: 5/13/2024  PROCEDURE:  CT BRAIN OR HEAD (33287)  COMPARISON:  [facility] , CT, CT BRAIN OR HEAD (55943), 7/07/2023, 11:18 AM.  INDICATIONS:  headache  TECHNIQUE:  Noncontrast CT scanning is performed  through the brain. Dose reduction techniques were used. Dose information is transmitted to the ACR (American College of Radiology) NRDR (National Radiology Data Registry) which includes the Dose Index Registry.  PATIENT STATED HISTORY: (As transcribed by Technologist)  Patient with headaches.    FINDINGS:  VENTRICLES/SULCI:  Ventricles and sulci are prominent compatible with atrophy. INTRACRANIAL:  There is multifocal and confluent decreased attenuation in periventricular and subcortical white matter.  This is consistent with chronic small vessel ischemic change.  There is no specific evidence of acute ischemia, hemorrhage or mass. SINUSES:           No sign of acute sinusitis.  MASTOIDS:          No sign of acute inflammation. SKULL:             No evidence for fracture or osseous abnormality. OTHER:             None.            CONCLUSION:  There is chronic small vessel ischemic change and atrophy evident.  There is no specific evidence of an acute abnormality on the noncontrast CT of the head.    LOCATION:     Dictated by (CST): Jesus Lindo MD on 5/13/2024 at 6:11 PM     Finalized by (CST): Jesus Lindo MD on 5/13/2024 at 6:13 PM        Medications   HYDROmorphone (Dilaudid) 1 MG/ML injection 0.5 mg (0.5 mg Intravenous Given 6/10/24 1937)   piperacillin-tazobactam (Zosyn) 4.5 g in dextrose 5% 100 mL IVPB-ADDV (has no administration in time range)   acetaminophen (Tylenol Extra Strength) tab 1,000 mg (1,000 mg Oral Given 6/10/24 1700)   sodium chloride 0.9% infusion 1,000 mL (1,000 mL Intravenous New Bag 6/10/24 2010)   ondansetron (Zofran) 4 MG/2ML injection 4 mg (4 mg Intravenous Given 6/10/24 2005)   iopamidol 76% (ISOVUE-370) injection for power injector (60 mL Intravenous Given 6/10/24 1959)              MDM      Differential diagnosis includes urinary tract infection, pyelonephritis, acute diverticulitis with perforation but not limited these.  Patient here does have acute diverticulitis with some  mass effect on the area of her bladder which may explain her discomfort in this area.  Patient is very uncomfortable at this time the patient be admitted for IV antibiotics and pain control      This note was prepared using Dragon [employer] voice recognition dictation software.  As a result errors may occur.  When identified to these areas have been corrected.  While every attempt is made to correct errors during dictation discrepancies may still exist.  Please contact if there are any errors.  Admission disposition: 6/10/2024  8:41 PM                                        Medical Decision Making      Disposition and Plan     Clinical Impression:  1. Acute diverticulitis         Disposition:  Admit  6/10/2024  8:41 pm    Follow-up:  No follow-up provider specified.        Medications Prescribed:  Current Discharge Medication List                            Hospital Problems       Present on Admission  Date Reviewed: 5/16/2024            ICD-10-CM Noted POA    * (Principal) Acute diverticulitis K57.92 6/10/2024 Unknown

## 2024-06-11 PROBLEM — Z71.89 ACP (ADVANCE CARE PLANNING): Status: ACTIVE | Noted: 2024-06-11

## 2024-06-11 PROBLEM — Z71.89 ACP (ADVANCE CARE PLANNING): Status: ACTIVE | Noted: 2024-01-01

## 2024-06-11 LAB
ANION GAP SERPL CALC-SCNC: 9 MMOL/L (ref 0–18)
BASOPHILS # BLD AUTO: 0.05 X10(3) UL (ref 0–0.2)
BASOPHILS NFR BLD AUTO: 0.2 %
BUN BLD-MCNC: 10 MG/DL (ref 9–23)
CALCIUM BLD-MCNC: 9.1 MG/DL (ref 8.5–10.1)
CHLORIDE SERPL-SCNC: 100 MMOL/L (ref 98–112)
CO2 SERPL-SCNC: 19 MMOL/L (ref 21–32)
CREAT BLD-MCNC: 0.93 MG/DL
DEPRECATED HBV CORE AB SER IA-ACNC: 51.5 NG/ML
EGFRCR SERPLBLD CKD-EPI 2021: 63 ML/MIN/1.73M2 (ref 60–?)
EOSINOPHIL # BLD AUTO: 0 X10(3) UL (ref 0–0.7)
EOSINOPHIL NFR BLD AUTO: 0 %
ERYTHROCYTE [DISTWIDTH] IN BLOOD BY AUTOMATED COUNT: 14.4 %
GLUCOSE BLD-MCNC: 105 MG/DL (ref 70–99)
HCT VFR BLD AUTO: 30.2 %
HGB BLD-MCNC: 10 G/DL
HGB RETIC QN AUTO: 30.8 PG (ref 28.2–36.6)
IMM GRANULOCYTES # BLD AUTO: 0.2 X10(3) UL (ref 0–1)
IMM GRANULOCYTES NFR BLD: 0.8 %
IMM RETICS NFR: 0.04 RATIO (ref 0.1–0.3)
IRON SATN MFR SERPL: 4 %
IRON SERPL-MCNC: 14 UG/DL
LYMPHOCYTES # BLD AUTO: 0.86 X10(3) UL (ref 1–4)
LYMPHOCYTES NFR BLD AUTO: 3.6 %
MCH RBC QN AUTO: 28.3 PG (ref 26–34)
MCHC RBC AUTO-ENTMCNC: 33.1 G/DL (ref 31–37)
MCV RBC AUTO: 85.6 FL
MONOCYTES # BLD AUTO: 1.36 X10(3) UL (ref 0.1–1)
MONOCYTES NFR BLD AUTO: 5.7 %
NEUTROPHILS # BLD AUTO: 21.49 X10 (3) UL (ref 1.5–7.7)
NEUTROPHILS # BLD AUTO: 21.49 X10(3) UL (ref 1.5–7.7)
NEUTROPHILS NFR BLD AUTO: 89.7 %
OSMOLALITY SERPL CALC.SUM OF ELEC: 265 MOSM/KG (ref 275–295)
PLATELET # BLD AUTO: 320 10(3)UL (ref 150–450)
POTASSIUM SERPL-SCNC: 4.6 MMOL/L (ref 3.5–5.1)
RBC # BLD AUTO: 3.53 X10(6)UL
RETICS # AUTO: 27.4 X10(3) UL (ref 22.5–147.5)
RETICS/RBC NFR AUTO: 0.9 %
SODIUM SERPL-SCNC: 128 MMOL/L (ref 136–145)
TIBC SERPL-MCNC: 364 UG/DL (ref 240–450)
TRANSFERRIN SERPL-MCNC: 244 MG/DL (ref 200–360)
WBC # BLD AUTO: 24 X10(3) UL (ref 4–11)

## 2024-06-11 PROCEDURE — 99232 SBSQ HOSP IP/OBS MODERATE 35: CPT | Performed by: HOSPITALIST

## 2024-06-11 PROCEDURE — 99497 ADVNCD CARE PLAN 30 MIN: CPT | Performed by: HOSPITALIST

## 2024-06-11 RX ORDER — SIMETHICONE 40MG/0.6ML
40 SUSPENSION, DROPS(FINAL DOSAGE FORM)(ML) ORAL EVERY 6 HOURS PRN
Status: DISCONTINUED | OUTPATIENT
Start: 2024-06-11 | End: 2024-06-14

## 2024-06-11 RX ORDER — MULTIPLE VITAMINS W/ MINERALS TAB 9MG-400MCG
1 TAB ORAL DAILY
Status: DISCONTINUED | OUTPATIENT
Start: 2024-06-12 | End: 2024-06-14

## 2024-06-11 RX ORDER — DEXTROSE MONOHYDRATE AND SODIUM CHLORIDE 5; .9 G/100ML; G/100ML
INJECTION, SOLUTION INTRAVENOUS CONTINUOUS
Status: DISCONTINUED | OUTPATIENT
Start: 2024-06-11 | End: 2024-06-14

## 2024-06-11 NOTE — PROGRESS NOTES
[address] Patent's granddaughter called earlier to express concern about patient. Stated that pt was diagnosed with gastroparesis a year ago and has been on mostly liquid diet during that time with frequent nausea and abdominal discomfort. Pt also was for a time under the care of GI specialist.  Notified Dr. Brothers, asked about possible GI consult, will monitor patient's recovery form diverticulitis.

## 2024-06-11 NOTE — OCCUPATIONAL THERAPY NOTE
OCCUPATIONAL THERAPY EVALUATION - INPATIENT     Room Number: 501/501-A  Evaluation Date: 6/11/2024  Type of Evaluation: Initial  Presenting Problem: acute diverticulitis    Physician Order: IP Consult to Occupational Therapy  Reason for Therapy: ADL/IADL Dysfunction and Discharge Planning    OCCUPATIONAL THERAPY ASSESSMENT   Patient is currently functioning below baseline with lower body dressing, bed mobility, transfers, stating sitting balance, dynamic standing balance, and functional standing tolerance. Prior to admission, patient's baseline is IND with ADLs and mobility.  Patient is requiring contact guard assist as a result of the following impairments: decreased endurance, pain, impaired standing balance, and medical status. Occupational Therapy will continue to follow for duration of hospitalization.    Patient will benefit from continued skilled OT Services at discharge to promote functional independence and safety with additional support and return home with home health OT      History Related to Current Admission: Patient is a 78 year old female admitted on 6/10/2024 with Presenting Problem: acute diverticulitis. Co-Morbidities : GI Bleed 3/2024, HTN, depression, COPD, anxiety, asthma    WEIGHT BEARING RESTRICTION  Weight Bearing Restriction: None                Recommendations for nursing staff:   Transfers: one person with RW  Toileting location: bedside commode as tollerable    EVALUATION SESSION:  Patient Start of Session: semi-supine in bed  FUNCTIONAL TRANSFER ASSESSMENT  Sit to Stand: Edge of Bed  Edge of Bed: Contact Guard Assist    BED MOBILITY  Supine to Sit : Supervision  Sit to Supine (OT): Supervision  Scooting: Supervision    BALANCE ASSESSMENT  Static Sitting: Independent  Sitting Unilateral: Supervision  Sitting Bilateral: Supervision  Static Standing: Contact Guard Assist  Standing Unilateral: Contact Guard Assist    FUNCTIONAL ADL ASSESSMENT  LB Dressing Seated: Supervision rebeca  socks)      ACTIVITY TOLERANCE: standing at EOB for approx 2 minutes utilizing RW an reporting 8/10 pain.                          O2 SATURATIONS       COGNITION  Overall Cognitive Status:  WFL - within functional limits    Upper Extremity   ROM: within functional limits   Strength: within functional limits   Coordination  Gross motor: WFL  Fine motor: WFL  Sensation: Light touch:  intact    EDUCATION PROVIDED  Patient: Role of Occupational Therapy; Plan of Care; Functional Transfer Techniques; Posture/Positioning; Energy Conservation; Proper Body Mechanics  Patient's Response to Education: Verbalized Understanding; Returned Demonstration    Equipment used: RW  Demonstrates functional use, Would benefit from additional trial      Therapist comments: Pt is pleasant and cooperative throughout session. Pt participation limited by reported pain and dizziness. Pt reports dizziness increased when standing which corresponded with 30 point drop in SBP. Pt returned to semi-reclined position for recovery.     Patient End of Session: In bed;Needs met;Call light within reach;RN aware of session/findings;All patient questions and concerns addressed;Alarm set    OCCUPATIONAL PROFILE    HOME SITUATION  Type of Home: Apartment  Home Layout: One level  Lives With: Alone               Occupation/Status: watches TV     Drives: No  Patient Regularly Uses: Glasses    Prior Level of Function: Pt is IND with BADLs, IADLs.    SUBJECTIVE   \"I like game shows.\"    PAIN ASSESSMENT  Ratin  Location: abdomen, pelvis  Management Techniques: Repositioning (reports less painful when sitting down)    OBJECTIVE  Precautions: Bed/chair alarm  Fall Risk: High fall risk      ASSESSMENTS    AM-PAC ‘6-Clicks’ Inpatient Daily Activity Short Form  -   Putting on and taking off regular lower body clothing?: A Little  -   Bathing (including washing, rinsing, drying)?: A Little  -   Toileting, which includes using toilet, bedpan or urinal? : A Little  -    Putting on and taking off regular upper body clothing?: None  -   Taking care of personal grooming such as brushing teeth?: None  -   Eating meals?: None    AM-PAC Score:  Score: 21  Approx Degree of Impairment: 32.79%  Standardized Score (AM-PAC Scale): 44.27    ADDITIONAL TESTS     NEUROLOGICAL FINDINGS      COGNITION ASSESSMENTS       PLAN  OT Treatment Plan: Balance activities;Energy conservation/work simplification techniques;ADL training;IADL training;Functional transfer training;UE strengthening/ROM;Endurance training;Patient/Family education;Patient/Family training;Equipment eval/education;Compensatory technique education;Continued evaluation  Rehab Potential : Good  Frequency: 3x/week  Number of Visits to Meet Established Goals: 4    ADL Goals   Patient will perform grooming: with supervision  Patient will perform upper body dressing:  with supervision  Patient will perform lower body dressing:  with supervision  Patient will perform toileting: with supervision    Functional Transfer Goals  Patient will transfer from supine to sit:  with modified independent  Patient will transfer from sit to stand:  with modified independent  Patient will transfer to bedside commode:  with modified independent  Patient will transfer to toilet:  with modified independent    UE Exercise Program Goal  Patient will be modified independent with bilateral AROM HEP (home exercise program) to prevent deconditioning.    Additional Goals  Pt will tolerate standing for 6 minutes utilizing AD as needed in preparation to perform grooming ADLs at sink level.    Patient Evaluation Complexity Level:   Occupational Profile/Medical History LOW - Brief history including review of medical or therapy records    Specific performance deficits impacting engagement in ADL/IADL MODERATE  3 - 5 performance deficits   Client Assessment/Performance Deficits MODERATE - Comorbidities and min to mod modifications of tasks    Clinical Decision Making LOW  - Analysis of occupational profile, problem-focused assessments, limited treatment options    Overall Complexity LOW     OT Session Time: 25 minutes  Self-Care Home Management: 11 minutes  Therapeutic Activity: 12 minutes

## 2024-06-11 NOTE — PHYSICAL THERAPY NOTE
PHYSICAL THERAPY EVALUATION - INPATIENT     Room Number: 501/501-A  Evaluation Date: 6/11/2024  Type of Evaluation: Initial  Physician Order: PT Eval and Treat    Presenting Problem: acture diverticulitis  Co-Morbidities : GI Bleed 3/2024, HTN, depression, COPD, anxiety, asthma  Reason for Therapy: Mobility Dysfunction and Discharge Planning    PHYSICAL THERAPY ASSESSMENT   Patient is currently functioning below baseline with bed mobility, transfers, gait, and standing prolonged periods.  Prior to admission, patient's baseline is independent without use of assistive device.  Patient is requiring contact guard assist and minimal assist as a result of the following impairments: decreased endurance/aerobic capacity, pain, decreased muscular endurance, and medical status.  Physical Therapy will continue to follow for duration of hospitalization.    Patient will benefit from continued skilled PT Services at discharge to promote functional independence in home.  Anticipate patient will return home with home health PT.    PLAN  PT Treatment Plan: Bed mobility;Endurance;Patient education;Gait training;Strengthening;Transfer training;Balance training  Rehab Potential : Good  Frequency (Obs): 3-5x/week  Number of Visits to Meet Established Goals: 3      CURRENT GOALS    Goal #1 Patient is able to demonstrate supine - sit EOB @ level: modified independent     Goal #2 Patient is able to demonstrate transfers EOB to/from Chair/Wheelchair at assistance level: modified independent     Goal #3 Patient is able to ambulate 150 feet with assist device:  least restrictive assistive device  at assistance level: supervision     Goal #4    Goal #5    Goal #6    Goal Comments: Goals established on 6/11/2024      PHYSICAL THERAPY MEDICAL/SOCIAL HISTORY  History related to current admission: Patient is a 78 year old female admitted on 6/10/2024 from home for abdominal pain.  Pt diagnosed with acute diverticulitis.    Recent  admission:  3/20- with GI Bleed with dc to home with HHPT.      HOME SITUATION  Type of Home: Apartment   Home Layout: One level  Stairs to Enter : 0             Lives With: Alone  Drives: No  Patient Owned Equipment: Rolling walker  Patient Regularly Uses: Glasses    Prior Level of [place]: Pt reports typically ind with all self care and mobility. Had used walker for a short time after admission in 2024, but was no longer using. Pt reports had fall 24 without injury stating that she took too many pain meds and become dizzy, was able to get up without assist.    SUBJECTIVE  \"I'm hot, dizzy and nauseas\" after standing EOB      OBJECTIVE  Precautions: Bed/chair alarm  Fall Risk: High fall risk    WEIGHT BEARING RESTRICTION  Weight Bearing Restriction: None                PAIN ASSESSMENT  Ratin  Location: abdomen, genital area  Management Techniques: Relaxation;Repositioning    COGNITION  Overall Cognitive Status:  WFL - within functional limits    RANGE OF MOTION AND STRENGTH ASSESSMENT  Upper extremity ROM and strength are within functional limits     Lower extremity ROM is within functional limits    Lower extremity strength is within functional limits       BALANCE  Static Sitting: Good  Dynamic Sitting: Not tested  Static Standing: Fair  Dynamic Standing: Fair -    ADDITIONAL TESTS                                    ACTIVITY TOLERANCE           BP: 96/57  BP Location: Right arm  BP Method: Automatic  Patient Position: Standing    O2 WALK       NEUROLOGICAL FINDINGS  Neurological Findings: None                     AM-PAC '6-Clicks' INPATIENT SHORT FORM - BASIC MOBILITY  How much difficulty does the patient currently have...  Patient Difficulty: Turning over in bed (including adjusting bedclothes, sheets and blankets)?: None   Patient Difficulty: Sitting down on and standing up from a chair with arms (e.g., wheelchair, bedside commode, etc.): A Little   Patient Difficulty: Moving from lying  on back to sitting on the side of the bed?: None   How much help from another person does the patient currently need...   Help from Another: Moving to and from a bed to a chair (including a wheelchair)?: A Little   Help from Another: Need to walk in hospital room?: A Little   Help from Another: Climbing 3-5 steps with a railing?: A Little       AM-PAC Score:  Raw Score: 20   Approx Degree of Impairment: 35.83%   Standardized Score (AM-PAC Scale): 47.67   CMS Modifier (G-Code): CJ    FUNCTIONAL ABILITY STATUS  Gait Assessment   Functional Mobility/Gait Assessment  Gait Assistance: Contact guard assist  Distance (ft): 2 (sidestep to head of bed only)  Assistive Device: Rolling walker  Pattern: Shuffle    Skilled Therapy Provided     Bed Mobility:  Rolling: NT   Supine to sit: sba   Sit to supine: min assist with LE only     Transfer Mobility:  Sit to stand: cga   Stand to sit: cga  Gait = cga for side step only    Therapist's Comments: Pt presents semi-reclined in bed, agreeable to PT, initially reports limited abdominal pain however reports significant increase once standing eob. Pt BP in sitting noted 126/42, in standing 96/57 with c/o dizziness and unable to continue standing. Pt seated with cga. Pt then agreeable to second trial of standing with sidesteps to HOB, completed with CGA. Pt assisted back to supine with min assist and RN notified of orthostatic BP and pt c/o, request for pain med.     Exercise/Education Provided:  Bed mobility  Functional activity tolerated  Posture  Transfer training    Patient End of Session: In bed;With  staff;Needs met;Call light within reach;RN aware of session/findings;All patient questions and concerns addressed;Alarm set      Patient Evaluation Complexity Level:  History Moderate - 1 or 2 personal factors and/or co-morbidities   Examination of body systems Moderate - addressing a total of 3 or more elements   Clinical Presentation Moderate - Evolving   Clinical Decision Making  Low - Stable       PT Session Time: 25 minutes    Therapeutic Activity: 15 minutes

## 2024-06-11 NOTE — DIETARY MALNUTRITION NOTE
[facility]   part of [facility]  NUTRITION ASSESSMENT    Pt meets moderate malnutrition criteria at this time.    CRITERIA FOR MALNUTRITION DIAGNOSIS:  Criteria for non-severe malnutrition diagnosis: chronic illness related to energy intake less than75% for greater than 1 month, body fat mild depletion, and muscle mass mild depletion    NUTRITION INTERVENTION:    RD nutrition Care Plan- Initiated ONS (oral nutritional supplements), Provided written information for optimizing nutrient intake at home, Ordered multivitamin, and See RD nutrition assessment for additional recommendations  Meal and Snacks - ADAT per GI/surg; monitor patient po intake. Encourage adequate po of appropriate diet.  Medical Food Supplements - RD to add [place] chocolate daily and Ensure Plus HP chocolate daily once diet advanced. Rationale/use for oral supplements discussed.  Nutrition Education - High Calorie High Protein Nutrition Therapy and Recipes. Pt/family receptive to education, no barriers noted. Handouts and Ensure coupons provided.   Vitamin and Mineral Supplements - Recommend adding Multivitamin with minerals    PATIENT STATUS: 78 year old female admitted on 6/10 presents with acute diverticulitis. Pt screened d/t low BMI and MST score 2. Visited pt at bedside. Pt reports her appetite has been \"okay\" but is variable depending on how she feels. On most days, she eats cream of wheat for bfast, [place] for lunch and Ensure for dinner (pt follows mostly full liquids diet d/t hx gastroparesis). She reports her wt has been stable recently but noted it is overall decreased over the past year. Reports having nausea this morning and abdominal pain yesterday - both of which are improved. Still having constipation with last BM 3 days ago (which was diarrhea). No chewing or swallowing difficulties and NKFA. Discussed adding ONS once diet advanced and pt agreeable. Also provided high kcal/pro diet handout for discharge. All  questions answered at this time.    PMH: hypothydroism, anxiety, asthma, CKD3, COPD, diverticulosis, HTN, HLD     ANTHROPOMETRICS:  Ht: 152.4 cm (5')  Wt: 41.7 kg (92 lb).   BMI: Body mass index is 17.97 kg/m².  IBW: 45.5 kg    WEIGHT HISTORY: Per chart, pt with ~13 lb wt loss x 1 year (12%, not significant per standards). Wt stable over past 5 months.  Patient Weight(s) for the past 336 hrs:   Weight   06/10/24 2157 41.7 kg (92 lb)   06/10/24 1630 41.7 kg (92 lb)       Wt Readings from Last 25 Encounters:   06/10/24 41.7 kg (92 lb)   05/16/24 41.3 kg (91 lb)   05/14/24 41.3 kg (91 lb)   05/01/24 41.3 kg (91 lb)   05/02/24 42.2 kg (93 lb)   04/30/24 41.3 kg (91 lb)   04/18/24 41.3 kg (91 lb)   04/04/24 41.3 kg (91 lb)   03/23/24 41.7 kg (91 lb 14.9 oz)   03/14/24 42 kg (92 lb 9.6 oz)   03/13/24 44 kg (97 lb)   03/06/24 41.7 kg (92 lb)   03/04/24 42.5 kg (93 lb 12.8 oz)   02/27/24 41.7 kg (92 lb)   02/19/24 42.1 kg (92 lb 12.8 oz)   02/16/24 42.4 kg (93 lb 6.4 oz)   01/22/24 41.3 kg (91 lb)   01/09/24 41.6 kg (91 lb 12.8 oz)   12/11/23 44.9 kg (99 lb)   10/03/23 43.7 kg (96 lb 6.4 oz)   09/14/23 45.5 kg (100 lb 6.4 oz)   08/03/23 47.9 kg (105 lb 9.6 oz)   07/07/23 47.6 kg (105 lb)   07/03/23 47.2 kg (104 lb)   05/23/23 49 kg (108 lb)        NUTRITION:  Diet:       Procedures    NPO        Percent Meals Eaten (last 3 days)       None            Food Allergies: No  Cultural/Ethnic/Mosque Preferences Addressed: Yes    GI SYSTEM REVIEW: constipation, nausea, and abdominal pain  Skin/Wounds: WNL    NUTRITION RELATED PHYSICAL FINDINGS:     1. Body Fat/Muscle Mass: moderate depletion body fat Buccal fat pad and Triceps and moderate muscle depletion Temple region, Clavicle region, Thigh region, and Calf region     2. Fluid Accumulation: none per RN documentation     NUTRITION PRESCRIPTION: 41.7 kg  Calories: 5919-7138 calories/day (30-35 kcal/kg)  Protein: 50-63 grams protein/day (1.2-1.5 gm/kg)  Fluid: ~1 ml/kcal or per  MD discretion    NUTRITION DIAGNOSIS/PROBLEM:  Malnutrition related to altered GI function/GI disorder and insufficient appetite resulting in inadequate nutrition intake as evidenced by documented/reported insufficient oral intake, documented/reported unintentional weight loss, loss of fat mass, loss of muscle mass, and low BMI    MONITOR AND EVALUATE/NUTRITION GOALS:  Weight stable within 1 to 2 lbs during admission - New  Return to PO intake or advance diet in 24-48 hrs - New      MEDICATIONS:  Remeron, protonix, abx, prn reglan, prn zofran  Gtt: D5W-NS at 100 ml/hr    LABS:  Na 128    Pt is at High nutrition risk    Halie Muniz RD, LDN, CNSC  Clinical Dietitian  Spectra: [phone]

## 2024-06-11 NOTE — PROGRESS NOTES
Patient felt dizzy when getting out of bed with PT/OT, felt \"hot\" with severe pain 8/10, nausea. Orthostatics positive SBP dropped for 126 to 96. Diluadid 0.5 mg, zofran prn. Patient felt better, , temp 97.7. Cool washcloth to forehead. Notified Dr. Brothers, IVF changed to D5 NS, losartan discontinued. Patient comfortable.

## 2024-06-11 NOTE — H&P
[facility]     Bibi Diaz Patient Status:  Observation    1945 MRN [id number]   Location [facility] 5NW-A Attending Win Brothers MD   Hosp Day # 0 PCP Rick Shannon DO     Chief Complaint: fever    Subjective:    History of Present Illness:     Bibi Diaz is a 78 year old female with PMH hypothydroism, anxiety, asthma, CKD3, COPD, diverticulosis, HTN, HLD who p/w fever and abd pain. Started a couple of days ago. LLQ abd pain. Fever of 102 degrees. Had bleeding before but never infection from divertulosis. A/w n/v. Denies other sx's.     History/Other:    Past Medical History:  Past Medical History:    Abdominal distention    Abdominal pain    Acquired hypothyroidism    Anxiety    Asthma (HCC)    Belching    Bloating    Chronic rhinitis    CKD (chronic kidney disease) stage 3, GFR 30-59 ml/min (HCC)    Constipation    COPD (chronic obstructive pulmonary disease) ([facility])    NO OXYGEN     Depression    Diarrhea, unspecified    Diverticulosis of large intestine    Emphysema    Esophageal reflux    Fatigue    Feeling lonely    Flatulence/gas pain/belching    Food intolerance    H/O bronchitis    Hay fever    Hemorrhoids    High blood pressure    History of depression    Hypercholesterolemia    Hypertension    Itch of skin    Migraines    Mild intermittent asthma without complication (HCC)    Nausea    Night sweats    Pneumonia    PONV (postoperative nausea and vomiting)    Pure hypercholesterolemia    Rash    Reflux    Sleep disturbance    Stress    Uncomfortable fullness after meals    Visual impairment    glasses    Vomiting    Wears glasses     Past Surgical History:   Past Surgical History:   Procedure Laterality Date    Cataract      Colonoscopy      Colonoscopy N/A 3/21/2024    Procedure: COLONOSCOPY;  Surgeon: Delon Ashford DO;  Location:  ENDOSCOPY    Colonoscopy & polypectomy  2014    multiple polyps; tics; repeat 3 yrs    Colonoscopy,remv  mamie,snare N/A 09/22/2014    Procedure: ESOPHAGOGASTRODUODENOSCOPY, COLONOSCOPY, POSSIBLE BIOPSY, POSSIBLE POLYPECTOMY 86937,02846;  Surgeon: Slade Ivan MD;  Location: [facility]    Correct bunion,simple Bilateral     Cystotomy,excis vesical neck Left     Egd      Gastro - dmg  09/2014    stricture; HH    Oophorectomy Bilateral 2017    Other surgical history  02/27/2023    Robotic repair of hiatal hernia and Nissen fundoplication    Patient documented not to have experienced any of the following events N/A 09/22/2014    Procedure: ESOPHAGOGASTRODUODENOSCOPY, COLONOSCOPY, POSSIBLE BIOPSY, POSSIBLE POLYPECTOMY [id number];  Surgeon: Slade Ivan MD;  Location: [facility]    Patient withough preoperative order for iv antibiotic surgical site infection prophylaxis. N/A 09/22/2014    Procedure: ESOPHAGOGASTRODUODENOSCOPY, COLONOSCOPY, POSSIBLE BIOPSY, POSSIBLE POLYPECTOMY [id number];  Surgeon: Slade Ivan MD;  Location: [facility]    Repair ing hernia,5+y/o,reducibl      Upper gi endoscopy,diagnosis N/A 09/22/2014    Procedure: ESOPHAGOGASTRODUODENOSCOPY, COLONOSCOPY, POSSIBLE BIOPSY, POSSIBLE POLYPECTOMY [id number];  Surgeon: Slade Ivan MD;  Location: [facility]      Family History:   Family History   Problem Relation Age of Onset    Colon Cancer Mother     Other (Other) Mother     Other (copd) Mother     Alcohol and Other Disorders Associated Father     Other (Other) Father     Other (emph) Father     Alcohol and Other Disorders Associated Son     Hypertension Sister     Prostate Cancer Brother     Hypertension Brother      Social History:    reports that she quit smoking about 43 years ago. Her smoking use included cigarettes. She started smoking about 53 years ago. She has never used smokeless tobacco. She reports that she does not drink alcohol and does not use drugs.     Allergies:   Allergies   Allergen Reactions    Avelox [Moxifloxacin Hcl In Nacl] SWELLING     Avelox [Moxifloxacin Hydrochloride] TONGUE SWELLING     \"TABS\"    Amoxicillin NAUSEA AND VOMITING     Vomiting.    Statins Myopathy    Sulfa Antibiotics RASH and ITCHING    Dander OTHER (SEE COMMENTS)     \"Animals\": runny nose, watery eyes, itching    Diphenhydramine Runny nose     \"Animals\": runny nose, watery eyes, itching    Dust Runny nose    Latex RASH    Sulfa Drugs Cross Reactors RASH and ITCHING       Medications:    Current Facility-Administered Medications on File Prior to Encounter   Medication Dose Route Frequency Provider Last Rate Last Admin    [COMPLETED] ondansetron (Zofran) 4 MG/2ML injection 4 mg  4 mg Intravenous Once Tal Anderson MD   4 mg at 05/13/24 1657    [COMPLETED] pantoprazole (Protonix) 40 mg in sodium chloride 0.9% PF 10 mL IV push  40 mg Intravenous Once Tal Anderson MD   40 mg at 05/13/24 1657    [COMPLETED] acetaminophen (Tylenol Extra Strength) tab 1,000 mg  1,000 mg Oral Once Tal Anderson MD   1,000 mg at 05/13/24 1657    [COMPLETED] iopamidol 76% (ISOVUE-370) injection for power injector  75 mL Intravenous ONCE PRN Tal Anderson MD   75 mL at 05/13/24 1803    [COMPLETED] ketorolac (Toradol) 15 MG/ML injection 15 mg  15 mg Intravenous Once Tal Anderson MD   15 mg at 05/13/24 1838    [COMPLETED] sodium chloride 0.9 % IV bolus 500 mL  500 mL Intravenous Once Margot Blanco  mL/hr at 03/28/24 1126 500 mL at 03/28/24 1126    [COMPLETED] sorbitol 70 % oral solution 30 mL  30 mL Oral Once Margot Blanco MD   30 mL at 03/28/24 1603    [COMPLETED] sodium chloride 0.9% infusion   Intravenous Once Yemi Mauro DO 10 mL/hr at 03/23/24 0140 New Bag at 03/23/24 0140    [COMPLETED] potassium chloride 20 mEq/100mL IVPB premix 20 mEq  20 mEq Intravenous Once Marcela Dangelo MD   Stopped at 03/22/24 1031    [COMPLETED] clonazePAM (KlonoPIN) tab 0.5 mg  0.5 mg Oral Once Olaru, Marcela, MD   0.5 mg at 03/22/24 1130    [COMPLETED] iopamidol 76% (ISOVUE-370) injection for  power injector  75 mL Intravenous ONCE PRN Marcela Dangelo MD   75 mL at 03/22/24 1443    [COMPLETED] sodium chloride 0.9% infusion   Intravenous Once Delon Ashford, DO 10 mL/hr at 03/21/24 1100 New Bag at 03/21/24 1100    [COMPLETED] iopamidol 76% (ISOVUE-370) injection for power injector  75 mL Intravenous ONCE PRN Marcela Dangelo MD   75 mL at 03/21/24 1816    [COMPLETED] potassium chloride 40 mEq in 250mL sodium chloride 0.9% IVPB premix  40 mEq Intravenous Once Marcela Dangelo MD 62.5 mL/hr at 03/21/24 2000 40 mEq at 03/21/24 2000    [COMPLETED] sodium chloride 0.9% infusion   Intravenous Once Yemi Mauro,  mL/hr at 03/21/24 2230 Bolus from Bag at 03/21/24 2230    [COMPLETED] sodium chloride 0.9% infusion   Intravenous Once Yemi Mauro, DO 10 mL/hr at 03/21/24 2357 New Bag at 03/21/24 2357    [COMPLETED] pantoprazole (Protonix) 40 mg in sodium chloride 0.9% PF 10 mL IV push  40 mg Intravenous Once Tal Anderson MD   40 mg at 03/20/24 0701    [COMPLETED] sodium chloride 0.9 % IV bolus 1,000 mL  1,000 mL Intravenous Once Tal Anderson MD 1,000 mL/hr at 03/20/24 0802 1,000 mL at 03/20/24 0802    [COMPLETED] acetaminophen (Tylenol Extra Strength) tab 1,000 mg  1,000 mg Oral Once Tal Anderson MD   1,000 mg at 03/20/24 0809    [COMPLETED] ondansetron (Zofran) 4 MG/2ML injection 4 mg  4 mg Intravenous Once Tal Anderson MD   4 mg at 03/20/24 1020    [COMPLETED] clonazePAM (KlonoPIN) tab 0.5 mg  0.5 mg Oral Once Tal Anderson MD   0.5 mg at 03/20/24 1020    [COMPLETED] polyethylene glycol-electrolyte (Golytely) 236 g oral solution 4,000 mL  4,000 mL Oral Once Yasmine Dolan APRN   4,000 mL at 03/20/24 1824    [COMPLETED] sodium chloride 0.9 % IV bolus 500 mL  500 mL Intravenous Once Geena Pardo  mL/hr at 03/20/24 2135 500 mL at 03/20/24 2135    [COMPLETED] sodium chloride 0.9% infusion   Intravenous Once Xiang Hernandez MD   Stopped at 03/13/24 1104    [COMPLETED]  ondansetron (Zofran) 4 MG/2ML injection 4 mg  4 mg Intravenous Once OhXiang MD   4 mg at 03/13/24 1052     Current Outpatient Medications on File Prior to Encounter   Medication Sig Dispense Refill    CLONAZEPAM 0.5 MG Oral Tab TAKE 1 TABLET(0.5 MG) BY MOUTH TWICE DAILY AS NEEDED 55 tablet 0    DULoxetine (CYMBALTA) 30 MG Oral Cap DR Particles Take 1 capsule (30 mg total) by mouth daily. 30 capsule 0    meclizine 12.5 MG Oral Tab Take 1 tablet (12.5 mg total) by mouth 3 (three) times daily as needed. 30 tablet 1    ondansetron 4 MG Oral Tablet Dispersible Take 1 tablet (4 mg total) by mouth every 4 (four) hours as needed for Nausea. 30 tablet 0    mirtazapine 30 MG Oral Tab Take 1 tablet (30 mg total) by mouth nightly. 90 tablet 1    fluticasone propionate 50 MCG/ACT Nasal Suspension 2 sprays by Each Nare route daily. 1 each 1    losartan 25 MG Oral Tab Take 1 tablet (25 mg total) by mouth daily. 90 tablet 1    albuterol (PROAIR HFA) 108 (90 Base) MCG/ACT Inhalation Aero Soln INHALE 2 PUFFS BY MOUTH EVERY 6 HOURS AS NEEDED FOR WHEEZING 8.5 g 2    SYMBICORT 160-4.5 MCG/ACT Inhalation Aerosol INHALE 2 PUFFS BY MOUTH TWICE DAILY 10.2 g 11    DUPIXENT 300 MG/2ML Subcutaneous Solution Prefilled Syringe       clobetasol 0.05 % External Ointment       TRIAMCINOLONE 0.1 % External Cream APPLY TOPICALLY TO THE AFFECTED AREA TWICE DAILY AS NEEDED (Patient taking differently: as needed.) 60 g 3    acetaminophen 500 MG Oral Tab Take 1 tablet (500 mg total) by mouth every 6 (six) hours as needed for Pain.      linaCLOtide (LINZESS) 72 MCG Oral Cap Take 1 tablet by mouth daily. (Patient not taking: Reported on 6/10/2024) 30 capsule 1    pantoprazole 40 MG Oral Tab EC Take 1 tablet (40 mg total) by mouth daily. (Patient not taking: Reported on 6/10/2024) 30 tablet 0    dicyclomine 20 MG Oral Tab Take 1 tablet (20 mg total) by mouth 4 (four) times daily as needed. (Patient not taking: Reported on 6/10/2024) 30 tablet 0     Cholecalciferol (VITAMIN D-3 OR) Take by mouth.         Review of Systems:   A comprehensive review of systems was completed.    Pertinent positives and negatives noted in the HPI.    Objective:   Physical Exam:    /38 (BP Location: Right arm)   Pulse 83   Temp 98.3 °F (36.8 °C) (Oral)   Resp 18   Ht 5' (1.524 m)   Wt 92 lb (41.7 kg)   SpO2 99%   BMI 17.97 kg/m²   General: No acute distress, Alert  Respiratory: No rhonchi, no wheezes  Cardiovascular: S1, S2. Regular rate and rhythm  Abdomen: Soft, LLQ-tender, non-distended, positive bowel sounds  Neuro: No new focal deficits  Extremities: No edema      Results:    Labs:      Labs Last 24 Hours:    Recent Labs   Lab 06/10/24  1652   RBC 3.51*   HGB 9.8*   HCT 28.2*   MCV 80.3   MCH 27.9   MCHC 34.8   RDW 14.1   NEPRELIM 9.07*   WBC 10.7   .0       Recent Labs   Lab 06/10/24  1652   *   BUN 12   CREATSERUM 0.85   EGFRCR 70   CA 9.2   ALB 3.4   *   K 4.0   CL 93*   CO2 22.0   ALKPHO 81   AST 15   ALT 17   BILT 0.6   TP 7.0       Lab Results   Component Value Date    PT 12.6 01/31/2013    PT 13.2 08/02/2011    INR 1.05 03/20/2024    INR 0.99 12/29/2022    INR 1.09 03/01/2018       Recent Labs   Lab 06/10/24  1652   TROPHS 3       No results for input(s): \"TROP\", \"PBNP\" in the last 168 hours.    No results for input(s): \"PCT\" in the last 168 hours.    Imaging: Imaging data reviewed in Epic.    Assessment & Plan:      #acute diverticulitis  -IV abx  -IVF  -NPO  -cxs    #hyponatremia  -IVF    #anemia    #HTN  #HLD  #CKD3  #hypothyroid  #COPD      Plan of care discussed with pt, ed physician     Scorates Red MD    Supplementary Documentation:     The 21st Century Cures Act makes medical notes like these available to patients in the interest of transparency. Please be advised this is a medical document. Medical documents are intended to carry relevant information, facts as evident, and the clinical opinion of the practitioner. The medical  note is intended as peer to peer communication and may appear blunt or direct. It is written in medical language and may contain abbreviations or verbiage that are unfamiliar.

## 2024-06-11 NOTE — PROGRESS NOTES
[facility]   part of [facility]     Hospitalist Progress Note     Bibi Diaz Patient Status:  Observation    1945 MRN [id number]   Location [facility] 5NW-A Attending Win Brothers MD   Hosp Day # 0 PCP Rick Shannon DO     Chief Complaint: abd pain    Subjective:     Patient still has pain in lower abd. Orthostatics +ve today. Retaining urine    Objective:    Review of Systems:   ROS completed; pertinent positive and negatives stated in subjective.    Vital signs:  Temp:  [97.7 °F (36.5 °C)-102 °F (38.9 °C)] 97.7 °F (36.5 °C)  Pulse:  [69-90] 83  Resp:  [16-20] 16  BP: ()/() 109/57  SpO2:  [97 %-100 %] 98 %    Physical Exam:    General: No acute distress  Respiratory: Clear to auscultation bilaterally  Cardiovascular: S1, S2.  Abdomen: Soft  Neuro: No new focal deficits      Diagnostic Data:    Labs:  Recent Labs   Lab 06/10/24  1652 24  0748   WBC 10.7 24.0*   HGB 9.8* 10.0*   MCV 80.3 85.6   .0 320.0       Recent Labs   Lab 06/10/24  1652 24  0748   * 105*   BUN 12 10   CREATSERUM 0.85 0.93   CA 9.2 9.1   ALB 3.4  --    * 128*   K 4.0 4.6   CL 93* 100   CO2 22.0 19.0*   ALKPHO 81  --    AST 15  --    ALT 17  --    BILT 0.6  --    TP 7.0  --        Estimated Creatinine Clearance: 32.3 mL/min (based on SCr of 0.93 mg/dL).     Recent Labs   Lab 06/10/24  1652   TROPHS 3       No results for input(s): \"PTP\", \"INR\" in the last 168 hours.           Imaging: Imaging data reviewed in Epic.    Medications:    [START ON 2024] multivitamin with minerals  1 tablet Oral Daily    DULoxetine  30 mg Oral Daily    fluticasone propionate  2 spray Each Nare Daily    mirtazapine  30 mg Oral Nightly    pantoprazole  40 mg Oral Daily    fluticasone furoate-vilanterol  1 puff Inhalation Daily    enoxaparin  40 mg Subcutaneous Daily    piperacillin-tazobactam  3.375 g Intravenous Q8H       Assessment & Plan:     #Acute Diverticulitis  Cont. IV ABX  Bowel  rest  Advance diet possibly tomorrow    #Gastroparesis  On liquid diet PTA  Follows with GI as OP    #Orthostatic Hypotension  IVF and monitor  Hold BP meds    #Hyponatremia  IVF and monitor    #Anemia  Fe studies    #HTN  Hold BP meds    #HLD    #Hypothyroid  #COPD    #Moderate Malnutrition  On remeron    #Metabolic acidosis  monitor    #Advance care planning  Voluntary meeting  17 minutes spent face-to-face with the patient.  ACP discussed, explained and reviewed with them in detail.  The patient is a DNAR/S.  CODE STATUS updated in system.          Win Brothers MD    Supplementary Documentation:     Quality:    DVT Mechanical Prophylaxis:   SCDs,    DVT Pharmacologic Prophylaxis   Medication    enoxaparin (Lovenox) 40 MG/0.4ML SUBQ injection 40 mg                Code Status: Full Code  Flood: External urinary catheter in place  Flood Duration (in days):   Central line:    WOLF:       Discharge is dependent on: clinical stability  At this point Ms. Diaz is expected to be discharge to: home    The 21st Century Cures Act makes medical notes like these available to patients in the interest of transparency. Please be advised this is a medical document. Medical documents are intended to carry relevant information, facts as evident, and the clinical opinion of the practitioner. The medical note is intended as peer to peer communication and may appear blunt or direct. It is written in medical language and may contain abbreviations or verbiage that are unfamiliar.     Dietitian Malnutrition Assessment        Evaluation for Malnutrition: Criteria for non-severe malnutrition diagnosis-         chronic illness related to   Energy intake less than 75% for greater than 1 month., Body fat mild depletion., Muscle mass mild depletion.       RD Malnutrition Care Plan: Intiated ONS (oral nutritional supplements)., Provided written information for optimizing nutrient intake at home., Ordered multivitamin., See RD nutrition  assessment for additional recommendations.    Body Fat/Muscle Mass:          Physician Assessment    Patient has a diagnosis of moderate malnutrition

## 2024-06-11 NOTE — PLAN OF CARE
Pt is A&Ox4, forgetful. Wear glasses. VSS, afebrile. SPO2 maintained on RA. Tele, NSR. EP. C/o dizziness- orthostats negative. Lovenox. Last BM 6/9. NPO, sips and ice chips okay. Urinary retention- straight cath x1. PT/OT eval. C/o lower abdomen pain- PRN given. PIV, IVF, IV ABX. No further needs at this time, continue POC. Safety and seizure precautions in place.       Problem: Patient/Family Goals  Goal: Patient/Family Long Term Goal  Description: Patient's Long Term Goal: discharge    Interventions:  - follow and update POC  - See additional Care Plan goals for specific interventions  Outcome: Progressing  Goal: Patient/Family Short Term Goal  Description: Patient's Short Term Goal:   6/10 NOC: sleep    Interventions:   - cluster care  - See additional Care Plan goals for specific interventions  Outcome: Progressing

## 2024-06-11 NOTE — PROGRESS NOTES
Psych Liaison on consult for PHQ-4 scoring. During evaluation, patient reported anxiety/depression symptoms directly related to medical issues; situational. Patient denied thoughts of harming self, denied- pt reported no safety concerns for herself. Patient reported good support system from family. Pt reported she worked with a therapist in the past and is not interested in working with one. Pt cleared from psych liaison perspective.

## 2024-06-11 NOTE — PLAN OF CARE
Patient is alert and oriented x 4, forgetful at times.  Temp<99, however patient frequently c/o chills and \"feeling hot\" Tylenol 650 mg given prn, patient stated she has no chills an hour later.  Pain - no complaints for most of the day, but patient experienced abdominal pain 8/10 in the am, Diluadid 0.5 prn, patient felt better.  She is on RA, NSR. BP low at times, orthostatics +, on Lovenox s.c. NPO except for sips with meds and ise chips. Zofran, Reglan prn for nausea. Patient did not void, needed str. cath approximately q 6 hrs. No BM today, unable to collect stool for c.diff and GI panel. Endorsed to next shift.  Patient able to ambulate briefly with PT, needing contact guard.      Problem: Patient/Family Goals  Goal: Patient/Family Long Term Goal  Description: Patient's Long Term Goal: discharge    Interventions:  - follow and update POC  - See additional Care Plan goals for specific interventions  Outcome: Progressing  Goal: Patient/Family Short Term Goal  Description: Patient's Short Term Goal:   6/10 NOC: sleep  6/11 AM:  Pain control    Interventions:   - cluster care  - See additional Care Plan goals for specific interventions  Outcome: Progressing

## 2024-06-11 NOTE — ED QUICK NOTES
Orders for admission, patient is aware of plan and ready to go upstairs. Any questions, please call ED RN Brittany at extension [phone].     Patient Covid vaccination status: Fully vaccinated     COVID Test Ordered in ED: SARS-CoV-2/Flu A and B/RSV by PCR (GeneXpert)    COVID Suspicion at Admission: N/A    Running Infusions:  Zosyn    Mental Status/LOC at time of transport: a/ox4    Other pertinent information:   CIWA score: N/A   NIH score:  N/A

## 2024-06-11 NOTE — PROGRESS NOTES
NURSING ADMISSION NOTE      Patient admitted via Cart  Oriented to room.  Safety precautions initiated.  Bed in low position.  Call light in reach.  Admitted to room 501    Pt arrived alert and oriented x4, on RA. Orthos negative, c/o dizziness. Bladder scan 541, straight cath 600mL out. Admission navigator, med rec, and flowsheets complete. Educated pt on POC, verbalizes understanding, no further questions.

## 2024-06-12 LAB
ANION GAP SERPL CALC-SCNC: 8 MMOL/L (ref 0–18)
ATRIAL RATE: 82 BPM
BASOPHILS # BLD AUTO: 0.02 X10(3) UL (ref 0–0.2)
BASOPHILS NFR BLD AUTO: 0.1 %
BILIRUB UR QL STRIP.AUTO: NEGATIVE
BUN BLD-MCNC: 9 MG/DL (ref 9–23)
CALCIUM BLD-MCNC: 8.3 MG/DL (ref 8.5–10.1)
CHLORIDE SERPL-SCNC: 107 MMOL/L (ref 98–112)
CLARITY UR REFRACT.AUTO: CLEAR
CO2 SERPL-SCNC: 19 MMOL/L (ref 21–32)
COLOR UR AUTO: YELLOW
CREAT BLD-MCNC: 0.8 MG/DL
EGFRCR SERPLBLD CKD-EPI 2021: 75 ML/MIN/1.73M2 (ref 60–?)
EOSINOPHIL # BLD AUTO: 0.03 X10(3) UL (ref 0–0.7)
EOSINOPHIL NFR BLD AUTO: 0.2 %
ERYTHROCYTE [DISTWIDTH] IN BLOOD BY AUTOMATED COUNT: 14.2 %
FOLATE SERPL-MCNC: 39.4 NG/ML (ref 8.7–?)
GLUCOSE BLD-MCNC: 147 MG/DL (ref 70–99)
GLUCOSE UR STRIP.AUTO-MCNC: NORMAL MG/DL
HCT VFR BLD AUTO: 24.5 %
HGB BLD-MCNC: 8.2 G/DL
HYALINE CASTS #/AREA URNS AUTO: PRESENT /LPF
IMM GRANULOCYTES # BLD AUTO: 0.11 X10(3) UL (ref 0–1)
IMM GRANULOCYTES NFR BLD: 0.6 %
KETONES UR STRIP.AUTO-MCNC: NEGATIVE MG/DL
LEUKOCYTE ESTERASE UR QL STRIP.AUTO: NEGATIVE
LYMPHOCYTES # BLD AUTO: 0.37 X10(3) UL (ref 1–4)
LYMPHOCYTES NFR BLD AUTO: 2.1 %
MCH RBC QN AUTO: 28 PG (ref 26–34)
MCHC RBC AUTO-ENTMCNC: 33.5 G/DL (ref 31–37)
MCV RBC AUTO: 83.6 FL
MONOCYTES # BLD AUTO: 0.85 X10(3) UL (ref 0.1–1)
MONOCYTES NFR BLD AUTO: 4.9 %
NEUTROPHILS # BLD AUTO: 16.05 X10 (3) UL (ref 1.5–7.7)
NEUTROPHILS # BLD AUTO: 16.05 X10(3) UL (ref 1.5–7.7)
NEUTROPHILS NFR BLD AUTO: 92.1 %
NITRITE UR QL STRIP.AUTO: NEGATIVE
OSMOLALITY SERPL CALC.SUM OF ELEC: 279 MOSM/KG (ref 275–295)
P AXIS: 74 DEGREES
P-R INTERVAL: 140 MS
PH UR STRIP.AUTO: 6 [PH] (ref 5–8)
PLATELET # BLD AUTO: 261 10(3)UL (ref 150–450)
POTASSIUM SERPL-SCNC: 3.5 MMOL/L (ref 3.5–5.1)
Q-T INTERVAL: 404 MS
QRS DURATION: 80 MS
QTC CALCULATION (BEZET): 472 MS
R AXIS: -72 DEGREES
RBC # BLD AUTO: 2.93 X10(6)UL
SODIUM SERPL-SCNC: 134 MMOL/L (ref 136–145)
SP GR UR STRIP.AUTO: 1.02 (ref 1–1.03)
T AXIS: 74 DEGREES
UROBILINOGEN UR STRIP.AUTO-MCNC: NORMAL MG/DL
VENTRICULAR RATE: 82 BPM
VIT B12 SERPL-MCNC: 575 PG/ML (ref 193–986)
WBC # BLD AUTO: 17.4 X10(3) UL (ref 4–11)

## 2024-06-12 PROCEDURE — 99233 SBSQ HOSP IP/OBS HIGH 50: CPT | Performed by: HOSPITALIST

## 2024-06-12 RX ORDER — ONDANSETRON 2 MG/ML
4 INJECTION INTRAMUSCULAR; INTRAVENOUS
Status: DISCONTINUED | OUTPATIENT
Start: 2024-06-12 | End: 2024-06-14

## 2024-06-12 NOTE — PROGRESS NOTES
[facility]   part of [facility]     Hospitalist Progress Note     Bibi Diaz Patient Status:  Observation    1945 MRN [id number]   Location [facility] 5NW-A Attending Win Brothers MD   Hosp Day # 1 PCP Rick Shannon DO     Chief Complaint: abd pain    Subjective:     Patient still has pain in lower abd. Orthostatics +ve today. Retaining urine    Objective:    Review of Systems:   ROS completed; pertinent positive and negatives stated in subjective.    Vital signs:  Temp:  [97.7 °F (36.5 °C)-102.6 °F (39.2 °C)] 97.7 °F (36.5 °C)  Pulse:  [68-86] 70  Resp:  [18-19] 18  BP: ()/(37-55) 107/37  SpO2:  [95 %-97 %] 95 %    Physical Exam:    General: No acute distress  Respiratory: Clear to auscultation bilaterally  Cardiovascular: S1, S2.  Abdomen: Soft  Neuro: No new focal deficits      Diagnostic Data:    Labs:  Recent Labs   Lab 06/10/24  1652 06/11/24  0748 24  0603   WBC 10.7 24.0* 17.4*   HGB 9.8* 10.0* 8.2*   MCV 80.3 85.6 83.6   .0 320.0 261.0       Recent Labs   Lab 06/10/24  1652 06/11/24  0748 24  0603   * 105* 147*   BUN 12 10 9   CREATSERUM 0.85 0.93 0.80   CA 9.2 9.1 8.3*   ALB 3.4  --   --    * 128* 134*   K 4.0 4.6 3.5   CL 93* 100 107   CO2 22.0 19.0* 19.0*   ALKPHO 81  --   --    AST 15  --   --    ALT 17  --   --    BILT 0.6  --   --    TP 7.0  --   --        Estimated Creatinine Clearance: 37.5 mL/min (based on SCr of 0.8 mg/dL).     Recent Labs   Lab 06/10/24  1652   TROPHS 3       No results for input(s): \"PTP\", \"INR\" in the last 168 hours.           Imaging: Imaging data reviewed in Epic.    Medications:    multivitamin with minerals  1 tablet Oral Daily    DULoxetine  30 mg Oral Daily    fluticasone propionate  2 spray Each Nare Daily    mirtazapine  30 mg Oral Nightly    pantoprazole  40 mg Oral Daily    fluticasone furoate-vilanterol  1 puff Inhalation Daily    enoxaparin  40 mg Subcutaneous Daily    piperacillin-tazobactam   3.375 g Intravenous Q8H       Assessment & Plan:     #Acute Diverticulitis  Cont. IV ABX  Bowel rest  Advance diet possibly tomorrow    #Gastroparesis  On liquid diet PTA  Follows with GI as OP    #Orthostatic Hypotension  IVF and monitor  Hold BP meds    #Hyponatremia  IVF and monitor    #Anemia  Fe studies    #HTN  Hold BP meds    #HLD    #Hypothyroid  #COPD    #Moderate Malnutrition  On remeron    #Metabolic acidosis  monitor    .              Supplementary Documentation:     Quality:    DVT Mechanical Prophylaxis:   SCDs,    DVT Pharmacologic Prophylaxis   Medication    enoxaparin (Lovenox) 40 MG/0.4ML SUBQ injection 40 mg                Code Status: DNAR/Selective Treatment  Flood: External urinary catheter in place  Flood Duration (in days):   Central line:    WOLF:       Discharge is dependent on: clinical stability  At this point Ms. Diaz is expected to be discharge to: home    The 21st Century Cures Act makes medical notes like these available to patients in the interest of transparency. Please be advised this is a medical document. Medical documents are intended to carry relevant information, facts as evident, and the clinical opinion of the practitioner. The medical note is intended as peer to peer communication and may appear blunt or direct. It is written in medical language and may contain abbreviations or verbiage that are unfamiliar.     Dietitian Malnutrition Assessment        Evaluation for Malnutrition: Criteria for non-severe malnutrition diagnosis-         chronic illness related to   Energy intake less than 75% for greater than 1 month., Body fat mild depletion., Muscle mass mild depletion.       RD Malnutrition Care Plan: Intiated ONS (oral nutritional supplements)., Provided written information for optimizing nutrient intake at home., Ordered multivitamin., See RD nutrition assessment for additional recommendations.    Body Fat/Muscle Mass:          Physician Assessment    Patient has a  diagnosis of moderate malnutrition

## 2024-06-12 NOTE — PLAN OF CARE
Pt a/o x4. Can be forgetful. Tmax 102.6. Other VSS on RA. No c/o pain. C/o nausea. Meds per MAR. IVF per order. Bladder scan, 477ml. Straight cath, 700ml. Pending stool collections. Seizure precautions. No seizure activity.     Fall risk protocol followed, call light within reach.     Bladder scan this AM, 302ml.     Problem: Patient/Family Goals  Goal: Patient/Family Short Term Goal  Description: Patient's Short Term Goal:   6/10 NOC: sleep  6/11 AM:  Pain control    Interventions:   - cluster care  - See additional Care Plan goals for specific interventions  Outcome: Progressing

## 2024-06-13 LAB
ERYTHROCYTE [DISTWIDTH] IN BLOOD BY AUTOMATED COUNT: 14.1 %
HCT VFR BLD AUTO: 27.1 %
HGB BLD-MCNC: 9 G/DL
MCH RBC QN AUTO: 27.8 PG (ref 26–34)
MCHC RBC AUTO-ENTMCNC: 33.2 G/DL (ref 31–37)
MCV RBC AUTO: 83.6 FL
PLATELET # BLD AUTO: 281 10(3)UL (ref 150–450)
RBC # BLD AUTO: 3.24 X10(6)UL
WBC # BLD AUTO: 13.1 X10(3) UL (ref 4–11)

## 2024-06-13 PROCEDURE — 99233 SBSQ HOSP IP/OBS HIGH 50: CPT | Performed by: HOSPITALIST

## 2024-06-13 NOTE — PROGRESS NOTES
Patient is alert and oriented x 4, afebrile during the day. Intermitted L abdominal pain, moderate to severe, exacerbates with movement, Diluadid, morphine IV given. On RA, NSR on telemetry, BP improved, dizziness with sitting in the morning but able to tolerate sitting at the edge of the bed with meals later. Upgraded to clear liquid diet, able to tolerate small amounts, scheduled zofran, simethicone prn. Patient able to void x 3 today 200-300 mL but still retaining - need str. Cath at 1730 for 500 mL. Urine dark and malodorous, UA sent. Patient on Zosyn q 8hr. IVF.   Patient and granddaughter concerned that patient's \"slipped disk\" is causing the urinary retention. Related concern to Dr. Dangelo at 1257, no new orders. Continued monitoring UO and PVRs.

## 2024-06-13 NOTE — PROGRESS NOTES
Received pt A&Ox4, forgetful.  on RA. NSR on tele. Febrile during shift - Tmax 101.5F. Lovenox for VTE prophylaxis. Tolerating clear liquid diet. Voids via BSC. Needs encouragement. Up with assistance and walker. Denies Pain. Receiving continuous IVF and IB abx. Plan of care continues, no further needs at this time.

## 2024-06-13 NOTE — PHYSICAL THERAPY NOTE
PHYSICAL THERAPY TREATMENT NOTE - INPATIENT    Room Number: 501/501-A     Session: 1     Number of Visits to Meet Established Goals: 3    Presenting Problem: acture diverticulitis  Co-Morbidities : GI Bleed 3/2024, HTN, depression, COPD, anxiety, asthma    ASSESSMENT   Patient demonstrates good  progress this session, goals  progressing with ambulation and transfers/bed mobility met this session.    Patient continues to function below baseline with bed mobility, transfers, gait, maintaining seated position, and standing prolonged periods.  Contributing factors to remaining limitations include decreased functional strength, decreased endurance/aerobic capacity, impaired dynamic balance, and decreased muscular endurance.  Next session anticipate patient to progress transfers, gait, and standing prolonged periods.  Physical Therapy will continue to follow patient for duration of hospitalization.    Patient continues to benefit from continued skilled PT services: at discharge to promote functional independence in home.  Anticipate patient will return home with home health PT.    PLAN  PT Treatment Plan: Bed mobility;Endurance;Patient education;Gait training;Strengthening;Transfer training;Balance training  Rehab Potential : Good  Frequency (Obs): 3-5x/week    CURRENT GOALS     Goal #1 Patient is able to demonstrate supine - sit EOB @ level: modified independent      Goal #2 Patient is able to demonstrate transfers EOB to/from Chair/Wheelchair at assistance level: modified independent    Did not complete a bed to chair transfer this session      Goal #3 Patient is able to ambulate 150 feet with assist device:  least restrictive assistive device  at assistance level: supervision      Goal #4     Goal #5     Goal #6       6/13/2024 all goals ongoing: Goal #3; achieved Goal #1    SUBJECTIVE  \"You guys always come when I am sleepy.\"    OBJECTIVE  Precautions: Bed/chair alarm    WEIGHT BEARING RESTRICTION  Weight Bearing  Restriction: None                PAIN ASSESSMENT   Ratin  Location: abdomen, genital area  Management Techniques: Relaxation;Repositioning    BALANCE                                                                                                                       Static Sitting: Good  Dynamic Sitting: Good           Static Standing: Fair -  Dynamic Standing: Fair -    ACTIVITY TOLERANCE                         O2 WALK         AM-PAC '6-Clicks' INPATIENT SHORT FORM - BASIC MOBILITY  How much difficulty does the patient currently have...  Patient Difficulty: Turning over in bed (including adjusting bedclothes, sheets and blankets)?: None   Patient Difficulty: Sitting down on and standing up from a chair with arms (e.g., wheelchair, bedside commode, etc.): A Little   Patient Difficulty: Moving from lying on back to sitting on the side of the bed?: None   How much help from another person does the patient currently need...   Help from Another: Moving to and from a bed to a chair (including a wheelchair)?: A Little   Help from Another: Need to walk in hospital room?: A Little   Help from Another: Climbing 3-5 steps with a railing?: A Little       AM-PAC Score:  Raw Score: 20   Approx Degree of Impairment: 35.83%   Standardized Score (AM-PAC Scale): 47.67   CMS Modifier (G-Code): CJ    FUNCTIONAL ABILITY STATUS  Gait Assessment   Functional Mobility/Gait Assessment  Gait Assistance: Supervision  Distance (ft): 10x1, 2x1, 120x1  Assistive Device: Rolling walker  Pattern: Shuffle (Slowed jordin)    Skilled Therapy Provided    Bed Mobility:  Rolling: NT   Supine<>Sit: Supervision   Sit<>Supine: NT     Transfer Mobility:  Sit<>Stand: Supervision   Stand<>Sit: Supervision   Gait: Supervision    Therapist's Comments: Pt received in bed waking up and agreeable to therapy. Pt requested using the restroom upon standing to try to have a BM, Pt sat EOB and then stood under supervision. Pt BP taken EOB was WNL. Pt amb to the  bathroom initially CGA with RW but no LOB or gait deviations were observed therefore she amb the rest of the way with supervision. Pt was SBA for toileting and was able to complete pericare ind. Pt amb with RW to sink to wash hands ind. Pt able to take backward steps out of the bathroom and begin amb into the hallway. Pt amb the hallway with a slowed jordin and shuffle gait pattern with RW. Pt able to amb back to the chair where she was left with the PCT. Pt educated to call nursing to get in at least two more walks today to increase muscle strength and endurance. Pt took increased time to complete activity.    Patient End of Session: Up in chair;With  staff;Call light within reach;Needs met;RN aware of session/findings;All patient questions and concerns addressed;Alarm set    PT Session Time: 23 minutes    Therapeutic Activity: 23 minutes

## 2024-06-13 NOTE — PLAN OF CARE
Problem: Patient/Family Goals  Goal: Patient/Family Long Term Goal  Description: Patient's Long Term Goal: discharge    Interventions:  - follow and update POC  - See additional Care Plan goals for specific interventions  6/13/2024 0301 by Melodie Sheffield RN  Outcome: Progressing  6/13/2024 0301 by Melodie Sheffield RN  Outcome: Progressing  Goal: Patient/Family Short Term Goal  Description: Patient's Short Term Goal:   6/10 NOC: sleep  6/11 AM:  Pain control  6/12 NOC: get klonopin and sleep    Interventions:   - cluster care  - See additional Care Plan goals for specific interventions  6/13/2024 0301 by Melodie Sheffield RN  Outcome: Progressing  6/13/2024 0301 by Melodie Sheffield RN  Outcome: Progressing     Problem: GENITOURINARY - ADULT  Goal: Absence of urinary retention  Description: INTERVENTIONS:  - Assess patient’s ability to void and empty bladder  - Monitor intake/output and perform bladder scan as needed  - Follow urinary retention protocol/standard of care  - Consider collaborating with pharmacy to review patient's medication profile  - Implement strategies to promote bladder emptying  Outcome: Progressing

## 2024-06-13 NOTE — PROGRESS NOTES
[place]   part of [facility]     Hospitalist Progress Note     Bibi Diaz Patient Status:  Observation    1945 MRN [id number]   Location [facility] 5NW-A Attending Win Brothers MD   Hosp Day # 2 PCP Rick Shannon DO     Chief Complaint: abd pain    Subjective:     Patient states abd pain is better    Objective:    Review of Systems:   ROS completed; pertinent positive and negatives stated in subjective.    Vital signs:  Temp:  [98.3 °F (36.8 °C)-101.5 °F (38.6 °C)] 98.3 °F (36.8 °C)  Pulse:  [66-81] 81  Resp:  [17-20] 17  BP: (114-140)/(46-64) 136/51  SpO2:  [79 %-97 %] 96 %    Physical Exam:    General: No acute distress  Respiratory: Clear to auscultation bilaterally  Cardiovascular: S1, S2.  Abdomen: Soft  Neuro: No new focal deficits      Diagnostic Data:    Labs:  Recent Labs   Lab 06/10/24  1652 24  0748 24  0603 24  0923   WBC 10.7 24.0* 17.4* 13.1*   HGB 9.8* 10.0* 8.2* 9.0*   MCV 80.3 85.6 83.6 83.6   .0 320.0 261.0 281.0       Recent Labs   Lab 06/10/24  1652 24  0748 24  0603   * 105* 147*   BUN 12 10 9   CREATSERUM 0.85 0.93 0.80   CA 9.2 9.1 8.3*   ALB 3.4  --   --    * 128* 134*   K 4.0 4.6 3.5   CL 93* 100 107   CO2 22.0 19.0* 19.0*   ALKPHO 81  --   --    AST 15  --   --    ALT 17  --   --    BILT 0.6  --   --    TP 7.0  --   --        Estimated Creatinine Clearance: 37.5 mL/min (based on SCr of 0.8 mg/dL).     Recent Labs   Lab 06/10/24  1652   TROPHS 3       No results for input(s): \"PTP\", \"INR\" in the last 168 hours.           Imaging: Imaging data reviewed in Epic.    Medications:    ondansetron  4 mg Intravenous TID AC&HS    multivitamin with minerals  1 tablet Oral Daily    DULoxetine  30 mg Oral Daily    fluticasone propionate  2 spray Each Nare Daily    mirtazapine  30 mg Oral Nightly    pantoprazole  40 mg Oral Daily    fluticasone furoate-vilanterol  1 puff Inhalation Daily    enoxaparin  40 mg Subcutaneous  Daily    piperacillin-tazobactam  3.375 g Intravenous Q8H       Assessment & Plan:     #Acute Diverticulitis  Cont. IV Zosyn  FLD today    #Gastroparesis  On liquid diet PTA  Follows with GI as OP    #Orthostatic Hypotension  IVF and monitor  Hold BP meds    #Hyponatremia  IVF and monitor    #Anemia  Fe studies    #HTN  Hold BP meds    #HLD    #Hypothyroid  #COPD    #Moderate Malnutrition  On remeron    #Metabolic acidosis  monitor    .              Supplementary Documentation:     Quality:    DVT Mechanical Prophylaxis:   SCDs,    DVT Pharmacologic Prophylaxis   Medication    enoxaparin (Lovenox) 40 MG/0.4ML SUBQ injection 40 mg                Code Status: DNAR/Selective Treatment  Flood: External urinary catheter in place  Flood Duration (in days):   Central line:    WOLF:       Discharge is dependent on: clinical stability  At this point Ms. Diaz is expected to be discharge to: home    The 21st Century Cures Act makes medical notes like these available to patients in the interest of transparency. Please be advised this is a medical document. Medical documents are intended to carry relevant information, facts as evident, and the clinical opinion of the practitioner. The medical note is intended as peer to peer communication and may appear blunt or direct. It is written in medical language and may contain abbreviations or verbiage that are unfamiliar.     Dietitian Malnutrition Assessment        Evaluation for Malnutrition: Criteria for non-severe malnutrition diagnosis-         chronic illness related to   Energy intake less than 75% for greater than 1 month., Body fat mild depletion., Muscle mass mild depletion.       RD Malnutrition Care Plan: Intiated ONS (oral nutritional supplements)., Provided written information for optimizing nutrient intake at home., Ordered multivitamin., See RD nutrition assessment for additional recommendations.    Body Fat/Muscle Mass:          Physician Assessment    Patient has  a diagnosis of moderate malnutrition

## 2024-06-13 NOTE — PLAN OF CARE
Patient is A+Ox4, forgetful at times. Maintaining sats >90% on RA. NSR on tele, Lovenox. Tolerating clear/ full liquid diet. Voids- no retention for shift. Up 1 w/ walker. IVF + IV Abx per MAR. No c/o pain. Patient updated on POC, awaiting PT/OT eval.     Problem: Patient/Family Goals  Goal: Patient/Family Long Term Goal  Description: Patient's Long Term Goal: discharge    Interventions:  - follow and update POC  - See additional Care Plan goals for specific interventions  Outcome: Progressing  Goal: Patient/Family Short Term Goal  Description: Patient's Short Term Goal:   6/10 NOC: sleep  6/11 AM:  Pain control  6/12 NOC: get klonopin and sleep  6/13 AM: ambulate     Interventions:   - cluster care  - See additional Care Plan goals for specific interventions  Outcome: Progressing     Problem: GENITOURINARY - ADULT  Goal: Absence of urinary retention  Description: INTERVENTIONS:  - Assess patient’s ability to void and empty bladder  - Monitor intake/output and perform bladder scan as needed  - Follow urinary retention protocol/standard of care  - Consider collaborating with pharmacy to review patient's medication profile  - Implement strategies to promote bladder emptying  Outcome: Progressing

## 2024-06-13 NOTE — PAYOR COMM NOTE
6/11 THRU 6/13  ADMISSION REVIEW     Payor: [employer]  Subscriber #:  [id number]  Authorization Number: [id number]    Admit date: 6/11/24  Admit time:  3:53 PM       REVIEW DOCUMENTATION:     ED Provider Notes        ED Provider Notes signed by Tyrone Kumari MD at 6/10/2024  8:42 PM       Author: Tyrone Kumari MD Service: -- Author Type: Physician    Filed: 6/10/2024  8:42 PM Date of Service: 6/10/2024  4:36 PM Status: Signed    : Tyrone Kumari MD (Physician)           Patient Seen in: [facility] Emergency Department      History     Chief Complaint   Patient presents with    Abdomen/Flank Pain     Stated Complaint: abd pain, feels like something is in her vagina and cant pee    Subjective:   HPI    78-year-old female comes to the hospital complaint of having difficulty with abdominal pain worsening over the last 24 hours.  She feels that she is unable to urinate that she needs to but denies any dysuria.  She does arrive with a fever.  She denies any headaches.  Denies any this.  She is no chest pain or shortness of breath.  She does have some nausea and that she is having and is spitting up phlegm.  She denies any diarrhea.  She is no flank pain.  She is denying any other specific complaints this time.    Objective:   Past Medical History:    Abdominal distention    Abdominal pain    Acquired hypothyroidism    Anxiety    Asthma ([facility])    Belching    Bloating    Chronic rhinitis    CKD (chronic kidney disease) stage 3, GFR 30-59 ml/min ([facility])    Constipation    COPD (chronic obstructive pulmonary disease) ([facility])    NO OXYGEN     Depression    Diarrhea, unspecified    Diverticulosis of large intestine    Emphysema    Esophageal reflux    Fatigue    Feeling lonely    Flatulence/gas pain/belching    Food intolerance    H/O bronchitis    Hay fever    Hemorrhoids    High blood pressure    History of depression    Hypercholesterolemia    Hypertension    Itch of skin    Migraines    Mild  intermittent asthma without complication (HCC)    Nausea    Night sweats    Pneumonia    PONV (postoperative nausea and vomiting)    Pure hypercholesterolemia    Rash    Reflux    Sleep disturbance    Stress    Uncomfortable fullness after meals    Visual impairment    glasses    Vomiting    Wears glasses              Past Surgical History:   Procedure Laterality Date    Cataract      Colonoscopy      Colonoscopy N/A 3/21/2024    Procedure: COLONOSCOPY;  Surgeon: Delon Ashford DO;  Location:  ENDOSCOPY    Colonoscopy & polypectomy  09/2014    multiple polyps; tics; repeat 3 yrs    Colonoscopy,remv lesn,snare N/A 09/22/2014    Procedure: ESOPHAGOGASTRODUODENOSCOPY, COLONOSCOPY, POSSIBLE BIOPSY, POSSIBLE POLYPECTOMY 68911,10171;  Surgeon: Slade Ivan MD;  Location: [facility]    Correct bunion,simple Bilateral     Cystotomy,excis vesical neck Left     Egd      Gastro - dmg  09/2014    stricture; HH    Oophorectomy Bilateral 2017    Other surgical history  02/27/2023    Robotic repair of hiatal hernia and Nissen fundoplication    Patient documented not to have experienced any of the following events N/A 09/22/2014    Procedure: ESOPHAGOGASTRODUODENOSCOPY, COLONOSCOPY, POSSIBLE BIOPSY, POSSIBLE POLYPECTOMY 25060,00423;  Surgeon: Slade Ivan MD;  Location: [facility]    Patient withough preoperative order for iv antibiotic surgical site infection prophylaxis. N/A 09/22/2014    Procedure: ESOPHAGOGASTRODUODENOSCOPY, COLONOSCOPY, POSSIBLE BIOPSY, POSSIBLE POLYPECTOMY 89870,15236;  Surgeon: Slade Ivan MD;  Location: [facility]    Repair ing hernia,5+y/o,reducibl      Upper gi endoscopy,diagnosis N/A 09/22/2014    Procedure: ESOPHAGOGASTRODUODENOSCOPY, COLONOSCOPY, POSSIBLE BIOPSY, POSSIBLE POLYPECTOMY 66394,29772;  Surgeon: Slade Ivan MD;  Location: [facility]                Social History     Socioeconomic History    Marital status:    Tobacco Use    Smoking  status: Former     Current packs/day: 0.00     Types: Cigarettes     Start date: 1970     Quit date: 1980     Years since quittin.7    Smokeless tobacco: Never    Tobacco comments:      1/2 pack per day. Social history shows \"Tobacco Use: Active\" and \"Never Smoker: Active\"   Vaping Use    Vaping status: Never Used   Substance and Sexual Activity    Alcohol use: No    Drug use: No   Other Topics Concern    Caffeine Concern Yes     Comment: \"1 cup of coffee and tea, and can of pop daily\"    Exercise Yes     Comment: walking   Social History Narrative    ** Merged History Encounter **          Social Determinants of Health     Financial Resource Strain: Low Risk  (2023)    Financial Resource Strain     Difficulty of Paying Living Expenses: Not hard at all     Med Affordability: No   Food Insecurity: No Food Insecurity (3/20/2024)    Food Insecurity     Food Insecurity: Never true   Transportation Needs: No Transportation Needs (3/20/2024)    Transportation Needs     Lack of Transportation: No   Physical Activity: Inactive (2022)    Exercise Vital Sign     Days of Exercise per Week: 0 days     Minutes of Exercise per Session: 0 min   Stress: Stress Concern Present (2024)    Stress     Feeling of Stress : Yes    Social Connections   Housing Stability: Low Risk  (3/20/2024)    Housing Stability     Housing Instability: No              Review of Systems    Positive for stated complaint: abd pain, feels like something is in her vagina and cant pee  Other systems are as noted in HPI.  Constitutional and vital signs reviewed.      All other systems reviewed and negative except as noted above.    Physical Exam     ED Triage Vitals [06/10/24 1630]   /60   Pulse 69   Resp 20   Temp (!) 102 °F (38.9 °C)   Temp src Temporal   SpO2 100 %   O2 Device None (Room air)       Current Vitals:   Vital Signs  BP: 123/64  Pulse: 80  Resp: 19  Temp: (!) 101.7 °F (38.7 °C)  Temp src: Temporal  MAP (mmHg):  (!) 122    Oxygen Therapy  SpO2: 99 %  O2 Device: None (Room air)            Physical Exam    HEENT : NCAT, EOMI, PEERL,  neck supple, no JVD, trachea midline, No LAD  Heart: S1S2 normal. No murmurs, regular rate and rhythm  Lungs: Clear to auscultation bilaterally  Abdomen: Soft abdominal region is tender nondistended normal active bowel sounds without rebound, guarding or masses noted  Back nontender without CVA tenderness  Extremity no clubbing, cyanosis or edema noted.  Full range of motion noted without tenderness  Neuro: No focal deficits noted    All measures to prevent infection transmission during my interaction with the patient were taken.  The patient was already wearing droplet mask on my arrival to the room.  Personal protective equipment including a droplet mask as well as gloves were worn throughout the duration of my exam.  Hand washing was performed prior to and after the exam.  Stethoscope and equipment used during my examination was cleaned with a super Sani cloth germicidal wipe following the exam.    ED Course     Labs Reviewed   COMP METABOLIC PANEL (14) - Abnormal; Notable for the following components:       Result Value    Glucose 142 (*)     Sodium 125 (*)     Chloride 93 (*)     Calculated Osmolality 262 (*)     A/G Ratio 0.9 (*)     All other components within normal limits   CBC W/ DIFFERENTIAL - Abnormal; Notable for the following components:    RBC 3.51 (*)     HGB 9.8 (*)     HCT 28.2 (*)     Neutrophil Absolute Prelim 9.07 (*)     Neutrophil Absolute 9.07 (*)     Lymphocyte Absolute 0.82 (*)     All other components within normal limits   TROPONIN I HIGH SENSITIVITY - Normal   LACTIC ACID, PLASMA - Normal   SARS-COV-2/FLU A AND B/RSV BY PCR (GENEXPERT) - Normal    Narrative:     This test is intended for the qualitative detection and differentiation of SARS-CoV-2, influenza A, influenza B, and respiratory syncytial virus (RSV) viral RNA in nasopharyngeal or nares swabs from  individuals suspected of respiratory viral infection consistent with COVID-19 by their healthcare provider. Signs and symptoms of respiratory viral infection due to SARS-CoV-2, influenza, and RSV can be similar.    Test performed using the Xpert Xpress SARS-CoV-2/FLU/RSV (real time RT-PCR)  assay on the GeneXpert instrument, [employer], [place] [address].   This test is being used under the Food and Drug Administration's Emergency Use Authorization.    The authorized Fact Sheet for Healthcare Providers for this assay is available upon request from the laboratory.   CBC WITH DIFFERENTIAL WITH PLATELET    Narrative:     The following orders were created for panel order CBC With Differential With Platelet.  Procedure                               Abnormality         Status                     ---------                               -----------         ------                     CBC W/ DIFFERENTIAL[[id number]]          Abnormal            Final result                 Please view results for these tests on the individual orders.   URINALYSIS WITH CULTURE REFLEX   BLOOD CULTURE   BLOOD CULTURE     EKG    Rate, intervals and axes as noted on EKG Report.  Rate: 82  Rhythm: Sinus Rhythm  Reading: , QRS of 80, patient is left anterior fascicular block without acute ischemia.              ED Course as of 06/10/24 2041  ------------------------------------------------------------  Time: 06/10 2039  Comment: While here the patient's urinalysis was negative.  Her CBC had a hemoglobin of 9.8 which for her is stable.  Her sodium was 125.  Troponin was negative her lactic acid level of 1.8.  Her COVID influenza and RSV were negative.  Blood cultures x 2 were taken.  She was given combination of Tylenol for her fever as well as started on IV fluid.  She given Zofran for nausea as well as Dilaudid for pain.  She was given Zosyn for acute diverticulitis.  The patient had a chest x-ray done here that I personally interpreted showed  no acute cardiopulmonary process.  Read the radiology report as well patient is CT of the abdomen pelvis consistent with acute diverticulitis.  The pain is still present and the patient appears frail and at this time patient will be admitted for IV antibiotics and further management.     CT ABDOMEN+PELVIS(CONTRAST ONLY)(CPT=74177)    Result Date: 6/10/2024  PROCEDURE:  CT ABDOMEN+PELVIS (CONTRAST ONLY) (CPT=74177)  COMPARISON:  [facility] , CT, CT ABDOMEN+PELVIS(CONTRAST ONLY)(CPT=74177), 5/13/2024, 6:01 PM.  [facility] , CT, CTA ABD/PEL (CPT=74174), 3/22/2024, 2:29 PM.  [facility] , CT, CTA ABD/PEL (CPT=74174), 3/21/2024, 6:04 PM.  [facility], CT, CT ABDOMEN+PELVIS(CONTRAST ONLY)(CPT=74177), 2/27/2024, 9:28 AM.  INDICATIONS:  abd pain, feels like something is in her vagina and cant pee  TECHNIQUE:  CT scanning was performed from the dome of the diaphragm to the pubic symphysis with non-ionic intravenous contrast material. Post contrast coronal MPR imaging was performed.  Dose reduction techniques were used. Dose information is transmitted to the ACR (American College of Radiology) NRDR (National Radiology Data Registry) which includes the Dose Index Registry.  PATIENT STATED HISTORY:(As transcribed by Technologist)  c/o pelvic pain, urinary retention, and weakness since yesterday.   CONTRAST USED:  60cc of Isovue 370  FINDINGS:  LIVER:  No enlargement, atrophy, abnormal density, or significant focal lesion.  BILIARY:  No visible dilatation or calcification.  PANCREAS:  No lesion, fluid collection, ductal dilatation, or atrophy.  SPLEEN:  No enlargement or focal lesion.  KIDNEYS:  No mass, obstruction, or calcification.  ADRENALS:  No mass or enlargement.  AORTA/VASCULAR:  No aneurysm or dissection.  There is moderate to severe atherosclerotic plaque at the origin of the SMA.  There is moderate plaque at the origin of the celiac axis.  There is mild plaque at the origin of the REX. RETROPERITONEUM:  No mass or adenopathy.   BOWEL/MESENTERY:  There is significant inflammatory changes surrounding the sigmoid colon and regions of several diverticula consistent with acute diverticulitis.  There is no definite drainable abscess collection at this time period the inflammatory change does abut the left aspect of the urinary bladder and causes some deviation of the bladder to the right of midline.  Main of the left colon demonstrates several diverticula.  There is moderate stool throughout the colon consistent constipation.  The appendix is unremarkable.  Visualized small bowel is within normal limits.  Postsurgical changes of Nissen. ABDOMINAL WALL:  No mass or hernia.  URINARY BLADDER:  No visible focal wall thickening, lesion, or calculus.  PELVIC NODES:  No adenopathy.  PELVIC ORGANS:  No visible mass.  Pelvic organs appropriate for patient age.  BONES:  No bony lesion or fracture.  Mild to moderate degenerative changes of the spine. LUNG BASES:  No visible pulmonary or pleural disease.             CONCLUSION:   Acute diverticulitis involving the sigmoid colon without a drainable abscess at this time.  There is mild mass effect on the urinary bladder due to the inflammation.   LOCATION:  [facility]   Dictated by (CST): Saurabh Can MD on 6/10/2024 at 8:07 PM     Finalized by (CST): Saurabh Can MD on 6/10/2024 at 8:13 PM       XR CHEST AP PORTABLE  (CPT=71045)    Result Date: 6/10/2024  PROCEDURE:  XR CHEST AP PORTABLE  (CPT=71045)  TECHNIQUE:  AP chest radiograph was obtained.  COMPARISON:  [facility] , CT, CT ABDOMEN+PELVIS(CONTRAST ONLY)(CPT=74177), 5/13/2024, 6:01 PM.  [facility] , XR, XR CHEST AP PORTABLE  (CPT=71045), 11/03/2022, 10:47 AM.  INDICATIONS:  abd pain, feels like something is in her vagina and cant pee  PATIENT STATED HISTORY: (As transcribed by Technologist)     FINDINGS:  The heart is normal in size.  The lungs are clear.  There are no pleural effusions.  The bones are unremarkable.            CONCLUSION:  No acute disease.     LOCATION:  [facility]      Dictated by (CST): Saurabh Can MD on 6/10/2024 at 5:33 PM     Finalized by (CST): Saurabh Can MD on 6/10/2024 at 5:34 PM       CT ABDOMEN+PELVIS(CONTRAST ONLY)(CPT=74177)    Result Date: 5/13/2024  PROCEDURE:  CT ABDOMEN+PELVIS (CONTRAST ONLY) (CPT=74177)  COMPARISON:  [facility] , CT, CTA ABD/PEL (CPT=74174), 3/22/2024, 2:29 PM.  INDICATIONS:  lower abd pain/nausea  TECHNIQUE:  CT scanning was performed from the dome of the diaphragm to the pubic symphysis with non-ionic intravenous contrast material. Post contrast coronal MPR imaging was performed.  Dose reduction techniques were used. Dose information is transmitted to the ACR (American College of Radiology) NRDR (National Radiology Data Registry) which includes the Dose Index Registry.  PATIENT STATED HISTORY:(As transcribed by Technologist)  Patient with nausea and constipation.   CONTRAST USED:  100cc of Isovue 370  FINDINGS:  LIVER:  No enlargement, atrophy, abnormal density, or significant focal lesion.  BILIARY:  No visible dilatation or calcification.  PANCREAS:  There is marked atrophy of the pancreatic head. SPLEEN:  No enlargement or focal lesion.  KIDNEYS:  No mass, obstruction, or calcification.  ADRENALS:  No mass or enlargement.  AORTA/VASCULAR:  Aorta is diffusely atherosclerotic but not aneurysmal. RETROPERITONEUM:  No mass or adenopathy.  BOWEL/MESENTERY:  There is diverticulosis of the colon.  There is no CT evidence of diverticulitis.  There is postoperative change from a prior Nissen fundoplication. ABDOMINAL WALL:  No mass or hernia.  URINARY BLADDER:  No visible focal wall thickening, lesion, or calculus.  PELVIC NODES:  No adenopathy.  PELVIC ORGANS:  No visible mass.  Pelvic organs appropriate for patient age.  BONES:  No bony lesion or fracture.  LUNG BASES:  No visible pulmonary or pleural disease.  OTHER:  Negative.             CONCLUSION:  1. Specific etiology for abdominal pain is not evident from this study. 2.  There is diverticulosis of colon without CT evidence of diverticulitis. 3. There has been previous Nissen fundoplication.   LOCATION:     Dictated by (CST): Jesus Lindo MD on 5/13/2024 at 6:17 PM     Finalized by (CST): Jesus Lindo MD on 5/13/2024 at 6:20 PM       CT BRAIN OR HEAD (56611)    Result Date: 5/13/2024  PROCEDURE:  CT BRAIN OR HEAD (72007)  COMPARISON:  [facility] , CT, CT BRAIN OR HEAD (36038), 7/07/2023, 11:18 AM.  INDICATIONS:  headache  TECHNIQUE:  Noncontrast CT scanning is performed through the brain. Dose reduction techniques were used. Dose information is transmitted to the ACR (American College of Radiology) NRDR (National Radiology Data Registry) which includes the Dose Index Registry.  PATIENT STATED HISTORY: (As transcribed by Technologist)  Patient with headaches.    FINDINGS:  VENTRICLES/SULCI:  Ventricles and sulci are prominent compatible with atrophy. INTRACRANIAL:  There is multifocal and confluent decreased attenuation in periventricular and subcortical white matter.  This is consistent with chronic small vessel ischemic change.  There is no specific evidence of acute ischemia, hemorrhage or mass. SINUSES:           No sign of acute sinusitis.  MASTOIDS:          No sign of acute inflammation. SKULL:             No evidence for fracture or osseous abnormality. OTHER:             None.            CONCLUSION:  There is chronic small vessel ischemic change and atrophy evident.  There is no specific evidence of an acute abnormality on the noncontrast CT of the head.    LOCATION:     Dictated by (CST): Jesus Lindo MD on 5/13/2024 at 6:11 PM     Finalized by (CST): Jesus Lindo MD on 5/13/2024 at 6:13 PM        Medications   HYDROmorphone (Dilaudid) 1 MG/ML injection 0.5 mg (0.5 mg Intravenous Given 6/10/24 1937)   piperacillin-tazobactam (Zosyn) 4.5 g in dextrose 5% 100 mL IVPB-ADDV (has no administration in time range)   acetaminophen (Tylenol Extra Strength) tab 1,000 mg  (1,000 mg Oral Given 6/10/24 1700)   sodium chloride 0.9% infusion 1,000 mL (1,000 mL Intravenous New Bag 6/10/24 2010)   ondansetron (Zofran) 4 MG/2ML injection 4 mg (4 mg Intravenous Given 6/10/24 2005)   iopamidol 76% (ISOVUE-370) injection for power injector (60 mL Intravenous Given 6/10/24 1959)             MDM      Differential diagnosis includes urinary tract infection, pyelonephritis, acute diverticulitis with perforation but not limited these.  Patient here does have acute diverticulitis with some mass effect on the area of her bladder which may explain her discomfort in this area.  Patient is very uncomfortable at this time the patient be admitted for IV antibiotics and pain control      This note was prepared using Dragon Medical voice recognition dictation software.  As a result errors may occur.  When identified to these areas have been corrected.  While every attempt is made to correct errors during dictation discrepancies may still exist.  Please contact if there are any errors.  Admission disposition: 6/10/2024  8:41 PM                                        Medical Decision Making      Disposition and Plan     Clinical Impression:  1. Acute diverticulitis         Disposition:  Admit  6/10/2024  8:41 pm    Follow-up:  No follow-up provider specified.        Medications Prescribed:  Current Discharge Medication List                            Hospital Problems       Present on Admission  Date Reviewed: 5/16/2024            ICD-10-CM Noted POA    * (Principal) Acute diverticulitis K57.92 6/10/2024 Unknown                     Signed by Tyrone Kumari MD on 6/10/2024  8:42 PM         MEDICATIONS ADMINISTERED IN LAST 1 DAY:  acetaminophen (Tylenol Extra Strength) tab 1,000 mg       Date Action Dose Route User    6/12/2024 2200 Given 1,000 mg Oral Melodie Sheffield RN          clonazePAM (KlonoPIN) tab 0.5 mg       Date Action Dose Route User    6/12/2024 2105 Given 0.5 mg Oral Ara Steel RN           dextrose 5%-sodium chloride 0.9% infusion       Date Action Dose Route User    6/13/2024 0638 New Bag (none) Intravenous Ara Steel RN          DULoxetine (Cymbalta) DR cap 30 mg       Date Action Dose Route User    6/13/2024 0859 Given 30 mg Oral Maci Vivas RN          enoxaparin (Lovenox) 40 MG/0.4ML SUBQ injection 40 mg       Date Action Dose Route User    6/13/2024 0858 Given 40 mg Subcutaneous (Left Upper Abdomen) Maci Vivas RN          fluticasone furoate-vilanterol (Breo Ellipta) 200-25 MCG/ACT inhaler 1 puff       Date Action Dose Route User    6/13/2024 0858 Given 1 puff Inhalation Maci Vivas RN          fluticasone propionate (Flonase) 50 MCG/ACT nasal suspension 2 spray       Date Action Dose Route User    6/13/2024 0858 Given 2 spray Each Nare Maci Vivas RN          mirtazapine (Remeron) tab 30 mg       Date Action Dose Route User    6/12/2024 2105 Given 30 mg Oral Ara Steel RN          ondansetron (Zofran) 4 MG/2ML injection 4 mg       Date Action Dose Route User    6/13/2024 0638 Given 4 mg Intravenous Ara Steel RN    6/12/2024 2106 Given 4 mg Intravenous Ara Steel RN    6/12/2024 1600 Given 4 mg Intravenous Fatoumata Rios RN          piperacillin-tazobactam (Zosyn) 3.375 g in dextrose 5% 100 mL IVPB-ADDV       Date Action Dose Route User    6/13/2024 0902 New Bag 3.375 g Intravenous Maci Vivas RN    6/13/2024 0135 New Bag 3.375 g Intravenous Melodie Sheffield RN    6/12/2024 1723 New Bag 3.375 g Intravenous Fatoumata Rios RN          simethicone (Mylicon) 40 MG/0.6ML oral suspension 40 mg       Date Action Dose Route User    6/13/2024 1326 Given 40 mg Oral Maci Vivas RN    6/12/2024 2200 Given 40 mg Oral Melodie Sheffield RN          multivitamin with minerals (Thera M Plus) tab 1 tablet       Date Action Dose Route User    6/13/2024 0859 Given 1 tablet Oral Maci Vivas RN            Vitals (last day)       Date/Time Temp Pulse  Resp BP SpO2 Weight O2 Device O2 Flow Rate (L/min) [facility]    06/13/24 1340 -- 83 -- -- -- -- -- --     06/13/24 1136 -- 81 -- -- -- -- -- --     06/13/24 1133 98.3 °F (36.8 °C) 75 17 136/51 -- -- None (Room air) --     06/13/24 0815 -- 80 17 139/60 -- -- None (Room air) --     06/13/24 0344 98.4 °F (36.9 °C) 66 20 128/50 96 % -- None (Room air) 0 L/min MA    06/13/24 0120 99.6 °F (37.6 °C) 76 -- -- -- -- -- -- MA    06/12/24 2322 100.7 °F (38.2 °C) 73 20 114/46 95 % -- None (Room air) 0 L/min MA    06/12/24 2155 101.5 °F (38.6 °C) -- -- -- -- -- -- --     06/12/24 1924 99.9 °F (37.7 °C) 77 20 140/50 97 % -- None (Room air) 0 L/min MA    06/12/24 1821 98.6 °F (37 °C) 81 20 122/64 79 % -- None (Room air) --     06/12/24 1130 98.8 °F (37.1 °C) 65 18 117/41 98 % -- None (Room air) --     06/12/24 0750 97.7 °F (36.5 °C) 70 18 107/37 95 % -- None (Room air) --     06/12/24 0538 -- 76 -- -- 97 % -- -- -- MA    06/12/24 0408 102.6 °F (39.2 °C) 86 18 130/48 97 % -- None (Room air) 0 L/min MA            H&P    Chief Complaint: fever        Subjective:  History of Present Illness:      Bibi Diaz is a 78 year old female with PMH hypothydroism, anxiety, asthma, CKD3, COPD, diverticulosis, HTN, HLD who p/w fever and abd pain. Started a couple of days ago. LLQ abd pain. Fever of 102 degrees. Had bleeding before but never infection from divertulosis. A/w n/v. Denies other sx's.           Assessment & Plan:  #acute diverticulitis  -IV abx  -IVF  -NPO  -cxs     #hyponatremia  -IVF     #anemia     #HTN  #HLD  #CKD3  #hypothyroid  #COPD        Plan of care discussed with pt, ed physician    6/11 HOSPITALIST NOTE    Subjective:  Patient still has pain in lower abd. Orthostatics +ve today. Retaining urine    Vital signs:  Temp:  [97.7 °F (36.5 °C)-102 °F (38.9 °C)] 97.7 °F (36.5 °C)  Pulse:  [69-90] 83  Resp:  [16-20] 16  BP: ()/() 109/57  SpO2:  [97 %-100 %] 98 %    Recent Labs   Lab 06/10/24  3668  06/11/24  0748   WBC 10.7 24.0*   HGB 9.8* 10.0*   MCV 80.3 85.6   .0 320.0              Recent Labs   Lab 06/10/24  1652 06/11/24  0748   * 105*   BUN 12 10   CREATSERUM 0.85 0.93   CA 9.2 9.1   ALB 3.4  --    * 128*   K 4.0 4.6   CL 93* 100   CO2 22.0 19.0*   ALKPHO 81  --    AST 15  --    ALT 17  --    BILT 0.6  --    TP 7.0  --              Assessment & Plan:  #Acute Diverticulitis  Cont. IV ABX  Bowel rest  Advance diet possibly tomorrow     #Gastroparesis  On liquid diet PTA  Follows with GI as OP     #Orthostatic Hypotension  IVF and monitor  Hold BP meds     #Hyponatremia  IVF and monitor     #Anemia  Fe studies     #HTN  Hold BP meds     #HLD     #Hypothyroid  #COPD     #Moderate Malnutrition  On remeron     #Metabolic acidosis  monitor     #Advance care planning  Voluntary meeting  17 minutes spent face-to-face with the patient.  ACP discussed, explained and reviewed with them in detail.  The patient is a DNAR/S.  CODE STATUS updated in system.     6/13 HOSPITALIST NOTE    Subjective:  Patient states abd pain is better     Vital signs:  Temp:  [98.3 °F (36.8 °C)-101.5 °F (38.6 °C)] 98.3 °F (36.8 °C)  Pulse:  [66-81] 81  Resp:  [17-20] 17  BP: (114-140)/(46-64) 136/51  SpO2:  [79 %-97 %] 96 %    Recent Labs   Lab 06/10/24  1652 06/11/24  0748 06/12/24  0603 06/13/24  0923   WBC 10.7 24.0* 17.4* 13.1*   HGB 9.8* 10.0* 8.2* 9.0*   MCV 80.3 85.6 83.6 83.6   .0 320.0 261.0 281.0               Recent Labs   Lab 06/10/24  1652 06/11/24  0748 06/12/24  0603   * 105* 147*   BUN 12 10 9   CREATSERUM 0.85 0.93 0.80   CA 9.2 9.1 8.3*   ALB 3.4  --   --    * 128* 134*   K 4.0 4.6 3.5   CL 93* 100 107   CO2 22.0 19.0* 19.0*   ALKPHO 81  --   --    AST 15  --   --    ALT 17  --   --    BILT 0.6  --   --    TP 7.0  --   --              Assessment & Plan:  #Acute Diverticulitis  Cont. IV Zosyn  FLD today     #Gastroparesis  On liquid diet PTA  Follows with GI as OP     #Orthostatic  Hypotension  IVF and monitor  Hold BP meds     #Hyponatremia  IVF and monitor     #Anemia  Fe studies     #HTN  Hold BP meds     #HLD     #Hypothyroid  #COPD     #Moderate Malnutrition  On remeron     #Metabolic acidosis  monitor

## 2024-06-14 VITALS
HEART RATE: 70 BPM | SYSTOLIC BLOOD PRESSURE: 144 MMHG | HEIGHT: 60 IN | BODY MASS INDEX: 18.06 KG/M2 | TEMPERATURE: 97 F | RESPIRATION RATE: 16 BRPM | DIASTOLIC BLOOD PRESSURE: 55 MMHG | WEIGHT: 92 LBS | OXYGEN SATURATION: 97 %

## 2024-06-14 LAB
ANION GAP SERPL CALC-SCNC: 7 MMOL/L (ref 0–18)
BUN BLD-MCNC: 5 MG/DL (ref 9–23)
CALCIUM BLD-MCNC: 8.4 MG/DL (ref 8.5–10.1)
CHLORIDE SERPL-SCNC: 107 MMOL/L (ref 98–112)
CO2 SERPL-SCNC: 22 MMOL/L (ref 21–32)
CREAT BLD-MCNC: 0.69 MG/DL
EGFRCR SERPLBLD CKD-EPI 2021: 88 ML/MIN/1.73M2 (ref 60–?)
ERYTHROCYTE [DISTWIDTH] IN BLOOD BY AUTOMATED COUNT: 14.3 %
GLUCOSE BLD-MCNC: 98 MG/DL (ref 70–99)
HCT VFR BLD AUTO: 25.2 %
HGB BLD-MCNC: 8.5 G/DL
MCH RBC QN AUTO: 28 PG (ref 26–34)
MCHC RBC AUTO-ENTMCNC: 33.7 G/DL (ref 31–37)
MCV RBC AUTO: 82.9 FL
OSMOLALITY SERPL CALC.SUM OF ELEC: 279 MOSM/KG (ref 275–295)
PLATELET # BLD AUTO: 303 10(3)UL (ref 150–450)
POTASSIUM SERPL-SCNC: 3.5 MMOL/L (ref 3.5–5.1)
RBC # BLD AUTO: 3.04 X10(6)UL
SODIUM SERPL-SCNC: 136 MMOL/L (ref 136–145)
WBC # BLD AUTO: 9.2 X10(3) UL (ref 4–11)

## 2024-06-14 PROCEDURE — 99239 HOSP IP/OBS DSCHRG MGMT >30: CPT | Performed by: HOSPITALIST

## 2024-06-14 RX ORDER — AMOXICILLIN AND CLAVULANATE POTASSIUM 400; 57 MG/5ML; MG/5ML
25 POWDER, FOR SUSPENSION ORAL 2 TIMES DAILY
Qty: 140 ML | Refills: 0 | Status: SHIPPED | OUTPATIENT
Start: 2024-06-14 | End: 2024-06-24

## 2024-06-14 RX ORDER — MULTIPLE VITAMINS W/ MINERALS TAB 9MG-400MCG
1 TAB ORAL DAILY
Qty: 30 TABLET | Refills: 1 | Status: SHIPPED | OUTPATIENT
Start: 2024-06-15

## 2024-06-14 NOTE — PLAN OF CARE
Received pt A&Ox4, forgetful.  on RA. NSR on tele. Lovenox for VTE prophylaxis. Tolerating full liquid diet. Voids via BRP. Up with SBA and walker. C/o gas - PRN simethicone provided. Receiving IVF and IV abx. Denies Pain. Plan of care continues, no further needs at this time.     Problem: Patient/Family Goals  Goal: Patient/Family Long Term Goal  Description: Patient's Long Term Goal: discharge    Interventions:  - follow and update POC  - See additional Care Plan goals for specific interventions  Outcome: Progressing  Goal: Patient/Family Short Term Goal  Description: Patient's Short Term Goal:   6/10 NOC: sleep  6/11 AM:  Pain control  6/12 NOC: get klonopin and sleep  6/13 AM: ambulate   6/13 NOC: void in bathroom    Interventions:   - cluster care  - See additional Care Plan goals for specific interventions  Outcome: Progressing     Problem: GENITOURINARY - ADULT  Goal: Absence of urinary retention  Description: INTERVENTIONS:  - Assess patient’s ability to void and empty bladder  - Monitor intake/output and perform bladder scan as needed  - Follow urinary retention protocol/standard of care  - Consider collaborating with pharmacy to review patient's medication profile  - Implement strategies to promote bladder emptying  Outcome: Progressing

## 2024-06-14 NOTE — PROGRESS NOTES
NURSING DISCHARGE NOTE    Discharged Home via Wheelchair.  Accompanied by Support staff  Belongings Taken by patient/family.      Discharge instructions explained to patient at bedside. IV removed. Patient's belongings taken by patient. No further questions at this time.

## 2024-06-14 NOTE — DIETARY MALNUTRITION NOTE
[facility]   part of [facility]  NUTRITION ASSESSMENT    Pt meets moderate malnutrition criteria at this time.    CRITERIA FOR MALNUTRITION DIAGNOSIS:  Criteria for non-severe malnutrition diagnosis: chronic illness related to energy intake less than75% for greater than 1 month, body fat mild depletion, and muscle mass mild depletion    NUTRITION INTERVENTION:    RD nutrition Care Plan- Initiated ONS (oral nutritional supplements), Provided written information for optimizing nutrient intake at home, Ordered multivitamin, and See RD nutrition assessment for additional recommendations  Meal and Snacks - Continue Low Fiber diet as tolerated; monitor patient po intake. Encourage adequate po of appropriate diet.  Medical Food Supplements - Continue [place] chocolate daily and Ensure Plus HP chocolate daily. Rationale/use for oral supplements discussed.  Nutrition Education - High Calorie High Protein Nutrition Therapy and Recipes. Pt/family receptive to education, no barriers noted. Handouts and Ensure coupons provided.   Vitamin and Mineral Supplements - Continue Multivitamin with minerals    PATIENT STATUS: 6/14: Visited pt at bedside. She reports she had a couple bites of bfast and ordered protein shake for lunch. Denies nausea, vomiting and diarrhea but reports still no BM since 6/11. Continued to encourage PO and ONS intake; all questions answered at this time. Plan to discharge home today.    6/11: 78 year old female admitted on 6/10 presents with acute diverticulitis. Pt screened d/t low BMI and MST score 2. Visited pt at bedside. Pt reports her appetite has been \"okay\" but is variable depending on how she feels. On most days, she eats cream of wheat for bfast, [place] for lunch and Ensure for dinner (pt follows mostly full liquids diet d/t hx gastroparesis). She reports her wt has been stable recently but noted it is overall decreased over the past year. Reports having nausea this morning and  abdominal pain yesterday - both of which are improved. Still having constipation with last BM 3 days ago (which was diarrhea). No chewing or swallowing difficulties and NKFA. Discussed adding ONS once diet advanced and pt agreeable. Also provided high kcal/pro diet handout for discharge. All questions answered at this time.    PMH: hypothydroism, anxiety, asthma, CKD3, COPD, diverticulosis, HTN, HLD     ANTHROPOMETRICS:  Ht: 152.4 cm (5')  Wt: 41.7 kg (92 lb).   BMI: Body mass index is 17.97 kg/m².  IBW: 45.5 kg    WEIGHT HISTORY: Per chart, pt with ~13 lb wt loss x 1 year (12%, not significant per standards). Wt stable over past 5 months.  Patient Weight(s) for the past 336 hrs:   Weight   06/10/24 2157 41.7 kg (92 lb)   06/10/24 1630 41.7 kg (92 lb)       Wt Readings from Last 25 Encounters:   06/10/24 41.7 kg (92 lb)   05/16/24 41.3 kg (91 lb)   05/14/24 41.3 kg (91 lb)   05/01/24 41.3 kg (91 lb)   05/02/24 42.2 kg (93 lb)   04/30/24 41.3 kg (91 lb)   04/18/24 41.3 kg (91 lb)   04/04/24 41.3 kg (91 lb)   03/23/24 41.7 kg (91 lb 14.9 oz)   03/14/24 42 kg (92 lb 9.6 oz)   03/13/24 44 kg (97 lb)   03/06/24 41.7 kg (92 lb)   03/04/24 42.5 kg (93 lb 12.8 oz)   02/27/24 41.7 kg (92 lb)   02/19/24 42.1 kg (92 lb 12.8 oz)   02/16/24 42.4 kg (93 lb 6.4 oz)   01/22/24 41.3 kg (91 lb)   01/09/24 41.6 kg (91 lb 12.8 oz)   12/11/23 44.9 kg (99 lb)   10/03/23 43.7 kg (96 lb 6.4 oz)   09/14/23 45.5 kg (100 lb 6.4 oz)   08/03/23 47.9 kg (105 lb 9.6 oz)   07/07/23 47.6 kg (105 lb)   07/03/23 47.2 kg (104 lb)   05/23/23 49 kg (108 lb)        NUTRITION:  Diet:       Procedures    Low Fiber/Soft diet Low Fiber/Soft; Is Patient on Accuchecks? No        Percent Meals Eaten (last 3 days)       Date/Time Percent Meals Eaten (%)    06/11/24 1800 0 %    06/13/24 0815 100 %            Food Allergies: No  Cultural/Ethnic/Rastafarian Preferences Addressed: Yes    GI SYSTEM REVIEW: constipation/gas; last BM 6/11  Skin/Wounds: WNL    NUTRITION  RELATED PHYSICAL FINDINGS:     1. Body Fat/Muscle Mass: moderate depletion body fat Buccal fat pad and Triceps and moderate muscle depletion Temple region, Clavicle region, Thigh region, and Calf region     2. Fluid Accumulation: none per RN documentation     NUTRITION PRESCRIPTION: 41.7 kg  Calories: 1313-5961 calories/day (30-35 kcal/kg)  Protein: 50-63 grams protein/day (1.2-1.5 gm/kg)  Fluid: ~1 ml/kcal or per MD discretion    NUTRITION DIAGNOSIS/PROBLEM:  Malnutrition related to altered GI function/GI disorder and insufficient appetite resulting in inadequate nutrition intake as evidenced by documented/reported insufficient oral intake, documented/reported unintentional weight loss, loss of fat mass, loss of muscle mass, and low BMI    MONITOR AND EVALUATE/NUTRITION GOALS:  PO intake of 75% of meals TID - New  PO intake of 75% of oral nutrition supplement/s - New  Weight stable within 1 to 2 lbs during admission - Ongoing  Return to PO intake or advance diet in 24-48 hrs - Resolved      MEDICATIONS:  Remeron, multivitamin with minerals, zofran QID, protonix, abx, prn simethicone    LABS:  Na 134    Pt is at Moderate nutrition risk    Halie Muniz RD, LDN, CNSC  Clinical Dietitian  Spectra: [phone]

## 2024-06-14 NOTE — PROGRESS NOTES
[facility]   part of [facility]     Hospitalist Progress Note     Bibi Diaz Patient Status:  Observation    1945 MRN [id number]   Location [facility] 5NW-A Attending Win Brothers MD   Hosp Day # 3 PCP Rick Shannon DO     Chief Complaint: abd pain    Subjective:     Patient states abd pain is better but she feels weak and tired  Encouraged to sit up in the chair and walk in the hallways    Objective:    Review of Systems:   ROS completed; pertinent positive and negatives stated in subjective.    Vital signs:  Temp:  [97.3 °F (36.3 °C)-99.2 °F (37.3 °C)] 97.3 °F (36.3 °C)  Pulse:  [62-83] 70  Resp:  [16-18] 16  BP: (114-152)/(48-82) 144/55  SpO2:  [93 %-99 %] 97 %    Physical Exam:    General: No acute distress  Respiratory: Clear to auscultation bilaterally  Cardiovascular: S1, S2.  Abdomen: Soft  Neuro: No new focal deficits      Diagnostic Data:    Labs:  Recent Labs   Lab 06/10/24  1652 24  0748 24  0603 24  0923   WBC 10.7 24.0* 17.4* 13.1*   HGB 9.8* 10.0* 8.2* 9.0*   MCV 80.3 85.6 83.6 83.6   .0 320.0 261.0 281.0       Recent Labs   Lab 06/10/24  1652 24  0748 24  0603   * 105* 147*   BUN 12 10 9   CREATSERUM 0.85 0.93 0.80   CA 9.2 9.1 8.3*   ALB 3.4  --   --    * 128* 134*   K 4.0 4.6 3.5   CL 93* 100 107   CO2 22.0 19.0* 19.0*   ALKPHO 81  --   --    AST 15  --   --    ALT 17  --   --    BILT 0.6  --   --    TP 7.0  --   --        Estimated Creatinine Clearance: 37.5 mL/min (based on SCr of 0.8 mg/dL).     Recent Labs   Lab 06/10/24  165   TROPHS 3       No results for input(s): \"PTP\", \"INR\" in the last 168 hours.           Imaging: Imaging data reviewed in Epic.    Medications:    ondansetron  4 mg Intravenous TID AC&HS    multivitamin with minerals  1 tablet Oral Daily    DULoxetine  30 mg Oral Daily    fluticasone propionate  2 spray Each Nare Daily    mirtazapine  30 mg Oral Nightly    pantoprazole  40 mg Oral Daily     fluticasone furoate-vilanterol  1 puff Inhalation Daily    enoxaparin  40 mg Subcutaneous Daily    piperacillin-tazobactam  3.375 g Intravenous Q8H       Assessment & Plan:     #Acute Diverticulitis  Cont. IV Zosyn  FLD today    #Gastroparesis  On liquid diet PTA  Follows with GI as OP    #Orthostatic Hypotension  IVF and monitor  Hold BP meds    #Hyponatremia  IVF and monitor    #Anemia  Fe studies    #HTN  Hold BP meds    #HLD    #Hypothyroid  #COPD    #Moderate Malnutrition  On remeron    #Metabolic acidosis  monitor    .              Supplementary Documentation:     Quality:    DVT Mechanical Prophylaxis:   SCDs,    DVT Pharmacologic Prophylaxis   Medication    enoxaparin (Lovenox) 40 MG/0.4ML SUBQ injection 40 mg                Code Status: DNAR/Selective Treatment  Flood: External urinary catheter in place  Flood Duration (in days):   Central line:    WOLF:       Discharge is dependent on: clinical stability  At this point Ms. Diaz is expected to be discharge to: home    The 21st Century Cures Act makes medical notes like these available to patients in the interest of transparency. Please be advised this is a medical document. Medical documents are intended to carry relevant information, facts as evident, and the clinical opinion of the practitioner. The medical note is intended as peer to peer communication and may appear blunt or direct. It is written in medical language and may contain abbreviations or verbiage that are unfamiliar.     Dietitian Malnutrition Assessment        Evaluation for Malnutrition: Criteria for non-severe malnutrition diagnosis-         chronic illness related to   Energy intake less than 75% for greater than 1 month., Body fat mild depletion., Muscle mass mild depletion.       RD Malnutrition Care Plan: Intiated ONS (oral nutritional supplements)., Provided written information for optimizing nutrient intake at home., Ordered multivitamin., See RD nutrition assessment for  additional recommendations.    Body Fat/Muscle Mass:          Physician Assessment    Patient has a diagnosis of moderate malnutrition

## 2024-06-14 NOTE — CM/SW NOTE
06/12/24 1400   CM/SW Referral Data   Referral Source Social Work (self-referral)   Reason for Referral Discharge planning   Informant Patient;EMR   Patient Info   Patient's Current Mental Status at Time of Assessment Alert;Oriented   Patient's Home Environment Condo/Apt no elevator   Number of Levels in Home 1   Patient lives with Alone   Discharge Needs   Anticipated D/C needs Home health care     HOME SITUATION  Type of Home: Apartment   Home Layout: One level  Stairs to Enter : 0     Lives With: Alone  Drives: No  Patient Owned Equipment: Rolling walker  Patient Regularly Uses: Glasses     Prior Level of [place]: Pt reports typically ind with all self care and mobility. Had used walker for a short time after admission in March 2024, but was no longer using. Pt reports had fall 6/7/24 without injury stating that she took too many pain meds and become dizzy, was able to get up without assist.  (Per PT evaluation)      Patient is a 79 y/o female who admitted with Acute diverticulitis.  Anticipated therapy need: Home with Home Healthcare    Met with patient, who was alert and oriented, to discuss discharge planning. She lives alone in an apartment. Discussed HH, she initially declined. Encouraged HH and she would like to think about it. Provided accepting HH list at bedside. Await HH decision.    SW/CM to remain available for support and/or discharge planning.    Janice Paredes EDITH  Discharge Planner  [phone]    
Anticipated therapy need: Home with Home Healthcare    SW requested department  ([facility]) to initiate [facility] referral for HHC.     SW/CM to remain available for support and/or discharge planning.    KELSEY Beasley  Discharge Planner  [phone]    
Department  notified of request for HHC, aidin referrals started. Assigned CM/SW to follow up with pt/family on further discharge planning.     Erika Caldwell, DSC    
How Severe Is Your Skin Lesion?: mild
Met with patient to follow up about HH. She declines HH. Encouraged her to follow up with PCP if she would like HH after discharge.    SW/CM to remain available for support and/or discharge planning.    KELSEY Beasley  Discharge Planner  [phone]    
Has Your Skin Lesion Been Treated?: not been treated
Is This A New Presentation, Or A Follow-Up?: Growth
How Severe Is Your Skin Lesion?: moderate
Is This A New Presentation, Or A Follow-Up?: Skin Lesions
Which Family Member (Optional)?: Father

## 2024-06-17 ENCOUNTER — HOSPITAL ENCOUNTER (INPATIENT)
Facility: HOSPITAL | Age: 79
LOS: 6 days | Discharge: NURSING, REHAB OR LONG-TERM CARE | DRG: 391 | End: 2024-06-24
Attending: EMERGENCY MEDICINE | Admitting: STUDENT IN AN ORGANIZED HEALTH CARE EDUCATION/TRAINING PROGRAM

## 2024-06-17 ENCOUNTER — PATIENT OUTREACH (OUTPATIENT)
Dept: CASE MANAGEMENT | Age: 79
End: 2024-06-17

## 2024-06-17 ENCOUNTER — APPOINTMENT (OUTPATIENT)
Dept: CT IMAGING | Facility: HOSPITAL | Age: 79
DRG: 391 | End: 2024-06-17
Attending: EMERGENCY MEDICINE

## 2024-06-17 ENCOUNTER — APPOINTMENT (OUTPATIENT)
Dept: PHYSICAL THERAPY | Age: 79
End: 2024-06-17
Attending: PHYSICAL MEDICINE & REHABILITATION
Payer: MEDICARE

## 2024-06-17 DIAGNOSIS — F32.A ANXIETY AND DEPRESSION: ICD-10-CM

## 2024-06-17 DIAGNOSIS — F41.9 ANXIETY AND DEPRESSION: ICD-10-CM

## 2024-06-17 DIAGNOSIS — K57.20 COLONIC DIVERTICULAR ABSCESS: ICD-10-CM

## 2024-06-17 DIAGNOSIS — R53.1 WEAKNESS GENERALIZED: Primary | ICD-10-CM

## 2024-06-17 LAB
ALBUMIN SERPL-MCNC: 2.6 G/DL (ref 3.4–5)
ALBUMIN/GLOB SERPL: 0.7 {RATIO} (ref 1–2)
ALP LIVER SERPL-CCNC: 73 U/L
ALT SERPL-CCNC: 15 U/L
ANION GAP SERPL CALC-SCNC: 7 MMOL/L (ref 0–18)
AST SERPL-CCNC: 21 U/L (ref 15–37)
BASOPHILS # BLD AUTO: 0.04 X10(3) UL (ref 0–0.2)
BASOPHILS NFR BLD AUTO: 0.4 %
BILIRUB SERPL-MCNC: 0.3 MG/DL (ref 0.1–2)
BILIRUB UR QL STRIP.AUTO: NEGATIVE
BUN BLD-MCNC: 7 MG/DL (ref 9–23)
CALCIUM BLD-MCNC: 9 MG/DL (ref 8.5–10.1)
CHLORIDE SERPL-SCNC: 108 MMOL/L (ref 98–112)
CLARITY UR REFRACT.AUTO: CLEAR
CO2 SERPL-SCNC: 26 MMOL/L (ref 21–32)
CREAT BLD-MCNC: 0.64 MG/DL
EGFRCR SERPLBLD CKD-EPI 2021: 90 ML/MIN/1.73M2 (ref 60–?)
EOSINOPHIL # BLD AUTO: 0.16 X10(3) UL (ref 0–0.7)
EOSINOPHIL NFR BLD AUTO: 1.6 %
ERYTHROCYTE [DISTWIDTH] IN BLOOD BY AUTOMATED COUNT: 14.6 %
GLOBULIN PLAS-MCNC: 3.6 G/DL (ref 2.8–4.4)
GLUCOSE BLD-MCNC: 92 MG/DL (ref 70–99)
GLUCOSE UR STRIP.AUTO-MCNC: NORMAL MG/DL
HCT VFR BLD AUTO: 26.5 %
HGB BLD-MCNC: 8.9 G/DL
IMM GRANULOCYTES # BLD AUTO: 0.07 X10(3) UL (ref 0–1)
IMM GRANULOCYTES NFR BLD: 0.7 %
KETONES UR STRIP.AUTO-MCNC: NEGATIVE MG/DL
LEUKOCYTE ESTERASE UR QL STRIP.AUTO: NEGATIVE
LIPASE SERPL-CCNC: 17 U/L (ref 13–75)
LYMPHOCYTES # BLD AUTO: 1.05 X10(3) UL (ref 1–4)
LYMPHOCYTES NFR BLD AUTO: 10.8 %
MCH RBC QN AUTO: 27.9 PG (ref 26–34)
MCHC RBC AUTO-ENTMCNC: 33.6 G/DL (ref 31–37)
MCV RBC AUTO: 83.1 FL
MONOCYTES # BLD AUTO: 0.95 X10(3) UL (ref 0.1–1)
MONOCYTES NFR BLD AUTO: 9.8 %
NEUTROPHILS # BLD AUTO: 7.45 X10 (3) UL (ref 1.5–7.7)
NEUTROPHILS # BLD AUTO: 7.45 X10(3) UL (ref 1.5–7.7)
NEUTROPHILS NFR BLD AUTO: 76.7 %
NITRITE UR QL STRIP.AUTO: NEGATIVE
OSMOLALITY SERPL CALC.SUM OF ELEC: 290 MOSM/KG (ref 275–295)
PH UR STRIP.AUTO: 6.5 [PH] (ref 5–8)
PLATELET # BLD AUTO: 386 10(3)UL (ref 150–450)
POTASSIUM SERPL-SCNC: 3.4 MMOL/L (ref 3.5–5.1)
PROT SERPL-MCNC: 6.2 G/DL (ref 6.4–8.2)
PROT UR STRIP.AUTO-MCNC: NEGATIVE MG/DL
RBC # BLD AUTO: 3.19 X10(6)UL
RBC UR QL AUTO: NEGATIVE
SODIUM SERPL-SCNC: 141 MMOL/L (ref 136–145)
SP GR UR STRIP.AUTO: <1.005 (ref 1–1.03)
UROBILINOGEN UR STRIP.AUTO-MCNC: NORMAL MG/DL
WBC # BLD AUTO: 9.7 X10(3) UL (ref 4–11)

## 2024-06-17 PROCEDURE — 99223 1ST HOSP IP/OBS HIGH 75: CPT | Performed by: HOSPITALIST

## 2024-06-17 PROCEDURE — 70450 CT HEAD/BRAIN W/O DYE: CPT | Performed by: EMERGENCY MEDICINE

## 2024-06-17 PROCEDURE — 74177 CT ABD & PELVIS W/CONTRAST: CPT | Performed by: EMERGENCY MEDICINE

## 2024-06-17 RX ORDER — SODIUM CHLORIDE 9 MG/ML
INJECTION, SOLUTION INTRAVENOUS CONTINUOUS
Status: DISCONTINUED | OUTPATIENT
Start: 2024-06-17 | End: 2024-06-19

## 2024-06-17 NOTE — ED PROVIDER NOTES
Patient Seen in: [facility] Emergency Department      History     Chief Complaint   Patient presents with    Fatigue    Fall     Stated Complaint: fall, weakness    Subjective:   HPI    Patient is a 78-year-old female presenting for evaluation of fall out of bed and weakness.  Patient was recently admitted here for which she is as well as UTI although per discharge summary was for diverticulitis.  She states she lives alone although family members live in the same apartment complex.  She states she still has a very poor appetite and has been very weak.  Has been sleeping a lot because she is just so fatigued.  No fevers, no vomiting just not eating.  Has been urinating frequently and she is concerned she may still have a UTI although no dysuria or hematuria.  Today she remembers sleeping in bed, next thing she knew she was on the floor.  Does not think she hit her head and states nothing hurts.  She thinks she was try to get up to go to the bathroom although she does not completely remember.  She was able to call her family they came and helped her up and put her back to bed, a little bit later she tried to get up again but was still very weak so she called the paramedics.    Objective:   No pertinent past medical history.            No pertinent past surgical history.              No pertinent social history.            Review of Systems    Positive for stated complaint: fall, weakness  Other systems are as noted in HPI.  Constitutional and vital signs reviewed.      All other systems reviewed and negative except as noted above.    Physical Exam     ED Triage Vitals   BP 06/17/24 1704 146/62   Pulse 06/17/24 1704 57   Resp 06/17/24 1704 18   Temp 06/17/24 1702 96.9 °F (36.1 °C)   Temp src 06/17/24 1702 Temporal   SpO2 06/17/24 1704 98 %   O2 Device 06/17/24 1704 None (Room air)       Current Vitals:   Vital Signs  BP: 113/56  Pulse: 51  Resp: 17  Temp: 96.9 °F (36.1 °C)  Temp src: Temporal  MAP (mmHg):  67    Oxygen Therapy  SpO2: 98 %  O2 Device: None (Room air)            Physical Exam  Vitals and nursing note reviewed.   Constitutional:       Appearance: She is well-developed.   HENT:      Head: Normocephalic and atraumatic.   Eyes:      Conjunctiva/sclera: Conjunctivae normal.      Pupils: Pupils are equal, round, and reactive to light.   Neck:      Comments: No midline cervical spine tenderness to palpation.  Cardiovascular:      Rate and Rhythm: Normal rate and regular rhythm.      Heart sounds: Normal heart sounds.   Pulmonary:      Effort: Pulmonary effort is normal.      Breath sounds: Normal breath sounds.   Abdominal:      General: Bowel sounds are normal.      Palpations: Abdomen is soft.   Musculoskeletal:      Cervical back: Normal range of motion and neck supple.      Comments: No areas of focal tenderness, obvious deformity or dislocation.   Skin:     General: Skin is warm and dry.   Neurological:      Mental Status: She is alert and oriented to person, place, and time.               ED Course     Labs Reviewed   COMP METABOLIC PANEL (14) - Abnormal; Notable for the following components:       Result Value    Potassium 3.4 (*)     BUN 7 (*)     Total Protein 6.2 (*)     Albumin 2.6 (*)     A/G Ratio 0.7 (*)     All other components within normal limits   URINALYSIS WITH CULTURE REFLEX - Abnormal; Notable for the following components:    Spec Gravity <1.005 (*)     All other components within normal limits   CBC W/ DIFFERENTIAL - Abnormal; Notable for the following components:    RBC 3.19 (*)     HGB 8.9 (*)     HCT 26.5 (*)     All other components within normal limits   LIPASE - Normal   CBC WITH DIFFERENTIAL WITH PLATELET    Narrative:     The following orders were created for panel order CBC With Differential With Platelet.  Procedure                               Abnormality         Status                     ---------                               -----------         ------                      CBC W/ DIFFERENTIAL[[id number]]          Abnormal            Final result                 Please view results for these tests on the individual orders.   RAINBOW DRAW BLUE             CT BRAIN OR HEAD (64470)    Result Date: 6/17/2024  PROCEDURE:  CT BRAIN OR HEAD (40509)  COMPARISON:  [facility] , CT, CT BRAIN OR HEAD (06213), 5/13/2024, 5:57 PM.  INDICATIONS:  fall, weakness  TECHNIQUE:  Noncontrast CT scanning is performed through the brain. Dose reduction techniques were used. Dose information is transmitted to the ACR (American College of Radiology) NRDR (National Radiology Data Registry) which includes the Dose Index Registry.  PATIENT STATED HISTORY: (As transcribed by Technologist)  Patient has weakness and fell    FINDINGS:   VENTRICLES/SULCI:   Ventricles and sulci are prominent indicating atrophy.  INTRACRANIAL:  There are no abnormal extraaxial fluid collections.  There is no midline shift.  There are no acute appearing intraparenchymal brain abnormalities.  There is nothing specific for acute territorial infarct.  There is no hemorrhage or mass lesion.  There is chronic microvascular ischemic white matter disease in the cerebrum bilaterally.  SINUSES:  No acute sinusitis.   MASTOIDS:  The mastoids are clear.  SKULL:  No evidence for fracture or acute osseous abnormality.  OTHER:  None.            CONCLUSION:  Chronic changes in the brain, as described, but no acute intracranial bleed, or other acute intracranial process identified.    LOCATION:  [facility]   Dictated by (CST): Colten Deshpande MD on 6/17/2024 at 7:23 PM     Finalized by (CST): Colten Deshpande MD on 6/17/2024 at 7:25 PM       CT ABDOMEN+PELVIS(CONTRAST ONLY)(CPT=74177)    Result Date: 6/10/2024  PROCEDURE:  CT ABDOMEN+PELVIS (CONTRAST ONLY) (CPT=74177)  COMPARISON:  [facility] , CT, CT ABDOMEN+PELVIS(CONTRAST ONLY)(CPT=74177), 5/13/2024, 6:01 PM.  [facility] , CT, CTA ABD/PEL (CPT=74174), 3/22/2024, 2:29 PM.  [facility] , CT, CTA ABD/PEL (NXP=37602),  3/21/2024, 6:04 PM.  [facility], CT, CT ABDOMEN+PELVIS(CONTRAST ONLY)(CPT=74177), 2/27/2024, 9:28 AM.  INDICATIONS:  abd pain, feels like something is in her vagina and cant pee  TECHNIQUE:  CT scanning was performed from the dome of the diaphragm to the pubic symphysis with non-ionic intravenous contrast material. Post contrast coronal MPR imaging was performed.  Dose reduction techniques were used. Dose information is transmitted to the ACR (American College of Radiology) NRDR (National Radiology Data Registry) which includes the Dose Index Registry.  PATIENT STATED HISTORY:(As transcribed by Technologist)  c/o pelvic pain, urinary retention, and weakness since yesterday.   CONTRAST USED:  60cc of Isovue 370  FINDINGS:  LIVER:  No enlargement, atrophy, abnormal density, or significant focal lesion.  BILIARY:  No visible dilatation or calcification.  PANCREAS:  No lesion, fluid collection, ductal dilatation, or atrophy.  SPLEEN:  No enlargement or focal lesion.  KIDNEYS:  No mass, obstruction, or calcification.  ADRENALS:  No mass or enlargement.  AORTA/VASCULAR:  No aneurysm or dissection.  There is moderate to severe atherosclerotic plaque at the origin of the SMA.  There is moderate plaque at the origin of the celiac axis.  There is mild plaque at the origin of the REX. RETROPERITONEUM:  No mass or adenopathy.  BOWEL/MESENTERY:  There is significant inflammatory changes surrounding the sigmoid colon and regions of several diverticula consistent with acute diverticulitis.  There is no definite drainable abscess collection at this time period the inflammatory change does abut the left aspect of the urinary bladder and causes some deviation of the bladder to the right of midline.  Main of the left colon demonstrates several diverticula.  There is moderate stool throughout the colon consistent constipation.  The appendix is unremarkable.  Visualized small bowel is within normal limits.  Postsurgical changes of  Nissen. ABDOMINAL WALL:  No mass or hernia.  URINARY BLADDER:  No visible focal wall thickening, lesion, or calculus.  PELVIC NODES:  No adenopathy.  PELVIC ORGANS:  No visible mass.  Pelvic organs appropriate for patient age.  BONES:  No bony lesion or fracture.  Mild to moderate degenerative changes of the spine. LUNG BASES:  No visible pulmonary or pleural disease.             CONCLUSION:   Acute diverticulitis involving the sigmoid colon without a drainable abscess at this time.  There is mild mass effect on the urinary bladder due to the inflammation.   LOCATION:  [facility]   Dictated by (CST): Saurabh Can MD on 6/10/2024 at 8:07 PM     Finalized by (CST): Saurabh Can MD on 6/10/2024 at 8:13 PM       XR CHEST AP PORTABLE  (CPT=71045)    Result Date: 6/10/2024  PROCEDURE:  XR CHEST AP PORTABLE  (CPT=71045)  TECHNIQUE:  AP chest radiograph was obtained.  COMPARISON:  [facility] , CT, CT ABDOMEN+PELVIS(CONTRAST ONLY)(CPT=74177), 5/13/2024, 6:01 PM.  [facility] , XR, XR CHEST AP PORTABLE  (CPT=71045), 11/03/2022, 10:47 AM.  INDICATIONS:  abd pain, feels like something is in her vagina and cant pee  PATIENT STATED HISTORY: (As transcribed by Technologist)     FINDINGS:  The heart is normal in size.  The lungs are clear.  There are no pleural effusions.  The bones are unremarkable.            CONCLUSION:  No acute disease.    LOCATION:  [facility]      Dictated by (CST): Saurabh Can MD on 6/10/2024 at 5:33 PM     Finalized by (CST): Saurabh Can MD on 6/10/2024 at 5:34 PM               MDM      78-year-old female presenting with chief complaint of ongoing anorexia, fatigue and weakness.  Recent admission for diverticulitis but states she is still not eating and just so weak.  Apparently fell out of bed today, she thinks she was turning up to over the bathroom and just was too weak to get up and ended up on the floor.  She does not have a headache and denies any pain from this.  Very flat affect, speaking slowly and very  quietly here.  Does not appear to be in distress.  Labs ordered to evaluate for infection, dehydration, electrolyte abnormality.  Although she does not think she hit her head we will get a CT brain to evaluate for intracranial injury.  Admission disposition: 6/17/2024  9:06 PM           Updated 8:10 PM.  No leukocytosis.  Chronic anemia which is at baseline.  No dehydration or electrolyte abnormality.  No UTI.  CT brain is normal.  No indication for medical admission.  Attempted to road test here and the patient was unable to even sit up in the bed independently.  She lives alone and cannot go home.  Discussed with the  as far as possible placement options and she will investigate.      Update at 9:05 PM.  Case discussed with .  Unable to place anywhere tonight that she would require proper authorization.  She is not safe to go home, family states they cannot help her there so unfortunately she is going to have to be admitted at least for tonight.        Past Medical History-, high cholesterol, CKD    Differential diagnosis before testing included dehydration, infection, stroke    Co-morbidities that add to the complexity of management include: None    Testing ordered during this visit included labs, UA, CT    Radiographic images  I personally reviewed the radiographs and my individual interpretation shows no ICH or stroke  I also reviewed the official reports that showed no ICH or stroke    External chart review showed reviewed recent discharge summary      Discussion of management with hospitalist, case management            Disposition:    Admission  I have discussed with the patient the results of test, differential diagnosis, and treatment plan. They expressed clear understanding of these instructions and agrees to the plan provided.                                Medical Decision Making      Disposition and Plan     Clinical Impression:  1. Weakness generalized          Disposition:  Admit  6/17/2024  9:06 pm    Follow-up:  No follow-up provider specified.        Medications Prescribed:  Current Discharge Medication List                            Hospital Problems       Present on Admission  Date Reviewed: 5/16/2024            ICD-10-CM Noted POA    * (Principal) Weakness generalized R53.1 6/17/2024 Unknown

## 2024-06-17 NOTE — PROGRESS NOTES
LM for pt to call [facility] for TCM since discharge. NCM phone number was provided for pt to call back.    TCC contact information was provided for pt to call back as well.

## 2024-06-17 NOTE — PROGRESS NOTES
Attempted [facility] monthly outreach ,  left message for patient to call back ,can be reached at  [phone]. Will try back at a later time.      Medical record reviewed including recent office visits and test results.    Chart review - 3 min  Time with patient - 2 min  Total time - 5 min

## 2024-06-17 NOTE — ED INITIAL ASSESSMENT (HPI)
Pt from home by EMS after unwitnessed fall from bed. Family then checked on patient later in morning and found her weaker and less responsive than her norm. Recent admission and tx for some kind of abdominal issue that she did not tolerate medication well for. Pt was unable to tolerate c-collar by EMS.

## 2024-06-18 ENCOUNTER — APPOINTMENT (OUTPATIENT)
Dept: CT IMAGING | Facility: HOSPITAL | Age: 79
DRG: 391 | End: 2024-06-18

## 2024-06-18 PROBLEM — K57.32 SIGMOID DIVERTICULITIS: Status: ACTIVE | Noted: 2024-06-18

## 2024-06-18 PROBLEM — Z87.19 HISTORY OF REPAIR OF HIATAL HERNIA: Status: ACTIVE | Noted: 2024-01-01

## 2024-06-18 PROBLEM — K57.20 COLONIC DIVERTICULAR ABSCESS: Status: ACTIVE | Noted: 2024-06-18

## 2024-06-18 PROBLEM — Z87.19 HISTORY OF REPAIR OF HIATAL HERNIA: Status: ACTIVE | Noted: 2024-06-18

## 2024-06-18 PROBLEM — Z98.890 HISTORY OF REPAIR OF HIATAL HERNIA: Status: ACTIVE | Noted: 2024-01-01

## 2024-06-18 PROBLEM — Z98.890 HISTORY OF REPAIR OF HIATAL HERNIA: Status: ACTIVE | Noted: 2024-06-18

## 2024-06-18 PROBLEM — K57.32 SIGMOID DIVERTICULITIS: Status: ACTIVE | Noted: 2024-01-01

## 2024-06-18 PROBLEM — K57.20 COLONIC DIVERTICULAR ABSCESS: Status: ACTIVE | Noted: 2024-01-01

## 2024-06-18 LAB
ALBUMIN SERPL-MCNC: 2.5 G/DL (ref 3.4–5)
ALBUMIN/GLOB SERPL: 0.8 {RATIO} (ref 1–2)
ALP LIVER SERPL-CCNC: 68 U/L
ALT SERPL-CCNC: 14 U/L
ANION GAP SERPL CALC-SCNC: 8 MMOL/L (ref 0–18)
AST SERPL-CCNC: 18 U/L (ref 15–37)
BASOPHILS # BLD AUTO: 0.03 X10(3) UL (ref 0–0.2)
BASOPHILS NFR BLD AUTO: 0.2 %
BILIRUB SERPL-MCNC: 0.3 MG/DL (ref 0.1–2)
BUN BLD-MCNC: 5 MG/DL (ref 9–23)
CALCIUM BLD-MCNC: 8.5 MG/DL (ref 8.5–10.1)
CHLORIDE SERPL-SCNC: 113 MMOL/L (ref 98–112)
CO2 SERPL-SCNC: 23 MMOL/L (ref 21–32)
CREAT BLD-MCNC: 0.66 MG/DL
EGFRCR SERPLBLD CKD-EPI 2021: 89 ML/MIN/1.73M2 (ref 60–?)
EOSINOPHIL # BLD AUTO: 0.12 X10(3) UL (ref 0–0.7)
EOSINOPHIL NFR BLD AUTO: 1 %
ERYTHROCYTE [DISTWIDTH] IN BLOOD BY AUTOMATED COUNT: 14.9 %
GLOBULIN PLAS-MCNC: 3.3 G/DL (ref 2.8–4.4)
GLUCOSE BLD-MCNC: 96 MG/DL (ref 70–99)
HCT VFR BLD AUTO: 27.1 %
HGB BLD-MCNC: 8.7 G/DL
IMM GRANULOCYTES # BLD AUTO: 0.09 X10(3) UL (ref 0–1)
IMM GRANULOCYTES NFR BLD: 0.7 %
INR BLD: 1.35 (ref 0.8–1.2)
LYMPHOCYTES # BLD AUTO: 0.37 X10(3) UL (ref 1–4)
LYMPHOCYTES NFR BLD AUTO: 3 %
MAGNESIUM SERPL-MCNC: 2.3 MG/DL (ref 1.6–2.6)
MCH RBC QN AUTO: 27.4 PG (ref 26–34)
MCHC RBC AUTO-ENTMCNC: 32.1 G/DL (ref 31–37)
MCV RBC AUTO: 85.2 FL
MONOCYTES # BLD AUTO: 0.51 X10(3) UL (ref 0.1–1)
MONOCYTES NFR BLD AUTO: 4.2 %
NEUTROPHILS # BLD AUTO: 11.04 X10 (3) UL (ref 1.5–7.7)
NEUTROPHILS # BLD AUTO: 11.04 X10(3) UL (ref 1.5–7.7)
NEUTROPHILS NFR BLD AUTO: 90.9 %
OSMOLALITY SERPL CALC.SUM OF ELEC: 295 MOSM/KG (ref 275–295)
PLATELET # BLD AUTO: 423 10(3)UL (ref 150–450)
POTASSIUM SERPL-SCNC: 3.3 MMOL/L (ref 3.5–5.1)
PROT SERPL-MCNC: 5.8 G/DL (ref 6.4–8.2)
PROTHROMBIN TIME: 16.8 SECONDS (ref 11.6–14.8)
RBC # BLD AUTO: 3.18 X10(6)UL
SODIUM SERPL-SCNC: 144 MMOL/L (ref 136–145)
WBC # BLD AUTO: 12.2 X10(3) UL (ref 4–11)

## 2024-06-18 PROCEDURE — 49406 IMAGE CATH FLUID PERI/RETRO: CPT

## 2024-06-18 PROCEDURE — 0W9G30Z DRAINAGE OF PERITONEAL CAVITY WITH DRAINAGE DEVICE, PERCUTANEOUS APPROACH: ICD-10-PCS | Performed by: RADIOLOGY

## 2024-06-18 PROCEDURE — 99153 MOD SED SAME PHYS/QHP EA: CPT

## 2024-06-18 PROCEDURE — 99152 MOD SED SAME PHYS/QHP 5/>YRS: CPT

## 2024-06-18 PROCEDURE — 99222 1ST HOSP IP/OBS MODERATE 55: CPT | Performed by: SURGERY

## 2024-06-18 PROCEDURE — 99232 SBSQ HOSP IP/OBS MODERATE 35: CPT | Performed by: INTERNAL MEDICINE

## 2024-06-18 RX ORDER — SODIUM CHLORIDE 9 MG/ML
INJECTION, SOLUTION INTRAVENOUS CONTINUOUS
Status: DISCONTINUED | OUTPATIENT
Start: 2024-06-18 | End: 2024-06-19

## 2024-06-18 RX ORDER — NALOXONE HYDROCHLORIDE 0.4 MG/ML
80 INJECTION, SOLUTION INTRAMUSCULAR; INTRAVENOUS; SUBCUTANEOUS AS NEEDED
Status: DISCONTINUED | OUTPATIENT
Start: 2024-06-18 | End: 2024-06-18 | Stop reason: HOSPADM

## 2024-06-18 RX ORDER — MULTIPLE VITAMINS W/ MINERALS TAB 9MG-400MCG
1 TAB ORAL DAILY
Status: DISCONTINUED | OUTPATIENT
Start: 2024-06-18 | End: 2024-06-24

## 2024-06-18 RX ORDER — HEPARIN SODIUM 5000 [USP'U]/ML
5000 INJECTION, SOLUTION INTRAVENOUS; SUBCUTANEOUS EVERY 12 HOURS SCHEDULED
Status: DISCONTINUED | OUTPATIENT
Start: 2024-06-18 | End: 2024-06-24

## 2024-06-18 RX ORDER — BISACODYL 10 MG
10 SUPPOSITORY, RECTAL RECTAL
Status: DISCONTINUED | OUTPATIENT
Start: 2024-06-18 | End: 2024-06-20

## 2024-06-18 RX ORDER — FLUTICASONE FUROATE AND VILANTEROL 100; 25 UG/1; UG/1
1 POWDER RESPIRATORY (INHALATION) DAILY
Status: DISCONTINUED | OUTPATIENT
Start: 2024-06-18 | End: 2024-06-24

## 2024-06-18 RX ORDER — MORPHINE SULFATE 2 MG/ML
0.5 INJECTION, SOLUTION INTRAMUSCULAR; INTRAVENOUS EVERY 2 HOUR PRN
Status: DISCONTINUED | OUTPATIENT
Start: 2024-06-18 | End: 2024-06-19

## 2024-06-18 RX ORDER — DULOXETIN HYDROCHLORIDE 30 MG/1
30 CAPSULE, DELAYED RELEASE ORAL DAILY
Status: DISCONTINUED | OUTPATIENT
Start: 2024-06-18 | End: 2024-06-24

## 2024-06-18 RX ORDER — ONDANSETRON 2 MG/ML
4 INJECTION INTRAMUSCULAR; INTRAVENOUS EVERY 6 HOURS PRN
Status: DISCONTINUED | OUTPATIENT
Start: 2024-06-18 | End: 2024-06-24

## 2024-06-18 RX ORDER — ENEMA 19; 7 G/133ML; G/133ML
1 ENEMA RECTAL ONCE AS NEEDED
Status: DISCONTINUED | OUTPATIENT
Start: 2024-06-18 | End: 2024-06-24

## 2024-06-18 RX ORDER — MELATONIN
3 NIGHTLY PRN
Status: DISCONTINUED | OUTPATIENT
Start: 2024-06-18 | End: 2024-06-24

## 2024-06-18 RX ORDER — SODIUM CHLORIDE 9 MG/ML
INJECTION, SOLUTION INTRAVENOUS CONTINUOUS
Status: DISCONTINUED | OUTPATIENT
Start: 2024-06-18 | End: 2024-06-18 | Stop reason: HOSPADM

## 2024-06-18 RX ORDER — CLONAZEPAM 0.5 MG/1
0.5 TABLET ORAL 2 TIMES DAILY PRN
Status: DISCONTINUED | OUTPATIENT
Start: 2024-06-18 | End: 2024-06-24

## 2024-06-18 RX ORDER — METOCLOPRAMIDE HYDROCHLORIDE 5 MG/ML
10 INJECTION INTRAMUSCULAR; INTRAVENOUS EVERY 8 HOURS PRN
Status: DISCONTINUED | OUTPATIENT
Start: 2024-06-18 | End: 2024-06-24

## 2024-06-18 RX ORDER — MIRTAZAPINE 15 MG/1
30 TABLET, FILM COATED ORAL NIGHTLY
Status: DISCONTINUED | OUTPATIENT
Start: 2024-06-18 | End: 2024-06-24

## 2024-06-18 RX ORDER — MIDAZOLAM HYDROCHLORIDE 1 MG/ML
INJECTION INTRAMUSCULAR; INTRAVENOUS
Status: COMPLETED
Start: 2024-06-18 | End: 2024-06-18

## 2024-06-18 RX ORDER — MORPHINE SULFATE 2 MG/ML
2 INJECTION, SOLUTION INTRAMUSCULAR; INTRAVENOUS EVERY 2 HOUR PRN
Status: DISCONTINUED | OUTPATIENT
Start: 2024-06-18 | End: 2024-06-19

## 2024-06-18 RX ORDER — FLUMAZENIL 0.1 MG/ML
0.2 INJECTION INTRAVENOUS AS NEEDED
Status: DISCONTINUED | OUTPATIENT
Start: 2024-06-18 | End: 2024-06-18 | Stop reason: HOSPADM

## 2024-06-18 RX ORDER — MIDAZOLAM HYDROCHLORIDE 1 MG/ML
1 INJECTION INTRAMUSCULAR; INTRAVENOUS EVERY 5 MIN PRN
Status: DISCONTINUED | OUTPATIENT
Start: 2024-06-18 | End: 2024-06-18 | Stop reason: HOSPADM

## 2024-06-18 RX ORDER — MORPHINE SULFATE 2 MG/ML
1 INJECTION, SOLUTION INTRAMUSCULAR; INTRAVENOUS EVERY 2 HOUR PRN
Status: DISCONTINUED | OUTPATIENT
Start: 2024-06-18 | End: 2024-06-19

## 2024-06-18 RX ORDER — ACETAMINOPHEN 500 MG
500 TABLET ORAL EVERY 4 HOURS PRN
Status: DISCONTINUED | OUTPATIENT
Start: 2024-06-18 | End: 2024-06-19

## 2024-06-18 RX ORDER — POLYETHYLENE GLYCOL 3350 17 G/17G
17 POWDER, FOR SOLUTION ORAL DAILY PRN
Status: DISCONTINUED | OUTPATIENT
Start: 2024-06-18 | End: 2024-06-24

## 2024-06-18 RX ORDER — SENNOSIDES 8.6 MG
17.2 TABLET ORAL NIGHTLY PRN
Status: DISCONTINUED | OUTPATIENT
Start: 2024-06-18 | End: 2024-06-24

## 2024-06-18 NOTE — DIETARY MALNUTRITION NOTE
[facility]   part of [facility]  NUTRITION ASSESSMENT    Pt meets moderate malnutrition criteria at this time.    CRITERIA FOR MALNUTRITION DIAGNOSIS:  Criteria for non-severe malnutrition diagnosis: chronic illness related to energy intake less than75% for greater than 1 month, body fat mild depletion, and muscle mass mild depletion    NUTRITION INTERVENTION:    RD nutrition Care Plan- Initiated ONS (oral nutritional supplements), Ordered multivitamin, and See RD nutrition assessment for additional recommendations  Meal and Snacks - ADAT per surgery; monitor patient po intake. Encourage adequate po of appropriate diet.  Medical Food Supplements - RD added Mayhill chocolate daily and Ensure Plus HP chocolate daily. Rationale/use for oral supplements discussed.  Vitamin and Mineral Supplements - Recommend adding Multivitamin with minerals  Coordination of Nutrition Care - Recommend GI/Surgery consult for nutrition support/diet advancement  and Palliative care consult for goal of care    PATIENT STATUS: 78 year old female admitted on 6/17 presents with generalized weakness. Pt screened d/t MST score 2. Pt known to writer from recent admit; last assessed on 6/14. Visited pt at bedside. Pt was sleepy so visit kept short. She reports appetite has been poor and agreeable to chocolate Mayhill and Ensure. Denies GI symptoms at this time but was having abdominal pain upon admit. Last BM PTA. No chewing or swallowing difficulties and NKFA. Pt currently NPO for CT abscess drain placement. Will continue to monitor and follow up as appropriate.    PMH: hypothyroidism, anxiety, asthma, CKD 3, COPD, essential hypertension, hyperlipidemia, recent hospitalization for diverticulitis     ANTHROPOMETRICS:  Ht: 152.4 cm (5')  Wt: 43.6 kg (96 lb 3.2 oz).   BMI: Body mass index is 18.79 kg/m².  IBW: 45.5 kg    WEIGHT HISTORY:  Patient Weight(s) for the past 336 hrs:   Weight   06/18/24 0043 43.6 kg (96 lb 3.2 oz)   06/17/24  1702 40.8 kg (90 lb)       Wt Readings from Last 10 Encounters:   06/18/24 43.6 kg (96 lb 3.2 oz)   06/10/24 41.7 kg (92 lb)   05/16/24 41.3 kg (91 lb)   05/14/24 41.3 kg (91 lb)   05/01/24 41.3 kg (91 lb)   05/02/24 42.2 kg (93 lb)   04/30/24 41.3 kg (91 lb)   04/18/24 41.3 kg (91 lb)   04/04/24 41.3 kg (91 lb)   03/23/24 41.7 kg (91 lb 14.9 oz)        NUTRITION:  Diet:       Procedures    NPO        Percent Meals Eaten (last 3 days)       None            Food Allergies: No  Cultural/Ethnic/Hoahaoism Preferences Addressed: Yes    GI SYSTEM REVIEW: abdominal pain  Skin/Wounds: WNL    NUTRITION RELATED PHYSICAL FINDINGS:     1. Body Fat/Muscle Mass: moderate depletion body fat Buccal fat pad and Triceps and moderate muscle depletion Temple region, Clavicle region, Thigh region, and Calf region     2. Fluid Accumulation: none per RN documentation     NUTRITION PRESCRIPTION: 43.6 kg  Calories: 5851-6538 calories/day (30-35 kcal/kg)  Protein: 52-65 grams protein/day (1.2-1.5 gm/kg)  Fluid: ~1 ml/kcal or per MD discretion    NUTRITION DIAGNOSIS/PROBLEM:  Malnutrition related to insufficient appetite resulting in inadequate nutrition intake as evidenced by documented/reported insufficient oral intake, documented/reported unintentional weight loss, loss of fat mass, and loss of muscle mass    MONITOR AND EVALUATE/NUTRITION GOALS:  Weight stable within 1 to 2 lbs during admission - New  Return to PO intake or advance diet in 24-48 hrs - New      MEDICATIONS:  Remeron, multivitamin with minerals, abx, prn zofran  Gtt: NS at 100 ml/hr    LABS:  K+ 3.3    Pt is at High nutrition risk    Halie Muniz RD, LDN, CNSC  Clinical Dietitian  Spectra: [phone]

## 2024-06-18 NOTE — ED QUICK NOTES
Orders for admission, patient is aware of plan and ready to go upstairs. Any questions, please call ED DONNIE Hameed  at extension [phone].     Vaccinated?  Type of COVID test sent:  COVID Suspicion level: Low/High  low    Titratable drug(s) infusing:  Rate:  Sodium chloride 124/hr  LOC at time of transport:  aox4  Other pertinent information:    CIWA score=  NIH score=

## 2024-06-18 NOTE — IMAGING NOTE
Patient arrived in CT room 1 for a peritoneal drain placement.  Consent noted in the patient's chart. Patient arouseable, able to states name and birth date.  Assisted patient to CT table.  Patient tolerated procedure well. Noted drain dressing intact and clean. Bedside report to Romy FIELDS.

## 2024-06-18 NOTE — CONSULTS
[facility]  Report of Consultation    Bibi Diaz Patient Status:  Observation    1945 MRN [id number]   Location [facility] 5NW-A Attending Rich Jesus MD   Hosp Day # 0 PCP Rick Shannon DO     Requesting Physician:  Dr. Temple    Reason for Consultation:  Unwitnessed fall, multiple pericolonic abscesses    Chief Complaint:  Lower abdominal pain    History of Present Illness:  Bibi Diaz is a 78 year old female who presents to [facility] after an unwitnessed fall out of bed.    She reports feeling dizzy and nauseous for the past couple weeks.  She attributes her symptoms to her gastroparesis.  On Saturday she reports feeling very nauseous, denies vomiting, fevers, or chills.  She reports intermittent symptoms of diarrhea and constipation for which she takes a fiber supplement at home.    On , she reports going to sleep in the middle of her bed and walking up on the floor.  She states she was too weak to get up and laid on the floor for a few hours. She called her son, who called her granddaughter to present to [facility] [place]. She isn't sure if she hit her head during the fall. She denies of consciousness.    Upon presentation to the hospital, the patient was hemodynamically stable.  CBC reveals WBC 9.7, hemoglobin 9.8, platelets 386,000. Slightly hypokalemic at 3.4 with no other electrolyte abnormalities.  BUN 7, creatinine 0.64.    CT of the brain and head revealed no acute intracranial bleed or acute intracranial process.  CT of the abdomen and pelvis reveals large amount of stool in the descending and sigmoid colon.  There is pain of the colon in this region with surrounding stranding most likely sterocoral colitis.  Are numerous pericolonic abscess formations including a collection measuring 4.0X 2.7 cm adjacent to the sigmoid colon where there is also stranding and enhancement of the pericolonic tissues.  Additional small suspected abscess cavities with small foci  of fluid with surrounding enhancement.    The patient's past medical history significant for for giant paraesophageal hernia, GERD, hypothyroidism, depression, anxiety, hypertension, COPD, hypercholesterolemia, and aortic atherosclerosis. The patient's surgical history significant for tubal ligation.  She denies taking blood thinning medications.      History:  Past Medical History:    Abdominal distention    Abdominal pain    Acquired hypothyroidism    Anxiety    Asthma ([facility])    Belching    Bloating    Chronic rhinitis    CKD (chronic kidney disease) stage 3, GFR 30-59 ml/min ([facility])    Constipation    COPD (chronic obstructive pulmonary disease) ([facility])    NO OXYGEN     Depression    Diarrhea, unspecified    Diverticulosis of large intestine    Emphysema    Esophageal reflux    Fatigue    Feeling lonely    Flatulence/gas pain/belching    Food intolerance    H/O bronchitis    Hay fever    Hemorrhoids    High blood pressure    History of depression    Hypercholesterolemia    Hypertension    Itch of skin    Migraines    Mild intermittent asthma without complication ([facility])    Nausea    Night sweats    Pneumonia    PONV (postoperative nausea and vomiting)    Pure hypercholesterolemia    Rash    Reflux    Sleep disturbance    Stress    Uncomfortable fullness after meals    Visual impairment    glasses    Vomiting    Wears glasses     Past Surgical History:   Procedure Laterality Date    Cataract      Colonoscopy      Colonoscopy N/A 3/21/2024    Procedure: COLONOSCOPY;  Surgeon: Delon Ashford DO;  Location:  ENDOSCOPY    Colonoscopy & polypectomy  09/2014    multiple polyps; tics; repeat 3 yrs    Colonoscopy,remv lesn,snare N/A 09/22/2014    Procedure: ESOPHAGOGASTRODUODENOSCOPY, COLONOSCOPY, POSSIBLE BIOPSY, POSSIBLE POLYPECTOMY 52928,06017;  Surgeon: Slade Ivan MD;  Location: [facility]    Correct bunion,simple Bilateral     Cystotomy,excis vesical neck Left     Egd      Gastro - dmg  09/2014     stricture; HH    Oophorectomy Bilateral 2017    Other surgical history  02/27/2023    Robotic repair of hiatal hernia and Nissen fundoplication    Patient documented not to have experienced any of the following events N/A 09/22/2014    Procedure: ESOPHAGOGASTRODUODENOSCOPY, COLONOSCOPY, POSSIBLE BIOPSY, POSSIBLE POLYPECTOMY 42094,63599;  Surgeon: Slade Ivan MD;  Location: [facility]    Patient withough preoperative order for iv antibiotic surgical site infection prophylaxis. N/A 09/22/2014    Procedure: ESOPHAGOGASTRODUODENOSCOPY, COLONOSCOPY, POSSIBLE BIOPSY, POSSIBLE POLYPECTOMY 85022,69297;  Surgeon: Slade Ivan MD;  Location: [facility]    Repair ing hernia,5+y/o,reducibl      Upper gi endoscopy,diagnosis N/A 09/22/2014    Procedure: ESOPHAGOGASTRODUODENOSCOPY, COLONOSCOPY, POSSIBLE BIOPSY, POSSIBLE POLYPECTOMY 60304,61168;  Surgeon: Slade Ivan MD;  Location: [facility]     Family History   Problem Relation Age of Onset    Colon Cancer Mother     Other (Other) Mother     Other (copd) Mother     Alcohol and Other Disorders Associated Father     Other (Other) Father     Other (emph) Father     Alcohol and Other Disorders Associated Son     Hypertension Sister     Prostate Cancer Brother     Hypertension Brother       reports that she quit smoking about 43 years ago. Her smoking use included cigarettes. She started smoking about 53 years ago. She has never used smokeless tobacco. She reports that she does not drink alcohol and does not use drugs.    Allergies:  Allergies   Allergen Reactions    Avelox [Moxifloxacin Hcl In Nacl] SWELLING    Avelox [Moxifloxacin Hydrochloride] TONGUE SWELLING     \"TABS\"    Amoxicillin NAUSEA AND VOMITING     Vomiting.    Statins Myopathy    Sulfa Antibiotics RASH and ITCHING    Dander OTHER (SEE COMMENTS)     \"Animals\": runny nose, watery eyes, itching    Diphenhydramine Runny nose     \"Animals\": runny nose, watery eyes, itching    Dust Runny  nose    Latex RASH    Sulfa Drugs Cross Reactors RASH and ITCHING       Medications:  Medications Prior to Admission   Medication Sig    multivitamin with minerals Oral Tab Take 1 tablet by mouth daily.    CLONAZEPAM 0.5 MG Oral Tab TAKE 1 TABLET(0.5 MG) BY MOUTH TWICE DAILY AS NEEDED    DULoxetine (CYMBALTA) 30 MG Oral Cap DR Particles Take 1 capsule (30 mg total) by mouth daily.    meclizine 12.5 MG Oral Tab Take 1 tablet (12.5 mg total) by mouth 3 (three) times daily as needed.    ondansetron 4 MG Oral Tablet Dispersible Take 1 tablet (4 mg total) by mouth every 4 (four) hours as needed for Nausea.    mirtazapine 30 MG Oral Tab Take 1 tablet (30 mg total) by mouth nightly.    fluticasone propionate 50 MCG/ACT Nasal Suspension 2 sprays by Each Nare route daily.    losartan 25 MG Oral Tab Take 1 tablet (25 mg total) by mouth daily.    albuterol (PROAIR HFA) 108 (90 Base) MCG/ACT Inhalation Aero Soln INHALE 2 PUFFS BY MOUTH EVERY 6 HOURS AS NEEDED FOR WHEEZING    SYMBICORT 160-4.5 MCG/ACT Inhalation Aerosol INHALE 2 PUFFS BY MOUTH TWICE DAILY    TRIAMCINOLONE 0.1 % External Cream APPLY TOPICALLY TO THE AFFECTED AREA TWICE DAILY AS NEEDED (Patient taking differently: as needed.)    amoxicillin-pot clavulanate 400-57 mg/5mL Oral Recon Susp Take 7 mL (560 mg total) by mouth 2 (two) times daily for 10 days.    Cholecalciferol (VITAMIN D-3 OR) Take by mouth.    DUPIXENT 300 MG/2ML Subcutaneous Solution Prefilled Syringe     clobetasol 0.05 % External Ointment     acetaminophen 500 MG Oral Tab Take 1 tablet (500 mg total) by mouth every 6 (six) hours as needed for Pain.         Current Facility-Administered Medications:     piperacillin-tazobactam (Zosyn) 3.375 g in dextrose 5% 100 mL IVPB-ADDV, 3.375 g, Intravenous, Q8H    sodium chloride 0.9% infusion, , Intravenous, Continuous    acetaminophen (Tylenol Extra Strength) tab 500 mg, 500 mg, Oral, Q4H PRN    melatonin tab 3 mg, 3 mg, Oral, Nightly PRN    ondansetron  (Zofran) 4 MG/2ML injection 4 mg, 4 mg, Intravenous, Q6H PRN    metoclopramide (Reglan) 5 mg/mL injection 10 mg, 10 mg, Intravenous, Q8H PRN    heparin (Porcine) 5000 UNIT/ML injection 5,000 Units, 5,000 Units, Subcutaneous, 2 times per day    polyethylene glycol (PEG 3350) (Miralax) 17 g oral packet 17 g, 17 g, Oral, Daily PRN    sennosides (Senokot) tab 17.2 mg, 17.2 mg, Oral, Nightly PRN    bisacodyl (Dulcolax) 10 MG rectal suppository 10 mg, 10 mg, Rectal, Daily PRN    fleet enema (Fleet) 7-19 GM/118ML rectal enema 133 mL, 1 enema, Rectal, Once PRN    clonazePAM (KlonoPIN) tab 0.5 mg, 0.5 mg, Oral, BID PRN    DULoxetine (Cymbalta) DR cap 30 mg, 30 mg, Oral, Daily    mirtazapine (Remeron) tab 30 mg, 30 mg, Oral, Nightly    multivitamin with minerals (Thera M Plus) tab 1 tablet, 1 tablet, Oral, Daily    fluticasone furoate-vilanterol (Breo Ellipta) 100-25 MCG/ACT inhaler 1 puff, 1 puff, Inhalation, Daily    morphINE PF 2 MG/ML injection 0.5 mg, 0.5 mg, Intravenous, Q2H PRN **OR** morphINE PF 2 MG/ML injection 1 mg, 1 mg, Intravenous, Q2H PRN **OR** morphINE PF 2 MG/ML injection 2 mg, 2 mg, Intravenous, Q2H PRN    potassium chloride 40 mEq in 250mL sodium chloride 0.9% IVPB premix, 40 mEq, Intravenous, Once    [COMPLETED] sodium chloride 0.9 % IV bolus 500 mL, 500 mL, Intravenous, Once **FOLLOWED BY** sodium chloride 0.9% infusion, , Intravenous, Continuous    Review of Systems:  Pertinent items are noted in HPI.  Allergic/Immuno:  Review of patient's allergies performed.  Cardiovascular:  Negative for cool extremity and irregular heartbeat/palpitations  Constitutional:  Negative for decreased activity, fever, irritability and lethargy  Endocrine:  Negative for abnormal sleep patterns, increased activity, polydipsia and polyphagia  ENMT:  Negative for ear drainage, hearing loss and nasal drainage  Eyes:  Negative for eye discharge and vision loss  Gastrointestinal:  Positive for abdominal pain, nausea.  Negative  for constipation, decreased appetite, diarrhea and vomiting  Genitourinary:  Negative for dysuria and hematuria  Hema/Lymph:  Negative for easy bleeding and easy bruising  Integumentary:  Negative for pruritus and rash  Musculoskeletal:  Negative for bone/joint symptoms  Neurological:  Negative for gait disturbance  Psychiatric:  Negative for inappropriate interaction and psychiatric symptoms  Respiratory:  Negative for cough, dyspnea and wheezing      Physical Exam:  Blood pressure 122/50, pulse 58, temperature 98.2 °F (36.8 °C), temperature source Oral, resp. rate 17, height 60\", weight 96 lb 3.2 oz (43.6 kg), SpO2 94%, not currently breastfeeding.    General: Alert, orientated x3.  Cooperative.  No apparent distress.    HEENT: Exam is unremarkable.  Without scleral icterus.  Mucous membranes are moist. EOM are WNL.    Neck: No tenderness to palpitation.  Full range of motion to flexion and extension, lateral rotation and lateral flexion of cervical spine.  No JVD. Supple.     Lungs: Clear to auscultation bilaterally. No wheezes, no rales, no rhonchi. Good excursion of the diaphragms. No secondary use of accessory respiratory musculature.    Cardiac: Regular rate and rhythm. No murmur.    Abdomen:   Soft, non-distended, tender to palpation in left lower quadrant, tympanic to percussion. No peritoneal signs. No guarding.     Extremities:  No lower extremity edema noted.  Without clubbing or cyanosis.      Skin: Normal texture and turgor.    Laboratory Data:  Recent Labs   Lab 06/12/24  0603 06/13/24  0923 06/14/24  1209 06/17/24  1712 06/18/24  0719   RBC 2.93*   < > 3.04* 3.19* 3.18*   HGB 8.2*   < > 8.5* 8.9* 8.7*   HCT 24.5*   < > 25.2* 26.5* 27.1*   MCV 83.6   < > 82.9 83.1 85.2   MCH 28.0   < > 28.0 27.9 27.4   MCHC 33.5   < > 33.7 33.6 32.1   RDW 14.2   < > 14.3 14.6 14.9   NEPRELIM 16.05*  --   --  7.45 11.04*   WBC 17.4*   < > 9.2 9.7 12.2*   .0   < > 303.0 386.0 423.0    < > = values in this  interval not displayed.       Recent Labs   Lab 06/14/24  1209 06/17/24  1712 06/18/24  0719   GLU 98 92 96   BUN 5* 7* 5*   CREATSERUM 0.69 0.64 0.66   CA 8.4* 9.0 8.5   ALB  --  2.6* 2.5*    141 144   K 3.5 3.4* 3.3*    108 113*   CO2 22.0 26.0 23.0   ALKPHO  --  73 68   AST  --  21 18   ALT  --  15 14   BILT  --  0.3 0.3   TP  --  6.2* 5.8*         No results for input(s): \"PTP\", \"INR\", \"PTT\" in the last 168 hours.      CT ABDOMEN+PELVIS(CONTRAST ONLY)(CPT=74177)    Result Date: 6/17/2024  CONCLUSION:  Hyperdense desiccated appearing stool in the sigmoid colon with stercoral colitis, and pericolonic abscess formations, multiple.  The largest pericolonic abscess measures 4.0 cm.   LOCATION:  [facility]   Dictated by (CST): Colten Deshpande MD on 6/17/2024 at 11:11 PM     Finalized by (CST): Colten Deshpande MD on 6/17/2024 at 11:17 PM       CT BRAIN OR HEAD (68836)    Result Date: 6/17/2024  CONCLUSION:  Chronic changes in the brain, as described, but no acute intracranial bleed, or other acute intracranial process identified.    LOCATION:  [facility]   Dictated by (CST): Colten Deshpande MD on 6/17/2024 at 7:23 PM     Finalized by (CST): Colten Deshpande MD on 6/17/2024 at 7:25 PM          MARY Peter  6/18/2024  1:34 PM    Is this a shared or split note between Advanced Practice Provider and Physician? Yes      Medical Decision Making         Impression:  Patient Active Problem List   Diagnosis    Major depressive disorder, recurrent episode, moderate (HCC)    Esophageal reflux    Chronic obstructive pulmonary disease with acute lower respiratory infection (HCC)    Acquired hypothyroidism    Memory deficit    Aortic atherosclerosis (HCC)    Diverticulosis of colon    Dizziness and giddiness    Dysthymic disorder    Lichen sclerosus    Pure hypercholesterolemia    Vitamin D deficiency    Vitiligo    Severe episode of recurrent major depressive disorder, without psychotic features (HCC)    Paraesophageal  hernia    Subclinical hypothyroidism    Hypertension    Protein-calorie malnutrition, unspecified severity (HCC)    Gastrointestinal hemorrhage, unspecified gastrointestinal hemorrhage type    Thrombocytopenia (HCC)    Acute diverticulitis    Hyponatremia    ACP (advance care planning)    Weakness generalized    Colonic diverticular abscess       78-year-old female who was admitted through the ED after an unwitnessed fall out of bed.  The patient reports that she has been falling more frequently over the past few months.  She reports that on Sunday night she went to sleep and subsequently woke up on the floor.  She was eventually assisted by her granddaughter however she was on the ground for several hours per her recollection.  Workup in the emergency department revealed slightly diminished hemoglobin of 8.9, WBC within normal limits, platelet count normal.  CT scan brain was negative for acute injury.  As part of workup, CT scan abdomen and pelvis was performed.  Unexpectedly, multiple pericolonic abscesses were identified.  BOWEL/MESENTERY:  Large amount of hyperdense desiccated appearing stool in the descending colon, and sigmoid colon.  Thickening of the colon in this region with surrounding stranding, most likely stercoral colitis.  Assessment has limitations without   contrast, but there are numerous pericolonic abscess formations including a collection measuring 4.0 x 2.7 cm image 82 adjacent to the sigmoid colon in this region, where there is also stranding and enhancement of the pericolonic soft tissues.  Below   this, for example on image 86 there are additional small suspected abscess cavities with small foci of fluid with surrounding enhancement.  Additional small abscess changes are seen within the inflammation.  More proximally the colon contains moderate   amount of stool.  Small bowel caliber normal.   The patient did have a prior CT scan Rose Mary 10, 2024 which revealed changes consistent with sigmoid  diverticulitis.  No evidence of abscess formation at that time.  The patient has had multiple CTAs earlier this year in February and March as part of workup for GI bleed.  No areas of extravasation of bleeding were identified by CT  Past surgical history-hiatal hernia repair in December 2022 by Dr. Friedman    Since admission, the patient has been afebrile and hemodynamically stable.  On examination she does have some tenderness in the left lower quadrant to deep palpation.  There is no evidence of percussion tenderness, guarding or rebound.  Treatment options were reviewed in detail with Bibi.  At this time the recommendation would be to proceed with IR drainage of pericolonic diverticular abscess.  Continue clear liquid diet, IV antibiotics   patient is comfortable with these recommendations and will proceed as discussed  The possible need for surgical intervention was discussed.  We did discuss that should surgery be indicated in this acute period, surgery would include laparotomy, sigmoid colon resection with creation of end colostomy.  Bibi has no further questions at this time.  The risks, benefits, complications, possible outcomes and alternatives of the procedure were explained to the patient in detail.  Potential complications of this procedure and as with any surgical procedure and anesthesia were reviewed including but not limited to bleeding, infection, thromboembolic event, pneumonia were discussed.  Expected postoperative pain, recuperation and postoperative course and possible complications were also reviewed.  All questions from the patient were answered in detail.  The patient did verbalize understanding and does not have any further questions at this time.  The patient does wish to proceed with the proposed procedure.    MARIAM Brooks MD, FACS    Please note that this report has been produced using speech recognition software and may contain errors related to that system including but not limited  to errors in grammar, punctuation and spelling as well as words and phrases that possibly may have been recognized inappropriately.  If there are any questions or concerns please contact the dictating provider for clarification.  The 21st Century Cures Act makes medical notes like these available to patients in the interest of trans parency. Please be advised this is a medical document. Medical documents are intended to carry relevant information, facts as evident, and the clinical opinion of the practitioner. The medical note is intended as peer to peer communication and may appear blunt or direct. It is written in medical language and may contain abbreviations or verbiage that are unfamiliar.  If there are any questions or concerns please contact the dictating provider for clarification.

## 2024-06-18 NOTE — PHYSICAL THERAPY NOTE
PHYSICAL THERAPY EVALUATION - INPATIENT     Room Number: 518/518-A  Evaluation Date: 6/18/2024  Type of Evaluation: Initial  Physician Order: PT Eval and Treat    Presenting Problem: weakness generalized, failure to thrive  Co-Morbidities : hypothyoid, anxiety, asthma, CKD3, COPD, essential HTN  Reason for Therapy: Mobility Dysfunction and Discharge Planning    PHYSICAL THERAPY ASSESSMENT   Patient is currently functioning below baseline with bed mobility, transfers, and gait.  Prior to admission, patient's baseline is modified indep with RW.  Patient is requiring moderate assist as a result of the following impairments: decreased functional strength, impaired sittind and standing balance, decreased muscular endurance, and medical status.  Physical Therapy will continue to follow for duration of hospitalization.    Patient will benefit from continued skilled PT Services to promote return to prior level of function and safety with continuous assistance and gradual rehabilitative therapy .    PLAN  PT Treatment Plan: Bed mobility;Endurance;Energy conservation;Patient education;Family education;Gait training;Strengthening;Transfer training;Balance training  Rehab Potential : Good  Frequency (Obs): 3-5x/week  Number of Visits to Meet Established Goals: 5      CURRENT GOALS    Goal #1 Patient is able to demonstrate supine - sit EOB @ level: supervision     Goal #2 Patient is able to demonstrate transfers Sit to/from Stand at assistance level: supervision     Goal #3 Patient is able to ambulate 50 feet with assist device: walker - rolling at assistance level: minimum assistance     Goal #4    Goal #5    Goal #6    Goal Comments: Goals established on 6/18/2024      PHYSICAL THERAPY MEDICAL/SOCIAL HISTORY  History related to current admission: Patient is a 78 year old female admitted on 6/17/2024 from home for unwitnessed fall out of bed.  Pt diagnosed with generalized weakness, .    Recent admissions:  6/10-6/14/2/4:  acute diverticulitis discharged home with HH PT  3/20-3/28/24: GI bleed discharged home with HH PT    HOME SITUATION  Type of Home: Apartment   Home Layout: One level  Stairs to Enter : 0             Lives With: Alone     Patient Owned Equipment: Rolling walker       Prior Level of [place]: per pt reports, pt was amb with RW without assist but has become weaker lately.  Pt states she lives alone in apartment, granddaughter lives in the same apt complex.  Pt reports she has 2 nieces that might be able to stay with her.  Per EMR recent fall hx 6/7/24    SUBJECTIVE  \"I don't feel well, I'm nauseous, I want to eat, but I can't.\"       OBJECTIVE  Precautions: Bed/chair alarm  Fall Risk: High fall risk    WEIGHT BEARING RESTRICTION  Weight Bearing Restriction: None                PAIN ASSESSMENT  Rating: Unable to rate  Location: head and stomach  Management Techniques: Activity promotion;Repositioning    COGNITION  Safety Judgement:  decreased awareness of need for assistance and decreased awareness of need for safety    RANGE OF MOTION AND STRENGTH ASSESSMENT  Upper extremity ROM and strength: see OT eval    Lower extremity ROM is within functional limits     Lower extremity strength is below functional limits   And grossly 3-/5 bilaterally.    BALANCE  Static Sitting: Fair +  Dynamic Sitting: Poor +  Static Standing: Poor  Dynamic Standing: Poor -    ADDITIONAL TESTS                                    ACTIVITY TOLERANCE           BP: 112/64  BP Location: Left arm  BP Method: Automatic  Patient Position: Sitting    O2 WALK  Oxygen Therapy  SPO2% on Room Air at Rest: 96    NEUROLOGICAL FINDINGS  Neurological Findings: None                     AM-PAC '6-Clicks' INPATIENT SHORT FORM - BASIC MOBILITY  How much difficulty does the patient currently have...  Patient Difficulty: Turning over in bed (including adjusting bedclothes, sheets and blankets)?: A Lot   Patient Difficulty: Sitting down on and standing up from a  chair with arms (e.g., wheelchair, bedside commode, etc.): A Lot   Patient Difficulty: Moving from lying on back to sitting on the side of the bed?: A Lot   How much help from another person does the patient currently need...   Help from Another: Moving to and from a bed to a chair (including a wheelchair)?: A Lot   Help from Another: Need to walk in hospital room?: A Lot   Help from Another: Climbing 3-5 steps with a railing?: Total       AM-PAC Score:  Raw Score: 11   Approx Degree of Impairment: 72.57%   Standardized Score (AM-PAC Scale): 33.86   CMS Modifier (G-Code): CL    FUNCTIONAL ABILITY STATUS  Gait Assessment   Functional Mobility/Gait Assessment  Gait Assistance: Moderate assistance  Distance (ft): 2  Assistive Device: Rolling walker  Pattern: Shuffle    Skilled Therapy Provided     Bed Mobility:  Rolling: not tested  Supine to sit: mod assist with HOB elevated   Sit to supine: not tested     Transfer Mobility:  Sit to stand: mod assist (min assist x 2) from EOB to RW with height of bed elevated   Stand to sit: mod assist  Gait = pt was able to take 2 side steps RW with mod assist to bedside chair with 2nd person CGA for safety.    Therapist's Comments: per RN pt ok to be seen. Pt received in bed, B LE off the bed in supine. Pt was tearful and emotional upon entry. Pt reporting she is hungry, but she cannot eat. Pt mobility as above. Pt required mod assist for trunk force generation.  Pt sitting at EOB x 5 mins with CGA/min assist to maintain static sitting balance, slumped posture in sitting. Pt sit to stand with assist x 2. Pt was able to take side steps with RW towards bedside chair and mod assist to maintain balance and upright position. Pt seated in bedside chair.  Pt educated on call don't fall, ankle pumps, questions answered, and RN in to attend to pt.  Pt left with chair alarm set and needs met.     Exercise/Education Provided:  Bed mobility  Energy conservation  Functional activity  tolerated  Gait training  Neuromuscular re-educate  Posture  Transfer training    Patient End of Session: Up in chair;Needs met;Call light within reach;RN aware of session/findings;All patient questions and concerns addressed;Alarm set      Patient Evaluation Complexity Level:  History Moderate - 1 or 2 personal factors and/or co-morbidities   Examination of body systems Moderate - addressing a total of 3 or more elements   Clinical Presentation Moderate - Evolving   Clinical Decision Making Low - Stable       PT Session Time: 45 minutes  Gait Trainin minutes  Therapeutic Activity: 15 minutes  Neuromuscular Re-education: 5 minutes

## 2024-06-18 NOTE — PROGRESS NOTES
[facility]   part of [facility]     Hospitalist Progress Note     Bibi Diaz Patient Status:  Observation    1945 MRN [id number]   Location [facility] 5NW-A Attending Rich Jesus MD   Hosp Day # 0 PCP Rick Shannon DO     Chief Complaint: worsening disability    Subjective:     Patient without acute events overnight. Abd pain controlled. No F/C. No N/V/D. Feels hungry.    Objective:    Review of Systems:   A comprehensive review of systems was completed; pertinent positive and negatives stated in subjective.    Vital signs:  Temp:  [96.9 °F (36.1 °C)-99.6 °F (37.6 °C)] 99.6 °F (37.6 °C)  Pulse:  [51-68] 68  Resp:  [17-22] 18  BP: (113-160)/(52-89) 123/52  SpO2:  [96 %-100 %] 98 %    Physical Exam:    General: No acute distress  Respiratory: No wheezes, no rhonchi  Cardiovascular: S1, S2, regular rate and rhythm  Abdomen: Soft, mild tender, non-distended, positive bowel sounds  Neuro: No new focal deficits.   Extremities: No edema      Diagnostic Data:    Labs:  Recent Labs   Lab 24  0603 24  0923 24  1209 24  1712   WBC 17.4* 13.1* 9.2 9.7   HGB 8.2* 9.0* 8.5* 8.9*   MCV 83.6 83.6 82.9 83.1   .0 281.0 303.0 386.0       Recent Labs   Lab 24  0603 24  1209 24  1712   * 98 92   BUN 9 5* 7*   CREATSERUM 0.80 0.69 0.64   CA 8.3* 8.4* 9.0   ALB  --   --  2.6*   * 136 141   K 3.5 3.5 3.4*    107 108   CO2 19.0* 22.0 26.0   ALKPHO  --   --  73   AST  --   --  21   ALT  --   --  15   BILT  --   --  0.3   TP  --   --  6.2*       Estimated Creatinine Clearance: 49.1 mL/min (based on SCr of 0.64 mg/dL).    No results for input(s): \"TROP\", \"TROPHS\", \"CK\" in the last 168 hours.    No results for input(s): \"PTP\", \"INR\" in the last 168 hours.               Microbiology    No results found for this visit on 24.      Imaging: Reviewed in Epic.    Medications:    piperacillin-tazobactam  3.375 g Intravenous Q8H    heparin   5,000 Units Subcutaneous 2 times per day    DULoxetine  30 mg Oral Daily    mirtazapine  30 mg Oral Nightly    multivitamin with minerals  1 tablet Oral Daily    fluticasone furoate-vilanterol  1 puff Inhalation Daily       Assessment & Plan:      #Multiple pericolonic abscess  #Acute diverticulitis  -General surgery to see  -IV Zosyn  -IV fluids and pain control     #Debility  -Therapies to see, may need placement  - CT head with chronic changes, no acute process    #Hypokalemia  - replace per protocol     #Normocytic anemia  #Iron deficiency     #Orthostatic hypotension     #Essential hypertension     #CKD 3     #Hyperlipidemia     #Hypothyroidism     #COPD      Rich Jesus MD    Supplementary Documentation:     Quality:  DVT Mechanical Prophylaxis:   SCDs,    DVT Pharmacologic Prophylaxis   Medication    heparin (Porcine) 5000 UNIT/ML injection 5,000 Units                Code Status: DNAR/Selective Treatment  Flood: External urinary catheter in place  Flood Duration (in days):   Central line:    WOLF:     Discharge is dependent on: progress  At this point Ms. Diaz is expected to be discharge to: TBD    The 21st Century Cures Act makes medical notes like these available to patients in the interest of transparency. Please be advised this is a medical document. Medical documents are intended to carry relevant information, facts as evident, and the clinical opinion of the practitioner. The medical note is intended as peer to peer communication and may appear blunt or direct. It is written in medical language and may contain abbreviations or verbiage that are unfamiliar.

## 2024-06-18 NOTE — CM/SW NOTE
06/18/24 1100   CM/SW Referral Data   Referral Source Social Work (self-referral)   Reason for Referral Discharge planning   Informant Patient;EMR;Clinical Staff Member   Readmission Assessment   Was patient a candidate for: Home Health   ----Home Health Recommendation: Recommended, refused   Was full assessment completed by SW/CM on prior admission? Yes   Was the recommended discharge plan achieved? No   ----D/C plan not achieved: What were the contributing factors? Patient not in agreement   Was pt. discharged w/out services? Yes   ----D/C services: Reason(s) why patient was discharged w/out services? Patient refused   Patient Info   Patient's Current Mental Status at Time of Assessment Alert;Oriented   Patient's Home Environment Condo/Apt with elevator   Patient lives with Alone   Discharge Needs   Anticipated D/C needs Subacute rehab;To be determined;Transportation services   Services Requested   Submitted to [facility] Yes   PASRR Level 1 Submitted Yes         Patient is a 77 y/o female who admitted with Weakness generalized, s/p fall. SW familiar with patient from recent admission 6/10-6/14 discharged home without HH as she declined.     Met with patient, who was alert and oriented but very tired, to discuss discharge planning. She lives in an apartment alone. Her granddaughter (Yareli) lives a few doors down. She is supportive and checks in on her. Her son (Jose) lives in [place]. She was agreeable with SW speaking with Yareli and she was agreeable with completing a HC POA with spiritual care and plans to list Yareli as agent. Discussed POLST from. Filled it out with patient, left at bedside for her to review and sign when she is ready. Discussed [facility], she was agreeable. Discussed need for insurance auth. Will bring her CHUY list when available.    Sent to [facility] for review. PASRR submitted, awaiting outcome. Await clinical course and therapy notes in order to start insurance auth.    SW/CM to remain available for support  and/or discharge planning.    Janice Paredes, [facility]  Discharge Planner  [phone]

## 2024-06-18 NOTE — PLAN OF CARE
Received patient on room air, saturating well. VSS. Medications administered per the MAR and patient needs addressed. NPO maintained. Heparin held for general surgery consult. Patient is A & O x4, incontinent with purewick in place, PT and OT are on. Family updated on plan of care. IVF infusing per MD order. Patient is resting in bed, at lowest position with bed rails up and call light within reach.

## 2024-06-18 NOTE — CM/SW NOTE
Received a call from Dr. ANN Gonzalez requesting assistance for safe DC planning. Pt in ER due to generalized weakness and fall at home.     EDCM spoke to patient who lives alone and verbalized she can't take care of herself, she feels very weak and tired. Claimed she can't even sit up by herself.  EDCM also spoke to granddaughter Yareli who is at bedside and who lives in the same complex as the patient. Per Yareli she tried to help her grandma but \"she's deadweight I couldn't even sit her up\". Per Yareli, she can't help take care of her grandma and  her Dad (pt's son) is taking of her Mom who has MS.     EDCM discussed HH and PT but pt declined. EDCM discussed [facility] and both are agreeable with CHUY placement.     MD updated with the above.     SW, PT, OT consults placed. PASSR completed but queued for review. [facility] referral initiated with Ref #[id number].     Pls add PT/OT notes to Aidin once available.  Samantha BAGLEY And SW Mariaelena AJCKSON added as followers in Aidin referral.

## 2024-06-18 NOTE — OCCUPATIONAL THERAPY NOTE
OCCUPATIONAL THERAPY EVALUATION - INPATIENT     Room Number: 518/518-A  Evaluation Date: 6/18/2024  Type of Evaluation: Initial  Presenting Problem: Generalized weakness    Physician Order: IP Consult to Occupational Therapy  Reason for Therapy: ADL/IADL Dysfunction and Discharge Planning    OCCUPATIONAL THERAPY ASSESSMENT   Patient is currently functioning below baseline with toileting, bathing, upper body dressing, lower body dressing, grooming, eating, bed mobility, transfers, stating sitting balance, dynamic sitting balance, static standing balance, dynamic standing balance, maintaining seated position, and functional standing tolerance. Prior to admission, patient's baseline is IND.  Patient is requiring  MAX-MOD for BADLs & MOD A -MIN A for functional transfer, bed mobility, and balance.   as a result of the following impairments: decreased functional strength, decreased functional reach, decreased endurance, impaired standing & seated balance, impaired coordination, impaired motor planning, decreased muscular endurance, and cognitive deficits (orientation, following commands, & alertness). Occupational Therapy will continue to follow for duration of hospitalization.    Patient will benefit from continued skilled OT Services to promote return to prior level of function and safety with continuous assistance and gradual rehabilitative therapy       History Related to Current Admission: Patient is a 78 year old female admitted on 6/17/2024 with Presenting Problem: Generalized weakness. Co-Morbidities : hypothyoid, anxiety, asthma, CKD3, COPD, essential HTN    WEIGHT BEARING RESTRICTION  Weight Bearing Restriction: None                Recommendations for nursing staff:   Transfers: MOD A x1 w/ RW   Toileting location: Bed pan or bedside commode.     EVALUATION SESSION:  Patient Start of Session: Semi-supine in bed with R leg hanging off the side.   FUNCTIONAL TRANSFER ASSESSMENT  Sit to Stand: Edge of Bed  Edge of  Bed: Moderate Assist (x2)    BED MOBILITY  Supine to Sit : Moderate Assist (x2)  Scooting: Pt. needed MOD A to scoot to EOB.    BALANCE ASSESSMENT  Static Sitting: Minimal Assist  Sitting Bilateral: Minimal Assist  Static Standing: Moderate Assist  Standing Bilateral: Moderate Assist    FUNCTIONAL ADL ASSESSMENT  LB Dressing Seated: Maximum Assist (Pt. demonstrated LB dressing by donning socks in supine w/ MAX A.)  Toileting Seated: Maximum Assist (Pt. demonstrated toileting by simulating sit to stand/stand to sit toilet transfer. Pt. required MOD A x2 to stand but progressed to MOD A x1 durinf stand pivot transfer. Pt. also demonstrated deficit w/ alterness.)      ACTIVITY TOLERANCE: Pt. Was emotional distraught throughout entire session. PT. Required MOD A x2 to transfer from supine to sitting EOB. Once pt. Was EOB she stated that she was dizzy & required MIN A for sitting balance. Pt. Sat EOB for 2-3 minutes before dizziness subsided and BP was taken with a SBP of 127. Once pt. Heath up to it pt. Transferred from sitting EOB to standing w/ RW with MOD A x2. Pt. Requires verbal and tactile cues for proper hand placement on RW. Pt. Was able to bring her head up more to improve posture while standing. Pt was able to stand for 1 min before needing a sitting break. After about sitting for 2-3 minutes pt. Transferred back into standing w/ RW & MOD A x1. Pt. Was able to transfer from standing & complete stand pivot transfer with PT & MOD A x1 into the recliner.                          O2 SATURATIONS       COGNITION  Arousal/Alertness:  delayed responses to stimuli  Orientation Level:  oriented to place, oriented to situation, and disoriented to time  Following Commands:  follows one-step commands inconsistently    Upper Extremity   ROM: NT: would not follow commands consistently enough to get accurate measurement.   Strength: within functional limits except for the following;  Right   2+/5  Left    2+/5  Coordination  Gross motor: NT  Fine motor: WFL  Sensation: Light touch:  intact    EDUCATION PROVIDED  Patient: Role of Occupational Therapy; Plan of Care  Patient's Response to Education: Demonstrates Poor Carry Over to Information    Equipment used: RW  Demonstrates functional use, Would benefit from additional trial.      Therapist comments: Needs frequent verbal and tactile cues to follow through with commands and paying attention.     Patient End of Session: Up in chair;With  staff;Needs met;Call light within reach;RN aware of session/findings;All patient questions and concerns addressed;Alarm set    OCCUPATIONAL PROFILE    HOME SITUATION  Type of Home: Apartment  Home Layout: One level  Lives With: Alone    Toilet and Equipment: Standard height toilet     Other Equipment: None          Drives: No       Prior Level of Function: Pt. Stated that she was IND w/ all BADLs but has had a harder time the last week with completing them without assistance.     SUBJECTIVE   Pt. Stated that she just wants to go home.     PAIN ASSESSMENT  Rating: Unable to rate  Location: Pt. stated that she has pain and weakness all over.  Management Techniques: Activity promotion;Repositioning    OBJECTIVE  Precautions: Bed/chair alarm  Fall Risk: High fall risk      ASSESSMENTS    AM-PAC ‘6-Clicks’ Inpatient Daily Activity Short Form  -   Putting on and taking off regular lower body clothing?: A Lot  -   Bathing (including washing, rinsing, drying)?: A Lot  -   Toileting, which includes using toilet, bedpan or urinal? : A Lot  -   Putting on and taking off regular upper body clothing?: A Lot  -   Taking care of personal grooming such as brushing teeth?: A Little  -   Eating meals?: A Little    AM-PAC Score:  Score: 14  Approx Degree of Impairment: 59.67%  Standardized Score (AM-PAC Scale): 33.39    ADDITIONAL TESTS     NEUROLOGICAL FINDINGS      COGNITION ASSESSMENTS       PLAN  OT Treatment Plan: ADL training;Energy  conservation/work simplification techniques;Balance activities;Functional transfer training;UE strengthening/ROM;Endurance training;Cognitive reorientation;Patient/Family education;Fine motor coordination activities;Compensatory technique education  Rehab Potential : Good  Frequency: 3-5x/week  Number of Visits to Meet Established Goals: 5    ADL Goals   Patient will perform grooming: with min assist and while in chair  Patient will perform lower body dressing:  with mod assist and while at edge of bed  Patient will perform toileting: with max assist and with bedside commode    Functional Transfer Goals  Patient will transfer from supine to sit:  with min assist  Patient will transfer from sit to stand:  with min assist  Patient will transfer to bedside commode:  with max assist    UE Exercise Program Goal  Patient will be supervision with bilateral AROM HEP (home exercise program).    Therapist Goals  Perform SBT or SLUMS     Patient Evaluation Complexity Level:   Occupational Profile/Medical History LOW - Brief history including review of medical or therapy records    Specific performance deficits impacting engagement in ADL/IADL MODERATE  3 - 5 performance deficits   Client Assessment/Performance Deficits MODERATE - Comorbidities and min to mod modifications of tasks    Clinical Decision Making LOW - Analysis of occupational profile, problem-focused assessments, limited treatment options    Overall Complexity LOW     OT Session Time: 30 minutes  Self-Care Home Management: 10 minutes  Therapeutic Activity: 15 minutes  Neuromuscular Re-education: 0 minutes  Therapeutic Exercise: 0 minutes  Cognitive Skills: 0 minutes  Sensory Integrative: 0 minutes  Orthotic Management and Trainin minutes  Can add/delete any of these

## 2024-06-18 NOTE — PLAN OF CARE
Problem: Patient/Family Goals  Goal: Patient/Family Long Term Goal  Description: Patient's Long Term Goal: discharge home    Interventions:  - Consult to Hosp, Gen Surgery  - IV Abx  - IVF  - Pain control  - See additional Care Plan goals for specific interventions  Outcome: Progressing  Goal: Patient/Family Short Term Goal  Description: Patient's Short Term Goal: pain control    Interventions:   - Consult to Hosp, Gen Surgery  - IVF  - IV Abx  - Pain control  - See additional Care Plan goals for specific interventions  Outcome: Progressing

## 2024-06-18 NOTE — PROGRESS NOTES
NURSING ADMISSION NOTE      Patient admitted via Cart  Oriented to room 518.  Safety precautions initiated.  Bed in low position.  Call light in reach.    Received Patient 0043. Pt A&O X 4. Room air. IV Abx. Tele. IVF. Heparin. Purewick and briefed. NPO. PT/OT to see. Patient c/o severe abdominal pain, MD paged for pain meds. No further needs at this time.

## 2024-06-18 NOTE — H&P
[facility]     Bibi Diaz Patient Status:  Emergency    1945 MRN [id number]   Location [facility] EMERGENCY DEPARTMENT Attending Jarrod Gonzalez MD   Hosp Day # 0 PCP Rick Shannon DO     Chief Complaint: worsening debility     Subjective:    History of Present Illness:     Bibi Diaz is a 78 year old female with past medical history hypothyroidism, anxiety, asthma, CKD 3, COPD, essential hypertension, hyperlipidemia, recent hospitalization for diverticulitis presents to hospital today for worsening debility and pain.  Patient was discharged  after hospitalization for diverticulitis but was unable to take antibiotics because \"it made her nauseous\".  Patient continued feel further weakness and abdominal pain and decided come to the hospital for further evaluation.  Patient had her granddaughter at the bedside lives nearby.  Patient reportedly having hard time completing ADLs.  No nausea vomiting no fevers or chills.            History/Other:    Past Medical History:  Past Medical History:    Abdominal distention    Abdominal pain    Acquired hypothyroidism    Anxiety    Asthma (HCC)    Belching    Bloating    Chronic rhinitis    CKD (chronic kidney disease) stage 3, GFR 30-59 ml/min ([facility])    Constipation    COPD (chronic obstructive pulmonary disease) ([facility])    NO OXYGEN     Depression    Diarrhea, unspecified    Diverticulosis of large intestine    Emphysema    Esophageal reflux    Fatigue    Feeling lonely    Flatulence/gas pain/belching    Food intolerance    H/O bronchitis    Hay fever    Hemorrhoids    High blood pressure    History of depression    Hypercholesterolemia    Hypertension    Itch of skin    Migraines    Mild intermittent asthma without complication ([facility])    Nausea    Night sweats    Pneumonia    PONV (postoperative nausea and vomiting)    Pure hypercholesterolemia    Rash    Reflux    Sleep disturbance    Stress    Uncomfortable fullness  after meals    Visual impairment    glasses    Vomiting    Wears glasses     Past Surgical History:   Past Surgical History:   Procedure Laterality Date    Cataract      Colonoscopy      Colonoscopy N/A 3/21/2024    Procedure: COLONOSCOPY;  Surgeon: eDlon Ashford DO;  Location:  ENDOSCOPY    Colonoscopy & polypectomy  09/2014    multiple polyps; tics; repeat 3 yrs    Colonoscopy,remv lesn,snare N/A 09/22/2014    Procedure: ESOPHAGOGASTRODUODENOSCOPY, COLONOSCOPY, POSSIBLE BIOPSY, POSSIBLE POLYPECTOMY 52040,09278;  Surgeon: Slade Ivan MD;  Location: [facility]    Correct bunion,simple Bilateral     Cystotomy,excis vesical neck Left     Egd      Gastro - dmg  09/2014    stricture; HH    Oophorectomy Bilateral 2017    Other surgical history  02/27/2023    Robotic repair of hiatal hernia and Nissen fundoplication    Patient documented not to have experienced any of the following events N/A 09/22/2014    Procedure: ESOPHAGOGASTRODUODENOSCOPY, COLONOSCOPY, POSSIBLE BIOPSY, POSSIBLE POLYPECTOMY 82347,90226;  Surgeon: Slade Ivan MD;  Location: [facility]    Patient withough preoperative order for iv antibiotic surgical site infection prophylaxis. N/A 09/22/2014    Procedure: ESOPHAGOGASTRODUODENOSCOPY, COLONOSCOPY, POSSIBLE BIOPSY, POSSIBLE POLYPECTOMY 23827,67310;  Surgeon: Slade Ivan MD;  Location: [facility]    Repair ing hernia,5+y/o,reducibl      Upper gi endoscopy,diagnosis N/A 09/22/2014    Procedure: ESOPHAGOGASTRODUODENOSCOPY, COLONOSCOPY, POSSIBLE BIOPSY, POSSIBLE POLYPECTOMY 27617,17147;  Surgeon: Slade Ivan MD;  Location: [facility]      Family History:   Family History   Problem Relation Age of Onset    Colon Cancer Mother     Other (Other) Mother     Other (copd) Mother     Alcohol and Other Disorders Associated Father     Other (Other) Father     Other (emph) Father     Alcohol and Other Disorders Associated Son     Hypertension Sister     Prostate  Cancer Brother     Hypertension Brother      Social History:    reports that she quit smoking about 43 years ago. Her smoking use included cigarettes. She started smoking about 53 years ago. She has never used smokeless tobacco. She reports that she does not drink alcohol and does not use drugs.     Allergies:   Allergies   Allergen Reactions    Avelox [Moxifloxacin Hcl In Nacl] SWELLING    Avelox [Moxifloxacin Hydrochloride] TONGUE SWELLING     \"TABS\"    Amoxicillin NAUSEA AND VOMITING     Vomiting.    Statins Myopathy    Sulfa Antibiotics RASH and ITCHING    Dander OTHER (SEE COMMENTS)     \"Animals\": runny nose, watery eyes, itching    Diphenhydramine Runny nose     \"Animals\": runny nose, watery eyes, itching    Dust Runny nose    Latex RASH    Sulfa Drugs Cross Reactors RASH and ITCHING       Medications:    Current Facility-Administered Medications on File Prior to Encounter   Medication Dose Route Frequency Provider Last Rate Last Admin    [COMPLETED] iron sucrose (Venofer) 20 mg/mL injection 200 mg  200 mg Intravenous Once Marcela Dangelo MD   200 mg at 06/12/24 1241    [COMPLETED] acetaminophen (Tylenol Extra Strength) tab 1,000 mg  1,000 mg Oral Once Tyrone Kumari MD   1,000 mg at 06/10/24 1700    [COMPLETED] HYDROmorphone (Dilaudid) 1 MG/ML injection 0.5 mg  0.5 mg Intravenous Q30 Min PRN Tyorne Kumari MD   0.5 mg at 06/12/24 0902    [COMPLETED] sodium chloride 0.9% infusion 1,000 mL  1,000 mL Intravenous Once Tyrone Kumari  mL/hr at 06/10/24 2010 1,000 mL at 06/10/24 2010    [COMPLETED] ondansetron (Zofran) 4 MG/2ML injection 4 mg  4 mg Intravenous Once Tyrone Kumari MD   4 mg at 06/10/24 2005    [COMPLETED] iopamidol 76% (ISOVUE-370) injection for power injector  60 mL Intravenous ONCE PRN Tyrone Kumari MD   60 mL at 06/10/24 1959    [COMPLETED] piperacillin-tazobactam (Zosyn) 4.5 g in dextrose 5% 100 mL IVPB-ADDV  4.5 g Intravenous Once Tyrone Kumari MD   Stopped at 06/10/24 2122     [COMPLETED] ondansetron (Zofran) 4 MG/2ML injection 4 mg  4 mg Intravenous Once Tal Anderson MD   4 mg at 05/13/24 1657    [COMPLETED] pantoprazole (Protonix) 40 mg in sodium chloride 0.9% PF 10 mL IV push  40 mg Intravenous Once Tal Anderson MD   40 mg at 05/13/24 1657    [COMPLETED] acetaminophen (Tylenol Extra Strength) tab 1,000 mg  1,000 mg Oral Once Tal Anderson MD   1,000 mg at 05/13/24 1657    [COMPLETED] iopamidol 76% (ISOVUE-370) injection for power injector  75 mL Intravenous ONCE PRN Tal Anderson MD   75 mL at 05/13/24 1803    [COMPLETED] ketorolac (Toradol) 15 MG/ML injection 15 mg  15 mg Intravenous Once Tal Anderson MD   15 mg at 05/13/24 1838    [COMPLETED] sodium chloride 0.9 % IV bolus 500 mL  500 mL Intravenous Once Margot Blanco  mL/hr at 03/28/24 1126 500 mL at 03/28/24 1126    [COMPLETED] sorbitol 70 % oral solution 30 mL  30 mL Oral Once Margot Blanco MD   30 mL at 03/28/24 1603    [COMPLETED] sodium chloride 0.9% infusion   Intravenous Once Yemi Mauro, DO 10 mL/hr at 03/23/24 0140 New Bag at 03/23/24 0140    [COMPLETED] potassium chloride 20 mEq/100mL IVPB premix 20 mEq  20 mEq Intravenous Once Marcela Dangelo MD   Stopped at 03/22/24 1031    [COMPLETED] clonazePAM (KlonoPIN) tab 0.5 mg  0.5 mg Oral Once Marcela Dangelo MD   0.5 mg at 03/22/24 1130    [COMPLETED] iopamidol 76% (ISOVUE-370) injection for power injector  75 mL Intravenous ONCE PRN Marcela Dangelo MD   75 mL at 03/22/24 1443    [COMPLETED] sodium chloride 0.9% infusion   Intravenous Once Delon Ashford, DO 10 mL/hr at 03/21/24 1100 New Bag at 03/21/24 1100    [COMPLETED] iopamidol 76% (ISOVUE-370) injection for power injector  75 mL Intravenous ONCE PRN Marcela Dangelo MD   75 mL at 03/21/24 1816    [COMPLETED] potassium chloride 40 mEq in 250mL sodium chloride 0.9% IVPB premix  40 mEq Intravenous Once Marcela Dangelo MD 62.5 mL/hr at 03/21/24 2000 40 mEq at 03/21/24 2000    [COMPLETED] sodium  chloride 0.9% infusion   Intravenous Once Yemi Mauro,  mL/hr at 03/21/24 2230 Bolus from Bag at 03/21/24 2230    [COMPLETED] sodium chloride 0.9% infusion   Intravenous Once Yemi Mauro, DO 10 mL/hr at 03/21/24 2357 New Bag at 03/21/24 2357    [COMPLETED] pantoprazole (Protonix) 40 mg in sodium chloride 0.9% PF 10 mL IV push  40 mg Intravenous Once Tal Anderson MD   40 mg at 03/20/24 0701    [COMPLETED] sodium chloride 0.9 % IV bolus 1,000 mL  1,000 mL Intravenous Once Tal Anderson MD 1,000 mL/hr at 03/20/24 0802 1,000 mL at 03/20/24 0802    [COMPLETED] acetaminophen (Tylenol Extra Strength) tab 1,000 mg  1,000 mg Oral Once Tal Anderson MD   1,000 mg at 03/20/24 0809    [COMPLETED] ondansetron (Zofran) 4 MG/2ML injection 4 mg  4 mg Intravenous Once Tal Anderson MD   4 mg at 03/20/24 1020    [COMPLETED] clonazePAM (KlonoPIN) tab 0.5 mg  0.5 mg Oral Once Tal Anderson MD   0.5 mg at 03/20/24 1020    [COMPLETED] polyethylene glycol-electrolyte (Golytely) 236 g oral solution 4,000 mL  4,000 mL Oral Once Yasmine Dolan APRN   4,000 mL at 03/20/24 1824    [COMPLETED] sodium chloride 0.9 % IV bolus 500 mL  500 mL Intravenous Once Geena Pardo  mL/hr at 03/20/24 2135 500 mL at 03/20/24 2135     Current Outpatient Medications on File Prior to Encounter   Medication Sig Dispense Refill    multivitamin with minerals Oral Tab Take 1 tablet by mouth daily. 30 tablet 1    amoxicillin-pot clavulanate 400-57 mg/5mL Oral Recon Susp Take 7 mL (560 mg total) by mouth 2 (two) times daily for 10 days. 140 mL 0    CLONAZEPAM 0.5 MG Oral Tab TAKE 1 TABLET(0.5 MG) BY MOUTH TWICE DAILY AS NEEDED 55 tablet 0    DULoxetine (CYMBALTA) 30 MG Oral Cap DR Particles Take 1 capsule (30 mg total) by mouth daily. 30 capsule 0    meclizine 12.5 MG Oral Tab Take 1 tablet (12.5 mg total) by mouth 3 (three) times daily as needed. 30 tablet 1    ondansetron 4 MG Oral Tablet Dispersible Take  1 tablet (4 mg total) by mouth every 4 (four) hours as needed for Nausea. 30 tablet 0    mirtazapine 30 MG Oral Tab Take 1 tablet (30 mg total) by mouth nightly. 90 tablet 1    fluticasone propionate 50 MCG/ACT Nasal Suspension 2 sprays by Each Nare route daily. 1 each 1    losartan 25 MG Oral Tab Take 1 tablet (25 mg total) by mouth daily. 90 tablet 1    albuterol (PROAIR HFA) 108 (90 Base) MCG/ACT Inhalation Aero Soln INHALE 2 PUFFS BY MOUTH EVERY 6 HOURS AS NEEDED FOR WHEEZING 8.5 g 2    SYMBICORT 160-4.5 MCG/ACT Inhalation Aerosol INHALE 2 PUFFS BY MOUTH TWICE DAILY 10.2 g 11    Cholecalciferol (VITAMIN D-3 OR) Take by mouth.      DUPIXENT 300 MG/2ML Subcutaneous Solution Prefilled Syringe       clobetasol 0.05 % External Ointment       TRIAMCINOLONE 0.1 % External Cream APPLY TOPICALLY TO THE AFFECTED AREA TWICE DAILY AS NEEDED (Patient taking differently: as needed.) 60 g 3    acetaminophen 500 MG Oral Tab Take 1 tablet (500 mg total) by mouth every 6 (six) hours as needed for Pain.         Review of Systems:   A comprehensive review of systems was completed.    Pertinent positives and negatives noted in the HPI.    Objective:   Physical Exam:    /56   Pulse 57   Temp 96.9 °F (36.1 °C) (Temporal)   Resp 17   Ht 5' (1.524 m)   Wt 90 lb (40.8 kg)   SpO2 97%   BMI 17.58 kg/m²   General: No acute distress, Alert  Respiratory: No rhonchi, no wheezes  Cardiovascular: S1, S2. Regular rate and rhythm  Abdomen: Soft, Non-tender, non-distended, positive bowel sounds  Neuro: No new focal deficits  Extremities: No edema      Results:    Labs:      Labs Last 24 Hours:    Recent Labs   Lab 06/11/24  0748 06/12/24  0603 06/13/24  0923 06/14/24  1209 06/17/24  1712   RBC 3.53* 2.93* 3.24* 3.04* 3.19*   HGB 10.0* 8.2* 9.0* 8.5* 8.9*   HCT 30.2* 24.5* 27.1* 25.2* 26.5*   MCV 85.6 83.6 83.6 82.9 83.1   MCH 28.3 28.0 27.8 28.0 27.9   MCHC 33.1 33.5 33.2 33.7 33.6   RDW 14.4 14.2 14.1 14.3 14.6   NEPRELIM 21.49*  16.05*  --   --  7.45   WBC 24.0* 17.4* 13.1* 9.2 9.7   .0 261.0 281.0 303.0 386.0       Recent Labs   Lab 06/12/24  0603 06/14/24  1209 06/17/24  1712   * 98 92   BUN 9 5* 7*   CREATSERUM 0.80 0.69 0.64   EGFRCR 75 88 90   CA 8.3* 8.4* 9.0   ALB  --   --  2.6*   * 136 141   K 3.5 3.5 3.4*    107 108   CO2 19.0* 22.0 26.0   ALKPHO  --   --  73   AST  --   --  21   ALT  --   --  15   BILT  --   --  0.3   TP  --   --  6.2*       Lab Results   Component Value Date    PT 12.6 01/31/2013    PT 13.2 08/02/2011    INR 1.05 03/20/2024    INR 0.99 12/29/2022    INR 1.09 03/01/2018       No results for input(s): \"TROP\", \"TROPHS\", \"CK\" in the last 168 hours.    No results for input(s): \"TROP\", \"PBNP\" in the last 168 hours.    No results for input(s): \"PCT\" in the last 168 hours.    Imaging: Imaging data reviewed in Epic.    Assessment & Plan:          #Multiple pericolonic abscess  #Acute diverticulitis  -General surgery to see  -IV Zosyn  -IV fluids and pain control    #Debility  -Therapies to see    #Normocytic anemia  #Iron deficiency    #Orthostatic hypotension    #Essential hypertension    #CKD 3    #Hyperlipidemia    #Hypothyroidism    #COPD        Plan of care discussed with ER physician, patient, patient's family     Lokesh Temple DO    Supplementary Documentation:     The 21st Century Cures Act makes medical notes like these available to patients in the interest of transparency. Please be advised this is a medical document. Medical documents are intended to carry relevant information, facts as evident, and the clinical opinion of the practitioner. The medical note is intended as peer to peer communication and may appear blunt or direct. It is written in medical language and may contain abbreviations or verbiage that are unfamiliar.

## 2024-06-18 NOTE — IMAGING NOTE
1345- Spoke w/ DONNIE Stanton regarding ordered CT abscess drain placement.  Per DONNIE Stanton, pt has been NPO since midnight, has saline locked IV, and blood thinners are on hold.  Per DONNIE Stanton, pt is not consentable; radiology RN filed consent form under communications tab and floor RN to reach out to pt's granddaughter to obtain consent on floor. Informed DONNIE Stanton that pt needs stat PT/INR sent.  Radiology RN to call back and confirm time for procedure.    1400- DONNIE Stanton provided phone numbers for pt's son Jose and granddaughter Yareil.  Contact information passed along to radiologist Dr. Gautam to obtain consent.    1500- Spoke w/ DONNIE Stanton; informed radiology RN that she was able to get ahold of both son Jose and granddadughter Yareli to obtain consent for procedure.

## 2024-06-18 NOTE — PROCEDURES
[facility]  Pre-Procedure Note    Name: Bibi Diaz  MRN#: [id number]  : 1945    Procedure:  CT guided abscess drain placement    Indication: Abdominal Abscess.  Sigmoid diverticulitis    Allergies:    Allergies   Allergen Reactions    Avelox [Moxifloxacin Hcl In Nacl] SWELLING    Avelox [Moxifloxacin Hydrochloride] TONGUE SWELLING     \"TABS\"    Amoxicillin NAUSEA AND VOMITING     Vomiting.    Statins Myopathy    Sulfa Antibiotics RASH and ITCHING    Dander OTHER (SEE COMMENTS)     \"Animals\": runny nose, watery eyes, itching    Diphenhydramine Runny nose     \"Animals\": runny nose, watery eyes, itching    Dust Runny nose    Latex RASH    Sulfa Drugs Cross Reactors RASH and ITCHING       Pertinent Medications:    Is patient on any Aspirin, Coumadin, or any other Anticoagulations/Antiplatelet medications?  not currently      Mental Status:  Alert and Oriented      Health Status: Acceptable for Procedure    Impression and Plans:    Pericolonic left pelvic abscess requiring CT guided drainage.    I have reviewed the above information prior to procedure.    I have discussed the risks and benefits and alternatives with the patient's granddaughter via the phone.  The patient's grandaughter voices understanding and agrees to proceed with plan of care.    Luke Gautam MD

## 2024-06-18 NOTE — PROCEDURES
[facility] Patient Status:  Inpatient    1945 MRN [id number]   Location [facility] 5NW-A Attending Lokesh Temple DO   Hosp Day # 0 PCP Rick Shannon DO         Brief Procedure Report    Pre-Operative Diagnosis: Diverticular abscess requiring drainage.    Post-Operative Diagnosis: Same as above.    Procedure Performed: CT guided left pelvic abscess drain placement    Anesthesia: 1% lidocaine.  Moderate sedation    EBL: <1cc    Complications: None    Summary of Case: 10cc of purulent fluid aspirated from the LLQ peritoneal abscess.  8.5 Fr. All Purpose Drainage Catheter placed into abscess cavity.  Catheter left to suction bulb drain. Fluid was sent for routine gram stain and cultures. Patient tolerated procedure well without immediate complication. Full report to follow in PACS.    Luke Gautam

## 2024-06-19 ENCOUNTER — APPOINTMENT (OUTPATIENT)
Dept: GENERAL RADIOLOGY | Facility: HOSPITAL | Age: 79
DRG: 391 | End: 2024-06-19
Attending: STUDENT IN AN ORGANIZED HEALTH CARE EDUCATION/TRAINING PROGRAM

## 2024-06-19 ENCOUNTER — APPOINTMENT (OUTPATIENT)
Dept: MRI IMAGING | Facility: HOSPITAL | Age: 79
DRG: 391 | End: 2024-06-19
Attending: STUDENT IN AN ORGANIZED HEALTH CARE EDUCATION/TRAINING PROGRAM

## 2024-06-19 LAB — POTASSIUM SERPL-SCNC: 3.4 MMOL/L (ref 3.5–5.1)

## 2024-06-19 PROCEDURE — 70551 MRI BRAIN STEM W/O DYE: CPT | Performed by: STUDENT IN AN ORGANIZED HEALTH CARE EDUCATION/TRAINING PROGRAM

## 2024-06-19 PROCEDURE — 99233 SBSQ HOSP IP/OBS HIGH 50: CPT | Performed by: STUDENT IN AN ORGANIZED HEALTH CARE EDUCATION/TRAINING PROGRAM

## 2024-06-19 PROCEDURE — 71045 X-RAY EXAM CHEST 1 VIEW: CPT | Performed by: STUDENT IN AN ORGANIZED HEALTH CARE EDUCATION/TRAINING PROGRAM

## 2024-06-19 PROCEDURE — 99232 SBSQ HOSP IP/OBS MODERATE 35: CPT | Performed by: SURGERY

## 2024-06-19 RX ORDER — MORPHINE SULFATE 2 MG/ML
0.5 INJECTION, SOLUTION INTRAMUSCULAR; INTRAVENOUS EVERY 4 HOURS PRN
Status: DISCONTINUED | OUTPATIENT
Start: 2024-06-19 | End: 2024-06-24

## 2024-06-19 RX ORDER — MORPHINE SULFATE 2 MG/ML
1 INJECTION, SOLUTION INTRAMUSCULAR; INTRAVENOUS EVERY 4 HOURS PRN
Status: DISCONTINUED | OUTPATIENT
Start: 2024-06-19 | End: 2024-06-24

## 2024-06-19 RX ORDER — ACETAMINOPHEN 500 MG
1000 TABLET ORAL EVERY 4 HOURS PRN
Status: DISCONTINUED | OUTPATIENT
Start: 2024-06-19 | End: 2024-06-24

## 2024-06-19 RX ORDER — TRAMADOL HYDROCHLORIDE 50 MG/1
50 TABLET ORAL EVERY 12 HOURS PRN
Status: DISCONTINUED | OUTPATIENT
Start: 2024-06-19 | End: 2024-06-24

## 2024-06-19 RX ORDER — SODIUM CHLORIDE 9 MG/ML
INJECTION, SOLUTION INTRAVENOUS CONTINUOUS
Status: DISCONTINUED | OUTPATIENT
Start: 2024-06-19 | End: 2024-06-20

## 2024-06-19 NOTE — PROGRESS NOTES
[facility]   part of [facility]  Progress Note      Bibi Diaz Patient Status:  Inpatient    1945 MRN [id number]   Location [facility] 5NW-A Attending Margot Blanco MD   Hosp Day # 1 PCP Rick Shannon DO       Subjective:   Patient resting comfortably    Objective:   78 year-old female with left pelvic fluid collection s/p drain placement    Vital Signs:  Blood pressure 149/56, pulse 63, temperature 98.1 °F (36.7 °C), temperature source Oral, resp. rate 16, height 60\", weight 96 lb 3.2 oz, SpO2 96%, not currently breastfeeding.    Input/Output:    Intake/Output Summary (Last 24 hours) at 2024 1659  Last data filed at 2024 1535  Gross per 24 hour   Intake --   Output 635 ml   Net -635 ml     General: NAD  Neck: No JVD  Cardiac: Normal  Rate  Lungs: Non-labored respirations  Abdomen: Nondistended      Results:       Assessment and Plan:   78 year-old female with left pelvic fluid collection s/p drain placement    - Low outputs from drain, not unexpected given size of collection  - Cultures still pending  - Continue current drain management  - CT Abscessogram when output < 10 cc/day  - Consider drain removal if output is < 10 cc/day x 2 days and no fistulous communication.    Patrick Kaye MD  2024  4:59 PM

## 2024-06-19 NOTE — CDS QUERY
Malnutrition  CLINICAL DOCUMENTATION CLARIFICATION FORM    Dear Dr. Blacno,   Clinical information (provided below) includes documentation of Moderate Malnutrition by the Clinical Dietician.  PLEASE INDICATE IF YOU ARE IN AGREEMENT WITH THE FOLLOWING   ASSESSMENT BY THE CONSULTANT:  Moderate Malnutrition as documented by the Clinical Dietician on : 6/18/24  [  x] Yes, I agree with this assessment      [  ] No, I do not agree with this assessment  If not in agreement with this assessment, please provide your independent assessment of the nutritional status:  ____________________________        Documentation from the Medical Record:   Dietician Nutritional Assessment: 6/18/24: Pt meets moderate malnutrition criteria.    CRITERIA FOR MALNUTRITION DIAGNOSIS:   Criteria for non-severe malnutrition diagnosis: chronic illness related to energy intake less than75% for greater than 1 month, body fat mild depletion, and muscle mass mild depletion     Nutrition Diagnosis/Problem: Malnutrition related to insufficient appetite resulting in inadequate nutrition intake as evidenced by documented/reported insufficient oral intake, documented/reported unintentional weight loss, loss of fat mass, and loss of muscle mass     Nutrition Focused Physical Exam (NFPE):      1. Body Fat/Muscle Mass: moderate depletion body fat Buccal fat pad and Triceps and moderate muscle depletion Temple region, Clavicle region, Thigh region, and Calf region      2. Fluid Accumulation: none per RN documentation           For questions regarding this query, please contact Clinical Documentation :   [email or web address] [phone]  Thank You  THIS FORM IS A PERMANENT PART OF THE MEDICAL RECORD

## 2024-06-19 NOTE — CM/SW NOTE
Submitted clinical via [employer] portal.  [employer] Case/Auth ID is [id number].  Final insurance authorization is pending at this time.      SW/CM assigned to the case will continue to follow auth status.      Erika Caldwell, DSC      Addendum:  6/19 -- [facility] ID [id number], approved 6/20 - 6/24       er

## 2024-06-19 NOTE — PROGRESS NOTES
Patient A&O X 3. Forgetful. Room air. IV Abx. VALERI drain L side. IVF. NSR on Tele. Heparin. Afebrile. Purewick and briefed. Denies any pain at this time. Full liquid diet, advance as tolerated. Up max assist. No further needs at this time.

## 2024-06-19 NOTE — SPIRITUAL CARE NOTE
Spiritual Care Visit Note    Patient Name: Bibi Diaz Date of Spiritual Care Visit: 24   : 1945 Primary Dx: Weakness generalized       Referred By: Referral From: Other (Comment)    Spiritual Care Taxonomy:    Intended Effects: Aligning care plan with patient's values    Methods: Offer support    Interventions: Acknowledge current situation    Visit Type/Summary:      - PoA: Patient unable to complete PoAH at this time:  remains available for follow up.    Spiritual Care support can be requested via an Epic consult.   For urgent/immediate needs, please contact the On Call  at: Edward: ext [phone]

## 2024-06-19 NOTE — CM/SW NOTE
Met with patient, who was sleeping, and left accepting CHUY list at bedside.    Spoke with patient's granddaughter (Yareli) to discuss discharge planning. Discussed CHUY and need for insurance auth. Yareli, her dad, and uncle (Jose) will review and further discuss. Emailed list to Yareli at [email or web address]. Await choice.    SW requested department  (DSC) to initiate GLORIA insurance auth.    MICHELLE/CARIN to remain available for support and/or discharge planning.    KELSEY Beasley  Discharge Planner  [phone]

## 2024-06-19 NOTE — PROGRESS NOTES
[facility]   part of [facility]     Hospitalist Progress Note     Bibi Diaz Patient Status:  Observation    1945 MRN [id number]   Location [facility] 5NW-A Attending Rich Jesus MD   Hosp Day # 1 PCP Rick Shannon DO     Chief Complaint: weakness, confusion, poor PO intake    Subjective:     Patient feels weak today, some nausea.     Objective:    Review of Systems:   A comprehensive review of systems was completed; pertinent positive and negatives stated in subjective.    Vital signs:  Temp:  [98 °F (36.7 °C)-98.9 °F (37.2 °C)] 98.9 °F (37.2 °C)  Pulse:  [60-88] 75  Resp:  [11-18] 17  BP: (134-160)/(55-76) 139/71  SpO2:  [93 %-99 %] 94 %    Physical Exam:    General: No acute distress, weak  Respiratory: No wheezes, no rhonchi  Cardiovascular: S1, S2, regular rate and rhythm  Abdomen: Soft, mild tender, non-distended, positive bowel sounds  Neuro: No new focal deficits.   Extremities: No edema      Diagnostic Data:    Labs:  Recent Labs   Lab 24  0923 24  1209 24  17124  0719 24  1405   WBC 13.1* 9.2 9.7 12.2*  --    HGB 9.0* 8.5* 8.9* 8.7*  --    MCV 83.6 82.9 83.1 85.2  --    .0 303.0 386.0 423.0  --    INR  --   --   --   --  1.35*       Recent Labs   Lab 24  1209 24  1712 24  0719 24  0713   GLU 98 92 96  --    BUN 5* 7* 5*  --    CREATSERUM 0.69 0.64 0.66  --    CA 8.4* 9.0 8.5  --    ALB  --  2.6* 2.5*  --     141 144  --    K 3.5 3.4* 3.3* 3.4*    108 113*  --    CO2 22.0 26.0 23.0  --    ALKPHO  --  73 68  --    AST  --  21 18  --    ALT  --  15 14  --    BILT  --  0.3 0.3  --    TP  --  6.2* 5.8*  --        Estimated Creatinine Clearance: 47.6 mL/min (based on SCr of 0.66 mg/dL).    No results for input(s): \"TROP\", \"TROPHS\", \"CK\" in the last 168 hours.    Recent Labs   Lab 24  1405   PTP 16.8*   INR 1.35*                  Microbiology    Hospital Encounter on 24   1. Anaerobic Culture      Status: None (Preliminary result)    Collection Time: 06/18/24  4:46 PM    Specimen: Abdominal peritoneum; Abscess   Result Value Ref Range    Anaerobic Culture Pending N/A   2. Aerobic Bacterial Culture     Status: Abnormal (Preliminary result)    Collection Time: 06/18/24  4:46 PM    Specimen: Abdominal peritoneum; Abscess   Result Value Ref Range    Aerobic Culture Result 2+ growth Escherichia coli (A) N/A    Aerobic Smear 4+ WBCs seen N/A    Aerobic Smear 1+ Gram Positive Cocci N/A    Aerobic Smear 2+ Gram Negative Rods N/A    Aerobic Smear 1+ Gram Positive Rods N/A         Imaging: Reviewed in Epic.    Medications:    piperacillin-tazobactam  3.375 g Intravenous Q8H    heparin  5,000 Units Subcutaneous 2 times per day    DULoxetine  30 mg Oral Daily    mirtazapine  30 mg Oral Nightly    multivitamin with minerals  1 tablet Oral Daily    fluticasone furoate-vilanterol  1 puff Inhalation Daily       Assessment & Plan:      #Multiple pericolonic abscess  #Acute diverticulitis  -General surgery to see  -IV Zosyn  -IV fluids and pain control     #Debility  -Therapies to see, may need placement  - CT head with chronic changes, no acute process    #Hypokalemia  - replace per protocol     #Normocytic anemia  #Iron deficiency     #Orthostatic hypotension     #Essential hypertension     #CKD 3     #Hyperlipidemia     #Hypothyroidism     #COPD      Margot Blanco MD    Supplementary Documentation:     Quality:  DVT Mechanical Prophylaxis:   SCDs,    DVT Pharmacologic Prophylaxis   Medication    heparin (Porcine) 5000 UNIT/ML injection 5,000 Units                Code Status: DNAR/Selective Treatment  Flood: External urinary catheter in place  Flood Duration (in days):   Central line:    WOLF:     Discharge is dependent on: progress  At this point Ms. Diaz is expected to be discharge to: TBD    The 21st Century Cures Act makes medical notes like these available to patients in the interest of transparency. Please be  advised this is a medical document. Medical documents are intended to carry relevant information, facts as evident, and the clinical opinion of the practitioner. The medical note is intended as peer to peer communication and may appear blunt or direct. It is written in medical language and may contain abbreviations or verbiage that are unfamiliar.       Called PT next of kin Yareli- at 1:39 pm- all questions answered.

## 2024-06-19 NOTE — PROGRESS NOTES
A+O x 2 this shift. Lethargic. STAT MRI ordered, MRI screening done. IVF and IV ABX as ordered. Poor appetite. Call light within reach. All needs met.

## 2024-06-19 NOTE — PAYOR COMM NOTE
--------------  ADMISSION REVIEW     Payor: [employer]  Subscriber #:  [id number]  Authorization Number: [id number].    Admit date: 6/18/24  Admit time:  2:21 PM       REVIEW DOCUMENTATION:  ED Provider Notes signed by Jarrod Gonzalez MD at 6/17/2024  9:06 PM      Patient Seen in: [facility] Emergency Department    History     Chief Complaint   Patient presents with    Fatigue    Fall     Stated Complaint: fall, weakness  Subjective:   HPI  Patient is a 78-year-old female presenting for evaluation of fall out of bed and weakness.  Patient was recently admitted here for which she is as well as UTI although per discharge summary was for diverticulitis.  She states she lives alone although family members live in the same apartment complex.  She states she still has a very poor appetite and has been very weak.  Has been sleeping a lot because she is just so fatigued.  No fevers, no vomiting just not eating.  Has been urinating frequently and she is concerned she may still have a UTI although no dysuria or hematuria.  Today she remembers sleeping in bed, next thing she knew she was on the floor.  Does not think she hit her head and states nothing hurts.  She thinks she was try to get up to go to the bathroom although she does not completely remember.  She was able to call her family they came and helped her up and put her back to bed, a little bit later she tried to get up again but was still very weak so she called the paramedics.    No pertinent past medical history.   No pertinent past surgical history.     Review of Systems  Positive for stated complaint: fall, weakness  Other systems are as noted in HPI.  Constitutional and vital signs reviewed.    All other systems reviewed and negative except as noted above.    Physical Exam     ED Triage Vitals   BP 06/17/24 1704 146/62   Pulse 06/17/24 1704 57   Resp 06/17/24 1704 18   Temp 06/17/24 1702 96.9 °F (36.1 °C)   Temp src 06/17/24 1702 Temporal    SpO2 06/17/24 1704 98 %   O2 Device 06/17/24 1704 None (Room air)     Current Vitals:   Vital Signs  BP: 113/56  Pulse: 51  Resp: 17  Temp: 96.9 °F (36.1 °C)  Temp src: Temporal  MAP (mmHg): 67    Oxygen Therapy  SpO2: 98 %  O2 Device: None (Room air)    Physical Exam  Vitals and nursing note reviewed.   Constitutional:       Appearance: She is well-developed.   HENT:      Head: Normocephalic and atraumatic.   Eyes:      Conjunctiva/sclera: Conjunctivae normal.      Pupils: Pupils are equal, round, and reactive to light.   Neck:      Comments: No midline cervical spine tenderness to palpation.  Cardiovascular:      Rate and Rhythm: Normal rate and regular rhythm.      Heart sounds: Normal heart sounds.   Pulmonary:      Effort: Pulmonary effort is normal.      Breath sounds: Normal breath sounds.   Abdominal:      General: Bowel sounds are normal.      Palpations: Abdomen is soft.   Musculoskeletal:      Cervical back: Normal range of motion and neck supple.      Comments: No areas of focal tenderness, obvious deformity or dislocation.   Skin:     General: Skin is warm and dry.   Neurological:      Mental Status: She is alert and oriented to person, place, and time.     ED Course     Labs Reviewed   COMP METABOLIC PANEL (14) - Abnormal; Notable for the following components:       Result Value    Potassium 3.4 (*)     BUN 7 (*)     Total Protein 6.2 (*)     Albumin 2.6 (*)     A/G Ratio 0.7 (*)     All other components within normal limits   URINALYSIS WITH CULTURE REFLEX - Abnormal; Notable for the following components:    Spec Gravity <1.005 (*)     All other components within normal limits   CBC W/ DIFFERENTIAL - Abnormal; Notable for the following components:    RBC 3.19 (*)     HGB 8.9 (*)     HCT 26.5 (*)     All other components within normal limits   LIPASE - Normal   CBC WITH DIFFERENTIAL WITH PLATELET     CT BRAIN OR HEAD (06269)  Result Date: 6/17/2024  CONCLUSION:  Chronic changes in the brain, as  described, but no acute intracranial bleed, or other acute intracranial process identified.        CT ABDOMEN+PELVIS(CONTRAST ONLY)(CPT=74177)  Result Date: 6/10/2024  CONCLUSION:   Acute diverticulitis involving the sigmoid colon without a drainable abscess at this time.  There is mild mass effect on the urinary bladder due to the inflammation.       XR CHEST AP PORTABLE  (CPT=71045)  Result Date: 6/10/2024  CONCLUSION:  No acute disease.      MDM      78-year-old female presenting with chief complaint of ongoing anorexia, fatigue and weakness.  Recent admission for diverticulitis but states she is still not eating and just so weak.  Apparently fell out of bed today, she thinks she was turning up to over the bathroom and just was too weak to get up and ended up on the floor.  She does not have a headache and denies any pain from this.  Very flat affect, speaking slowly and very quietly here.  Does not appear to be in distress.  Labs ordered to evaluate for infection, dehydration, electrolyte abnormality.  Although she does not think she hit her head we will get a CT brain to evaluate for intracranial injury.  Admission disposition: 6/17/2024  9:06 PM    Updated 8:10 PM.  No leukocytosis.  Chronic anemia which is at baseline.  No dehydration or electrolyte abnormality.  No UTI.  CT brain is normal.  No indication for medical admission.  Attempted to road test here and the patient was unable to even sit up in the bed independently.  She lives alone and cannot go home.  Discussed with the  as far as possible placement options and she will investigate.    Update at 9:05 PM.  Case discussed with .  Unable to place anywhere tonight that she would require proper authorization.  She is not safe to go home, family states they cannot help her there so unfortunately she is going to have to be admitted at least for tonight.    Past Medical History-, high cholesterol, CKD    Differential diagnosis before  testing included dehydration, infection, stroke    Co-morbidities that add to the complexity of management include: None    Testing ordered during this visit included labs, UA, CT    Radiographic images  I personally reviewed the radiographs and my individual interpretation shows no ICH or stroke  I also reviewed the official reports that showed no ICH or stroke    External chart review showed reviewed recent discharge summary    Discussion of management with hospitalist, case management    Disposition:  Admission  I have discussed with the patient the results of test, differential diagnosis, and treatment plan. They expressed clear understanding of these instructions and agrees to the plan provided.     Medical Decision Making    Disposition and Plan     Clinical Impression:  1. Weakness generalized       Disposition:  Admit  6/17/2024  9:06 pm    Hospital Problems       Present on Admission  Date Reviewed: 5/16/2024            ICD-10-CM Noted POA    * (Principal) Weakness generalized R53.1 6/17/2024 Unknown       Jarrod Gonzalez MD on 6/17/2024  9:06 PM    History and Physical   Chief Complaint: worsening debility       78 year old female with past medical history hypothyroidism, anxiety, asthma, CKD 3, COPD, essential hypertension, hyperlipidemia, recent hospitalization for diverticulitis presents to hospital today for worsening debility and pain.  Patient was discharged 6/14 after hospitalization for diverticulitis but was unable to take antibiotics because \"it made her nauseous\".  Patient continued feel further weakness and abdominal pain and decided come to the hospital for further evaluation.  Patient had her granddaughter at the bedside lives nearby.  Patient reportedly having hard time completing ADLs.  No nausea vomiting no fevers or chills.     /56  Pulse 57  Temp 96.9 °F (36.1 °C) (Temporal)  Resp 17  Ht 5' (1.524 m)  Wt 90 lb (40.8 kg)  SpO2 97%  BMI 17.58 kg/m²       Lab 06/11/24  0748  06/12/24  0603 06/13/24  0923 06/14/24  1209 06/17/24  1712   RBC 3.53* 2.93* 3.24* 3.04* 3.19*   HGB 10.0* 8.2* 9.0* 8.5* 8.9*   HCT 30.2* 24.5* 27.1* 25.2* 26.5*   MCV 85.6 83.6 83.6 82.9 83.1   MCH 28.3 28.0 27.8 28.0 27.9   MCHC 33.1 33.5 33.2 33.7 33.6   RDW 14.4 14.2 14.1 14.3 14.6   NEPRELIM 21.49* 16.05*  --   --  7.45   WBC 24.0* 17.4* 13.1* 9.2 9.7   .0 261.0 281.0 303.0 386.0      Lab 06/12/24  0603 06/14/24  1209 06/17/24  1712   * 98 92   BUN 9 5* 7*   CREATSERUM 0.80 0.69 0.64   EGFRCR 75 88 90   CA 8.3* 8.4* 9.0   ALB  --   --  2.6*   * 136 141   K 3.5 3.5 3.4*    107 108   CO2 19.0* 22.0 26.0   ALKPHO  --   --  73   AST  --   --  21   ALT  --   --  15   BILT  --   --  0.3   TP  --   --  6.2*        Assessment & Plan:  #Multiple pericolonic abscess  #Acute diverticulitis  -General surgery to see  -IV Zosyn  -IV fluids and pain control     #Debility  -Therapies to see     #Normocytic anemia  #Iron deficiency     #Orthostatic hypotension     #Essential hypertension     #CKD 3     #Hyperlipidemia     #Hypothyroidism     #COPD    Plan of care discussed with ER physician, patient, patient's family   Lokesh Temple DO  6/17/2024  9:26 PM     General Surgery   Reason for Consultation:  Unwitnessed fall, multiple pericolonic abscesses     Chief Complaint:  Lower abdominal pain     History of Present Illness:  78 year old female who presents to [facility] after an unwitnessed fall out of bed.     She reports feeling dizzy and nauseous for the past couple weeks.  She attributes her symptoms to her gastroparesis.  On Saturday she reports feeling very nauseous, denies vomiting, fevers, or chills.  She reports intermittent symptoms of diarrhea and constipation for which she takes a fiber supplement at home.     On Sunday, she reports going to sleep in the middle of her bed and walking up on the floor.  She states she was too weak to get up and laid on the floor for a few hours. She  called her son, who called her granddaughter to present to [facility]. She isn't sure if she hit her head during the fall. She denies of consciousness.     Upon presentation to the hospital, the patient was hemodynamically stable.  CBC reveals WBC 9.7, hemoglobin 9.8, platelets 386,000. Slightly hypokalemic at 3.4 with no other electrolyte abnormalities.  BUN 7, creatinine 0.64.     CT of the brain and head revealed no acute intracranial bleed or acute intracranial process.  CT of the abdomen and pelvis reveals large amount of stool in the descending and sigmoid colon.  There is pain of the colon in this region with surrounding stranding most likely sterocoral colitis.  Are numerous pericolonic abscess formations including a collection measuring 4.0X 2.7 cm adjacent to the sigmoid colon where there is also stranding and enhancement of the pericolonic tissues.  Additional small suspected abscess cavities with small foci of fluid with surrounding enhancement.     The patient's past medical history significant for for giant paraesophageal hernia, GERD, hypothyroidism, depression, anxiety, hypertension, COPD, hypercholesterolemia, and aortic atherosclerosis. The patient's surgical history significant for tubal ligation.  She denies taking blood thinning medications.    Blood pressure 122/50, pulse 58, temperature 98.2 °F (36.8 °C), temperature source Oral, resp. rate 17, height 60\", weight 96 lb 3.2 oz (43.6 kg), SpO2 94%       Lab 06/12/24  0603 06/14/24  1209 06/17/24 1712 06/18/24  0719   RBC 2.93* 3.04* 3.19* 3.18*   HGB 8.2* 8.5* 8.9* 8.7*   HCT 24.5* 25.2* 26.5* 27.1*   MCV 83.6 82.9 83.1 85.2   MCH 28.0 28.0 27.9 27.4   MCHC 33.5 33.7 33.6 32.1   RDW 14.2 14.3 14.6 14.9   NEPRELIM 16.05*  --  7.45 11.04*   WBC 17.4* 9.2 9.7 12.2*   .0 303.0 386.0 423.0     Lab 06/14/24  1209 06/17/24  1712 06/18/24  0719   GLU 98 92 96   BUN 5* 7* 5*   CREATSERUM 0.69 0.64 0.66   CA 8.4* 9.0 8.5   ALB  --  2.6* 2.5*     141 144   K 3.5 3.4* 3.3*    108 113*   CO2 22.0 26.0 23.0   ALKPHO  --  73 68   AST  --  21 18   ALT  --  15 14   BILT  --  0.3 0.3   TP  --  6.2* 5.8*      Impression:     78-year-old female who was admitted through the ED after an unwitnessed fall out of bed.  The patient reports that she has been falling more frequently over the past few months.  She reports that on Sunday night she went to sleep and subsequently woke up on the floor.  She was eventually assisted by her granddaughter however she was on the ground for several hours per her recollection.  Workup in the emergency department revealed slightly diminished hemoglobin of 8.9, WBC within normal limits, platelet count normal.  CT scan brain was negative for acute injury.  As part of workup, CT scan abdomen and pelvis was performed.  Unexpectedly, multiple pericolonic abscesses were identified.  BOWEL/MESENTERY:  Large amount of hyperdense desiccated appearing stool in the descending colon, and sigmoid colon.  Thickening of the colon in this region with surrounding stranding, most likely stercoral colitis.  Assessment has limitations without   contrast, but there are numerous pericolonic abscess formations including a collection measuring 4.0 x 2.7 cm image 82 adjacent to the sigmoid colon in this region, where there is also stranding and enhancement of the pericolonic soft tissues.  Below   this, for example on image 86 there are additional small suspected abscess cavities with small foci of fluid with surrounding enhancement.  Additional small abscess changes are seen within the inflammation.  More proximally the colon contains moderate   amount of stool.  Small bowel caliber normal.   The patient did have a prior CT scan Rose Mary 10, 2024 which revealed changes consistent with sigmoid diverticulitis.  No evidence of abscess formation at that time.  The patient has had multiple CTAs earlier this year in February and March as part of workup for  GI bleed.  No areas of extravasation of bleeding were identified by CT  Past surgical history-hiatal hernia repair in December 2022 by Dr. Friedman     Since admission, the patient has been afebrile and hemodynamically stable.  On examination she does have some tenderness in the left lower quadrant to deep palpation.  There is no evidence of percussion tenderness, guarding or rebound.  Treatment options were reviewed in detail with Bibi.  At this time the recommendation would be to proceed with IR drainage of pericolonic diverticular abscess.  Continue clear liquid diet, IV antibiotics   patient is comfortable with these recommendations and will proceed as discussed  The possible need for surgical intervention was discussed.  We did discuss that should surgery be indicated in this acute period, surgery would include laparotomy, sigmoid colon resection with creation of end colostomy.  Bibi has no further questions at this time.  The risks, benefits, complications, possible outcomes and alternatives of the procedure were explained to the patient in detail.  Potential complications of this procedure and as with any surgical procedure and anesthesia were reviewed including but not limited to bleeding, infection, thromboembolic event, pneumonia were discussed.  Expected postoperative pain, recuperation and postoperative course and possible complications were also reviewed.  All questions from the patient were answered in detail.  The patient did verbalize understanding and does not have any further questions at this time.  The patient does wish to proceed with the proposed procedure.  MARIAM Brooks MD, FACS    6/18/2024  1:34 PM     Procedure Report  Pre-Operative Diagnosis: Diverticular abscess requiring drainage.  Post-Operative Diagnosis: Same as above.  Procedure Performed: CT guided left pelvic abscess drain placement     Anesthesia: 1% lidocaine.  Moderate sedation     EBL: <1cc  Complications: None     Summary  of Case: 10cc of purulent fluid aspirated from the LLQ peritoneal abscess.  8.5 Fr. All Purpose Drainage Catheter placed into abscess cavity.  Catheter left to suction bulb drain. Fluid was sent for routine gram stain and cultures. Patient tolerated procedure well without immediate complication. Full report to follow in PACS.   Luke Gautam  6/18/2024  4:42 PM     6/19/2024:  POD #1  General Surgery  reports improvement in her abdominal pain. She is now experiencing mild discomfort at her drain site. She denies nausea or vomiting     Blood pressure 160/64, pulse 68, temperature 98.3 °F (36.8 °C), temperature source Oral, resp. rate 18, height 60\", weight 96 lb 3.2 oz (43.6 kg), SpO2 97%     Lab 06/14/24  1209 06/17/24  1712 06/18/24  0719 06/19/24  0713   GLU 98 92 96  --    BUN 5* 7* 5*  --    CREATSERUM 0.69 0.64 0.66  --    CA 8.4* 9.0 8.5  --    ALB  --  2.6* 2.5*  --     141 144  --    K 3.5 3.4* 3.3* 3.4*       Specimen: Abdominal peritoneum; Abscess    Result Value Ref Range     Aerobic Culture Result 2+ growth Escherichia coli (A) N/A     PPD 1 IR drainage of diverticular abscess     Continue full liquid diet.   Continue IV antibiotics- Zoysn  Continue pain control and antiemetics as needed.  Encourage ambulation and up to chair.  DVT prophylaxis with heparin  CT abscessogram when drain is less than 10cc for 2 consecutive days.  Hopeful discharge home tomorrow with a course of oral antibiotics.   MARY Peter  6/19/2024  8:12 AM     MEDICATIONS ADMINISTERED:  Medications 06/17/24 06/18/24 06/19/24   DULoxetine (Cymbalta) DR cap 30 mg  Dose: 30 mg  Freq: Daily Route: OR  Start: 06/18/24 0930   Admin Instructions:   Capsule may be opened; do not crush contents. May mix with applesauce     0755 JH-Given        0846 KG-Given          fluticasone furoate-vilanterol (Breo Ellipta) 100-25 MCG/ACT inhaler 1 puff  Dose: 1 puff  Freq: Daily Route: IN  Start: 06/18/24 0930   Admin Instructions:   RT To  Administer First Dose.     0835 GC-Given        0930 KG-Given          heparin (Porcine) 5000 UNIT/ML injection 5,000 Units  Dose: 5,000 Units  Freq: 2 times per day Route: SC  Start: 06/18/24 0900     (0755 JH)-Not Given   2054 KD-Given       0846 KG-Given   2100         mirtazapine (Remeron) tab 30 mg  Dose: 30 mg  Freq: Nightly Route: OR  Start: 06/18/24 0045     (0045 KD)-Not Given   2054 KD-Given       2100          multivitamin with minerals (Thera M Plus) tab 1 tablet  Dose: 1 tablet  Freq: Daily Route: OR  Start: 06/18/24 0930     (0755 JH)-Not Given        0846 KG-Given          piperacillin-tazobactam (Zosyn) 3.375 g in dextrose 5% 100 mL IVPB-ADDV  Dose: 3.375 g  Freq: Every 8 hours Route: IV  Last Dose: 3.375 g (06/19/24 0547)  Start: 06/18/24 0700   Admin Instructions:   Incompatible with Lactated Ringers (LR)   Order specific questions:        0605 KD-New Bag   1509 JH-New Bag       0029 KD-New Bag   0547 KD-New Bag   1500     2300          piperacillin-tazobactam (Zosyn) 4.5 g in dextrose 5% 100 mL IVPB-ADDV  Dose: 4.5 g  Freq: Once Route: IV  Last Dose: 4.5 g (06/17/24 2326)  Start: 06/17/24 2326 End: 06/17/24 2356   Admin Instructions:   Incompatible with Lactated Ringers (LR)   Order specific questions:       2326 EM-New Bag            potassium chloride 40 mEq in 250mL sodium chloride 0.9% IVPB premix  Dose: 40 mEq  Freq: Once Route: IV  Last Dose: 40 mEq (06/18/24 1028)  Start: 06/18/24 1000 End: 06/18/24 1428     1028 JH-New Bag            sodium chloride 0.9 % IV bolus 500 mL  Dose: 500 mL  Freq: Once Route: IV  Last Dose: Stopped (06/17/24 1831)  Start: 06/17/24 1725 End: 06/17/24 1831    1735 SP-New Bag   1831 SP-Stopped           Followed by   sodium chloride 0.9% infusion  Rate: 125 mL/hr  Freq: Continuous Route: IV  Start: 06/17/24 1824 End: 06/19/24 1028    1825 SP-New Bag   2331 EM-Handoff        1028-D/C'd          sodium chloride 0.9% infusion  Rate: 75 mL/hr  Freq: Continuous Route:  IV  Start: 06/19/24 1030      1030 KG-Rate/Dose Change          sodium chloride 0.9% infusion  Rate: 10 mL/hr  Freq: Continuous Route: IV  Last Dose: Stopped (06/18/24 1653)  Start: 06/18/24 1600 End: 06/18/24 1716     1551 RD-New Bag   1653 RD-Stopped [C]   1716-D/C'd       sodium chloride 0.9% infusion  Rate: 100 mL/hr  Freq: Continuous Route: IV  Start: 06/18/24 0045 End: 06/19/24 1028     0128 KD-New Bag        1028-D/C'd         sodium chloride 0.9 % IV bolus 500 mL  Dose: 500 mL  Freq: Once Route: IV  Last Dose: Stopped (06/17/24 1831)  Start: 06/17/24 1725 End: 06/17/24 1831    1735 SP-New Bag   1831 SP-Stopped           Followed by   sodium chloride 0.9% infusion  Rate: 125 mL/hr  Freq: Continuous Route: IV  Start: 06/17/24 1824 End: 06/19/24 1028    1825 SP-New Bag   2331 EM-Handoff        1028-D/C'd          acetaminophen (Tylenol Extra Strength) tab 500 mg  Dose: 500 mg  Freq: Every 4 hours PRN Route: OR  PRN Reasons: Fever,moderate pain  PRN Comment: equal to or greater than 100.4  Start: 06/18/24 0039 End: 06/19/24 1331   Admin Instructions:   do not initiate oral therapy until 6-8 hours after the last IV acetaminophen dose if IV acetaminophen was used previously     0755 JH-Given        1331-D/C'd        fentaNYL (Sublimaze) 50 mcg/mL injection 50 mcg  Dose: 50 mcg  Freq: Every 5 min PRN Route: IV  PRN Comment: sedation  Start: 06/18/24 1546 End: 06/18/24 1716   Admin Instructions:   Maximum dose 150 mcg, then consult radiologist.     1617 RD-Given   1716-D/C'd        iopamidol 76% (ISOVUE-370) injection for power injector  Dose: 65 mL  Freq: IMG once as needed Route: IV  PRN Reason: contrast  Start: 06/17/24 2235 End: 06/17/24 2235 2235 DC-Given            midazolam (Versed) 2 MG/2ML injection 1 mg  Dose: 1 mg  Freq: Every 5 min PRN Route: IV  PRN Reason: sedation  Start: 06/18/24 1546 End: 06/18/24 1716   Admin Instructions:   Maximum dose 10 mg, then consult radiologist. Administer by slow IV  push every 2-5 minutes.     1616 RD-Given   1716-D/C'd        Or   morphINE PF 2 MG/ML injection 2 mg  Dose: 2 mg  Freq: Every 2 hour PRN Route: IV  PRN Reason: severe pain  Start: 06/18/24 0122 End: 06/19/24 1331   Admin Instructions:   Use PRN reason as a guide and follow range order policy. If oral pain meds are ordered and patient can tolerate oral intake, start with PRN oral pain medications first.     0128 KD-Given   1028 JH-Given   1514 JH-Given      1331-D/C'd        ondansetron (Zofran) 4 MG/2ML injection 4 mg  Dose: 4 mg  Freq: Every 6 hours PRN Route: IV  PRN Reasons: Nausea,vomiting  Start: 06/18/24 0039   Admin Instructions:   Default antiemetic sequence (unless otherwise preferred by patient):  1. ondansetron (Zofran) 2. metoclopramide (Reglan)  Wait 15 minutes before proceeding to next medication in sequence.  Follow therapeutic duplication policy.     0127 KD-Given             Vitals (last day)       Date/Time Temp Pulse Resp BP SpO2 Weight O2 Device O2 Flow Rate (L/min) [facility]    06/19/24 1115 98.9 °F (37.2 °C) 75 17 139/71 94 % -- None (Room air) 0 L/min CT    06/19/24 0720 98 °F (36.7 °C) 71 16 159/59 96 % -- None (Room air) 0 L/min CT    06/19/24 0530 98.3 °F (36.8 °C) 68 18 160/64 -- -- None (Room air) -- DS    06/19/24 0023 98.7 °F (37.1 °C) 70 18 149/72 97 % -- None (Room air) -- DS    06/18/24 2025 98.2 °F (36.8 °C) 88 16 134/55 93 % -- None (Room air) -- DS    06/18/24 1635 -- 64 13 145/60 99 % -- -- -- RD    06/18/24 1630 -- 63 13 150/57 99 % -- -- -- RD    06/18/24 1625 -- 64 13 155/59 99 % -- -- -- RD    06/18/24 1620 -- 60 11 160/57 99 % -- Nasal cannula 2 L/min RD    06/18/24 1500 98.2 °F (36.8 °C) 67 18 150/76 98 % -- None (Room air) 0 L/min CT    06/18/24 1100 98.2 °F (36.8 °C) 58 17 122/50 94 % -- None (Room air) 0 L/min CT    06/18/24 1024 -- -- -- 112/64 -- -- -- -- MK    06/18/24 0720 98.4 °F (36.9 °C) 67 17 115/46 94 % -- None (Room air) 0 L/min CT    06/18/24 0433 99.6 °F (37.6 °C)  68 18 123/52 98 % -- None (Room air) -- AN    06/18/24 0043 97 °F (36.1 °C) 68 18 160/69 99 % 96 lb 3.2 oz None (Room air) -- AN    06/18/24 0015 -- 55 18 153/64 96 % -- None (Room air) -- EM     06/17/24 2245 -- 53 -- 144/61 100 % -- -- -- KS   06/17/24 2100 -- 57 17 -- 97 % -- -- -- KS   06/17/24 1930 -- 51 17 113/56 98 % -- -- -- KS   06/17/24 1900 -- 51 22 153/54 98 % -- -- -- KS   06/17/24 1800 -- 61 19 121/89 99 % -- None (Room air) -- SP   06/17/24 1730 -- 54 20 126/58 99 % -- None (Room air) -- SP   06/17/24 1707 -- -- -- -- -- -- None (Room air) -- SP   06/17/24 1704 96.9 °F (36.1 °C) 57 18 146/62 98 % -- None (Room air) -- SP   06/17/24 1702 96.9 °F (36.1 °C) -- -- -- -- 90 lb -- -- SP     FOR REVIEW/APPROVAL OF INPT ADMISSION

## 2024-06-19 NOTE — PROGRESS NOTES
[facility]  Progress Note    Bibi Diaz Patient Status:  Inpatient    1945 MRN [id number]   Location [facility] 5NW-A Attending Margot Blanco MD   Hosp Day # 1 PCP Rick Shannon DO     Subjective:  The patient is resting in bed. She underwent IR drainage of pericolonic abscess yesterday.     She reports improvement in her abdominal pain. She is now experiencing mild discomfort at her drain site. She denies nausea or vomiting.    Objective/Physical Exam:  General: Alert, orientated x3.  Cooperative.  No apparent distress.  Vital Signs:  Blood pressure 160/64, pulse 68, temperature 98.3 °F (36.8 °C), temperature source Oral, resp. rate 18, height 60\", weight 96 lb 3.2 oz (43.6 kg), SpO2 97%, not currently breastfeeding.  HEENT: Normocephalic, atraumatic. No scleral icterus.  Abdomen:  Soft, non-distended, slightly tender to palpation near drain site, with no rebound or guarding.  No peritoneal signs.  Drain: in place with 20 ml of sangineous output over the past 24 hours.    Labs:  Lab Results   Component Value Date    K 3.4 2024    INR 1.35 2024     CBC:    Lab Results   Component Value Date    WBC 12.2 (H) 2024    WBC 9.7 2024    WBC 9.2 2024     Lab Results   Component Value Date    HEMOGLOBIN 11.8 (L) 2024    HEMOGLOBIN 12.7 2021    HEMOGLOBIN 13.4 2018    HGB 8.7 (L) 2024    HGB 8.9 (L) 2024    HGB 8.5 (L) 2024      Lab Results   Component Value Date    .0 2024    .0 2024    .0 2024         Assessment/Plan:  Patient Active Problem List   Diagnosis    Major depressive disorder, recurrent episode, moderate (HCC)    Esophageal reflux    Chronic obstructive pulmonary disease with acute lower respiratory infection (HCC)    Acquired hypothyroidism    Memory deficit    Aortic atherosclerosis (HCC)    Diverticulosis of colon    Dizziness and giddiness    Dysthymic disorder    Lichen sclerosus     Pure hypercholesterolemia    Vitamin D deficiency    Vitiligo    Severe episode of recurrent major depressive disorder, without psychotic features (HCC)    Paraesophageal hernia    Subclinical hypothyroidism    Hypertension    Protein-calorie malnutrition, unspecified severity (HCC)    Gastrointestinal hemorrhage, unspecified gastrointestinal hemorrhage type    Thrombocytopenia (HCC)    Acute diverticulitis    Hyponatremia    ACP (advance care planning)    Weakness generalized    Colonic diverticular abscess    Sigmoid diverticulitis    History of repair of hiatal hernia     PPD 1 IR drainage of diverticular abscess    Continue full liquid diet.   Continue IV antibiotics- Zoysn  Continue pain control and antiemetics as needed.  Encourage ambulation and up to chair.  DVT prophylaxis with heparin  CT abscessogram when drain is less than 10cc for 2 consecutive days.  Hopeful discharge home tomorrow with a course of oral antibiotics.     MARY Peter  6/19/2024  8:12 AM     Patient is status post IR drain diverticular abscess.  Drain output 30 cc  No CBC from today-will order in AM.  Preliminary anaerobic culture +2 E. coli, gram-positive cocci, gram-negative rods and gram-positive rods also noted.  Final is pending.  Continue Zosyn until final cultures retrieved.  Continue IV antibiotics until culture finalized.  Patient will likely require laparotomy, LAR for perforated diverticulitis.  Patient has been seen by Dr. Friedman in the past for repair hiatal hernia.  Patient will follow-up with Dr. Friedman as outpatient to discuss definitive surgical therapy for diverticulitis with abscess.  No indication for emergent surgical intervention at this time.  Continue IV Zosyn until cultures finalized  I agree with the note above and attest to its accuracy with the following changes below after my interview and examination of the patient    The patient was seen and examined.  Available labs and radiology is  noted.    Ileana Brooks MD FACS    Please note that this report has been produced using speech recognition software and may contain errors related to that system including but not limited to errors in grammar, punctuation and spelling as well as words and phrases that possibly may have been recognized inappropriately.  If there are any questions or concerns please contact the dictating provider for clarification.    The 21st Century Cures Act makes medical notes like these available to patients in the interest of trans parency. Please be advised this is a medical document. Medical documents are intended to carry relevant information, facts as evident, and the clinical opinion of the practitioner. The medical note is intended as peer to peer communication and may appear blunt or direct. It is written in medical language and may contain abbreviations or verbiage that are unfamiliar.   Again if there are any questions or concerns please contact the dictating provider for clarification.

## 2024-06-20 LAB
ALBUMIN SERPL-MCNC: 2.5 G/DL (ref 3.4–5)
ALBUMIN/GLOB SERPL: 0.8 {RATIO} (ref 1–2)
ALP LIVER SERPL-CCNC: 66 U/L
ALT SERPL-CCNC: 14 U/L
ANION GAP SERPL CALC-SCNC: 8 MMOL/L (ref 0–18)
ANION GAP SERPL CALC-SCNC: 8 MMOL/L (ref 0–18)
AST SERPL-CCNC: 16 U/L (ref 15–37)
BASOPHILS # BLD AUTO: 0.04 X10(3) UL (ref 0–0.2)
BASOPHILS NFR BLD AUTO: 0.4 %
BILIRUB SERPL-MCNC: 0.2 MG/DL (ref 0.1–2)
BUN BLD-MCNC: 7 MG/DL (ref 9–23)
BUN BLD-MCNC: 8 MG/DL (ref 9–23)
CALCIUM BLD-MCNC: 8.4 MG/DL (ref 8.5–10.1)
CALCIUM BLD-MCNC: 8.5 MG/DL (ref 8.5–10.1)
CHLORIDE SERPL-SCNC: 105 MMOL/L (ref 98–112)
CHLORIDE SERPL-SCNC: 109 MMOL/L (ref 98–112)
CO2 SERPL-SCNC: 24 MMOL/L (ref 21–32)
CO2 SERPL-SCNC: 25 MMOL/L (ref 21–32)
CREAT BLD-MCNC: 0.56 MG/DL
CREAT BLD-MCNC: 0.6 MG/DL
EGFRCR SERPLBLD CKD-EPI 2021: 91 ML/MIN/1.73M2 (ref 60–?)
EGFRCR SERPLBLD CKD-EPI 2021: 93 ML/MIN/1.73M2 (ref 60–?)
EOSINOPHIL # BLD AUTO: 0.19 X10(3) UL (ref 0–0.7)
EOSINOPHIL NFR BLD AUTO: 2.1 %
ERYTHROCYTE [DISTWIDTH] IN BLOOD BY AUTOMATED COUNT: 15.2 %
GLOBULIN PLAS-MCNC: 3.2 G/DL (ref 2.8–4.4)
GLUCOSE BLD-MCNC: 108 MG/DL (ref 70–99)
GLUCOSE BLD-MCNC: 124 MG/DL (ref 70–99)
HCT VFR BLD AUTO: 29.5 %
HGB BLD-MCNC: 9.5 G/DL
IMM GRANULOCYTES # BLD AUTO: 0.05 X10(3) UL (ref 0–1)
IMM GRANULOCYTES NFR BLD: 0.5 %
LACTATE SERPL-SCNC: 1.8 MMOL/L (ref 0.4–2)
LIPASE SERPL-CCNC: 24 U/L (ref ?–300)
LYMPHOCYTES # BLD AUTO: 0.82 X10(3) UL (ref 1–4)
LYMPHOCYTES NFR BLD AUTO: 8.9 %
MAGNESIUM SERPL-MCNC: 2 MG/DL (ref 1.6–2.6)
MCH RBC QN AUTO: 27.5 PG (ref 26–34)
MCHC RBC AUTO-ENTMCNC: 32.2 G/DL (ref 31–37)
MCV RBC AUTO: 85.3 FL
MONOCYTES # BLD AUTO: 0.71 X10(3) UL (ref 0.1–1)
MONOCYTES NFR BLD AUTO: 7.7 %
NEUTROPHILS # BLD AUTO: 7.45 X10 (3) UL (ref 1.5–7.7)
NEUTROPHILS # BLD AUTO: 7.45 X10(3) UL (ref 1.5–7.7)
NEUTROPHILS NFR BLD AUTO: 80.4 %
OSMOLALITY SERPL CALC.SUM OF ELEC: 284 MOSM/KG (ref 275–295)
OSMOLALITY SERPL CALC.SUM OF ELEC: 293 MOSM/KG (ref 275–295)
PHOSPHATE SERPL-MCNC: 2.6 MG/DL (ref 2.5–4.9)
PLATELET # BLD AUTO: 474 10(3)UL (ref 150–450)
POTASSIUM SERPL-SCNC: 3.3 MMOL/L (ref 3.5–5.1)
POTASSIUM SERPL-SCNC: 3.5 MMOL/L (ref 3.5–5.1)
PROT SERPL-MCNC: 5.7 G/DL (ref 6.4–8.2)
RBC # BLD AUTO: 3.46 X10(6)UL
SODIUM SERPL-SCNC: 137 MMOL/L (ref 136–145)
SODIUM SERPL-SCNC: 142 MMOL/L (ref 136–145)
TROPONIN I SERPL HS-MCNC: 5 NG/L
WBC # BLD AUTO: 9.3 X10(3) UL (ref 4–11)

## 2024-06-20 PROCEDURE — 99232 SBSQ HOSP IP/OBS MODERATE 35: CPT | Performed by: SURGERY

## 2024-06-20 PROCEDURE — 99233 SBSQ HOSP IP/OBS HIGH 50: CPT | Performed by: STUDENT IN AN ORGANIZED HEALTH CARE EDUCATION/TRAINING PROGRAM

## 2024-06-20 RX ORDER — POTASSIUM CHLORIDE 20 MEQ/1
40 TABLET, EXTENDED RELEASE ORAL ONCE
Status: COMPLETED | OUTPATIENT
Start: 2024-06-20 | End: 2024-06-20

## 2024-06-20 RX ORDER — BISACODYL 10 MG
10 SUPPOSITORY, RECTAL RECTAL
Status: DISCONTINUED | OUTPATIENT
Start: 2024-06-20 | End: 2024-06-22

## 2024-06-20 RX ORDER — SIMETHICONE 80 MG
80 TABLET,CHEWABLE ORAL 4 TIMES DAILY PRN
Status: DISCONTINUED | OUTPATIENT
Start: 2024-06-20 | End: 2024-06-24

## 2024-06-20 RX ORDER — SODIUM CHLORIDE 9 MG/ML
INJECTION, SOLUTION INTRAVENOUS CONTINUOUS
Status: DISCONTINUED | OUTPATIENT
Start: 2024-06-20 | End: 2024-06-24

## 2024-06-20 NOTE — PROGRESS NOTES
[facility]  Progress Note    Bibi Diaz Patient Status:  Inpatient    1945 MRN [id number]   Location [facility] 5NW-A Attending Margot Blanco MD   Hosp Day # 2 PCP Rick Shannon DO     Subjective:  The patient is resting in bed. She denies abdominal pain, but states she doesn't feel as good as she did yesterday. She denies nausea or vomiting. She states she is eating very little.    WBC 9.3.     Objective/Physical Exam:  General: Alert, orientated x3.  Cooperative.  No apparent distress.  Vital Signs:  Blood pressure 146/68, pulse 69, temperature 97.7 °F (36.5 °C), temperature source Oral, resp. rate 16, height 60\", weight 96 lb 3.2 oz (43.6 kg), SpO2 96%, not currently breastfeeding.  HEENT: Normocephalic, atraumatic. No scleral icterus.  Abdomen:  Soft, non-distended, slightly tender near drain site, with no rebound or guarding.  No peritoneal signs.  Drain: in place with 5 ml output over the past 24 hours.    Labs:  CBC:    Lab Results   Component Value Date    WBC 9.3 2024    WBC 12.2 (H) 2024    WBC 9.7 2024     Lab Results   Component Value Date    HEMOGLOBIN 11.8 (L) 2024    HEMOGLOBIN 12.7 2021    HEMOGLOBIN 13.4 2018    HGB 9.5 (L) 2024    HGB 8.7 (L) 2024    HGB 8.9 (L) 2024      Lab Results   Component Value Date    .0 (H) 2024    .0 2024    .0 2024         Assessment/Plan:  Patient Active Problem List   Diagnosis    Major depressive disorder, recurrent episode, moderate (HCC)    Esophageal reflux    Chronic obstructive pulmonary disease with acute lower respiratory infection (HCC)    Acquired hypothyroidism    Memory deficit    Aortic atherosclerosis (HCC)    Diverticulosis of colon    Dizziness and giddiness    Dysthymic disorder    Lichen sclerosus    Pure hypercholesterolemia    Vitamin D deficiency    Vitiligo    Severe episode of recurrent major depressive disorder, without psychotic  features (HCC)    Paraesophageal hernia    Subclinical hypothyroidism    Hypertension    Protein-calorie malnutrition, unspecified severity (HCC)    Gastrointestinal hemorrhage, unspecified gastrointestinal hemorrhage type    Thrombocytopenia (HCC)    Acute diverticulitis    Hyponatremia    ACP (advance care planning)    Weakness generalized    Colonic diverticular abscess    Sigmoid diverticulitis    History of repair of hiatal hernia     PPD 2 IR drainage of diverticular abscess    Continue diet as tolerated.   Preliminary culture showed +2 E. coli. Await for sensitives to determine 14 day course oral abx regimen upon discharge.   Continue IV antibiotics- Zoysn.  Continue pain control and antiemetics as needed.  Encourage ambulation and up to chair.  DVT prophylaxis with heparin  CT abscessogram when drain output is less than 10cc for 2 consecutive days.  Upon discharge, the patient is to follow-up with Dr. rosario to discuss surgical management for diverticulitis with abscess.  The patient is scheduled to see him in clinic on 6/26/2024 at 9:45 AM.    MARY Peter  6/20/2024  9:51 AM     I agree with the note above and attest to its accuracy with the following changes below after my interview and examination of the patient    The patient was seen and examined.  Available labs and radiology is noted.    Ileana Brooks MD FACS    Please note that this report has been produced using speech recognition software and may contain errors related to that system including but not limited to errors in grammar, punctuation and spelling as well as words and phrases that possibly may have been recognized inappropriately.  If there are any questions or concerns please contact the dictating provider for clarification.    The 21st Century Cures Act makes medical notes like these available to patients in the interest of trans parency. Please be advised this is a medical document. Medical documents are intended to carry  relevant information, facts as evident, and the clinical opinion of the practitioner. The medical note is intended as peer to peer communication and may appear blunt or direct. It is written in medical language and may contain abbreviations or verbiage that are unfamiliar.   Again if there are any questions or concerns please contact the dictating provider for clarification.

## 2024-06-20 NOTE — PROGRESS NOTES
A+O x 3. Gave Miralax for c/o constipation, no bm yet. Gave PRN nausea medication. Patient continues to have poor appetite. IVF discontinued. IV ABX as ordered.

## 2024-06-20 NOTE — PHYSICAL THERAPY NOTE
PHYSICAL THERAPY TREATMENT NOTE - INPATIENT    Room Number: 518/518-A     Session: 1     Number of Visits to Meet Established Goals: 5    Presenting Problem: weakness generalized, failure to thrive  Co-Morbidities : hypothyoid, anxiety, asthma, CKD3, COPD, essential HTN    ASSESSMENT   Patient demonstrates limited progress this session, goals  remain in progress.    Patient continues to function below baseline with bed mobility, transfers, gait, stair negotiation, and standing prolonged periods.  Contributing factors to remaining limitations include decreased functional strength, decreased endurance/aerobic capacity, pain, impaired   balance, and medical status.  Next session anticipate patient to progress transfers and gait.  Physical Therapy will continue to follow patient for duration of hospitalization.    Patient continues to benefit from continued skilled PT services: to promote return to prior level of function and safety with continuous assistance and gradual rehabilitative therapy .    PLAN  PT Treatment Plan: Bed mobility;Endurance;Energy conservation;Patient education;Family education;Gait training;Strengthening;Transfer training;Balance training  Rehab Potential : Good  Frequency (Obs): 3-5x/week    CURRENT GOALS     Goal #1 Patient is able to demonstrate supine - sit EOB @ level: supervision     Goal #2 Patient is able to demonstrate transfers Sit to/from Stand at assistance level: supervision     Goal #3 Patient is able to ambulate 50 feet with assist device: walker - rolling at assistance level: minimum assistance     Goal #4    Goal #5    Goal #6    Goal Comments: Goals established on 6/18/2024 6/20/2024 all goals ongoing    SUBJECTIVE  \" I am so weak.\"    OBJECTIVE  Precautions: Bed/chair alarm    WEIGHT BEARING RESTRICTION  Weight Bearing Restriction: None                PAIN ASSESSMENT   Rating: Unable to rate  Location: head and stomach  Management Techniques: Activity  promotion;Repositioning    BALANCE                                                                                                                       Static Sitting: Fair +  Dynamic Sitting: Poor +           Static Standing: Poor  Dynamic Standing: Poor -    ACTIVITY TOLERANCE                         O2 WALK         AM-PAC '6-Clicks' INPATIENT SHORT FORM - BASIC MOBILITY  How much difficulty does the patient currently have...  Patient Difficulty: Turning over in bed (including adjusting bedclothes, sheets and blankets)?: A Little   Patient Difficulty: Sitting down on and standing up from a chair with arms (e.g., wheelchair, bedside commode, etc.): A Little   Patient Difficulty: Moving from lying on back to sitting on the side of the bed?: A Lot   How much help from another person does the patient currently need...   Help from Another: Moving to and from a bed to a chair (including a wheelchair)?: A Lot   Help from Another: Need to walk in hospital room?: A Lot   Help from Another: Climbing 3-5 steps with a railing?: Total       AM-PAC Score:  Raw Score: 13   Approx Degree of Impairment: 64.91%   Standardized Score (AM-PAC Scale): 36.74   CMS Modifier (G-Code): CL    FUNCTIONAL ABILITY STATUS  Gait Assessment   Functional Mobility/Gait Assessment  Gait Assistance: Moderate assistance  Distance (ft): 3  Assistive Device: Rolling walker  Pattern: Shuffle    Skilled Therapy Provided    Bed Mobility:  Rolling: mod A   Supine<>Sit: mod A   Sit<>Supine: NT     Transfer Mobility:  Sit<>Stand: Mod assist from bed. Repeated five times.    Stand<>Sit: min   Gait: RW and mod assist. Patient shows flexed posture, dec foot clearance, shuffling steps. Needs cues for step sequence, proper RW management and tends to fatigue easily. Needs a chair follow.     Therapist's Comments: PCT cleansed pt while the writer assisted pt to maintain standing with min assist of one to prevent post lob. Maintained standing for 1 min twice. Seated  rest needed due to fatigue.       THERAPEUTIC EXERCISES  Lower Extremity Ankle pumps  Heel slides  LAQ  SAQ  SLR     Upper Extremity Elbow flex/ext     Position Sitting and Supine     Repetitions   10   Sets   1     Patient End of Session: Up in chair;Needs met;Call light within reach;RN aware of session/findings;All patient questions and concerns addressed;Alarm set    PT Session Time: 30 minutes  Gait Trainin minutes  Therapeutic Activity: 20 minutes  Therapeutic Exercise: 8 minutes   Neuromuscular Re-education: 0 minutes

## 2024-06-20 NOTE — CM/SW NOTE
06/20/24 0928   Choice of Post-Acute Provider   Informed patient of right to choose their preferred provider Yes   List of appropriate post-acute services provided to patient/family with quality data Yes   Patient/family choice [facility]   Information given to Other (comment)     Spoke with patient's granddaughter (Yareli) to discuss discharge planning. Yareli/son/pt chose [facility] for [facility]. Discussed anticipated discharge tomorrow. Discussed transport by medicar and private pay cost, she was agreeable.    Informed [facility] and reserved in aidin. Informed [facility] of PAC decision. Insurance auth approved [facility] ID [id number], approved 6/20 - 6/24.    SW/CM to remain available for support and/or discharge planning.    Addendum 3:30pm: Met with patient to discuss discharge plan. She was in agreement with [facility] for [facility].      Janice Paredes, EDITH  Discharge Planner  [phone]

## 2024-06-20 NOTE — PROGRESS NOTES
[facility]   part of [facility]     Hospitalist Progress Note     Bibi Diaz Patient Status:  Observation    1945 MRN [id number]   Location [facility] 5NW-A Attending Rich Jesus MD   Hosp Day # 2 PCP Rick Shannon DO     Chief Complaint: weakness, confusion, poor PO intake    Subjective:     Patient still weak, has poor appetite at baseline. Oriented to year, place not to president.    Objective:    Review of Systems:   A comprehensive review of systems was completed; pertinent positive and negatives stated in subjective.    Vital signs:  Temp:  [97.7 °F (36.5 °C)-98.9 °F (37.2 °C)] 97.7 °F (36.5 °C)  Pulse:  [57-75] 69  Resp:  [16-18] 16  BP: (139-159)/(52-71) 146/68  SpO2:  [94 %-96 %] 96 %    Physical Exam:    General: No acute distress, weak  Respiratory: No wheezes, no rhonchi  Cardiovascular: S1, S2, regular rate and rhythm  Abdomen: Soft, mild tender, non-distended, positive bowel sounds  Neuro: No new focal deficits.   Extremities: No edema      Diagnostic Data:    Labs:  Recent Labs   Lab 24  1209 24  1712 24  0719 24  1405 24  0755   WBC 9.2 9.7 12.2*  --  9.3   HGB 8.5* 8.9* 8.7*  --  9.5*   MCV 82.9 83.1 85.2  --  85.3   .0 386.0 423.0  --  474.0*   INR  --   --   --  1.35*  --        Recent Labs   Lab 24  1712 24  0719 24  0713 24  0754   GLU 92 96  --  108*   BUN 7* 5*  --  7*   CREATSERUM 0.64 0.66  --  0.56   CA 9.0 8.5  --  8.5   ALB 2.6* 2.5*  --  2.5*    144  --  142   K 3.4* 3.3* 3.4* 3.5    113*  --  109   CO2 26.0 23.0  --  25.0   ALKPHO 73 68  --  66   AST 21 18  --  16   ALT 15 14  --  14   BILT 0.3 0.3  --  0.2   TP 6.2* 5.8*  --  5.7*       Estimated Creatinine Clearance: 56.1 mL/min (based on SCr of 0.56 mg/dL).    No results for input(s): \"TROP\", \"TROPHS\", \"CK\" in the last 168 hours.    Recent Labs   Lab 24  1405   PTP 16.8*   INR 1.35*                   Microbiology    Hospital Encounter on 06/17/24   1. Anaerobic Culture     Status: None (Preliminary result)    Collection Time: 06/18/24  4:46 PM    Specimen: Abdominal peritoneum; Abscess   Result Value Ref Range    Anaerobic Culture Pending N/A   2. Aerobic Bacterial Culture     Status: Abnormal (Preliminary result)    Collection Time: 06/18/24  4:46 PM    Specimen: Abdominal peritoneum; Abscess   Result Value Ref Range    Aerobic Culture Result 2+ growth Escherichia coli (A) N/A    Aerobic Smear 4+ WBCs seen N/A    Aerobic Smear 1+ Gram Positive Cocci N/A    Aerobic Smear 2+ Gram Negative Rods N/A    Aerobic Smear 1+ Gram Positive Rods N/A       Susceptibility    Escherichia coli -  (no method available)     Ampicillin 8 Sensitive      Cefazolin <=4 Sensitive      Ciprofloxacin <=0.25 Sensitive      Gentamicin <=1 Sensitive      Meropenem <=0.25 Sensitive      Levofloxacin <=0.12 Sensitive      Piperacillin + Tazobactam <=4 Sensitive      Trimethoprim/Sulfa <=20 Sensitive          Imaging: Reviewed in Epic.    Medications:    piperacillin-tazobactam  3.375 g Intravenous Q8H    heparin  5,000 Units Subcutaneous 2 times per day    DULoxetine  30 mg Oral Daily    mirtazapine  30 mg Oral Nightly    multivitamin with minerals  1 tablet Oral Daily    fluticasone furoate-vilanterol  1 puff Inhalation Daily       Assessment & Plan:      #Multiple pericolonic abscess  Pt had been DC on 6/15 on PO amoxicillin but did not take it after vomiting after taking one dose  #Acute diverticulitis  - drain in place  -IV Zosyn  -IV fluids and pain control    #weakness  Multifactorial due to above, poor PO intake at baseline   DC to [facility]     #Hypokalemia  - replace per protocol     #Normocytic anemia  #Iron deficiency     #Orthostatic hypotension     #Essential hypertension     #CKD 3     #Hyperlipidemia     #Hypothyroidism     #COPD    #acute metabolic and toxic encephalopathy- improving on 6/20/24  Likely due to narcotics,  versed prior to drain insertion, infection   MRI brain 6/19/2024 no acute findings       I updated Yareli on 6/19 and on 6/20- all questions answered.      Margot Blanco MD    Supplementary Documentation:     Quality:  DVT Mechanical Prophylaxis:   SCDs,    DVT Pharmacologic Prophylaxis   Medication    heparin (Porcine) 5000 UNIT/ML injection 5,000 Units                Code Status: DNAR/Selective Treatment  Flood: External urinary catheter in place  Flood Duration (in days):   Central line:    WOLF:     Discharge is dependent on: progress  At this point Ms. Diaz is expected to be discharge to: TBD    The 21st Century Cures Act makes medical notes like these available to patients in the interest of transparency. Please be advised this is a medical document. Medical documents are intended to carry relevant information, facts as evident, and the clinical opinion of the practitioner. The medical note is intended as peer to peer communication and may appear blunt or direct. It is written in medical language and may contain abbreviations or verbiage that are unfamiliar.       Called PT next of kin Yareli- at 1:39 pm- all questions answered.

## 2024-06-20 NOTE — PROGRESS NOTES
[facility]   part of [facility]  Progress Note      Bibi Diaz Patient Status:  Inpatient    1945 MRN [id number]   Location [facility] 5NW-A Attending Margot Blanco MD   Hosp Day # 2 PCP Rick Shannon DO       Subjective:   Eating dinner in bed    Objective:     Vital Signs:  Blood pressure 141/81, pulse 69, temperature 99.3 °F (37.4 °C), temperature source Oral, resp. rate 18, height 60\", weight 96 lb 3.2 oz, SpO2 98%, not currently breastfeeding.    Input/Output:    Intake/Output Summary (Last 24 hours) at 2024 1754  Last data filed at 2024 1709  Gross per 24 hour   Intake --   Output 105 ml   Net -105 ml     24 hour drain output: 5 ml, previously 20 ml    Results:   Labs:  Lab Results   Component Value Date    WBC 9.3 2024    RBC 3.46 2024    HGB 9.5 2024    HCT 29.5 2024    MCV 85.3 2024    MCH 27.5 2024    MCHC 32.2 2024    RDW 15.2 2024    .0 2024       Microbiology:    2+ growth Escherichia coli Abnormal           Assessment and Plan:   Continue drainage and flushing. CT abscess injection when output is <10 ml/day for at least 2 days.    Dexter Glasgow MD  2024  5:54 PM

## 2024-06-20 NOTE — PROGRESS NOTES
Patient A&O X 3. Forgetful. Lethargic. Room air. IV Abx. IVF. NSR on Tele. VALERI drain in place. Heparin. Purewick and briefed. PRN Tyenol. MRI of brain done, see results. No further needs at this time.

## 2024-06-20 NOTE — PLAN OF CARE
Problem: Patient/Family Goals  Goal: Patient/Family Long Term Goal  Description: Patient's Long Term Goal: discharge home    Interventions:  - Consult to Hosp, Gen Surgery  - IV Abx  - IVF  - Pain control  - See additional Care Plan goals for specific interventions  Outcome: Progressing  Goal: Patient/Family Short Term Goal  Description: Patient's Short Term Goal: pain control  6/19: maintain safety  6/19 noc: rest    Interventions:   - Consult to Hosp, Gen Surgery  - IVF  - IV Abx  - Pain control  - See additional Care Plan goals for specific interventions  Outcome: Progressing

## 2024-06-21 ENCOUNTER — APPOINTMENT (OUTPATIENT)
Dept: CT IMAGING | Facility: HOSPITAL | Age: 79
DRG: 391 | End: 2024-06-21
Attending: PHYSICIAN ASSISTANT

## 2024-06-21 PROBLEM — K57.20 DIVERTICULITIS OF LARGE INTESTINE WITH ABSCESS WITHOUT BLEEDING: Status: ACTIVE | Noted: 2024-01-01

## 2024-06-21 PROBLEM — K57.20 DIVERTICULITIS OF LARGE INTESTINE WITH ABSCESS WITHOUT BLEEDING: Status: ACTIVE | Noted: 2024-06-21

## 2024-06-21 LAB
ALBUMIN SERPL-MCNC: 2.5 G/DL (ref 3.4–5)
ALBUMIN/GLOB SERPL: 0.7 {RATIO} (ref 1–2)
ALP LIVER SERPL-CCNC: 73 U/L
ALT SERPL-CCNC: 11 U/L
ANION GAP SERPL CALC-SCNC: 6 MMOL/L (ref 0–18)
AST SERPL-CCNC: 14 U/L (ref 15–37)
ATRIAL RATE: 72 BPM
BASOPHILS # BLD AUTO: 0.04 X10(3) UL (ref 0–0.2)
BASOPHILS NFR BLD AUTO: 0.4 %
BILIRUB SERPL-MCNC: 0.2 MG/DL (ref 0.1–2)
BUN BLD-MCNC: 5 MG/DL (ref 9–23)
CALCIUM BLD-MCNC: 9 MG/DL (ref 8.5–10.1)
CHLORIDE SERPL-SCNC: 108 MMOL/L (ref 98–112)
CO2 SERPL-SCNC: 24 MMOL/L (ref 21–32)
CREAT BLD-MCNC: 0.56 MG/DL
EGFRCR SERPLBLD CKD-EPI 2021: 93 ML/MIN/1.73M2 (ref 60–?)
EOSINOPHIL # BLD AUTO: 0.14 X10(3) UL (ref 0–0.7)
EOSINOPHIL NFR BLD AUTO: 1.2 %
ERYTHROCYTE [DISTWIDTH] IN BLOOD BY AUTOMATED COUNT: 15.5 %
GLOBULIN PLAS-MCNC: 3.6 G/DL (ref 2.8–4.4)
GLUCOSE BLD-MCNC: 105 MG/DL (ref 70–99)
HCT VFR BLD AUTO: 30.8 %
HGB BLD-MCNC: 10 G/DL
IMM GRANULOCYTES # BLD AUTO: 0.05 X10(3) UL (ref 0–1)
IMM GRANULOCYTES NFR BLD: 0.4 %
LYMPHOCYTES # BLD AUTO: 0.96 X10(3) UL (ref 1–4)
LYMPHOCYTES NFR BLD AUTO: 8.5 %
MCH RBC QN AUTO: 27.2 PG (ref 26–34)
MCHC RBC AUTO-ENTMCNC: 32.5 G/DL (ref 31–37)
MCV RBC AUTO: 83.9 FL
MONOCYTES # BLD AUTO: 0.76 X10(3) UL (ref 0.1–1)
MONOCYTES NFR BLD AUTO: 6.7 %
NEUTROPHILS # BLD AUTO: 9.32 X10 (3) UL (ref 1.5–7.7)
NEUTROPHILS # BLD AUTO: 9.32 X10(3) UL (ref 1.5–7.7)
NEUTROPHILS NFR BLD AUTO: 82.8 %
OSMOLALITY SERPL CALC.SUM OF ELEC: 284 MOSM/KG (ref 275–295)
P AXIS: 58 DEGREES
P-R INTERVAL: 172 MS
PLATELET # BLD AUTO: 531 10(3)UL (ref 150–450)
POTASSIUM SERPL-SCNC: 4 MMOL/L (ref 3.5–5.1)
POTASSIUM SERPL-SCNC: 4 MMOL/L (ref 3.5–5.1)
PROT SERPL-MCNC: 6.1 G/DL (ref 6.4–8.2)
Q-T INTERVAL: 426 MS
QRS DURATION: 74 MS
QTC CALCULATION (BEZET): 466 MS
R AXIS: -59 DEGREES
RBC # BLD AUTO: 3.67 X10(6)UL
SODIUM SERPL-SCNC: 138 MMOL/L (ref 136–145)
T AXIS: 54 DEGREES
VENTRICULAR RATE: 72 BPM
WBC # BLD AUTO: 11.3 X10(3) UL (ref 4–11)

## 2024-06-21 PROCEDURE — 74177 CT ABD & PELVIS W/CONTRAST: CPT | Performed by: PHYSICIAN ASSISTANT

## 2024-06-21 PROCEDURE — 99232 SBSQ HOSP IP/OBS MODERATE 35: CPT | Performed by: STUDENT IN AN ORGANIZED HEALTH CARE EDUCATION/TRAINING PROGRAM

## 2024-06-21 PROCEDURE — 99233 SBSQ HOSP IP/OBS HIGH 50: CPT | Performed by: STUDENT IN AN ORGANIZED HEALTH CARE EDUCATION/TRAINING PROGRAM

## 2024-06-21 RX ORDER — HYDRALAZINE HYDROCHLORIDE 20 MG/ML
5 INJECTION INTRAMUSCULAR; INTRAVENOUS EVERY 6 HOURS PRN
Status: DISCONTINUED | OUTPATIENT
Start: 2024-06-21 | End: 2024-06-24

## 2024-06-21 RX ORDER — HYDRALAZINE HYDROCHLORIDE 20 MG/ML
10 INJECTION INTRAMUSCULAR; INTRAVENOUS EVERY 6 HOURS PRN
Status: DISCONTINUED | OUTPATIENT
Start: 2024-06-21 | End: 2024-06-24

## 2024-06-21 NOTE — PROGRESS NOTES
[facility]   part of [facility]     Hospitalist Progress Note     Bibi Diaz Patient Status:  Observation    1945 MRN [id number]   Location [facility] 5NW-A Attending Rich Jesus MD   Hosp Day # 3 PCP Rick Shannon DO     Chief Complaint: weakness, confusion, poor PO intake    Subjective:   PAtient alert this AM, not lethargic, appetite is lagging. No fevers.  5 b NSVT overnight     Objective:    Review of Systems:   A comprehensive review of systems was completed; pertinent positive and negatives stated in subjective.    Vital signs:  Temp:  [97.8 °F (36.6 °C)-99.3 °F (37.4 °C)] 98 °F (36.7 °C)  Pulse:  [65-75] 67  Resp:  [18] 18  BP: (141-166)/(60-95) 154/95  SpO2:  [95 %-98 %] 96 %    Physical Exam:    General: No acute distress, weak  Respiratory: No wheezes, no rhonchi  Cardiovascular: S1, S2, regular rate and rhythm  Abdomen: Soft, mild tender, non-distended, positive bowel sounds  Neuro: No new focal deficits.   Extremities: No edema      Diagnostic Data:    Labs:  Recent Labs   Lab 24  1712 24  0719 24  1405 24  0755 24  0905   WBC 9.7 12.2*  --  9.3 11.3*   HGB 8.9* 8.7*  --  9.5* 10.0*   MCV 83.1 85.2  --  85.3 83.9   .0 423.0  --  474.0* 531.0*   INR  --   --  1.35*  --   --        Recent Labs   Lab 24  0719 24  0713 24  0754 24  1950 24  0905   GLU 96  --  108* 124* 105*   BUN 5*  --  7* 8* 5*   CREATSERUM 0.66  --  0.56 0.60 0.56   CA 8.5  --  8.5 8.4* 9.0   ALB 2.5*  --  2.5*  --  2.5*     --  142 137 138   K 3.3*   < > 3.5 3.3* 4.0  4.0   *  --  109 105 108   CO2 23.0  --  25.0 24.0 24.0   ALKPHO 68  --  66  --  73   AST 18  --  16  --  14*   ALT 14  --  14  --  11*   BILT 0.3  --  0.2  --  0.2   TP 5.8*  --  5.7*  --  6.1*    < > = values in this interval not displayed.       Estimated Creatinine Clearance: 56.1 mL/min (based on SCr of 0.56 mg/dL).    Recent Labs   Lab 24  1950    TROPHS 5       Recent Labs   Lab 06/18/24  1405   PTP 16.8*   INR 1.35*                  Microbiology    Hospital Encounter on 06/17/24   1. Anaerobic Culture     Status: None (Preliminary result)    Collection Time: 06/18/24  4:46 PM    Specimen: Abdominal peritoneum; Abscess   Result Value Ref Range    Anaerobic Culture Pending N/A   2. Aerobic Bacterial Culture     Status: Abnormal (Preliminary result)    Collection Time: 06/18/24  4:46 PM    Specimen: Abdominal peritoneum; Abscess   Result Value Ref Range    Aerobic Culture Result 2+ growth Escherichia coli (A) N/A    Aerobic Culture Result 4+ growth Streptococcus anginosus (A) N/A    Aerobic Culture Result 2+ growth Streptococcus constellatus (A) N/A    Aerobic Smear 4+ WBCs seen N/A    Aerobic Smear 1+ Gram Positive Cocci N/A    Aerobic Smear 2+ Gram Negative Rods N/A    Aerobic Smear 1+ Gram Positive Rods N/A       Susceptibility    Escherichia coli -  (no method available)     Ampicillin 8 Sensitive      Cefazolin <=4 Sensitive      Ciprofloxacin <=0.25 Sensitive      Gentamicin <=1 Sensitive      Meropenem <=0.25 Sensitive      Levofloxacin <=0.12 Sensitive      Piperacillin + Tazobactam <=4 Sensitive      Trimethoprim/Sulfa <=20 Sensitive          Imaging: Reviewed in Epic.    Medications:    ceFAZolin  2 g Intravenous Q8H    heparin  5,000 Units Subcutaneous 2 times per day    DULoxetine  30 mg Oral Daily    mirtazapine  30 mg Oral Nightly    multivitamin with minerals  1 tablet Oral Daily    fluticasone furoate-vilanterol  1 puff Inhalation Daily       Assessment & Plan:      #Multiple pericolonic abscesses with Ecoli spp  Pt had been DC on 6/15 on PO amoxicillin but did not take it after vomiting after taking one dose  #Acute diverticulitis  - drain in place  -IV Zosyn--> Ancef on 6/20  -IV fluids and pain control    #weakness  Multifactorial due to above, poor PO intake at baseline   DC to [facility] once improved    #Hypokalemia  - replace per protocol      #Normocytic anemia  #Iron deficiency     #Orthostatic hypotension     #Essential hypertension     #CKD 3     #Hyperlipidemia     #Hypothyroidism     #COPD    #acute metabolic and toxic encephalopathy- improving on 6/20/24, resolved as of 6/21/24  Likely due to narcotics, versed prior to drain insertion, infection   MRI brain 6/19/2024 no acute findings           Margot Blanco MD    Supplementary Documentation:     Quality:  DVT Mechanical Prophylaxis:   SCDs,    DVT Pharmacologic Prophylaxis   Medication    heparin (Porcine) 5000 UNIT/ML injection 5,000 Units                Code Status: DNAR/Selective Treatment  Flood: External urinary catheter in place  Flood Duration (in days):   Central line:    WOLF:     Discharge is dependent on: progress  At this point Ms. Diaz is expected to be discharge to: TBD    The 21st Century Cures Act makes medical notes like these available to patients in the interest of transparency. Please be advised this is a medical document. Medical documents are intended to carry relevant information, facts as evident, and the clinical opinion of the practitioner. The medical note is intended as peer to peer communication and may appear blunt or direct. It is written in medical language and may contain abbreviations or verbiage that are unfamiliar.

## 2024-06-21 NOTE — PROGRESS NOTES
A+O x 4. More awake and alert today. Ambulates CGA with a RW. Had two protein shakes. Gave PRN nausea and PRN gas medication. All needs met.

## 2024-06-21 NOTE — PROGRESS NOTES
[facility]  Progress Note    Bibi Diaz Patient Status:  Inpatient    1945 MRN [id number]   Location [facility] 5NW-A Attending Margot Blanco MD   Hosp Day # 3 PCP Rick Shannon DO     Subjective:  Patient is sitting comfortably in chair.  She states that she is feeling a little worse today.  States that her abdominal pain is improving, though she feels generally weak, nauseous, and poor appetite.  She is tolerating liquids.  She has not had a bowel movement.  Denies fevers.    Objective/Physical Exam:  General: Alert, orientated x3.  Cooperative.  No apparent distress.  Vital Signs:  Blood pressure (!) 154/95, pulse 67, temperature 98 °F (36.7 °C), temperature source Oral, resp. rate 18, height 60\", weight 96 lb 3.2 oz (43.6 kg), SpO2 96%, not currently breastfeeding.  Lungs: No respiratory distress.  Cardiac: Regular rate and rhythm.   Abdomen:  Soft, mildly distended, mild tenderness throughout lower abdomen, with no rebound or guarding.  No peritoneal signs.   Extremities:  No lower extremity edema noted.    Drain: 34cc - output thick and brown today     Labs:  Lab Results   Component Value Date    WBC 11.3 2024    HGB 10.0 2024    HCT 30.8 2024    .0 2024     Lab Results   Component Value Date     2024    K 4.0 2024    K 4.0 2024     2024    CO2 24.0 2024    BUN 5 2024    CREATSERUM 0.56 2024     2024    CA 9.0 2024    ALKPHO 73 2024    ALT 11 2024    AST 14 2024    BILT 0.2 2024    ALB 2.5 2024    TP 6.1 2024     Lab Results   Component Value Date    PT 12.6 2013    PT 13.2 2011    INR 1.35 (H) 2024    INR 1.05 2024    INR 0.99 2022       I/O last 3 completed shifts:  In: 424 [I.V.:404]  Out: 139 [Urine:100; Drains:39]  No intake/output data recorded.    Assessment  Patient Active Problem List   Diagnosis    Major  depressive disorder, recurrent episode, moderate (HCC)    Esophageal reflux    Chronic obstructive pulmonary disease with acute lower respiratory infection (HCC)    Acquired hypothyroidism    Memory deficit    Aortic atherosclerosis (HCC)    Diverticulosis of colon    Dizziness and giddiness    Dysthymic disorder    Lichen sclerosus    Pure hypercholesterolemia    Vitamin D deficiency    Vitiligo    Severe episode of recurrent major depressive disorder, without psychotic features (HCC)    Paraesophageal hernia    Subclinical hypothyroidism    Hypertension    Protein-calorie malnutrition, unspecified severity (HCC)    Gastrointestinal hemorrhage, unspecified gastrointestinal hemorrhage type    Thrombocytopenia (HCC)    Acute diverticulitis    Hyponatremia    ACP (advance care planning)    Weakness generalized    Colonic diverticular abscess    Sigmoid diverticulitis    History of repair of hiatal hernia       Pericolonic abscess status post IR drain    Plan:  Patient reports she is feeling a bit worse today.  She had increase in leukocytosis of 11.3.  Afebrile.  Drain output appears brown today, which is a change in character from reported output yesterday - recommend obtaining repeat CT abdomen pelvis to further evaluate for worsening acute intra-abdominal process  May continue with liquids as tolerated  Follow culture sensitivities.  In the interim, continue IV antibiotics  Analgesia and antiemetics as needed  Ambulate and up to chair    Marisa Robledo PA-C  6/21/2024  11:54 AM     ADDENDUM:     Patient was seen and examined by me. I agree with the above note.  No acute events overnight.  Patient is tolerating diet without any nausea or vomiting.  She has not had a bowel movement today.    General: Alert, orientated x3. Cooperative. No apparent distress.  Pulmonary: non labored breathing, effort normal  Abdomen: Soft, non-distended, nontender, left-sided IR drain with feculent material  Extremities: warm, no edema  or cyanosis        A/P 78 year old female with perforated diverticulitis with abscess     No acute surgical intervention  On physical exam, the character of patient's IR drain has changed from purulent to feculent  CT abdomen pelvis with p.o. and IV contrast ordered, will follow-up imaging  Continue IV antibiotics  Continue diet as tolerated  Encourage ambulation and mobilization  Surgery will continue to follow  Rest of care per primary       This note was initiated by Marisa Robledo PA-C.  The PA saw the patient in conjunction with me. The PA performed a history, exam, and developed the assessment and plan. I agree with the mentioned assessment and plan and have provided further documentation of my own, if necessary.       Ina Rowan MD  [facility] General Surgery  6/21/2024  10:19 PM

## 2024-06-21 NOTE — PLAN OF CARE
Received pt A&Ox3, forgetful.  on RA. NSR on tele. HTN in AM, PRN hydralazine ordered. K+ replaced. Heparin for VTE prophylaxis. Poor appetite. IVF started per hospitalist. Voids via purwick. Up with assistance and walker. C/o gas pain - PRN simethicone provided. IV abx changed to ancef. VALERI drain to LLQ, irrigated & aspirated per order. Plan of care continues, no further needs at this time.     Problem: Patient/Family Goals  Goal: Patient/Family Long Term Goal  Description: Patient's Long Term Goal: discharge home    Interventions:  - Consult to [facility], Gen Surgery  - IV Abx  - IVF  - Pain control  - See additional Care Plan goals for specific interventions  Outcome: Progressing  Goal: Patient/Family Short Term Goal  Description: Patient's Short Term Goal: pain control  6/19: maintain safety  6/19 noc: rest  6/20: maintain safety  6/20 NOC: manage gas  Interventions:   - Consult to [facility], [facility] Surgery  - IVF  - IV Abx  - Pain control  - See additional Care Plan goals for specific interventions  Outcome: Progressing     Problem: SAFETY ADULT - FALL  Goal: Free from fall injury  Description: INTERVENTIONS:  - Assess pt frequently for physical needs  - Identify cognitive and physical deficits and behaviors that affect risk of falls.  - Brandon fall precautions as indicated by assessment.  - Educate pt/family on patient safety including physical limitations  - Instruct pt to call for assistance with activity based on assessment  - Modify environment to reduce risk of injury  - Provide assistive devices as appropriate  - Consider OT/PT consult to assist with strengthening/mobility  - Encourage toileting schedule  Outcome: Progressing     Problem: CARDIOVASCULAR - ADULT  Goal: Absence of cardiac arrhythmias or at baseline  Description: INTERVENTIONS:  - Continuous cardiac monitoring, monitor vital signs, obtain 12 lead EKG if indicated  - Evaluate effectiveness of antiarrhythmic and heart rate control medications as  ordered  - Initiate emergency measures for life threatening arrhythmias  - Monitor electrolytes and administer replacement therapy as ordered  Outcome: Progressing     Problem: METABOLIC/FLUID AND ELECTROLYTES - ADULT  Goal: Electrolytes maintained within normal limits  Description: INTERVENTIONS:  - Monitor labs and rhythm and assess patient for signs and symptoms of electrolyte imbalances  - Administer electrolyte replacement as ordered  - Monitor response to electrolyte replacements, including rhythm and repeat lab results as appropriate  - Fluid restriction as ordered  - Instruct patient on fluid and nutrition restrictions as appropriate  Outcome: Progressing     Problem: SKIN/TISSUE INTEGRITY - ADULT  Goal: Skin integrity remains intact  Description: INTERVENTIONS  - Assess and document risk factors for pressure ulcer development  - Assess and document skin integrity  - Monitor for areas of redness and/or skin breakdown  - Initiate interventions, skin care algorithm/standards of care as needed  Outcome: Progressing  Goal: Incision(s), wounds(s) or drain site(s) healing without S/S of infection  Description: INTERVENTIONS:  - Assess and document risk factors for pressure ulcer development  - Assess and document skin integrity  - Assess and document dressing/incision, wound bed, drain sites and surrounding tissue  - Implement wound care per orders  - Initiate isolation precautions as appropriate  - Initiate Pressure Ulcer prevention bundle as indicated  Outcome: Progressing

## 2024-06-21 NOTE — DIETARY MALNUTRITION NOTE
[facility]   part of [facility]  NUTRITION ASSESSMENT    Pt meets moderate malnutrition criteria at this time.    CRITERIA FOR MALNUTRITION DIAGNOSIS:  Criteria for non-severe malnutrition diagnosis: chronic illness related to energy intake less than75% for greater than 1 month, body fat mild depletion, and muscle mass mild depletion    NUTRITION INTERVENTION:    RD nutrition Care Plan- Initiated ONS (oral nutritional supplements), Ordered multivitamin, and See RD nutrition assessment for additional recommendations  Meal and Snacks - ADAT per surgery; monitor patient po intake. Encourage adequate po of appropriate diet.  Medical Food Supplements - Continue [place] chocolate daily and Ensure Plus HP chocolate daily. Rationale/use for oral supplements discussed.  Vitamin and Mineral Supplements - Continue Multivitamin with minerals  Coordination of Nutrition Care - Recommend GI/Surgery consult for nutrition support/diet advancement  and Palliative care consult for goal of care    PATIENT STATUS: 6/21: Pt had CT guided pericolonic abscess drain placement on 6/18. No plan for inpatient surgery at this time. Visited pt at bedside. Pt reports drinking her protein shake this morning and planning to drink the other later today. Reports having nausea this morning and ongoing constipation with last BM PTA - plan for suppository later today. Continued to encourage PO and ONS intake; all questions answered at this time.    6/18: 78 year old female admitted on 6/17 presents with generalized weakness. Pt screened d/t MST score 2. Pt known to writer from recent admit; last assessed on 6/14. Visited pt at bedside. Pt was sleepy so visit kept short. She reports appetite has been poor and agreeable to chocolate [place] and Ensure. Denies GI symptoms at this time but was having abdominal pain upon admit. Last BM PTA. No chewing or swallowing difficulties and NKFA. Pt currently NPO for CT abscess drain placement. Will  continue to monitor and follow up as appropriate.    PMH: hypothyroidism, anxiety, asthma, CKD 3, COPD, essential hypertension, hyperlipidemia, recent hospitalization for diverticulitis     ANTHROPOMETRICS:  Ht: 152.4 cm (5')  Wt: 43.6 kg (96 lb 3.2 oz).   BMI: Body mass index is 18.79 kg/m².  IBW: 45.5 kg    WEIGHT HISTORY:  Patient Weight(s) for the past 336 hrs:   Weight   06/18/24 0043 43.6 kg (96 lb 3.2 oz)   06/17/24 1702 40.8 kg (90 lb)       Wt Readings from Last 10 Encounters:   06/18/24 43.6 kg (96 lb 3.2 oz)   06/10/24 41.7 kg (92 lb)   05/16/24 41.3 kg (91 lb)   05/14/24 41.3 kg (91 lb)   05/01/24 41.3 kg (91 lb)   05/02/24 42.2 kg (93 lb)   04/30/24 41.3 kg (91 lb)   04/18/24 41.3 kg (91 lb)   04/04/24 41.3 kg (91 lb)   03/23/24 41.7 kg (91 lb 14.9 oz)        NUTRITION:  Diet:       Procedures    Full liquid diet Is Patient on Accuchecks? No        Percent Meals Eaten (last 3 days)       None            Food Allergies: No  Cultural/Ethnic/Yazdanism Preferences Addressed: Yes    GI SYSTEM REVIEW: constipation and nausea; last BM PTA  Skin/Wounds: WNL    NUTRITION RELATED PHYSICAL FINDINGS:     1. Body Fat/Muscle Mass: moderate depletion body fat Buccal fat pad and Triceps and moderate muscle depletion Temple region, Clavicle region, Thigh region, and Calf region     2. Fluid Accumulation: none per RN documentation     NUTRITION PRESCRIPTION: 43.6 kg  Calories: 5827-2330 calories/day (30-35 kcal/kg)  Protein: 52-65 grams protein/day (1.2-1.5 gm/kg)  Fluid: ~1 ml/kcal or per MD discretion    NUTRITION DIAGNOSIS/PROBLEM:  Malnutrition related to insufficient appetite resulting in inadequate nutrition intake as evidenced by documented/reported insufficient oral intake, documented/reported unintentional weight loss, loss of fat mass, and loss of muscle mass    MONITOR AND EVALUATE/NUTRITION GOALS:  PO intake of 75% of meals TID - New  PO intake of 75% of oral nutrition supplement/s - New  Weight stable within  1 to 2 lbs during admission - Ongoing  Return to PO intake or advance diet in 24-48 hrs - Resolved      MEDICATIONS:  Remeron, multivitamin with minerals, abx, prn reglan, prn zofran, prn miralax, prn senokot, prn simethicone  Gtt: NS at 75 ml/hr    LABS:  Reviewed     Pt is at Moderate nutrition risk    Halie Muniz, RD, LDN, CNSC  Clinical Dietitian  Spectra: [phone]

## 2024-06-22 PROBLEM — E46 MALNUTRITION (HCC): Status: ACTIVE | Noted: 2024-06-22

## 2024-06-22 PROBLEM — E46 MALNUTRITION (HCC): Status: ACTIVE | Noted: 2024-01-01

## 2024-06-22 LAB
BASOPHILS # BLD AUTO: 0.05 X10(3) UL (ref 0–0.2)
BASOPHILS NFR BLD AUTO: 0.6 %
EOSINOPHIL # BLD AUTO: 0.15 X10(3) UL (ref 0–0.7)
EOSINOPHIL NFR BLD AUTO: 1.8 %
ERYTHROCYTE [DISTWIDTH] IN BLOOD BY AUTOMATED COUNT: 15.8 %
HCT VFR BLD AUTO: 29.9 %
HGB BLD-MCNC: 9.9 G/DL
IMM GRANULOCYTES # BLD AUTO: 0.04 X10(3) UL (ref 0–1)
IMM GRANULOCYTES NFR BLD: 0.5 %
LYMPHOCYTES # BLD AUTO: 0.85 X10(3) UL (ref 1–4)
LYMPHOCYTES NFR BLD AUTO: 10.1 %
MCH RBC QN AUTO: 27.5 PG (ref 26–34)
MCHC RBC AUTO-ENTMCNC: 33.1 G/DL (ref 31–37)
MCV RBC AUTO: 83.1 FL
MONOCYTES # BLD AUTO: 0.77 X10(3) UL (ref 0.1–1)
MONOCYTES NFR BLD AUTO: 9.2 %
NEUTROPHILS # BLD AUTO: 6.52 X10 (3) UL (ref 1.5–7.7)
NEUTROPHILS # BLD AUTO: 6.52 X10(3) UL (ref 1.5–7.7)
NEUTROPHILS NFR BLD AUTO: 77.8 %
PLATELET # BLD AUTO: 549 10(3)UL (ref 150–450)
RBC # BLD AUTO: 3.6 X10(6)UL
WBC # BLD AUTO: 8.4 X10(3) UL (ref 4–11)

## 2024-06-22 PROCEDURE — 99233 SBSQ HOSP IP/OBS HIGH 50: CPT | Performed by: STUDENT IN AN ORGANIZED HEALTH CARE EDUCATION/TRAINING PROGRAM

## 2024-06-22 RX ORDER — BISACODYL 10 MG
10 SUPPOSITORY, RECTAL RECTAL
Status: DISCONTINUED | OUTPATIENT
Start: 2024-06-22 | End: 2024-06-24

## 2024-06-22 RX ORDER — SORBITOL SOLUTION 70 %
30 SOLUTION, ORAL MISCELLANEOUS ONCE
Status: COMPLETED | OUTPATIENT
Start: 2024-06-22 | End: 2024-06-22

## 2024-06-22 NOTE — PROGRESS NOTES
Patient A&Ox4 - forgetful at times.  Denies SOB/chest pain.  Saturating above 90% on room air.  No cough, no fever.  Simethicone given for c/o gas pains.  No BM since admission - Miralax given.  Purewick in place.   VALERI drain in place - irrigated and aspirated as ordered. +Foul smelling, purulent brown drainage.   IVF and IV Zosyn as ordered. Safety precautions in place.  Will follow.

## 2024-06-22 NOTE — PROGRESS NOTES
[facility]   part of [facility]     Hospitalist Progress Note     Bibi Diaz Patient Status:  Observation    1945 MRN [id number]   Location [facility] 5NW-A Attending Rich Jesus MD   Hosp Day # 4 PCP Rick Shannon DO     Chief Complaint: weakness, confusion, poor PO intake    Subjective:   Patient alert, family at bedside. Notes she feels bloated/ constipated    Objective:    Review of Systems:   A comprehensive review of systems was completed; pertinent positive and negatives stated in subjective.    Vital signs:  Temp:  [97.9 °F (36.6 °C)-98.9 °F (37.2 °C)] 98.3 °F (36.8 °C)  Pulse:  [67-85] 78  Resp:  [16-22] 22  BP: (142-175)/(71-84) 171/83  SpO2:  [97 %-98 %] 97 %    Physical Exam:    General: No acute distress, weak  Respiratory: No wheezes, no rhonchi  Cardiovascular: S1, S2, regular rate and rhythm  Abdomen: Soft, mild tender, non-distended, positive bowel sounds  Neuro: No new focal deficits.   Extremities: No edema      Diagnostic Data:    Labs:  Recent Labs   Lab 24  1712 24  0719 24  1405 24  0755 24  0905 24  0747   WBC 9.7 12.2*  --  9.3 11.3* 8.4   HGB 8.9* 8.7*  --  9.5* 10.0* 9.9*   MCV 83.1 85.2  --  85.3 83.9 83.1   .0 423.0  --  474.0* 531.0* 549.0*   INR  --   --  1.35*  --   --   --        Recent Labs   Lab 24  0719 24  0713 24  0754 24  1950 24  0905   GLU 96  --  108* 124* 105*   BUN 5*  --  7* 8* 5*   CREATSERUM 0.66  --  0.56 0.60 0.56   CA 8.5  --  8.5 8.4* 9.0   ALB 2.5*  --  2.5*  --  2.5*     --  142 137 138   K 3.3*   < > 3.5 3.3* 4.0  4.0   *  --  109 105 108   CO2 23.0  --  25.0 24.0 24.0   ALKPHO 68  --  66  --  73   AST 18  --  16  --  14*   ALT 14  --  14  --  11*   BILT 0.3  --  0.2  --  0.2   TP 5.8*  --  5.7*  --  6.1*    < > = values in this interval not displayed.       Estimated Creatinine Clearance: 56.1 mL/min (based on SCr of 0.56 mg/dL).    Recent  Labs   Lab 06/20/24  1950   TROPHS 5       Recent Labs   Lab 06/18/24  1405   PTP 16.8*   INR 1.35*                  Microbiology    Hospital Encounter on 06/17/24   1. Anaerobic Culture     Status: None (Preliminary result)    Collection Time: 06/18/24  4:46 PM    Specimen: Abdominal peritoneum; Abscess   Result Value Ref Range    Anaerobic Culture Pending N/A   2. Aerobic Bacterial Culture     Status: Abnormal (Preliminary result)    Collection Time: 06/18/24  4:46 PM    Specimen: Abdominal peritoneum; Abscess   Result Value Ref Range    Aerobic Culture Result 2+ growth Escherichia coli (A) N/A    Aerobic Culture Result 4+ growth Streptococcus anginosus (A) N/A    Aerobic Culture Result 2+ growth Streptococcus constellatus (A) N/A    Aerobic Culture Result 2+ growth Pseudomonas aeruginosa (A) N/A    Aerobic Smear 4+ WBCs seen N/A    Aerobic Smear 1+ Gram Positive Cocci N/A    Aerobic Smear 2+ Gram Negative Rods N/A    Aerobic Smear 1+ Gram Positive Rods N/A       Susceptibility    Escherichia coli -  (no method available)     Ampicillin 8 Sensitive      Cefazolin <=4 Sensitive      Ciprofloxacin <=0.25 Sensitive      Gentamicin <=1 Sensitive      Meropenem <=0.25 Sensitive      Levofloxacin <=0.12 Sensitive      Piperacillin + Tazobactam <=4 Sensitive      Trimethoprim/Sulfa <=20 Sensitive          Imaging: Reviewed in Epic.    Medications:    piperacillin-tazobactam  3.375 g Intravenous Q8H    heparin  5,000 Units Subcutaneous 2 times per day    DULoxetine  30 mg Oral Daily    mirtazapine  30 mg Oral Nightly    multivitamin with minerals  1 tablet Oral Daily    fluticasone furoate-vilanterol  1 puff Inhalation Daily       Assessment & Plan:      #Multiple pericolonic abscesses with Ecoli spp  Pt had been DC on 6/15 on PO amoxicillin but did not take it after vomiting after taking one dose  #Acute diverticulitis  - drain in place  -IV Zosyn  -IV fluids and pain control  - Repeat CT A/P on 6/21/24- with large stool  burden, two small abscesses seen again. Continue abx, escalate bowel care     #weakness  Multifactorial due to above, poor PO intake at baseline   DC to [facility] once improved    #Hypokalemia  - replace per protocol     #Normocytic anemia  #Iron deficiency     #Orthostatic hypotension     #Essential hypertension     #CKD 3     #Hyperlipidemia     #Hypothyroidism     #COPD    #acute metabolic and toxic encephalopathy- improving on 6/20/24, resolved as of 6/21/24  Likely due to narcotics, versed prior to drain insertion, infection   MRI brain 6/19/2024 no acute findings           Margot Blanco MD    Supplementary Documentation:     Quality:  DVT Mechanical Prophylaxis:   SCDs,    DVT Pharmacologic Prophylaxis   Medication    heparin (Porcine) 5000 UNIT/ML injection 5,000 Units                Code Status: DNAR/Selective Treatment  Flood: External urinary catheter in place  Flood Duration (in days):   Central line:    WOLF:     Discharge is dependent on: progress  At this point Ms. Diaz is expected to be discharge to: TBD    The 21st Century Cures Act makes medical notes like these available to patients in the interest of transparency. Please be advised this is a medical document. Medical documents are intended to carry relevant information, facts as evident, and the clinical opinion of the practitioner. The medical note is intended as peer to peer communication and may appear blunt or direct. It is written in medical language and may contain abbreviations or verbiage that are unfamiliar.

## 2024-06-22 NOTE — PROGRESS NOTES
Patient c/o feeling constipated. CT yesterday showed increased large amount of stool. Notified Dr. Blanco, suppository given at 0930. Patient with medium soft BM. Tap water enema ordered.  Patient unable to tolerate and hold water in, multiple small hard stool pieces came out. Notified Dr. Blanco, sorbitol given, large soft BM, mixed with urine. Patient states she still feels constipated but overall better. Nausea in the early morning, but improved during the day. Chocolate protein shake x 1 today, to encourage PO intake.  VALERI drain to right abdomen, 5 mL brown drainage today. Flush q 8 hrs.  Patient is incontinent, voids. Refused to get up and remained in bed d/t bowel management during the day.

## 2024-06-22 NOTE — PROGRESS NOTES
Name:Bibi Diaz  MRN#:[id number]  :1945      Subjective:  Feels better since drain placed.  Mild drain site discomfort    Objective:  LLQ drain site is CDI    Vital Signs:  Blood pressure 149/71, pulse 70, temperature 98.9 °F (37.2 °C), temperature source Oral, resp. rate 17, height 60\", weight 96 lb 3.2 oz, SpO2 98%, not currently breastfeeding.    Input/Output:    Intake/Output Summary (Last 24 hours) at 2024  Last data filed at 2024 1812  Gross per 24 hour   Intake 414 ml   Output 520 ml   Net -106 ml     24 hour drain output: 34cc  dark in color    Labs:  Lab Results   Component Value Date    WBC 11.3 2024    RBC 3.67 2024    HGB 10.0 2024    HCT 30.8 2024    MCV 83.9 2024    MCH 27.2 2024    MCHC 32.5 2024    RDW 15.5 2024    .0 2024       Microbiology:  2+ growth Escherichia coli Abnormal       4+ growth Streptococcus anginosus Abnormal       2+ growth Streptococcus constellatus Abnormal       2+ growth Pseudomonas aeruginosa Abnormal        Assessment/Plan:  Stercoral colitis and diverticulitis with abscess. S/p IR drain.  Drainage now dark, concerning for stool. CT today with no free air or recurrent abscess.  Abscess along lateral margin of sigmoid resolved.  Tiny abscesses along inferior and medial sigmoid colon are smaller and not drainable.      Luke Gautam MD  2024  8:27 PM    Addendum:  When drainage is <10cc/day x 2 days then CT abscessogram.

## 2024-06-22 NOTE — PROGRESS NOTES
Patient had a small amount of soft brown stool after Miralax tonight.  This morning, she woke up complaining of nausea.  Reglan was given as ordered.

## 2024-06-22 NOTE — PROGRESS NOTES
[facility]  Progress Note    Bibi Diaz Patient Status:  Inpatient    1945 MRN [id number]   Location [facility]-A Attending Margot Blanco MD   Hosp Day # 4 PCP Rick Shannon DO     Subjective:  The patient is resting in bed. She reports feeling better today. She denies abdominal pain, but states she feels very constipated. She did receive an suppository and had a medium sized BM. She did not tolerated enema. She denies nausea or vomiting. She is slowly increasing oral intake.    Objective/Physical Exam:  General: Alert, orientated x3.  Cooperative.  No apparent distress.  Vital Signs:  Blood pressure (!) 171/83, pulse 78, temperature 98.3 °F (36.8 °C), temperature source Oral, resp. rate 22, height 60\", weight 96 lb 3.2 oz (43.6 kg), SpO2 97%, not currently breastfeeding.  HEENT: Normocephalic, atraumatic. No scleral icterus.  Abdomen:  Soft, non-distended, non-tender, with no rebound or guarding.  No peritoneal signs.  Drain: in place with 10 ml output over the past 24 hours.    Labs:  CBC:    Lab Results   Component Value Date    WBC 8.4 2024    WBC 11.3 (H) 2024    WBC 9.3 2024     Lab Results   Component Value Date    HEMOGLOBIN 11.8 (L) 2024    HEMOGLOBIN 12.7 2021    HEMOGLOBIN 13.4 2018    HGB 9.9 (L) 2024    HGB 10.0 (L) 2024    HGB 9.5 (L) 2024      Lab Results   Component Value Date    .0 (H) 2024    .0 (H) 2024    .0 (H) 2024     Lab Results   Component Value Date    WBC 8.4 2024    HGB 9.9 2024    HCT 29.9 2024    .0 2024         Assessment/Plan:  Patient Active Problem List   Diagnosis    Major depressive disorder, recurrent episode, moderate (HCC)    Esophageal reflux    Chronic obstructive pulmonary disease with acute lower respiratory infection (HCC)    Acquired hypothyroidism    Memory deficit    Aortic atherosclerosis (HCC)    Diverticulosis of  colon    Dizziness and giddiness    Dysthymic disorder    Lichen sclerosus    Pure hypercholesterolemia    Vitamin D deficiency    Vitiligo    Severe episode of recurrent major depressive disorder, without psychotic features (HCC)    Paraesophageal hernia    Subclinical hypothyroidism    Hypertension    Protein-calorie malnutrition, unspecified severity (HCC)    Gastrointestinal hemorrhage, unspecified gastrointestinal hemorrhage type    Thrombocytopenia (HCC)    Acute diverticulitis    Hyponatremia    ACP (advance care planning)    Weakness generalized    Colonic diverticular abscess    Sigmoid diverticulitis    History of repair of hiatal hernia    Diverticulitis of large intestine with abscess without bleeding    Malnutrition (HCC)     Pericolonic abscess status post IR drain    Continue full liquid diet.   Continue IV antibiotics.  Continue bowel regimen, will await for bowel movement  Continue pain control and antiemetics as needed.  Encourage ambulation and up to chair.  DVT prophylaxis with heparin    MARY Peter  6/22/2024  5:30 PM     This note was initiated by Rach Martinez.  The PA saw the patient in conjunction with me. The PA performed a history, exam, and developed the assessment and plan in conjunction with me. I agree with the above note and have made changes which reflect my own history and physical, if necessary.    Ina Rowan MD  [facility]  6/22/2024  8:56 PM

## 2024-06-22 NOTE — PLAN OF CARE
Problem: Patient/Family Goals  Goal: Patient/Family Long Term Goal  Description: Patient's Long Term Goal: discharge home    Interventions:  - Consult to [facility], Gen Surgery  - IV Abx  - IVF  - Pain control  - See additional Care Plan goals for specific interventions  Outcome: Progressing  Goal: Patient/Family Short Term Goal  Description: Patient's Short Term Goal: pain control  6/19: maintain safety  6/19 noc: rest  6/20: maintain safety  6/20 NOC: manage gas  6/21: maintain safety  06/21/2024 - have a bowel movement    Interventions:   - Consult to [facility], Gen Surgery  - IVF  - IV Abx  - Pain control  - Miralax as ordered  - See additional Care Plan goals for specific interventions  Outcome: Progressing     Problem: SAFETY ADULT - FALL  Goal: Free from fall injury  Description: INTERVENTIONS:  - Assess pt frequently for physical needs  - Identify cognitive and physical deficits and behaviors that affect risk of falls.  - [place] fall precautions as indicated by assessment.  - Educate pt/family on patient safety including physical limitations  - Instruct pt to call for assistance with activity based on assessment  - Modify environment to reduce risk of injury  - Provide assistive devices as appropriate  - Consider OT/PT consult to assist with strengthening/mobility  - Encourage toileting schedule  Outcome: Progressing     Problem: CARDIOVASCULAR - ADULT  Goal: Absence of cardiac arrhythmias or at baseline  Description: INTERVENTIONS:  - Continuous cardiac monitoring, monitor vital signs, obtain 12 lead EKG if indicated  - Evaluate effectiveness of antiarrhythmic and heart rate control medications as ordered  - Initiate emergency measures for life threatening arrhythmias  - Monitor electrolytes and administer replacement therapy as ordered  Outcome: Progressing     Problem: METABOLIC/FLUID AND ELECTROLYTES - ADULT  Goal: Electrolytes maintained within normal limits  Description: INTERVENTIONS:  - Monitor labs and  rhythm and assess patient for signs and symptoms of electrolyte imbalances  - Administer electrolyte replacement as ordered  - Monitor response to electrolyte replacements, including rhythm and repeat lab results as appropriate  - Fluid restriction as ordered  - Instruct patient on fluid and nutrition restrictions as appropriate  Outcome: Progressing     Problem: SKIN/TISSUE INTEGRITY - ADULT  Goal: Skin integrity remains intact  Description: INTERVENTIONS  - Assess and document risk factors for pressure ulcer development  - Assess and document skin integrity  - Monitor for areas of redness and/or skin breakdown  - Initiate interventions, skin care algorithm/standards of care as needed  Outcome: Progressing  Goal: Incision(s), wounds(s) or drain site(s) healing without S/S of infection  Description: INTERVENTIONS:  - Assess and document risk factors for pressure ulcer development  - Assess and document skin integrity  - Assess and document dressing/incision, wound bed, drain sites and surrounding tissue  - Implement wound care per orders  - Initiate isolation precautions as appropriate  - Initiate Pressure Ulcer prevention bundle as indicated  Outcome: Progressing

## 2024-06-23 PROBLEM — K57.92 DIVERTICULITIS: Status: ACTIVE | Noted: 2024-06-23

## 2024-06-23 PROBLEM — K57.92 DIVERTICULITIS: Status: ACTIVE | Noted: 2024-01-01

## 2024-06-23 LAB
BASOPHILS # BLD AUTO: 0.08 X10(3) UL (ref 0–0.2)
BASOPHILS NFR BLD AUTO: 0.9 %
EOSINOPHIL # BLD AUTO: 0.11 X10(3) UL (ref 0–0.7)
EOSINOPHIL NFR BLD AUTO: 1.3 %
ERYTHROCYTE [DISTWIDTH] IN BLOOD BY AUTOMATED COUNT: 15.9 %
HCT VFR BLD AUTO: 29.4 %
HGB BLD-MCNC: 9.8 G/DL
IMM GRANULOCYTES # BLD AUTO: 0.03 X10(3) UL (ref 0–1)
IMM GRANULOCYTES NFR BLD: 0.4 %
LYMPHOCYTES # BLD AUTO: 0.87 X10(3) UL (ref 1–4)
LYMPHOCYTES NFR BLD AUTO: 10.2 %
MCH RBC QN AUTO: 27.5 PG (ref 26–34)
MCHC RBC AUTO-ENTMCNC: 33.3 G/DL (ref 31–37)
MCV RBC AUTO: 82.6 FL
MONOCYTES # BLD AUTO: 0.75 X10(3) UL (ref 0.1–1)
MONOCYTES NFR BLD AUTO: 8.8 %
NEUTROPHILS # BLD AUTO: 6.65 X10 (3) UL (ref 1.5–7.7)
NEUTROPHILS # BLD AUTO: 6.65 X10(3) UL (ref 1.5–7.7)
NEUTROPHILS NFR BLD AUTO: 78.4 %
PLATELET # BLD AUTO: 595 10(3)UL (ref 150–450)
RBC # BLD AUTO: 3.56 X10(6)UL
WBC # BLD AUTO: 8.5 X10(3) UL (ref 4–11)

## 2024-06-23 PROCEDURE — 99232 SBSQ HOSP IP/OBS MODERATE 35: CPT | Performed by: STUDENT IN AN ORGANIZED HEALTH CARE EDUCATION/TRAINING PROGRAM

## 2024-06-23 PROCEDURE — 99233 SBSQ HOSP IP/OBS HIGH 50: CPT | Performed by: STUDENT IN AN ORGANIZED HEALTH CARE EDUCATION/TRAINING PROGRAM

## 2024-06-23 RX ORDER — LOSARTAN POTASSIUM 25 MG/1
25 TABLET ORAL DAILY
Status: DISCONTINUED | OUTPATIENT
Start: 2024-06-23 | End: 2024-06-24

## 2024-06-23 RX ORDER — TRAMADOL HYDROCHLORIDE 50 MG/1
50 TABLET ORAL EVERY 12 HOURS PRN
Qty: 12 TABLET | Refills: 0 | Status: SHIPPED | OUTPATIENT
Start: 2024-06-23

## 2024-06-23 RX ORDER — ONDANSETRON 4 MG/1
4 TABLET, ORALLY DISINTEGRATING ORAL EVERY 8 HOURS PRN
Qty: 30 TABLET | Refills: 0 | Status: SHIPPED | OUTPATIENT
Start: 2024-06-23

## 2024-06-23 RX ORDER — CLONAZEPAM 0.5 MG/1
0.5 TABLET ORAL 2 TIMES DAILY PRN
Qty: 12 TABLET | Refills: 0 | Status: SHIPPED | OUTPATIENT
Start: 2024-06-23 | End: 2024-06-25

## 2024-06-23 NOTE — CONSULTS
INFECTIOUS DISEASE CONSULTATION    Bibi Diaz Patient Status:  Inpatient    1945 MRN [id number]   [facility] 5NW-A Attending Margot Blanco MD   Hosp Day # 5 PCP Rick Shannon DO       Requested by Dr. Blanco    Reason for Consultation:  Polymicrobial abscess, multiple antibiotic allergies     History of Present Illness:  Bibi Diaz is a a(n) 78 year old female with a PMH of HTN, CKD 3, HLD, GERD, paraesophageal hernia, Hypothyroidism, COPD, depression, anxiety who presented  after fall out of bed.     Patient recently admitted 6/10- for sigmoid diverticulitis. Prior CT 6/10/24 with e/o sigmoid diverticulitis without abscess formation at that time. Blood cultures negative 6/10.Treated with zosyn. Prescribed Augmentin on discharge though patient did not take it because it has amoxicillin in it.     On admission  she was afebrile with a normal WBC count and HDS. CTH unremarkable. CT AP revealed large stool in descending and sigmoid colon, with surrounding stranding and abscesses. General surgery consulted. After management discussions, patient and team elected for IR aspiration / drain placement which was performed on , with 10ml of pus aspirated and culture growing E coli, PsAr, Strep species. She has been treated with zosyn since admission and has tolerated it well. She has received cefazolin sue procedurally. She only notes ongoing nausea but this seems to be multifactorial in the setting of gastroparesis. Plan for discharge to rehab. No rash or itchy skin.     In regards to her prior antibiotic allergies, she has a hard time remembering the full extent to what her side effects were and when these occurred. Niece was present via telephone as well.     History:  Past Medical History:    Abdominal distention    Abdominal pain    Acquired hypothyroidism    Anxiety    Asthma (HCC)    Belching    Bloating    Chronic  rhinitis    CKD (chronic kidney disease) stage 3, GFR 30-59 ml/min ([facility])    Constipation    COPD (chronic obstructive pulmonary disease) ([facility])    NO OXYGEN     Depression    Diarrhea, unspecified    Diverticulosis of large intestine    Emphysema    Esophageal reflux    Fatigue    Feeling lonely    Flatulence/gas pain/belching    Food intolerance    H/O bronchitis    Hay fever    Hemorrhoids    High blood pressure    History of depression    Hypercholesterolemia    Hypertension    Itch of skin    Migraines    Mild intermittent asthma without complication ([facility])    Nausea    Night sweats    Pneumonia    PONV (postoperative nausea and vomiting)    Pure hypercholesterolemia    Rash    Reflux    Sleep disturbance    Stress    Uncomfortable fullness after meals    Visual impairment    glasses    Vomiting    Wears glasses     Past Surgical History:   Procedure Laterality Date    Cataract      Colonoscopy      Colonoscopy N/A 3/21/2024    Procedure: COLONOSCOPY;  Surgeon: Delon Ashford DO;  Location:  ENDOSCOPY    Colonoscopy & polypectomy  09/2014    multiple polyps; tics; repeat 3 yrs    Colonoscopy,remv lesn,snare N/A 09/22/2014    Procedure: ESOPHAGOGASTRODUODENOSCOPY, COLONOSCOPY, POSSIBLE BIOPSY, POSSIBLE POLYPECTOMY [id number];  Surgeon: Slade Ivan MD;  Location: [facility]    Correct bunion,simple Bilateral     Cystotomy,excis vesical neck Left     Egd      Gastro - dmg  09/2014    stricture; HH    Oophorectomy Bilateral 2017    Other surgical history  02/27/2023    Robotic repair of hiatal hernia and Nissen fundoplication    Patient documented not to have experienced any of the following events N/A 09/22/2014    Procedure: ESOPHAGOGASTRODUODENOSCOPY, COLONOSCOPY, POSSIBLE BIOPSY, POSSIBLE POLYPECTOMY [id number];  Surgeon: Slade Ivan MD;  Location: [facility]    Patient withough preoperative order for iv antibiotic surgical site infection prophylaxis. N/A 09/22/2014    Procedure:  ESOPHAGOGASTRODUODENOSCOPY, COLONOSCOPY, POSSIBLE BIOPSY, POSSIBLE POLYPECTOMY [id number];  Surgeon: Slade Ivan MD;  Location: [facility]    Repair ing hernia,5+y/o,reducibl      Upper gi endoscopy,diagnosis N/A 09/22/2014    Procedure: ESOPHAGOGASTRODUODENOSCOPY, COLONOSCOPY, POSSIBLE BIOPSY, POSSIBLE POLYPECTOMY [id number];  Surgeon: Slade Ivan MD;  Location: [facility]     Family History   Problem Relation Age of Onset    Colon Cancer Mother     Other (Other) Mother     Other (copd) Mother     Alcohol and Other Disorders Associated Father     Other (Other) Father     Other (emph) Father     Alcohol and Other Disorders Associated Son     Hypertension Sister     Prostate Cancer Brother     Hypertension Brother       reports that she quit smoking about 43 years ago. Her smoking use included cigarettes. She started smoking about 53 years ago. She has never used smokeless tobacco. She reports that she does not drink alcohol and does not use drugs.      Allergies:  Allergies   Allergen Reactions    Avelox [Moxifloxacin Hcl In Nacl] SWELLING    Avelox [Moxifloxacin Hydrochloride] TONGUE SWELLING     \"TABS\"    Amoxicillin NAUSEA AND VOMITING     Vomiting.    Statins Myopathy    Sulfa Antibiotics RASH and ITCHING    Dander OTHER (SEE COMMENTS)     \"Animals\": runny nose, watery eyes, itching    Diphenhydramine Runny nose     \"Animals\": runny nose, watery eyes, itching    Dust Runny nose    Latex RASH    Sulfa Drugs Cross Reactors RASH and ITCHING       Medications:    Current Facility-Administered Medications:     losartan (Cozaar) tab 25 mg, 25 mg, Oral, Daily    bisacodyl (Dulcolax) 10 MG rectal suppository 10 mg, 10 mg, Rectal, Daily PRN    hydrALAzine (Apresoline) 20 mg/mL injection 10 mg, 10 mg, Intravenous, Q6H PRN    piperacillin-tazobactam (Zosyn) 3.375 g in dextrose 5% 100 mL IVPB-ADDV, 3.375 g, Intravenous, Q8H    hydrALAzine (Apresoline) 20 mg/mL injection 5 mg, 5 mg, Intravenous, Q6H  PRN    sodium chloride 0.9% infusion, , Intravenous, Continuous    simethicone (Mylicon) chewable tab 80 mg, 80 mg, Oral, QID PRN    morphINE PF 2 MG/ML injection 0.5 mg, 0.5 mg, Intravenous, Q4H PRN **OR** morphINE PF 2 MG/ML injection 1 mg, 1 mg, Intravenous, Q4H PRN **OR** [DISCONTINUED] morphINE PF 2 MG/ML injection 2 mg, 2 mg, Intravenous, Q2H PRN    traMADol (Ultram) tab 50 mg, 50 mg, Oral, Q12H PRN    acetaminophen (Tylenol Extra Strength) tab 1,000 mg, 1,000 mg, Oral, Q4H PRN    melatonin tab 3 mg, 3 mg, Oral, Nightly PRN    ondansetron (Zofran) 4 MG/2ML injection 4 mg, 4 mg, Intravenous, Q6H PRN    metoclopramide (Reglan) 5 mg/mL injection 10 mg, 10 mg, Intravenous, Q8H PRN    heparin (Porcine) 5000 UNIT/ML injection 5,000 Units, 5,000 Units, Subcutaneous, 2 times per day    polyethylene glycol (PEG 3350) (Miralax) 17 g oral packet 17 g, 17 g, Oral, Daily PRN    sennosides (Senokot) tab 17.2 mg, 17.2 mg, Oral, Nightly PRN    fleet enema (Fleet) 7-19 GM/118ML rectal enema 133 mL, 1 enema, Rectal, Once PRN    clonazePAM (KlonoPIN) tab 0.5 mg, 0.5 mg, Oral, BID PRN    DULoxetine (Cymbalta) DR cap 30 mg, 30 mg, Oral, Daily    mirtazapine (Remeron) tab 30 mg, 30 mg, Oral, Nightly    multivitamin with minerals (Thera M Plus) tab 1 tablet, 1 tablet, Oral, Daily    fluticasone furoate-vilanterol (Breo Ellipta) 100-25 MCG/ACT inhaler 1 puff, 1 puff, Inhalation, Daily  Current Facility-Administered Medications on File Prior to Encounter   Medication Dose Route Frequency Provider Last Rate Last Admin    [COMPLETED] iron sucrose (Venofer) 20 mg/mL injection 200 mg  200 mg Intravenous Once Marcela Dangelo MD   200 mg at 06/12/24 1241    [COMPLETED] acetaminophen (Tylenol Extra Strength) tab 1,000 mg  1,000 mg Oral Once Tyrone Kumari MD   1,000 mg at 06/10/24 1700    [COMPLETED] HYDROmorphone (Dilaudid) 1 MG/ML injection 0.5 mg  0.5 mg Intravenous Q30 Min PRN Tyrone Kumari MD   0.5 mg at 06/12/24 0902    [COMPLETED]  sodium chloride 0.9% infusion 1,000 mL  1,000 mL Intravenous Once Tyrone Kumari  mL/hr at 06/10/24 2010 1,000 mL at 06/10/24 2010    [COMPLETED] ondansetron (Zofran) 4 MG/2ML injection 4 mg  4 mg Intravenous Once Tyrone Kumari MD   4 mg at 06/10/24 2005    [COMPLETED] iopamidol 76% (ISOVUE-370) injection for power injector  60 mL Intravenous ONCE PRN Tyrone Kumari MD   60 mL at 06/10/24 1959    [COMPLETED] piperacillin-tazobactam (Zosyn) 4.5 g in dextrose 5% 100 mL IVPB-ADDV  4.5 g Intravenous Once Tyrone Kumari MD   Stopped at 06/10/24 2122    [COMPLETED] ondansetron (Zofran) 4 MG/2ML injection 4 mg  4 mg Intravenous Once Tal Anderson MD   4 mg at 05/13/24 1657    [COMPLETED] pantoprazole (Protonix) 40 mg in sodium chloride 0.9% PF 10 mL IV push  40 mg Intravenous Once Tal Anderson MD   40 mg at 05/13/24 1657    [COMPLETED] acetaminophen (Tylenol Extra Strength) tab 1,000 mg  1,000 mg Oral Once Tal Anderson MD   1,000 mg at 05/13/24 1657    [COMPLETED] iopamidol 76% (ISOVUE-370) injection for power injector  75 mL Intravenous ONCE PRN Tal Anderson MD   75 mL at 05/13/24 1803    [COMPLETED] ketorolac (Toradol) 15 MG/ML injection 15 mg  15 mg Intravenous Once Tal Anderson MD   15 mg at 05/13/24 1838    [COMPLETED] sodium chloride 0.9 % IV bolus 500 mL  500 mL Intravenous Once Margot Blanco  mL/hr at 03/28/24 1126 500 mL at 03/28/24 1126    [COMPLETED] sorbitol 70 % oral solution 30 mL  30 mL Oral Once Margot Blanco MD   30 mL at 03/28/24 1603     Current Outpatient Medications on File Prior to Encounter   Medication Sig Dispense Refill    multivitamin with minerals Oral Tab Take 1 tablet by mouth daily. 30 tablet 1    DULoxetine (CYMBALTA) 30 MG Oral Cap DR Particles Take 1 capsule (30 mg total) by mouth daily. 30 capsule 0    meclizine 12.5 MG Oral Tab Take 1 tablet (12.5 mg total) by mouth 3 (three) times daily as needed. 30 tablet 1    ondansetron 4 MG Oral Tablet  Dispersible Take 1 tablet (4 mg total) by mouth every 4 (four) hours as needed for Nausea. 30 tablet 0    mirtazapine 30 MG Oral Tab Take 1 tablet (30 mg total) by mouth nightly. 90 tablet 1    fluticasone propionate 50 MCG/ACT Nasal Suspension 2 sprays by Each Nare route daily. 1 each 1    losartan 25 MG Oral Tab Take 1 tablet (25 mg total) by mouth daily. 90 tablet 1    albuterol (PROAIR HFA) 108 (90 Base) MCG/ACT Inhalation Aero Soln INHALE 2 PUFFS BY MOUTH EVERY 6 HOURS AS NEEDED FOR WHEEZING 8.5 g 2    SYMBICORT 160-4.5 MCG/ACT Inhalation Aerosol INHALE 2 PUFFS BY MOUTH TWICE DAILY 10.2 g 11    TRIAMCINOLONE 0.1 % External Cream APPLY TOPICALLY TO THE AFFECTED AREA TWICE DAILY AS NEEDED (Patient taking differently: as needed.) 60 g 3    amoxicillin-pot clavulanate 400-57 mg/5mL Oral Recon Susp Take 7 mL (560 mg total) by mouth 2 (two) times daily for 10 days. 140 mL 0    Cholecalciferol (VITAMIN D-3 OR) Take by mouth.      DUPIXENT 300 MG/2ML Subcutaneous Solution Prefilled Syringe       clobetasol 0.05 % External Ointment       acetaminophen 500 MG Oral Tab Take 1 tablet (500 mg total) by mouth every 6 (six) hours as needed for Pain.         Review of Systems:    A comprehensive 10 point review of systems was completed.  Pertinent positives and negatives noted in the the HPI.      Physical Exam:    General: No acute distress. Alert and oriented x 3.  Vital signs: Temp:  [97.2 °F (36.2 °C)-98.8 °F (37.1 °C)] 98.8 °F (37.1 °C)  Pulse:  [69-89] 76  Resp:  [18-20] 19  BP: (145-179)/(62-91) 174/77  SpO2:  [96 %-99 %] 98 %  HEENT: Moist mucous membranes. Extraocular muscles are intact.  Neck: No swelling, no masses  Respiratory: Non labored, symmetric exursion  Cardiovascular: No irregularities in rhythm  Abdomen: Soft, nontender, nondistended.  No guarding.  Musculoskeletal: Full range of motion of all extremities.  No swelling noted.  Joints: no effusions  Skin: No lesions. No erythema, no open  wounds      Laboratory Data:  Laboratory data reviewed      Recent Labs   Lab 06/23/24  0857   RBC 3.56*   HGB 9.8*   HCT 29.4*   MCV 82.6   MCH 27.5   MCHC 33.3   RDW 15.9   NEPRELIM 6.65   WBC 8.5   .0*       Recent Labs   Lab 06/18/24  0719 06/19/24  0713 06/20/24  0754 06/20/24  1950 06/21/24  0905   GLU 96  --  108* 124* 105*   BUN 5*  --  7* 8* 5*   CREATSERUM 0.66  --  0.56 0.60 0.56   CA 8.5  --  8.5 8.4* 9.0   ALB 2.5*  --  2.5*  --  2.5*     --  142 137 138   K 3.3*   < > 3.5 3.3* 4.0  4.0   *  --  109 105 108   CO2 23.0  --  25.0 24.0 24.0   ALKPHO 68  --  66  --  73   AST 18  --  16  --  14*   ALT 14  --  14  --  11*   BILT 0.3  --  0.2  --  0.2   TP 5.8*  --  5.7*  --  6.1*    < > = values in this interval not displayed.                Microbiologic Data:   Hospital Encounter on 06/17/24   1. Anaerobic Culture     Status: None (Preliminary result)    Collection Time: 06/18/24  4:46 PM    Specimen: Abdominal peritoneum; Abscess   Result Value Ref Range    Anaerobic Culture Pending N/A   2. Aerobic Bacterial Culture     Status: Abnormal    Collection Time: 06/18/24  4:46 PM    Specimen: Abdominal peritoneum; Abscess   Result Value Ref Range    Aerobic Culture Result 2+ growth Escherichia coli (A) N/A    Aerobic Culture Result 4+ growth Streptococcus anginosus (A) N/A    Aerobic Culture Result 1+ growth Escherichia coli (A) N/A    Aerobic Culture Result 2+ growth Streptococcus constellatus (A) N/A    Aerobic Culture Result 2+ growth Pseudomonas aeruginosa (A) N/A    Aerobic Smear 4+ WBCs seen N/A    Aerobic Smear 1+ Gram Positive Cocci N/A    Aerobic Smear 2+ Gram Negative Rods N/A    Aerobic Smear 1+ Gram Positive Rods N/A       Susceptibility    Escherichia coli -  (no method available)     Ampicillin 8 Sensitive      Cefazolin <=4 Sensitive      Ciprofloxacin <=0.25 Sensitive      Gentamicin <=1 Sensitive      Meropenem <=0.25 Sensitive      Levofloxacin <=0.12 Sensitive       Piperacillin + Tazobactam <=4 Sensitive      Trimethoprim/Sulfa <=20 Sensitive     Escherichia coli -  (no method available)     Ampicillin 16 Intermediate      Cefazolin <=4 Sensitive      Ciprofloxacin <=0.25 Sensitive      Gentamicin <=1 Sensitive      Meropenem <=0.25 Sensitive      Levofloxacin <=0.12 Sensitive      Piperacillin + Tazobactam <=4 Sensitive      Trimethoprim/Sulfa <=20 Sensitive     Pseudomonas aeruginosa -  (no method available)     Cefepime <=2 Sensitive      Ceftazidime <=1 Sensitive      Ciprofloxacin <=1 Sensitive      Meropenem <=1 Sensitive      Levofloxacin 1 Sensitive      Piperacillin + Tazobactam <=4 Sensitive      Tobramycin <=1 Sensitive          Imaging:    CT AP 6/10/24:  FINDINGS:    LIVER:  No enlargement, atrophy, abnormal density, or significant focal lesion.    BILIARY:  No visible dilatation or calcification.    PANCREAS:  No lesion, fluid collection, ductal dilatation, or atrophy.    SPLEEN:  No enlargement or focal lesion.    KIDNEYS:  No mass, obstruction, or calcification.    ADRENALS:  No mass or enlargement.    AORTA/VASCULAR:  No aneurysm or dissection.  There is moderate to severe atherosclerotic plaque at the origin of the SMA.  There is moderate plaque at the origin of the celiac axis.  There is mild plaque at the origin of the REX.  RETROPERITONEUM:  No mass or adenopathy.    BOWEL/MESENTERY:  There is significant inflammatory changes surrounding the sigmoid colon and regions of several diverticula consistent with acute diverticulitis.  There is no definite drainable abscess collection at this time period the inflammatory  change does abut the left aspect of the urinary bladder and causes some deviation of the bladder to the right of midline.  Main of the left colon demonstrates several diverticula.  There is moderate stool throughout the colon consistent constipation.    The appendix is unremarkable.  Visualized small bowel is within normal limits.  Postsurgical  changes of Nissen.  ABDOMINAL WALL:  No mass or hernia.    URINARY BLADDER:  No visible focal wall thickening, lesion, or calculus.    PELVIC NODES:  No adenopathy.    PELVIC ORGANS:  No visible mass.  Pelvic organs appropriate for patient age.    BONES:  No bony lesion or fracture.  Mild to moderate degenerative changes of the spine.  LUNG BASES:  No visible pulmonary or pleural disease.                     Impression   CONCLUSION:       Acute diverticulitis involving the sigmoid colon without a drainable abscess at this time.  There is mild mass effect on the urinary bladder due to the inflammation.     CT AP 6/17/24:  BOWEL/MESENTERY:  Large amount of hyperdense desiccated appearing stool in the descending colon, and sigmoid colon.  Thickening of the colon in this region with surrounding stranding, most likely stercoral colitis.  Assessment has limitations without  contrast, but there are numerous pericolonic abscess formations including a collection measuring 4.0 x 2.7 cm image 82 adjacent to the sigmoid colon in this region, where there is also stranding and enhancement of the pericolonic soft tissues.  Below  this, for example on image 86 there are additional small suspected abscess cavities with small foci of fluid with surrounding enhancement.  Additional small abscess changes are seen within the inflammation.  More proximally the colon contains moderate  amount of stool.  Small bowel caliber normal.     ABDOMINAL WALL:  Unremarkable.     URINARY BLADDER:  Dilated.  Thickening left side of the bladder as it abuts the above-mentioned inflammatory changes around the colon, probably secondarily inflamed.     LYMPH NODES PELVIS:  Unremarkable.     PELVIC ORGANS:  No acute process.     LUNG BASES:  No acute process.     BONES:  No acute abnormality. Note is made of degenerative changes present in the spine.                         Impression   CONCLUSION:  Hyperdense desiccated appearing stool in the sigmoid  colon with stercoral colitis, and pericolonic abscess formations, multiple.  The largest pericolonic abscess measures 4.0 cm.     CT AP 6/21/24:  FINDINGS:    LIVER:  No enlargement, atrophy, abnormal density, or significant focal lesion.    BILIARY:  No visible dilatation or calcification.    PANCREAS:  No lesion, fluid collection, ductal dilatation, or atrophy.    SPLEEN:  No enlargement or focal lesion.    KIDNEYS:  No mass, obstruction, or calcification.    ADRENALS:  No mass or enlargement.    AORTA/VASCULAR:  Atherosclerosis.  No aneurysm.  RETROPERITONEUM:  No mass or adenopathy.    BOWEL/MESENTERY:  Increased stool in descending and rectosigmoid colon with scattered diverticula, mild mural thickening and mild pericolonic inflammatory changes.  Pigtail drain in place lateral to the sigmoid colon with no residual abscess in this  location.  Small peripheral enhancing fluid collections the more inferomedial to the sigmoid colon again seen, measuring up to 1.8 x 0.8 cm on image 83 of series 301 compared to 2.3 x 0.9 cm previously.  Small pericolonic abscess medial to the sigmoid  colon more superiorly measures 1.0 x 0.7 cm, previously 1.0 x 0.7 cm.  Moderate thin wall diverticulum of 2nd portion of duodenum.  ABDOMINAL WALL:  No mass or hernia.    URINARY BLADDER:  No visible focal wall thickening, lesion, or calculus.    PELVIC NODES:  No adenopathy.    PELVIC ORGANS:  No visible mass.  Pelvic organs appropriate for patient age.    BONES:  Degenerative changes of spine.  LUNG BASES:  No visible pulmonary or pleural disease.                        Impression   CONCLUSION:    1. Increased large amount of stool in rectosigmoid colon with persistent mural thickening and pericolonic inflammatory changes consistent with stercoral colitis.  The largest pericolonic abscess laterally has resolved with an indwelling drain in place.    2. Two small pericolonic abscesses are again seen, the small abscess inferior to the  sigmoid colon is decreased in size and the tiny abscess lateral to the sigmoid colon is not significantly changed.  These would not be amenable to percutaneous  aspiration or drainage.       Established Problem list:  Patient Active Problem List   Diagnosis    Major depressive disorder, recurrent episode, moderate (HCC)    Esophageal reflux    Chronic obstructive pulmonary disease with acute lower respiratory infection (HCC)    Acquired hypothyroidism    Memory deficit    Aortic atherosclerosis (HCC)    Diverticulosis of colon    Dizziness and giddiness    Dysthymic disorder    Lichen sclerosus    Pure hypercholesterolemia    Vitamin D deficiency    Vitiligo    Severe episode of recurrent major depressive disorder, without psychotic features (HCC)    Paraesophageal hernia    Subclinical hypothyroidism    Hypertension    Protein-calorie malnutrition, unspecified severity (HCC)    Gastrointestinal hemorrhage, unspecified gastrointestinal hemorrhage type    Thrombocytopenia (HCC)    Acute diverticulitis    Hyponatremia    ACP (advance care planning)    Weakness generalized    Colonic diverticular abscess    Sigmoid diverticulitis    History of repair of hiatal hernia    Diverticulitis of large intestine with abscess without bleeding    Malnutrition (HCC)    Diverticulitis       ASSESSMENT/PLAN:  1. Sigmoid Diverticulitis with pericolonic abscesses    S/p IR drain placement 6/18, 10ml pus aspirated, culture growing:    E coili - pan S  E coli - I to ampicillin   Strep anginosus  Strep constellatus  Pseudomonas aeruginosa pan-S    CT 6/21:  Pigtail drain in place lateral to the sigmoid colon with no residual abscess in this location. Small peripheral enhancing fluid collections the more inferomedial to the sigmoid colon again seen, measuring up to 1.8 x 0.8 cm (compared to 2.3 x 0.9 cm previously).  Small pericolonic abscess medial to the sigmoid colon more superiorly measures 1.0 x 0.7 cm, (previously 1.0 x 0.7 cm).        Antibiotics this admission:  Zosyn 6/17-->  [place]-procedural cefazolin     Patient has received appropriate antibiotic therapy with zosyn to cover the polymicrobial abscesses. Based on most recent CT, abscess are decreasing in size. Clinically she is comfortable, afebrile with a normal WBC and HDS. Given her severe reaction to FQ, would not trial FQ use to treat PsAr at this time. Patient is being discharge to rehab, therefore will plan to place midline and continue on Zosyn for the remainder of her antibiotic course. General surgery and IR following along and plan for abscessogram once drain output is minimal.     - continue zosyn   - OK for midline placement as patient has remained afebrile since admission, no bcxs drawn on this admission, though bcxs 6/10 were negative   - plan for IV Zosyn via midline on discharge.     2. Antibiotic Allergies   Moxifloxacin - tongue swelling, 2012  Amoxicillin - nausea and vomiting, 2016  Sulfa - rash, itching, 2011         [facility]  [phone]

## 2024-06-23 NOTE — PROGRESS NOTES
Katharina A&Ox4 - forgetful at times.  Reports she is feeling better  - not feeling as bloated.  She has had 3 bowel movements so far this shift.  VALERI drain in place - irrigated and aspirated as ordered - irrigated and aspirated as ordered.  +foul smelling , purulent, brown drainage.   Zofran and Tylenol were given for c/o nausea and headache.  IVF and IV Zosyn as ordered.  Will follow.

## 2024-06-23 NOTE — PROGRESS NOTES
Name:Bibi Diaz  MRN#:[id number]  :1945      Subjective:  Nausea.  Constipated but had a BM    Objective:  LLQ drain site is CDI    Vital Signs:  Blood pressure (!) 174/77, pulse 76, temperature 98.8 °F (37.1 °C), temperature source Oral, resp. rate 19, height 60\", weight 96 lb 3.2 oz, SpO2 98%, not currently breastfeeding.    Input/Output:    Intake/Output Summary (Last 24 hours) at 2024 1146  Last data filed at 2024 0511  Gross per 24 hour   Intake  ml   Output 435 ml   Net 1590 ml     24 hour drain output: 20cc feculent    Labs:  Lab Results   Component Value Date    WBC 8.5 2024    RBC 3.56 2024    HGB 9.8 2024    HCT 29.4 2024    MCV 82.6 2024    MCH 27.5 2024    MCHC 33.3 2024    RDW 15.9 2024    .0 2024       Microbiology:    2+ growth Escherichia coli Abnormal       4+ growth Streptococcus anginosus Abnormal       1+ growth Escherichia coli Abnormal    Strain 2   2+ growth Streptococcus constellatus Abnormal       2+ growth Pseudomonas aeruginosa Abnormal           Assessment/Plan:  77 yo female with stercoral colitis and diverticulitis with pericolonic abscesses. S/p drainage of dominant abscess. Other abscesses are not drainable from percutaneous approach and are getting smaller. Dominant abscess is no longer measurable but drainage is feculent suggesting persistent contained perforation. General surgery is following patient. Abscessogram not beneficial at this time given the feculent drainage. CPM.   Nausea and constipation as per Gen surgery.       Luke Gautam MD  2024  11:46 AM

## 2024-06-23 NOTE — PHYSICAL THERAPY NOTE
PHYSICAL THERAPY TREATMENT NOTE - INPATIENT    Room Number: 518/518-A     Session: 2     Number of Visits to Meet Established Goals: 5    Presenting Problem: weakness generalized, failure to thrive  Co-Morbidities : hypothyoid, anxiety, asthma, CKD3, COPD, essential HTN    ASSESSMENT   Patient demonstrates limited progress this session, goals  remain in progress.    Patient continues to function below baseline with bed mobility, transfers, gait, stair negotiation, and standing prolonged periods.  Contributing factors to remaining limitations include decreased functional strength, decreased endurance/aerobic capacity, pain, impaired   balance, and medical status.  Next session anticipate patient to progress transfers and gait.  Physical Therapy will continue to follow patient for duration of hospitalization.    Patient continues to benefit from continued skilled PT services: to promote return to prior level of function and safety with continuous assistance and gradual rehabilitative therapy .    PLAN  PT Treatment Plan: Bed mobility;Endurance;Energy conservation;Patient education;Family education;Gait training;Strengthening;Transfer training;Balance training  Rehab Potential : Good  Frequency (Obs): 3-5x/week    CURRENT GOALS     Goal #1 Patient is able to demonstrate supine - sit EOB @ level: supervision     Goal #2 Patient is able to demonstrate transfers Sit to/from Stand at assistance level: supervision     Goal #3 Patient is able to ambulate 50 feet with assist device: walker - rolling at assistance level: minimum assistance     Goal #4    Goal #5    Goal #6    Goal Comments: Goals established on 2024 all goals ongoing    SUBJECTIVE  \"I wake up and I'm nauseous\"    OBJECTIVE  Precautions: Bed/chair alarm    WEIGHT BEARING RESTRICTION  Weight Bearing Restriction: None                PAIN ASSESSMENT   Ratin  Location: head and stomach  Management Techniques: Activity  promotion;Repositioning    BALANCE                                                                                                                       Static Sitting: Fair +  Dynamic Sitting: Fair -           Static Standing: Not tested  Dynamic Standing: Not tested    ACTIVITY TOLERANCE                              AM-PAC '6-Clicks' INPATIENT SHORT FORM - BASIC MOBILITY  How much difficulty does the patient currently have...  Patient Difficulty: Turning over in bed (including adjusting bedclothes, sheets and blankets)?: A Little   Patient Difficulty: Sitting down on and standing up from a chair with arms (e.g., wheelchair, bedside commode, etc.): A Little   Patient Difficulty: Moving from lying on back to sitting on the side of the bed?: A Lot   How much help from another person does the patient currently need...   Help from Another: Moving to and from a bed to a chair (including a wheelchair)?: A Lot   Help from Another: Need to walk in hospital room?: A Lot   Help from Another: Climbing 3-5 steps with a railing?: Total       AM-PAC Score:  Raw Score: 13   Approx Degree of Impairment: 64.91%   Standardized Score (AM-PAC Scale): 36.74   CMS Modifier (G-Code): CL    FUNCTIONAL ABILITY STATUS    Bed Mobility:  Supine>Sit: mod A   Sit>Supine: [place]    Transfer Mobility:  Pt politely declined 2' incr tiredness from poor sleep and nausea.    Skilled Therapy Provided  Pt greeted supine in bed. Pt engaging in supine<>sit, along with seated EOB dynamic and static activities. Attempted to have pt engage in STS transfers, however pt politely declining 2' incr tiredness and fatigue from poor sleep and nausea. Pt educated on importance of continued and progressive mobility while hospitalized. Engaged in multi dir reaching with Ue's without LOB. Grossly SBA for duration of sitting EOB, approx 8min.       THERAPEUTIC EXERCISES  Lower Extremity Ankle pumps  LAQ     Upper Extremity      Position Sitting     Repetitions   10    Sets   1     Patient End of Session: In bed;Needs met;Call light within reach;All patient questions and concerns addressed;RN aware of session/findings;Alarm set    PT Session Time: 15 minutes  Therapeutic Activity: 10 minutes  Therapeutic Exercise: 5minutes

## 2024-06-23 NOTE — CM/SW NOTE
CM attached clinical updates to [facility] referral and messaged [facility] for bed availability status, awaiting reponse.     Update:  1500: CM informed by RN patient will need IV antibiotic therapy for discharge; midline placement and medication orders pending at this time. ID physician progress note attached to [facility] referral in [facility] system, will endorse to CM/SW for follow up.     Eliane Alston RN Case Manager [id number]

## 2024-06-23 NOTE — PROGRESS NOTES
[facility]   part of [facility]     Hospitalist Progress Note     Bibi Diaz Patient Status:  Observation    1945 MRN [id number]   Location [facility] 5NW-A Attending Rich Jesus MD   Hosp Day # 5 PCP Rick Shannon DO     Chief Complaint: weakness, confusion, poor PO intake    Subjective:   Patient having a lot of nausea.    Objective:    Review of Systems:   A comprehensive review of systems was completed; pertinent positive and negatives stated in subjective.    Vital signs:  Temp:  [97.2 °F (36.2 °C)-98.3 °F (36.8 °C)] 97.2 °F (36.2 °C)  Pulse:  [69-89] 84  Resp:  [18-22] 18  BP: (145-179)/(62-91) 167/87  SpO2:  [96 %-99 %] 98 %    Physical Exam:    General: No acute distress, weak  Respiratory: No wheezes, no rhonchi  Cardiovascular: S1, S2, regular rate and rhythm  Abdomen: Soft, mild tender, non-distended, positive bowel sounds  Neuro: No new focal deficits.   Extremities: No edema      Diagnostic Data:    Labs:  Recent Labs   Lab 24  0719 24  1405 24  0755 24  0905 24  0747 24  0857   WBC 12.2*  --  9.3 11.3* 8.4 8.5   HGB 8.7*  --  9.5* 10.0* 9.9* 9.8*   MCV 85.2  --  85.3 83.9 83.1 82.6   .0  --  474.0* 531.0* 549.0* 595.0*   INR  --  1.35*  --   --   --   --        Recent Labs   Lab 24  0719 24  0713 24  0754 24  1950 24  0905   GLU 96  --  108* 124* 105*   BUN 5*  --  7* 8* 5*   CREATSERUM 0.66  --  0.56 0.60 0.56   CA 8.5  --  8.5 8.4* 9.0   ALB 2.5*  --  2.5*  --  2.5*     --  142 137 138   K 3.3*   < > 3.5 3.3* 4.0  4.0   *  --  109 105 108   CO2 23.0  --  25.0 24.0 24.0   ALKPHO 68  --  66  --  73   AST 18  --  16  --  14*   ALT 14  --  14  --  11*   BILT 0.3  --  0.2  --  0.2   TP 5.8*  --  5.7*  --  6.1*    < > = values in this interval not displayed.       Estimated Creatinine Clearance: 56.1 mL/min (based on SCr of 0.56 mg/dL).    Recent Labs   Lab 24  1950   TROPHS 5        Recent Labs   Lab 06/18/24  1405   PTP 16.8*   INR 1.35*                  Microbiology    Hospital Encounter on 06/17/24   1. Anaerobic Culture     Status: None (Preliminary result)    Collection Time: 06/18/24  4:46 PM    Specimen: Abdominal peritoneum; Abscess   Result Value Ref Range    Anaerobic Culture Pending N/A   2. Aerobic Bacterial Culture     Status: Abnormal    Collection Time: 06/18/24  4:46 PM    Specimen: Abdominal peritoneum; Abscess   Result Value Ref Range    Aerobic Culture Result 2+ growth Escherichia coli (A) N/A    Aerobic Culture Result 4+ growth Streptococcus anginosus (A) N/A    Aerobic Culture Result 1+ growth Escherichia coli (A) N/A    Aerobic Culture Result 2+ growth Streptococcus constellatus (A) N/A    Aerobic Culture Result 2+ growth Pseudomonas aeruginosa (A) N/A    Aerobic Smear 4+ WBCs seen N/A    Aerobic Smear 1+ Gram Positive Cocci N/A    Aerobic Smear 2+ Gram Negative Rods N/A    Aerobic Smear 1+ Gram Positive Rods N/A       Susceptibility    Escherichia coli -  (no method available)     Ampicillin 8 Sensitive      Cefazolin <=4 Sensitive      Ciprofloxacin <=0.25 Sensitive      Gentamicin <=1 Sensitive      Meropenem <=0.25 Sensitive      Levofloxacin <=0.12 Sensitive      Piperacillin + Tazobactam <=4 Sensitive      Trimethoprim/Sulfa <=20 Sensitive     Escherichia coli -  (no method available)     Ampicillin 16 Intermediate      Cefazolin <=4 Sensitive      Ciprofloxacin <=0.25 Sensitive      Gentamicin <=1 Sensitive      Meropenem <=0.25 Sensitive      Levofloxacin <=0.12 Sensitive      Piperacillin + Tazobactam <=4 Sensitive      Trimethoprim/Sulfa <=20 Sensitive     Pseudomonas aeruginosa -  (no method available)     Cefepime <=2 Sensitive      Ceftazidime <=1 Sensitive      Ciprofloxacin <=1 Sensitive      Meropenem <=1 Sensitive      Levofloxacin 1 Sensitive      Piperacillin + Tazobactam <=4 Sensitive      Tobramycin <=1 Sensitive          Imaging: Reviewed in  Epic.    Medications:    losartan  25 mg Oral Daily    piperacillin-tazobactam  3.375 g Intravenous Q8H    heparin  5,000 Units Subcutaneous 2 times per day    DULoxetine  30 mg Oral Daily    mirtazapine  30 mg Oral Nightly    multivitamin with minerals  1 tablet Oral Daily    fluticasone furoate-vilanterol  1 puff Inhalation Daily       Assessment & Plan:      #Multiple pericolonic abscesses with Ecoli spp  Pt had been DC on 6/15 on PO amoxicillin but did not take it after vomiting after taking one dose  #Acute diverticulitis  - drain in place  -IV Zosyn  -IV fluids and pain control  - Repeat CT A/P on 6/21/24- with large stool burden, two small abscesses seen again. Continue abx, escalate bowel care     #weakness  Multifactorial due to above, poor PO intake at baseline   DC to [facility] once improved    #Hypokalemia  - replace per protocol     #Normocytic anemia  #Iron deficiency     #Orthostatic hypotension     #Essential hypertension     #CKD 3     #Hyperlipidemia     #Hypothyroidism     #COPD    #acute metabolic and toxic encephalopathy- improving on 6/20/24, resolved as of 6/21/24  Likely due to narcotics, versed prior to drain insertion, infection   MRI brain 6/19/2024 no acute findings           Margot Blanco MD    Supplementary Documentation:     Quality:  DVT Mechanical Prophylaxis:   SCDs,    DVT Pharmacologic Prophylaxis   Medication    heparin (Porcine) 5000 UNIT/ML injection 5,000 Units                Code Status: DNAR/Selective Treatment  Flood: External urinary catheter in place  Flood Duration (in days):   Central line:    WOLF: 6/23/2024    Discharge is dependent on: progress  At this point Ms. Diaz is expected to be discharge to: TBD    The 21st Century Cures Act makes medical notes like these available to patients in the interest of transparency. Please be advised this is a medical document. Medical documents are intended to carry relevant information, facts as evident, and the clinical opinion of  the practitioner. The medical note is intended as peer to peer communication and may appear blunt or direct. It is written in medical language and may contain abbreviations or verbiage that are unfamiliar.

## 2024-06-23 NOTE — PROGRESS NOTES
[facility]  Progress Note    Bibi Diaz Patient Status:  Inpatient    1945 MRN [id number]   Location [facility] 5NW-A Attending Margot Blanco MD   Hosp Day # 5 PCP Rick Shannon DO     Subjective:  The patient is resting in bed.  She denies abdominal pain.  She continues to report intermittent nausea.  She reports having multiple bowel movements yesterday.  She is slowly increasing her oral intake.    Objective/Physical Exam:  General: Alert, orientated x3.  Cooperative.  No apparent distress.  Vital Signs:  Blood pressure (!) 167/87, pulse 84, temperature 97.2 °F (36.2 °C), temperature source Oral, resp. rate 18, height 60\", weight 96 lb 3.2 oz (43.6 kg), SpO2 98%, not currently breastfeeding.  HEENT: Normocephalic, atraumatic. No scleral icterus.  Abdomen:  Soft, non-distended, non-tender, with no rebound or guarding.  No peritoneal signs.  Drain: in place with 20 mL of output over the past 24 hours.    Labs:  CBC:    Lab Results   Component Value Date    WBC 8.5 2024    WBC 8.4 2024    WBC 11.3 (H) 2024     Lab Results   Component Value Date    HEMOGLOBIN 11.8 (L) 2024    HEMOGLOBIN 12.7 2021    HEMOGLOBIN 13.4 2018    HGB 9.8 (L) 2024    HGB 9.9 (L) 2024    HGB 10.0 (L) 2024      Lab Results   Component Value Date    .0 (H) 2024    .0 (H) 2024    .0 (H) 2024     Lab Results   Component Value Date    WBC 8.5 2024    HGB 9.8 2024    HCT 29.4 2024    .0 2024         Assessment/Plan:  Patient Active Problem List   Diagnosis    Major depressive disorder, recurrent episode, moderate (HCC)    Esophageal reflux    Chronic obstructive pulmonary disease with acute lower respiratory infection (HCC)    Acquired hypothyroidism    Memory deficit    Aortic atherosclerosis (HCC)    Diverticulosis of colon    Dizziness and giddiness    Dysthymic disorder    Lichen sclerosus    Pure  hypercholesterolemia    Vitamin D deficiency    Vitiligo    Severe episode of recurrent major depressive disorder, without psychotic features (HCC)    Paraesophageal hernia    Subclinical hypothyroidism    Hypertension    Protein-calorie malnutrition, unspecified severity (HCC)    Gastrointestinal hemorrhage, unspecified gastrointestinal hemorrhage type    Thrombocytopenia (HCC)    Acute diverticulitis    Hyponatremia    ACP (advance care planning)    Weakness generalized    Colonic diverticular abscess    Sigmoid diverticulitis    History of repair of hiatal hernia    Diverticulitis of large intestine with abscess without bleeding    Malnutrition (HCC)     Pericolonic abscess status post IR drain    The patient is stable for discharge home from a general surgery standpoint.  Continue diet as tolerated.  Culture sensitivities resulted.  Will consult ID for further antibiotic regimen.  Continue pain control and antiemetics as needed.  Encourage ambulation and up to chair.  DVT prophylaxis with heparin.  Abscessogram when drain output is less than 10 cc for 2 consecutive days.  Upon discharge, the patient is to follow up with Dr. Friedman to discuss surgical management for diverticulitis with abscess.  The patient is scheduled to see him in clinic on 6/26/2024 at 9:45 AM.      MARY Peter  6/23/2024  11:07 AM     ADDENDUM:     Patient was seen and examined by me. I agree with the above note.  Patient had multiple bowel movements.  She still feels constipated and bloated but is tolerating a diet without any nausea or vomiting.  IR drain put out 35 cc in the past 24 hours.    General: Alert, orientated x3. Cooperative. No apparent distress.  Pulmonary: non labored breathing, effort normal  Abdomen: Soft, non-distended, nontender to palpation, IR drain in place with dark purulent material  Extremities: warm, no edema or cyanosis        A/P 78 year old female with acute perforated diverticulitis with abscess status  post IR drainage     Patient has had multiple bowel movements  She continues to tolerate diet without any nausea or vomiting  She does report feeling bloated but is soft and nontender on exam  Patient is cleared from a surgical standpoint for discharge home  Continue antibiotics on discharge  Patient will need CT abscessogram once drain output is under 10 cc per IR  Patient will follow-up with Dr. Friedman  She can call the office for any questions or concerns       This note was initiated by Rach Martinez PA-C.  The PA saw the patient in conjunction with me. The PA performed a history, exam, and developed the assessment and plan. I agree with the mentioned assessment and plan and have provided further documentation of my own, if necessary.       Ina Rowan MD  [facility]  6/23/2024  12:10 PM

## 2024-06-23 NOTE — PLAN OF CARE
Pt AOx4. Anxious. VSS on RA. Tele, NSR. Heparin subcutaneous. Voids. Denies pain. C/o nausea, prn meds. Max assist. Iv abx. Ivf. Midline ordered. Left VALERI drain. FLD, poor appetite. Pt and grand daughter updated on poc, no further needs.      Problem: Patient/Family Goals  Goal: Patient/Family Long Term Goal  Description: Patient's Long Term Goal: discharge home    Interventions:  - Consult to [facility], Gen Surgery  - IV Abx  - IVF  - Pain control  - See additional Care Plan goals for specific interventions  Outcome: Progressing  Goal: Patient/Family Short Term Goal  Description: Patient's Short Term Goal: pain control  6/19: maintain safety  6/19 noc: rest  6/20: maintain safety  6/20 NOC: manage gas  6/21: maintain safety  06/21/2024 - have a bowel movement  06/21/2024 - \"I just want to rest now\"  6/23 AM: rest  Interventions:   - Consult to [facility], Gen Surgery  - IVF  - IV Abx  - Pain control  - Miralax as ordered  - See additional Care Plan goals for specific interventions  Outcome: Progressing     Problem: SAFETY ADULT - FALL  Goal: Free from fall injury  Description: INTERVENTIONS:  - Assess pt frequently for physical needs  - Identify cognitive and physical deficits and behaviors that affect risk of falls.  - Romeo fall precautions as indicated by assessment.  - Educate pt/family on patient safety including physical limitations  - Instruct pt to call for assistance with activity based on assessment  - Modify environment to reduce risk of injury  - Provide assistive devices as appropriate  - Consider OT/PT consult to assist with strengthening/mobility  - Encourage toileting schedule  Outcome: Progressing     Problem: CARDIOVASCULAR - ADULT  Goal: Absence of cardiac arrhythmias or at baseline  Description: INTERVENTIONS:  - Continuous cardiac monitoring, monitor vital signs, obtain 12 lead EKG if indicated  - Evaluate effectiveness of antiarrhythmic and heart rate control medications as ordered  - Initiate  emergency measures for life threatening arrhythmias  - Monitor electrolytes and administer replacement therapy as ordered  Outcome: Progressing

## 2024-06-24 ENCOUNTER — APPOINTMENT (OUTPATIENT)
Dept: PHYSICAL THERAPY | Age: 79
End: 2024-06-24
Attending: PHYSICAL MEDICINE & REHABILITATION
Payer: MEDICARE

## 2024-06-24 ENCOUNTER — APPOINTMENT (OUTPATIENT)
Facility: HOSPITAL | Age: 79
DRG: 391 | End: 2024-06-24
Attending: PHYSICIAN ASSISTANT

## 2024-06-24 VITALS
BODY MASS INDEX: 18.88 KG/M2 | SYSTOLIC BLOOD PRESSURE: 163 MMHG | RESPIRATION RATE: 18 BRPM | HEIGHT: 60 IN | OXYGEN SATURATION: 99 % | TEMPERATURE: 98 F | WEIGHT: 96.19 LBS | HEART RATE: 81 BPM | DIASTOLIC BLOOD PRESSURE: 81 MMHG

## 2024-06-24 PROCEDURE — 99239 HOSP IP/OBS DSCHRG MGMT >30: CPT | Performed by: STUDENT IN AN ORGANIZED HEALTH CARE EDUCATION/TRAINING PROGRAM

## 2024-06-24 RX ORDER — TAZOBACTAM SODIUM AND PIPERACILLIN SODIUM 375; 3 MG/50ML; G/50ML
3.38 INJECTION, SOLUTION INTRAVENOUS EVERY 8 HOURS
Qty: 21 EACH | Refills: 0 | Status: SHIPPED | OUTPATIENT
Start: 2024-06-25 | End: 2024-07-02

## 2024-06-24 RX ORDER — POLYETHYLENE GLYCOL 3350 17 G/17G
17 POWDER, FOR SOLUTION ORAL DAILY
Status: DISCONTINUED | OUTPATIENT
Start: 2024-06-24 | End: 2024-06-24

## 2024-06-24 NOTE — PROGRESS NOTES
[facility]   part of [facility]     Hospitalist Progress Note     Bibi Diaz Patient Status:  Observation    1945 MRN [id number]   Location [facility] 5NW-A Attending Rich Jesus MD   Hosp Day # 6 PCP Rick Shannon DO     Chief Complaint: weakness, confusion, poor PO intake    Subjective:   Patient feeling anxious about plan of care.    Objective:    Review of Systems:   A comprehensive review of systems was completed; pertinent positive and negatives stated in subjective.    Vital signs:  Temp:  [97.7 °F (36.5 °C)-98.9 °F (37.2 °C)] 98.1 °F (36.7 °C)  Pulse:  [76-87] 79  Resp:  [16-18] 18  BP: (120-153)/(57-70) 153/70  SpO2:  [98 %-99 %] 99 %    Physical Exam:    General: No acute distress, weak  Respiratory: No wheezes, no rhonchi  Cardiovascular: S1, S2, regular rate and rhythm  Abdomen: Soft, mild tender, non-distended, positive bowel sounds  Neuro: No new focal deficits.   Extremities: No edema      Diagnostic Data:    Labs:  Recent Labs   Lab 24  0719 24  1405 24  0755 24  0905 24  0747 24  0857   WBC 12.2*  --  9.3 11.3* 8.4 8.5   HGB 8.7*  --  9.5* 10.0* 9.9* 9.8*   MCV 85.2  --  85.3 83.9 83.1 82.6   .0  --  474.0* 531.0* 549.0* 595.0*   INR  --  1.35*  --   --   --   --        Recent Labs   Lab 24  0719 24  0713 24  0754 24  1950 24  0905   GLU 96  --  108* 124* 105*   BUN 5*  --  7* 8* 5*   CREATSERUM 0.66  --  0.56 0.60 0.56   CA 8.5  --  8.5 8.4* 9.0   ALB 2.5*  --  2.5*  --  2.5*     --  142 137 138   K 3.3*   < > 3.5 3.3* 4.0  4.0   *  --  109 105 108   CO2 23.0  --  25.0 24.0 24.0   ALKPHO 68  --  66  --  73   AST 18  --  16  --  14*   ALT 14  --  14  --  11*   BILT 0.3  --  0.2  --  0.2   TP 5.8*  --  5.7*  --  6.1*    < > = values in this interval not displayed.       Estimated Creatinine Clearance: 56.1 mL/min (based on SCr of 0.56 mg/dL).    Recent Labs   Lab 24  1950    TROPHS 5       Recent Labs   Lab 06/18/24  1405   PTP 16.8*   INR 1.35*                  Microbiology    Hospital Encounter on 06/17/24   1. Anaerobic Culture     Status: None (Preliminary result)    Collection Time: 06/18/24  4:46 PM    Specimen: Abdominal peritoneum; Abscess   Result Value Ref Range    Anaerobic Culture Pending N/A   2. Aerobic Bacterial Culture     Status: Abnormal    Collection Time: 06/18/24  4:46 PM    Specimen: Abdominal peritoneum; Abscess   Result Value Ref Range    Aerobic Culture Result 2+ growth Escherichia coli (A) N/A    Aerobic Culture Result 4+ growth Streptococcus anginosus (A) N/A    Aerobic Culture Result 1+ growth Escherichia coli (A) N/A    Aerobic Culture Result 2+ growth Streptococcus constellatus (A) N/A    Aerobic Culture Result 2+ growth Pseudomonas aeruginosa (A) N/A    Aerobic Smear 4+ WBCs seen N/A    Aerobic Smear 1+ Gram Positive Cocci N/A    Aerobic Smear 2+ Gram Negative Rods N/A    Aerobic Smear 1+ Gram Positive Rods N/A       Susceptibility    Escherichia coli -  (no method available)     Ampicillin 8 Sensitive      Cefazolin <=4 Sensitive      Ciprofloxacin <=0.25 Sensitive      Gentamicin <=1 Sensitive      Meropenem <=0.25 Sensitive      Levofloxacin <=0.12 Sensitive      Piperacillin + Tazobactam <=4 Sensitive      Trimethoprim/Sulfa <=20 Sensitive     Escherichia coli -  (no method available)     Ampicillin 16 Intermediate      Cefazolin <=4 Sensitive      Ciprofloxacin <=0.25 Sensitive      Gentamicin <=1 Sensitive      Meropenem <=0.25 Sensitive      Levofloxacin <=0.12 Sensitive      Piperacillin + Tazobactam <=4 Sensitive      Trimethoprim/Sulfa <=20 Sensitive     Pseudomonas aeruginosa -  (no method available)     Cefepime <=2 Sensitive      Ceftazidime <=1 Sensitive      Ciprofloxacin <=1 Sensitive      Meropenem <=1 Sensitive      Levofloxacin 1 Sensitive      Piperacillin + Tazobactam <=4 Sensitive      Tobramycin <=1 Sensitive          Imaging:  Reviewed in Epic.    Medications:    polyethylene glycol (PEG 3350)  17 g Oral Daily    losartan  25 mg Oral Daily    piperacillin-tazobactam  3.375 g Intravenous Q8H    heparin  5,000 Units Subcutaneous 2 times per day    DULoxetine  30 mg Oral Daily    mirtazapine  30 mg Oral Nightly    multivitamin with minerals  1 tablet Oral Daily    fluticasone furoate-vilanterol  1 puff Inhalation Daily       Assessment & Plan:      #Multiple pericolonic abscesses with Ecoli spp  Pt had been DC on 6/15 on PO amoxicillin but did not take it after vomiting after taking one dose  #Acute diverticulitis  - drain in place  -IV Zosyn- due to her numerous abx allergies/ intolerance ID was consulted on 6/23/24- recommending Midline IV Abx  -IV fluids and pain control  - Repeat CT A/P on 6/21/24- with large stool burden  - IR reporting on 6/23 feculent material in drain no role for abscessogram will notify surgery-    #weakness  Multifactorial due to above, poor PO intake at baseline   DC to [facility] once improved    #Hypokalemia  - replace per protocol     #Normocytic anemia  #Iron deficiency     #Orthostatic hypotension     #Essential hypertension     #CKD 3     #Hyperlipidemia     #Hypothyroidism     #COPD    #acute metabolic and toxic encephalopathy- improving on 6/20/24, resolved as of 6/21/24  Likely due to narcotics, versed prior to drain insertion, infection   MRI brain 6/19/2024 no acute findings           Margot Blanco MD    Supplementary Documentation:     Quality:  DVT Mechanical Prophylaxis:   SCDs,    DVT Pharmacologic Prophylaxis   Medication    heparin (Porcine) 5000 UNIT/ML injection 5,000 Units                Code Status: DNAR/Selective Treatment  Flood: External urinary catheter in place  Flood Duration (in days):   Central line:    WOLF: 6/23/2024    Discharge is dependent on: progress  At this point Ms. Diaz is expected to be discharge to: TBD    The 21st Century Cures Act makes medical notes like these available to  patients in the interest of transparency. Please be advised this is a medical document. Medical documents are intended to carry relevant information, facts as evident, and the clinical opinion of the practitioner. The medical note is intended as peer to peer communication and may appear blunt or direct. It is written in medical language and may contain abbreviations or verbiage that are unfamiliar.       Called Yareli at 11:32 on 6/24/24- all questions

## 2024-06-24 NOTE — PLAN OF CARE
Problem: Patient/Family Goals  Goal: Patient/Family Long Term Goal  Description: Patient's Long Term Goal: discharge home    Interventions:  - Consult to Hosp, Gen Surgery  - IV Abx  - IVF  - Pain control  - See additional Care Plan goals for specific interventions  Outcome: Progressing  Goal: Patient/Family Short Term Goal  Description: Patient's Short Term Goal: pain control  6/19: maintain safety  6/19 noc: rest  6/20: maintain safety  6/20 NOC: manage gas  6/21: maintain safety  06/21/2024 - have a bowel movement  06/21/2024 - \"I just want to rest now\"  6/23 AM: rest  06/23/2024 - control nausea  Interventions:   - Consult to Hosp, Gen Surgery  - IVF  - IV Abx  - Pain control  - Miralax as ordered  - zofran/reglan as ordered  - See additional Care Plan goals for specific interventions  Outcome: Progressing     Problem: SAFETY ADULT - FALL  Goal: Free from fall injury  Description: INTERVENTIONS:  - Assess pt frequently for physical needs  - Identify cognitive and physical deficits and behaviors that affect risk of falls.  - [place] fall precautions as indicated by assessment.  - Educate pt/family on patient safety including physical limitations  - Instruct pt to call for assistance with activity based on assessment  - Modify environment to reduce risk of injury  - Provide assistive devices as appropriate  - Consider OT/PT consult to assist with strengthening/mobility  - Encourage toileting schedule  Outcome: Progressing     Problem: CARDIOVASCULAR - ADULT  Goal: Absence of cardiac arrhythmias or at baseline  Description: INTERVENTIONS:  - Continuous cardiac monitoring, monitor vital signs, obtain 12 lead EKG if indicated  - Evaluate effectiveness of antiarrhythmic and heart rate control medications as ordered  - Initiate emergency measures for life threatening arrhythmias  - Monitor electrolytes and administer replacement therapy as ordered  Outcome: Progressing     Problem: METABOLIC/FLUID AND ELECTROLYTES  - ADULT  Goal: Electrolytes maintained within normal limits  Description: INTERVENTIONS:  - Monitor labs and rhythm and assess patient for signs and symptoms of electrolyte imbalances  - Administer electrolyte replacement as ordered  - Monitor response to electrolyte replacements, including rhythm and repeat lab results as appropriate  - Fluid restriction as ordered  - Instruct patient on fluid and nutrition restrictions as appropriate  Outcome: Progressing     Problem: SKIN/TISSUE INTEGRITY - ADULT  Goal: Skin integrity remains intact  Description: INTERVENTIONS  - Assess and document risk factors for pressure ulcer development  - Assess and document skin integrity  - Monitor for areas of redness and/or skin breakdown  - Initiate interventions, skin care algorithm/standards of care as needed  Outcome: Progressing  Goal: Incision(s), wounds(s) or drain site(s) healing without S/S of infection  Description: INTERVENTIONS:  - Assess and document risk factors for pressure ulcer development  - Assess and document skin integrity  - Assess and document dressing/incision, wound bed, drain sites and surrounding tissue  - Implement wound care per orders  - Initiate isolation precautions as appropriate  - Initiate Pressure Ulcer prevention bundle as indicated  Outcome: Progressing

## 2024-06-24 NOTE — PROGRESS NOTES
INFECTIOUS DISEASE  PROGRESS NOTE            [facility]    Bibi Diaz Patient Status:  Inpatient    1945 MRN [id number]   Formerly [facility] 5NW-A Attending Margot Blanco MD   Hosp Day # 6 PCP Rick Shannon DO     Antibiotics:#14   zosyn#7    Subjective:  : comfortable    Objective:  Temp:  [97.7 °F (36.5 °C)-98.9 °F (37.2 °C)] 98.1 °F (36.7 °C)  Pulse:  [76-87] 79  Resp:  [16-19] 18  BP: (120-174)/(57-77) 153/70  SpO2:  [98 %-99 %] 99 %    General: awake in no distress  Vital signs:Temp:  [97.7 °F (36.5 °C)-98.9 °F (37.2 °C)] 98.1 °F (36.7 °C)  Pulse:  [76-87] 79  Resp:  [16-19] 18  BP: (120-174)/(57-77) 153/70  SpO2:  [98 %-99 %] 99 %  HEENT: Moist mucous membranes. Extraocular muscles are intact.  Neck: supple no masses  Respiratory: Non labored, symmetric excursion, normal respirations  Cardiovascular: no irregularities in rhythm  Abdomen: Soft, nontender, nondistended. VALERI drain   Musculoskeletal: joints: no swelling   Integument: No lesions. No erythema. No open wounds.  Labs:     COVID-19 Lab Results    COVID-19  Lab Results   Component Value Date    COVID19 Not Detected 06/10/2024    COVID19 Not Detected 2023    COVID19 Not Detected 2022       Pro-Calcitonin  No results for input(s): \"PCT\" in the last 168 hours.    Cardiac  No results for input(s): \"TROP\", \"PBNP\" in the last 168 hours.    Creatinine Kinase  No results for input(s): \"CK\" in the last 168 hours.    Inflammatory Markers  No results for input(s): \"CRP\", \"MURALI\", \"LDH\", \"DDIMER\" in the last 168 hours.    Recent Labs   Lab 24  0857   RBC 3.56*   HGB 9.8*   HCT 29.4*   MCV 82.6   MCH 27.5   MCHC 33.3   RDW 15.9   NEPRELIM 6.65   WBC 8.5   .0*         Recent Labs   Lab 24  0719 24  0713 24  0754 24  1950 24  0905   GLU 96  --  108* 124* 105*   BUN 5*  --  7* 8* 5*   CREATSERUM 0.66  --  0.56 0.60 0.56   CA 8.5  --  8.5 8.4* 9.0   ALB 2.5*  --  2.5*  --  2.5*      --  142 137 138   K 3.3*   < > 3.5 3.3* 4.0  4.0   *  --  109 105 108   CO2 23.0  --  25.0 24.0 24.0   ALKPHO 68  --  66  --  73   AST 18  --  16  --  14*   ALT 14  --  14  --  11*   BILT 0.3  --  0.2  --  0.2   TP 5.8*  --  5.7*  --  6.1*    < > = values in this interval not displayed.       No results found for: \"VANCT\"  Microbiology    Hospital Encounter on 06/17/24   1. Anaerobic Culture     Status: None (Preliminary result)    Collection Time: 06/18/24  4:46 PM    Specimen: Abdominal peritoneum; Abscess   Result Value Ref Range    Anaerobic Culture Pending N/A   2. Aerobic Bacterial Culture     Status: Abnormal    Collection Time: 06/18/24  4:46 PM    Specimen: Abdominal peritoneum; Abscess   Result Value Ref Range    Aerobic Culture Result 2+ growth Escherichia coli (A) N/A    Aerobic Culture Result 4+ growth Streptococcus anginosus (A) N/A    Aerobic Culture Result 1+ growth Escherichia coli (A) N/A    Aerobic Culture Result 2+ growth Streptococcus constellatus (A) N/A    Aerobic Culture Result 2+ growth Pseudomonas aeruginosa (A) N/A    Aerobic Smear 4+ WBCs seen N/A    Aerobic Smear 1+ Gram Positive Cocci N/A    Aerobic Smear 2+ Gram Negative Rods N/A    Aerobic Smear 1+ Gram Positive Rods N/A       Susceptibility    Escherichia coli -  (no method available)     Ampicillin 8 Sensitive      Cefazolin <=4 Sensitive      Ciprofloxacin <=0.25 Sensitive      Gentamicin <=1 Sensitive      Meropenem <=0.25 Sensitive      Levofloxacin <=0.12 Sensitive      Piperacillin + Tazobactam <=4 Sensitive      Trimethoprim/Sulfa <=20 Sensitive     Escherichia coli -  (no method available)     Ampicillin 16 Intermediate      Cefazolin <=4 Sensitive      Ciprofloxacin <=0.25 Sensitive      Gentamicin <=1 Sensitive      Meropenem <=0.25 Sensitive      Levofloxacin <=0.12 Sensitive      Piperacillin + Tazobactam <=4 Sensitive      Trimethoprim/Sulfa <=20 Sensitive     Pseudomonas aeruginosa -  (no method available)      Cefepime <=2 Sensitive      Ceftazidime <=1 Sensitive      Ciprofloxacin <=1 Sensitive      Meropenem <=1 Sensitive      Levofloxacin 1 Sensitive      Piperacillin + Tazobactam <=4 Sensitive      Tobramycin <=1 Sensitive          Problem list reviewed:  Patient Active Problem List   Diagnosis    Major depressive disorder, recurrent episode, moderate (HCC)    Esophageal reflux    Chronic obstructive pulmonary disease with acute lower respiratory infection (HCC)    Acquired hypothyroidism    Memory deficit    Aortic atherosclerosis (HCC)    Diverticulosis of colon    Dizziness and giddiness    Dysthymic disorder    Lichen sclerosus    Pure hypercholesterolemia    Vitamin D deficiency    Vitiligo    Severe episode of recurrent major depressive disorder, without psychotic features (HCC)    Paraesophageal hernia    Subclinical hypothyroidism    Hypertension    Protein-calorie malnutrition, unspecified severity (HCC)    Gastrointestinal hemorrhage, unspecified gastrointestinal hemorrhage type    Thrombocytopenia (HCC)    Acute diverticulitis    Hyponatremia    ACP (advance care planning)    Weakness generalized    Colonic diverticular abscess    Sigmoid diverticulitis    History of repair of hiatal hernia    Diverticulitis of large intestine with abscess without bleeding    Malnutrition (HCC)    Diverticulitis             ASSESSMENT/PLAN:  1. Diverticulitis/abscess  SP IR drain on 6/18  -complete 2 weeks pip/damaso through 7/2      Marc Dc MD, MD [facility]  [phone]

## 2024-06-24 NOTE — PROGRESS NOTES
Katharina A&Ox4 - forgetful at times.  Reports she is feeling better.  No voiced complaints so far.  VALERI drain irrigated and aspirated as ordered  - still with foul smelling , purulent, brown drainage. IVF and IV Zosyn as ordered.  Will follow.

## 2024-06-24 NOTE — PROGRESS NOTES
[facility]  Progress Note    Bibi Diaz Patient Status:  Inpatient    1945 MRN [id number]   Location [facility] 5NW-A Attending Margot Blanco MD   Hosp Day # 6 PCP Rick Shannon DO     Subjective:  Patient is doing well today.  She reports she has had a bowel movement.  She reports improvement in her abdominal pain and bloating.  She denies nausea or vomiting.  She is tolerating diet.    Objective/Physical Exam:  General: Alert, orientated x3.  Cooperative.  No apparent distress.  Vital Signs:  Blood pressure 153/70, pulse 79, temperature 98.1 °F (36.7 °C), temperature source Oral, resp. rate 18, height 60\", weight 96 lb 3.2 oz (43.6 kg), SpO2 99%, not currently breastfeeding.  Wt Readings from Last 3 Encounters:   24 96 lb 3.2 oz (43.6 kg)   06/10/24 92 lb (41.7 kg)   24 91 lb (41.3 kg)     Lungs: No respiratory distress.  Cardiac: Regular rate and rhythm.   Abdomen:  Soft, non distended, non tender, with no rebound or guarding.  No peritoneal signs.   Extremities:  No lower extremity edema noted.        Intake/Output:    Intake/Output Summary (Last 24 hours) at 2024 1039  Last data filed at 2024 0500  Gross per 24 hour   Intake 1917 ml   Output 424 ml   Net 1493 ml     I/O last 3 completed shifts:  In: 3932 [I.V.:3892]  Out: 849 [Urine:800; Drains:49]  No intake/output data recorded.    Medications:    losartan  25 mg Oral Daily    piperacillin-tazobactam  3.375 g Intravenous Q8H    heparin  5,000 Units Subcutaneous 2 times per day    DULoxetine  30 mg Oral Daily    mirtazapine  30 mg Oral Nightly    multivitamin with minerals  1 tablet Oral Daily    fluticasone furoate-vilanterol  1 puff Inhalation Daily       Labs:        Lab Results   Component Value Date    PT 12.6 2013    PT 13.2 2011    INR 1.35 (H) 2024    INR 1.05 2024    INR 0.99 2022         Assessment  Patient Active Problem List   Diagnosis    Major depressive disorder,  recurrent episode, moderate (HCC)    Esophageal reflux    Chronic obstructive pulmonary disease with acute lower respiratory infection (HCC)    Acquired hypothyroidism    Memory deficit    Aortic atherosclerosis (HCC)    Diverticulosis of colon    Dizziness and giddiness    Dysthymic disorder    Lichen sclerosus    Pure hypercholesterolemia    Vitamin D deficiency    Vitiligo    Severe episode of recurrent major depressive disorder, without psychotic features (HCC)    Paraesophageal hernia    Subclinical hypothyroidism    Hypertension    Protein-calorie malnutrition, unspecified severity (HCC)    Gastrointestinal hemorrhage, unspecified gastrointestinal hemorrhage type    Thrombocytopenia (HCC)    Acute diverticulitis    Hyponatremia    ACP (advance care planning)    Weakness generalized    Colonic diverticular abscess    Sigmoid diverticulitis    History of repair of hiatal hernia    Diverticulitis of large intestine with abscess without bleeding    Malnutrition (HCC)    Diverticulitis       Pericolonic abscess status post IR drain    Plan:  Continue diet as tolerated.  Continue antibiotics as per ID recommendations.  Continue drain care.  Drain with feculent material suggesting contained perforation.  Drain likely to be removed at time of surgical intervention.  Continue bowel regimen.  The patient is stable for discharge from surgical standpoint.  Follow-up with Dr. Friedman as an outpatient      Quality:  DVT Mechanical Prophylaxis:   SCDs,    DVT Pharmacologic Prophylaxis   Medication    heparin (Porcine) 5000 UNIT/ML injection 5,000 Units                Code Status: DNAR/Selective Treatment  Flood: External urinary catheter in place  Flood Duration (in days):   Central line:    WOLF: 6/23/2024        Margarita Al PA-C  6/24/2024  10:39 AM

## 2024-06-24 NOTE — DISCHARGE INSTRUCTIONS
IR drains to be managed by radiology.  Drains to be removed when output is less than 10 cc for 2 consecutive days.

## 2024-06-24 NOTE — CM/SW NOTE
06/24/24 1409   Discharge disposition   Expected discharge disposition subacute   Post Acute Care Provider [facility]   Discharge transportation Edkiran Tripp     Informed by RN that patient is medically cleared for discharge. [facility] who confirmed bed [facility] today. Spoke with Edkiran tripp and scheduled  for 5pm today. PCS form completed and available for RN to print. RN updated patient/family. SW will remain available.    Addendum 2:20pm: Met with patient to provide update. She was agreeable with discharge and cost of medicar.        [facility] [place]  [phone]    [facility]  [phone] or [phone]      KELSEY Beasley  Discharge Planner  [phone]

## 2024-06-24 NOTE — PROGRESS NOTES
NURSING DISCHARGE NOTE    Discharged Rehab facility via Ambulance.  Accompanied by Support staff  Belongings Taken by patient/family.      Discharge instructions explained to patient at bedside. IV removed. Midline c/d/I. Patient's belongings taken by patient. No further questions at this time. This RN called report to [facility]. Patient's grand daughter Yareli updated on POC.

## 2024-06-24 NOTE — OCCUPATIONAL THERAPY NOTE
OCCUPATIONAL THERAPY TREATMENT NOTE - INPATIENT     Room Number: 530/530-A  Session: 1   Number of Visits to Meet Established Goals: 5    Presenting Problem: Generalized weakness  Prior Level of Function: Pt. Stated that she was IND w/ all BADLs but has had a harder time the last week with completing them without assistance.     ASSESSMENT   Patient demonstrates good  progress this session, goals remain in progress.    Patient continues to function below baseline with toileting, lower body dressing, bed mobility, transfers, static standing balance, dynamic standing balance, and functional standing tolerance.   Contributing factors to remaining limitations include decreased functional strength, decreased endurance, and decreased muscular endurance.  Next session anticipate patient to progress toileting, lower body dressing, bed mobility, transfers, static standing balance, dynamic standing balance, and functional standing tolerance.  Occupational Therapy will continue to follow patient for duration of hospitalization.    Patient continues to benefit from continued skilled OT services: to promote return to prior level of function and safety with continuous assistance and gradual rehabilitative therapy .          OT Device Recommendations: TBD    History: Patient is a 78 year old female admitted on 6/17/2024 with Presenting Problem: Generalized weakness. Co-Morbidities : hypothyoid, anxiety, asthma, CKD3, COPD, essential HTN    WEIGHT BEARING RESTRICTION  Weight Bearing Restriction: None                Recommendations for nursing staff:   Transfers: 1 person  Toileting location: toilet or bedside commode (pending anxiety)    TREATMENT SESSION:  Patient Start of Session: semi supine in bed  FUNCTIONAL TRANSFER ASSESSMENT  Sit to Stand: Edge of Bed  Edge of Bed: Minimal Assist  Toilet Transfer: Minimal Assist    BED MOBILITY  Supine to Sit : Stand-by Assist  Sit to Supine (OT): Minimal Assist  Scooting:  CGA/SBA    BALANCE ASSESSMENT  Static Sitting: Supervision  Sitting Bilateral: Stand-by Assist  Static Standing: Minimal Assist  Standing Bilateral: Minimal Assist    FUNCTIONAL ADL ASSESSMENT  LB Dressing Seated: Stand-by Assist  LB Dressing Standing: Minimal Assist  Toileting Seated: Supervision  Toileting Standing: Minimal Assist      ACTIVITY TOLERANCE: WFL                         O2 SATURATIONS       EDUCATION PROVIDED  Patient: Role of Occupational Therapy; Plan of Care; Discharge Recommendations; Functional Transfer Techniques; Fall Prevention; Posture/Positioning; Energy Conservation; Proper Body Mechanics  Patient's Response to Education: Verbalized Understanding; Returned Demonstration      Equipment used: RW  Demonstrates functional use, Would benefit from additional trial      Therapist comments: Pt received semi supine in bed, pleasant and cooperative for OT session. Pt states that she wants to try to get to the bathroom to have a bowel movement but reports that she's anxious to try walking there. This writer provided pt with calming techniques to overcome anxious feeling. Pt agreeable to try. Pt performs bed mobility at SBA to sit EOB. Sit to stand transfers performed at min A. Pt with good static standing balance in preparation to advance to functional mobility. Pt demos ambulatory toilet transfer with RW requiring min A and cues for pursed lip breathing techniques to keep calm and focused on task. Pt reaches toilet and completes clothing management at min A. Toilet hygiene in sitting completed at supervision and min A in standing. Pt then dons pull-ups at SBA in sitting and min A to advance up past hips in standing. Pt ambulates back to bed and transfers back into bed with all needs.  Patient End of Session: In bed;Needs met;Call light within reach;RN aware of session/findings;All patient questions and concerns addressed;Alarm set    SUBJECTIVE  \"I'll be honest, I'm a little anxious to get up  now.\"  \"You made it easier for me to get up and move today. Thank you.\"    PAIN ASSESSMENT  Rating: Unable to rate  Location: reports weak all over  Management Techniques: Activity promotion;Repositioning     OBJECTIVE  Precautions: Bed/chair alarm    AM-PAC ‘6-Clicks’ Inpatient Daily Activity Short Form  -   Putting on and taking off regular lower body clothing?: A Lot  -   Bathing (including washing, rinsing, drying)?: A Lot  -   Toileting, which includes using toilet, bedpan or urinal? : A Little  -   Putting on and taking off regular upper body clothing?: A Lot  -   Taking care of personal grooming such as brushing teeth?: A Little  -   Eating meals?: A Little    AM-PAC Score:  Score: 15  Approx Degree of Impairment: 56.46%  Standardized Score (AM-PAC Scale): 34.69    PLAN  OT Treatment Plan: ADL training;Energy conservation/work simplification techniques;Balance activities;Functional transfer training;UE strengthening/ROM;Endurance training;Cognitive reorientation;Patient/Family education;Fine motor coordination activities;Compensatory technique education  Rehab Potential : Good  Frequency: 3-5x/week    OT Goals:     All goals ongoing 06/24    ADL Goals   Patient will perform grooming: with min assist and while in chair  Patient will perform lower body dressing:  with mod assist and while at edge of bed  Patient will perform toileting: with max assist and with bedside commode     Functional Transfer Goals  Patient will transfer from supine to sit:  with min assist  Patient will transfer from sit to stand:  with min assist  Patient will transfer to bedside commode:  with max assist     UE Exercise Program Goal  Patient will be supervision with bilateral AROM HEP (home exercise program).     Therapist Goals  Perform SBT or SLUMS     OT Session Time: 25 minutes  Self-Care Home Management: 25 minutes

## 2024-06-24 NOTE — CM/SW NOTE
Awaiting final IV abx order and midline placement.     SW/CM to remain available for support and/or discharge planning.    KELSEY Beasley  Discharge Planner  [phone]

## 2024-06-24 NOTE — PLAN OF CARE
Problem: Patient/Family Goals  Goal: Patient/Family Long Term Goal  Description: Patient's Long Term Goal: discharge home    Interventions:  - Consult to Hosp, Gen Surgery  - IV Abx  - IVF  - Pain control  - See additional Care Plan goals for specific interventions  Outcome: Progressing  Goal: Patient/Family Short Term Goal  Description: Patient's Short Term Goal: pain control  6/19: maintain safety  6/19 noc: rest  6/20: maintain safety  6/20 NOC: manage gas  6/21: maintain safety  06/21/2024 - have a bowel movement  06/21/2024 - \"I just want to rest now\"  6/23 AM: rest  06/23/2024 - control nausea  Interventions:   - Consult to Hosp, Gen Surgery  - IVF  - IV Abx  - Pain control  - Miralax as ordered  - zofran/reglan as ordered  - See additional Care Plan goals for specific interventions  Outcome: Progressing     Problem: SAFETY ADULT - FALL  Goal: Free from fall injury  Description: INTERVENTIONS:  - Assess pt frequently for physical needs  - Identify cognitive and physical deficits and behaviors that affect risk of falls.  - [place] fall precautions as indicated by assessment.  - Educate pt/family on patient safety including physical limitations  - Instruct pt to call for assistance with activity based on assessment  - Modify environment to reduce risk of injury  - Provide assistive devices as appropriate  - Consider OT/PT consult to assist with strengthening/mobility  - Encourage toileting schedule  Outcome: Progressing     Problem: CARDIOVASCULAR - ADULT  Goal: Absence of cardiac arrhythmias or at baseline  Description: INTERVENTIONS:  - Continuous cardiac monitoring, monitor vital signs, obtain 12 lead EKG if indicated  - Evaluate effectiveness of antiarrhythmic and heart rate control medications as ordered  - Initiate emergency measures for life threatening arrhythmias  - Monitor electrolytes and administer replacement therapy as ordered  Outcome: Progressing     Problem: METABOLIC/FLUID AND ELECTROLYTES  - ADULT  Goal: Electrolytes maintained within normal limits  Description: INTERVENTIONS:  - Monitor labs and rhythm and assess patient for signs and symptoms of electrolyte imbalances  - Administer electrolyte replacement as ordered  - Monitor response to electrolyte replacements, including rhythm and repeat lab results as appropriate  - Fluid restriction as ordered  - Instruct patient on fluid and nutrition restrictions as appropriate  Outcome: Progressing     Problem: SKIN/TISSUE INTEGRITY - ADULT  Goal: Skin integrity remains intact  Description: INTERVENTIONS  - Assess and document risk factors for pressure ulcer development  - Assess and document skin integrity  - Monitor for areas of redness and/or skin breakdown  - Initiate interventions, skin care algorithm/standards of care as needed  Outcome: Progressing  Goal: Incision(s), wounds(s) or drain site(s) healing without S/S of infection  Description: INTERVENTIONS:  - Assess and document risk factors for pressure ulcer development  - Assess and document skin integrity  - Assess and document dressing/incision, wound bed, drain sites and surrounding tissue  - Implement wound care per orders  - Initiate isolation precautions as appropriate  - Initiate Pressure Ulcer prevention bundle as indicated  Outcome: Progressing

## 2024-06-25 ENCOUNTER — EXTERNAL FACILITY (OUTPATIENT)
Dept: FAMILY MEDICINE CLINIC | Facility: CLINIC | Age: 79
End: 2024-06-25

## 2024-06-25 DIAGNOSIS — J44.9 CHRONIC OBSTRUCTIVE PULMONARY DISEASE, UNSPECIFIED COPD TYPE (HCC): ICD-10-CM

## 2024-06-25 DIAGNOSIS — F41.9 ANXIETY AND DEPRESSION: ICD-10-CM

## 2024-06-25 DIAGNOSIS — E44.0 MODERATE PROTEIN-CALORIE MALNUTRITION (HCC): ICD-10-CM

## 2024-06-25 DIAGNOSIS — F32.A ANXIETY AND DEPRESSION: ICD-10-CM

## 2024-06-25 DIAGNOSIS — D64.9 NORMOCYTIC ANEMIA: ICD-10-CM

## 2024-06-25 DIAGNOSIS — R53.81 PHYSICAL DECONDITIONING: ICD-10-CM

## 2024-06-25 DIAGNOSIS — K57.20 PERICOLONIC ABSCESS DUE TO DIVERTICULITIS: Primary | ICD-10-CM

## 2024-06-25 DIAGNOSIS — I10 PRIMARY HYPERTENSION: ICD-10-CM

## 2024-06-25 DIAGNOSIS — E87.6 HYPOKALEMIA: ICD-10-CM

## 2024-06-25 PROCEDURE — 1111F DSCHRG MED/CURRENT MED MERGE: CPT | Performed by: FAMILY MEDICINE

## 2024-06-25 PROCEDURE — G0317 PROLNG NSG FAC E/M EA ADDL 15 MIN: HCPCS | Performed by: FAMILY MEDICINE

## 2024-06-25 PROCEDURE — 99306 1ST NF CARE HIGH MDM 50: CPT | Performed by: FAMILY MEDICINE

## 2024-06-25 RX ORDER — CLONAZEPAM 0.5 MG/1
0.5 TABLET ORAL 2 TIMES DAILY
Qty: 28 TABLET | Refills: 0 | Status: SHIPPED | OUTPATIENT
Start: 2024-06-25

## 2024-06-25 NOTE — PAYOR COMM NOTE
--------------  DISCHARGE REVIEW    Payor: [employer]  Subscriber #:  [id number]  Authorization Number: [id number].    Admit date: 6/18/24  Admit time:   2:21 PM  Discharge Date: 6/24/2024  5:42 PM     Admitting Physician: Margot Blanco MD  Attending Physician:  No att. providers found  Primary Care Physician: Rick Shannon DO       Discharge Summary Notes    No notes of this type exist for this encounter.         REVIEWER COMMENTS

## 2024-06-25 NOTE — DISCHARGE SUMMARY
[place] [facility]OSPITALIST  DISCHARGE SUMMARY     Bibi Diaz Patient Status:  Inpatient    1945 MRN [id number]   Location [facility] 5-A Attending No att. providers found   Hosp Day # 6 PCP Rick Shannon DO     Date of Admission: 2024  Date of Discharge:  2024     Discharge Disposition: SNF Subacute Rehab    Discharge Diagnosis:     Expand All Collapse All    [facility]   [facility]            Bibi Diaz Patient Status:  Observation    1945 MRN [id number]   Location [facility] 5-A Attending Rich Jesus MD   Hosp Day # 6 PCP Rick Shannon DO      Chief Complaint: weakness, confusion, poor PO intake        Subjective:  Patient feeling anxious about plan of care.           Objective:  Review of Systems:   A comprehensive review of systems was completed; pertinent positive and negatives stated in subjective.     Vital signs:  Temp:  [97.7 °F (36.5 °C)-98.9 °F (37.2 °C)] 98.1 °F (36.7 °C)  Pulse:  [76-87] 79  Resp:  [16-18] 18  BP: (120-153)/(57-70) 153/70  SpO2:  [98 %-99 %] 99 %     Physical Exam:    General: No acute distress, weak  Respiratory: No wheezes, no rhonchi  Cardiovascular: S1, S2, regular rate and rhythm  Abdomen: Soft, mild tender, non-distended, positive bowel sounds  Neuro: No new focal deficits.   Extremities: No edema        Diagnostic Data:    Labs:           Recent Labs   Lab 24  0719 24  1405 24  0755 24  0905 24  0747 24  0857   WBC 12.2*  --  9.3 11.3* 8.4 8.5   HGB 8.7*  --  9.5* 10.0* 9.9* 9.8*   MCV 85.2  --  85.3 83.9 83.1 82.6   .0  --  474.0* 531.0* 549.0* 595.0*   INR  --  1.35*  --   --   --   --                  Recent Labs   Lab 24  0719 24  0713 24  0754 24  1950 24  0905   GLU 96  --  108* 124* 105*   BUN 5*  --  7* 8* 5*   CREATSERUM 0.66  --  0.56 0.60 0.56   CA 8.5  --  8.5 8.4* 9.0   ALB 2.5*  --  2.5*  --  2.5*      --  142 137 138   K 3.3*   < > 3.5 3.3* 4.0  4.0   *  --  109 105 108   CO2 23.0  --  25.0 24.0 24.0   ALKPHO 68  --  66  --  73   AST 18  --  16  --  14*   ALT 14  --  14  --  11*   BILT 0.3  --  0.2  --  0.2   TP 5.8*  --  5.7*  --  6.1*    < > = values in this interval not displayed.         Estimated Creatinine Clearance: 56.1 mL/min (based on SCr of 0.56 mg/dL).         Recent Labs   Lab 06/20/24  1950   TROPHS 5             Recent Labs   Lab 06/18/24  1405   PTP 16.8*   INR 1.35*                     Microbiology             Hospital Encounter on 06/17/24   1. Anaerobic Culture     Status: None (Preliminary result)     Collection Time: 06/18/24  4:46 PM     Specimen: Abdominal peritoneum; Abscess   Result Value Ref Range     Anaerobic Culture Pending N/A   2. Aerobic Bacterial Culture     Status: Abnormal     Collection Time: 06/18/24  4:46 PM     Specimen: Abdominal peritoneum; Abscess   Result Value Ref Range     Aerobic Culture Result 2+ growth Escherichia coli (A) N/A     Aerobic Culture Result 4+ growth Streptococcus anginosus (A) N/A     Aerobic Culture Result 1+ growth Escherichia coli (A) N/A     Aerobic Culture Result 2+ growth Streptococcus constellatus (A) N/A     Aerobic Culture Result 2+ growth Pseudomonas aeruginosa (A) N/A     Aerobic Smear 4+ WBCs seen N/A     Aerobic Smear 1+ Gram Positive Cocci N/A     Aerobic Smear 2+ Gram Negative Rods N/A     Aerobic Smear 1+ Gram Positive Rods N/A       Susceptibility     Escherichia coli -  (no method available)       Ampicillin 8 Sensitive         Cefazolin <=4 Sensitive         Ciprofloxacin <=0.25 Sensitive         Gentamicin <=1 Sensitive         Meropenem <=0.25 Sensitive         Levofloxacin <=0.12 Sensitive         Piperacillin + Tazobactam <=4 Sensitive         Trimethoprim/Sulfa <=20 Sensitive       Escherichia coli -  (no method available)       Ampicillin 16 Intermediate         Cefazolin <=4 Sensitive         Ciprofloxacin  <=0.25 Sensitive         Gentamicin <=1 Sensitive         Meropenem <=0.25 Sensitive         Levofloxacin <=0.12 Sensitive         Piperacillin + Tazobactam <=4 Sensitive         Trimethoprim/Sulfa <=20 Sensitive       Pseudomonas aeruginosa -  (no method available)       Cefepime <=2 Sensitive         Ceftazidime <=1 Sensitive         Ciprofloxacin <=1 Sensitive         Meropenem <=1 Sensitive         Levofloxacin 1 Sensitive         Piperacillin + Tazobactam <=4 Sensitive         Tobramycin <=1 Sensitive              Imaging: Reviewed in Epic.     Medications:   Scheduled Medications    polyethylene glycol (PEG 3350)  17 g Oral Daily    losartan  25 mg Oral Daily    piperacillin-tazobactam  3.375 g Intravenous Q8H    heparin  5,000 Units Subcutaneous 2 times per day    DULoxetine  30 mg Oral Daily    mirtazapine  30 mg Oral Nightly    multivitamin with minerals  1 tablet Oral Daily    fluticasone furoate-vilanterol  1 puff Inhalation Daily                  Assessment & Plan:  #Multiple pericolonic abscesses with Ecoli spp  Pt had been DC on 6/15 on PO amoxicillin but did not take it after vomiting after taking one dose  #Acute diverticulitis  - drain in place  -IV Zosyn- due to her numerous abx allergies/ intolerance ID was consulted on 6/23/24- recommending Midline IV Abx  -IV fluids and pain control  - Repeat CT A/P on 6/21/24- with large stool burden  - IR reporting on 6/23 feculent material in drain no role for abscessogram will notify surgery-     #weakness  Multifactorial due to above, poor PO intake at baseline   DC to [facility] once improved     #Hypokalemia  - replace per protocol     #Normocytic anemia  #Iron deficiency     #Orthostatic hypotension     #Essential hypertension     #CKD 3     #Hyperlipidemia     #Hypothyroidism     #COPD     #acute metabolic and toxic encephalopathy- improving on 6/20/24, resolved as of 6/21/24  Likely due to narcotics, versed prior to drain insertion, infection   MRI brain  6/19/2024 no acute findings              History of Present Illness:   Bibi Diaz is a 78 year old female with past medical history hypothyroidism, anxiety, asthma, CKD 3, COPD, essential hypertension, hyperlipidemia, recent hospitalization for diverticulitis presents to hospital today for worsening debility and pain.  Patient was discharged 6/14 after hospitalization for diverticulitis but was unable to take antibiotics because \"it made her nauseous\".  Patient continued feel further weakness and abdominal pain and decided come to the hospital for further evaluation.  Patient had her granddaughter at the bedside lives nearby.  Patient reportedly having hard time completing ADLs.  No nausea vomiting no fevers or chills.     Brief Synopsis:   The patient was admitted noted to have pericolonic abscesses- drain was inserted and she was treated with IV Abx. REpeat CT performed due to ongoing nausea, poor PO intake which was stable. Plan is for DC with drain, IV Abx per ID recs given her multiple allergies/ polymicrobial drain culture and she will see surgery for surgical intervention of contained perf. DC plan discussed with pt chantel.     Lace+ Score: 82  59-90 High Risk  29-58 Medium Risk  0-28   Low Risk       TCM Follow-Up Recommendation:  LACE > 58: High Risk of readmission after discharge from the hospital.      Procedures during hospitalization:   As above    Incidental or significant findings and recommendations (brief descriptions):   Polymicrobial culture     Lab/Test results pending at Discharge:   no    Consultants:  ID, Surgery     Discharge Medication List:     Discharge Medications        START taking these medications        Instructions Prescription details   dextrose 5% SOLN 100 mL with piperacillin-tazobactam 3.375 (3-0.375) g SOLR 3.375 g      Inject 3.375 g into the vein every 8 (eight) hours for 7 days. Remove midline with completion of ivabx 7/2   Stop taking on: July 2, 2024  Quantity: 21  each  Refills: 0     traMADol 50 MG Tabs  Commonly known as: Ultram      Take 1 tablet (50 mg total) by mouth every 12 (twelve) hours as needed.   Quantity: 12 tablet  Refills: 0            CHANGE how you take these medications        Instructions Prescription details   ondansetron 4 MG Tbdp  Commonly known as: Zofran-ODT  What changed: Another medication with the same name was added. Make sure you understand how and when to take each.      Take 1 tablet (4 mg total) by mouth every 4 (four) hours as needed for Nausea.   Quantity: 30 tablet  Refills: 0     ondansetron 4 MG Tbdp  Commonly known as: Zofran-ODT  What changed: You were already taking a medication with the same name, and this prescription was added. Make sure you understand how and when to take each.      Take 1 tablet (4 mg total) by mouth every 8 (eight) hours as needed for Nausea.   Quantity: 30 tablet  Refills: 0     triamcinolone 0.1 % Crea  Commonly known as: Kenalog  What changed:   how much to take  how to take this  when to take this      APPLY TOPICALLY TO THE AFFECTED AREA TWICE DAILY AS NEEDED   Quantity: 60 g  Refills: 3            CONTINUE taking these medications        Instructions Prescription details   acetaminophen 500 MG Tabs  Commonly known as: Tylenol Extra Strength      Take 1 tablet (500 mg total) by mouth every 6 (six) hours as needed for Pain.   Refills: 0     albuterol 108 (90 Base) MCG/ACT Aers  Commonly known as: ProAir HFA      INHALE 2 PUFFS BY MOUTH EVERY 6 HOURS AS NEEDED FOR WHEEZING   Quantity: 8.5 g  Refills: 2     clobetasol 0.05 % Oint  Commonly known as: Temovate       Refills: 0     clonazePAM 0.5 MG Tabs  Commonly known as: KlonoPIN      Take 1 tablet (0.5 mg total) by mouth 2 (two) times daily as needed.   Quantity: 12 tablet  Refills: 0     DULoxetine 30 MG Cpep  Commonly known as: Cymbalta      Take 1 capsule (30 mg total) by mouth daily.   Quantity: 30 capsule  Refills: 0     Dupixent 300 MG/2ML Sosy  Generic  drug: Dupilumab       Refills: 0     fluticasone propionate 50 MCG/ACT Susp  Commonly known as: Flonase      2 sprays by Each Nare route daily.   Quantity: 1 each  Refills: 1     losartan 25 MG Tabs  Commonly known as: Cozaar      Take 1 tablet (25 mg total) by mouth daily.   Quantity: 90 tablet  Refills: 1     meclizine 12.5 MG Tabs  Commonly known as: Antivert      Take 1 tablet (12.5 mg total) by mouth 3 (three) times daily as needed.   Quantity: 30 tablet  Refills: 1     mirtazapine 30 MG Tabs  Commonly known as: Remeron      Take 1 tablet (30 mg total) by mouth nightly.   Quantity: 90 tablet  Refills: 1     multivitamin with minerals Tabs      Take 1 tablet by mouth daily.   Quantity: 30 tablet  Refills: 1     Symbicort 160-4.5 MCG/ACT Aero  Generic drug: Budesonide-Formoterol Fumarate      INHALE 2 PUFFS BY MOUTH TWICE DAILY   Quantity: 10.2 g  Refills: 11     VITAMIN D-3 OR      Take by mouth.   Refills: 0            STOP taking these medications      amoxicillin-pot clavulanate 400-57 mg/5mL Susr  Commonly known as: Augmentin                  Where to Get Your Medications        These medications were sent to [id number] - [place], [place] - [address] AT [facility] [address], [phone], [phone]  [address] [place] [address], [facility] [address]      Phone: [phone]   ondansetron 4 MG Tbdp       Please  your prescriptions at the location directed by your doctor or nurse    Bring a paper prescription for each of these medications  clonazePAM 0.5 MG Tabs  dextrose 5% SOLN 100 mL with piperacillin-tazobactam 3.375 (3-0.375) g SOLR 3.375 g  traMADol 50 MG Tabs         [place] reviewed[place]     Follow-up appointment:   Remi Friedman MD  [address]  [facility] [address] [phone]    Go on 7/3/2024  at 2pm    Rick Shannon DO  [address] [place] [address]  [address]  [facility] [address] [phone]    Follow up in 1 week(s)  Follow up    Appointments for Next 30 Days 6/25/2024 - 7/25/2024         Date Arrival Time Visit Type Length Department Provider     2024  9:00 AM  EXAM - ESTABLISHED [2844] 15 min [facility], [address], [place] ShivaEliana DO    Patient Instructions:         Location Instructions:     Masks are optional for all patients and visitors, unless otherwise indicated.               7/3/2024  2:00 PM  EXAM - ESTABLISHED [2844] 15 min [facility] Remi Friedman MD    Patient Instructions:         Location Instructions:     Masks are optional for all patients and visitors, unless otherwise indicated.                      Vital signs:       Physical Exam:    General: No acute distress   Lungs: clear to auscultation  Cardiovascular: S1, S2  Abdomen: Soft      -----------------------------------------------------------------------------------------------  PATIENT DISCHARGE INSTRUCTIONS: See electronic chart    Margot Blanco MD    Total time spent on discharge plannin minutes     The  [employer] Cures Act makes medical notes like these available to patients in the interest of transparency. Please be advised this is a medical document. Medical documents are intended to carry relevant information, facts as evident, and the clinical opinion of the practitioner. The medical note is intended as peer to peer communication and may appear blunt or direct. It is written in medical language and may contain abbreviations or verbiage that are unfamiliar.

## 2024-06-26 ENCOUNTER — INITIAL APN SNF VISIT (OUTPATIENT)
Dept: INTERNAL MEDICINE CLINIC | Age: 79
End: 2024-06-26

## 2024-06-26 VITALS
BODY MASS INDEX: 17 KG/M2 | WEIGHT: 87 LBS | TEMPERATURE: 99 F | SYSTOLIC BLOOD PRESSURE: 146 MMHG | RESPIRATION RATE: 17 BRPM | HEART RATE: 82 BPM | DIASTOLIC BLOOD PRESSURE: 80 MMHG | OXYGEN SATURATION: 98 %

## 2024-06-26 DIAGNOSIS — K57.92 DIVERTICULITIS: Primary | ICD-10-CM

## 2024-06-26 DIAGNOSIS — E44.0 MODERATE PROTEIN-CALORIE MALNUTRITION (HCC): ICD-10-CM

## 2024-06-26 DIAGNOSIS — K57.20 COLONIC DIVERTICULAR ABSCESS: ICD-10-CM

## 2024-06-26 DIAGNOSIS — R53.1 WEAKNESS GENERALIZED: ICD-10-CM

## 2024-06-26 DIAGNOSIS — J44.0 CHRONIC OBSTRUCTIVE PULMONARY DISEASE WITH ACUTE LOWER RESPIRATORY INFECTION (HCC): ICD-10-CM

## 2024-06-26 DIAGNOSIS — I15.9 SECONDARY HYPERTENSION: ICD-10-CM

## 2024-06-26 PROCEDURE — 99310 SBSQ NF CARE HIGH MDM 45: CPT | Performed by: NURSE PRACTITIONER

## 2024-06-26 PROCEDURE — 1160F RVW MEDS BY RX/DR IN RCRD: CPT | Performed by: NURSE PRACTITIONER

## 2024-06-26 PROCEDURE — 3079F DIAST BP 80-89 MM HG: CPT | Performed by: NURSE PRACTITIONER

## 2024-06-26 PROCEDURE — 1159F MED LIST DOCD IN RCRD: CPT | Performed by: NURSE PRACTITIONER

## 2024-06-26 PROCEDURE — 3077F SYST BP >= 140 MM HG: CPT | Performed by: NURSE PRACTITIONER

## 2024-06-26 PROCEDURE — 1111F DSCHRG MED/CURRENT MED MERGE: CPT | Performed by: NURSE PRACTITIONER

## 2024-06-26 PROCEDURE — 1124F ACP DISCUSS-NO DSCNMKR DOCD: CPT | Performed by: NURSE PRACTITIONER

## 2024-06-26 NOTE — PROGRESS NOTES
Skilled Nursing Facility, Subacute Rehab  [facility]     Bibi Diaz Author: Margarette LOONEY ZEENAT Conley     1945 MRN [id number]   [facility]  [facility] 24      [facility] 24 PCP Rick Shannon DO       HPI:      Bibi Diaz is a 78 year old female with previous medical history including  CKD III, COPD, asthma, HTN, HLD, recently hospitalized with diverticulitis. She was unable to tolerate her home antibiotics with nausea and had persistent pain and weakness. She  was admitted to the hospital for pericolonic abscesses. She had a drain inserted by IR and was treated with IV antibiotics which continue. Plan is for surgery follow up for possible intervention on perforation. She is now admitted to SNF for sub-acute rehabilitation.     Bibi is seen in her room in the wheelchair today. She has no pain. She had some diarrhea yesterday after drinking Ensure. She ate cream of wheat and toast for breakfast and is trying some fish and macaroni for lunch. She has no nausea at this time. Appetite is OK, she has been feeling nauseated and sick the last fdew weeks and has not been able to keep much in. When she eats now she has diarrhea. DW her eating small portions more often and trying to slowly increase portion size to assist with weight gain. Stool pending for Cdiff now. Slept OK. Her goal is to improve and get back home to enjoy her family time.     Hospital Discharge Diagnoses:  Diverticulitis of colon  Colonic diverticular abscesses/perforation  Malnutrition  Weakness  Diarrhea    Chief Complaint at visit:   Chief Complaint   Patient presents with    Follow - Up     Hospitalized for diverticulitis with multiple colon abscesses, anemia, drain placed, on IV antibiotics        ALLERGIES    ALLERGIES:  Allergies   Allergen Reactions    Avelox [Moxifloxacin Hcl In Nacl] SWELLING    Avelox [Moxifloxacin Hydrochloride] TONGUE SWELLING     \"TABS\"    Amoxicillin NAUSEA AND  VOMITING     Vomiting.    Statins Myopathy    Sulfa Antibiotics RASH and ITCHING    Dander OTHER (SEE COMMENTS)     \"Animals\": runny nose, watery eyes, itching    Diphenhydramine Runny nose     \"Animals\": runny nose, watery eyes, itching    Dust Runny nose    Latex RASH    Sulfa Drugs Cross Reactors RASH and ITCHING       CURRENT MEDS:      CURRENT MEDICATIONS   Current Outpatient Medications   Medication Sig Dispense Refill    clonazePAM 0.5 MG Oral Tab Take 1 tablet (0.5 mg total) by mouth in the morning and 1 tablet (0.5 mg total) before bedtime. 28 tablet 0    dextrose 5% SOLN 100 mL with piperacillin-tazobactam 3.375 (3-0.375) g SOLR 3.375 g Inject 3.375 g into the vein every 8 (eight) hours for 7 days. Remove midline with completion of ivabx 7/2 21 each 0    traMADol 50 MG Oral Tab Take 1 tablet (50 mg total) by mouth every 12 (twelve) hours as needed. 12 tablet 0    ondansetron 4 MG Oral Tablet Dispersible Take 1 tablet (4 mg total) by mouth every 8 (eight) hours as needed for Nausea. 30 tablet 0    multivitamin with minerals Oral Tab Take 1 tablet by mouth daily. 30 tablet 1    meclizine 12.5 MG Oral Tab Take 1 tablet (12.5 mg total) by mouth 3 (three) times daily as needed. 30 tablet 1    mirtazapine 30 MG Oral Tab Take 1 tablet (30 mg total) by mouth nightly. 90 tablet 1    fluticasone propionate 50 MCG/ACT Nasal Suspension 2 sprays by Each Nare route daily. 1 each 1    losartan 25 MG Oral Tab Take 1 tablet (25 mg total) by mouth daily. 90 tablet 1    albuterol (PROAIR HFA) 108 (90 Base) MCG/ACT Inhalation Aero Soln INHALE 2 PUFFS BY MOUTH EVERY 6 HOURS AS NEEDED FOR WHEEZING 8.5 g 2    SYMBICORT 160-4.5 MCG/ACT Inhalation Aerosol INHALE 2 PUFFS BY MOUTH TWICE DAILY 10.2 g 11    Cholecalciferol (VITAMIN D-3 OR) Take 25 mcg by mouth daily with breakfast.      DUPIXENT 300 MG/2ML Subcutaneous Solution Prefilled Syringe Taking every 2 weeks      TRIAMCINOLONE 0.1 % External Cream APPLY TOPICALLY TO THE  AFFECTED AREA TWICE DAILY AS NEEDED (Patient taking differently: as needed.) 60 g 3    acetaminophen 500 MG Oral Tab Take 1 tablet (500 mg total) by mouth every 6 (six) hours as needed for Pain.      DULoxetine (CYMBALTA) 30 MG Oral Cap DR Particles Take 1 capsule (30 mg total) by mouth daily. (Patient not taking: Reported on 6/26/2024) 30 capsule 0    ondansetron 4 MG Oral Tablet Dispersible Take 1 tablet (4 mg total) by mouth every 4 (four) hours as needed for Nausea. (Patient not taking: Reported on 6/26/2024) 30 tablet 0    clobetasol 0.05 % External Ointment  (Patient not taking: Reported on 6/26/2024)         HISTORY:    Past Medical History:    Abdominal distention    Abdominal pain    Acquired hypothyroidism    Anxiety    Asthma ([facility])    Belching    Bloating    Chronic rhinitis    CKD (chronic kidney disease) stage 3, GFR 30-59 ml/min ([facility])    Constipation    COPD (chronic obstructive pulmonary disease) ([facility])    NO OXYGEN     Depression    Diarrhea, unspecified    Diverticulosis of large intestine    Emphysema    Esophageal reflux    Fatigue    Feeling lonely    Flatulence/gas pain/belching    Food intolerance    H/O bronchitis    Hay fever    Hemorrhoids    High blood pressure    History of depression    Hypercholesterolemia    Hypertension    Itch of skin    Migraines    Mild intermittent asthma without complication ([facility])    Nausea    Night sweats    Pneumonia    PONV (postoperative nausea and vomiting)    Pure hypercholesterolemia    Rash    Reflux    Sleep disturbance    Stress    Uncomfortable fullness after meals    Visual impairment    glasses    Vomiting    Wears glasses     Past Surgical History:   Procedure Laterality Date    Cataract      Colonoscopy      Colonoscopy N/A 3/21/2024    Procedure: COLONOSCOPY;  Surgeon: Delon Ashford DO;  Location:  ENDOSCOPY    Colonoscopy & polypectomy  09/2014    multiple polyps; tics; repeat 3 yrs    Colonoscopy,asael hatch,snare N/A 09/22/2014     Procedure: ESOPHAGOGASTRODUODENOSCOPY, COLONOSCOPY, POSSIBLE BIOPSY, POSSIBLE POLYPECTOMY 67544,51819;  Surgeon: Slade Ivan MD;  Location: [facility]    Correct bunion,simple Bilateral     Cystotomy,excis vesical neck Left     Egd      Gastro - dmg  2014    stricture; HH    Oophorectomy Bilateral 2017    Other surgical history  2023    Robotic repair of hiatal hernia and Nissen fundoplication    Patient documented not to have experienced any of the following events N/A 2014    Procedure: ESOPHAGOGASTRODUODENOSCOPY, COLONOSCOPY, POSSIBLE BIOPSY, POSSIBLE POLYPECTOMY [id number];  Surgeon: Slade Ivan MD;  Location: [facility]    Patient withough preoperative order for iv antibiotic surgical site infection prophylaxis. N/A 2014    Procedure: ESOPHAGOGASTRODUODENOSCOPY, COLONOSCOPY, POSSIBLE BIOPSY, POSSIBLE POLYPECTOMY [id number];  Surgeon: Slade Ivan MD;  Location: [facility]    Repair ing hernia,4+y/o,reducibl      Upper gi endoscopy,diagnosis N/A 2014    Procedure: ESOPHAGOGASTRODUODENOSCOPY, COLONOSCOPY, POSSIBLE BIOPSY, POSSIBLE POLYPECTOMY 95002,30301;  Surgeon: Slade Ivan MD;  Location: [facility]     Family History   Problem Relation Age of Onset    Colon Cancer Mother     Other (Other) Mother     Other (copd) Mother     Alcohol and Other Disorders Associated Father     Other (Other) Father     Other (emph) Father     Alcohol and Other Disorders Associated Son     Hypertension Sister     Prostate Cancer Brother     Hypertension Brother      Social History     Socioeconomic History    Marital status:    Tobacco Use    Smoking status: Former     Current packs/day: 0.00     Types: Cigarettes     Start date: 1970     Quit date: 1980     Years since quittin.7    Smokeless tobacco: Never    Tobacco comments:      1/2 pack per day. Social history shows \"Tobacco Use: Active\" and \"Never Smoker: Active\"   Vaping Use     Vaping status: Never Used   Substance and Sexual Activity    Alcohol use: No    Drug use: No   Other Topics Concern    Caffeine Concern Yes     Comment: \"1 cup of coffee and tea, and can of pop daily\"    Exercise Yes     Comment: walking   Social History Narrative    ** Merged History Encounter **          Social Determinants of Health     Financial Resource Strain: Low Risk  (4/20/2023)    Financial Resource Strain     Difficulty of Paying Living Expenses: Not hard at all     Med Affordability: No   Food Insecurity: No Food Insecurity (6/18/2024)    Food Insecurity     Food Insecurity: Never true   Transportation Needs: No Transportation Needs (6/18/2024)    Transportation Needs     Lack of Transportation: No   Physical Activity: Inactive (4/21/2022)    Exercise Vital Sign     Days of Exercise per Week: 0 days     Minutes of Exercise per Session: 0 min   Stress: Stress Concern Present (2/13/2024)    Stress     Feeling of Stress : Yes    Social Connections   Housing Stability: Low Risk  (6/18/2024)    Housing Stability     Housing Instability: No         POA not on file    CODE STATUS:  DNR    ADVANCED CARE PLANNING TEAM: None    SUBJECTIVE/ ROS:       REVIEW OF SYSTEMS:  GENERAL HEALTH:fatigues easily, recent wgt loss  SKIN: denies any unusual skin lesions or rashes  WOUNDS: none   EYES:no visual complaints or deficits  HENT: denies nasal congestion, sinus pain or sore throat; and hearing loss negative  RESPIRATORY: denies shortness of breath, wheezing or cough   CARDIOVASCULAR:denies chest pain, no palpitations , denies cough  GI: denies nausea, vomiting, constipation, diarrhea; no rectal bleeding; no heartburn; had diarrhea last night, today more formed  Gu: No urinary frequency, urgency or dysuria  MUSCULOSKELETAL:no joint complaints upper or lower extremities  NEURO:no sensory or motor complaint  PSYCHE: no symptoms of depression or anxiety  HEMATOLOGY:denies bruising, denies excessive bleeding  ENDOCRINE:  denies excessive thirst or urination; denies unexpected wt gain or wt loss  ALLERGY/IMM.: hx of allergies      OBJECTIVE:     ASSESSMENT/ PHYSICAL EXAM:     VITALS:  /80   Pulse 82   Temp 98.8 °F (37.1 °C)   Resp 17   Wt 87 lb (39.5 kg)   SpO2 98%   BMI 16.99 kg/m²      PHYSICAL EXAM:  GENERAL HEALTH: well developed, thin, frail, 78 year old female,  in no apparent distress  LINES, TUBES, DRAINS:  PIC line - location UE, VALERI drain to abdomen  VALERI with pale gray brown drainage, small amount  SKIN: pale, warm, dry  WOUND: VALERI site clean and dry, no erythema, no drainage at incision  EYES: sclera anicteric, conjunctiva normal; there is no nystagmus, no drainage from eyes  HENT: normocephalic; normal nose, no nasal drainage, mucous membranes pink, moist.  NECK: supple, non tender, FROM  BREAST: deferred  RESPIRATORY:clear, no crackles, no wheezing, no dyspnea, no cough, room air  CARDIOVASCULAR: RRR, S1 and S2, no murmurs, no edema  ABDOMEN:  normal active BS+, soft, nondistended; no organomegaly, no masses; nontender, no guarding, no rebound tenderness.  :no suprapubic distension  LYMPHATIC:no lymphedema  MUSCULOSKELETAL: no acute synovitis upper or lower extremity  EXTREMITIES/VASCULAR:radial pulses 2+ and dorsalis pedal pulses 2+  NEUROLOGIC: A&OX3, no focal deficits, follows commands  PSYCHIATRIC: calm, cooperative, mood and affect appropriate to situation    Hospital and rehab lab results and imaging reviewed as available.      DIAGNOSTICS REVIEWED AT THIS VISIT:    Lab Results   Component Value Date    WBC 6.4 06/25/2024    RBC 3.13 (L) 06/25/2024    HGB 8.7 (L) 06/25/2024    HCT 26.5 (L) 06/25/2024    MCV 84.7 06/25/2024    MCH 27.8 06/25/2024    MCHC 32.8 06/25/2024    RDW 17.1 06/25/2024    .0 (H) 06/25/2024    MPV 9.4 02/28/2013     Lab Results   Component Value Date    GLU 83 06/25/2024    BUN 6 (L) 06/25/2024    BUNCREA 14 01/12/2022    CREATSERUM 0.57 06/25/2024    ANIONGAP 6 06/25/2024     GFR 53 (L) 12/12/2017    GFRNAA 65 07/14/2022    GFRAA 75 07/14/2022    CA 8.8 06/25/2024    OSMOCALC 293 06/25/2024    ALKPHO 58 06/25/2024    AST 10 (L) 06/25/2024    ALT 10 (L) 06/25/2024    BILT 0.2 06/25/2024    TP 5.5 (L) 06/25/2024    ALB 2.6 (L) 06/25/2024    GLOBULIN 2.9 06/25/2024    AGRATIO 1.9 01/12/2022     06/25/2024    K 3.5 06/25/2024     06/25/2024    CO2 25.0 06/25/2024       [facility] records reviewed.  Medication reconciliation completed.        MEDICAL DECISION MAKING and PLAN OF CARE:     SEE PLAN BELOW    Multiple pericolonic abscesses with Ecoli, acute diverticulitis  VALERI drain in place, IR to remove when < 10 ML for 2 consecutive days  IV Zosyn 3.375 gm Q8 until 7/2/24; multiple allergies  ID follow up Dr Dc as needed  Midline IV  CBC, CMP weekly  WBC wnl now, no fevers, no nausea, no vomiting  Zofran 4 mg Q8 prn nausea   Surgery follow up on 7/3/24    Hypokalemia  K 3.5 today  Repeat BMP weekly and prn    Normocytic anemia  Hgb trending down   10, 9.9, 9.8, 8.7 repeat Thursday  No bleeding noted  No lightheadedness, no dizziness    HTN, with orthostatic hypotension, HLD  VS q shift and prn  Losartan 25 mg daily    COPD/asthma  Albuterol HFA prn  Fluticasone nasal daily  Symbicort BID    Dizziness/vertigo  Meclizine 12.5 mg TID prn    Physical Deconditioning/Weakness; at risk for falling  Fall Precautions  CHUY team to establish care plan meeting with patient and POA/family as appropriate  CHUY team/ & discharge planner to assist with establishing safe discharge plan for next level of care  PT/OT evaluate and treat  DC planning with MDT, SW, therapies  Services on DC: HHC RN/PT/OT/SLP/SW  Equipment on DC: Wc/walker    Anxiety/depression  Not taking duloxetine per report  Clonazepam BID  Mirtazapine 30 mg Po QHS  Appears managed  Emotional support offered    Eczema  On dupixent q2 weeks  Clobetazole or triamcinolone prn      Pain management  Monitor and  assess pain  Tylenol 500 mg Po Q6 prn   Tramadol 50 mg PO Q12 hours prn  Offer to pre-medicate 30-60 min prior to therapy  Physiatry evaluation with management appreciated    Bowel management  Monitor for Bms  Cdiff pending from diarrhea yesterday     Vitamins/supplements as r/t deficiencies  [facility] RD to follow while in rehab; supplementation/diet as per [facility] RD  May continue home supplements  Vit D daily  MVI with minerals daily    Malnutrition  BMI 17  Dietician consult  Weekly weights  Supplements as indicated  Encourage po intake  Small meals more often  Protein and a;albumin low    DVT Prophylaxis   Encourage exercise and participation with therapy as much as able    GI prophylaxis  none    Labs  CBC, CMP weekly and prn  Repeat Thursday 6/27/24    Follow Ups:  PCP within 7 days of DC from rehab  Surgery as below July 3 Dr Friedman    Future Appointments   Date Time Provider Department Center   7/3/2024  2:00 PM Remi Friedman MD [id number]         75 min spent w/ patient and reviewing medical records, labs, completing medication reconciliation and entering orders to establish plan of care in [facility].      Note to patient: The 21st Century Cures Act makes medical notes like these available to patients in the interest of transparency. However, this is a medical document intended as peer to peer communication. It is written in medical language and may contain abbreviations or verbiage that are unfamiliar. It may appear blunt or direct. Medical documents are intended to carry relevant information, facts as evident, and the clinical opinion of the practitioner who signs the document.       Margarette Conley, APRN  06/26/24

## 2024-06-27 NOTE — PROGRESS NOTES
Bibi Diaz Author: Gigi Azul MD     1945 MRN [id number]   Last Hospital  Admission 24      Last Hospital Discharge 24 PCP Rick Shannon DO   Hospital of Discharge  [facility]        CC --admitted to [facility] from [facility] for subacute rehab pericolonic abscess, acute diverticulitis    H.P.I Bibi Diaz is a 78 year old female with past medical history significant for hypothyroidism anxiety asthma CKD 3 COPD hypertension hyperlipidemia and recent hospital admission for diverticulitis was treated with IV antibiotics transition to oral Augmentin and discharged on .  Patient developed nausea and vomiting with 1 dose of Augmentin and stopped taking it.  Her condition got worse with weakness and abdominal pain and she came back to the emergency room, she was found to be very weak.  CT of the abdomen revealed multiple pericolonic abscesses.  Patient treated with IV antibiotics, a drain was inserted by interventional radiology  Patient transferred to [facility] for IV antibiotics as well as rehab   She will be on IV antibiotics through   Discharge diagnosis  Generalized weakness  Pericolonic abscesses  Sigmoid diverticulitis  Malnutrition  Hypokalemia  Normocytic anemia  Hypertension  CKD 3  Hypothyroidism  COPD    Patient seen in her room today  She is doing well denies any complaints  No chest pain shortness of breath dizziness headache nausea vomiting diarrhea abdominal pain  She states that her abdominal pain is much better now  patient states that her bowel movements have become loose and she may be having diarrhea        Past Medical History:    Abdominal distention    Abdominal pain    Acquired hypothyroidism    Anxiety    Asthma (HCC)    Belching    Bloating    Chronic rhinitis    CKD (chronic kidney disease) stage 3, GFR 30-59 ml/min ([facility])    Constipation    COPD (chronic obstructive pulmonary disease) ([facility])    NO OXYGEN      Depression    Diarrhea, unspecified    Diverticulosis of large intestine    Emphysema    Esophageal reflux    Fatigue    Feeling lonely    Flatulence/gas pain/belching    Food intolerance    H/O bronchitis    Hay fever    Hemorrhoids    High blood pressure    History of depression    Hypercholesterolemia    Hypertension    Itch of skin    Migraines    Mild intermittent asthma without complication (HCC)    Nausea    Night sweats    Pneumonia    PONV (postoperative nausea and vomiting)    Pure hypercholesterolemia    Rash    Reflux    Sleep disturbance    Stress    Uncomfortable fullness after meals    Visual impairment    glasses    Vomiting    Wears glasses     Past Surgical History:   Procedure Laterality Date    Cataract      Colonoscopy      Colonoscopy N/A 3/21/2024    Procedure: COLONOSCOPY;  Surgeon: Delon Ashford DO;  Location:  ENDOSCOPY    Colonoscopy & polypectomy  09/2014    multiple polyps; tics; repeat 3 yrs    Colonoscopy,remv lesn,snare N/A 09/22/2014    Procedure: ESOPHAGOGASTRODUODENOSCOPY, COLONOSCOPY, POSSIBLE BIOPSY, POSSIBLE POLYPECTOMY [id number];  Surgeon: Slade Ivan MD;  Location: [facility]    Correct bunion,simple Bilateral     Cystotomy,excis vesical neck Left     Egd      Gastro - dmg  09/2014    stricture; HH    Oophorectomy Bilateral 2017    Other surgical history  02/27/2023    Robotic repair of hiatal hernia and Nissen fundoplication    Patient documented not to have experienced any of the following events N/A 09/22/2014    Procedure: ESOPHAGOGASTRODUODENOSCOPY, COLONOSCOPY, POSSIBLE BIOPSY, POSSIBLE POLYPECTOMY 07618,08066;  Surgeon: Slade Ivan MD;  Location: [facility]    Patient withough preoperative order for iv antibiotic surgical site infection prophylaxis. N/A 09/22/2014    Procedure: ESOPHAGOGASTRODUODENOSCOPY, COLONOSCOPY, POSSIBLE BIOPSY, POSSIBLE POLYPECTOMY [id number];  Surgeon: Slade Ivan MD;  Location: [facility]    Repair  ing hernia,5+y/o,reducibl      Upper gi endoscopy,diagnosis N/A 2014    Procedure: ESOPHAGOGASTRODUODENOSCOPY, COLONOSCOPY, POSSIBLE BIOPSY, POSSIBLE POLYPECTOMY 84656,35337;  Surgeon: Slade Ivan MD;  Location: [facility]     Family History   Problem Relation Age of Onset    Colon Cancer Mother     Other (Other) Mother     Other (copd) Mother     Alcohol and Other Disorders Associated Father     Other (Other) Father     Other (emph) Father     Alcohol and Other Disorders Associated Son     Hypertension Sister     Prostate Cancer Brother     Hypertension Brother      Social History     Socioeconomic History    Marital status:    Tobacco Use    Smoking status: Former     Current packs/day: 0.00     Types: Cigarettes     Start date: 1970     Quit date: 1980     Years since quittin.7    Smokeless tobacco: Never    Tobacco comments:      1/2 pack per day. Social history shows \"Tobacco Use: Active\" and \"Never Smoker: Active\"   Vaping Use    Vaping status: Never Used   Substance and Sexual Activity    Alcohol use: No    Drug use: No   Other Topics Concern    Caffeine Concern Yes     Comment: \"1 cup of coffee and tea, and can of pop daily\"    Exercise Yes     Comment: walking   Social History Narrative    ** Merged History Encounter **          Social Determinants of Health     Financial Resource Strain: Low Risk  (2023)    Financial Resource Strain     Difficulty of Paying Living Expenses: Not hard at all     Med Affordability: No   Food Insecurity: No Food Insecurity (2024)    Food Insecurity     Food Insecurity: Never true   Transportation Needs: No Transportation Needs (2024)    Transportation Needs     Lack of Transportation: No   Physical Activity: Inactive (2022)    Exercise Vital Sign     Days of Exercise per Week: 0 days     Minutes of Exercise per Session: 0 min   Stress: Stress Concern Present (2024)    Stress     Feeling of Stress : Yes    Social  Connections   Housing Stability: Low Risk  (6/18/2024)    Housing Stability     Housing Instability: No       ALLERGIES:  Allergies   Allergen Reactions    Avelox [Moxifloxacin Hcl In Nacl] SWELLING    Avelox [Moxifloxacin Hydrochloride] TONGUE SWELLING     \"TABS\"    Amoxicillin NAUSEA AND VOMITING     Vomiting.    Statins Myopathy    Sulfa Antibiotics RASH and ITCHING    Dander OTHER (SEE COMMENTS)     \"Animals\": runny nose, watery eyes, itching    Diphenhydramine Runny nose     \"Animals\": runny nose, watery eyes, itching    Dust Runny nose    Latex RASH    Sulfa Drugs Cross Reactors RASH and ITCHING       CODE STATUS:  DNR    CURRENT MEDICATIONS   Current Outpatient Medications   Medication Sig Dispense Refill    clonazePAM 0.5 MG Oral Tab Take 1 tablet (0.5 mg total) by mouth in the morning and 1 tablet (0.5 mg total) before bedtime. 28 tablet 0    dextrose 5% SOLN 100 mL with piperacillin-tazobactam 3.375 (3-0.375) g SOLR 3.375 g Inject 3.375 g into the vein every 8 (eight) hours for 7 days. Remove midline with completion of ivabx 7/2 21 each 0    traMADol 50 MG Oral Tab Take 1 tablet (50 mg total) by mouth every 12 (twelve) hours as needed. 12 tablet 0    ondansetron 4 MG Oral Tablet Dispersible Take 1 tablet (4 mg total) by mouth every 8 (eight) hours as needed for Nausea. 30 tablet 0    multivitamin with minerals Oral Tab Take 1 tablet by mouth daily. 30 tablet 1    DULoxetine (CYMBALTA) 30 MG Oral Cap DR Particles Take 1 capsule (30 mg total) by mouth daily. (Patient not taking: Reported on 6/26/2024) 30 capsule 0    meclizine 12.5 MG Oral Tab Take 1 tablet (12.5 mg total) by mouth 3 (three) times daily as needed. 30 tablet 1    ondansetron 4 MG Oral Tablet Dispersible Take 1 tablet (4 mg total) by mouth every 4 (four) hours as needed for Nausea. (Patient not taking: Reported on 6/26/2024) 30 tablet 0    mirtazapine 30 MG Oral Tab Take 1 tablet (30 mg total) by mouth nightly. 90 tablet 1    fluticasone  propionate 50 MCG/ACT Nasal Suspension 2 sprays by Each Nare route daily. 1 each 1    losartan 25 MG Oral Tab Take 1 tablet (25 mg total) by mouth daily. 90 tablet 1    albuterol (PROAIR HFA) 108 (90 Base) MCG/ACT Inhalation Aero Soln INHALE 2 PUFFS BY MOUTH EVERY 6 HOURS AS NEEDED FOR WHEEZING 8.5 g 2    SYMBICORT 160-4.5 MCG/ACT Inhalation Aerosol INHALE 2 PUFFS BY MOUTH TWICE DAILY 10.2 g 11    Cholecalciferol (VITAMIN D-3 OR) Take 25 mcg by mouth daily with breakfast.      DUPIXENT 300 MG/2ML Subcutaneous Solution Prefilled Syringe Taking every 2 weeks      clobetasol 0.05 % External Ointment  (Patient not taking: Reported on 6/26/2024)      TRIAMCINOLONE 0.1 % External Cream APPLY TOPICALLY TO THE AFFECTED AREA TWICE DAILY AS NEEDED (Patient taking differently: as needed.) 60 g 3    acetaminophen 500 MG Oral Tab Take 1 tablet (500 mg total) by mouth every 6 (six) hours as needed for Pain.         REVIEW OF SYSTEMS:  Review of Systems:   Constitutional: No fevers, chills, fatigue or night sweats.  ENT: No mouth pain, neck pain, running nose, headaches or swollen glands.  Skin: No rashes, pruritus or skin changes,  Respiratory: Denies cough, wheezing or shortness of breath.  CV: Denies chest pain, palpitations, orthopnea, PND or dizziness.  Musculoskeletal: No joint pain, stiffness or swelling.  GI: No nausea, vomiting or diarrhea. No blood in stools.  Neurologic: No seizures, tremors, weakness or numbness    VITALS: Blood pressure 140/80 pulse 82/min temperature 98.3 respiration 18/min and her weight is 87 pounds    PHYSICAL EXAM:  GENERAL: well developed, poorly nourished thinly built, in no apparent distress  SKIN: no rashes, no suspicious lesions  Wound; VALERI drain left lower quadrant with drainage  HEENT: atraumatic, normocephalic,   EYES: PERRLA, EOMI, conjunctiva are clear  NECK: supple, no adenopathy, no bruits  CHEST: no chest tenderness  LUNGS: clear to auscultation  CARDIO: RRR without murmur  GI: good  BS's, no masses, HSM or tenderness  : deferred  RECTAL: deferred  MUSCULOSKELETAL: back is not tender, FROM of the back  EXTREMITIES: no cyanosis, clubbing or edema  NEURO: Oriented times three, cranial nerves are intact, motor and sensory are grossly intact      DIAGNOSTICS REVIEWED AT THIS VISIT:  Lab Results   Component Value Date    GLU 83 06/25/2024    BUN 6 (L) 06/25/2024    BUNCREA 14 01/12/2022    CREATSERUM 0.57 06/25/2024    ANIONGAP 6 06/25/2024    GFR 53 (L) 12/12/2017    GFRNAA 65 07/14/2022    GFRAA 75 07/14/2022    CA 8.8 06/25/2024    OSMOCALC 293 06/25/2024    ALKPHO 58 06/25/2024    AST 10 (L) 06/25/2024    ALT 10 (L) 06/25/2024    BILT 0.2 06/25/2024    TP 5.5 (L) 06/25/2024    ALB 2.6 (L) 06/25/2024    GLOBULIN 2.9 06/25/2024    AGRATIO 1.9 01/12/2022     06/25/2024    K 3.5 06/25/2024     06/25/2024    CO2 25.0 06/25/2024       Lab Results   Component Value Date    WBC 6.4 06/25/2024    RBC 3.13 (L) 06/25/2024    HGB 8.7 (L) 06/25/2024    HCT 26.5 (L) 06/25/2024    .0 (H) 06/25/2024    MPV 9.4 02/28/2013    MCV 84.7 06/25/2024    MCH 27.8 06/25/2024    MCHC 32.8 06/25/2024    RDW 17.1 06/25/2024    NEPRELIM 4.06 06/25/2024    NEPERCENT 63.4 06/25/2024    LYPERCENT 19.2 06/25/2024    MOPERCENT 12.8 06/25/2024    EOPERCENT 3.4 06/25/2024    BAPERCENT 0.9 06/25/2024    NE 4.06 06/25/2024    LYMABS 1.23 06/25/2024    MOABSO 0.82 06/25/2024    EOABSO 0.22 06/25/2024    BAABSO 0.06 06/25/2024     ASSESSMENT AND PLAN:      Diagnoses and all orders for this visit:    Pericolonic abscess due to diverticulitis  IV antibiotics through the PICC line, Zosyn until 7/2  VALERI drain in place,  Follow-up with interventional radiology, for drainage less than 10 mL 1 2 complicated days for removal of VALERI drain  Hypokalemia  Monitor potassium  Check CMP tomorrow  Normocytic anemia  Check weekly CBC  Primary hypertension  Check blood pressures every shift  Continue losartan  Chronic obstructive  pulmonary disease, unspecified COPD type (HCC)  Symbicort  Albuterol every 4 hours as needed  Physical deconditioning  PT to work on ambulation, gait, balance, strength, endurance, transfers, safety  - OT to work on fine motor skills, ADLs, hygiene, toileting, transfers, safety  Moderate protein-calorie malnutrition (HCC)  Dietitian on consult  Weekly weights  Boost with each meal   we will continue to monitor her albumin      -   - 24h nursing for medication administration, bowel/bladder care, pain/sleep assessment  - Physician supervision for multiple medical comorbidities, fall risk, DVT risk, infection risk, pain management  - Psych for adjustment to disability and cognitive deficits  - Social work/: for discharge planning needs and access to community resources as needed     -Hospital medications reviewed reconciled and restarted   Check the labs in the morning, CBC CMP TSH, vitamin D, UA    Time spent at appointment today is 95 minutes including preparing to see patient, reviewing test results, performing medically appropriate examination and evaluation and coordinating care, counseling and educating patient/family, ordering medications and testing, and documenting clinical information in EMR.       Gigi Azul MD   [facility]  [address] [place]. [address] [place] [phone]    Electronically signed      This dictation was performed with a verbal recognition program (DRAGON) and it was checked for errors. It is possible that there are still dictated errors within this office note. If so, please bring any errors to my attention for an addendum. All efforts were made to ensure that this office note is accurate

## 2024-07-02 ENCOUNTER — SNF VISIT (OUTPATIENT)
Dept: INTERNAL MEDICINE CLINIC | Age: 79
End: 2024-07-02

## 2024-07-02 VITALS
SYSTOLIC BLOOD PRESSURE: 157 MMHG | RESPIRATION RATE: 16 BRPM | DIASTOLIC BLOOD PRESSURE: 76 MMHG | TEMPERATURE: 97 F | OXYGEN SATURATION: 96 % | HEART RATE: 67 BPM

## 2024-07-02 DIAGNOSIS — H92.02 LEFT EAR PAIN: Primary | ICD-10-CM

## 2024-07-02 DIAGNOSIS — R53.1 WEAKNESS GENERALIZED: ICD-10-CM

## 2024-07-02 DIAGNOSIS — K57.92 DIVERTICULITIS: ICD-10-CM

## 2024-07-02 DIAGNOSIS — E78.2 MIXED HYPERLIPIDEMIA: ICD-10-CM

## 2024-07-02 DIAGNOSIS — J44.9 CHRONIC OBSTRUCTIVE PULMONARY DISEASE, UNSPECIFIED COPD TYPE (HCC): ICD-10-CM

## 2024-07-02 DIAGNOSIS — I15.9 SECONDARY HYPERTENSION: ICD-10-CM

## 2024-07-02 DIAGNOSIS — E44.0 MODERATE PROTEIN-CALORIE MALNUTRITION (HCC): ICD-10-CM

## 2024-07-02 DIAGNOSIS — K57.20 COLONIC DIVERTICULAR ABSCESS: ICD-10-CM

## 2024-07-02 PROCEDURE — 3078F DIAST BP <80 MM HG: CPT | Performed by: NURSE PRACTITIONER

## 2024-07-02 PROCEDURE — 3077F SYST BP >= 140 MM HG: CPT | Performed by: NURSE PRACTITIONER

## 2024-07-02 PROCEDURE — 99309 SBSQ NF CARE MODERATE MDM 30: CPT | Performed by: NURSE PRACTITIONER

## 2024-07-02 PROCEDURE — 1160F RVW MEDS BY RX/DR IN RCRD: CPT | Performed by: NURSE PRACTITIONER

## 2024-07-02 PROCEDURE — 1159F MED LIST DOCD IN RCRD: CPT | Performed by: NURSE PRACTITIONER

## 2024-07-02 PROCEDURE — 1111F DSCHRG MED/CURRENT MED MERGE: CPT | Performed by: NURSE PRACTITIONER

## 2024-07-02 NOTE — PROGRESS NOTES
Bibi Diaz, 7/14/1945, 78 year old, female    Chief Complaint:    Chief Complaint   Patient presents with    Follow - Up     Poor appetite, left ear pain, diverticulitis, pericolonic abscess         Subjective:   TODAY:    Patient seen today for aprn follow up visit. Patient seen laying in bed sleeping. Patient reports she is not feeling well today, has been feeling weaker and tired as she has had not much of an appetite. Also c/o left ear discomfort that started last week. Left ear pain is mild and intermittent, feels as if there is an object stuck in her ear. Denies drainage, tinnitus, and hearing loss. She also states she is now eating a regular diet but doesn't eat as much because certain foods can cause her too much bloating and dizziness. Ate vegetables and a doughnut last week and she felt too full and felt like she couldn't digest properly. It appears she can only tolerate soft bland foods such as scrambled eggs, toast, chicken, and mashed potatoes. Encouraged patient to eat tolerable foods for now and continue eating smaller but more frequent meals throughout the day. Patient is agreeable. Denies any nausea or abdominal pain with meals and reports her diarrhea has resolved. Having regular BM's now. Sleeping OK throughout the night. Denies fever/chills, cough, SOB, dyspnea, angina, palpitations, n/v, diarrhea, constipation, and urinary sxs. Has follow up with surgery tomorrow.      Objective:  /76   Pulse 67   Temp 97.2 °F (36.2 °C)   Resp 16   SpO2 96%     PHYSICAL EXAM:  GENERAL HEALTH: well developed, well nourished, in no apparent distress  LINES, TUBES, DRAINS:  PIC line - location LUE , VALERI drain  SKIN: pale, warm, dry  WOUND: VALERI site without drainage or erythema. No VALERI output seen.   EYES:  sclera anicteric, conjunctiva normal; there is no nystagmus, no drainage from eyes  HENT:  normocephalic; normal nose, no nasal drainage, mucous membranes pink, moist. L tympanic membrane visible,  intact, grey, and translucent without erythema or drainage, ear canal free without any foreign material. R tympanic membrane intact, translucent, grey, no erythema or drainage.   NECK:  supple, non tender, FROM  BREAST:  deferred  RESPIRATORY: clear, no crackles, no wheezing, no dyspnea, no cough, room air  CARDIOVASCULAR: RRR, S1 and S2, no murmurs, no edema  ABDOMEN:  normal active BS+, soft, nondistended; no organomegaly, no masses; nontender, no guarding, no rebound tenderness.  :no suprapubic distension  LYMPHATIC:no lymphedema  MUSCULOSKELETAL: no acute synovitis upper or lower extremity  EXTREMITIES/VASCULAR:no cyanosis, clubbing or edema, radial pulses 2+, and dorsalis pedal pulses 2+  NEUROLOGIC: A&OX3, no focal deficits, follows commands  PSYCHIATRIC: calm, cooperative, mood and affect appropriate to situation      Therapy update:  Transfers: supervision/independent   ADLS:  supervision/independent   Ambulation:  supervision/independent   DC plan: pending     Medications reviewed: Yes      Current Outpatient Medications:     clonazePAM 0.5 MG Oral Tab, Take 1 tablet (0.5 mg total) by mouth in the morning and 1 tablet (0.5 mg total) before bedtime., Disp: 28 tablet, Rfl: 0    dextrose 5% SOLN 100 mL with piperacillin-tazobactam 3.375 (3-0.375) g SOLR 3.375 g, Inject 3.375 g into the vein every 8 (eight) hours for 7 days. Remove midline with completion of ivabx 7/2, Disp: 21 each, Rfl: 0    traMADol 50 MG Oral Tab, Take 1 tablet (50 mg total) by mouth every 12 (twelve) hours as needed., Disp: 12 tablet, Rfl: 0    ondansetron 4 MG Oral Tablet Dispersible, Take 1 tablet (4 mg total) by mouth every 8 (eight) hours as needed for Nausea., Disp: 30 tablet, Rfl: 0    multivitamin with minerals Oral Tab, Take 1 tablet by mouth daily., Disp: 30 tablet, Rfl: 1    meclizine 12.5 MG Oral Tab, Take 1 tablet (12.5 mg total) by mouth 3 (three) times daily as needed., Disp: 30 tablet, Rfl: 1    mirtazapine 30 MG Oral Tab,  Take 1 tablet (30 mg total) by mouth nightly., Disp: 90 tablet, Rfl: 1    fluticasone propionate 50 MCG/ACT Nasal Suspension, 2 sprays by Each Nare route daily., Disp: 1 each, Rfl: 1    losartan 25 MG Oral Tab, Take 1 tablet (25 mg total) by mouth daily., Disp: 90 tablet, Rfl: 1    albuterol (PROAIR HFA) 108 (90 Base) MCG/ACT Inhalation Aero Soln, INHALE 2 PUFFS BY MOUTH EVERY 6 HOURS AS NEEDED FOR WHEEZING, Disp: 8.5 g, Rfl: 2    SYMBICORT 160-4.5 MCG/ACT Inhalation Aerosol, INHALE 2 PUFFS BY MOUTH TWICE DAILY, Disp: 10.2 g, Rfl: 11    Cholecalciferol (VITAMIN D-3 OR), Take 25 mcg by mouth daily with breakfast., Disp: , Rfl:     TRIAMCINOLONE 0.1 % External Cream, APPLY TOPICALLY TO THE AFFECTED AREA TWICE DAILY AS NEEDED (Patient taking differently: as needed.), Disp: 60 g, Rfl: 3    acetaminophen 500 MG Oral Tab, Take 1 tablet (500 mg total) by mouth every 6 (six) hours as needed for Pain., Disp: , Rfl:     DULoxetine (CYMBALTA) 30 MG Oral Cap DR Particles, Take 1 capsule (30 mg total) by mouth daily. (Patient not taking: Reported on 6/26/2024), Disp: 30 capsule, Rfl: 0    ondansetron 4 MG Oral Tablet Dispersible, Take 1 tablet (4 mg total) by mouth every 4 (four) hours as needed for Nausea. (Patient not taking: Reported on 6/26/2024), Disp: 30 tablet, Rfl: 0    DUPIXENT 300 MG/2ML Subcutaneous Solution Prefilled Syringe, Taking every 2 weeks, Disp: , Rfl:     clobetasol 0.05 % External Ointment, , Disp: , Rfl:       Diagnostics reviewed:    Lab Results   Component Value Date    WBC 5.7 07/01/2024    RBC 3.31 (L) 07/01/2024    HGB 9.3 (L) 07/01/2024    HCT 29.6 (L) 07/01/2024    MCV 89.4 07/01/2024    MCH 28.1 07/01/2024    MCHC 31.4 07/01/2024    RDW 18.6 07/01/2024    .0 07/01/2024    MPV 9.4 02/28/2013     Lab Results   Component Value Date    GLU 83 07/01/2024    BUN 5 (L) 07/01/2024    BUNCREA 14 01/12/2022    CREATSERUM 0.79 07/01/2024    ANIONGAP 6 07/01/2024    GFR 53 (L) 12/12/2017    GFRNAA 65  07/14/2022    GFRAA 75 07/14/2022    CA 9.1 07/01/2024    OSMOCALC 290 07/01/2024    ALKPHO 58 06/25/2024    AST 10 (L) 06/25/2024    ALT 10 (L) 06/25/2024    BILT 0.2 06/25/2024    TP 5.5 (L) 06/25/2024    ALB 2.6 (L) 06/25/2024    GLOBULIN 2.9 06/25/2024    AGRATIO 1.9 01/12/2022     07/01/2024    K 3.8 07/01/2024     07/01/2024    CO2 24.0 07/01/2024           Assessment and plan:    L ear pain   Intermittent mild pain  Unclear etiology   Normal physical ear exam, TM wnl   Tylenol prn for pain management  Monitor     Multiple pericolonic abscesses with Ecoli, acute diverticulitis  VALERI drain in place, IR to remove when < 10 ML for 2 consecutive days  IV Zosyn 3.375 gm Q8 until 7/2/24; multiple allergies  ID follow up Dr Dc as needed  Midline IV  CBC, CMP weekly  WBC wnl now, no fevers, no nausea, no vomiting  Zofran 4 mg Q8 prn nausea   Surgery follow up on 7/3/24     Hypokalemia  Repeat 3.8 WNL today   Repeat BMP weekly and prn     Normocytic anemia  Hgb trending down   10, 9.9, 9.8, 8.7. Hgb 9.3 now - stable  No bleeding noted  No lightheadedness, no dizziness  Monitor      HTN, with orthostatic hypotension, HLD  VS q shift and prn  Losartan 25 mg daily     COPD/asthma  Albuterol HFA prn  Fluticasone nasal daily  Symbicort BID     Dizziness/vertigo  Meclizine 12.5 mg TID prn     Physical Deconditioning/Weakness; at risk for falling  Fall Precautions  [facility] team to establish care plan meeting with patient and POA/family as appropriate  [facility] team/ & discharge planner to assist with establishing safe discharge plan for next level of care  PT/OT evaluate and treat  DC planning with MDT, SW, therapies  Services on DC: HHC RN/PT/OT/SLP/SW  Equipment on DC: Wc/walker     Anxiety/depression  Not taking duloxetine per report  Clonazepam BID  Mirtazapine 30 mg Po QHS  Appears managed  Emotional support offered     Eczema  On dupixent q2 weeks  Clobetazole or triamcinolone prn     Pain  management  Monitor and assess pain  Tylenol 500 mg Po Q6 prn   Tramadol 50 mg PO Q12 hours prn  Offer to pre-medicate 30-60 min prior to therapy  Physiatry evaluation with management appreciated     Bowel management  Monitor for Bms  Cdiff negative      Vitamins/supplements as r/t deficiencies  [facility] RD to follow while in rehab; supplementation/diet as per [facility] RD  May continue home supplements  Vit D daily  MVI with minerals daily     Malnutrition  BMI 17  Dietician consult  Weekly weights  Supplements as indicated  Encourage po intake  Encourage more tolerable foods - eggs, chicken, plain greek yogurt,etc.  Small meals more often  Protein and albumin low  Monitor      DVT Prophylaxis   Encourage exercise and participation with therapy as much as able     GI prophylaxis  none     Labs  CBC, CMP weekly and prn  Labs Monday 7/8      Follow Ups:  PCP within 7 days of DC from rehab  Surgery as below July 3 Dr Friedman for follow up    Future Appointments   Date Time Provider Department Center   7/3/2024  2:00 PM Remi Friedman MD [id number]        *Established patient; follow-up moderately complex visit/ greater than 30     32 minutes spent w/ patient and staff, including but not limited to/ reviewing present status, needs, abilities with disciplines, reviewing medical records, vital signs, labs, completing medication reconciliation and entering orders for continued care in [facility].    Note to patient: The 21st Century Cures Act makes medical notes like these available to patients in the interest of transparency. However, this is a medical document intended as peer to peer communication. It is written in medical language and may contain abbreviations or verbiage that are unfamiliar. It may appear blunt or direct. Medical documents are intended to carry relevant information, facts as evident, and the clinical opinion of the practitioner who signs the document.    Margarette Conley, APRN  7/2/2024

## 2024-07-03 ENCOUNTER — OFFICE VISIT (OUTPATIENT)
Facility: LOCATION | Age: 79
End: 2024-07-03
Payer: COMMERCIAL

## 2024-07-03 VITALS — OXYGEN SATURATION: 97 % | HEART RATE: 74 BPM | TEMPERATURE: 98 F

## 2024-07-03 DIAGNOSIS — N73.9 PELVIC ABSCESS IN FEMALE: Primary | ICD-10-CM

## 2024-07-03 NOTE — PROGRESS NOTES
Follow Up Visit Note       Active Problems      1. Pelvic abscess in female          Chief Complaint   Chief Complaint   Patient presents with    [facility] Care     EP - Paraesophageal hiatal hernia 4/5/24, CT ABDOMEN+PELVIS (CONTRAST ONLY) 6/21/24,  CT DRAIN ABSCESS PERITONEAL 6/18/24, XR CHEST AP PORTABLE 6/19/24, HEADACHE, DIZZINESS, ABDOMINAL PAIN, NO OTHER SYMPTOMS.         History of Present Illness    Patient presents for follow-up after recent hospitalization for pelvic abscess and IR drainage.  Repeat CT scan shows resolution of main abscess however 2 other smaller collections persist.  The patient has now completed antibiotics and has minimal drain output.  Patient is here for care management strategies.    The patient denies fever, chills, chest pain, shortness of breath, dyspnea. The patient also denies hematemesis, melena, or hematochezia. The patient denies change in bowel or bladder habits. There is no complaint of hematuria or dysuria.    Allergies  Bibi is allergic to avelox [moxifloxacin hcl in nacl], avelox [moxifloxacin hydrochloride], amoxicillin, statins, sulfa antibiotics, dander, diphenhydramine, dust, latex, and sulfa drugs cross reactors.    Past Medical / Surgical / Social / Family History    The past medical and past surgical history have been reviewed by me today.    Past Medical History:    Abdominal distention    Abdominal pain    Acquired hypothyroidism    Anxiety    Asthma (HCC)    Belching    Bloating    Chronic rhinitis    CKD (chronic kidney disease) stage 3, GFR 30-59 ml/min (HCC)    Constipation    COPD (chronic obstructive pulmonary disease) ([facility])    NO OXYGEN     Depression    Diarrhea, unspecified    Diverticulosis of large intestine    Emphysema    Esophageal reflux    Fatigue    Feeling lonely    Flatulence/gas pain/belching    Food intolerance    H/O bronchitis    Hay fever    Hemorrhoids    High blood pressure    History of depression    Hypercholesterolemia     Hypertension    Itch of skin    Migraines    Mild intermittent asthma without complication (HCC)    Nausea    Night sweats    Pneumonia    PONV (postoperative nausea and vomiting)    Pure hypercholesterolemia    Rash    Reflux    Sleep disturbance    Stress    Uncomfortable fullness after meals    Visual impairment    glasses    Vomiting    Wears glasses     Past Surgical History:   Procedure Laterality Date    Cataract      Colonoscopy      Colonoscopy N/A 3/21/2024    Procedure: COLONOSCOPY;  Surgeon: Delon Ashford DO;  Location:  ENDOSCOPY    Colonoscopy & polypectomy  09/2014    multiple polyps; tics; repeat 3 yrs    Colonoscopy,remv lesn,snare N/A 09/22/2014    Procedure: ESOPHAGOGASTRODUODENOSCOPY, COLONOSCOPY, POSSIBLE BIOPSY, POSSIBLE POLYPECTOMY [id number];  Surgeon: Slade Ivan MD;  Location: [facility]    Correct bunion,simple Bilateral     Cystotomy,excis vesical neck Left     Egd      Gastro - dmg  09/2014    stricture; HH    Oophorectomy Bilateral 2017    Other surgical history  02/27/2023    Robotic repair of hiatal hernia and Nissen fundoplication    Patient documented not to have experienced any of the following events N/A 09/22/2014    Procedure: ESOPHAGOGASTRODUODENOSCOPY, COLONOSCOPY, POSSIBLE BIOPSY, POSSIBLE POLYPECTOMY 45553,12312;  Surgeon: Slade Ivan MD;  Location: [facility]    Patient withough preoperative order for iv antibiotic surgical site infection prophylaxis. N/A 09/22/2014    Procedure: ESOPHAGOGASTRODUODENOSCOPY, COLONOSCOPY, POSSIBLE BIOPSY, POSSIBLE POLYPECTOMY 59386,08216;  Surgeon: Slade Ivan MD;  Location: [facility]    Repair ing hernia,4+y/o,reducibl      Upper gi endoscopy,diagnosis N/A 09/22/2014    Procedure: ESOPHAGOGASTRODUODENOSCOPY, COLONOSCOPY, POSSIBLE BIOPSY, POSSIBLE POLYPECTOMY [id number],[id number];  Surgeon: Slade Ivan MD;  Location: [facility]       The family history and social history have been reviewed by  me today.    Family History   Problem Relation Age of Onset    Colon Cancer Mother     Other (Other) Mother     Other (copd) Mother     Alcohol and Other Disorders Associated Father     Other (Other) Father     Other (emph) Father     Alcohol and Other Disorders Associated Son     Hypertension Sister     Prostate Cancer Brother     Hypertension Brother      Social History     Socioeconomic History    Marital status:    Tobacco Use    Smoking status: Former     Current packs/day: 0.00     Types: Cigarettes     Start date: 1970     Quit date: 1980     Years since quittin.8    Smokeless tobacco: Never    Tobacco comments:      1/2 pack per day. Social history shows \"Tobacco Use: Active\" and \"Never Smoker: Active\"   Vaping Use    Vaping status: Never Used   Substance and Sexual Activity    Alcohol use: No    Drug use: No   Other Topics Concern    Caffeine Concern Yes     Comment: \"1 cup of coffee and tea, and can of pop daily\"    Exercise Yes     Comment: walking        Current Outpatient Medications:     clonazePAM 0.5 MG Oral Tab, Take 1 tablet (0.5 mg total) by mouth in the morning and 1 tablet (0.5 mg total) before bedtime., Disp: 28 tablet, Rfl: 0    traMADol 50 MG Oral Tab, Take 1 tablet (50 mg total) by mouth every 12 (twelve) hours as needed., Disp: 12 tablet, Rfl: 0    ondansetron 4 MG Oral Tablet Dispersible, Take 1 tablet (4 mg total) by mouth every 8 (eight) hours as needed for Nausea., Disp: 30 tablet, Rfl: 0    multivitamin with minerals Oral Tab, Take 1 tablet by mouth daily., Disp: 30 tablet, Rfl: 1    meclizine 12.5 MG Oral Tab, Take 1 tablet (12.5 mg total) by mouth 3 (three) times daily as needed., Disp: 30 tablet, Rfl: 1    mirtazapine 30 MG Oral Tab, Take 1 tablet (30 mg total) by mouth nightly., Disp: 90 tablet, Rfl: 1    fluticasone propionate 50 MCG/ACT Nasal Suspension, 2 sprays by Each Nare route daily., Disp: 1 each, Rfl: 1    losartan 25 MG Oral Tab, Take 1 tablet (25 mg  total) by mouth daily., Disp: 90 tablet, Rfl: 1    albuterol (PROAIR HFA) 108 (90 Base) MCG/ACT Inhalation Aero Soln, INHALE 2 PUFFS BY MOUTH EVERY 6 HOURS AS NEEDED FOR WHEEZING, Disp: 8.5 g, Rfl: 2    SYMBICORT 160-4.5 MCG/ACT Inhalation Aerosol, INHALE 2 PUFFS BY MOUTH TWICE DAILY, Disp: 10.2 g, Rfl: 11    Cholecalciferol (VITAMIN D-3 OR), Take 25 mcg by mouth daily with breakfast., Disp: , Rfl:     DUPIXENT 300 MG/2ML Subcutaneous Solution Prefilled Syringe, Taking every 2 weeks, Disp: , Rfl:     TRIAMCINOLONE 0.1 % External Cream, APPLY TOPICALLY TO THE AFFECTED AREA TWICE DAILY AS NEEDED (Patient taking differently: as needed.), Disp: 60 g, Rfl: 3    acetaminophen 500 MG Oral Tab, Take 1 tablet (500 mg total) by mouth every 6 (six) hours as needed for Pain., Disp: , Rfl:      Review of Systems  The Review of Systems has been reviewed by me during today.  Review of Systems   Constitutional: Negative.    HENT: Negative.     Eyes: Negative.    Respiratory: Negative.     Cardiovascular: Negative.    Gastrointestinal: Negative.    Genitourinary: Negative.    Musculoskeletal: Negative.    Skin: Negative.    Neurological: Negative.    Psychiatric/Behavioral: Negative.          Physical Findings   Pulse 74   Temp 98.1 °F (36.7 °C) (Temporal)   SpO2 97%   Physical Exam  Vitals and nursing note reviewed.   Constitutional:       General: She is not in acute distress.     Appearance: Normal appearance. She is well-developed.   HENT:      Head: Normocephalic and atraumatic.   Eyes:      General: No scleral icterus.     Conjunctiva/sclera: Conjunctivae normal.   Neck:      Trachea: No tracheal deviation.   Cardiovascular:      Rate and Rhythm: Normal rate and regular rhythm.      Heart sounds: S1 normal and S2 normal. No murmur heard.  Pulmonary:      Effort: No accessory muscle usage or respiratory distress.      Breath sounds: No decreased breath sounds, wheezing, rhonchi or rales.   Abdominal:      General: There is no  distension or abdominal bruit.      Palpations: Abdomen is soft. Abdomen is not rigid. There is no shifting dullness, fluid wave, hepatomegaly, splenomegaly, mass or pulsatile mass.      Tenderness: There is no abdominal tenderness. There is no guarding or rebound. Negative signs include Acevedo's sign and McBurney's sign.      Hernia: There is no hernia in the umbilical area or ventral area.   Skin:     General: Skin is warm and dry.   Neurological:      Mental Status: She is alert and oriented to person, place, and time.   Psychiatric:         Speech: Speech normal.         Behavior: Behavior normal.         Thought Content: Thought content normal.         Judgment: Judgment normal.          Assessment   1. Pelvic abscess in female        Plan     The patient is overall improving following IR drainage of pelvic abscess.  Antibiotic regimen complete.  Minimal drain output.  I have ordered repeat CT scan with injection through existing catheter to assess the abscess cavities.  If favorable, drain can be removed and continue with current management.  If infectious process persists, patient will return to my attention to discuss surgical options    The patient was provided ample opportunity to ask questions.  All of the patient's questions were answered in detail.  The patient voiced understanding of the care plan.     No orders of the defined types were placed in this encounter.      Imaging & Referrals   CT ABSCESS INJECTION VIA EXISTING CATH (CPT=76080/14470)    Follow Up  No follow-ups on file.    Remi Friedman MD

## 2024-07-05 ENCOUNTER — SNF DISCHARGE (OUTPATIENT)
Dept: INTERNAL MEDICINE CLINIC | Age: 79
End: 2024-07-05

## 2024-07-05 VITALS
RESPIRATION RATE: 17 BRPM | WEIGHT: 84.63 LBS | OXYGEN SATURATION: 97 % | DIASTOLIC BLOOD PRESSURE: 74 MMHG | SYSTOLIC BLOOD PRESSURE: 103 MMHG | BODY MASS INDEX: 17 KG/M2 | HEART RATE: 73 BPM | TEMPERATURE: 97 F

## 2024-07-05 DIAGNOSIS — R53.1 WEAKNESS GENERALIZED: ICD-10-CM

## 2024-07-05 DIAGNOSIS — J44.9 CHRONIC OBSTRUCTIVE PULMONARY DISEASE, UNSPECIFIED COPD TYPE (HCC): ICD-10-CM

## 2024-07-05 DIAGNOSIS — I15.9 SECONDARY HYPERTENSION: ICD-10-CM

## 2024-07-05 DIAGNOSIS — K57.92 DIVERTICULITIS: Primary | ICD-10-CM

## 2024-07-05 DIAGNOSIS — E44.0 MODERATE PROTEIN-CALORIE MALNUTRITION (HCC): ICD-10-CM

## 2024-07-05 DIAGNOSIS — K57.20 COLONIC DIVERTICULAR ABSCESS: ICD-10-CM

## 2024-07-05 DIAGNOSIS — J44.0 CHRONIC OBSTRUCTIVE PULMONARY DISEASE WITH ACUTE LOWER RESPIRATORY INFECTION (HCC): ICD-10-CM

## 2024-07-05 DIAGNOSIS — E78.2 MIXED HYPERLIPIDEMIA: ICD-10-CM

## 2024-07-05 PROCEDURE — 1159F MED LIST DOCD IN RCRD: CPT | Performed by: NURSE PRACTITIONER

## 2024-07-05 PROCEDURE — 3078F DIAST BP <80 MM HG: CPT | Performed by: NURSE PRACTITIONER

## 2024-07-05 PROCEDURE — 1111F DSCHRG MED/CURRENT MED MERGE: CPT | Performed by: NURSE PRACTITIONER

## 2024-07-05 PROCEDURE — 99316 NF DSCHRG MGMT 30 MIN+: CPT | Performed by: NURSE PRACTITIONER

## 2024-07-05 PROCEDURE — 1160F RVW MEDS BY RX/DR IN RCRD: CPT | Performed by: NURSE PRACTITIONER

## 2024-07-05 PROCEDURE — 3074F SYST BP LT 130 MM HG: CPT | Performed by: NURSE PRACTITIONER

## 2024-07-05 NOTE — PROGRESS NOTES
Bibi Diaz, 7/14/1945, 78 year old, female is being discharged from Facility: [facility]      DISCHARGE SUMMARY    Date of Admission:6/24/24    Date of Discharge:7/6/24                                 Admitting Diagnoses:  Diverticulitis of colon  Colonic diverticular abscesses/perforation  Malnutrition  Weakness  Diarrhea    Subjective:   Patient seen today for aprn follow up visit for discharge. Patient was seen ambulating in the [place] independently with her walker. Patient did report seeing her surgeon on Wednesday, states she will be needing a repeat CT scan in about two weeks. Reports she has been doing OK, still not eating a lot of food. She is ready to go home tomorrow and is excited to be able to go back to her own routine and enjoy her own cooked food. VALERI drain maintained with loose dressing. DW RN to replace this afternoon. Denies fatigue, lightheadedness, SOB, chest pain, cough, palpitations, abdominal pain, nausea, vomiting, urinary, or bowel complaints.    Vital signs:  /74   Pulse 73   Temp 96.6 °F (35.9 °C)   Resp 17   Wt 84 lb 9.6 oz (38.4 kg)   SpO2 97%   BMI 16.52 kg/m²     ROS and Physical Exam:    GENERAL HEALTH: well developed, well nourished, in no apparent distress  LINES, TUBES, DRAINS:  PIC line - location LUE , VALERI drain with loose tegaderm dressing, with milky brown output noted   SKIN: pale, warm, dry  WOUND: VALERI site without drainage or erythema. No VALERI output seen.   EYES:  sclera anicteric, conjunctiva normal; there is no nystagmus, no drainage from eyes  HENT:  normocephalic; normal nose, no nasal drainage, mucous membranes pink, moist. L tympanic membrane visible, intact, grey, and translucent without erythema or drainage, ear canal free without any foreign material. R tympanic membrane intact, translucent, grey, no erythema or drainage.   NECK:  supple, non tender, FROM  BREAST:  deferred  RESPIRATORY: clear, no crackles, no wheezing, no dyspnea,  no cough, room air  CARDIOVASCULAR: RRR, S1 and S2, no murmurs, no edema  ABDOMEN:  normal active BS+, soft, nondistended; no organomegaly, no masses; nontender, no guarding, no rebound tenderness.  :no suprapubic distension  LYMPHATIC:no lymphedema  MUSCULOSKELETAL: no acute synovitis upper or lower extremity  EXTREMITIES/VASCULAR:no cyanosis, clubbing or edema, radial pulses 2+, and dorsalis pedal pulses 2+  NEUROLOGIC: A&OX3, no focal deficits, follows commands  PSYCHIATRIC: calm, cooperative, mood and affect appropriate to situation    Therapy Updates:   Ambulation: Independent   ADLs/transfers: Independent   DC Plan: Home with Residential      Skilled Nursing Facility Course:    Bibi admitted to rehab without any readmissions.  Progressing well with PT/OT, ambulating independently   Medically cleared for anticipated discharge  Home with [facility] [place] care services RN/PT/OT/ST/Bath Aide  Equipment per PT recommendations: walker    Most recent lab results:  Lab Results   Component Value Date    WBC 5.7 07/01/2024    RBC 3.31 (L) 07/01/2024    HGB 9.3 (L) 07/01/2024    HCT 29.6 (L) 07/01/2024    MCV 89.4 07/01/2024    MCH 28.1 07/01/2024    MCHC 31.4 07/01/2024    RDW 18.6 07/01/2024    .0 07/01/2024    MPV 9.4 02/28/2013     Lab Results   Component Value Date    GLU 83 07/01/2024    BUN 5 (L) 07/01/2024    BUNCREA 14 01/12/2022    CREATSERUM 0.79 07/01/2024    ANIONGAP 6 07/01/2024    GFR 53 (L) 12/12/2017    GFRNAA 65 07/14/2022    GFRAA 75 07/14/2022    CA 9.1 07/01/2024    OSMOCALC 290 07/01/2024    ALKPHO 58 06/25/2024    AST 10 (L) 06/25/2024    ALT 10 (L) 06/25/2024    BILT 0.2 06/25/2024    TP 5.5 (L) 06/25/2024    ALB 2.6 (L) 06/25/2024    GLOBULIN 2.9 06/25/2024    AGRATIO 1.9 01/12/2022     07/01/2024    K 3.8 07/01/2024     07/01/2024    CO2 24.0 07/01/2024         Discharge Diagnoses w/ current management:    Multiple pericolonic abscesses with Ecoli, acute  diverticulitis  VALERI drain in place, IR to remove when < 10 ML for 2 consecutive days   Completed IV Zosyn 3.375 gm Q8H on 7/2/24; multiple allergies  ID follow up Dr Dc as needed  Midline IV -  can be discontinued since IV now completed. DW RN   CBC, CMP weekly  WBC wnl   Had surgery follow up with Dr Friedman on 7/3/24. Repeat CT scan scheduled for 7/16 to re eval abscess cavities. Drain removal vs surgery pending image results.     L ear pain - improved   Intermittent pain, but no pain today   Unclear etiology   Normal physical ear exam, TM wnl   Tylenol prn for pain management  Monitor      Hypokalemia  Repeat 3.8 WNL today   Repeat BMP weekly and prn     Normocytic anemia  Hgb trending down   10, 9.9, 9.8, 8.7. Hgb 9.3 now - stable  No bleeding noted  No lightheadedness, no dizziness  Monitor      HTN, with orthostatic hypotension, HLD  VS q shift and prn  Losartan 25 mg daily     COPD/asthma  Albuterol HFA prn  Fluticasone nasal daily  Symbicort BID     Dizziness/vertigo  Meclizine 12.5 mg TID prn     Physical Deconditioning/Weakness; at risk for falling  Fall Precautions  DC planning with MDT, SW, therapies  Services on DC: [facility] RN/PT/OT//SW  Equipment on DC: walker     Anxiety/depression  Not taking duloxetine per report  Clonazepam BID  Mirtazapine 30 mg Po QHS  Appears managed  Emotional support offered     Eczema  On dupixent q2 weeks  Clobetazole or triamcinolone prn     Pain management  Monitor and assess pain  Tylenol 500 mg Po Q6 prn   Tramadol 50 mg PO Q12 hours prn  Offer to pre-medicate 30-60 min prior to therapy  Physiatry evaluation with management appreciated     Bowel management  Monitor for Bms  Cdiff negative      Vitamins/supplements as r/t deficiencies  CHUY RD to follow while in rehab; supplementation/diet as per CHUY RD  May continue home supplements  Vit D daily  MVI with minerals daily     Malnutrition  BMI 17  Dietician consult  Weekly weights  Supplements as indicated  Encourage po  intake  Encourage more tolerable foods - eggs, chicken, plain greek yogurt,etc.  Small meals more often  Protein and albumin low  Monitor      DVT Prophylaxis   Encourage exercise and participation with therapy as much as able     GI prophylaxis  none     Medication Reconciliation Completed:  Yes    Follow Up Visits:  PCP within 7 days of DC from rehab   Surgeon Dr. Friedman after 7/16 repeat CT imaging     Future Appointments   Date Time Provider Department Center   7/16/2024  8:45 AM  CT MAIN [facility]  [facility]       35 spent in coordination of care in preparation for discharge.    Margarette Conley, ZEENAT  7/5/2024  1:12 PM

## 2024-07-08 ENCOUNTER — PATIENT OUTREACH (OUTPATIENT)
Dept: CASE MANAGEMENT | Age: 79
End: 2024-07-08

## 2024-07-08 DIAGNOSIS — Z02.9 ENCOUNTERS FOR UNSPECIFIED ADMINISTRATIVE PURPOSE: Primary | ICD-10-CM

## 2024-07-08 PROCEDURE — 1125F AMNT PAIN NOTED PAIN PRSNT: CPT

## 2024-07-08 PROCEDURE — 1111F DSCHRG MED/CURRENT MED MERGE: CPT

## 2024-07-08 NOTE — PROGRESS NOTES
Initial Post Discharge Follow Up   Discharge Date from the [facility]  Contact Date: 2024    Consent Verification:  Assessment Completed With: Patient  HIPAA Verified?  Yes    Discharge Dx:   Diverticulitis of colon  Colonic diverticular abscesses/perforation  Malnutrition  Weakness  Diarrhea    General:   How have you been since your discharge from the hospital? The patient denies any fever/chills, abdominal cramping/ tenderness, nausea/vomiting, or melena/hematochezia. The patient denies any difficulty breathing/ shortness of breath, wheezing, cough, dizziness/syncope, irregular heart beats/palpitations, or chest pain. The patient reported a blood pressure reading of 130/80 yesterday. The patient reported the left ear pain has resolved. The patient has been following a soft diet at home. The patient reported experiencing abdominal bloating/distention after having meatloaf and mashed potatoes yesterday. The patient reported these symptoms have since resolved and the patient is following a strict soft diet at home. Nurse care manager did review this with the patient.   The patient complained of redness, leaking/foul smelling drainage, and soreness (2-3/10)around the VALERI drain. The patient did report this to Dr. Friedman on 7/3/2024. The dressing was changed in the office visit. The patient reported symptoms improved for one day but now symptoms have worsened. The patient denies any noticeable tube damage, new red drainage/blood clots, or a sudden increase/decrease in drainage. Nurse care manager contacted Dr. Berry's office to provide this condition update. The patient did admit to experiencing increased anxiety/stress that has made it difficult for the patient to concentrate after the recent hospital stay.     Do you have any pain since discharge?  Yes  Where: VALERI drain soreness    Rating on pain scale 1-10, 10 being the worst pain you have ever experienced, what is current pain: 3  Is  the pain manageable at home? Yes  When you were leaving the hospital were your discharge instructions reviewed with you? Yes  How well were your discharge instructions explained to you?   On a scale of 1-5   1- Very Poor and 5- Very well   Very Well  Do you have any questions about your discharge instructions?  No  Before leaving the hospital was your diagnoses explained to you? Yes  Do you have any questions about your diagnoses? No  Are you able to perform normal daily activities of living as you have prior to your hospital stay (dressing, bathing, ambulating to the bathroom, etc)? yes; The patient is ambulating at home with the walker. NCM did review/stress the importance of fall precautions.   (NCM) Was patient given a different diet per AVS? yes  If so, which diet?  Soft/low fiber diet   Are there any barriers to following this diet? no    Medications: The patient had to end the Transitional Care Management call abruptly and was not able to complete a medication review with the nurse care manager.   Current Outpatient Medications   Medication Sig Dispense Refill    clonazePAM 0.5 MG Oral Tab Take 1 tablet (0.5 mg total) by mouth in the morning and 1 tablet (0.5 mg total) before bedtime. 28 tablet 0    traMADol 50 MG Oral Tab Take 1 tablet (50 mg total) by mouth every 12 (twelve) hours as needed. 12 tablet 0    ondansetron 4 MG Oral Tablet Dispersible Take 1 tablet (4 mg total) by mouth every 8 (eight) hours as needed for Nausea. 30 tablet 0    multivitamin with minerals Oral Tab Take 1 tablet by mouth daily. 30 tablet 1    meclizine 12.5 MG Oral Tab Take 1 tablet (12.5 mg total) by mouth 3 (three) times daily as needed. 30 tablet 1    mirtazapine 30 MG Oral Tab Take 1 tablet (30 mg total) by mouth nightly. 90 tablet 1    fluticasone propionate 50 MCG/ACT Nasal Suspension 2 sprays by Each Nare route daily. 1 each 1    losartan 25 MG Oral Tab Take 1 tablet (25 mg total) by mouth daily. 90 tablet 1    albuterol  (PROAIR HFA) 108 (90 Base) MCG/ACT Inhalation Aero Soln INHALE 2 PUFFS BY MOUTH EVERY 6 HOURS AS NEEDED FOR WHEEZING 8.5 g 2    SYMBICORT 160-4.5 MCG/ACT Inhalation Aerosol INHALE 2 PUFFS BY MOUTH TWICE DAILY 10.2 g 11    Cholecalciferol (VITAMIN D-3 OR) Take 25 mcg by mouth daily with breakfast.      DUPIXENT 300 MG/2ML Subcutaneous Solution Prefilled Syringe Taking every 2 weeks      TRIAMCINOLONE 0.1 % External Cream APPLY TOPICALLY TO THE AFFECTED AREA TWICE DAILY AS NEEDED (Patient taking differently: as needed.) 60 g 3    acetaminophen 500 MG Oral Tab Take 1 tablet (500 mg total) by mouth every 6 (six) hours as needed for Pain.       Are there any reasons that keep you from taking your medication as prescribed? No  Are you having any concerns with constipation? No      Discharge medications reviewed/discussed/and reconciled against outpatient medications with patient.  Any changes or updates to medications sent to PCP.  Patient Declined     Referrals/orders at D/C:  Referrals/orders placed at D/C? yes  What services:   Home health   (If HH was ordered) Has HH been set up?  No; The patient confirmed home health called to schedule but the patient reported plans to follow up tomorrow to schedule.     If Yes: With Whom: [facility]   Except for Home Health Services mentioned above, have you scheduled these other services? Yes The patient is scheduled for a CT scan on 7/16/2024.     If yes, have you started these services? no    DME ordered at D/C? Yes  What? VALERI drain   Have you received your (DME)? yes    The patient has a walker at home.     Discharge orders, AVS reviewed and discussed with patient. Any changes or updates to orders sent to PCP.  Patient Acknowledged      SDOH:   Transportation:   Transportation Needs: No Transportation Needs (6/18/2024)    Transportation Needs     Lack of Transportation: No     Car Seat: Not on file       Follow up appointments:      Your appointments       Date &  Time Appointment Department (Center)    Jul 12, 2024 1:00 PM CDT Hospital Follow Up with Rick Shannon DO [facility], [address] [place] [address] ([facility] [address])        Jul 16, 2024 8:45 AM CDT CT INJECTION ABSCESS with EH CT MAIN RM1 [facility] CT ([facility])    If you have had prior imaging outside of an [facility] or [facility] facility, you must provide a copy for this appointment. If we do not receive a copy of your images or you do not bring them to your appointment, you will be rescheduled.    Please arrive 15 minutes prior to your scheduled appointment time.      Jul 16, 2024 8:45 AM CDT CT INJECTION ABSCESS with EH RN RADIOLOGY [facility] X-ray ([facility])    If you have had prior imaging outside of an [facility] or [facility] facility, you must provide a copy for this appointment. If we do not receive a copy of your images or you do not bring them to your appointment, you will be rescheduled.    Please arrive 15 minutes prior to your scheduled appointment time.      Jul 16, 2024 8:45 AM CDT CT INJECTION ABSCESS with  RADIOLOGIST SPEC CT US [facility] ([facility])    If you have had prior imaging outside of an [facility] or [facility] facility, you must provide a copy for this appointment. If we do not receive a copy of your images or you do not bring them to your appointment, you will be rescheduled.    Please arrive 15 minutes prior to your scheduled appointment time.            [facility]  [facility]  [address] [facility] [address]  [phone] [facility] X-ray  [facility]  [address] [facility] [address]  [phone] [facility], [address] [place] [address] [address]  [address] [place] [id number]  [phone]             TCC  Was TCC ordered: Yes; The patient would prefer to be seen by Dr. Shannon as scheduled.     PCP (If no TCC appointment)  Does patient already have a PCP appointment scheduled? Yes; 7/12/2024  NCM Confirmed PCP office TCM appointment with patient      Specialist    Does the patient have any other follow up appointment(s) needing to be scheduled? No    Is there any reason as to why you cannot make your appointment(s)?  No     Needs post D/C:   Now that you are home, are there any needs or concerns you need addressed before your next visit with your PCP?  (DME, meds, questions, etc.): Yes; Nurse care manager contacted Dr. Berry's office to provide a condition update:     The patient complained of redness, leaking/foul smelling drainage, and soreness (2-3/10)around the VALERI drain. The patient did report this to Dr. Friedman on 7/3/2024. The dressing was changed in the office visit. The patient reported symptoms improved for one day but now symptoms have worsened. The patient denies any noticeable tube damage, new red drainage/blood clots, or a sudden increase/decrease in drainage.     Nurse care manager provided this update to the office nurse directly.     Interventions by [facility]:    Reviewed all discharge instructions with the patient with a focus on VALERI drain symptoms/care, diet and fall precautions.  Patient symptoms were assessed, education was completed for signs/symptoms to monitor, and reviewed when to contact providers versus go to the emergency department/call 911. Nurse care manager contacted Dr. Berry's office to provide a condition update. Appointments were reviewed and stressed the importance of a close follow up with providers.Confirmed patient has DME (medical equipment) and understands proper use  The patient verbalized understanding and will contact the office with any further questions or concerns.       Overall Rating:   How would you rate the care you received while in the hospital?  good    CCM referral placed:    No; The patient is currently enrolled.     BOOK BY DATE: 7/20/2024

## 2024-07-08 NOTE — PROGRESS NOTES
Attempted [facility] monthly outreach,  left message for patient to call back ,can be reached at  [phone], Will try back at a later time.      Medical record reviewed including recent office visits and test results.    Chart review - 3 min  Time with patient - 2 min  Total time - 5 min

## 2024-07-10 ENCOUNTER — HOSPITAL ENCOUNTER (INPATIENT)
Facility: HOSPITAL | Age: 79
LOS: 6 days | Discharge: NURSING, REHAB OR LONG-TERM CARE | End: 2024-07-16
Attending: EMERGENCY MEDICINE | Admitting: HOSPITALIST
Payer: MEDICARE

## 2024-07-10 ENCOUNTER — APPOINTMENT (OUTPATIENT)
Dept: CT IMAGING | Facility: HOSPITAL | Age: 79
End: 2024-07-10
Attending: EMERGENCY MEDICINE
Payer: MEDICARE

## 2024-07-10 DIAGNOSIS — K57.20 COLONIC DIVERTICULAR ABSCESS: Primary | ICD-10-CM

## 2024-07-10 PROBLEM — K57.32 DIVERTICULITIS OF COLON: Status: ACTIVE | Noted: 2024-01-01

## 2024-07-10 PROBLEM — D72.829 LEUKOCYTOSIS: Status: ACTIVE | Noted: 2024-07-10

## 2024-07-10 PROBLEM — K57.32 DIVERTICULITIS OF COLON: Status: ACTIVE | Noted: 2024-07-10

## 2024-07-10 PROBLEM — D72.829 LEUKOCYTOSIS: Status: ACTIVE | Noted: 2024-01-01

## 2024-07-10 LAB
ALBUMIN SERPL-MCNC: 3 G/DL (ref 3.4–5)
ALBUMIN/GLOB SERPL: 0.7 {RATIO} (ref 1–2)
ALP LIVER SERPL-CCNC: 71 U/L
ALT SERPL-CCNC: 14 U/L
ANION GAP SERPL CALC-SCNC: 8 MMOL/L (ref 0–18)
AST SERPL-CCNC: 15 U/L (ref 15–37)
BASOPHILS # BLD AUTO: 0.07 X10(3) UL (ref 0–0.2)
BASOPHILS NFR BLD AUTO: 0.4 %
BILIRUB SERPL-MCNC: 0.6 MG/DL (ref 0.1–2)
BILIRUB UR QL STRIP.AUTO: NEGATIVE
BUN BLD-MCNC: 16 MG/DL (ref 9–23)
CALCIUM BLD-MCNC: 9.6 MG/DL (ref 8.5–10.1)
CHLORIDE SERPL-SCNC: 103 MMOL/L (ref 98–112)
CLARITY UR REFRACT.AUTO: CLEAR
CO2 SERPL-SCNC: 23 MMOL/L (ref 21–32)
COLOR UR AUTO: YELLOW
CREAT BLD-MCNC: 0.9 MG/DL
EGFRCR SERPLBLD CKD-EPI 2021: 65 ML/MIN/1.73M2 (ref 60–?)
EOSINOPHIL # BLD AUTO: 0.02 X10(3) UL (ref 0–0.7)
EOSINOPHIL NFR BLD AUTO: 0.1 %
ERYTHROCYTE [DISTWIDTH] IN BLOOD BY AUTOMATED COUNT: 18.5 %
GLOBULIN PLAS-MCNC: 4.2 G/DL (ref 2.8–4.4)
GLUCOSE BLD-MCNC: 107 MG/DL (ref 70–99)
GLUCOSE UR STRIP.AUTO-MCNC: NORMAL MG/DL
HCT VFR BLD AUTO: 30.9 %
HGB BLD-MCNC: 10.3 G/DL
IMM GRANULOCYTES # BLD AUTO: 0.08 X10(3) UL (ref 0–1)
IMM GRANULOCYTES NFR BLD: 0.5 %
KETONES UR STRIP.AUTO-MCNC: NEGATIVE MG/DL
LACTATE SERPL-SCNC: 1.9 MMOL/L (ref 0.4–2)
LEUKOCYTE ESTERASE UR QL STRIP.AUTO: NEGATIVE
LIPASE SERPL-CCNC: 14 U/L (ref 13–75)
LYMPHOCYTES # BLD AUTO: 1.44 X10(3) UL (ref 1–4)
LYMPHOCYTES NFR BLD AUTO: 8.6 %
MCH RBC QN AUTO: 28.2 PG (ref 26–34)
MCHC RBC AUTO-ENTMCNC: 33.3 G/DL (ref 31–37)
MCV RBC AUTO: 84.7 FL
MONOCYTES # BLD AUTO: 1.95 X10(3) UL (ref 0.1–1)
MONOCYTES NFR BLD AUTO: 11.7 %
NEUTROPHILS # BLD AUTO: 13.14 X10 (3) UL (ref 1.5–7.7)
NEUTROPHILS # BLD AUTO: 13.14 X10(3) UL (ref 1.5–7.7)
NEUTROPHILS NFR BLD AUTO: 78.7 %
NITRITE UR QL STRIP.AUTO: NEGATIVE
OSMOLALITY SERPL CALC.SUM OF ELEC: 280 MOSM/KG (ref 275–295)
PH UR STRIP.AUTO: 7 [PH] (ref 5–8)
PLATELET # BLD AUTO: 378 10(3)UL (ref 150–450)
POTASSIUM SERPL-SCNC: 3.7 MMOL/L (ref 3.5–5.1)
PROT SERPL-MCNC: 7.2 G/DL (ref 6.4–8.2)
PROT UR STRIP.AUTO-MCNC: 20 MG/DL
RBC # BLD AUTO: 3.65 X10(6)UL
RBC UR QL AUTO: NEGATIVE
SODIUM SERPL-SCNC: 134 MMOL/L (ref 136–145)
SP GR UR STRIP.AUTO: 1.02 (ref 1–1.03)
UROBILINOGEN UR STRIP.AUTO-MCNC: NORMAL MG/DL
WBC # BLD AUTO: 16.7 X10(3) UL (ref 4–11)

## 2024-07-10 PROCEDURE — 74177 CT ABD & PELVIS W/CONTRAST: CPT | Performed by: EMERGENCY MEDICINE

## 2024-07-10 PROCEDURE — 99223 1ST HOSP IP/OBS HIGH 75: CPT | Performed by: SURGERY

## 2024-07-10 PROCEDURE — 99223 1ST HOSP IP/OBS HIGH 75: CPT | Performed by: HOSPITALIST

## 2024-07-10 RX ORDER — SENNOSIDES 8.6 MG
17.2 TABLET ORAL NIGHTLY PRN
Status: DISCONTINUED | OUTPATIENT
Start: 2024-07-10 | End: 2024-07-11

## 2024-07-10 RX ORDER — MORPHINE SULFATE 2 MG/ML
1 INJECTION, SOLUTION INTRAMUSCULAR; INTRAVENOUS EVERY 2 HOUR PRN
Status: DISCONTINUED | OUTPATIENT
Start: 2024-07-10 | End: 2024-07-10

## 2024-07-10 RX ORDER — TRAMADOL HYDROCHLORIDE 50 MG/1
50 TABLET ORAL EVERY 12 HOURS PRN
Status: DISCONTINUED | OUTPATIENT
Start: 2024-07-10 | End: 2024-07-12

## 2024-07-10 RX ORDER — ALBUTEROL SULFATE 90 UG/1
2 AEROSOL, METERED RESPIRATORY (INHALATION) EVERY 6 HOURS PRN
Status: DISCONTINUED | OUTPATIENT
Start: 2024-07-10 | End: 2024-07-16

## 2024-07-10 RX ORDER — FLUTICASONE PROPIONATE 50 MCG
2 SPRAY, SUSPENSION (ML) NASAL DAILY
Status: DISCONTINUED | OUTPATIENT
Start: 2024-07-11 | End: 2024-07-16

## 2024-07-10 RX ORDER — ENEMA 19; 7 G/133ML; G/133ML
1 ENEMA RECTAL ONCE AS NEEDED
Status: DISCONTINUED | OUTPATIENT
Start: 2024-07-10 | End: 2024-07-11

## 2024-07-10 RX ORDER — BISACODYL 10 MG
10 SUPPOSITORY, RECTAL RECTAL ONCE
Status: COMPLETED | OUTPATIENT
Start: 2024-07-10 | End: 2024-07-10

## 2024-07-10 RX ORDER — SODIUM CHLORIDE 9 MG/ML
INJECTION, SOLUTION INTRAVENOUS CONTINUOUS
Status: DISCONTINUED | OUTPATIENT
Start: 2024-07-10 | End: 2024-07-11

## 2024-07-10 RX ORDER — POLYETHYLENE GLYCOL 3350 17 G/17G
17 POWDER, FOR SOLUTION ORAL DAILY PRN
Status: DISCONTINUED | OUTPATIENT
Start: 2024-07-10 | End: 2024-07-11

## 2024-07-10 RX ORDER — METOCLOPRAMIDE HYDROCHLORIDE 5 MG/ML
5 INJECTION INTRAMUSCULAR; INTRAVENOUS EVERY 8 HOURS PRN
Status: DISCONTINUED | OUTPATIENT
Start: 2024-07-10 | End: 2024-07-11

## 2024-07-10 RX ORDER — LOSARTAN POTASSIUM 25 MG/1
25 TABLET ORAL DAILY
Status: DISCONTINUED | OUTPATIENT
Start: 2024-07-11 | End: 2024-07-11

## 2024-07-10 RX ORDER — ACETAMINOPHEN 500 MG
500 TABLET ORAL EVERY 4 HOURS PRN
Status: DISCONTINUED | OUTPATIENT
Start: 2024-07-10 | End: 2024-07-11

## 2024-07-10 RX ORDER — ENOXAPARIN SODIUM 100 MG/ML
30 INJECTION SUBCUTANEOUS ONCE
Status: COMPLETED | OUTPATIENT
Start: 2024-07-10 | End: 2024-07-10

## 2024-07-10 RX ORDER — ENOXAPARIN SODIUM 100 MG/ML
40 INJECTION SUBCUTANEOUS NIGHTLY
Status: DISCONTINUED | OUTPATIENT
Start: 2024-07-10 | End: 2024-07-10

## 2024-07-10 RX ORDER — ONDANSETRON 2 MG/ML
4 INJECTION INTRAMUSCULAR; INTRAVENOUS EVERY 4 HOURS PRN
Status: DISCONTINUED | OUTPATIENT
Start: 2024-07-10 | End: 2024-07-10

## 2024-07-10 RX ORDER — ONDANSETRON 2 MG/ML
4 INJECTION INTRAMUSCULAR; INTRAVENOUS EVERY 6 HOURS PRN
Status: DISCONTINUED | OUTPATIENT
Start: 2024-07-10 | End: 2024-07-11

## 2024-07-10 RX ORDER — CLONAZEPAM 0.5 MG/1
0.5 TABLET ORAL 2 TIMES DAILY
Status: DISCONTINUED | OUTPATIENT
Start: 2024-07-10 | End: 2024-07-16

## 2024-07-10 RX ORDER — FLUTICASONE FUROATE AND VILANTEROL 200; 25 UG/1; UG/1
1 POWDER RESPIRATORY (INHALATION) DAILY
Status: DISCONTINUED | OUTPATIENT
Start: 2024-07-10 | End: 2024-07-16

## 2024-07-10 RX ORDER — MORPHINE SULFATE 2 MG/ML
2 INJECTION, SOLUTION INTRAMUSCULAR; INTRAVENOUS EVERY 2 HOUR PRN
Status: DISCONTINUED | OUTPATIENT
Start: 2024-07-10 | End: 2024-07-10

## 2024-07-10 RX ORDER — BISACODYL 10 MG
10 SUPPOSITORY, RECTAL RECTAL
Status: DISCONTINUED | OUTPATIENT
Start: 2024-07-10 | End: 2024-07-11

## 2024-07-10 RX ORDER — MORPHINE SULFATE 4 MG/ML
4 INJECTION, SOLUTION INTRAMUSCULAR; INTRAVENOUS EVERY 30 MIN PRN
Status: DISCONTINUED | OUTPATIENT
Start: 2024-07-10 | End: 2024-07-10

## 2024-07-10 RX ORDER — MIRTAZAPINE 15 MG/1
30 TABLET, FILM COATED ORAL NIGHTLY
Status: DISCONTINUED | OUTPATIENT
Start: 2024-07-10 | End: 2024-07-16

## 2024-07-10 RX ORDER — MORPHINE SULFATE 2 MG/ML
0.5 INJECTION, SOLUTION INTRAMUSCULAR; INTRAVENOUS EVERY 2 HOUR PRN
Status: DISCONTINUED | OUTPATIENT
Start: 2024-07-10 | End: 2024-07-10

## 2024-07-10 RX ORDER — SODIUM CHLORIDE 9 MG/ML
INJECTION, SOLUTION INTRAVENOUS CONTINUOUS
Status: DISCONTINUED | OUTPATIENT
Start: 2024-07-10 | End: 2024-07-10

## 2024-07-10 NOTE — ED PROVIDER NOTES
Patient Seen in: [facility] Emergency Department      History     Chief Complaint   Patient presents with    Abdomen/Flank Pain     Stated Complaint: abd pain starting today    Subjective:   HPI    Patient is a 78-year-old woman who presents with generalized abdominal pain.  She has a history of perforated diverticulitis and abscess.  She had a drain placed.  She was treated with prolonged antibiotics, Zosyn which she has tolerated in the past.  Pain however is increasing.  There is feculent drainage around the drain itself.  She is got nausea.  Describes 8 out of 10 pain.  Her granddaughter is at bedside and helps with her history.  Proceed fentanyl by paramedics.  No other specific complaints.    Objective:   Past Medical History:    Abdominal distention    Abdominal pain    Acquired hypothyroidism    Anxiety    Asthma ([facility])    Belching    Bloating    Chronic rhinitis    CKD (chronic kidney disease) stage 3, GFR 30-59 ml/min ([facility])    Constipation    COPD (chronic obstructive pulmonary disease) ([facility])    NO OXYGEN     Depression    Diarrhea, unspecified    Diverticulosis of large intestine    Emphysema    Esophageal reflux    Fatigue    Feeling lonely    Flatulence/gas pain/belching    Food intolerance    H/O bronchitis    Hay fever    Hemorrhoids    High blood pressure    History of depression    Hypercholesterolemia    Hypertension    Itch of skin    Migraines    Mild intermittent asthma without complication ([facility])    Nausea    Night sweats    Pneumonia    PONV (postoperative nausea and vomiting)    Pure hypercholesterolemia    Rash    Reflux    Sleep disturbance    Stress    Uncomfortable fullness after meals    Visual impairment    glasses    Vomiting    Wears glasses              Past Surgical History:   Procedure Laterality Date    Cataract      Colonoscopy      Colonoscopy N/A 3/21/2024    Procedure: COLONOSCOPY;  Surgeon: Delon Ashford DO;  Location:  ENDOSCOPY    Colonoscopy & polypectomy   2014    multiple polyps; tics; repeat 3 yrs    Colonoscopy,remv lesn,snare N/A 2014    Procedure: ESOPHAGOGASTRODUODENOSCOPY, COLONOSCOPY, POSSIBLE BIOPSY, POSSIBLE POLYPECTOMY 14813,13994;  Surgeon: Slade Ivan MD;  Location: [facility]    Correct bunion,simple Bilateral     Cystotomy,excis vesical neck Left     Egd      Gastro - dmg  2014    stricture; HH    Oophorectomy Bilateral     Other surgical history  2023    Robotic repair of hiatal hernia and Nissen fundoplication    Patient documented not to have experienced any of the following events N/A 2014    Procedure: ESOPHAGOGASTRODUODENOSCOPY, COLONOSCOPY, POSSIBLE BIOPSY, POSSIBLE POLYPECTOMY 14643,05263;  Surgeon: Slade Ivan MD;  Location: [facility]    Patient withough preoperative order for iv antibiotic surgical site infection prophylaxis. N/A 2014    Procedure: ESOPHAGOGASTRODUODENOSCOPY, COLONOSCOPY, POSSIBLE BIOPSY, POSSIBLE POLYPECTOMY 45467,81215;  Surgeon: Slade Ivan MD;  Location: [facility]    Repair ing hernia,5+y/o,reducibl      Upper gi endoscopy,diagnosis N/A 2014    Procedure: ESOPHAGOGASTRODUODENOSCOPY, COLONOSCOPY, POSSIBLE BIOPSY, POSSIBLE POLYPECTOMY 01290,31869;  Surgeon: Slade Ivan MD;  Location: [facility]                Social History     Socioeconomic History    Marital status:    Tobacco Use    Smoking status: Former     Current packs/day: 0.00     Types: Cigarettes     Start date: 1970     Quit date: 1980     Years since quittin.8    Smokeless tobacco: Never    Tobacco comments:      1/2 pack per day. Social history shows \"Tobacco Use: Active\" and \"Never Smoker: Active\"   Vaping Use    Vaping status: Never Used   Substance and Sexual Activity    Alcohol use: No    Drug use: No   Other Topics Concern    Caffeine Concern Yes     Comment: \"1 cup of coffee and tea, and can of pop daily\"    Exercise Yes     Comment: walking    Social History Narrative    ** Merged History Encounter **          Social Determinants of Health     Financial Resource Strain: Low Risk  (4/20/2023)    Financial Resource Strain     Difficulty of Paying Living Expenses: Not hard at all     Med Affordability: No   Food Insecurity: No Food Insecurity (6/18/2024)    Food Insecurity     Food Insecurity: Never true   Transportation Needs: No Transportation Needs (6/18/2024)    Transportation Needs     Lack of Transportation: No   Physical Activity: Inactive (4/21/2022)    Exercise Vital Sign     Days of Exercise per Week: 0 days     Minutes of Exercise per Session: 0 min   Stress: Stress Concern Present (2/13/2024)    Stress     Feeling of Stress : Yes    Social Connections   Housing Stability: Low Risk  (6/18/2024)    Housing Stability     Housing Instability: No              Review of Systems    Positive for stated Chief Complaint: Abdomen/Flank Pain    Other systems are as noted in HPI.  Constitutional and vital signs reviewed.      All other systems reviewed and negative except as noted above.    Physical Exam     ED Triage Vitals   BP 07/10/24 1343 127/53   Pulse 07/10/24 1342 80   Resp 07/10/24 1342 24   Temp 07/10/24 1342 97.4 °F (36.3 °C)   Temp src 07/10/24 1342 Temporal   SpO2 07/10/24 1342 99 %   O2 Device 07/10/24 1342 None (Room air)       Current Vitals:   Vital Signs  BP: 154/68  Pulse: 82  Resp: 22  Temp: 97.4 °F (36.3 °C)  Temp src: Temporal  MAP (mmHg): 91    Oxygen Therapy  SpO2: 97 %  O2 Device: None (Room air)            Physical Exam    General: Well-appearing patient of stated age resting comfortably  HEENT: Normocephalic atraumatic.  Nonicteric sclera.  Moist mucous membranes  Lungs: No tachypnea  Cardiac: No tachycardia  Abdomen: There is a drain in place.  There is a bandage with fecal material over the drain itself.  Mild distention and tenderness.  Skin: No rashes, pallor  Neuro: No focal deficits.  Extremities: No cyanosis/edema    ED  Course     Labs Reviewed   COMP METABOLIC PANEL (14) - Abnormal; Notable for the following components:       Result Value    Glucose 107 (*)     Sodium 134 (*)     Albumin 3.0 (*)     A/G Ratio 0.7 (*)     All other components within normal limits   URINALYSIS WITH CULTURE REFLEX - Abnormal; Notable for the following components:    Protein Urine 20 (*)     All other components within normal limits   CBC W/ DIFFERENTIAL - Abnormal; Notable for the following components:    WBC 16.7 (*)     RBC 3.65 (*)     HGB 10.3 (*)     HCT 30.9 (*)     Neutrophil Absolute Prelim 13.14 (*)     Neutrophil Absolute 13.14 (*)     Monocyte Absolute 1.95 (*)     All other components within normal limits   LIPASE - Normal   LACTIC ACID, PLASMA - Normal   CBC WITH DIFFERENTIAL WITH PLATELET    Narrative:     The following orders were created for panel order CBC With Differential With Platelet.  Procedure                               Abnormality         Status                     ---------                               -----------         ------                     CBC W/ DIFFERENTIAL[[id number]]          Abnormal            Final result                 Please view results for these tests on the individual orders.   RAINBOW DRAW LAVENDER   RAINBOW DRAW LIGHT GREEN   RAINBOW DRAW BLUE   BLOOD CULTURE   BLOOD CULTURE             Blood work reviewed.  Electrolytes normal except sodium 134.  Lipase normal.  White count elevated at 16,000 significantly up from previous.  Blood cultures were sent.  Lactate is normal.       CT abdomen pelvis: Pending  MDM      Patient presents with drainage around her drain which appears to be fecal material.  Concerned about perforation, recurrent abscess, other.  IV was placed, she will be hydrated.  Culture sent.  Zosyn started.  Case discussed with general surgery.  Will send for CT abdomen pelvis.  Will likely need to be admitted for surgical evaluation, IV antibiotics.      Patient with abdominal pain,  known history of a diverticular abscess now with fecal material in the draining around the drain.  Differential includes perforation, fistula, recurrent abscess, other.  Patient be admitted for IV antibiotics.  Discussed with [facility], [facility].  CT scan endorsed to my partner.                             Medical Decision Making      Disposition and Plan     Clinical Impression:  1. Colonic diverticular abscess         Disposition:  There is no disposition on file for this visit.  There is no disposition time on file for this visit.    Follow-up:  No follow-up provider specified.        Medications Prescribed:  Current Discharge Medication List

## 2024-07-10 NOTE — ED INITIAL ASSESSMENT (HPI)
Pt arrives to ED from home with c/o generalized abdominal pain that started today. PT had abscess removal x2 weeks with VALERI drain placement. Pt c/o 8/10 pain. 45mcg Fentanyl given PTA with some relief. +nausea, denies diarrhea. Reports normal bowel movements. Denies fevers. Pt a/ox 4.

## 2024-07-10 NOTE — PROGRESS NOTES
NURSING ADMISSION NOTE      Patient admitted via Cart  Oriented to room.  Safety precautions initiated.  Bed in low position.  Call light in reach.    Admission database information taken from patient's POA /granddaughter Yareli Minor.

## 2024-07-10 NOTE — ED QUICK NOTES
Orders for admission, patient is aware of plan and ready to go upstairs. Any questions, please call ED RN Akin at extension [phone].     Patient Covid vaccination status: Fully vaccinated     COVID Test Ordered in ED: None    COVID Suspicion at Admission: N/A    Running Infusions:       Mental Status/LOC at time of transport: A&Ox4    Other pertinent information:   CIWA score: N/A   NIH score:  N/A

## 2024-07-10 NOTE — CONSULTS
Addendum-[facility]  Report of Consultation    Bibi Diaz Patient Status:  Emergency    1945 MRN [id number]   Location [facility] EMERGENCY DEPARTMENT Attending Remi Rey MD   Hosp Day # 0 PCP Rick Shannon,      Date of consultation:      July 10, 2024    Requesting Physician: Dr. Rey-ED    Reason for Consultation:  Abdominal pain,  Diverticular abscess    History of Present Illness:  Bibi Diaz is a a(n) 78 year old female    78-year-old female who was recently admitted to the hospital for diverticular abscess.  Bibi is being interviewed with Yareli, her granddaughter at the bedside  Admitted to [facility] on 2024 after an unwitnessed fall out of bed.  Workup in the ED including CT scan of the abdomen and pelvis revealed a large amount of stool in the descending and sigmoid colon, with an adjacent pericolonic abscess.  At that time IR drain was placed.  The patient did undergo medical to speak with Bibi and via ambulance.      History:  Past Medical History:    Abdominal distention    Abdominal pain    Acquired hypothyroidism    Anxiety    Asthma (HCC)    Belching    Bloating    Chronic rhinitis    CKD (chronic kidney disease) stage 3, GFR 30-59 ml/min (HCC)    Constipation    COPD (chronic obstructive pulmonary disease) ([facility])    NO OXYGEN     Depression    Diarrhea, unspecified    Diverticulosis of large intestine    Emphysema    Esophageal reflux    Fatigue    Feeling lonely    Flatulence/gas pain/belching    Food intolerance    H/O bronchitis    Hay fever    Hemorrhoids    High blood pressure    History of depression    Hypercholesterolemia    Hypertension    Itch of skin    Migraines    Mild intermittent asthma without complication (HCC)    Nausea    Night sweats    Pneumonia    PONV (postoperative nausea and vomiting)    Pure hypercholesterolemia    Rash    Reflux    Sleep disturbance    Stress    Uncomfortable fullness after meals    Visual  impairment    glasses    Vomiting    Wears glasses     Past Surgical History:   Procedure Laterality Date    Cataract      Colonoscopy      Colonoscopy N/A 3/21/2024    Procedure: COLONOSCOPY;  Surgeon: Delon Ashford DO;  Location:  ENDOSCOPY    Colonoscopy & polypectomy  09/2014    multiple polyps; tics; repeat 3 yrs    Colonoscopy,remv lesn,snare N/A 09/22/2014    Procedure: ESOPHAGOGASTRODUODENOSCOPY, COLONOSCOPY, POSSIBLE BIOPSY, POSSIBLE POLYPECTOMY 96936,[id number];  Surgeon: Slade Ivan MD;  Location: [facility]    Correct bunion,simple Bilateral     Cystotomy,excis vesical neck Left     Egd      Gastro - dmg  09/2014    stricture; HH    Oophorectomy Bilateral 2017    Other surgical history  02/27/2023    Robotic repair of hiatal hernia and Nissen fundoplication    Patient documented not to have experienced any of the following events N/A 09/22/2014    Procedure: ESOPHAGOGASTRODUODENOSCOPY, COLONOSCOPY, POSSIBLE BIOPSY, POSSIBLE POLYPECTOMY [id number];  Surgeon: Slade Ivan MD;  Location: [facility]    Patient withough preoperative order for iv antibiotic surgical site infection prophylaxis. N/A 09/22/2014    Procedure: ESOPHAGOGASTRODUODENOSCOPY, COLONOSCOPY, POSSIBLE BIOPSY, POSSIBLE POLYPECTOMY 50861,[id number];  Surgeon: Slade Ivan MD;  Location: [facility]    Repair ing hernia,5+y/o,reducibl      Upper gi endoscopy,diagnosis N/A 09/22/2014    Procedure: ESOPHAGOGASTRODUODENOSCOPY, COLONOSCOPY, POSSIBLE BIOPSY, POSSIBLE POLYPECTOMY [id number],04489;  Surgeon: Slade Ivan MD;  Location: [facility]     Family History   Problem Relation Age of Onset    Colon Cancer Mother     Other (Other) Mother     Other (copd) Mother     Alcohol and Other Disorders Associated Father     Other (Other) Father     Other (emph) Father     Alcohol and Other Disorders Associated Son     Hypertension Sister     Prostate Cancer Brother     Hypertension Brother       reports that she quit  smoking about 43 years ago. Her smoking use included cigarettes. She started smoking about 53 years ago. She has never used smokeless tobacco. She reports that she does not drink alcohol and does not use drugs.    Allergies:  Allergies   Allergen Reactions    Avelox [Moxifloxacin Hcl In Nacl] SWELLING    Avelox [Moxifloxacin Hydrochloride] TONGUE SWELLING     \"TABS\"    Amoxicillin NAUSEA AND VOMITING     Vomiting.    Statins Myopathy    Sulfa Antibiotics RASH and ITCHING    Dander OTHER (SEE COMMENTS)     \"Animals\": runny nose, watery eyes, itching    Diphenhydramine Runny nose     \"Animals\": runny nose, watery eyes, itching    Dust Runny nose    Latex RASH    Sulfa Drugs Cross Reactors RASH and ITCHING       Medications:    Current Facility-Administered Medications:     sodium chloride 0.9% infusion, , Intravenous, Continuous    sodium chloride 0.9 % IV bolus 1,000 mL, 1,000 mL, Intravenous, Once    piperacillin-tazobactam (Zosyn) 4.5 g in dextrose 5% 100 mL IVPB-ADDV, 4.5 g, Intravenous, Once    Review of Systems:    Allergic/Immuno:  Review of patient's allergies performed.  Constitutional:  Negative for decreased activity, yes-chills, n fever, irritability and lethargy, n unintentional weight loss  Endocrine:  Negative for abnormal sleep patterns, increased activity, polydipsia and polyphagia  HEENT:  Negative for hearing changes,  Nasal discharge  Eyes:  Negative for eye discharge, visual disturbance   Cardiovascular:  Negative for cool extremity and irregular heartbeat/palpitations  Respiratory:  Negative for cough, dyspnea and wheezing, SOB  Gastrointestinal:  y  abdominal pain,  yanorexia, ynausea, yemesis, ydiarrhea, nconstipation, nhematochezia  Genitourinary:  Negative for dysuria and hematuria  Hema/Lymph:  Negative for easy bleeding and easy bruising  Integumentary:  Negative for pruritus and rash, changing pigmented moles, lesions  Musculoskeletal:  Negative for bone/joint symptoms, negative for muscle  aches or cramping  Neurological:  Negative for gait disturbance, headaches  Psychiatric:  Negative for inappropriate interaction and psychiatric symptoms      Physical Exam:  Blood pressure 127/53, pulse 80, temperature 97.4 °F (36.3 °C), temperature source Temporal, resp. rate 24, height 65\", SpO2 99%, not currently breastfeeding.    General:WDWN, in no acute distress   HEENT: Exam is unremarkable.  Without scleral icterus.  Mucous membranes are moist.  Oropharynx is clear.  Neck:  No JVD. Supple.   Lungs: Clear to auscultation bilaterally.  Cardiac: Regular rate and rhythm. No murmur.  Abdomen: Soft, minimally distended, tender to deep palpation in the left lower quadrant.  No evidence of percussion tenderness or involuntary guarding.  Mild voluntary guarding near the drain site.  Feculent staining of the dressing around the drain site and feculent material within the catheter itself.  No peritoneal signs.     Extremities:  No lower extremity edema noted.  Without clubbing or cyanosis.    Skin: Normal texture and turgor.  Neurologic: Cranial nerves are grossly intact.  Alert and oriented x 3.  No focal neurologic deficit.    Laboratory Data:  Recent Labs   Lab 07/10/24  1344   RBC 3.65*   HGB 10.3*   HCT 30.9*   MCV 84.7   MCH 28.2   MCHC 33.3   RDW 18.5   NEPRELIM 13.14*   WBC 16.7*   .0     No results for input(s): \"GLU\", \"BUN\", \"CREATSERUM\", \"GFRAA\", \"GFRNAA\", \"CA\", \"ALB\", \"NA\", \"K\", \"CL\", \"CO2\", \"ALKPHO\", \"AST\", \"ALT\", \"BILT\", \"TP\" in the last 168 hours.      Assessment/Plan:     78-year-old female who is here for evaluation abdominal pain.  The patient was previously admitted to [facility] approximately 3 weeks ago for acute diverticulitis with abscess formation.  The patient does have an IR drain in place.  There is feculent drainage around the drain as well as in the drain itself.  On arrival to the ED, the patient is afebrile, vital signs are stable.  Workup in the ED reveals leukocytosis of  16.7, decreased hemoglobin of 10.3, normal platelet count.  CMP reveals normal LFTs, normal electrolytes except sodium mildly diminished at 134, glucose 107.  UA negative except elevated urine protein.  At this time we will proceed with CT scan abdomen pelvis to assess position of the drain and to evaluate for possible colo cutaneous fistula.  Further recommendations will be pending CT scan imaging and full workup.  Kavon have no further questions at this time and will proceed as discussed.  The patient will be admitted for IV hydration, IV antibiotics, symptomatic management of pain and nausea as needed.    Addendum-return to ED to discuss CT scan findings with Kavon at the bedside.  CT findings-   BOWEL/MESENTERY:  Surgical drain, percutaneous inserted anterior abdominal wall on the left, pigtail left lower quadrant stable position of the pigtail.  Suspect small residual abscess image 75 measuring 1.5 cm to the left of the continued-inflamed   sigmoid colon.  In this region is relatively extensive pericolonic stranding, the stranding appears worse than on the most recent CT scan from 6/21/2024.  Another small suspected abscess image 72 measuring about 1.7 cm adjacent to the pigtail catheter   and sigmoid colon.  However each of these could reflect inflamed, prominent fluid and air-filled diverticula as they both appear immediately adjacent to the sigmoid colon.  There therefore indeterminate between small abscess versus fluid-filled diverticula.  Continued thickening of the sigmoid colon and moderate amount of stool in the colon and large amount of stool in the rectum.  The stranding appears greater on the sigmoid colon and extends along the abdominal wall tract for the drain, for   example there is now tissue thickening measuring up to 2.7 cm image 75 at the anterior abdominal wall where the drain enters the peritoneal cavity.  The abdominal wall fat shows stranding in this region  additionally.  No measurable abscess in this region.  More proximally the stool in the colon is moderate, there is no sign of colonic obstruction.  Moderate fluid and air in the small bowel without small bowel obstruction pattern.  No generalized free air.  Trace free fluid pelvis but no large or drainable ascites.     Given CT scan findings and increased abdominal pain, at this time would recommend proceed with single-stage procedure with exploratory laparotomy, low anterior resection, evacuation of intra-abdominal abscess, creation of end colostomy.  The surgery as well as the risks and benefits of procedure were reviewed in detail with Kavon.  They do not have any further questions at this time and wished to proceed with surgery tomorrow as discussed    Discussed:   The potential treatment options were discussed with the patient.  The potential risks, benefits, outcomes/recovery and alternatives to any proposed surgery were also fully reviewed with the patient. Any proposed surgical procedure was described in detail.  The patient verbalizes understanding.  All questions from the patient were discussed in detail to the patient's satisfaction.  No other questions were presented at this time.  Incidental abnormalities found on serology or imaging will be addressed by the patient's PCP or other services as appropriate.    Thank you,   Ileana Brooks MD      Please note that this report has been produced using speech recognition software and may contain errors related to that system including but not limited to errors in grammar, punctuation and spelling as well as words and phrases that possibly may have been recognized inappropriately.  If there are any questions or concerns please contact the dictating provider for clarification.  The 21st Century Cures Act makes medical notes like these available to patients in the interest of trans parency. Please be advised this is a medical document. Medical  documents are intended to carry relevant information, facts as evident, and the clinical opinion of the practitioner. The medical note is intended as peer to peer communication and may appear blunt or direct. It is written in medical language and may contain abbreviations or verbiage that are unfamiliar.  If there are any questions or concerns please contact the dictating provider for clarification.

## 2024-07-11 ENCOUNTER — ANESTHESIA (OUTPATIENT)
Dept: SURGERY | Facility: HOSPITAL | Age: 79
End: 2024-07-11
Payer: MEDICARE

## 2024-07-11 ENCOUNTER — ANESTHESIA EVENT (OUTPATIENT)
Dept: SURGERY | Facility: HOSPITAL | Age: 79
End: 2024-07-11
Payer: MEDICARE

## 2024-07-11 LAB
ALBUMIN SERPL-MCNC: 2.3 G/DL (ref 3.4–5)
ALBUMIN SERPL-MCNC: 2.4 G/DL (ref 3.4–5)
ALBUMIN/GLOB SERPL: 0.6 {RATIO} (ref 1–2)
ALBUMIN/GLOB SERPL: 0.7 {RATIO} (ref 1–2)
ALP LIVER SERPL-CCNC: 56 U/L
ALP LIVER SERPL-CCNC: 59 U/L
ALT SERPL-CCNC: 10 U/L
ALT SERPL-CCNC: 10 U/L
ANION GAP SERPL CALC-SCNC: 10 MMOL/L (ref 0–18)
ANION GAP SERPL CALC-SCNC: 9 MMOL/L (ref 0–18)
AST SERPL-CCNC: 19 U/L (ref 15–37)
AST SERPL-CCNC: 20 U/L (ref 15–37)
BASOPHILS # BLD AUTO: 0.07 X10(3) UL (ref 0–0.2)
BASOPHILS NFR BLD AUTO: 0.6 %
BILIRUB SERPL-MCNC: 0.4 MG/DL (ref 0.1–2)
BILIRUB SERPL-MCNC: 0.4 MG/DL (ref 0.1–2)
BUN BLD-MCNC: 8 MG/DL (ref 9–23)
BUN BLD-MCNC: 8 MG/DL (ref 9–23)
CALCIUM BLD-MCNC: 8.5 MG/DL (ref 8.5–10.1)
CALCIUM BLD-MCNC: 8.7 MG/DL (ref 8.5–10.1)
CHLORIDE SERPL-SCNC: 108 MMOL/L (ref 98–112)
CHLORIDE SERPL-SCNC: 109 MMOL/L (ref 98–112)
CO2 SERPL-SCNC: 19 MMOL/L (ref 21–32)
CO2 SERPL-SCNC: 20 MMOL/L (ref 21–32)
CREAT BLD-MCNC: 0.74 MG/DL
CREAT BLD-MCNC: 0.76 MG/DL
EGFRCR SERPLBLD CKD-EPI 2021: 80 ML/MIN/1.73M2 (ref 60–?)
EGFRCR SERPLBLD CKD-EPI 2021: 82 ML/MIN/1.73M2 (ref 60–?)
EOSINOPHIL # BLD AUTO: 0.07 X10(3) UL (ref 0–0.7)
EOSINOPHIL NFR BLD AUTO: 0.6 %
ERYTHROCYTE [DISTWIDTH] IN BLOOD BY AUTOMATED COUNT: 18.1 %
ERYTHROCYTE [DISTWIDTH] IN BLOOD BY AUTOMATED COUNT: 18.3 %
GLOBULIN PLAS-MCNC: 3.5 G/DL (ref 2.8–4.4)
GLOBULIN PLAS-MCNC: 3.6 G/DL (ref 2.8–4.4)
GLUCOSE BLD-MCNC: 131 MG/DL (ref 70–99)
GLUCOSE BLD-MCNC: 98 MG/DL (ref 70–99)
HCT VFR BLD AUTO: 26.1 %
HCT VFR BLD AUTO: 33.3 %
HGB BLD-MCNC: 10.7 G/DL
HGB BLD-MCNC: 8.9 G/DL
IMM GRANULOCYTES # BLD AUTO: 0.05 X10(3) UL (ref 0–1)
IMM GRANULOCYTES NFR BLD: 0.4 %
LYMPHOCYTES # BLD AUTO: 1.21 X10(3) UL (ref 1–4)
LYMPHOCYTES NFR BLD AUTO: 10.5 %
MCH RBC QN AUTO: 27.9 PG (ref 26–34)
MCH RBC QN AUTO: 28.6 PG (ref 26–34)
MCHC RBC AUTO-ENTMCNC: 32.1 G/DL (ref 31–37)
MCHC RBC AUTO-ENTMCNC: 34.1 G/DL (ref 31–37)
MCV RBC AUTO: 83.9 FL
MCV RBC AUTO: 86.9 FL
MONOCYTES # BLD AUTO: 1.18 X10(3) UL (ref 0.1–1)
MONOCYTES NFR BLD AUTO: 10.3 %
NEUTROPHILS # BLD AUTO: 8.93 X10 (3) UL (ref 1.5–7.7)
NEUTROPHILS # BLD AUTO: 8.93 X10(3) UL (ref 1.5–7.7)
NEUTROPHILS NFR BLD AUTO: 77.6 %
OSMOLALITY SERPL CALC.SUM OF ELEC: 282 MOSM/KG (ref 275–295)
OSMOLALITY SERPL CALC.SUM OF ELEC: 286 MOSM/KG (ref 275–295)
PLATELET # BLD AUTO: 298 10(3)UL (ref 150–450)
PLATELET # BLD AUTO: 342 10(3)UL (ref 150–450)
POTASSIUM SERPL-SCNC: 3.5 MMOL/L (ref 3.5–5.1)
POTASSIUM SERPL-SCNC: 3.5 MMOL/L (ref 3.5–5.1)
PROT SERPL-MCNC: 5.9 G/DL (ref 6.4–8.2)
PROT SERPL-MCNC: 5.9 G/DL (ref 6.4–8.2)
RBC # BLD AUTO: 3.11 X10(6)UL
RBC # BLD AUTO: 3.83 X10(6)UL
SODIUM SERPL-SCNC: 137 MMOL/L (ref 136–145)
SODIUM SERPL-SCNC: 138 MMOL/L (ref 136–145)
WBC # BLD AUTO: 10.4 X10(3) UL (ref 4–11)
WBC # BLD AUTO: 11.5 X10(3) UL (ref 4–11)

## 2024-07-11 PROCEDURE — 76942 ECHO GUIDE FOR BIOPSY: CPT | Performed by: STUDENT IN AN ORGANIZED HEALTH CARE EDUCATION/TRAINING PROGRAM

## 2024-07-11 PROCEDURE — 0W9G0ZZ DRAINAGE OF PERITONEAL CAVITY, OPEN APPROACH: ICD-10-PCS | Performed by: SURGERY

## 2024-07-11 PROCEDURE — 3E0T3BZ INTRODUCTION OF ANESTHETIC AGENT INTO PERIPHERAL NERVES AND PLEXI, PERCUTANEOUS APPROACH: ICD-10-PCS | Performed by: STUDENT IN AN ORGANIZED HEALTH CARE EDUCATION/TRAINING PROGRAM

## 2024-07-11 PROCEDURE — 44146 PARTIAL REMOVAL OF COLON: CPT

## 2024-07-11 PROCEDURE — 99291 CRITICAL CARE FIRST HOUR: CPT

## 2024-07-11 PROCEDURE — 0DBN0ZZ EXCISION OF SIGMOID COLON, OPEN APPROACH: ICD-10-PCS | Performed by: SURGERY

## 2024-07-11 PROCEDURE — 0DBP0ZZ EXCISION OF RECTUM, OPEN APPROACH: ICD-10-PCS | Performed by: SURGERY

## 2024-07-11 PROCEDURE — 0D1E0Z4 BYPASS LARGE INTESTINE TO CUTANEOUS, OPEN APPROACH: ICD-10-PCS | Performed by: SURGERY

## 2024-07-11 PROCEDURE — 44146 PARTIAL REMOVAL OF COLON: CPT | Performed by: SURGERY

## 2024-07-11 PROCEDURE — 99232 SBSQ HOSP IP/OBS MODERATE 35: CPT | Performed by: INTERNAL MEDICINE

## 2024-07-11 RX ORDER — SODIUM CHLORIDE, SODIUM LACTATE, POTASSIUM CHLORIDE, CALCIUM CHLORIDE 600; 310; 30; 20 MG/100ML; MG/100ML; MG/100ML; MG/100ML
INJECTION, SOLUTION INTRAVENOUS CONTINUOUS PRN
Status: DISCONTINUED | OUTPATIENT
Start: 2024-07-11 | End: 2024-07-11 | Stop reason: SURG

## 2024-07-11 RX ORDER — ONDANSETRON 2 MG/ML
4 INJECTION INTRAMUSCULAR; INTRAVENOUS EVERY 6 HOURS PRN
Status: DISCONTINUED | OUTPATIENT
Start: 2024-07-11 | End: 2024-07-11

## 2024-07-11 RX ORDER — HYDROCODONE BITARTRATE AND ACETAMINOPHEN 5; 325 MG/1; MG/1
1 TABLET ORAL ONCE AS NEEDED
Status: ACTIVE | OUTPATIENT
Start: 2024-07-11 | End: 2024-07-11

## 2024-07-11 RX ORDER — DEXAMETHASONE SODIUM PHOSPHATE 4 MG/ML
VIAL (ML) INJECTION AS NEEDED
Status: DISCONTINUED | OUTPATIENT
Start: 2024-07-11 | End: 2024-07-11 | Stop reason: SURG

## 2024-07-11 RX ORDER — ROCURONIUM BROMIDE 10 MG/ML
INJECTION, SOLUTION INTRAVENOUS AS NEEDED
Status: DISCONTINUED | OUTPATIENT
Start: 2024-07-11 | End: 2024-07-11 | Stop reason: SURG

## 2024-07-11 RX ORDER — METOCLOPRAMIDE HYDROCHLORIDE 5 MG/ML
5 INJECTION INTRAMUSCULAR; INTRAVENOUS EVERY 8 HOURS PRN
Status: DISCONTINUED | OUTPATIENT
Start: 2024-07-11 | End: 2024-07-16

## 2024-07-11 RX ORDER — HYDROMORPHONE HYDROCHLORIDE 1 MG/ML
0.6 INJECTION, SOLUTION INTRAMUSCULAR; INTRAVENOUS; SUBCUTANEOUS EVERY 5 MIN PRN
Status: DISPENSED | OUTPATIENT
Start: 2024-07-11 | End: 2024-07-12

## 2024-07-11 RX ORDER — OXYCODONE HYDROCHLORIDE 5 MG/1
5 TABLET ORAL EVERY 4 HOURS PRN
Status: DISCONTINUED | OUTPATIENT
Start: 2024-07-11 | End: 2024-07-11

## 2024-07-11 RX ORDER — ACETAMINOPHEN 160 MG/5ML
1000 SOLUTION ORAL EVERY 8 HOURS SCHEDULED
Status: DISCONTINUED | OUTPATIENT
Start: 2024-07-11 | End: 2024-07-11

## 2024-07-11 RX ORDER — OXYCODONE HYDROCHLORIDE 5 MG/1
2.5 TABLET ORAL EVERY 4 HOURS PRN
Status: DISCONTINUED | OUTPATIENT
Start: 2024-07-11 | End: 2024-07-11

## 2024-07-11 RX ORDER — HEPARIN SODIUM 5000 [USP'U]/ML
5000 INJECTION, SOLUTION INTRAVENOUS; SUBCUTANEOUS EVERY 8 HOURS SCHEDULED
Status: DISCONTINUED | OUTPATIENT
Start: 2024-07-12 | End: 2024-07-11

## 2024-07-11 RX ORDER — HYDROMORPHONE HYDROCHLORIDE 1 MG/ML
0.4 INJECTION, SOLUTION INTRAMUSCULAR; INTRAVENOUS; SUBCUTANEOUS EVERY 2 HOUR PRN
Status: DISCONTINUED | OUTPATIENT
Start: 2024-07-11 | End: 2024-07-11

## 2024-07-11 RX ORDER — OXYCODONE HYDROCHLORIDE 5 MG/1
2.5 TABLET ORAL EVERY 4 HOURS PRN
Status: DISCONTINUED | OUTPATIENT
Start: 2024-07-11 | End: 2024-07-12

## 2024-07-11 RX ORDER — NALOXONE HYDROCHLORIDE 0.4 MG/ML
0.08 INJECTION, SOLUTION INTRAMUSCULAR; INTRAVENOUS; SUBCUTANEOUS AS NEEDED
Status: ACTIVE | OUTPATIENT
Start: 2024-07-11 | End: 2024-07-12

## 2024-07-11 RX ORDER — HYDROMORPHONE HYDROCHLORIDE 1 MG/ML
0.2 INJECTION, SOLUTION INTRAMUSCULAR; INTRAVENOUS; SUBCUTANEOUS EVERY 2 HOUR PRN
Status: DISCONTINUED | OUTPATIENT
Start: 2024-07-11 | End: 2024-07-11

## 2024-07-11 RX ORDER — HYDROMORPHONE HYDROCHLORIDE 1 MG/ML
0.2 INJECTION, SOLUTION INTRAMUSCULAR; INTRAVENOUS; SUBCUTANEOUS EVERY 2 HOUR PRN
Status: DISCONTINUED | OUTPATIENT
Start: 2024-07-11 | End: 2024-07-12

## 2024-07-11 RX ORDER — FAMOTIDINE 10 MG/ML
20 INJECTION, SOLUTION INTRAVENOUS DAILY
Status: DISCONTINUED | OUTPATIENT
Start: 2024-07-11 | End: 2024-07-16

## 2024-07-11 RX ORDER — METOCLOPRAMIDE HYDROCHLORIDE 5 MG/ML
5 INJECTION INTRAMUSCULAR; INTRAVENOUS EVERY 8 HOURS PRN
Status: DISCONTINUED | OUTPATIENT
Start: 2024-07-11 | End: 2024-07-11

## 2024-07-11 RX ORDER — SODIUM CHLORIDE, SODIUM LACTATE, POTASSIUM CHLORIDE, CALCIUM CHLORIDE 600; 310; 30; 20 MG/100ML; MG/100ML; MG/100ML; MG/100ML
INJECTION, SOLUTION INTRAVENOUS CONTINUOUS
Status: DISCONTINUED | OUTPATIENT
Start: 2024-07-11 | End: 2024-07-12

## 2024-07-11 RX ORDER — ENOXAPARIN SODIUM 100 MG/ML
30 INJECTION SUBCUTANEOUS DAILY
Status: DISCONTINUED | OUTPATIENT
Start: 2024-07-12 | End: 2024-07-12

## 2024-07-11 RX ORDER — FAMOTIDINE 20 MG/1
20 TABLET, FILM COATED ORAL DAILY
Status: DISCONTINUED | OUTPATIENT
Start: 2024-07-11 | End: 2024-07-16

## 2024-07-11 RX ORDER — LIDOCAINE HYDROCHLORIDE 10 MG/ML
INJECTION, SOLUTION EPIDURAL; INFILTRATION; INTRACAUDAL; PERINEURAL AS NEEDED
Status: DISCONTINUED | OUTPATIENT
Start: 2024-07-11 | End: 2024-07-11 | Stop reason: SURG

## 2024-07-11 RX ORDER — LABETALOL HYDROCHLORIDE 5 MG/ML
10 INJECTION, SOLUTION INTRAVENOUS EVERY 4 HOURS PRN
Status: DISCONTINUED | OUTPATIENT
Start: 2024-07-11 | End: 2024-07-16

## 2024-07-11 RX ORDER — ACETAMINOPHEN 500 MG
1000 TABLET ORAL EVERY 8 HOURS SCHEDULED
Status: DISCONTINUED | OUTPATIENT
Start: 2024-07-11 | End: 2024-07-16

## 2024-07-11 RX ORDER — ONDANSETRON 2 MG/ML
4 INJECTION INTRAMUSCULAR; INTRAVENOUS EVERY 6 HOURS PRN
Status: DISCONTINUED | OUTPATIENT
Start: 2024-07-11 | End: 2024-07-16

## 2024-07-11 RX ORDER — DEXAMETHASONE SODIUM PHOSPHATE 10 MG/ML
INJECTION, SOLUTION INTRAMUSCULAR; INTRAVENOUS AS NEEDED
Status: DISCONTINUED | OUTPATIENT
Start: 2024-07-11 | End: 2024-07-11 | Stop reason: SURG

## 2024-07-11 RX ORDER — MELATONIN
3 NIGHTLY PRN
Status: DISCONTINUED | OUTPATIENT
Start: 2024-07-11 | End: 2024-07-16

## 2024-07-11 RX ORDER — SODIUM CHLORIDE, SODIUM LACTATE, POTASSIUM CHLORIDE, CALCIUM CHLORIDE 600; 310; 30; 20 MG/100ML; MG/100ML; MG/100ML; MG/100ML
INJECTION, SOLUTION INTRAVENOUS CONTINUOUS
Status: DISCONTINUED | OUTPATIENT
Start: 2024-07-11 | End: 2024-07-11

## 2024-07-11 RX ORDER — HYDROMORPHONE HYDROCHLORIDE 1 MG/ML
INJECTION, SOLUTION INTRAMUSCULAR; INTRAVENOUS; SUBCUTANEOUS
Status: COMPLETED
Start: 2024-07-11 | End: 2024-07-11

## 2024-07-11 RX ORDER — HYDROMORPHONE HYDROCHLORIDE 1 MG/ML
0.4 INJECTION, SOLUTION INTRAMUSCULAR; INTRAVENOUS; SUBCUTANEOUS EVERY 5 MIN PRN
Status: DISPENSED | OUTPATIENT
Start: 2024-07-11 | End: 2024-07-12

## 2024-07-11 RX ORDER — HYDROCODONE BITARTRATE AND ACETAMINOPHEN 5; 325 MG/1; MG/1
2 TABLET ORAL ONCE AS NEEDED
Status: ACTIVE | OUTPATIENT
Start: 2024-07-11 | End: 2024-07-11

## 2024-07-11 RX ORDER — HYDRALAZINE HYDROCHLORIDE 20 MG/ML
10 INJECTION INTRAMUSCULAR; INTRAVENOUS EVERY 4 HOURS PRN
Status: DISCONTINUED | OUTPATIENT
Start: 2024-07-11 | End: 2024-07-16

## 2024-07-11 RX ORDER — HYDROMORPHONE HYDROCHLORIDE 1 MG/ML
0.4 INJECTION, SOLUTION INTRAMUSCULAR; INTRAVENOUS; SUBCUTANEOUS EVERY 2 HOUR PRN
Status: DISCONTINUED | OUTPATIENT
Start: 2024-07-11 | End: 2024-07-12

## 2024-07-11 RX ORDER — LOSARTAN POTASSIUM 50 MG/1
50 TABLET ORAL DAILY
Status: DISCONTINUED | OUTPATIENT
Start: 2024-07-11 | End: 2024-07-16

## 2024-07-11 RX ORDER — CEFAZOLIN SODIUM 1 G/3ML
INJECTION, POWDER, FOR SOLUTION INTRAMUSCULAR; INTRAVENOUS AS NEEDED
Status: DISCONTINUED | OUTPATIENT
Start: 2024-07-11 | End: 2024-07-11 | Stop reason: SURG

## 2024-07-11 RX ORDER — OXYCODONE HYDROCHLORIDE 5 MG/1
5 TABLET ORAL EVERY 4 HOURS PRN
Status: DISCONTINUED | OUTPATIENT
Start: 2024-07-11 | End: 2024-07-12

## 2024-07-11 RX ORDER — ONDANSETRON 2 MG/ML
INJECTION INTRAMUSCULAR; INTRAVENOUS AS NEEDED
Status: DISCONTINUED | OUTPATIENT
Start: 2024-07-11 | End: 2024-07-11 | Stop reason: SURG

## 2024-07-11 RX ORDER — HYDROMORPHONE HYDROCHLORIDE 1 MG/ML
0.2 INJECTION, SOLUTION INTRAMUSCULAR; INTRAVENOUS; SUBCUTANEOUS EVERY 5 MIN PRN
Status: ACTIVE | OUTPATIENT
Start: 2024-07-11 | End: 2024-07-12

## 2024-07-11 RX ORDER — ACETAMINOPHEN 500 MG
1000 TABLET ORAL ONCE AS NEEDED
Status: ACTIVE | OUTPATIENT
Start: 2024-07-11 | End: 2024-07-11

## 2024-07-11 RX ADMIN — SODIUM CHLORIDE, SODIUM LACTATE, POTASSIUM CHLORIDE, CALCIUM CHLORIDE: 600; 310; 30; 20 INJECTION, SOLUTION INTRAVENOUS at 14:31:00

## 2024-07-11 RX ADMIN — SODIUM CHLORIDE, SODIUM LACTATE, POTASSIUM CHLORIDE, CALCIUM CHLORIDE: 600; 310; 30; 20 INJECTION, SOLUTION INTRAVENOUS at 18:09:00

## 2024-07-11 RX ADMIN — ONDANSETRON 4 MG: 2 INJECTION INTRAMUSCULAR; INTRAVENOUS at 17:09:00

## 2024-07-11 RX ADMIN — ROCURONIUM BROMIDE 20 MG: 10 INJECTION, SOLUTION INTRAVENOUS at 15:24:00

## 2024-07-11 RX ADMIN — DEXAMETHASONE SODIUM PHOSPHATE 4 MG: 4 MG/ML VIAL (ML) INJECTION at 15:10:00

## 2024-07-11 RX ADMIN — ROCURONIUM BROMIDE 5 MG: 10 INJECTION, SOLUTION INTRAVENOUS at 16:14:00

## 2024-07-11 RX ADMIN — ROCURONIUM BROMIDE 30 MG: 10 INJECTION, SOLUTION INTRAVENOUS at 14:38:00

## 2024-07-11 RX ADMIN — CEFAZOLIN SODIUM 2 G: 1 INJECTION, POWDER, FOR SOLUTION INTRAMUSCULAR; INTRAVENOUS at 14:42:00

## 2024-07-11 RX ADMIN — LIDOCAINE HYDROCHLORIDE 50 MG: 10 INJECTION, SOLUTION EPIDURAL; INFILTRATION; INTRACAUDAL; PERINEURAL at 14:38:00

## 2024-07-11 RX ADMIN — ROCURONIUM BROMIDE 5 MG: 10 INJECTION, SOLUTION INTRAVENOUS at 16:39:00

## 2024-07-11 RX ADMIN — DEXAMETHASONE SODIUM PHOSPHATE 2 MG: 10 INJECTION, SOLUTION INTRAMUSCULAR; INTRAVENOUS at 17:50:00

## 2024-07-11 NOTE — ANESTHESIA PREPROCEDURE EVALUATION
PRE-OP EVALUATION    Patient Name: Bibi Diaz    Admit Diagnosis: Colonic diverticular abscess [K57.20]    Pre-op Diagnosis: Colonic diverticular abscess [K57.20]    EXPLORATORY LAPAROTOMY, LOW ANTERIOR COLON RESECTION, COLOSTOMY CREATION    Anesthesia Procedure: EXPLORATORY LAPAROTOMY, LOW ANTERIOR COLON RESECTION, COLOSTOMY CREATION    Surgeons and Role:     * Ileana Brooks MD - Primary    Pre-op vitals reviewed.  Temp: 100.2 °F (37.9 °C)  Pulse: 71  Resp: 18  BP: 133/52  SpO2: 97 %  Body mass index is 15 kg/m².    Current medications reviewed.  Hospital Medications:   [COMPLETED] sodium chloride 0.9 % IV bolus 1,000 mL  1,000 mL Intravenous Once    [COMPLETED] piperacillin-tazobactam (Zosyn) 4.5 g in dextrose 5% 100 mL IVPB-ADDV  4.5 g Intravenous Once    [COMPLETED] iopamidol 76% (ISOVUE-370) injection for power injector  100 mL Intravenous ONCE PRN    [Transfer Hold] albuterol (Ventolin HFA) 108 (90 Base) MCG/ACT inhaler 2 puff  2 puff Inhalation Q6H PRN    [Transfer Hold] clonazePAM (KlonoPIN) tab 0.5 mg  0.5 mg Oral BID    [Transfer Hold] fluticasone propionate (Flonase) 50 MCG/ACT nasal suspension 2 spray  2 spray Each Nare Daily    [Transfer Hold] losartan (Cozaar) tab 25 mg  25 mg Oral Daily    [Transfer Hold] mirtazapine (Remeron) tab 30 mg  30 mg Oral Nightly    [Transfer Hold] traMADol (Ultram) tab 50 mg  50 mg Oral Q12H PRN    [Transfer Hold] fluticasone furoate-vilanterol (Breo Ellipta) 200-25 MCG/ACT inhaler 1 puff  1 puff Inhalation Daily    [COMPLETED] bisacodyl (Dulcolax) 10 MG rectal suppository 10 mg  10 mg Rectal Once    piperacillin-tazobactam (Zosyn) 3.375 g in dextrose 5% 100 mL IVPB-ADDV  3.375 g Intravenous Q8H KYLE    sodium chloride 0.9% infusion   Intravenous Continuous    [Transfer Hold] acetaminophen (Tylenol Extra Strength) tab 500 mg  500 mg Oral Q4H PRN    [Transfer Hold] ondansetron (Zofran) 4 MG/2ML injection 4 mg  4 mg Intravenous Q6H PRN    [Transfer Hold]  metoclopramide (Reglan) 5 mg/mL injection 5 mg  5 mg Intravenous Q8H PRN    [Transfer Hold] polyethylene glycol (PEG 3350) (Miralax) 17 g oral packet 17 g  17 g Oral Daily PRN    [Transfer Hold] sennosides (Senokot) tab 17.2 mg  17.2 mg Oral Nightly PRN    [Transfer Hold] bisacodyl (Dulcolax) 10 MG rectal suppository 10 mg  10 mg Rectal Daily PRN    [Transfer Hold] fleet enema (Fleet) 7-19 GM/118ML rectal enema 133 mL  1 enema Rectal Once PRN    [COMPLETED] enoxaparin (Lovenox) 30 MG/0.3ML SUBQ injection 30 mg  30 mg Subcutaneous Once       Outpatient Medications:     Medications Prior to Admission   Medication Sig Dispense Refill Last Dose    clonazePAM 0.5 MG Oral Tab Take 1 tablet (0.5 mg total) by mouth in the morning and 1 tablet (0.5 mg total) before bedtime. 28 tablet 0 7/10/2024    ondansetron 4 MG Oral Tablet Dispersible Take 1 tablet (4 mg total) by mouth every 8 (eight) hours as needed for Nausea. 30 tablet 0 7/10/2024 at 0900    multivitamin with minerals Oral Tab Take 1 tablet by mouth daily. 30 tablet 1 Past Week    mirtazapine 30 MG Oral Tab Take 1 tablet (30 mg total) by mouth nightly. 90 tablet 1 2024 at 2100    fluticasone propionate 50 MCG/ACT Nasal Suspension 2 sprays by Each Nare route daily. 1 each 1 Past Week    losartan 25 MG Oral Tab Take 1 tablet (25 mg total) by mouth daily. 90 tablet 1 Past Week    SYMBICORT 160-4.5 MCG/ACT Inhalation Aerosol INHALE 2 PUFFS BY MOUTH TWICE DAILY 10.2 g 11 7/10/2024    TRIAMCINOLONE 0.1 % External Cream APPLY TOPICALLY TO THE AFFECTED AREA TWICE DAILY AS NEEDED (Patient taking differently: as needed.) 60 g 3 Past Week    acetaminophen 500 MG Oral Tab Take 1 tablet (500 mg total) by mouth every 6 (six) hours as needed for Pain.   2024 at 2200    [] dextrose 5% SOLN 100 mL with piperacillin-tazobactam 3.375 (3-0.375) g SOLR 3.375 g Inject 3.375 g into the vein every 8 (eight) hours for 7 days. Remove midline with completion of ivabx   0     traMADol 50 MG Oral Tab Take 1 tablet (50 mg total) by mouth every 12 (twelve) hours as needed. 12 tablet 0     meclizine 12.5 MG Oral Tab Take 1 tablet (12.5 mg total) by mouth 3 (three) times daily as needed. 30 tablet 1     albuterol (PROAIR HFA) 108 (90 Base) MCG/ACT Inhalation Aero Soln INHALE 2 PUFFS BY MOUTH EVERY 6 HOURS AS NEEDED FOR WHEEZING 8.5 g 2 More than a month    Cholecalciferol (VITAMIN D-3 OR) Take 25 mcg by mouth daily with breakfast.       DUPIXENT 300 MG/2ML Subcutaneous Solution Prefilled Syringe Taking every 2 weeks   5/31/2024       Allergies: Avelox [moxifloxacin hcl in nacl], Avelox [moxifloxacin hydrochloride], Amoxicillin, Statins, Sulfa antibiotics, Dander, Morphine, Diphenhydramine, Dust, Latex, and Sulfa drugs cross reactors      Anesthesia Evaluation    Patient summary reviewed.    Anesthetic Complications  (+) history of anesthetic complications  History of: PONV       GI/Hepatic/Renal      (+) GERD                           Cardiovascular                  (+) hypertension                                     Endo/Other    Negative endo/other ROS.                              Pulmonary      (+) asthma  (+) COPD                   Neuro/Psych      (+) depression  (+) anxiety                          Past Surgical History:   Procedure Laterality Date    Cataract      Colonoscopy      Colonoscopy N/A 3/21/2024    Procedure: COLONOSCOPY;  Surgeon: Delon Ashford DO;  Location:  ENDOSCOPY    Colonoscopy & polypectomy  09/2014    multiple polyps; tics; repeat 3 yrs    Colonoscopy,remv lesn,snare N/A 09/22/2014    Procedure: ESOPHAGOGASTRODUODENOSCOPY, COLONOSCOPY, POSSIBLE BIOPSY, POSSIBLE POLYPECTOMY 59545,88609;  Surgeon: Slade Ivan MD;  Location: [facility]    Correct bunion,simple Bilateral     Cystotomy,excis vesical neck Left     Egd      Gastro - dmg  09/2014    stricture;     Oophorectomy Bilateral 2017    Other surgical history  02/27/2023    Robotic  repair of hiatal hernia and Nissen fundoplication    Patient documented not to have experienced any of the following events N/A 2014    Procedure: ESOPHAGOGASTRODUODENOSCOPY, COLONOSCOPY, POSSIBLE BIOPSY, POSSIBLE POLYPECTOMY 15807,[id number];  Surgeon: Slade Ivan MD;  Location: [facility]    Patient withough preoperative order for iv antibiotic surgical site infection prophylaxis. N/A 2014    Procedure: ESOPHAGOGASTRODUODENOSCOPY, COLONOSCOPY, POSSIBLE BIOPSY, POSSIBLE POLYPECTOMY 38361,[id number];  Surgeon: Slade Ivan MD;  Location: [facility]    Repair ing hernia,5+y/o,reducibl      Upper gi endoscopy,diagnosis N/A 2014    Procedure: ESOPHAGOGASTRODUODENOSCOPY, COLONOSCOPY, POSSIBLE BIOPSY, POSSIBLE POLYPECTOMY 30654,62743;  Surgeon: Slade Ivan MD;  Location: [facility]     Social History     Socioeconomic History    Marital status:    Tobacco Use    Smoking status: Former     Current packs/day: 0.00     Types: Cigarettes     Start date: 1970     Quit date: 1980     Years since quittin.8    Smokeless tobacco: Never    Tobacco comments:      1/2 pack per day. Social history shows \"Tobacco Use: Active\" and \"Never Smoker: Active\"   Vaping Use    Vaping status: Never Used   Substance and Sexual Activity    Alcohol use: No    Drug use: No   Other Topics Concern    Caffeine Concern Yes     Comment: \"1 cup of coffee and tea, and can of pop daily\"    Exercise Yes     Comment: walking     History   Drug Use No     Available pre-op labs reviewed.  Lab Results   Component Value Date    WBC 11.5 (H) 2024    RBC 3.11 (L) 2024    HGB 8.9 (L) 2024    HCT 26.1 (L) 2024    MCV 83.9 2024    MCH 28.6 2024    MCHC 34.1 2024    RDW 18.3 2024    .0 2024     Lab Results   Component Value Date     2024    K 3.5 2024     2024    CO2 19.0 (L) 2024    BUN 8 (L) 2024     CREATSERUM 0.74 07/11/2024    GLU 98 07/11/2024    CA 8.5 07/11/2024            Airway      Mallampati: I  Mouth opening: >3 FB  TM distance: > 6 cm  Neck ROM: full Cardiovascular    Cardiovascular exam normal.  Rhythm: regular  Rate: normal     Dental    Dentition appears grossly intact         Pulmonary    Pulmonary exam normal.                 Other findings            ASA: 3   Plan: general  NPO status verified and patient meets guidelines.    Post-procedure pain management plan discussed with surgeon and patient.    Comment: Options, risks and benefits of anesthesia as outlined in the anesthesia consent were reviewed with the patient. Risks and benefits of GA including sore throat, allergy, nausea, vomiting, dental trauma, pain management modalities were all discussed. Particularly the risk of dental trauma with weakened teeth or crowns, partials, fillings and any non natural teeth due to instrumentation of oral cavity and airway. Patient understands risks and verbally agreed to proceed. All questions answered.The consent was signed without further questions. Consent signed  by POA        Plan/risks discussed with: patient              Present on Admission:  **None**

## 2024-07-11 NOTE — PLAN OF CARE
Pt A&Ox3, resting in bed.  Resp: RA,   GI/: brief, PureWick; stool in IR drain from 6/25  Activity/Safety: SBA w/walker  Skin: intact (1L midline to LUE)  Pain: no pain reported at this time  Pt updated on and agreeable to POC; NPO for surg recommendations, IV fluids, IV abx

## 2024-07-11 NOTE — CONSULTS
ICU  Critical Care APRN Progress Note    NAME: Bibi Diaz - ROOM:  OR POOL ROOMS/EH OR P* - MRN: [id number] - Age: 78 year old - :1945    History Of Present Illness:  Bibi Diaz is a 78 year old female with PMHx significant for COPD, HTN, HLD, GERD, anxiety, depression who presented to the ED 7/10 with c/o generalized abdominal pain. Recent admission (2024) with diverticulitis, pericolonic abscess removal and VALERI drain placement. She was taken to the OR  for ex lap with low anterior resection of the rectosigmoid colon with Elizabeth's pouch and end colostomy, drainage of abdominal abscess and placement of bake drain. Arrives to ICU hemodynamically stable on room air for closer monitoring.     PMH:  Past Medical History:    Abdominal distention    Abdominal pain    Acquired hypothyroidism    Anxiety    Asthma (HCC)    Belching    Bloating    Chronic rhinitis    CKD (chronic kidney disease) stage 3, GFR 30-59 ml/min ([facility])    Constipation    COPD (chronic obstructive pulmonary disease) ([facility])    NO OXYGEN     Depression    Diarrhea, unspecified    Diverticulosis of large intestine    Emphysema    Esophageal reflux    Fatigue    Feeling lonely    Flatulence/gas pain/belching    Food intolerance    H/O bronchitis    Hay fever    Hemorrhoids    High blood pressure    History of depression    Hypercholesterolemia    Hypertension    Itch of skin    Migraines    Mild intermittent asthma without complication ([facility])    Nausea    Night sweats    Pneumonia    PONV (postoperative nausea and vomiting)    Pure hypercholesterolemia    Rash    Reflux    Sleep disturbance    Stress    Uncomfortable fullness after meals    Visual impairment    glasses    Vomiting    Wears glasses       Social Hx:  Social History     Socioeconomic History    Marital status:    Tobacco Use    Smoking status: Former     Current packs/day: 0.00     Types: Cigarettes     Start date: 1970     Quit date: 1980      Years since quittin.8    Smokeless tobacco: Never    Tobacco comments:      1/2 pack per day. Social history shows \"Tobacco Use: Active\" and \"Never Smoker: Active\"   Vaping Use    Vaping status: Never Used   Substance and Sexual Activity    Alcohol use: No    Drug use: No   Other Topics Concern    Caffeine Concern Yes     Comment: \"1 cup of coffee and tea, and can of pop daily\"    Exercise Yes     Comment: walking   Social History Narrative    ** Merged History Encounter **          Social Determinants of Health     Financial Resource Strain: Low Risk  (2023)    Financial Resource Strain     Difficulty of Paying Living Expenses: Not hard at all     Med Affordability: No   Food Insecurity: No Food Insecurity (7/10/2024)    Food Insecurity     Food Insecurity: Never true   Transportation Needs: No Transportation Needs (7/10/2024)    Transportation Needs     Lack of Transportation: No   Physical Activity: Inactive (2022)    Exercise Vital Sign     Days of Exercise per Week: 0 days     Minutes of Exercise per Session: 0 min   Stress: Stress Concern Present (2024)    Stress     Feeling of Stress : Yes    Social Connections   Housing Stability: Low Risk  (7/10/2024)    Housing Stability     Housing Instability: No       Family Hx:  Family History   Problem Relation Age of Onset    Colon Cancer Mother     Other (Other) Mother     Other (copd) Mother     Alcohol and Other Disorders Associated Father     Other (Other) Father     Other (emph) Father     Alcohol and Other Disorders Associated Son     Hypertension Sister     Prostate Cancer Brother     Hypertension Brother          Review of Systems:   A comprehensive 10 point review of systems was completed.  Pertinent positives and negatives noted in the HPI.    Current Facility-Administered Medications   Medication Dose Route Frequency    [Transfer Hold] albuterol (Ventolin HFA) 108 (90 Base) MCG/ACT inhaler 2 puff  2 puff Inhalation Q6H PRN    [Transfer  Hold] clonazePAM (KlonoPIN) tab 0.5 mg  0.5 mg Oral BID    [Transfer Hold] fluticasone propionate (Flonase) 50 MCG/ACT nasal suspension 2 spray  2 spray Each Nare Daily    [Transfer Hold] losartan (Cozaar) tab 25 mg  25 mg Oral Daily    [Transfer Hold] mirtazapine (Remeron) tab 30 mg  30 mg Oral Nightly    [Transfer Hold] traMADol (Ultram) tab 50 mg  50 mg Oral Q12H PRN    [Transfer Hold] fluticasone furoate-vilanterol (Breo Ellipta) 200-25 MCG/ACT inhaler 1 puff  1 puff Inhalation Daily    piperacillin-tazobactam (Zosyn) 3.375 g in dextrose 5% 100 mL IVPB-ADDV  3.375 g Intravenous Q8H KYLE    sodium chloride 0.9% infusion   Intravenous Continuous    [Transfer Hold] acetaminophen (Tylenol Extra Strength) tab 500 mg  500 mg Oral Q4H PRN    [Transfer Hold] ondansetron (Zofran) 4 MG/2ML injection 4 mg  4 mg Intravenous Q6H PRN    [Transfer Hold] metoclopramide (Reglan) 5 mg/mL injection 5 mg  5 mg Intravenous Q8H PRN    [Transfer Hold] polyethylene glycol (PEG 3350) (Miralax) 17 g oral packet 17 g  17 g Oral Daily PRN    [Transfer Hold] sennosides (Senokot) tab 17.2 mg  17.2 mg Oral Nightly PRN    [Transfer Hold] bisacodyl (Dulcolax) 10 MG rectal suppository 10 mg  10 mg Rectal Daily PRN    [Transfer Hold] fleet enema (Fleet) 7-19 GM/118ML rectal enema 133 mL  1 enema Rectal Once PRN     Facility-Administered Medications Ordered in Other Encounters   Medication Dose Route Frequency    lactated ringers infusion   Intravenous Continuous PRN    propofol (Diprivan) 10 MG/ML injection   Intravenous PRN    lidocaine PF (Xylocaine-MPF) 1% injection   Injection PRN    rocuronium (Zemuron) 50 mg/5mL injection   Intravenous PRN    ceFAZolin (Ancef) injection   Intravenous PRN    dexamethasone (Decadron) 4 MG/ML injection   Intravenous PRN    fentaNYL (Sublimaze) 50 mcg/mL injection   Intravenous PRN       OBJECTIVE  Vitals:  /52 (BP Location: Left arm)   Pulse 71   Temp 100.2 °F (37.9 °C) (Oral)   Resp 18   Ht 165.1 cm  (5' 5\")   Wt 90 lb 2.7 oz (40.9 kg)   SpO2 97%   BMI 15.00 kg/m²                Physical Exam:    General Appearance: Drowsy, following commands  Neck: No JVD, neck supple, no adenopathy, trachea midline, no carotid bruits  Lungs: Clear to auscultation bilaterally, respirations unlabored  Heart: Regular rate and rhythm, S1 and S2 normal, no murmur, rub or gallop  Abdomen: Soft, incisional tenderness, midline incision, VALERI, colostomy  Extremities: Extremities normal, atraumatic, no cyanosis or edema, capillary refill <3 sec.    Pulses: 2+ and symmetric all extremities  Skin: Skin color pale. Midline incision dressing c/d/i      Data this admission:  CT ABDOMEN+PELVIS(CONTRAST ONLY)(CPT=74177)    Result Date: 7/10/2024  CONCLUSION:   1. There has been increase in the pericolonic inflammatory stranding and phlegmonous tissue thickening around the sigmoid colon when compared to the prior CT from 6/21/2024.  Consistent with continued/worsening colitis and/or diverticulitis.  Two focal measurable structures are indeterminate between small abscess cavities or prominent inflamed diverticula.  Nothing is conclusive for definitive abscess.  Small free fluid pelvis without large or drainable ascites.  No sign of bowel obstruction or free air.  2. In addition to the intraperitoneal inflammatory process, there is now thickening of the soft tissue around the tract for the abdominal wall drain on the left along with adjacent fatty stranding of the abdominal wall, may reflect extension of inflammation/infection in this region without measurable abscess.  Advise correlation for any potential abnormal drainage from this area.  3. Thickened left-sided urinary bladder where it abuts the sigmoid and pericolonic inflammatory phlegmonous process, probably secondarily inflamed/infected urinary bladder.  Advise correlation with symptoms and urinalysis.   LOCATION:  [place]   Dictated by (CST): Colten Deshpande MD on 7/10/2024 at 4:50 PM      Finalized by (CST): Colten Deshpande MD on 7/10/2024 at 5:00 PM       CT ABDOMEN+PELVIS(CONTRAST ONLY)(CPT=74177)    Result Date: 6/21/2024  CONCLUSION:  1. Increased large amount of stool in rectosigmoid colon with persistent mural thickening and pericolonic inflammatory changes consistent with stercoral colitis.  The largest pericolonic abscess laterally has resolved with an indwelling drain in place.  2. Two small pericolonic abscesses are again seen, the small abscess inferior to the sigmoid colon is decreased in size and the tiny abscess lateral to the sigmoid colon is not significantly changed.  These would not be amenable to percutaneous aspiration or drainage.   LOCATION:  [id number]   Dictated by (CST): Dexter Glasgow MD on 6/21/2024 at 6:43 PM     Finalized by (CST): Dexter Glasgow MD on 6/21/2024 at 6:53 PM       MRI BRAIN (CPT=70551)    Result Date: 6/19/2024  CONCLUSION:  1. No acute infarct, acute intracranial hemorrhage, or hydrocephalus. 2. Mild chronic small vessel ischemic disease with punctate chronic infarcts at the basal ganglia.   LOCATION:  [facility]   Dictated by (CST): Stromberg, LeRoy, MD on 6/19/2024 at 8:13 PM     Finalized by (CST): Stromberg, LeRoy, MD on 6/19/2024 at 8:19 PM       XR CHEST AP PORTABLE  (CPT=71045)    Result Date: 6/19/2024  CONCLUSION:  No acute disease.    LOCATION:  [facility]      Dictated by (CST): Saurabh Can MD on 6/19/2024 at 2:36 PM     Finalized by (CST): Saurabh Can MD on 6/19/2024 at 2:36 PM       CT DRAIN ABSCESS PERITONEAL (CPT=49406)    Result Date: 6/18/2024  CONCLUSION:  CT-guided left lower quadrant abscess drainage was performed without complication.   LOCATION:  [facility]   Dictated by (CST): Luke Gautam MD on 6/18/2024 at 5:39 PM     Finalized by (CST): Luke Gautam MD on 6/18/2024 at 5:51 PM       CT ABDOMEN+PELVIS(CONTRAST ONLY)(CPT=74177)    Result Date: 6/17/2024  CONCLUSION:  Hyperdense desiccated appearing stool in the sigmoid colon with  stercoral colitis, and pericolonic abscess formations, multiple.  The largest pericolonic abscess measures 4.0 cm.   LOCATION:  [facility]   Dictated by (CST): Colten Deshpande MD on 6/17/2024 at 11:11 PM     Finalized by (CST): Colten Deshpande MD on 6/17/2024 at 11:17 PM       CT BRAIN OR HEAD (22213)    Result Date: 6/17/2024  CONCLUSION:  Chronic changes in the brain, as described, but no acute intracranial bleed, or other acute intracranial process identified.    LOCATION:  [facility]   Dictated by (CST): Colten Deshpande MD on 6/17/2024 at 7:23 PM     Finalized by (CST): Colten Deshpande MD on 6/17/2024 at 7:25 PM         Labs:  Lab Results   Component Value Date    WBC 11.5 07/11/2024    HGB 8.9 07/11/2024    HCT 26.1 07/11/2024    .0 07/11/2024    CREATSERUM 0.74 07/11/2024    BUN 8 07/11/2024     07/11/2024    K 3.5 07/11/2024     07/11/2024    CO2 19.0 07/11/2024    GLU 98 07/11/2024    CA 8.5 07/11/2024    ALB 2.4 07/11/2024    ALKPHO 56 07/11/2024    BILT 0.4 07/11/2024    TP 5.9 07/11/2024    AST 20 07/11/2024    ALT 10 07/11/2024           Assessment/Plan:  POD #0 s/p ex lap, low anterior resection of the rectosigmoid colon with Elizabeth's puch and end colostomy, drainage of abdominal abscess and placement of ailyn drain  - Post op labs pending  - NPO  - IVF  - Pain management  - Ostomy care  - VALERI to bulb suction  - Zosyn (7/10- )    Leukocytosis, likely 2/2 to above  - low grade fevers  - Blood cultures pending  - Cdiff negative  - Antibiotics as above    History of HTN  - Home meds on hold, resume as able     Anxiety/depression  - Resume home meds as able    F/E/N  - IVF  - Replete electrolytes per protocol  - NPO    Proph  - SQ lovenox when ok with surgery  - SCDs  - PT/OT    Dispo  - DNAR select  - ICU monitoring    Plan of care discussed with intensivist, Dr. Gonsalez.    A total of 35 minutes of critical care time (exclusive of billable procedures) was administered. This involved direct  patient intervention, complex decision making, and/or extensive discussions (>50% face to face time) with the patient, family, and clinical staff.    Connie Milian [facility]  Critical Care  [id number]

## 2024-07-11 NOTE — H&P
[facility]     Bibi Diaz Patient Status:  Inpatient    1945 MRN [id number]   Location [facility] 3NW-A Attending Win Brothers MD   Hosp Day # 0 PCP Rick Shannon DO     Chief Complaint: Abdominal pain    Subjective:    History of Present Illness:     Bibi Diaz is a 78 year old female with a history of COPD, essential hypertension, dyslipidemia, GERD, anxiety, depression who presents with abdominal pain. Patient recently admitted for abdominal abscess sp drain placement. She was discharged on IV abx to SNF. Patient has since returned home. Last night around 9pm patient noted change in output from drain and began to have worsening abdominal pain in lower quadrants. Pain has been intermittent in nature. She admits to nausea, no vomiting. No BM. Poor PO intake.     History/Other:    Past Medical History:  Past Medical History:    Abdominal distention    Abdominal pain    Acquired hypothyroidism    Anxiety    Asthma (HCC)    Belching    Bloating    Chronic rhinitis    CKD (chronic kidney disease) stage 3, GFR 30-59 ml/min ([facility])    Constipation    COPD (chronic obstructive pulmonary disease) ([facility])    NO OXYGEN     Depression    Diarrhea, unspecified    Diverticulosis of large intestine    Emphysema    Esophageal reflux    Fatigue    Feeling lonely    Flatulence/gas pain/belching    Food intolerance    H/O bronchitis    Hay fever    Hemorrhoids    High blood pressure    History of depression    Hypercholesterolemia    Hypertension    Itch of skin    Migraines    Mild intermittent asthma without complication ([facility])    Nausea    Night sweats    Pneumonia    PONV (postoperative nausea and vomiting)    Pure hypercholesterolemia    Rash    Reflux    Sleep disturbance    Stress    Uncomfortable fullness after meals    Visual impairment    glasses    Vomiting    Wears glasses     Past Surgical History:   Past Surgical History:   Procedure Laterality Date    Cataract       Colonoscopy      Colonoscopy N/A 3/21/2024    Procedure: COLONOSCOPY;  Surgeon: Delon Ashford DO;  Location:  ENDOSCOPY    Colonoscopy & polypectomy  09/2014    multiple polyps; tics; repeat 3 yrs    Colonoscopy,remv lesn,snare N/A 09/22/2014    Procedure: ESOPHAGOGASTRODUODENOSCOPY, COLONOSCOPY, POSSIBLE BIOPSY, POSSIBLE POLYPECTOMY 30217,70498;  Surgeon: Slade Ivan MD;  Location: [facility]    Correct bunion,simple Bilateral     Cystotomy,excis vesical neck Left     Egd      Gastro - dmg  09/2014    stricture; HH    Oophorectomy Bilateral 2017    Other surgical history  02/27/2023    Robotic repair of hiatal hernia and Nissen fundoplication    Patient documented not to have experienced any of the following events N/A 09/22/2014    Procedure: ESOPHAGOGASTRODUODENOSCOPY, COLONOSCOPY, POSSIBLE BIOPSY, POSSIBLE POLYPECTOMY 52443,35460;  Surgeon: Slade Ivan MD;  Location: [facility]    Patient withough preoperative order for iv antibiotic surgical site infection prophylaxis. N/A 09/22/2014    Procedure: ESOPHAGOGASTRODUODENOSCOPY, COLONOSCOPY, POSSIBLE BIOPSY, POSSIBLE POLYPECTOMY 85083,29122;  Surgeon: Slade Ivan MD;  Location: [facility]    Repair ing hernia,5+y/o,reducibl      Upper gi endoscopy,diagnosis N/A 09/22/2014    Procedure: ESOPHAGOGASTRODUODENOSCOPY, COLONOSCOPY, POSSIBLE BIOPSY, POSSIBLE POLYPECTOMY 47439,82798;  Surgeon: Slade Ivan MD;  Location: [facility]      Family History:   Family History   Problem Relation Age of Onset    Colon Cancer Mother     Other (Other) Mother     Other (copd) Mother     Alcohol and Other Disorders Associated Father     Other (Other) Father     Other (emph) Father     Alcohol and Other Disorders Associated Son     Hypertension Sister     Prostate Cancer Brother     Hypertension Brother      Social History:    reports that she quit smoking about 43 years ago. Her smoking use included cigarettes. She started  smoking about 53 years ago. She has never used smokeless tobacco. She reports that she does not drink alcohol and does not use drugs.     Allergies:   Allergies   Allergen Reactions    Avelox [Moxifloxacin Hcl In Nacl] SWELLING    Avelox [Moxifloxacin Hydrochloride] TONGUE SWELLING     \"TABS\"    Amoxicillin NAUSEA AND VOMITING     Vomiting.    Statins Myopathy    Sulfa Antibiotics RASH and ITCHING    Dander OTHER (SEE COMMENTS)     \"Animals\": runny nose, watery eyes, itching    Morphine OTHER (SEE COMMENTS)     Word finding difficulties    Diphenhydramine Runny nose     \"Animals\": runny nose, watery eyes, itching    Dust Runny nose    Latex RASH    Sulfa Drugs Cross Reactors RASH and ITCHING       Medications:    Current Facility-Administered Medications on File Prior to Encounter   Medication Dose Route Frequency Provider Last Rate Last Admin    [COMPLETED] sorbitol 70 % oral solution 30 mL  30 mL Oral Once Margot Blanco MD   30 mL at 06/22/24 1653    [COMPLETED] iopamidol 76% (ISOVUE-370) injection for power injector  65 mL Intravenous ONCE PRN Margot Blanco MD   65 mL at 06/21/24 1830    [COMPLETED] potassium chloride (Klor-Con M20) tab 40 mEq  40 mEq Oral Once Margot Blanco MD   40 mEq at 06/20/24 2156    [COMPLETED] potassium chloride 40 mEq in 250mL sodium chloride 0.9% IVPB premix  40 mEq Intravenous Once Rich Jesus MD 62.5 mL/hr at 06/18/24 1028 40 mEq at 06/18/24 1028    [COMPLETED] sodium chloride 0.9 % IV bolus 500 mL  500 mL Intravenous Once Jarrod Gonzalez MD   Stopped at 06/17/24 1831    [COMPLETED] iopamidol 76% (ISOVUE-370) injection for power injector  65 mL Intravenous ONCE PRN Jarrod Gonzalez MD   65 mL at 06/17/24 2235    [COMPLETED] piperacillin-tazobactam (Zosyn) 4.5 g in dextrose 5% 100 mL IVPB-ADDV  4.5 g Intravenous Once Jarrod Gonzalez  mL/hr at 06/17/24 2326 4.5 g at 06/17/24 2326    [COMPLETED] iron sucrose (Venofer) 20 mg/mL injection 200 mg  200 mg Intravenous Once  Marcela Dangelo MD   200 mg at 06/12/24 1241    [COMPLETED] acetaminophen (Tylenol Extra Strength) tab 1,000 mg  1,000 mg Oral Once Tyrone Kumari MD   1,000 mg at 06/10/24 1700    [COMPLETED] HYDROmorphone (Dilaudid) 1 MG/ML injection 0.5 mg  0.5 mg Intravenous Q30 Min PRN Tyrone Kumari MD   0.5 mg at 06/12/24 0902    [COMPLETED] sodium chloride 0.9% infusion 1,000 mL  1,000 mL Intravenous Once Tyrone Kumari  mL/hr at 06/10/24 2010 1,000 mL at 06/10/24 2010    [COMPLETED] ondansetron (Zofran) 4 MG/2ML injection 4 mg  4 mg Intravenous Once Tyrone Kumari MD   4 mg at 06/10/24 2005    [COMPLETED] iopamidol 76% (ISOVUE-370) injection for power injector  60 mL Intravenous ONCE PRN Tyrone Kumari MD   60 mL at 06/10/24 1959    [COMPLETED] piperacillin-tazobactam (Zosyn) 4.5 g in dextrose 5% 100 mL IVPB-ADDV  4.5 g Intravenous Once Tyrone Kumari MD   Stopped at 06/10/24 2122    [COMPLETED] ondansetron (Zofran) 4 MG/2ML injection 4 mg  4 mg Intravenous Once Tal Anderson MD   4 mg at 05/13/24 1657    [COMPLETED] pantoprazole (Protonix) 40 mg in sodium chloride 0.9% PF 10 mL IV push  40 mg Intravenous Once Tal Anderson MD   40 mg at 05/13/24 1657    [COMPLETED] acetaminophen (Tylenol Extra Strength) tab 1,000 mg  1,000 mg Oral Once Tal Anderson MD   1,000 mg at 05/13/24 1657    [COMPLETED] iopamidol 76% (ISOVUE-370) injection for power injector  75 mL Intravenous ONCE PRN Tal Anderson MD   75 mL at 05/13/24 1803    [COMPLETED] ketorolac (Toradol) 15 MG/ML injection 15 mg  15 mg Intravenous Once Tal Anderson MD   15 mg at 05/13/24 1838     Current Outpatient Medications on File Prior to Encounter   Medication Sig Dispense Refill    clonazePAM 0.5 MG Oral Tab Take 1 tablet (0.5 mg total) by mouth in the morning and 1 tablet (0.5 mg total) before bedtime. 28 tablet 0    ondansetron 4 MG Oral Tablet Dispersible Take 1 tablet (4 mg total) by mouth every 8 (eight) hours as needed for Nausea. 30  tablet 0    multivitamin with minerals Oral Tab Take 1 tablet by mouth daily. 30 tablet 1    mirtazapine 30 MG Oral Tab Take 1 tablet (30 mg total) by mouth nightly. 90 tablet 1    fluticasone propionate 50 MCG/ACT Nasal Suspension 2 sprays by Each Nare route daily. 1 each 1    losartan 25 MG Oral Tab Take 1 tablet (25 mg total) by mouth daily. 90 tablet 1    SYMBICORT 160-4.5 MCG/ACT Inhalation Aerosol INHALE 2 PUFFS BY MOUTH TWICE DAILY 10.2 g 11    TRIAMCINOLONE 0.1 % External Cream APPLY TOPICALLY TO THE AFFECTED AREA TWICE DAILY AS NEEDED (Patient taking differently: as needed.) 60 g 3    acetaminophen 500 MG Oral Tab Take 1 tablet (500 mg total) by mouth every 6 (six) hours as needed for Pain.      [] dextrose 5% SOLN 100 mL with piperacillin-tazobactam 3.375 (3-0.375) g SOLR 3.375 g Inject 3.375 g into the vein every 8 (eight) hours for 7 days. Remove midline with completion of ivabx  21 each 0    traMADol 50 MG Oral Tab Take 1 tablet (50 mg total) by mouth every 12 (twelve) hours as needed. 12 tablet 0    meclizine 12.5 MG Oral Tab Take 1 tablet (12.5 mg total) by mouth 3 (three) times daily as needed. 30 tablet 1    albuterol (PROAIR HFA) 108 (90 Base) MCG/ACT Inhalation Aero Soln INHALE 2 PUFFS BY MOUTH EVERY 6 HOURS AS NEEDED FOR WHEEZING 8.5 g 2    Cholecalciferol (VITAMIN D-3 OR) Take 25 mcg by mouth daily with breakfast.      DUPIXENT 300 MG/2ML Subcutaneous Solution Prefilled Syringe Taking every 2 weeks         Review of Systems:   A comprehensive review of systems was completed.    Pertinent positives and negatives noted in the HPI.    Objective:   Physical Exam:    /62 (BP Location: Left arm)   Pulse 77   Temp 98.5 °F (36.9 °C) (Oral)   Resp 24   Ht 5' 5\" (1.651 m)   SpO2 98%   BMI 14.08 kg/m²   General: No acute distress, Alert  Respiratory: No rhonchi, no wheezes  Cardiovascular: S1, S2. Regular rate and rhythm  Abdomen: Soft, BS + drain noted with fecal matter around drain,  in drain   Extremities: No edema      Results:    Labs:      Labs Last 24 Hours:    Recent Labs   Lab 07/10/24  1344   RBC 3.65*   HGB 10.3*   HCT 30.9*   MCV 84.7   MCH 28.2   MCHC 33.3   RDW 18.5   NEPRELIM 13.14*   WBC 16.7*   .0       Recent Labs   Lab 07/10/24  1345   *   BUN 16   CREATSERUM 0.90   EGFRCR 65   CA 9.6   ALB 3.0*   *   K 3.7      CO2 23.0   ALKPHO 71   AST 15   ALT 14   BILT 0.6   TP 7.2       Lab Results   Component Value Date    PT 12.6 01/31/2013    PT 13.2 08/02/2011    INR 1.35 (H) 06/18/2024    INR 1.05 03/20/2024    INR 0.99 12/29/2022       No results for input(s): \"TROP\", \"TROPHS\", \"CK\" in the last 168 hours.    No results for input(s): \"TROP\", \"PBNP\" in the last 168 hours.    No results for input(s): \"PCT\" in the last 168 hours.    Imaging: Imaging data reviewed in Epic.    Assessment & Plan:      #Abdominal pain d/t abdominal abscess  #Leukocytosis  -Admit  -CLD today, NPO after midnight  -IVF  -IV abx  -Pain control  -OR tomorrow  -Surgical consult     #Constipation  -Supp x 1    #Anemia  -Monitor H&H    #Hyponatremia  -IVF  -Monitor electrolytes    #COPD  -BD    #Essential hypertension  -Losartan  -Monitor hemodynamics    #Anxiety    #Depression    #DVT Px: Lovenox x 1 today    Plan of care discussed with patient, family, ER and surgical team.    Kranthi Pitts MD    Supplementary Documentation:     The 21st Century Cures Act makes medical notes like these available to patients in the interest of transparency. Please be advised this is a medical document. Medical documents are intended to carry relevant information, facts as evident, and the clinical opinion of the practitioner. The medical note is intended as peer to peer communication and may appear blunt or direct. It is written in medical language and may contain abbreviations or verbiage that are unfamiliar.               **Certification      PHYSICIAN Certification of Need for Inpatient Hospitalization  - Initial Certification    Patient will require inpatient services that will reasonably be expected to span two midnight's based on the clinical documentation in H+P.   Based on patients current state of illness, I anticipate that, after discharge, patient will require TBD.

## 2024-07-11 NOTE — PLAN OF CARE
Pt A&O x3 and drowsy. Tolerating clear liquids. Denied chest pain and shortness of breath. Drain noted to abdomen, brown liquid noted. IVF infusing. To remain NPO after midnight for procedure.

## 2024-07-11 NOTE — PROGRESS NOTES
[facility]   part of [facility]     Hospitalist Progress Note     Bibi Diaz Patient Status:  Inpatient    1945 MRN [id number]   Location [facility] 3NW-A Attending Win Brothers MD   Hosp Day # 1 PCP Rick Shannon DO     Chief Complaint: abd pain     Subjective:     Patient with abd pain, no N/V, Tm 101    Objective:    Review of Systems:   A comprehensive review of systems was completed; pertinent positive and negatives stated in subjective.    Vital signs:  Temp:  [97.4 °F (36.3 °C)-101 °F (38.3 °C)] 98.2 °F (36.8 °C)  Pulse:  [70-82] 70  Resp:  [18-24] 18  BP: (127-154)/(51-72) 141/53  SpO2:  [94 %-99 %] 94 %    Physical Exam:    General: No acute distress  Respiratory: No wheezes, no rhonchi  Cardiovascular: S1, S2, regular rate and rhythm  Abdomen: Soft, Non-tender, non-distended, positive bowel sounds  Neuro: No new focal deficits.   Extremities: No edema      Diagnostic Data:    Labs:  Recent Labs   Lab 07/10/24  1344 24  0554   WBC 16.7* 11.5*   HGB 10.3* 8.9*   MCV 84.7 83.9   .0 298.0       Recent Labs   Lab 07/10/24  1345 24  0554   * 98   BUN 16 8*   CREATSERUM 0.90 0.74   CA 9.6 8.5   ALB 3.0* 2.4*   * 137   K 3.7 3.5    109   CO2 23.0 19.0*   ALKPHO 71 56   AST 15 20   ALT 14 10*   BILT 0.6 0.4   TP 7.2 5.9*       Estimated Creatinine Clearance: 39.8 mL/min (based on SCr of 0.74 mg/dL).    No results for input(s): \"TROP\", \"TROPHS\", \"CK\" in the last 168 hours.    No results for input(s): \"PTP\", \"INR\" in the last 168 hours.               Microbiology    No results found for this visit on 07/10/24.      Imaging: Reviewed in Epic.    Medications:    clonazePAM  0.5 mg Oral BID    fluticasone propionate  2 spray Each Nare Daily    losartan  25 mg Oral Daily    mirtazapine  30 mg Oral Nightly    fluticasone furoate-vilanterol  1 puff Inhalation Daily    piperacillin-tazobactam  3.375 g Intravenous Q8H [facility]       Assessment & Plan:       #Abdominal pain d/t diverticular abscess  #Leukocytosis  - plan to OR today   - abx   - NPO  - pain control  - surgery following      #Constipation  -bowel regimen      #chronic Anemia  -Monitor H&H     #Hyponatremia  -IVF     #COPD without exacerbation   - cont BD     #Essential hypertension  -Losartan     #Anxiety     #Depression      Geena Pardo MD    Supplementary Documentation:     Quality:  DVT Mechanical Prophylaxis:   SCDs,    DVT Pharmacologic Prophylaxis   Medication   None                Code Status: DNAR/Selective Treatment  Flood: External urinary catheter in place  Flood Duration (in days):   Central line:    WOLF:     Discharge is dependent on: course  At this point Ms. Diaz is expected to be discharge to: TBD    The 21st Century Cures Act makes medical notes like these available to patients in the interest of transparency. Please be advised this is a medical document. Medical documents are intended to carry relevant information, facts as evident, and the clinical opinion of the practitioner. The medical note is intended as peer to peer communication and may appear blunt or direct. It is written in medical language and may contain abbreviations or verbiage that are unfamiliar.

## 2024-07-11 NOTE — ANESTHESIA PROCEDURE NOTES
Regional Block    Performed by: Patricia Ba DO  Authorized by: Patricia Ba DO      General Information and Staff    Start Time:   Anesthesiologist:  Patricia Ba DO  Performed by:  Anesthesiologist  Patient Location:  OR      Site Identification: real time ultrasound guided and image stored and retrievable    Block site/laterality marked before start: site marked  Reason for Block: at surgeon's request and post-op pain management    Preanesthetic Checklist: 2 patient identifers, IV checked, risks and benefits discussed, monitors and equipment checked, pre-op evaluation, timeout performed, anesthesia consent, sterile technique used, no prohibitive neurological deficits and no local skin infection at insertion site      Procedure Details    Patient Position:   Prep: ChloraPrep    Monitoring:  Cardiac monitor, continuous pulse ox and blood pressure cuff  Block Type:  TAP  Laterality:  Bilateral  Injection Technique:  Single-shot    Needle    Needle Type:  Short-bevel and echogenic  Needle Localization:  Ultrasound guidance  Reason for Ultrasound Use: appropriate spread of the medication was noted in real time and no ultrasound evidence of intravascular and/or intraneural injection            Assessment    Injection Assessment:  Good spread noted, negative resistance, negative aspiration for heme, incremental injection and low pressure  Heart Rate Change: No    - Patient tolerated block procedure well without evidence of immediate block related complications.     Medications      Additional Comments    Medication:  Ropivacaine 0.25% 40 mL with 2 mg PF dexamethasone

## 2024-07-11 NOTE — PROGRESS NOTES
Patient without [facility]  Progress Note    Bibi Diaz Patient Status:  Inpatient    1945 MRN [id number]   Location [facility] Attending Geena Pardo MD   Hosp Day # 1 PCP Rick Shannon DO     Subjective:  Bibi is without any new complaints today.  Mild discomfort in the area of the drain.    Physical Exam:    Vital Signs:    Blood pressure 133/52, pulse 71, temperature 100.2 °F (37.9 °C), temperature source Oral, resp. rate 18, height 65\", weight 90 lb 2.7 oz (40.9 kg), SpO2 97%, not currently breastfeeding.    Intake/Output Summary (Last 24 hours) at 2024 1439  Last data filed at 2024 1127  Gross per 24 hour   Intake 0 ml   Output 915 ml   Net -915 ml     I/O this shift:  In: 0   Out: 910 [Urine:900; Drains:10]  Lungs: Clear to auscultation bilaterally.  Cardiac: Regular rate and rhythm. No murmur.  Abdomen:  Soft, tender to deep palpation in the area of the drain, no percussion tenderness or guarding, no peritoneal signs.  Drain in place    Labs:  Recent Labs   Lab 07/10/24  1344 24  0554   RBC 3.65* 3.11*   HGB 10.3* 8.9*   HCT 30.9* 26.1*   MCV 84.7 83.9   MCH 28.2 28.6   MCHC 33.3 34.1   RDW 18.5 18.3   NEPRELIM 13.14* 8.93*   WBC 16.7* 11.5*   .0 298.0     Recent Labs   Lab 07/10/24  1345 24  0554   * 98   BUN 16 8*   CREATSERUM 0.90 0.74   CA 9.6 8.5   ALB 3.0* 2.4*   * 137   K 3.7 3.5    109   CO2 23.0 19.0*   ALKPHO 71 56   AST 15 20   ALT 14 10*   BILT 0.6 0.4   TP 7.2 5.9*         Chief Complaint:     Diverticulitis of the sigmoid colon with abscess formation status post IR drain placement.  Follow-up CT scan reveals worsening inflammatory changes and drain is feculent in appearance      Assessment/Plan:   We did discuss yesterday evening at the bedside that due to worsening CT scan findings and increased abdominal pain, the recommendation is to proceed with exploratory laparotomy, placement Keegan drain.  Low anterior  resection with creation of end colostomy evacuation abscess,  Bibi has no further questions at this time regarding the risks and benefits of surgery.  She wishes to proceed as discussed      B. MD Cecilia, FACS      Please note that this report has been produced using speech recognition software and may contain errors related to that system including but not limited to errors in grammar, punctuation and spelling as well as words and phrases that possibly may have been recognized inappropriately.  If there are any questions or concerns please contact the dictating provider for clarification.  The 21st Century Cures Act makes medical notes like these available to patients in the interest of transparency. Please be advised this is a medical document. Medical documents are intended to carry relevant information, facts as evident, and the clinical opinion of the practitioner. The medical note is intended as peer to peer communication and may appear blunt or direct. It is written in medical language and may contain abbreviations or verbiage that are unfamiliar.  If there are any questions or concerns please contact the dictating provider for clarification.

## 2024-07-11 NOTE — CONSULTS
ID asked to see patient by Dr. Brooks.     Patient currently off the floor in the OR. Chart reviewed. Continue IV Zosyn for now. ID will see in am.     Sheyla Chavez PA-C

## 2024-07-11 NOTE — ANESTHESIA POSTPROCEDURE EVALUATION
[facility] Patient Status:  Inpatient   Age/Gender 78 year old female MRN [id number]   Location [facility] 4SW-A Attending Geena Pardo MD   Hosp Day # 1 PCP Rick Shannon DO       Anesthesia Post-op Note    EXPLORATORY LAPAROTOMY, LOW ANTERIOR COLON RESECTION, COLOSTOMY CREATION    Procedure Summary       Date: 07/11/24 Room / Location:  MAIN OR  /  MAIN OR    Anesthesia Start: 1431 Anesthesia Stop:     Procedure: EXPLORATORY LAPAROTOMY, LOW ANTERIOR COLON RESECTION, COLOSTOMY CREATION (Abdomen) Diagnosis:       Colonic diverticular abscess      (Perforated diverticulitis)    Surgeons: Ileana Brooks MD Anesthesiologist: Patricia Ba DO    Anesthesia Type: general, regional ASA Status: 3            Anesthesia Type: general, regional    Vitals Value Taken Time   /50 07/11/24 1810   Temp 100 °F (37.8 °C) 07/11/24 1810   Pulse 62 07/11/24 1812   Resp 29 07/11/24 1812   SpO2 95 % 07/11/24 1811   Vitals shown include unfiled device data.    Patient Location: ICU    Anesthesia Type: general and regional    Airway Patency: patent    Postop Pain Control: adequate    Mental Status: mildly sedated but able to meaningfully participate in the post-anesthesia evaluation    Nausea/Vomiting: none    Cardiopulmonary/Hydration status: stable euvolemic    Complications: no apparent anesthesia related complications    Postop vital signs: stable    Dental Exam: Unchanged from Preop    Sign out given to ICU staff.

## 2024-07-11 NOTE — PAYOR COMM NOTE
--------------  ADMISSION REVIEW     Payor: [employer]  Subscriber #:  [id number]  Authorization Number: [id number]    Admit date: 7/10/24  Admit time:  6:30 PM       REVIEW DOCUMENTATION:     ED Provider Notes        Stated Complaint: abd pain starting today    Subjective:   HPI    Patient is a 78-year-old woman who presents with generalized abdominal pain.  She has a history of perforated diverticulitis and abscess.  She had a drain placed.  She was treated with prolonged antibiotics, Zosyn which she has tolerated in the past.  Pain however is increasing.  There is feculent drainage around the drain itself.  She is got nausea.  Describes 8 out of 10 pain.  Her granddaughter is at bedside and helps with her history.  Proceed fentanyl by paramedics.  No other specific complaints.      Physical Exam     ED Triage Vitals   BP 07/10/24 1343 127/53   Pulse 07/10/24 1342 80   Resp 07/10/24 1342 24   Temp 07/10/24 1342 97.4 °F (36.3 °C)   Temp src 07/10/24 1342 Temporal   SpO2 07/10/24 1342 99 %   O2 Device 07/10/24 1342 None (Room air)       Current Vitals:   Vital Signs  BP: 154/68  Pulse: 82  Resp: 22  Temp: 97.4 °F (36.3 °C)  Temp src: Temporal  MAP (mmHg): 91    Oxygen Therapy  SpO2: 97 %  O2 Device: None (Room air)      Physical Exam    General: Well-appearing patient of stated age resting comfortably  HEENT: Normocephalic atraumatic.  Nonicteric sclera.  Moist mucous membranes  Lungs: No tachypnea  Cardiac: No tachycardia  Abdomen: There is a drain in place.  There is a bandage with fecal material over the drain itself.  Mild distention and tenderness.  Skin: No rashes, pallor  Neuro: No focal deficits.  Extremities: No cyanosis/edema    ED Course     Labs Reviewed   COMP METABOLIC PANEL (14) - Abnormal; Notable for the following components:       Result Value    Glucose 107 (*)     Sodium 134 (*)     Albumin 3.0 (*)     A/G Ratio 0.7 (*)     All other components within normal limits   URINALYSIS  WITH CULTURE REFLEX - Abnormal; Notable for the following components:    Protein Urine 20 (*)     All other components within normal limits   CBC W/ DIFFERENTIAL - Abnormal; Notable for the following components:    WBC 16.7 (*)     RBC 3.65 (*)     HGB 10.3 (*)     HCT 30.9 (*)     Neutrophil Absolute Prelim 13.14 (*)     Neutrophil Absolute 13.14 (*)     Monocyte Absolute 1.95 (*)     All other components within normal limits   LIPASE - Normal   LACTIC ACID, PLASMA - Normal   CBC WITH DIFFERENTIAL WITH PLATELET      Blood work reviewed.  Electrolytes normal except sodium 134.  Lipase normal.  White count elevated at 16,000 significantly up from previous.  Blood cultures were sent.  Lactate is normal.      CT abdomen pelvis: Pending  MDM      Patient presents with drainage around her drain which appears to be fecal material.  Concerned about perforation, recurrent abscess, other.  IV was placed, she will be hydrated.  Culture sent.  Zosyn started.  Case discussed with general surgery.  Will send for CT abdomen pelvis.  Will likely need to be admitted for surgical evaluation, IV antibiotics.      Patient with abdominal pain, known history of a diverticular abscess now with fecal material in the draining around the drain.  Differential includes perforation, fistula, recurrent abscess, other.  Patient be admitted for IV antibiotics.  [facility], [place] general surgery.  CT scan endorsed to my partner.      Disposition and Plan     Clinical Impression:  1. Colonic diverticular abscess         History and Physical            Bibi Diaz Patient Status:  Inpatient    1945 MRN [id number]   Location [facility] 3NW-A Attending Win Brothers MD   Hosp Day # 0 PCP Rick Shannon DO      Chief Complaint: Abdominal pain        Subjective:  History of Present Illness:      Bibi Diaz is a 78 year old female with a history of COPD, essential hypertension, dyslipidemia, GERD,  anxiety, depression who presents with abdominal pain. Patient recently admitted for abdominal abscess sp drain placement. She was discharged on IV abx to SNF. Patient has since returned home. Last night around 9pm patient noted change in output from drain and began to have worsening abdominal pain in lower quadrants. Pain has been intermittent in nature. She admits to nausea, no vomiting. No BM. Poor PO intake.       Assessment & Plan:  #Abdominal pain d/t abdominal abscess  #Leukocytosis  -Admit  -CLD today, NPO after midnight  -IVF  -IV abx  -Pain control  -OR tomorrow  -Surgical consult      #Constipation  -Supp x 1     #Anemia  -Monitor H&H     #Hyponatremia  -IVF  -Monitor electrolytes     #COPD  -BD     #Essential hypertension  -Losartan  -Monitor hemodynamics     #Anxiety     #Depression     #DVT Px: Lovenox x 1 today     Plan of care discussed with patient, family, ER and surgical team.     Kranthi Pitts MD        7/11  SURGERY:    Date of procedure:     July 11, 2024-     Pre-Operative Diagnosis: Colonic diverticular abscess [K57.20]     Post-Operative Diagnosis: Same as above     Indication for Surgery:    Colonic diverticular abscess unresponsive to medical management with IR drain and antibiotics     Procedure Performed: Low Anterior Resection of the Rectosigmoid Colon with Elizabeth's pouch and end colostomy. Drainage of abdominal abscess.  Placement Keegan drain     Surgeons and Role:     * Ileana Brooks MD - Primary     Assist-MARY Owens     Note:         A physician's surgical assistant was essential in the prompt and proper completion of this case specifically during the dissection of the inflammatory mass and abscess cavity and formation of the anastomosis.          Anesthesia: General     History of Present Illness:         78-year-old female who is here for evaluation abdominal pain.  The patient was previously admitted to [facility] approximately 3 weeks ago for acute  diverticulitis with abscess formation.  The patient does have an IR drain in place.  There is feculent drainage around the drain as well as in the drain itself.  On arrival to the ED, the patient is afebrile, vital signs are stable.  Workup in the ED reveals leukocytosis of 16.7, decreased hemoglobin of 10.3, normal platelet count.  CMP reveals normal LFTs, normal electrolytes except sodium mildly diminished at 134, glucose 107.  UA negative except elevated urine protein.  At this time we will proceed with CT scan abdomen pelvis to assess position of the drain and to evaluate for possible colo cutaneous fistula.  Further recommendations will be pending CT scan imaging and full workup.  Kavon have no further questions at this time and will proceed as discussed.  The patient will be admitted for IV hydration, IV antibiotics, symptomatic management of pain and nausea as needed.     Addendum-return to ED to discuss CT scan findings with Kavon at the bedside.  CT findings-   BOWEL/MESENTERY:  Surgical drain, percutaneous inserted anterior abdominal wall on the left, pigtail left lower quadrant stable position of the pigtail.  Suspect small residual abscess image 75 measuring 1.5 cm to the left of the continued-inflamed   sigmoid colon.  In this region is relatively extensive pericolonic stranding, the stranding appears worse than on the most recent CT scan from 6/21/2024.  Another small suspected abscess image 72 measuring about 1.7 cm adjacent to the pigtail catheter   and sigmoid colon.  However each of these could reflect inflamed, prominent fluid and air-filled diverticula as they both appear immediately adjacent to the sigmoid colon.  There therefore indeterminate between small abscess versus fluid-filled diverticula.  Continued thickening of the sigmoid colon and moderate amount of stool in the colon and large amount of stool in the rectum.  The stranding appears greater on the sigmoid  colon and extends along the abdominal wall tract for the drain, for   example there is now tissue thickening measuring up to 2.7 cm image 75 at the anterior abdominal wall where the drain enters the peritoneal cavity.  The abdominal wall fat shows stranding in this region additionally.  No measurable abscess in this region.  More proximally the stool in the colon is moderate, there is no sign of colonic obstruction.  Moderate fluid and air in the small bowel without small bowel obstruction pattern.  No generalized free air.  Trace free fluid pelvis but no large or drainable ascites.      Given CT scan findings and increased abdominal pain, at this time would recommend proceed with single-stage procedure with exploratory laparotomy, low anterior resection, evacuation of intra-abdominal abscess, creation of end colostomy.  The surgery as well as the risks and benefits of procedure were reviewed in detail with Kavon.  They do not have any further questions at this time and wished to proceed with surgery tomorrow as discussed     Discussed:     The potential risks and benefits of surgery were discussed with the patient as well as family members at the bedside.  These are including but not limited to bleeding, infection, intra-abdominal abscess formation, possible need for drain, seroma-hematoma formation, postoperative wound complications including but not limited to wound infection, development of a hernia, intra-abdominal organ injury specifically ureter, bladder, spleen and small bowel, the need for a colostomy, possible complications related to the colostomy including necrosis, parastomal hernia etc. possible need for further surgery pending clinical course, need for further surgery to take down colostomy, possible formation of intra-abdominal adhesions resulting in small bowel obstruction, intraoperative or postoperative medical complications including but not limited to pneumonia, DVT, pulmonary  embolism.   These and other potential intraoperative and perioperative risks were discussed.  The patient and family verbalized understanding, all questions were addressed to their satisfaction, no further questions remain in the patient and family wished to proceed with surgery.     Operative Summary:   The patient was taken to the operating room and was placed on the OR table in a supine position.  After the induction of general anesthesia, perioperative antibiotics were given.  A nasogastric tube and Flood catheter was then placed.  The patient was then prepped and draped in the usual sterile fashion.  A midline incision was made from the umbilicus, down to the pubic tubercle, the skin and subcutaneous tissues were dissected down to the midline fascia. The midline fascia was divided with electrocautery in the decussation of fibers.  The peritoneum was then identified and a little elevated.  The peritoneum was then opened sharply.  The peritoneum was opened along the entire length of the incision.  Initial exploration of the abdomen revealed a large inflammatory inflammatory mass in the pelvis near the sigmoid colon.  The sigmoid colon appeared to be twisted upon itself in the left lower quadrant in this inflammatory mass.  The percutaneous drain could be followed into the area of this mass   .  Enough inflammation and abscess was identified to have an indicate colostomy, Ramírez's pouch, and drainage of the inflammatory process.  The nasogastric tube was identified to be in good position in the gastric body.  There were no gross liver lesions noted.  The small bowel was run from the ligament of Treitz down to the ileocecal valve.  During this maneuver, no other abnormalities were found.  The small bowel was then packed away into the upper abdomen with moist lap pads.    The descending and sigmoid colon was mobilized along the avascular line of Toldt. The colon was mobilized onto its mesentery.  The ureter was  identified and kept out of harm's way.  The pelvic adhesions were taken down distally.  The colon was mobilized up to but not including the splenic flexure proximally.  A point was chosen on the descending colon in an area that was free of inflammatory changes.  The colon was divided using the CORONA-75 stapler.  Working distally, the mesentery was taken down with the LigaSure device.  The inferior hemorrhoidal vessel was identified and ligated.  The colon was densely adherent to the left lateral pelvic sidewall.  The colon was finger fractured away from the pelvic sidewall.  Care was taken to avoid any injury to the left ureter.  The dissection was taken down to the rectosigmoid junction at the confluence of the tinea.  The proximal rectum was then transected with a TA stapler.  The specimen was delivered off the field.  The left lower quadrant and pelvis was copiously irrigated.  Hemostasis was assured in the bed of dissection and specifically the residual mesentery.  There was no evidence of bleeding.  As this large inflammatory mass was abutting the left side of the bladder, the bladder was insufflated with methylene blue and saline.  The bladder was carefully examined.  There is no evidence of injury to the bladder itself.  A point was then chosen in the left abdomen which would easily accommodate the end colostomy without any undue tension or kinking of the descending colon mesentery.  A circular buttonhole incision was made in the left abdomen.  The subcutaneous adipose tissue was excised.  A cruciate incision was made in the anterior abdominal fascia and the muscle was split bluntly.  The posterior abdominal fascia was also incised with a cruciate incision.  2 fingers were comfortably able to be admitted to the ostomy site.  Using a Paras forcep the end ostomy was then brought through the anterior abdominal wall.  A large Keegan drain was placed into the pelvis and brought out through the anterior abdominal  wall.  This was secured to the skin with a 2-0 nylon suture.  The small bowel was then unpacked.  The small bowel was again run from the ligament of Treitz down to the ileocecal valve.  The small bowel was placed back into the central abdomen without any undue tension or kinking of the mesentery.  The greater omentum was then brought over the small bowel.  The infraumbilical peritoneum was closed with #1 Vicryl in a running fashion.  The anterior abdominal fascia was closed with #1 looped PDS.  The subcutaneous tissue was copiously irrigated.  Electrocautery was used for hemostasis.  The incision was then closed in a layered fashion.  Subcutaneous tissue was then reapproximated with 3-0 Vicryl in an interrupted fashion the skin was closed with surgical skin staples.  An occlusive dressing was then placed.  The colostomy was subsequently matured.  The colostomy was sutured to the anterior abdominal fascia in 4 quadrants using 2-0 Vicryl.  Subsequently 3-0 Vicryl was used to pal the ostomy.  The colostomy was completed with interrupted 4-0 Vicryl suture.  The colostomy appeared to be pink and viable.  Digital examination of the colostomy revealed it to be widely patent with no stenosis particularly at the level of the fascia.  The colostomy appliance was applied.  All sponge instrument and needle counts were correct at the conclusion of the procedure.  The patient was extubated in the operating room and was taken to the recovery room in stable condition.     Pathologic Specimen:  rectosigmoid colon     Drains: Large Keegan Drain, mills     EBL: 40cc     Ileana Brooks MD       :  ICU  Critical Care APRN Progress Note     NAME: Bibi Diaz - ROOM:  OR [place] ROOMS/ OR * - MRN: [id number] - Age: 78 year old - :1945     History Of Present Illness:  Bibi Diaz is a 78 year old female with PMHx significant for COPD, HTN, HLD, GERD, anxiety, depression who presented to the ED 7/10 with c/o  generalized abdominal pain. Recent admission (2024) with diverticulitis, pericolonic abscess removal and VALERI drain placement. She was taken to the OR  for ex lap with low anterior resection of the rectosigmoid colon with Elizabeth's pouch and end colostomy, drainage of abdominal abscess and placement of bake drain. Arrives to ICU hemodynamically stable on room air for closer monitoring.       Assessment/Plan:  POD #0 s/p ex lap, low anterior resection of the rectosigmoid colon with Elizabeth's puch and end colostomy, drainage of abdominal abscess and placement of ailyn drain  - Post op labs pending  - NPO  - IVF  - Pain management  - Ostomy care  - VALERI to bulb suction  - Zosyn (7/10- )     Leukocytosis, likely 2/2 to above  - low grade fevers  - Blood cultures pending  - Cdiff negative  - Antibiotics as above     History of HTN  - Home meds on hold, resume as able      Anxiety/depression  - Resume home meds as able     F/E/N  - IVF  - Replete electrolytes per protocol  - NPO     Proph  - SQ lovenox when ok with surgery  - SCDs  - PT/OT     Dispo  - DNAR select  - ICU monitoring     Plan of care discussed with intensivist, Dr. Gonsalez.     A total of 35 minutes of critical care time (exclusive of billable procedures) was administered. This involved direct patient intervention, complex decision making, and/or extensive discussions (>50% face to face time) with the patient, family, and clinical staff.     Connie Milian [facility]  Critical Care  [id number]      ICU  Critical Care APRN Progress Note     NAME: Bibi Diaz - ROOM:  OR [place] ROOMS/ OR P* - MRN: [id number] - Age: 78 year old - :1945     Subjective: No acute events overnight.           Assessment/Plan:  POD #1 s/p ex lap, low anterior resection of the rectosigmoid colon with Elizabeth's puch and end colostomy, drainage of abdominal abscess and placement of ailyn drain  - NPO   - IVF  - Pain management  - Ostomy care  - VALERI to bulb suction  -  Zosyn (7/10- )     Leukocytosis, likely 2/2 to above  - low grade fevers  - Blood cultures pending  - Cdiff negative  - Antibiotics as above     History of HTN  - Home meds on hold, resume as able      Anxiety/depression  - Resume home meds as able     F/E/N  - IVF  - Replete electrolytes per protocol  - NPO     Proph  - SQ lovenox   - SCDs  - PT/OT     Dispo  - DNAR select  - may transfer to floor     Plan of care discussed with intensivist, Dr. Ml Irwin, [facility]  Critical Care NP  Phone [phone]                      MEDICATIONS ADMINISTERED IN LAST 1 DAY:  bisacodyl (Dulcolax) 10 MG rectal suppository 10 mg       Date Action Dose Route User    7/10/2024 2107 Given 10 mg Rectal Priscilla Hearn RN          clonazePAM (KlonoPIN) tab 0.5 mg       Date Action Dose Route User    7/10/2024 2110 Given 0.5 mg Oral Priscilla Hearn RN          enoxaparin (Lovenox) 30 MG/0.3ML SUBQ injection 30 mg       Date Action Dose Route User    7/10/2024 2110 Given 30 mg Subcutaneous (Right Lower Abdomen) Priscilla Hearn RN          iopamidol 76% (ISOVUE-370) injection for power injector       Date Action Dose Route User    7/10/2024 1646 Given 100 mL Intravenous Rehbein, Sonia          mirtazapine (Remeron) tab 30 mg       Date Action Dose Route User    7/10/2024 2110 Given 30 mg Oral Priscilla Hearn RN          morphINE PF 2 MG/ML injection 2 mg       Date Action Dose Route User    7/10/2024 1853 Given 2 mg Intravenous Piper Kim RN    7/10/2024 1712 Given 2 mg Intravenous Chelsi Black RN          piperacillin-tazobactam (Zosyn) 3.375 g in dextrose 5% 100 mL IVPB-ADDV       Date Action Dose Route User    7/11/2024 0510 New Bag 3.375 g Intravenous Priscilla Hearn RN    7/10/2024 2110 New Bag 3.375 g Intravenous Priscilla Hearn RN          piperacillin-tazobactam (Zosyn) 4.5 g in dextrose 5% 100 mL IVPB-ADDV       Date Action Dose Route User    7/10/2024 1441 New Bag 4.5 g Intravenous Chelsi Black RN           sodium chloride 0.9% infusion       Date Action Dose Route User    7/10/2024 1351 New Bag (none) Intravenous Chelsi Black RN          sodium chloride 0.9% infusion       Date Action Dose Route User    7/11/2024 0509 New Bag (none) Intravenous Priscilla Hearn, DONNIE    7/10/2024 1845 New Bag (none) Intravenous Piper Kim RN          sodium chloride 0.9 % IV bolus 1,000 mL       Date Action Dose Route User    7/10/2024 1401 New Bag 1,000 mL Intravenous Chelsi Black, DONNIE            Vitals (last day)       Date/Time Temp Pulse Resp BP SpO2 Weight O2 Device O2 Flow Rate (L/min) [facility]    07/11/24 0300 98.2 °F (36.8 °C) 70 18 141/53 94 % -- None (Room air) 0 L/min ED    07/10/24 2316 99.2 °F (37.3 °C) 78 -- -- 95 % -- -- -- EW    07/10/24 2044 101 °F (38.3 °C) 80 20 127/51 96 % 90 lb 2.7 oz (40.9 kg) None (Room air) --     07/10/24 1852 98.5 °F (36.9 °C) 77 24 153/62 98 % -- None (Room air) --     07/10/24 1800 -- 81 24 153/72 99 % -- None (Room air) -- AP    07/10/24 1700 -- 82 21 146/65 99 % -- None (Room air) -- AP    07/10/24 1600 -- 82 22 154/68 97 % -- None (Room air) -- AP    07/10/24 1530 -- 75 23 143/62 98 % -- None (Room air) -- AP    07/10/24 1500 -- 75 24 136/63 98 % -- None (Room air) -- AP    07/10/24 1430 -- 74 19 154/65 99 % -- None (Room air) -- AP    07/10/24 1349 -- -- -- -- -- -- None (Room air) -- AP    07/10/24 1343 -- -- -- 127/53 -- -- -- -- AL    07/10/24 1342 97.4 °F (36.3 °C) 80 24 -- 99 % -- None (Room air) -- AL

## 2024-07-11 NOTE — OPERATIVE REPORT
Hi Dr. Brooks I was just calling retrogram I including but not limited to thromboembolic events [facility]  Op Note    Bibi Diaz Location: OR   JORGE [id number] MRN [id number]   Admission Date 7/10/2024 Operation Date 7/11/2024   Attending Physician Geena Pardo MD Operating Physician Ileana Brooks MD       Date of procedure:     July 11, 2024-    Pre-Operative Diagnosis: Colonic diverticular abscess [K57.20]    Post-Operative Diagnosis: Same as above    Indication for Surgery:    Colonic diverticular abscess unresponsive to medical management with IR drain and antibiotics    Procedure Performed: Low Anterior Resection of the Rectosigmoid Colon with Elizabeth's pouch and end colostomy. Drainage of abdominal abscess.  Placement Keegan drain    Surgeons and Role:     * Ileana Brooks MD - Primary    Assist-MARY Owens    Note:         A physician's surgical assistant was essential in the prompt and proper completion of this case specifically during the dissection of the inflammatory mass and abscess cavity and formation of the anastomosis.         Anesthesia: General    History of Present Illness:         78-year-old female who is here for evaluation abdominal pain.  The patient was previously admitted to [facility] approximately 3 weeks ago for acute diverticulitis with abscess formation.  The patient does have an IR drain in place.  There is feculent drainage around the drain as well as in the drain itself.  On arrival to the ED, the patient is afebrile, vital signs are stable.  Workup in the ED reveals leukocytosis of 16.7, decreased hemoglobin of 10.3, normal platelet count.  CMP reveals normal LFTs, normal electrolytes except sodium mildly diminished at 134, glucose 107.  UA negative except elevated urine protein.  At this time we will proceed with CT scan abdomen pelvis to assess position of the drain and to evaluate for possible colo cutaneous fistula.  Further recommendations will be  pending CT scan imaging and full workup.  Kavon have no further questions at this time and will proceed as discussed.  The patient will be admitted for IV hydration, IV antibiotics, symptomatic management of pain and nausea as needed.     Addendum-return to ED to discuss CT scan findings with Kavon at the bedside.  CT findings-   BOWEL/MESENTERY:  Surgical drain, percutaneous inserted anterior abdominal wall on the left, pigtail left lower quadrant stable position of the pigtail.  Suspect small residual abscess image 75 measuring 1.5 cm to the left of the continued-inflamed   sigmoid colon.  In this region is relatively extensive pericolonic stranding, the stranding appears worse than on the most recent CT scan from 6/21/2024.  Another small suspected abscess image 72 measuring about 1.7 cm adjacent to the pigtail catheter   and sigmoid colon.  However each of these could reflect inflamed, prominent fluid and air-filled diverticula as they both appear immediately adjacent to the sigmoid colon.  There therefore indeterminate between small abscess versus fluid-filled diverticula.  Continued thickening of the sigmoid colon and moderate amount of stool in the colon and large amount of stool in the rectum.  The stranding appears greater on the sigmoid colon and extends along the abdominal wall tract for the drain, for   example there is now tissue thickening measuring up to 2.7 cm image 75 at the anterior abdominal wall where the drain enters the peritoneal cavity.  The abdominal wall fat shows stranding in this region additionally.  No measurable abscess in this region.  More proximally the stool in the colon is moderate, there is no sign of colonic obstruction.  Moderate fluid and air in the small bowel without small bowel obstruction pattern.  No generalized free air.  Trace free fluid pelvis but no large or drainable ascites.      Given CT scan findings and increased abdominal pain, at this  time would recommend proceed with single-stage procedure with exploratory laparotomy, low anterior resection, evacuation of intra-abdominal abscess, creation of end colostomy.  The surgery as well as the risks and benefits of procedure were reviewed in detail with Kavon.  They do not have any further questions at this time and wished to proceed with surgery tomorrow as discussed    Discussed:     The potential risks and benefits of surgery were discussed with the patient as well as family members at the bedside.  These are including but not limited to bleeding, infection, intra-abdominal abscess formation, possible need for drain, seroma-hematoma formation, postoperative wound complications including but not limited to wound infection, development of a hernia, intra-abdominal organ injury specifically ureter, bladder, spleen and small bowel, the need for a colostomy, possible complications related to the colostomy including necrosis, parastomal hernia etc. possible need for further surgery pending clinical course, need for further surgery to take down colostomy, possible formation of intra-abdominal adhesions resulting in small bowel obstruction, intraoperative or postoperative medical complications including but not limited to pneumonia, DVT, pulmonary embolism.   These and other potential intraoperative and perioperative risks were discussed.  The patient and family verbalized understanding, all questions were addressed to their satisfaction, no further questions remain in the patient and family wished to proceed with surgery.    Operative Summary:   The patient was taken to the operating room and was placed on the OR table in a supine position.  After the induction of general anesthesia, perioperative antibiotics were given.  A nasogastric tube and Flood catheter was then placed.  The patient was then prepped and draped in the usual sterile fashion.  A midline incision was made from the umbilicus,  down to the pubic tubercle, the skin and subcutaneous tissues were dissected down to the midline fascia. The midline fascia was divided with electrocautery in the decussation of fibers.  The peritoneum was then identified and a little elevated.  The peritoneum was then opened sharply.  The peritoneum was opened along the entire length of the incision.  Initial exploration of the abdomen revealed a large inflammatory inflammatory mass in the pelvis near the sigmoid colon.  The sigmoid colon appeared to be twisted upon itself in the left lower quadrant in this inflammatory mass.  The percutaneous drain could be followed into the area of this mass   .  Enough inflammation and abscess was identified to have an indicate colostomy, Ramírez's pouch, and drainage of the inflammatory process.  The nasogastric tube was identified to be in good position in the gastric body.  There were no gross liver lesions noted.  The small bowel was run from the ligament of Treitz down to the ileocecal valve.  During this maneuver, no other abnormalities were found.  The small bowel was then packed away into the upper abdomen with moist lap pads.    The descending and sigmoid colon was mobilized along the avascular line of Toldt. The colon was mobilized onto its mesentery.  The ureter was identified and kept out of harm's way.  The pelvic adhesions were taken down distally.  The colon was mobilized up to but not including the splenic flexure proximally.  A point was chosen on the descending colon in an area that was free of inflammatory changes.  The colon was divided using the CORONA-75 stapler.  Working distally, the mesentery was taken down with the LigaSure device.  The inferior hemorrhoidal vessel was identified and ligated.  The colon was densely adherent to the left lateral pelvic sidewall.  The colon was finger fractured away from the pelvic sidewall.  Care was taken to avoid any injury to the left ureter.  The dissection was taken  down to the rectosigmoid junction at the confluence of the tinea.  The proximal rectum was then transected with a TA stapler.  The specimen was delivered off the field.  The left lower quadrant and pelvis was copiously irrigated.  Hemostasis was assured in the bed of dissection and specifically the residual mesentery.  There was no evidence of bleeding.  As this large inflammatory mass was abutting the left side of the bladder, the bladder was insufflated with methylene blue and saline.  The bladder was carefully examined.  There is no evidence of injury to the bladder itself.  A point was then chosen in the left abdomen which would easily accommodate the end colostomy without any undue tension or kinking of the descending colon mesentery.  A circular buttonhole incision was made in the left abdomen.  The subcutaneous adipose tissue was excised.  A cruciate incision was made in the anterior abdominal fascia and the muscle was split bluntly.  The posterior abdominal fascia was also incised with a cruciate incision.  2 fingers were comfortably able to be admitted to the ostomy site.  Using a [place] forcep the end ostomy was then brought through the anterior abdominal wall.  A large Keegan drain was placed into the pelvis and brought out through the anterior abdominal wall.  This was secured to the skin with a 2-0 nylon suture.  The small bowel was then unpacked.  The small bowel was again run from the ligament of Treitz down to the ileocecal valve.  The small bowel was placed back into the central abdomen without any undue tension or kinking of the mesentery.  The greater omentum was then brought over the small bowel.  The infraumbilical peritoneum was closed with #1 Vicryl in a running fashion.  The anterior abdominal fascia was closed with #1 looped PDS.  The subcutaneous tissue was copiously irrigated.  Electrocautery was used for hemostasis.  The incision was then closed in a layered fashion.  Subcutaneous tissue  was then reapproximated with 3-0 Vicryl in an interrupted fashion the skin was closed with surgical skin staples.  An occlusive dressing was then placed.  The colostomy was subsequently matured.  The colostomy was sutured to the anterior abdominal fascia in 4 quadrants using 2-0 Vicryl.  Subsequently 3-0 Vicryl was used to pal the ostomy.  The colostomy was completed with interrupted 4-0 Vicryl suture.  The colostomy appeared to be pink and viable.  Digital examination of the colostomy revealed it to be widely patent with no stenosis particularly at the level of the fascia.  The colostomy appliance was applied.  All sponge instrument and needle counts were correct at the conclusion of the procedure.  The patient was extubated in the operating room and was taken to the recovery room in stable condition.    Pathologic Specimen:  rectosigmoid colon    Drains: Large Keegan Drain, mills    EBL: 40cc    Ileana Brooks MD      Please note that this report has been produced using speech recognition software and may contain errors related to that system including but not limited to errors in grammar, punctuation and spelling as well as words and phrases that possibly may have been recognized inappropriately.  If there are any questions or concerns please contact the dictating provider for clarification.  The 21st Century Cures Act makes medical notes like these available to patients in the interest of trans parency. Please be advised this is a medical document. Medical documents are intended to carry relevant information, facts as evident, and the clinical opinion of the practitioner. The medical note is intended as peer to peer communication and may appear blunt or direct. It is written in medical language and may contain abbreviations or verbiage that are unfamiliar.  If there are any questions or concerns please contact the dictating provider for clarification.

## 2024-07-12 LAB
ALBUMIN SERPL-MCNC: 2.2 G/DL (ref 3.4–5)
ALBUMIN/GLOB SERPL: 0.6 {RATIO} (ref 1–2)
ALP LIVER SERPL-CCNC: 56 U/L
ALT SERPL-CCNC: 14 U/L
ANION GAP SERPL CALC-SCNC: 9 MMOL/L (ref 0–18)
AST SERPL-CCNC: 16 U/L (ref 15–37)
BASOPHILS # BLD AUTO: 0.02 X10(3) UL (ref 0–0.2)
BASOPHILS NFR BLD AUTO: 0.2 %
BILIRUB SERPL-MCNC: 0.3 MG/DL (ref 0.1–2)
BUN BLD-MCNC: 7 MG/DL (ref 9–23)
CALCIUM BLD-MCNC: 8.8 MG/DL (ref 8.5–10.1)
CHLORIDE SERPL-SCNC: 108 MMOL/L (ref 98–112)
CO2 SERPL-SCNC: 19 MMOL/L (ref 21–32)
CREAT BLD-MCNC: 0.66 MG/DL
EGFRCR SERPLBLD CKD-EPI 2021: 89 ML/MIN/1.73M2 (ref 60–?)
EOSINOPHIL # BLD AUTO: 0 X10(3) UL (ref 0–0.7)
EOSINOPHIL NFR BLD AUTO: 0 %
ERYTHROCYTE [DISTWIDTH] IN BLOOD BY AUTOMATED COUNT: 17.8 %
GLOBULIN PLAS-MCNC: 3.7 G/DL (ref 2.8–4.4)
GLUCOSE BLD-MCNC: 143 MG/DL (ref 70–99)
HCT VFR BLD AUTO: 31.3 %
HGB BLD-MCNC: 10.1 G/DL
IMM GRANULOCYTES # BLD AUTO: 0.04 X10(3) UL (ref 0–1)
IMM GRANULOCYTES NFR BLD: 0.3 %
LYMPHOCYTES # BLD AUTO: 0.62 X10(3) UL (ref 1–4)
LYMPHOCYTES NFR BLD AUTO: 5.1 %
MCH RBC QN AUTO: 28.2 PG (ref 26–34)
MCHC RBC AUTO-ENTMCNC: 32.3 G/DL (ref 31–37)
MCV RBC AUTO: 87.4 FL
MONOCYTES # BLD AUTO: 0.81 X10(3) UL (ref 0.1–1)
MONOCYTES NFR BLD AUTO: 6.7 %
NEUTROPHILS # BLD AUTO: 10.66 X10 (3) UL (ref 1.5–7.7)
NEUTROPHILS # BLD AUTO: 10.66 X10(3) UL (ref 1.5–7.7)
NEUTROPHILS NFR BLD AUTO: 87.7 %
OSMOLALITY SERPL CALC.SUM OF ELEC: 282 MOSM/KG (ref 275–295)
PLATELET # BLD AUTO: 349 10(3)UL (ref 150–450)
POTASSIUM SERPL-SCNC: 3.6 MMOL/L (ref 3.5–5.1)
PROT SERPL-MCNC: 5.9 G/DL (ref 6.4–8.2)
RBC # BLD AUTO: 3.58 X10(6)UL
SODIUM SERPL-SCNC: 136 MMOL/L (ref 136–145)
WBC # BLD AUTO: 12.2 X10(3) UL (ref 4–11)

## 2024-07-12 PROCEDURE — 99232 SBSQ HOSP IP/OBS MODERATE 35: CPT | Performed by: INTERNAL MEDICINE

## 2024-07-12 RX ORDER — HYDROMORPHONE HYDROCHLORIDE 1 MG/ML
0.2 INJECTION, SOLUTION INTRAMUSCULAR; INTRAVENOUS; SUBCUTANEOUS
Status: DISCONTINUED | OUTPATIENT
Start: 2024-07-12 | End: 2024-07-16

## 2024-07-12 RX ORDER — TRAMADOL HYDROCHLORIDE 50 MG/1
50 TABLET ORAL EVERY 6 HOURS PRN
Status: DISCONTINUED | OUTPATIENT
Start: 2024-07-12 | End: 2024-07-16

## 2024-07-12 RX ORDER — ENOXAPARIN SODIUM 100 MG/ML
30 INJECTION SUBCUTANEOUS DAILY
Status: DISCONTINUED | OUTPATIENT
Start: 2024-07-13 | End: 2024-07-16

## 2024-07-12 RX ORDER — HYDROMORPHONE HYDROCHLORIDE 1 MG/ML
0.4 INJECTION, SOLUTION INTRAMUSCULAR; INTRAVENOUS; SUBCUTANEOUS
Status: DISCONTINUED | OUTPATIENT
Start: 2024-07-12 | End: 2024-07-16

## 2024-07-12 RX ORDER — MULTIPLE VITAMINS W/ MINERALS TAB 9MG-400MCG
1 TAB ORAL DAILY
Status: DISCONTINUED | OUTPATIENT
Start: 2024-07-13 | End: 2024-07-16

## 2024-07-12 RX ORDER — KETOROLAC TROMETHAMINE 15 MG/ML
15 INJECTION, SOLUTION INTRAMUSCULAR; INTRAVENOUS EVERY 6 HOURS
Status: COMPLETED | OUTPATIENT
Start: 2024-07-12 | End: 2024-07-14

## 2024-07-12 NOTE — CONSULTS
[facility]   part of [facility] ID CONSULT NOTE    Bibi Diaz Patient Status:  Inpatient    1945 MRN [id number]   Location [facility] 4SW-A Attending Geena Pardo MD   Hosp Day # 2 PCP Rick Shannon DO       Reason for Consultation:  Diverticulitis with intra-abdominal abscess    History of Present Illness:  Bibi Diaz is a 78 year old female with a history of COPD, CKD and HTN. Patient was recently hospitalized 2024 with diverticulitis/abscess, s/p IR drainage - cxs polymicrobial. Patient was discharged with IV Zosyn.    Patient now presents with worsening abdominal pain, nausea and po intake. Temp of 101 in the ED. CT showed worsening pericolonic inflammatory stranding and phlegmonous tissue thickening around the sigmoid colon. She underwent low anterior resection of the retrosigmoid colon with Elizabeth's pouch and end colostomy with drainage of abdominal abscess and placement of Keegan drain .    Patient complaining of pain and nausea. Afebrile today so far.    History:  Past Medical History:    Abdominal distention    Abdominal pain    Acquired hypothyroidism    Anxiety    Asthma (HCC)    Belching    Bloating    Chronic rhinitis    CKD (chronic kidney disease) stage 3, GFR 30-59 ml/min (HCC)    Constipation    COPD (chronic obstructive pulmonary disease) ([facility])    NO OXYGEN     Depression    Diarrhea, unspecified    Diverticulosis of large intestine    Emphysema    Esophageal reflux    Fatigue    Feeling lonely    Flatulence/gas pain/belching    Food intolerance    H/O bronchitis    Hay fever    Hemorrhoids    High blood pressure    History of depression    Hypercholesterolemia    Hypertension    Itch of skin    Migraines    Mild intermittent asthma without complication (HCC)    Nausea    Night sweats    Pneumonia    PONV (postoperative nausea and vomiting)    Pure hypercholesterolemia    Rash    Reflux    Sleep disturbance    Stress    Uncomfortable  fullness after meals    Visual impairment    glasses    Vomiting    Wears glasses     Past Surgical History:   Procedure Laterality Date    Cataract      Colonoscopy      Colonoscopy N/A 3/21/2024    Procedure: COLONOSCOPY;  Surgeon: Delon Ashford DO;  Location:  ENDOSCOPY    Colonoscopy & polypectomy  09/2014    multiple polyps; tics; repeat 3 yrs    Colonoscopy,remv lesn,snare N/A 09/22/2014    Procedure: ESOPHAGOGASTRODUODENOSCOPY, COLONOSCOPY, POSSIBLE BIOPSY, POSSIBLE POLYPECTOMY 67698,40986;  Surgeon: Slade Ivan MD;  Location: [facility]    Correct bunion,simple Bilateral     Cystotomy,excis vesical neck Left     Egd      Gastro - dmg  09/2014    stricture; HH    Oophorectomy Bilateral 2017    Other surgical history  02/27/2023    Robotic repair of hiatal hernia and Nissen fundoplication    Patient documented not to have experienced any of the following events N/A 09/22/2014    Procedure: ESOPHAGOGASTRODUODENOSCOPY, COLONOSCOPY, POSSIBLE BIOPSY, POSSIBLE POLYPECTOMY 43975,78803;  Surgeon: Slade Ivan MD;  Location: [facility]    Patient withough preoperative order for iv antibiotic surgical site infection prophylaxis. N/A 09/22/2014    Procedure: ESOPHAGOGASTRODUODENOSCOPY, COLONOSCOPY, POSSIBLE BIOPSY, POSSIBLE POLYPECTOMY 27758,49585;  Surgeon: Slade Ivan MD;  Location: [facility]    Repair ing hernia,5+y/o,reducibl      Upper gi endoscopy,diagnosis N/A 09/22/2014    Procedure: ESOPHAGOGASTRODUODENOSCOPY, COLONOSCOPY, POSSIBLE BIOPSY, POSSIBLE POLYPECTOMY 06779,95074;  Surgeon: Slade Ivan MD;  Location: [facility]     Family History   Problem Relation Age of Onset    Colon Cancer Mother     Other (Other) Mother     Other (copd) Mother     Alcohol and Other Disorders Associated Father     Other (Other) Father     Other (emph) Father     Alcohol and Other Disorders Associated Son     Hypertension Sister     Prostate Cancer Brother     Hypertension  Brother       reports that she quit smoking about 43 years ago. Her smoking use included cigarettes. She started smoking about 53 years ago. She has never used smokeless tobacco. She reports that she does not drink alcohol and does not use drugs.    Allergies:  Allergies   Allergen Reactions    Avelox [Moxifloxacin Hcl In Nacl] SWELLING    Avelox [Moxifloxacin Hydrochloride] TONGUE SWELLING     \"TABS\"    Amoxicillin NAUSEA AND VOMITING     Vomiting.    Statins Myopathy    Sulfa Antibiotics RASH and ITCHING    Dander OTHER (SEE COMMENTS)     \"Animals\": runny nose, watery eyes, itching    Morphine OTHER (SEE COMMENTS)     Word finding difficulties    Diphenhydramine Runny nose     \"Animals\": runny nose, watery eyes, itching    Dust Runny nose    Latex RASH    Sulfa Drugs Cross Reactors RASH and ITCHING       Medications:    Current Facility-Administered Medications:     lactated ringers infusion, , Intravenous, Continuous    phenol (Chloraseptic) 1.4 % oral liquid spray, , Mouth/Throat, PRN    oxyCODONE immediate release tab 2.5 mg, 2.5 mg, Oral, Q4H PRN **OR** oxyCODONE immediate release tab 5 mg, 5 mg, Oral, Q4H PRN    HYDROmorphone (Dilaudid) 1 MG/ML injection 0.2 mg, 0.2 mg, Intravenous, Q2H PRN **OR** HYDROmorphone (Dilaudid) 1 MG/ML injection 0.4 mg, 0.4 mg, Intravenous, Q2H PRN    acetaminophen (Tylenol Extra Strength) tab 1,000 mg, 1,000 mg, Oral, Q8H KYLE    famotidine (Pepcid) tab 20 mg, 20 mg, Oral, Daily **OR** famotidine (Pepcid) 20 mg/2mL injection 20 mg, 20 mg, Intravenous, Daily    enoxaparin (Lovenox) 30 MG/0.3ML SUBQ injection 30 mg, 30 mg, Subcutaneous, Daily    melatonin tab 3 mg, 3 mg, Oral, Nightly PRN    ondansetron (Zofran) 4 MG/2ML injection 4 mg, 4 mg, Intravenous, Q6H PRN    metoclopramide (Reglan) 5 mg/mL injection 5 mg, 5 mg, Intravenous, Q8H PRN    losartan (Cozaar) tab 50 mg, 50 mg, Oral, Daily    hydrALAzine (Apresoline) 20 mg/mL injection 10 mg, 10 mg, Intravenous, Q4H PRN    labetalol  (Trandate) 5 mg/mL injection 10 mg, 10 mg, Intravenous, Q4H PRN    albuterol (Ventolin HFA) 108 (90 Base) MCG/ACT inhaler 2 puff, 2 puff, Inhalation, Q6H PRN    clonazePAM (KlonoPIN) tab 0.5 mg, 0.5 mg, Oral, BID    fluticasone propionate (Flonase) 50 MCG/ACT nasal suspension 2 spray, 2 spray, Each Nare, Daily    mirtazapine (Remeron) tab 30 mg, 30 mg, Oral, Nightly    traMADol (Ultram) tab 50 mg, 50 mg, Oral, Q12H PRN    fluticasone furoate-vilanterol (Breo Ellipta) 200-25 MCG/ACT inhaler 1 puff, 1 puff, Inhalation, Daily    piperacillin-tazobactam (Zosyn) 3.375 g in dextrose 5% 100 mL IVPB-ADDV, 3.375 g, Intravenous, Q8H KYLE    Review of Systems:  Attempted but unable to complete. Patient reports she did not want to talk further due to pain.     Physical Exam:  Vital signs: Blood pressure 156/67, pulse 79, temperature 98.2 °F (36.8 °C), temperature source Temporal, resp. rate 14, height 165.1 cm (5' 5\"), weight 89 lb 11.6 oz (40.7 kg), SpO2 95%, not currently breastfeeding.    General: Alert, frustrated, NAD, on room air.   HEENT: Moist mucous membranes.   Neck: No lymphadenopathy.  Supple.  Cardiovascular: RRR  Respiratory: Clear to auscultation bilaterally.  No wheezes. No rhonchi.  Abdomen: Soft, tenderness- mostly by incision site, nondistended. Midline dressing in place- clean/dry/intact. + drain in place.   Musculoskeletal: No edema noted  Integument: No lesions. No erythema.    Laboratory Data:  Recent Labs   Lab 07/12/24  0517   RBC 3.58*   HGB 10.1*   HCT 31.3*   MCV 87.4   MCH 28.2   MCHC 32.3   RDW 17.8   NEPRELIM 10.66*   WBC 12.2*   .0     Recent Labs   Lab 07/11/24  0554 07/11/24  1854 07/12/24  0518   GLU 98 131* 143*   BUN 8* 8* 7*   CREATSERUM 0.74 0.76 0.66   CA 8.5 8.7 8.8   ALB 2.4* 2.3* 2.2*    138 136   K 3.5 3.5 3.6    108 108   CO2 19.0* 20.0* 19.0*   ALKPHO 56 59 56   AST 20 19 16   ALT 10* 10* 14   BILT 0.4 0.4 0.3   TP 5.9* 5.9* 5.9*       Microbiology: Reviewed in  [facility]    Radiology: Reviewed.  PROCEDURE:  CT ABDOMEN+PELVIS (CONTRAST ONLY) (CPT=74177)     COMPARISON:  [facility] , CT, CT ABDOMEN+PELVIS(CONTRAST ONLY)(CPT=74177), 6/17/2024, 10:08 PM.  [facility] , CT, CT ABDOMEN+PELVIS(CONTRAST ONLY)(CPT=74177), 6/21/2024, 6:19 PM.     INDICATIONS:  abd pain starting today, surgery with drain x2 weeks ago     TECHNIQUE:  CT scanning was performed from the dome of the diaphragm to the pubic symphysis with non-ionic intravenous contrast material. Post contrast coronal MPR imaging was performed.  Dose reduction techniques were used. Dose information is  transmitted to the ACR (American College of Radiology) NRDR (National Radiology Data Registry) which includes the Dose Index Registry.     PATIENT STATED HISTORY:(As transcribed by Technologist)  Abdominal pain      CONTRAST USED:  80cc of Isovue 370     FINDINGS:          LIVER:  Unremarkable.     BILIARY:  Unremarkable.     PANCREAS:  Unremarkable.     SPLEEN:  Unremarkable.     KIDNEYS:  No acute abnormality.     ADRENALS:  Unremarkable.     AORTA/VASCULAR:  No aortic aneurysm. There are atherosclerotic changes including calcification involving the aorta, but no evidence for aneurysm involving the aorta.     RETROPERITONEUM:  Unremarkable.     BOWEL/MESENTERY:  Surgical drain, percutaneous inserted anterior abdominal wall on the left, pigtail left lower quadrant stable position of the pigtail.  Suspect small residual abscess image 75 measuring 1.5 cm to the left of the continued-inflamed  sigmoid colon.  In this region is relatively extensive pericolonic stranding, the stranding appears worse than on the most recent CT scan from 6/21/2024.  Another small suspected abscess image 72 measuring about 1.7 cm adjacent to the pigtail catheter  and sigmoid colon.  However each of these could reflect inflamed, prominent fluid and air-filled diverticula as they both appear immediately adjacent to the sigmoid colon.  There therefore indeterminate  between small abscess versus fluid-filled  diverticula.  Continued thickening of the sigmoid colon and moderate amount of stool in the colon and large amount of stool in the rectum.  The stranding appears greater on the sigmoid colon and extends along the abdominal wall tract for the drain, for  example there is now tissue thickening measuring up to 2.7 cm image 75 at the anterior abdominal wall where the drain enters the peritoneal cavity.  The abdominal wall fat shows stranding in this region additionally.  No measurable abscess in this  region.  More proximally the stool in the colon is moderate, there is no sign of colonic obstruction.  Moderate fluid and air in the small bowel without small bowel obstruction pattern.  No generalized free air.  Trace free fluid pelvis but no large or  drainable ascites.     ABDOMINAL WALL:  See above regarding the insertion site of the surgical drain on the left.     URINARY BLADDER:  Thickened appearance of the left side of the urinary bladder as it abuts the above described sigmoid colon and pericolonic inflammatory process.  No gas bubbles in the bladder.  This could be secondarily inflamed/infected..     LYMPH NODES PELVIS:  Unremarkable.     PELVIC ORGANS:  No acute process.     LUNG BASES:  No acute process.     BONES:  No acute abnormality. Note is made of degenerative changes present in the spine.        Impression:     CONCLUSION:       1. There has been increase in the pericolonic inflammatory stranding and phlegmonous tissue thickening around the sigmoid colon when compared to the prior CT from 6/21/2024.  Consistent with continued/worsening colitis and/or diverticulitis.  Two focal  measurable structures are indeterminate between small abscess cavities or prominent inflamed diverticula.  Nothing is conclusive for definitive abscess.  Small free fluid pelvis without large or drainable ascites.  No sign of bowel obstruction or free  air.     2. In addition to the  intraperitoneal inflammatory process, there is now thickening of the soft tissue around the tract for the abdominal wall drain on the left along with adjacent fatty stranding of the abdominal wall, may reflect extension of  inflammation/infection in this region without measurable abscess.  Advise correlation for any potential abnormal drainage from this area.     3. Thickened left-sided urinary bladder where it abuts the sigmoid and pericolonic inflammatory phlegmonous process, probably secondarily inflamed/infected urinary bladder.  Advise correlation with symptoms and urinalysis.        LOCATION:  [facility]        Dictated by (CST): Colten Deshpande MD on 7/10/2024 at 4:50 PM      Finalized by (CST): Colten Deshpande MD on 7/10/2024 at 5:00 PM        ASSESSMENT:  Diverticulitis with abscess  S/p IR drainage 6/18, cxs polymicrobial (E. Coli, strep anginosus, E. Coli, strep constellatus, pseudomonas). Recently completed course of IV Zosyn  Now presents with worsening abdominal pain. S/p LAR of rectosigmoid colon with Elizabeth's pouch and end colostomy and drainage of abdominal abscess with placement of ailyn drain 7/11.  Leukocytosis, d/t above  HTN  COPD  Anemia    PLAN:  - continue IV Zosyn   - follow blood cultures  - follow temps and wbc  - follow abdominal exam  - reviewed labs, micro, imaging reports, available old records    Discussed with RN, patient and hospitalist PA.      Thank you for allowing us to participate in the care of this patient. Please do not hesitate to call if you have any questions.   We will continue to follow with you and will make further recommendations based on her progress.    MARY Beck   [facility]  [phone]  7/12/2024    ID ATTENDING ADDENDUM     Pt seen an examined independently. Chart reviewed. Agree with above. Note has been reviewed by me and modified as needed.  Exam and Impression/ Recs as noted above.  Will follow  D/w staff and with  pt  More than 50% of clinical time and 100% of the clinical decision making performed by me.    Leighann Patterson MD

## 2024-07-12 NOTE — WOUND PROGRESS NOTE
[facility]  Report of Inpatient Ostomy Consultation     Bibi Diaz Patient Status:  Inpatient    1945 MRN [id number]   Location [facility] 4SW-A 474/474-A Attending Geena Pardo MD   Hosp Day # 2 PCP Rick Shannon DO       REASON FOR CONSULT:  New Colostomy education      History of Present Illness:   Bibi Diaz is a a(n) 78 year old female seen in room 474 for initial ostomy consultation. On 24 pt underwent an end colostomy creation with Dr. Brooks.     History:  Past Medical History:    Abdominal distention    Abdominal pain    Acquired hypothyroidism    Anxiety    Asthma (HCC)    Belching    Bloating    Chronic rhinitis    CKD (chronic kidney disease) stage 3, GFR 30-59 ml/min (HCC)    Constipation    COPD (chronic obstructive pulmonary disease) ([facility])    NO OXYGEN     Depression    Diarrhea, unspecified    Diverticulosis of large intestine    Emphysema    Esophageal reflux    Fatigue    Feeling lonely    Flatulence/gas pain/belching    Food intolerance    H/O bronchitis    Hay fever    Hemorrhoids    High blood pressure    History of depression    Hypercholesterolemia    Hypertension    Itch of skin    Migraines    Mild intermittent asthma without complication ([facility])    Nausea    Night sweats    Pneumonia    PONV (postoperative nausea and vomiting)    Pure hypercholesterolemia    Rash    Reflux    Sleep disturbance    Stress    Uncomfortable fullness after meals    Visual impairment    glasses    Vomiting    Wears glasses     Past Surgical History:   Procedure Laterality Date    Cataract      Colonoscopy      Colonoscopy N/A 3/21/2024    Procedure: COLONOSCOPY;  Surgeon: Delon Ashford DO;  Location:  ENDOSCOPY    Colonoscopy & polypectomy  2014    multiple polyps; tics; repeat 3 yrs    Colonoscopy,remv lesn,snare N/A 2014    Procedure: ESOPHAGOGASTRODUODENOSCOPY, COLONOSCOPY, POSSIBLE BIOPSY, POSSIBLE POLYPECTOMY 75709,78415;  Surgeon: Slade Ivan MD;   Location: [facility]    Correct bunion,simple Bilateral     Cystotomy,excis vesical neck Left     Egd      Gastro - dmg  2014    stricture; HH    Oophorectomy Bilateral 2017    Other surgical history  2023    Robotic repair of hiatal hernia and Nissen fundoplication    Patient documented not to have experienced any of the following events N/A 2014    Procedure: ESOPHAGOGASTRODUODENOSCOPY, COLONOSCOPY, POSSIBLE BIOPSY, POSSIBLE POLYPECTOMY 40486,94773;  Surgeon: Slade Ivan MD;  Location: [facility]    Patient withough preoperative order for iv antibiotic surgical site infection prophylaxis. N/A 2014    Procedure: ESOPHAGOGASTRODUODENOSCOPY, COLONOSCOPY, POSSIBLE BIOPSY, POSSIBLE POLYPECTOMY 19930,00343;  Surgeon: Slade Ivan MD;  Location: [facility]    Repair ing hernia,5+y/o,reducibl      Upper gi endoscopy,diagnosis N/A 2014    Procedure: ESOPHAGOGASTRODUODENOSCOPY, COLONOSCOPY, POSSIBLE BIOPSY, POSSIBLE POLYPECTOMY 75183,91780;  Surgeon: Slade Ivan MD;  Location: [facility]      Social History     Socioeconomic History    Marital status:    Tobacco Use    Smoking status: Former     Current packs/day: 0.00     Types: Cigarettes     Start date: 1970     Quit date: 1980     Years since quittin.8    Smokeless tobacco: Never    Tobacco comments:      1/2 pack per day. Social history shows \"Tobacco Use: Active\" and \"Never Smoker: Active\"   Vaping Use    Vaping status: Never Used   Substance and Sexual Activity    Alcohol use: No    Drug use: No   Other Topics Concern    Caffeine Concern Yes     Comment: \"1 cup of coffee and tea, and can of pop daily\"    Exercise Yes     Comment: walking   Social History Narrative    ** Merged History Encounter **          Social Determinants of Health     Financial Resource Strain: Low Risk  (2023)    Financial Resource Strain     Difficulty of Paying Living Expenses: Not hard at all      Med Affordability: No   Food Insecurity: No Food Insecurity (7/10/2024)    Food Insecurity     Food Insecurity: Never true   Transportation Needs: No Transportation Needs (7/10/2024)    Transportation Needs     Lack of Transportation: No   Physical Activity: Inactive (4/21/2022)    Exercise Vital Sign     Days of Exercise per Week: 0 days     Minutes of Exercise per Session: 0 min   Stress: Stress Concern Present (2/13/2024)    Stress     Feeling of Stress : Yes    Social Connections   Housing Stability: Low Risk  (7/10/2024)    Housing Stability     Housing Instability: No       ASSESSMENT:    Stoma location: [facility]  Stoma type: colostomy   Stoma color: pink  Stoma condition: edematous  Stoma diameter: Not assessed  Ostomy output: none  Stoma height: budding  Peristomal skin: Not assessed  Pouching system:one piece  Able to care for stoma: No      Assessment of Learning Readiness:  Barriers/Limitations:  Denial and Drowsy from medication      Type of ostomy:  colostomy         Outcome of Education:  Patient refused      IMPRESSION/PLAN:  Pt had surgery on 7/11/24 for creation of end colostomy. Offered to start education today, pt decline, stating \"I am not ready yet\". Provided pt with ostomy folder for her to read on her time. Will follow up on Monday to assess readiness for education. Provided extra pouch in folded. Informed pt about olu secure start, pt signed, form faxed. RN notified.     Would benefit from Home Health: Yes      Ostomy Care Recommendations:   Pouch/Appliance change with a frequency of 3-7 days using products: [place] ileostomy/colostomy pouch #[id number]      Thank you for allowing me to participate in the care of your patient.  Time Spent: 30 Minutes.    Thank you.    Yasmine Segundo RN  Wound/Ostomy care pager [phone]  Wound Clinic [phone]  7/12/2024  3:55 PM

## 2024-07-12 NOTE — DIETARY MALNUTRITION NOTE
[facility]   part of [facility]  NUTRITION ASSESSMENT    Pt meets moderate malnutrition criteria at this time.    CRITERIA FOR MALNUTRITION DIAGNOSIS:  Criteria for non-severe malnutrition diagnosis: chronic illness related to energy intake less than75% for greater than 1 month, body fat mild depletion, and muscle mass mild depletion    NUTRITION INTERVENTION:    RD nutrition Care Plan- Initiated ONS (oral nutritional supplements), Provided written information for optimizing nutrient intake at home, Ordered multivitamin, and See RD nutrition assessment for additional recommendations  Meal and Snacks - ADAT Low Fiber as tolerated per surgery; monitor patient po intake. Encourage adequate po of appropriate diet.  Medical Food Supplements - RD to add [place] chocolate daily and Ensure Plus HP chocolate daily once diet advanced. Rationale/use for oral supplements discussed.  Nutrition Education - Colostomy Nutrition Therapy. Handouts provided but education deferred as pt upset.   Vitamin and Mineral Supplements - Recommend adding Multivitamin with minerals  Coordination of Nutrition Care - Recommend GI/Surgery consult for nutrition support/diet advancement     PATIENT STATUS: 78 year old female admitted on 7/10 presents with abdominal pain s/p OR yesterday. Pt screened d/t MST score 2 and low BMI. Pt known to writer from previous admits last month. Visited pt at bedside. Pt reports no changes in appetite/PO intake since last admit (following mostly full liquids diet). Wt has remained mostly stable. Pt having nausea and pain this morning. No colostomy output yet; surgery following for diet advancement. Will add ONS once diet advanced. Attempted to give new colostomy diet education but pt upset and asking why no one told her about this before the surgery and asking how long she was going to be here. Left handout at bedside and will follow up as able for education as appropriate.    PMH: hypothyroidism,  anxiety, asthma, CKD 3, COPD, essential hypertension, hyperlipidemia, recent hospitalization for diverticulitis     Major Events/Procedures:  7/11: Low Anterior Resection of the Rectosigmoid Colon with Elizabeth's pouch and end colostomy. Drainage of abdominal abscess. Placement Keegan drain.    ANTHROPOMETRICS:  Ht: 152.4 cm (5')  Wt: 40.7 kg (89 lb 11.6 oz).   BMI: Body mass index is 17.52 kg/m².  IBW: 45.5 kg    WEIGHT HISTORY:   Patient Weight(s) for the past 336 hrs:   Weight   07/12/24 0600 40.7 kg (89 lb 11.6 oz)   07/11/24 1817 40.9 kg (90 lb 2.7 oz)   07/10/24 2044 40.9 kg (90 lb 2.7 oz)       Wt Readings from Last 10 Encounters:   07/12/24 40.7 kg (89 lb 11.6 oz)   07/05/24 38.4 kg (84 lb 9.6 oz)   06/26/24 39.5 kg (87 lb)   06/18/24 43.6 kg (96 lb 3.2 oz)   06/10/24 41.7 kg (92 lb)   05/16/24 41.3 kg (91 lb)   05/14/24 41.3 kg (91 lb)   05/01/24 41.3 kg (91 lb)   05/02/24 42.2 kg (93 lb)   04/30/24 41.3 kg (91 lb)        NUTRITION:  Diet:       Procedures    NPO        Percent Meals Eaten (last 3 days)       Date/Time Percent Meals Eaten (%)    07/10/24 2044 0 %    07/11/24 0721 0 %    07/11/24 1127 0 %            Food Allergies: No  Cultural/Ethnic/Gnosticist Preferences Addressed: Yes    GI SYSTEM REVIEW: nausea, abdominal pain, and +colostomy  Skin/Wounds: WNL    NUTRITION RELATED PHYSICAL FINDINGS:     1. Body Fat/Muscle Mass: moderate depletion body fat Buccal fat pad and Triceps and moderate muscle depletion Temple region, Clavicle region, Thigh region, and Calf region per assessment last admit.     2. Fluid Accumulation: none per RN documentation     NUTRITION PRESCRIPTION: 40.7 kg  Calories: 6855-2008 calories/day (30-35 kcal/kg)  Protein: 49-61 grams protein/day (1.2-1.5 gm/kg)  Fluid: ~1 ml/kcal or per MD discretion    NUTRITION DIAGNOSIS/PROBLEM:  Malnutrition related to altered GI function/GI disorder and insufficient appetite resulting in inadequate nutrition intake as evidenced by  documented/reported insufficient oral intake, loss of fat mass, and loss of muscle mass    MONITOR AND EVALUATE/NUTRITION GOALS:  Weight stable within 1 to 2 lbs during admission - New  Return to PO intake or advance diet in 24-48 hrs - New      MEDICATIONS:  Pepcid, remeron, abx, prn reglan, prn zofran  Gtt: LR at 100 ml/hr    LABS:  Reviewed     Pt is at High nutrition risk    Halie Muniz RD, LDN, CNSC  Clinical Dietitian  Spectra: [phone]

## 2024-07-12 NOTE — PLAN OF CARE
Received pt A&Ox4 on RA. C/o severe abdominal pain and nausea; PRN meds given per order. Flood intact, monitoring output. VALERI drain intact, draining serosanguineous output. Tmax: 100.6F; tylenol given and ice packs applied. VSS. NSR on monitor. See flowsheets. See MAR. Has transfer orders.

## 2024-07-12 NOTE — PROGRESS NOTES
[facility]  Progress Note    Bibi Diaz Patient Status:  Inpatient    1945 MRN [id number]   Location [facility] 4SW-A Attending Geena Pardo MD   Hosp Day # 2 PCP Rick Shannon DO     Subjective:  The patient is resting comfortably in bed.  She states that she is depressed.  She reports mild nausea.  She denies vomiting.  She denies fever or chills.    Objective/Physical Exam:  General: Alert, orientated x3.  Cooperative.  No apparent distress.  Vital Signs:  Blood pressure 151/62, pulse 80, temperature 98.6 °F (37 °C), temperature source Temporal, resp. rate 19, height 60\", weight 89 lb 11.6 oz (40.7 kg), SpO2 97%, not currently breastfeeding.  Wt Readings from Last 3 Encounters:   24 89 lb 11.6 oz (40.7 kg)   24 84 lb 9.6 oz (38.4 kg)   24 87 lb (39.5 kg)     Lungs: No respiratory distress.  Cardiac: Regular rate and rhythm.   Abdomen:  Soft, non distended, non tender, with no rebound or guarding.  No peritoneal signs.  Ostomy is pink with no stool output noted.  Air is noted in the ostomy appliance  Extremities:  No lower extremity edema noted.    Incision: Clean, dry, intact, no erythema  Drain: With serosanguineous output    Intake/Output:    Intake/Output Summary (Last 24 hours) at 2024 1207  Last data filed at 2024 0600  Gross per 24 hour   Intake 2400 ml   Output 1890 ml   Net 510 ml     I/O last 3 completed shifts:  In: 2400 [P.O.:150; I.V.:2050; IV PIGGYBACK:200]  Out: 2805 [Urine:2450; Drains:315; Blood:40]  No intake/output data recorded.    Medications:    [START ON 2024] multivitamin with minerals  1 tablet Oral Daily    ketorolac  15 mg Intravenous q6h    acetaminophen  1,000 mg Oral Q8H KYLE    famotidine  20 mg Oral Daily    Or    famotidine  20 mg Intravenous Daily    enoxaparin  30 mg Subcutaneous Daily    losartan  50 mg Oral Daily    clonazePAM  0.5 mg Oral BID    fluticasone propionate  2 spray Each Nare Daily    mirtazapine  30 mg Oral  Nightly    fluticasone furoate-vilanterol  1 puff Inhalation Daily    piperacillin-tazobactam  3.375 g Intravenous Q8H KYLE       Labs:  Lab Results   Component Value Date    WBC 12.2 07/12/2024    HGB 10.1 07/12/2024    HCT 31.3 07/12/2024    .0 07/12/2024     Lab Results   Component Value Date     07/12/2024    K 3.6 07/12/2024     07/12/2024    CO2 19.0 07/12/2024    BUN 7 07/12/2024    CREATSERUM 0.66 07/12/2024     07/12/2024    CA 8.8 07/12/2024    ALKPHO 56 07/12/2024    ALT 14 07/12/2024    AST 16 07/12/2024    BILT 0.3 07/12/2024    ALB 2.2 07/12/2024    TP 5.9 07/12/2024     Lab Results   Component Value Date    PT 12.6 01/31/2013    PT 13.2 08/02/2011    INR 1.35 (H) 06/18/2024    INR 1.05 03/20/2024    INR 0.99 12/29/2022         Assessment  Patient Active Problem List   Diagnosis    Major depressive disorder, recurrent episode, moderate (HCC)    Esophageal reflux    Chronic obstructive pulmonary disease with acute lower respiratory infection (HCC)    Acquired hypothyroidism    Memory deficit    Aortic atherosclerosis (HCC)    Diverticulosis of colon    Dizziness and giddiness    Dysthymic disorder    Lichen sclerosus    Pure hypercholesterolemia    Vitamin D deficiency    Vitiligo    Severe episode of recurrent major depressive disorder, without psychotic features (HCC)    Paraesophageal hernia    Subclinical hypothyroidism    Hypertension    Protein-calorie malnutrition, unspecified severity (HCC)    Gastrointestinal hemorrhage, unspecified gastrointestinal hemorrhage type    Thrombocytopenia (HCC)    Acute diverticulitis    Hyponatremia    ACP (advance care planning)    Weakness generalized    Colonic diverticular abscess    Sigmoid diverticulitis    History of repair of hiatal hernia    Diverticulitis of large intestine with abscess without bleeding    Malnutrition (HCC)    Diverticulitis    Pelvic abscess in female    Leukocytosis    Diverticulitis of colon     Postop day 1  low anterior resection of rectosigmoid colon with Ramírez's pouch and end colostomy,, drainage of abdominal abscess.      Plan:  Advance to clear liquid diet.  Add nutritional supplements daily.  Add scheduled Toradol.  Continue scheduled Tylenol.  Minimize narcotics.  Continue drain care.  Continue ostomy care.  Continue IV antibiotics.  Courage patient to be out of bed.  DVT prophylaxis with Lovenox  GI prophylaxis with Pepcid    Quality:  DVT Mechanical Prophylaxis:   SCDs, Early ambuation  DVT Pharmacologic Prophylaxis   Medication    enoxaparin (Lovenox) 30 MG/0.3ML SUBQ injection 30 mg                Code Status: DNAR/Selective Treatment  Flood: Flood catheter in place  Flood Duration (in days): 2  Central line:    WOLF:         Margarita Al PA-C  7/12/2024  12:07 PM      This note was initiated by Margarita Al PA-C.  The PA saw the patient in conjunction with me. The PA performed a history, exam, and developed the assessment and plan. I agree with the mentioned assessment and plan and have provided further documentation of my own, if necessary.    Jason White MD  [facility]          Postop Diagnosis: same

## 2024-07-12 NOTE — PROGRESS NOTES
[facility]   part of [facility]     Hospitalist Progress Note     Bibi Diaz Patient Status:  Inpatient    1945 MRN [id number]   Location [facility] 3NW-A Attending Win Brothers MD   Hosp Day # 2 PCP Rcik Shannon DO     Chief Complaint: abd pain     Subjective:  Patient reports abdominal pain/nausea.   Reports she is not sure what happened in surgery. +confused at times.  RN reports she was more comfortable/calm this am and had received oxycodone/zofran.      Objective:    Review of Systems:  comprehensive review of systems was completed; pertinent positive and negatives stated in subjective.    Vital signs:  Temp:  [98.2 °F (36.8 °C)-100.2 °F (37.9 °C)] 98.2 °F (36.8 °C)  Pulse:  [61-83] 79  Resp:  [14-26] 14  BP: (133-189)/(50-90) 156/67  SpO2:  [92 %-97 %] 95 %    Physical Exam:    General: No acute distress 78 year old female   Respiratory: No wheezes, no rhonchi on RA  Cardiovascular: S1, S2, regular rate and rhythm  Abdomen: Soft, Nondistended +ostomy +drain  Neuro: No new focal deficits.   Extremities: No edema      Diagnostic Data:    Labs:  Recent Labs   Lab 07/10/24  1344 24  0554 24  1854 24  0517   WBC 16.7* 11.5* 10.4 12.2*   HGB 10.3* 8.9* 10.7* 10.1*   MCV 84.7 83.9 86.9 87.4   .0 298.0 342.0 349.0       Recent Labs   Lab 24  0554 24  1854 24  0518   GLU 98 131* 143*   BUN 8* 8* 7*   CREATSERUM 0.74 0.76 0.66   CA 8.5 8.7 8.8   ALB 2.4* 2.3* 2.2*    138 136   K 3.5 3.5 3.6    108 108   CO2 19.0* 20.0* 19.0*   ALKPHO 56 59 56   AST 20 19 16   ALT 10* 10* 14   BILT 0.4 0.4 0.3   TP 5.9* 5.9* 5.9*       Estimated Creatinine Clearance: 44.4 mL/min (based on SCr of 0.66 mg/dL).    No results for input(s): \"TROP\", \"TROPHS\", \"CK\" in the last 168 hours.    No results for input(s): \"PTP\", \"INR\" in the last 168 hours.       Microbiology    Hospital Encounter on 07/10/24   1. Blood Culture     Status: None (Preliminary  result)    Collection Time: 07/10/24  2:38 PM    Specimen: Blood,peripheral   Result Value Ref Range    Blood Culture Result No Growth 1 Day N/A       Imaging: Reviewed in Epic.    Medications:    acetaminophen  1,000 mg Oral Q8H KYLE    famotidine  20 mg Oral Daily    Or    famotidine  20 mg Intravenous Daily    enoxaparin  30 mg Subcutaneous Daily    losartan  50 mg Oral Daily    clonazePAM  0.5 mg Oral BID    fluticasone propionate  2 spray Each Nare Daily    mirtazapine  30 mg Oral Nightly    fluticasone furoate-vilanterol  1 puff Inhalation Daily    piperacillin-tazobactam  3.375 g Intravenous Q8H [facility]       Assessment & Plan:      #Abdominal pain d/t diverticular abscess  #Leukocytosis, improved  - surgery noted large inflammatory mass in pelvis near sigmoid colon now s/p LAR w/ hartmanns pouch and end colostomy on 7/11,   - abx IV per ID, follow cultures  - NPO per surgery, on IVF  - pain control - oxycodone/dilaudid prn, consider toradol - d/w surgery PA  - await ostomy output  - good UO     #Delirium multifactorial  - lights on, re-oriented, sleep hygiene tonight  - try non-narcotic pain mgmt - tylenol/toradol    #Constipation, improved  #Chronic Anemia, stable, monitor H&H  #Hyponatremia, resolved  #COPD without exacerbation - cont BD  #Essential hypertension-Losartan  #Anxiety, Depression - remeron, klonopin    Case d/w pt, RN surgery PA, ID MD MARY.  Appreciate consultants.   MARY Greer  11:02 AM     Addendum: pt reports pain improved after dilaudid and feeling better now.  Updated RN covering.   MARY Greer  11:28 AM       Addendum:    Agree with above note except as documented below.      Gen: NAD  CVS: s1s2  Resp: CTA  Abd: soft    # Diverticular abscess sp LAR and ostomy 7/11  - abx per ID   - surgery and ID on board   - pain control   - IS    # HTN   - pain control   - will adjut meds     # delirium   # anxiety   # COPD      Pt seen and examined independently. Chart reviewed. Labs and  imaging over the last 24 hours have been personally reviewed.  I personally made/approved 100% of the management plan for this patient and take full responsibility for the patient management.   Note has been reviewed by me and modified as needed.  Exam and Impression/ Recs as noted above.  D/w staff.    Geena Pardo MD          Supplementary Documentation:     Quality:  DVT Mechanical Prophylaxis:   SCDs, Early ambuation  DVT Pharmacologic Prophylaxis   Medication    enoxaparin (Lovenox) 30 MG/0.3ML SUBQ injection 30 mg                Code Status: DNAR/Selective Treatment  Flood: Flood catheter in place    The 21st Century Cures Act makes medical notes like these available to patients in the interest of transparency. Please be advised this is a medical document. Medical documents are intended to carry relevant information, facts as evident, and the clinical opinion of the practitioner. The medical note is intended as peer to peer communication and may appear blunt or direct. It is written in medical language and may contain abbreviations or verbiage that are unfamiliar.

## 2024-07-12 NOTE — PLAN OF CARE
Received pt from the OR at about 1810. Alert to self and location. Complains of pain, prn dilaudid given see mar. Flood in place. VALERI drain intact. Colostomy bag intact. See flowsheets for vitals and more info.

## 2024-07-12 NOTE — PLAN OF CARE
Report received from previous RN.  Pt initially drowsy, more alert as the night progress.  Pt c/o abd pain and headache.  Medicated as per orders.  Pt nauseous.  Cardiac monitor SR.  [facility] with good results. See flowsheet for further assessment detailed.  Call light within reach.

## 2024-07-12 NOTE — PROGRESS NOTES
ICU  Critical Care APRN Progress Note    NAME: Bibi Diaz - ROOM:  OR [place] ROOMS/EH OR P* - MRN: [id number] - Age: 78 year old - :1945    Subjective: No acute events overnight.    Current Facility-Administered Medications   Medication Dose Route Frequency    lactated ringers infusion   Intravenous Continuous    phenol (Chloraseptic) 1.4 % oral liquid spray   Mouth/Throat PRN    oxyCODONE immediate release tab 2.5 mg  2.5 mg Oral Q4H PRN    Or    oxyCODONE immediate release tab 5 mg  5 mg Oral Q4H PRN    HYDROmorphone (Dilaudid) 1 MG/ML injection 0.2 mg  0.2 mg Intravenous Q2H PRN    Or    HYDROmorphone (Dilaudid) 1 MG/ML injection 0.4 mg  0.4 mg Intravenous Q2H PRN    acetaminophen (Tylenol Extra Strength) tab 1,000 mg  1,000 mg Oral Q8H KYLE    famotidine (Pepcid) tab 20 mg  20 mg Oral Daily    Or    famotidine (Pepcid) 20 mg/2mL injection 20 mg  20 mg Intravenous Daily    enoxaparin (Lovenox) 30 MG/0.3ML SUBQ injection 30 mg  30 mg Subcutaneous Daily    melatonin tab 3 mg  3 mg Oral Nightly PRN    ondansetron (Zofran) 4 MG/2ML injection 4 mg  4 mg Intravenous Q6H PRN    metoclopramide (Reglan) 5 mg/mL injection 5 mg  5 mg Intravenous Q8H PRN    losartan (Cozaar) tab 50 mg  50 mg Oral Daily    hydrALAzine (Apresoline) 20 mg/mL injection 10 mg  10 mg Intravenous Q4H PRN    labetalol (Trandate) 5 mg/mL injection 10 mg  10 mg Intravenous Q4H PRN    albuterol (Ventolin HFA) 108 (90 Base) MCG/ACT inhaler 2 puff  2 puff Inhalation Q6H PRN    clonazePAM (KlonoPIN) tab 0.5 mg  0.5 mg Oral BID    fluticasone propionate (Flonase) 50 MCG/ACT nasal suspension 2 spray  2 spray Each Nare Daily    mirtazapine (Remeron) tab 30 mg  30 mg Oral Nightly    traMADol (Ultram) tab 50 mg  50 mg Oral Q12H PRN    fluticasone furoate-vilanterol (Breo Ellipta) 200-25 MCG/ACT inhaler 1 puff  1 puff Inhalation Daily    piperacillin-tazobactam (Zosyn) 3.375 g in dextrose 5% 100 mL IVPB-ADDV  3.375 g Intravenous Q8H KYLE        OBJECTIVE  Vitals:  /67   Pulse 79   Temp 98.2 °F (36.8 °C) (Temporal)   Resp 14   Ht 165.1 cm (5' 5\")   Wt 89 lb 11.6 oz (40.7 kg)   SpO2 95%   BMI 14.93 kg/m²                Physical Exam:    General Appearance: Drowsy, following commands  Neck: No JVD, neck supple, no adenopathy, trachea midline  Lungs: Clear to auscultation bilaterally, respirations unlabored  Heart: Regular rate and rhythm, S1 and S2 normal, no murmur, rub or gallop  Abdomen: Soft, incisional tenderness, midline incision, VALERI, colostomy  Extremities: Extremities normal, atraumatic, no cyanosis or edema, capillary refill <3 sec.    Pulses: 2+ and symmetric all extremities  Skin: Skin color pale. Midline incision dressing c/d/i      Data this admission:  CT ABDOMEN+PELVIS(CONTRAST ONLY)(CPT=74177)    Result Date: 7/10/2024  CONCLUSION:   1. There has been increase in the pericolonic inflammatory stranding and phlegmonous tissue thickening around the sigmoid colon when compared to the prior CT from 6/21/2024.  Consistent with continued/worsening colitis and/or diverticulitis.  Two focal measurable structures are indeterminate between small abscess cavities or prominent inflamed diverticula.  Nothing is conclusive for definitive abscess.  Small free fluid pelvis without large or drainable ascites.  No sign of bowel obstruction or free air.  2. In addition to the intraperitoneal inflammatory process, there is now thickening of the soft tissue around the tract for the abdominal wall drain on the left along with adjacent fatty stranding of the abdominal wall, may reflect extension of inflammation/infection in this region without measurable abscess.  Advise correlation for any potential abnormal drainage from this area.  3. Thickened left-sided urinary bladder where it abuts the sigmoid and pericolonic inflammatory phlegmonous process, probably secondarily inflamed/infected urinary bladder.  Advise correlation with symptoms and  urinalysis.   LOCATION:  [facility]   Dictated by (CST): Colten Deshpande MD on 7/10/2024 at 4:50 PM     Finalized by (CST): Colten Deshpande MD on 7/10/2024 at 5:00 PM       CT ABDOMEN+PELVIS(CONTRAST ONLY)(CPT=74177)    Result Date: 6/21/2024  CONCLUSION:  1. Increased large amount of stool in rectosigmoid colon with persistent mural thickening and pericolonic inflammatory changes consistent with stercoral colitis.  The largest pericolonic abscess laterally has resolved with an indwelling drain in place.  2. Two small pericolonic abscesses are again seen, the small abscess inferior to the sigmoid colon is decreased in size and the tiny abscess lateral to the sigmoid colon is not significantly changed.  These would not be amenable to percutaneous aspiration or drainage.   LOCATION:  [id number]   Dictated by (CST): Dexter Glasgow MD on 6/21/2024 at 6:43 PM     Finalized by (CST): Dexter Glasgow MD on 6/21/2024 at 6:53 PM       MRI BRAIN (CPT=70551)    Result Date: 6/19/2024  CONCLUSION:  1. No acute infarct, acute intracranial hemorrhage, or hydrocephalus. 2. Mild chronic small vessel ischemic disease with punctate chronic infarcts at the basal ganglia.   LOCATION:  [facility]   Dictated by (CST): Stromberg, LeRoy, MD on 6/19/2024 at 8:13 PM     Finalized by (CST): Stromberg, LeRoy, MD on 6/19/2024 at 8:19 PM       XR CHEST AP PORTABLE  (CPT=71045)    Result Date: 6/19/2024  CONCLUSION:  No acute disease.    LOCATION:  [facility]      Dictated by (CST): Saurabh Can MD on 6/19/2024 at 2:36 PM     Finalized by (CST): Saurabh Can MD on 6/19/2024 at 2:36 PM       CT DRAIN ABSCESS PERITONEAL (CPT=49406)    Result Date: 6/18/2024  CONCLUSION:  CT-guided left lower quadrant abscess drainage was performed without complication.   LOCATION:  [facility]   Dictated by (CST): Luke Gautam MD on 6/18/2024 at 5:39 PM     Finalized by (CST): Luke Gautam MD on 6/18/2024 at 5:51 PM       CT ABDOMEN+PELVIS(CONTRAST ONLY)(CPT=74177)    Result  Date: 6/17/2024  CONCLUSION:  Hyperdense desiccated appearing stool in the sigmoid colon with stercoral colitis, and pericolonic abscess formations, multiple.  The largest pericolonic abscess measures 4.0 cm.   LOCATION:  [facility]   Dictated by (CST): Colten Deshpande MD on 6/17/2024 at 11:11 PM     Finalized by (CST): Colten Deshpande MD on 6/17/2024 at 11:17 PM       CT BRAIN OR HEAD (48350)    Result Date: 6/17/2024  CONCLUSION:  Chronic changes in the brain, as described, but no acute intracranial bleed, or other acute intracranial process identified.    LOCATION:  [facility]   Dictated by (CST): Colten Deshpande MD on 6/17/2024 at 7:23 PM     Finalized by (CST): Colten Deshpande MD on 6/17/2024 at 7:25 PM         Labs:  Lab Results   Component Value Date    WBC 12.2 07/12/2024    HGB 10.1 07/12/2024    HCT 31.3 07/12/2024    .0 07/12/2024    CREATSERUM 0.66 07/12/2024    BUN 7 07/12/2024     07/12/2024    K 3.6 07/12/2024     07/12/2024    CO2 19.0 07/12/2024     07/12/2024    CA 8.8 07/12/2024    ALB 2.2 07/12/2024    ALKPHO 56 07/12/2024    BILT 0.3 07/12/2024    TP 5.9 07/12/2024    AST 16 07/12/2024    ALT 14 07/12/2024           Assessment/Plan:  POD #1 s/p ex lap, low anterior resection of the rectosigmoid colon with Elizabeth's puch and end colostomy, drainage of abdominal abscess and placement of ailyn drain  - NPO   - IVF  - Pain management  - Ostomy care  - VALERI to bulb suction  - Zosyn (7/10- )    Leukocytosis, likely 2/2 to above  - low grade fevers  - Blood cultures pending  - Cdiff negative  - Antibiotics as above    History of HTN  - Home meds on hold, resume as able     Anxiety/depression  - Resume home meds as able    F/E/N  - IVF  - Replete electrolytes per protocol  - NPO    Proph  - SQ lovenox   - SCDs  - PT/OT    Dispo  - DNAR select  - may transfer to floor    Plan of care discussed with intensivist, Dr. Ml Irwin, [facility]  Critical Care NP  Phone  [phone]    Intensivist Addendum:  I agree with APN note above. I have independently seen & evaluated the patient.  I agree with the management plan outlined above with the following changes/additions:    Transferred to ICU for transient hypotension during operative recovery.  Diverticulitis s/p drainage of abscess and creation of morales's pouch with ostomy. Now improved postoperatively without ICU criteria.  Will continue antibiotics. Keep NPO per surgery. Transfer out of ICU to surgical floor today.    Pulmonary service will follow peripherally outside of ICU    Lvl 3    Galileo Bull,   Pulmonary & Critical Care Medicine  [facility]

## 2024-07-13 LAB
ALBUMIN SERPL-MCNC: 1.9 G/DL (ref 3.4–5)
ALBUMIN/GLOB SERPL: 0.5 {RATIO} (ref 1–2)
ALP LIVER SERPL-CCNC: 52 U/L
ALT SERPL-CCNC: 39 U/L
ANION GAP SERPL CALC-SCNC: 6 MMOL/L (ref 0–18)
AST SERPL-CCNC: 16 U/L (ref 15–37)
BASOPHILS # BLD AUTO: 0.04 X10(3) UL (ref 0–0.2)
BASOPHILS # BLD AUTO: 0.04 X10(3) UL (ref 0–0.2)
BASOPHILS NFR BLD AUTO: 0.5 %
BASOPHILS NFR BLD AUTO: 0.5 %
BILIRUB SERPL-MCNC: 0.3 MG/DL (ref 0.1–2)
BUN BLD-MCNC: 7 MG/DL (ref 9–23)
CALCIUM BLD-MCNC: 8.9 MG/DL (ref 8.5–10.1)
CHLORIDE SERPL-SCNC: 110 MMOL/L (ref 98–112)
CO2 SERPL-SCNC: 25 MMOL/L (ref 21–32)
CREAT BLD-MCNC: 0.6 MG/DL
EGFRCR SERPLBLD CKD-EPI 2021: 91 ML/MIN/1.73M2 (ref 60–?)
EOSINOPHIL # BLD AUTO: 0.11 X10(3) UL (ref 0–0.7)
EOSINOPHIL # BLD AUTO: 0.16 X10(3) UL (ref 0–0.7)
EOSINOPHIL NFR BLD AUTO: 1.4 %
EOSINOPHIL NFR BLD AUTO: 2 %
ERYTHROCYTE [DISTWIDTH] IN BLOOD BY AUTOMATED COUNT: 18.1 %
ERYTHROCYTE [DISTWIDTH] IN BLOOD BY AUTOMATED COUNT: 18.3 %
GLOBULIN PLAS-MCNC: 3.5 G/DL (ref 2.8–4.4)
GLUCOSE BLD-MCNC: 99 MG/DL (ref 70–99)
HCT VFR BLD AUTO: 27.2 %
HCT VFR BLD AUTO: 31.5 %
HGB BLD-MCNC: 8.6 G/DL
HGB BLD-MCNC: 9.7 G/DL
IMM GRANULOCYTES # BLD AUTO: 0.03 X10(3) UL (ref 0–1)
IMM GRANULOCYTES # BLD AUTO: 0.04 X10(3) UL (ref 0–1)
IMM GRANULOCYTES NFR BLD: 0.4 %
IMM GRANULOCYTES NFR BLD: 0.5 %
LYMPHOCYTES # BLD AUTO: 0.63 X10(3) UL (ref 1–4)
LYMPHOCYTES # BLD AUTO: 0.72 X10(3) UL (ref 1–4)
LYMPHOCYTES NFR BLD AUTO: 7.7 %
LYMPHOCYTES NFR BLD AUTO: 8.8 %
MAGNESIUM SERPL-MCNC: 2.1 MG/DL (ref 1.6–2.6)
MCH RBC QN AUTO: 27.7 PG (ref 26–34)
MCH RBC QN AUTO: 28.9 PG (ref 26–34)
MCHC RBC AUTO-ENTMCNC: 30.8 G/DL (ref 31–37)
MCHC RBC AUTO-ENTMCNC: 31.6 G/DL (ref 31–37)
MCV RBC AUTO: 87.7 FL
MCV RBC AUTO: 93.8 FL
MONOCYTES # BLD AUTO: 0.53 X10(3) UL (ref 0.1–1)
MONOCYTES # BLD AUTO: 0.55 X10(3) UL (ref 0.1–1)
MONOCYTES NFR BLD AUTO: 6.5 %
MONOCYTES NFR BLD AUTO: 6.8 %
NEUTROPHILS # BLD AUTO: 6.68 X10 (3) UL (ref 1.5–7.7)
NEUTROPHILS # BLD AUTO: 6.68 X10(3) UL (ref 1.5–7.7)
NEUTROPHILS # BLD AUTO: 6.81 X10 (3) UL (ref 1.5–7.7)
NEUTROPHILS # BLD AUTO: 6.81 X10(3) UL (ref 1.5–7.7)
NEUTROPHILS NFR BLD AUTO: 82 %
NEUTROPHILS NFR BLD AUTO: 82.9 %
OSMOLALITY SERPL CALC.SUM OF ELEC: 290 MOSM/KG (ref 275–295)
PLATELET # BLD AUTO: 350 10(3)UL (ref 150–450)
PLATELET # BLD AUTO: 361 10(3)UL (ref 150–450)
POTASSIUM SERPL-SCNC: 3.1 MMOL/L (ref 3.5–5.1)
PROT SERPL-MCNC: 5.4 G/DL (ref 6.4–8.2)
RBC # BLD AUTO: 3.1 X10(6)UL
RBC # BLD AUTO: 3.36 X10(6)UL
SODIUM SERPL-SCNC: 141 MMOL/L (ref 136–145)
WBC # BLD AUTO: 8.1 X10(3) UL (ref 4–11)
WBC # BLD AUTO: 8.2 X10(3) UL (ref 4–11)

## 2024-07-13 PROCEDURE — 99232 SBSQ HOSP IP/OBS MODERATE 35: CPT | Performed by: INTERNAL MEDICINE

## 2024-07-13 NOTE — PHYSICAL THERAPY NOTE
PHYSICAL THERAPY EVALUATION - INPATIENT     Room Number: 474/474-A  Evaluation Date: 7/13/2024  Type of Evaluation: Initial  Physician Order: PT Eval and Treat    Presenting Problem: abd pain, s/p low anterior resection of colon with wood's pouch and colostomy creation and placement of keegan drain  Co-Morbidities : COPD, essential hypertension, dyslipidemia, GERD, anxiety, depression  Reason for Therapy: Mobility Dysfunction and Discharge Planning  Procedure Performed 7/10/24 Dr Brooks: Low Anterior Resection of the Rectosigmoid Colon with Wood's pouch and end colostomy. Drainage of abdominal abscess.  Placement Keegan drain    Recent Admissions:  6/18-6/24/24: failure to thrive/weakness >> [facility]  6/10-6/14/24: acute diverticulitis >> [facility]  3/20-3/28/24: GIB s/p colonoscopy >> [facility]  PHYSICAL THERAPY ASSESSMENT   Patient is currently functioning near baseline with bed mobility, transfers, and gait.  Prior to admission, patient's baseline is Inge with RW.  Patient is requiring contact guard assist and minimal assist as a result of the following impairments: decreased functional strength, decreased endurance/aerobic capacity, impaired static and dynamic standing balance, impaired coordination, impaired motor planning, and decreased muscular endurance. Physical Therapy will continue to follow for duration of hospitalization.      Patient will benefit from continued skilled PT Services at discharge to promote prior level of function and safety with additional support and return home with home health PT.    PLAN  PT Treatment Plan: Body mechanics;Bed mobility;Coordination;Endurance;Energy conservation;Patient education;Family education;Gait training;Neuromuscular re-educate;Range of motion;Strengthening;Transfer training;Balance training  Rehab Potential : Good  Frequency (Obs): 3-5x/week  Number of Visits to Meet Established Goals: 3      CURRENT GOALS    Goal #1 Patient is able to demonstrate supine - sit EOB @  level: supervision     Goal #2 Patient is able to demonstrate transfers Sit to/from Stand at assistance level: supervision     Goal #3 Patient is able to ambulate 50 feet with assist device: walker - rolling at assistance level: supervision     Goal #4    Goal #5    Goal #6    Goal Comments: Goals established on 2024    PHYSICAL THERAPY MEDICAL/SOCIAL HISTORY  History related to current admission: Patient is a 78 year old female admitted on 7/10/2024 from home for incr abdominal pain.  Pt diagnosed with colon diverticular abscess.    HOME SITUATION  Type of Home: Apartment   Home Layout: One level  Stairs to Enter : 0             Lives With: Alone  Drives: No  Patient Owned Equipment: Rolling walker  Patient Regularly Uses: Glasses    Prior Level of Kathleen: Per pt lives in one level apartment alone. No steps to enter building. Ambulates with RW. No recent falls. Grand-daughter assists with transportation, groceries.     SUBJECTIVE  \"You need to wake me up earlier if you expect me to walk.\"    OBJECTIVE  Precautions: Bed/chair alarm;Drain(s);Colostomy;Abdominal protective strategies  Fall Risk: Standard fall risk    WEIGHT BEARING RESTRICTION  Weight Bearing Restriction: None                PAIN ASSESSMENT  Ratin  Location: denies pain at this time       COGNITION  Overall Cognitive Status:  WFL - within functional limits    RANGE OF MOTION AND STRENGTH ASSESSMENT  Upper extremity ROM and strength are within functional limits   Lower extremity ROM is within functional limits   Lower extremity strength is within functional limits     BALANCE  Static Sitting: Fair +  Dynamic Sitting: Fair  Static Standing: Fair -  Dynamic Standing: Fair -    ADDITIONAL TESTS                                    ACTIVITY TOLERANCE                         O2 WALK       NEUROLOGICAL FINDINGS                        AM-PAC '6-Clicks' INPATIENT SHORT FORM - BASIC MOBILITY  How much difficulty does the patient currently  have...  Patient Difficulty: Turning over in bed (including adjusting bedclothes, sheets and blankets)?: A Little   Patient Difficulty: Sitting down on and standing up from a chair with arms (e.g., wheelchair, bedside commode, etc.): A Little   Patient Difficulty: Moving from lying on back to sitting on the side of the bed?: A Little   How much help from another person does the patient currently need...   Help from Another: Moving to and from a bed to a chair (including a wheelchair)?: A Little   Help from Another: Need to walk in hospital room?: A Little   Help from Another: Climbing 3-5 steps with a railing?: A Little       AM-PAC Score:  Raw Score: 18   Approx Degree of Impairment: 46.58%   Standardized Score (AM-PAC Scale): 43.63   CMS Modifier (G-Code): CK    FUNCTIONAL ABILITY STATUS  Gait Assessment   Functional Mobility/Gait Assessment  Gait Assistance: Contact guard assist  Distance (ft): 3  Assistive Device: Rolling walker  Pattern: Shuffle    Skilled Therapy Provided     Bed Mobility:  Rolling: educated on log roll for abdominal protective strategy- able to complete w/ v/c for sequencing   Supine to sit: Chadwick from R sidelying to sitting EOB   Sit to supine: NT     Transfer Mobility:  Sit to stand: CGA to RW- v/c for hand placement, minor imbalance once in standing requiring steadying assist   Stand to sit: CGA  Gait = CGA x 3 feet with RW - shuffle steps     Therapist's Comments:   Patient presents laying in bed. RN present in room during session. Educated on role and goal of physical therapy in hospital setting. Pt in agreement to session. Denies pain at this time.  Educated on abdominal protective strategy to ease transitional movements. Bed mobility via log roll R > sidelying > sitting EOB at Chad[place]. Once sitting EOB, denies dizziness/lightheadedness. Sit to stand to RW at CGA, minor imbalance in standing, a little bit of posterior lean but able to correct with verbal cues. Shuffle side steps with RW to  bedside chair at CGA. Upright in chair at end of session.   Discussed importance of continued out of bed mobility. Encouraged continued ambulation and mobility with nursing staff. Pt verbalizes understanding. RN aware.    Exercise/Education Provided:  Bed mobility  Body mechanics  Energy conservation  Functional activity tolerated  Posture  Transfer training    Patient End of Session: Up in chair;Needs met;Call light within reach;With  staff;RN aware of session/findings;All patient questions and concerns addressed;Discussed recommendations with /      Patient Evaluation Complexity Level:  History Moderate - 1 or 2 personal factors and/or co-morbidities   Examination of body systems Low -  addressing 1-2 elements   Clinical Presentation  Moderate - Evolving   Clinical Decision Making Moderate Complexity     PT Session Time: 30 minutes  Therapeutic Activity: 15 minutes

## 2024-07-13 NOTE — PROGRESS NOTES
[facility]   part of [facility]     Hospitalist Progress Note     Bibi Diaz Patient Status:  Inpatient    1945 MRN [id number]   Location [facility] 3NW-A Attending Win Brothers MD   Hosp Day # 3 PCP Rick Shannon DO     Chief Complaint: abd pain     Subjective:  Patient doing much better. Less nausea, abd pain improved. Ate breakfast. In the chair now.     Objective:    Review of Systems:  comprehensive review of systems was completed; pertinent positive and negatives stated in subjective.    Vital signs:  Temp:  [98.3 °F (36.8 °C)-100.6 °F (38.1 °C)] 98.9 °F (37.2 °C)  Pulse:  [68-87] 68  Resp:  [12-25] 19  BP: (131-176)/(54-74) 140/62  SpO2:  [96 %-98 %] 96 %    Physical Exam:    General: No acute distress   Respiratory: No wheezes, no rhonchi on RA  Cardiovascular: S1, S2, regular rate and rhythm  Abdomen: Soft, Nondistended +ostomy +drain  Neuro: No new focal deficits.   Extremities: No edema      Diagnostic Data:    Labs:  Recent Labs   Lab 07/10/24  1344 24  0554 24  1854 24  0517 24  0702   WBC 16.7* 11.5* 10.4 12.2* 8.2   HGB 10.3* 8.9* 10.7* 10.1* 8.6*   MCV 84.7 83.9 86.9 87.4 87.7   .0 298.0 342.0 349.0 361.0       Recent Labs   Lab 24  0554 24  1854 24  0518   GLU 98 131* 143*   BUN 8* 8* 7*   CREATSERUM 0.74 0.76 0.66   CA 8.5 8.7 8.8   ALB 2.4* 2.3* 2.2*    138 136   K 3.5 3.5 3.6    108 108   CO2 19.0* 20.0* 19.0*   ALKPHO 56 59 56   AST 20 19 16   ALT 10* 10* 14   BILT 0.4 0.4 0.3   TP 5.9* 5.9* 5.9*       Estimated Creatinine Clearance: 44.4 mL/min (based on SCr of 0.66 mg/dL).    No results for input(s): \"TROP\", \"TROPHS\", \"CK\" in the last 168 hours.    No results for input(s): \"PTP\", \"INR\" in the last 168 hours.       Microbiology    Hospital Encounter on 07/10/24   1. Blood Culture     Status: None (Preliminary result)    Collection Time: 07/10/24  2:38 PM    Specimen: Blood,peripheral   Result Value Ref  Range    Blood Culture Result No Growth 2 Days N/A       Imaging: Reviewed in Epic.    Medications:    multivitamin with minerals  1 tablet Oral Daily    ketorolac  15 mg Intravenous q6h    enoxaparin  30 mg Subcutaneous Daily    acetaminophen  1,000 mg Oral Q8H KYLE    famotidine  20 mg Oral Daily    Or    famotidine  20 mg Intravenous Daily    losartan  50 mg Oral Daily    clonazePAM  0.5 mg Oral BID    fluticasone propionate  2 spray Each Nare Daily    mirtazapine  30 mg Oral Nightly    fluticasone furoate-vilanterol  1 puff Inhalation Daily    piperacillin-tazobactam  3.375 g Intravenous Q8H [facility]       Assessment & Plan:      #Abdominal pain d/t diverticular abscess  #Leukocytosis, improved  - surgery noted large inflammatory mass in pelvis near sigmoid colon now s/p LAR w/ hartmanns pouch and end colostomy on 7/11,   - abx IV per ID, follow cultures  - pain control -     #Delirium multifactorial, resolved   #Constipation, improved  #Chronic Anemia, stable, monitor H&H  #Hyponatremia, resolved  #COPD without exacerbation - cont BD  #Essential hypertension-Losartan  #Anxiety, Depression - belen ulrich MD          Supplementary Documentation:     Quality:  DVT Mechanical Prophylaxis:   SCDs, Early ambuation  DVT Pharmacologic Prophylaxis   Medication    enoxaparin (Lovenox) 30 MG/0.3ML SUBQ injection 30 mg                Code Status: DNAR/Selective Treatment  Flood: Flood catheter in place    The 21st Century Cures Act makes medical notes like these available to patients in the interest of transparency. Please be advised this is a medical document. Medical documents are intended to carry relevant information, facts as evident, and the clinical opinion of the practitioner. The medical note is intended as peer to peer communication and may appear blunt or direct. It is written in medical language and may contain abbreviations or verbiage that are unfamiliar.

## 2024-07-13 NOTE — PLAN OF CARE
Assumed care of pt following shift report. Pt is A&Ox4 and reports mild pain to abdomen/surgical site. Receiving scheduled Tylenol and Toradol with adequate relief. Pt expressed sadness and frustration regarding her colostomy. Pt states she did not fully understand the extent of the procedure. She is still working on coming to terms with her ostomy but does not want to see it or do any care yet. Pt says it will take time. Pt is very depressed and discouraged. Provided emotional support to pt. Afebrile and normotensive. Tele monitor shows NSR. Stable on room air. Able to drink liquids without issue and able to take PO medications. Still reporting some intermittent nausea; administered prn Zofran with relief noted. No stool output from ostomy. Flood in place w/ adequate urine output.Transfer orders remain in place. See MAR and flowsheets for additional assessments.

## 2024-07-13 NOTE — PROGRESS NOTES
[facility]  Progress Note    Bibi Diaz Patient Status:  Inpatient    1945 MRN [id number]   Location [facility] 4SW-A Attending Geena Pardo MD   Hosp Day # 3 PCP Rick Shannon DO     Subjective:  She is seen and examined resting in bed. She reports feeling pretty good today. She denies nausea or vomiting. She denies flatus or stool in her ostomy appliance. She reports she has not ambulated.     Hemoglobin 8.6, down from 10.1 CMP pending   Objective/Physical Exam:  /62 (BP Location: Left arm)   Pulse 68   Temp 98.9 °F (37.2 °C) (Temporal)   Resp 19   Ht 60\"   Wt 89 lb 11.6 oz   SpO2 96%   BMI 17.52 kg/m²     Intake/Output Summary (Last 24 hours) at 2024 0758  Last data filed at 2024 0555  Gross per 24 hour   Intake 1489.4 ml   Output 2155 ml   Net -665.6 ml         General: Awake, Alert,   Cooperative.  No apparent distress.  Pulm: no respiratory distress, no increased work of breathing  Abdomen:  Soft, non-distended,appropriate incisional tenderness, with no rebound or guarding.  No peritoneal signs. Stoma is pink and well perfused with some gas and bowel sweat.   Incision:  Clean, dry, intact without erythema.  Staples in place  Drains: Ruslan drain in place with serous drainage. Penrose drain to left lower quadrant.     Labs:  Lab Results   Component Value Date    WBC 8.2 2024    HGB 8.6 2024    HCT 27.2 2024    .0 2024      Lab Results   Component Value Date    PT 12.6 2013    PT 13.2 2011    INR 1.35 (H) 2024    INR 1.05 2024    INR 0.99 2022               Assessment:  Patient Active Problem List   Diagnosis    Major depressive disorder, recurrent episode, moderate (HCC)    Esophageal reflux    Chronic obstructive pulmonary disease with acute lower respiratory infection (HCC)    Acquired hypothyroidism    Memory deficit    Aortic atherosclerosis (HCC)    Diverticulosis of colon    Dizziness and giddiness     Dysthymic disorder    Lichen sclerosus    Pure hypercholesterolemia    Vitamin D deficiency    Vitiligo    Severe episode of recurrent major depressive disorder, without psychotic features (HCC)    Paraesophageal hernia    Subclinical hypothyroidism    Hypertension    Protein-calorie malnutrition, unspecified severity (HCC)    Gastrointestinal hemorrhage, unspecified gastrointestinal hemorrhage type    Thrombocytopenia (HCC)    Acute diverticulitis    Hyponatremia    ACP (advance care planning)    Weakness generalized    Colonic diverticular abscess    Sigmoid diverticulitis    History of repair of hiatal hernia    Diverticulitis of large intestine with abscess without bleeding    Malnutrition (HCC)    Diverticulitis    Pelvic abscess in female    Leukocytosis    Diverticulitis of colon     Postop day 2 low anterior resection of rectosigmoid colon with Ramírez's pouch and end colostomy,, drainage of abdominal abscess.       Plan:  Continue clear liquid diet.  Add nutritional supplements daily.  Analgesics and antiemetics as needed  Remove mills catheter today  Continue drain care.  Continue ostomy care.  Continue IV antibiotics.  Encourage ambulation and up to chair.   Aquacel dressing removed at bedside today  AM labs to monitor hemoglobin   DVT prophylaxis with Lovenox  GI prophylaxis with Pepcid  OK to transfer to floor from general surgery standpoint      Myra Mustafa PA-C  7/13/2024  7:58 AM    Patient seen and examined, I agree with the documentation above with the following addendum.    No acute events overnight. Feels better. Abdominal pain improved. No nausea or vomiting.   Physical exam:  General: NAD  Abdomen: soft, tender about the incision, incisions are clean dry and intact, no erythema, drain is serosang, ostomy is well perfused and productive of gas      Assesment & Plan:  78 year old female, 2 Days Post-Op from LAR  Continue clear diet  DVT chemoprophylaxis  Ambulate  IV abx  Remove  mills  Multimodal analgesia        Jason White MD  [facility] General Surgery

## 2024-07-13 NOTE — PROGRESS NOTES
ICU  Critical Care APRN Progress Note    NAME: Bibi Diaz - ROOM:  OR [facility] ROOMS/EH OR * - MRN: [id number] - Age: 78 year old - :1945    Subjective:  No issues overnight. Up in the chair this morning. Pain controlled, mild nausea.     Current Facility-Administered Medications   Medication Dose Route Frequency    traMADol (Ultram) tab 50 mg  50 mg Oral Q6H PRN    multivitamin with minerals (Thera M Plus) tab 1 tablet  1 tablet Oral Daily    ketorolac (Toradol) 15 MG/ML injection 15 mg  15 mg Intravenous q6h    HYDROmorphone (Dilaudid) 1 MG/ML injection 0.2 mg  0.2 mg Intravenous Q3H PRN    Or    HYDROmorphone (Dilaudid) 1 MG/ML injection 0.4 mg  0.4 mg Intravenous Q3H PRN    enoxaparin (Lovenox) 30 MG/0.3ML SUBQ injection 30 mg  30 mg Subcutaneous Daily    phenol (Chloraseptic) 1.4 % oral liquid spray   Mouth/Throat PRN    acetaminophen (Tylenol Extra Strength) tab 1,000 mg  1,000 mg Oral Q8H KYLE    famotidine (Pepcid) tab 20 mg  20 mg Oral Daily    Or    famotidine (Pepcid) 20 mg/2mL injection 20 mg  20 mg Intravenous Daily    melatonin tab 3 mg  3 mg Oral Nightly PRN    ondansetron (Zofran) 4 MG/2ML injection 4 mg  4 mg Intravenous Q6H PRN    metoclopramide (Reglan) 5 mg/mL injection 5 mg  5 mg Intravenous Q8H PRN    losartan (Cozaar) tab 50 mg  50 mg Oral Daily    hydrALAzine (Apresoline) 20 mg/mL injection 10 mg  10 mg Intravenous Q4H PRN    labetalol (Trandate) 5 mg/mL injection 10 mg  10 mg Intravenous Q4H PRN    albuterol (Ventolin HFA) 108 (90 Base) MCG/ACT inhaler 2 puff  2 puff Inhalation Q6H PRN    clonazePAM (KlonoPIN) tab 0.5 mg  0.5 mg Oral BID    fluticasone propionate (Flonase) 50 MCG/ACT nasal suspension 2 spray  2 spray Each Nare Daily    mirtazapine (Remeron) tab 30 mg  30 mg Oral Nightly    fluticasone furoate-vilanterol (Breo Ellipta) 200-25 MCG/ACT inhaler 1 puff  1 puff Inhalation Daily    piperacillin-tazobactam (Zosyn) 3.375 g in dextrose 5% 100 mL IVPB-ADDV  3.375 g  Intravenous Q8H KYLE       OBJECTIVE  Vitals:  /66 (BP Location: Left arm)   Pulse 73   Temp 99.8 °F (37.7 °C) (Temporal)   Resp 19   Ht 152.4 cm (5')   Wt 89 lb 11.6 oz (40.7 kg)   SpO2 96%   BMI 17.52 kg/m²                Physical Exam:    General Appearance: awake, following commands, sitting up in chair  Neck: No JVD  Lungs: Clear to auscultation bilaterally, respirations unlabored  Heart: Regular rate and rhythm, S1 and S2 normal, no murmur, rub or gallop  Abdomen: Soft, incisional tenderness, midline incision with nhi intact, VALERI wit no drainage, colostomy with pink stoma-no output  Extremities: Extremities normal, atraumatic, no cyanosis or edema, capillary refill <3 sec.    Pulses: 2+ and symmetric all extremities  Skin: Skin color pale. Midline incision dressing c/d/i      Data this admission:  CT ABDOMEN+PELVIS(CONTRAST ONLY)(CPT=74177)    Result Date: 7/10/2024  CONCLUSION:   1. There has been increase in the pericolonic inflammatory stranding and phlegmonous tissue thickening around the sigmoid colon when compared to the prior CT from 6/21/2024.  Consistent with continued/worsening colitis and/or diverticulitis.  Two focal measurable structures are indeterminate between small abscess cavities or prominent inflamed diverticula.  Nothing is conclusive for definitive abscess.  Small free fluid pelvis without large or drainable ascites.  No sign of bowel obstruction or free air.  2. In addition to the intraperitoneal inflammatory process, there is now thickening of the soft tissue around the tract for the abdominal wall drain on the left along with adjacent fatty stranding of the abdominal wall, may reflect extension of inflammation/infection in this region without measurable abscess.  Advise correlation for any potential abnormal drainage from this area.  3. Thickened left-sided urinary bladder where it abuts the sigmoid and pericolonic inflammatory phlegmonous process, probably secondarily  inflamed/infected urinary bladder.  Advise correlation with symptoms and urinalysis.   LOCATION:  [facility]   Dictated by (CST): Colten Deshpande MD on 7/10/2024 at 4:50 PM     Finalized by (CST): Colten Deshpande MD on 7/10/2024 at 5:00 PM         Labs:  Lab Results   Component Value Date    WBC 8.2 07/13/2024    HGB 8.6 07/13/2024    HCT 27.2 07/13/2024    .0 07/13/2024    CREATSERUM 0.60 07/13/2024    BUN 7 07/13/2024     07/13/2024    K 3.1 07/13/2024     07/13/2024    CO2 25.0 07/13/2024    GLU 99 07/13/2024    CA 8.9 07/13/2024    ALB 1.9 07/13/2024    ALKPHO 52 07/13/2024    BILT 0.3 07/13/2024    TP 5.4 07/13/2024    AST 16 07/13/2024    ALT 39 07/13/2024    MG 2.1 07/13/2024       Assessment/Plan:    S/p ex lap, low anterior resection of the rectosigmoid colon with Elizabeth's puch and end colostomy, drainage of abdominal abscess and placement of ailyn drain on 7/11  - CLD per surgery  - Pain management  - Ostomy care  - VALERI to bulb suction  - Zosyn (7/10- )  -OOB to chair    Leukocytosis, likely 2/2 to above--> improved  - Afebrile now, last low grade fever 7/12  - Blood cultures NGTD  -Abdominal fluid cx with E. Coli, streptoccocus, pseudomonas  - Cdiff negative  - Antibiotics as above  - Encourage IS  -ID following    Anemia  -Hgb fluctuating, dropped this morning-likely dilutional  -Repeat CBC at noon  -No signs of active bleeding  -Monitor VALERI drain output closely and ostomy  -Monitor daily CBC    History of HTN  - Home losartan  -monitor on tele     Anxiety/depression  - home Klonopin    F/E/N  - Replete electrolytes per protocol  - CLD    Proph  - SQ lovenox   - SCDs  - PT/OT    Dispo  - DNAR select  - may transfer to surgical floor    Plan of care discussed with intensivist, Dr. Ml London, [facility]  ICU  Phone  [phone]   Pager [phone]    Intensivist Addendum:  I agree with APN note above. I have independently seen & evaluated the patient.  I agree with the management plan outlined  above with the following changes/additions:    Patient has improved in past 24 hours.  Diet to be determined by surgery. Will plan to continue zosyn for coverage of polymicrobial infection of abdominal fluid.  Blood counts decreased, but patient is hemodynamically stable at this time, will continue to monitor.  No further needs for ICU level of care and will transfer to surgical floor today    Transfer out of ICU today, pulmonary services will follow peripherally on the floor    Lvl 3    Galileo Bull, DO  Pulmonary & Critical Care Medicine  [facility]

## 2024-07-13 NOTE — PROGRESS NOTES
[facility]   part of [facility] ID PROGRESS NOTE    Bibi Diaz Patient Status:  Inpatient    1945 MRN [id number]   Location [facility] 4SW-A Attending Geena Pardo MD   Hosp Day # 3 PCP Rick Shannon DO     Abx: IV Zosyn D#3    Subjective: Patient seen and examined today. Feeling better today. Had some clear liquids earlier today and was able to tolerate. Tmax 100.6, temps improved today.     Allergies:  Allergies   Allergen Reactions    Avelox [Moxifloxacin Hcl In Nacl] SWELLING    Avelox [Moxifloxacin Hydrochloride] TONGUE SWELLING     \"TABS\"    Amoxicillin NAUSEA AND VOMITING     Vomiting.    Statins Myopathy    Sulfa Antibiotics RASH and ITCHING    Dander OTHER (SEE COMMENTS)     \"Animals\": runny nose, watery eyes, itching    Morphine OTHER (SEE COMMENTS)     Word finding difficulties    Diphenhydramine Runny nose     \"Animals\": runny nose, watery eyes, itching    Dust Runny nose    Latex RASH    Sulfa Drugs Cross Reactors RASH and ITCHING       Medications:    Current Facility-Administered Medications:     potassium chloride 40 mEq in 250mL sodium chloride 0.9% IVPB premix, 40 mEq, Intravenous, Once    traMADol (Ultram) tab 50 mg, 50 mg, Oral, Q6H PRN    multivitamin with minerals (Thera M Plus) tab 1 tablet, 1 tablet, Oral, Daily    ketorolac (Toradol) 15 MG/ML injection 15 mg, 15 mg, Intravenous, q6h    HYDROmorphone (Dilaudid) 1 MG/ML injection 0.2 mg, 0.2 mg, Intravenous, Q3H PRN **OR** HYDROmorphone (Dilaudid) 1 MG/ML injection 0.4 mg, 0.4 mg, Intravenous, Q3H PRN    enoxaparin (Lovenox) 30 MG/0.3ML SUBQ injection 30 mg, 30 mg, Subcutaneous, Daily    phenol (Chloraseptic) 1.4 % oral liquid spray, , Mouth/Throat, PRN    acetaminophen (Tylenol Extra Strength) tab 1,000 mg, 1,000 mg, Oral, Q8H KYLE    famotidine (Pepcid) tab 20 mg, 20 mg, Oral, Daily **OR** famotidine (Pepcid) 20 mg/2mL injection 20 mg, 20 mg, Intravenous, Daily    melatonin tab 3 mg, 3 mg, Oral, Nightly  PRN    ondansetron (Zofran) 4 MG/2ML injection 4 mg, 4 mg, Intravenous, Q6H PRN    metoclopramide (Reglan) 5 mg/mL injection 5 mg, 5 mg, Intravenous, Q8H PRN    losartan (Cozaar) tab 50 mg, 50 mg, Oral, Daily    hydrALAzine (Apresoline) 20 mg/mL injection 10 mg, 10 mg, Intravenous, Q4H PRN    labetalol (Trandate) 5 mg/mL injection 10 mg, 10 mg, Intravenous, Q4H PRN    albuterol (Ventolin HFA) 108 (90 Base) MCG/ACT inhaler 2 puff, 2 puff, Inhalation, Q6H PRN    clonazePAM (KlonoPIN) tab 0.5 mg, 0.5 mg, Oral, BID    fluticasone propionate (Flonase) 50 MCG/ACT nasal suspension 2 spray, 2 spray, Each Nare, Daily    mirtazapine (Remeron) tab 30 mg, 30 mg, Oral, Nightly    fluticasone furoate-vilanterol (Breo Ellipta) 200-25 MCG/ACT inhaler 1 puff, 1 puff, Inhalation, Daily    piperacillin-tazobactam (Zosyn) 3.375 g in dextrose 5% 100 mL IVPB-ADDV, 3.375 g, Intravenous, Q8H KYLE    Review of Systems:  Completed. See pertinent positives and negatives above.     Physical Exam:  Vital signs: Blood pressure 149/66, pulse 73, temperature 99.8 °F (37.7 °C), temperature source Temporal, resp. rate 19, height 152.4 cm (5'), weight 89 lb 11.6 oz (40.7 kg), SpO2 96%, not currently breastfeeding.    General: Alert, answering questions appropriately, NAD, on room air.   HEENT: Moist mucous membranes.   Neck: No lymphadenopathy.  Supple.  Cardiovascular: RRR  Respiratory: Clear to auscultation bilaterally.  No wheezes. No rhonchi.  Abdomen: Soft, mild tenderness by incision site, nondistended. Midline incision with staples in place- clean/dry/intact. + drain with serosang appearing output. + ostomy in place.  Musculoskeletal: No edema noted  Integument: No lesions. No erythema.    Laboratory Data:  Recent Labs   Lab 07/13/24  0702   RBC 3.10*   HGB 8.6*   HCT 27.2*   MCV 87.7   MCH 27.7   MCHC 31.6   RDW 18.1   NEPRELIM 6.81   WBC 8.2   .0     Recent Labs   Lab 07/11/24  1854 07/12/24  0518 07/13/24  0702   * 143* 99    BUN 8* 7* 7*   CREATSERUM 0.76 0.66 0.60   CA 8.7 8.8 8.9   ALB 2.3* 2.2* 1.9*    136 141   K 3.5 3.6 3.1*    108 110   CO2 20.0* 19.0* 25.0   ALKPHO 59 56 52*   AST 19 16 16   ALT 10* 14 39   BILT 0.4 0.3 0.3   TP 5.9* 5.9* 5.4*       Microbiology: Reviewed in EMR    Radiology: Reviewed.  PROCEDURE:  CT ABDOMEN+PELVIS (CONTRAST ONLY) (CPT=74177)     COMPARISON:  [facility] , CT, CT ABDOMEN+PELVIS(CONTRAST ONLY)(CPT=74177), 6/17/2024, 10:08 PM.  [facility] , CT, CT ABDOMEN+PELVIS(CONTRAST ONLY)(CPT=74177), 6/21/2024, 6:19 PM.     INDICATIONS:  abd pain starting today, surgery with drain x2 weeks ago     TECHNIQUE:  CT scanning was performed from the dome of the diaphragm to the pubic symphysis with non-ionic intravenous contrast material. Post contrast coronal MPR imaging was performed.  Dose reduction techniques were used. Dose information is  transmitted to the ACR (American College of Radiology) NRDR (National Radiology Data Registry) which includes the Dose Index Registry.     PATIENT STATED HISTORY:(As transcribed by Technologist)  Abdominal pain      CONTRAST USED:  80cc of Isovue 370     FINDINGS:          LIVER:  Unremarkable.     BILIARY:  Unremarkable.     PANCREAS:  Unremarkable.     SPLEEN:  Unremarkable.     KIDNEYS:  No acute abnormality.     ADRENALS:  Unremarkable.     AORTA/VASCULAR:  No aortic aneurysm. There are atherosclerotic changes including calcification involving the aorta, but no evidence for aneurysm involving the aorta.     RETROPERITONEUM:  Unremarkable.     BOWEL/MESENTERY:  Surgical drain, percutaneous inserted anterior abdominal wall on the left, pigtail left lower quadrant stable position of the pigtail.  Suspect small residual abscess image 75 measuring 1.5 cm to the left of the continued-inflamed  sigmoid colon.  In this region is relatively extensive pericolonic stranding, the stranding appears worse than on the most recent CT scan from 6/21/2024.  Another small suspected  abscess image 72 measuring about 1.7 cm adjacent to the pigtail catheter  and sigmoid colon.  However each of these could reflect inflamed, prominent fluid and air-filled diverticula as they both appear immediately adjacent to the sigmoid colon.  There therefore indeterminate between small abscess versus fluid-filled  diverticula.  Continued thickening of the sigmoid colon and moderate amount of stool in the colon and large amount of stool in the rectum.  The stranding appears greater on the sigmoid colon and extends along the abdominal wall tract for the drain, for  example there is now tissue thickening measuring up to 2.7 cm image 75 at the anterior abdominal wall where the drain enters the peritoneal cavity.  The abdominal wall fat shows stranding in this region additionally.  No measurable abscess in this  region.  More proximally the stool in the colon is moderate, there is no sign of colonic obstruction.  Moderate fluid and air in the small bowel without small bowel obstruction pattern.  No generalized free air.  Trace free fluid pelvis but no large or  drainable ascites.     ABDOMINAL WALL:  See above regarding the insertion site of the surgical drain on the left.     URINARY BLADDER:  Thickened appearance of the left side of the urinary bladder as it abuts the above described sigmoid colon and pericolonic inflammatory process.  No gas bubbles in the bladder.  This could be secondarily inflamed/infected..     LYMPH NODES PELVIS:  Unremarkable.     PELVIC ORGANS:  No acute process.     LUNG BASES:  No acute process.     BONES:  No acute abnormality. Note is made of degenerative changes present in the spine.        Impression:     CONCLUSION:       1. There has been increase in the pericolonic inflammatory stranding and phlegmonous tissue thickening around the sigmoid colon when compared to the prior CT from 6/21/2024.  Consistent with continued/worsening colitis and/or diverticulitis.  Two focal  measurable  structures are indeterminate between small abscess cavities or prominent inflamed diverticula.  Nothing is conclusive for definitive abscess.  Small free fluid pelvis without large or drainable ascites.  No sign of bowel obstruction or free  air.     2. In addition to the intraperitoneal inflammatory process, there is now thickening of the soft tissue around the tract for the abdominal wall drain on the left along with adjacent fatty stranding of the abdominal wall, may reflect extension of  inflammation/infection in this region without measurable abscess.  Advise correlation for any potential abnormal drainage from this area.     3. Thickened left-sided urinary bladder where it abuts the sigmoid and pericolonic inflammatory phlegmonous process, probably secondarily inflamed/infected urinary bladder.  Advise correlation with symptoms and urinalysis.        LOCATION:  [facility]        Dictated by (CST): Colten Deshpande MD on 7/10/2024 at 4:50 PM      Finalized by (CST): Colten Deshpande MD on 7/10/2024 at 5:00 PM        ASSESSMENT:  Diverticulitis with abscess  S/p IR drainage 6/18, cxs polymicrobial (E. Coli, strep anginosus, E. Coli, strep constellatus, pseudomonas). Recently completed course of IV Zosyn  Now presents with worsening abdominal pain. S/p LAR of rectosigmoid colon with Elizabeth's pouch and end colostomy and drainage of abdominal abscess with placement of ailyn drain 7/11. No OR cxs noted.  Leukocytosis, d/t above, resolved.  HTN  COPD  Anemia    PLAN:  - continue IV Zosyn   - follow blood cultures- ngtd  - follow temps and cbc  - follow abdominal exam    Discussed with RN and patient.     [facility]  [phone]

## 2024-07-13 NOTE — PLAN OF CARE
Problem: PAIN - ADULT  Goal: Verbalizes/displays adequate comfort level or patient's stated pain goal  Description: INTERVENTIONS:  - Encourage pt to monitor pain and request assistance  - Assess pain using appropriate pain scale  - Administer analgesics based on type and severity of pain and evaluate response  - Implement non-pharmacological measures as appropriate and evaluate response  - Consider cultural and social influences on pain and pain management  - Manage/alleviate anxiety  - Utilize distraction and/or relaxation techniques  - Monitor for opioid side effects  - Notify MD/LIP if interventions unsuccessful or patient reports new pain  - Anticipate increased pain with activity and pre-medicate as appropriate  Outcome: Progressing     Problem: RISK FOR INFECTION - ADULT  Goal: Absence of fever/infection during anticipated neutropenic period  Description: INTERVENTIONS  - Monitor WBC  - Administer growth factors as ordered  - Implement neutropenic guidelines  Outcome: Progressing     Problem: SAFETY ADULT - FALL  Goal: Free from fall injury  Description: INTERVENTIONS:  - Assess pt frequently for physical needs  - Identify cognitive and physical deficits and behaviors that affect risk of falls.  - [place] fall precautions as indicated by assessment.  - Educate pt/family on patient safety including physical limitations  - Instruct pt to call for assistance with activity based on assessment  - Modify environment to reduce risk of injury  - Provide assistive devices as appropriate  - Consider OT/PT consult to assist with strengthening/mobility  - Encourage toileting schedule  Outcome: Progressing     Problem: DISCHARGE PLANNING  Goal: Discharge to home or other facility with appropriate resources  Description: INTERVENTIONS:  - Identify barriers to discharge w/pt and caregiver  - Include patient/family/discharge partner in discharge planning  - Arrange for needed discharge resources and transportation as  appropriate  - Identify discharge learning needs (meds, wound care, etc)  - Arrange for interpreters to assist at discharge as needed  - Consider post-discharge preferences of patient/family/discharge partner  - Complete POLST form as appropriate  - Assess patient's ability to be responsible for managing their own health  - Refer to Case Management Department for coordinating discharge planning if the patient needs post-hospital services based on physician/LIP order or complex needs related to functional status, cognitive ability or social support system  Outcome: Progressing

## 2024-07-13 NOTE — PLAN OF CARE
Assumed pt care at 730. Pt in bed resting quietly on room air. No s/s of acute distress noted at this time. VSS. Pt reports pain to abdomen, medicated per MAR. Able to tolerate OOB to chair today, approximately 2.5 hrs, then became too tired. Finished two meals today, no nausea after. POC continues. Transfer orders, waiting on bed to open. Call light in reach. Will monitor.

## 2024-07-14 LAB
ALBUMIN SERPL-MCNC: 2 G/DL (ref 3.4–5)
ALBUMIN/GLOB SERPL: 0.6 {RATIO} (ref 1–2)
ALP LIVER SERPL-CCNC: 58 U/L
ALT SERPL-CCNC: 37 U/L
ANION GAP SERPL CALC-SCNC: 6 MMOL/L (ref 0–18)
AST SERPL-CCNC: 16 U/L (ref 15–37)
BASOPHILS # BLD AUTO: 0.05 X10(3) UL (ref 0–0.2)
BASOPHILS # BLD AUTO: 0.05 X10(3) UL (ref 0–0.2)
BASOPHILS NFR BLD AUTO: 0.7 %
BASOPHILS NFR BLD AUTO: 0.7 %
BILIRUB SERPL-MCNC: 0.2 MG/DL (ref 0.1–2)
BUN BLD-MCNC: 6 MG/DL (ref 9–23)
CALCIUM BLD-MCNC: 8.1 MG/DL (ref 8.5–10.1)
CHLORIDE SERPL-SCNC: 110 MMOL/L (ref 98–112)
CO2 SERPL-SCNC: 25 MMOL/L (ref 21–32)
CREAT BLD-MCNC: 0.6 MG/DL
EGFRCR SERPLBLD CKD-EPI 2021: 91 ML/MIN/1.73M2 (ref 60–?)
EOSINOPHIL # BLD AUTO: 0.5 X10(3) UL (ref 0–0.7)
EOSINOPHIL # BLD AUTO: 0.56 X10(3) UL (ref 0–0.7)
EOSINOPHIL NFR BLD AUTO: 7.3 %
EOSINOPHIL NFR BLD AUTO: 7.3 %
ERYTHROCYTE [DISTWIDTH] IN BLOOD BY AUTOMATED COUNT: 17.7 %
ERYTHROCYTE [DISTWIDTH] IN BLOOD BY AUTOMATED COUNT: 17.7 %
GLOBULIN PLAS-MCNC: 3.2 G/DL (ref 2.8–4.4)
GLUCOSE BLD-MCNC: 82 MG/DL (ref 70–99)
HCT VFR BLD AUTO: 23.7 %
HCT VFR BLD AUTO: 27.2 %
HGB BLD-MCNC: 8 G/DL
HGB BLD-MCNC: 9.2 G/DL
IMM GRANULOCYTES # BLD AUTO: 0.03 X10(3) UL (ref 0–1)
IMM GRANULOCYTES # BLD AUTO: 0.04 X10(3) UL (ref 0–1)
IMM GRANULOCYTES NFR BLD: 0.4 %
IMM GRANULOCYTES NFR BLD: 0.5 %
LYMPHOCYTES # BLD AUTO: 0.83 X10(3) UL (ref 1–4)
LYMPHOCYTES # BLD AUTO: 0.89 X10(3) UL (ref 1–4)
LYMPHOCYTES NFR BLD AUTO: 10.9 %
LYMPHOCYTES NFR BLD AUTO: 13 %
MCH RBC QN AUTO: 28.5 PG (ref 26–34)
MCH RBC QN AUTO: 28.7 PG (ref 26–34)
MCHC RBC AUTO-ENTMCNC: 33.8 G/DL (ref 31–37)
MCHC RBC AUTO-ENTMCNC: 33.8 G/DL (ref 31–37)
MCV RBC AUTO: 84.3 FL
MCV RBC AUTO: 84.7 FL
MONOCYTES # BLD AUTO: 0.45 X10(3) UL (ref 0.1–1)
MONOCYTES # BLD AUTO: 0.48 X10(3) UL (ref 0.1–1)
MONOCYTES NFR BLD AUTO: 6.3 %
MONOCYTES NFR BLD AUTO: 6.6 %
NEUTROPHILS # BLD AUTO: 4.9 X10 (3) UL (ref 1.5–7.7)
NEUTROPHILS # BLD AUTO: 4.9 X10(3) UL (ref 1.5–7.7)
NEUTROPHILS # BLD AUTO: 5.67 X10 (3) UL (ref 1.5–7.7)
NEUTROPHILS # BLD AUTO: 5.67 X10(3) UL (ref 1.5–7.7)
NEUTROPHILS NFR BLD AUTO: 72 %
NEUTROPHILS NFR BLD AUTO: 74.3 %
OSMOLALITY SERPL CALC.SUM OF ELEC: 289 MOSM/KG (ref 275–295)
PLATELET # BLD AUTO: 363 10(3)UL (ref 150–450)
PLATELET # BLD AUTO: 386 10(3)UL (ref 150–450)
PLATELETS.RETICULATED NFR BLD AUTO: 0.7 % (ref 0–7)
POTASSIUM SERPL-SCNC: 3.2 MMOL/L (ref 3.5–5.1)
POTASSIUM SERPL-SCNC: 3.2 MMOL/L (ref 3.5–5.1)
POTASSIUM SERPL-SCNC: 4.3 MMOL/L (ref 3.5–5.1)
PROT SERPL-MCNC: 5.2 G/DL (ref 6.4–8.2)
RBC # BLD AUTO: 2.81 X10(6)UL
RBC # BLD AUTO: 3.21 X10(6)UL
SODIUM SERPL-SCNC: 141 MMOL/L (ref 136–145)
WBC # BLD AUTO: 6.8 X10(3) UL (ref 4–11)
WBC # BLD AUTO: 7.6 X10(3) UL (ref 4–11)

## 2024-07-14 PROCEDURE — 99232 SBSQ HOSP IP/OBS MODERATE 35: CPT | Performed by: INTERNAL MEDICINE

## 2024-07-14 RX ORDER — IBUPROFEN 400 MG/1
400 TABLET ORAL EVERY 6 HOURS PRN
Status: DISCONTINUED | OUTPATIENT
Start: 2024-07-14 | End: 2024-07-16

## 2024-07-14 RX ORDER — IBUPROFEN 600 MG/1
600 TABLET ORAL EVERY 6 HOURS PRN
Status: DISCONTINUED | OUTPATIENT
Start: 2024-07-14 | End: 2024-07-16

## 2024-07-14 RX ORDER — POTASSIUM CHLORIDE 1.5 G/1.58G
40 POWDER, FOR SOLUTION ORAL EVERY 4 HOURS
Status: COMPLETED | OUTPATIENT
Start: 2024-07-14 | End: 2024-07-14

## 2024-07-14 NOTE — PROGRESS NOTES
INFECTIOUS DISEASE  PROGRESS NOTE            [facility]    Bibi Diaz Patient Status:  Inpatient    1945 MRN [id number]   [facility] 4SW-A Attending Geena Pardo MD   Hosp Day # 4 PCP Rick Shannon DO     Antibiotics: zosyn #4    Subjective:  : resting comfortable    Objective:  Temp:  [97.6 °F (36.4 °C)-100.3 °F (37.9 °C)] 99 °F (37.2 °C)  Pulse:  [63-74] 74  Resp:  [18-25] 20  BP: (120-163)/(58-82) 163/82  SpO2:  [95 %-98 %] 98 %    General: awake in no distress  Vital signs:Temp:  [97.6 °F (36.4 °C)-100.3 °F (37.9 °C)] 99 °F (37.2 °C)  Pulse:  [63-74] 74  Resp:  [18-25] 20  BP: (120-163)/(58-82) 163/82  SpO2:  [95 %-98 %] 98 %  HEENT: Moist mucous membranes. Extraocular muscles are intact.  Neck: supple no masses  Respiratory: Non labored, symmetric excursion, normal respirations  Cardiovascular: no irregularities in rhythm  Abdomen: Soft, nontender, nondistended. Ostomy, drains  Integument: No lesions. No erythema. No open wounds.  Labs:     COVID-19 Lab Results    COVID-19  Lab Results   Component Value Date    COVID19 Not Detected 06/10/2024    COVID19 Not Detected 2023    COVID19 Not Detected 2022       Pro-Calcitonin  No results for input(s): \"PCT\" in the last 168 hours.    Cardiac  No results for input(s): \"TROP\", \"PBNP\" in the last 168 hours.    Creatinine Kinase  No results for input(s): \"CK\" in the last 168 hours.    Inflammatory Markers  No results for input(s): \"CRP\", \"MURALI\", \"LDH\", \"DDIMER\" in the last 168 hours.    Recent Labs   Lab 24  1205   RBC 3.21*   HGB 9.2*   HCT 27.2*   MCV 84.7   MCH 28.7   MCHC 33.8   RDW 17.7   NEPRELIM 5.67   WBC 7.6   .0         Recent Labs   Lab 24  0518 24  0702 24  0329 24  1205   * 99 82  --    BUN 7* 7* 6*  --    CREATSERUM 0.66 0.60 0.60  --    CA 8.8 8.9 8.1*  --    ALB 2.2* 1.9* 2.0*  --     141 141  --    K 3.6 3.1* 3.2*  3.2* 4.3    110 110  --    CO2  19.0* 25.0 25.0  --    ALKPHO 56 52* 58  --    AST 16 16 16  --    ALT 14 39 37  --    BILT 0.3 0.3 0.2  --    TP 5.9* 5.4* 5.2*  --        No results found for: \"VANCT\"  Microbiology    Hospital Encounter on 07/10/24   1. Blood Culture     Status: None (Preliminary result)    Collection Time: 07/10/24  2:38 PM    Specimen: Blood,peripheral   Result Value Ref Range    Blood Culture Result No Growth 3 Days N/A         Problem list reviewed:  Patient Active Problem List   Diagnosis    Major depressive disorder, recurrent episode, moderate (HCC)    Esophageal reflux    Chronic obstructive pulmonary disease with acute lower respiratory infection (HCC)    Acquired hypothyroidism    Memory deficit    Aortic atherosclerosis (HCC)    Diverticulosis of colon    Dizziness and giddiness    Dysthymic disorder    Lichen sclerosus    Pure hypercholesterolemia    Vitamin D deficiency    Vitiligo    Severe episode of recurrent major depressive disorder, without psychotic features (HCC)    Paraesophageal hernia    Subclinical hypothyroidism    Hypertension    Protein-calorie malnutrition, unspecified severity (HCC)    Gastrointestinal hemorrhage, unspecified gastrointestinal hemorrhage type    Thrombocytopenia (HCC)    Acute diverticulitis    Hyponatremia    ACP (advance care planning)    Weakness generalized    Colonic diverticular abscess    Sigmoid diverticulitis    History of repair of hiatal hernia    Diverticulitis of large intestine with abscess without bleeding    Malnutrition (HCC)    Diverticulitis    Pelvic abscess in female    Leukocytosis    Diverticulitis of colon             ASSESSMENT/PLAN:  1. Diverticulitis, abscess  Previous IR drain on 6/18, and completed zosyn 7/2  ---previous cultures E coli and Pseudomonas  Underwent LAR, colostomy on 7/11  -no new micro    Marc Dc MD, MD [facility]  [phone]

## 2024-07-14 NOTE — PROGRESS NOTES
Report given to DONNIE Morel, as pt is transferring to ortho/spine unit. Pt in bed resting quietly on room air. No s/s of acute distress noted at this time. VSS. Pain medicated per MAR.

## 2024-07-14 NOTE — PLAN OF CARE
Assumed care of pt for the night shift. Midline abdominal incision intact. VALERI drain intact with serosanguinous output. Ostomy pink and moist, no stool output. See flowsheets for further assessment.

## 2024-07-14 NOTE — PROGRESS NOTES
[facility]   part of [facility]     Hospitalist Progress Note     Bibi Diaz Patient Status:  Inpatient    1945 MRN [id number]   Location [facility] 3NW-A Attending Win Brothers MD   Hosp Day # 4 PCP Rick Shannon DO     Chief Complaint: abd pain     Subjective:  Patient overall better. Feels weak and does not want to get into chair. Has abd cramping at times,.     Objective:    Review of Systems:  comprehensive review of systems was completed; pertinent positive and negatives stated in subjective.    Vital signs:  Temp:  [97.6 °F (36.4 °C)-99.8 °F (37.7 °C)] 97.6 °F (36.4 °C)  Pulse:  [63-84] 63  Resp:  [15-25] 21  BP: (131-155)/(58-72) 131/64  SpO2:  [95 %-97 %] 97 %    Physical Exam:    General: No acute distress   Respiratory: No wheezes, no rhonchi on RA  Cardiovascular: S1, S2, regular rate and rhythm  Abdomen: Soft, Nondistended +ostomy +drain  Neuro: No new focal deficits.   Extremities: No edema      Diagnostic Data:    Labs:  Recent Labs   Lab 24  1854 24  0517 24  0702 24  1314 24  0329   WBC 10.4 12.2* 8.2 8.1 6.8   HGB 10.7* 10.1* 8.6* 9.7* 8.0*   MCV 86.9 87.4 87.7 93.8 84.3   .0 349.0 361.0 350.0 386.0       Recent Labs   Lab 24  0518 24  0702 24  0329   * 99 82   BUN 7* 7* 6*   CREATSERUM 0.66 0.60 0.60   CA 8.8 8.9 8.1*   ALB 2.2* 1.9* 2.0*    141 141   K 3.6 3.1* 3.2*  3.2*    110 110   CO2 19.0* 25.0 25.0   ALKPHO 56 52* 58   AST 16 16 16   ALT 14 39 37   BILT 0.3 0.3 0.2   TP 5.9* 5.4* 5.2*       Estimated Creatinine Clearance: 48 mL/min (based on SCr of 0.6 mg/dL).    No results for input(s): \"TROP\", \"TROPHS\", \"CK\" in the last 168 hours.    No results for input(s): \"PTP\", \"INR\" in the last 168 hours.       Microbiology    Hospital Encounter on 07/10/24   1. Blood Culture     Status: None (Preliminary result)    Collection Time: 07/10/24  2:38 PM    Specimen: Blood,peripheral   Result Value  Ref Range    Blood Culture Result No Growth 3 Days N/A       Imaging: Reviewed in Epic.    Medications:    potassium chloride  40 mEq Oral Q4H    multivitamin with minerals  1 tablet Oral Daily    ketorolac  15 mg Intravenous q6h    enoxaparin  30 mg Subcutaneous Daily    acetaminophen  1,000 mg Oral Q8H KYLE    famotidine  20 mg Oral Daily    Or    famotidine  20 mg Intravenous Daily    losartan  50 mg Oral Daily    clonazePAM  0.5 mg Oral BID    fluticasone propionate  2 spray Each Nare Daily    mirtazapine  30 mg Oral Nightly    fluticasone furoate-vilanterol  1 puff Inhalation Daily    piperacillin-tazobactam  3.375 g Intravenous Q8H KYLE       Assessment & Plan:      #Abdominal pain d/t diverticular abscess  #Leukocytosis, improved  - surgery noted large inflammatory mass in pelvis near sigmoid colon now s/p LAR w/ hartmanns pouch and end colostomy on 7/11,   - abx IV per ID, follow cultures  - pain control      #Delirium multifactorial, resolved   #Constipation, improved  #Chronic Anemia, stable, monitor H&H  #Hyponatremia, resolved  #COPD without exacerbation - cont BD  #Essential hypertension-Losartan  #Anxiety, Depression - belen ulrich MD          Supplementary Documentation:     Quality:  DVT Mechanical Prophylaxis:   SCDs, Early ambuation  DVT Pharmacologic Prophylaxis   Medication    enoxaparin (Lovenox) 30 MG/0.3ML SUBQ injection 30 mg                Code Status: DNAR/Selective Treatment  Flood: External urinary catheter in place    The 21st Century Cures Act makes medical notes like these available to patients in the interest of transparency. Please be advised this is a medical document. Medical documents are intended to carry relevant information, facts as evident, and the clinical opinion of the practitioner. The medical note is intended as peer to peer communication and may appear blunt or direct. It is written in medical language and may contain abbreviations or verbiage that  are unfamiliar.

## 2024-07-14 NOTE — CDS QUERY
Clinical Documentation Clarification    Dear Dr. Pardo   Please clarify the status of the patient's nutritional status.    (  x ) Moderate protein calorie malnutrition  (   ) Other (please specify) ______________________________________  ________________________________________________________________________________________                           Documentation from the Medical Record:   Clinical Indicators   >07/12/2024 Dietary malnutrition note-   -Nutrition assessment - Pt meets moderate malnutrition criteria at this time.  Ht: 152.4 cm (5')  Wt: 40.7 kg (89 lb 11.6 oz).   BMI: Body mass index is 17.52 kg/m².  -Criteria for malnutrition diagnosis- Criteria for non-severe malnutrition diagnosis: chronic illness related to energy intake less than 75% for greater than 1 month, body fat mild depletion, and muscle mass mild depletion.  - Nutrition related physical findings- 1.Body Fat/Muscle Mass: moderate depletion body fat Buccal fat pad and Triceps and moderate muscle depletion Temple region, Clavicle region, Thigh  region, and Calf region per assessment last admit.  2.Fluid Accumulation: none per RN documentation  -Nutrition diagnosis /problem- Malnutrition related to altered GI function/GI disorder and insufficient appetite resulting in inadequate nutrition intake as evidenced by  documented/reported insufficient oral intake, loss of fat mass, and loss of  muscle mass  Risk Factors  Advanced age, 7/11: Low Anterior Resection of the Rectosigmoid Colon with Elizabeth's pouch and end colostomy. Drainage of abdominal abscess.    Treatment RD consult,  [place] chocolate daily and Ensure Plus,  Recommend adding Multivitamin with minerals     Use of terms such as suspected, possible, or probable (associated with a specific diagnosis that is being evaluated, monitored, or treated as if it exists) are acceptable and can be coded in the inpatient setting, when  documented at the time of discharge.   Please add any additional documentation to your progress note and continue to document this through discharge.  If you have any questions, please contact Clinical : Margarette Shah RN /BSN @ [phone] or email [email or web address]  Thank You!                                                           THIS FORM IS A PERMANENT PART OF THE MEDICAL RECORD

## 2024-07-14 NOTE — PROGRESS NOTES
ICU  Critical Care APRN Progress Note    NAME: Bibi Diaz - ROOM:  OR [facility]S/EH OR * - MRN: [id number] - Age: 79 year old - :1945    Subjective:  No issues overnight. Denies pain.       Current Facility-Administered Medications   Medication Dose Route Frequency    potassium chloride (Klor-Con) 20 MEQ oral powder 40 mEq  40 mEq Oral Q4H    traMADol (Ultram) tab 50 mg  50 mg Oral Q6H PRN    multivitamin with minerals (Thera M Plus) tab 1 tablet  1 tablet Oral Daily    ketorolac (Toradol) 15 MG/ML injection 15 mg  15 mg Intravenous q6h    HYDROmorphone (Dilaudid) 1 MG/ML injection 0.2 mg  0.2 mg Intravenous Q3H PRN    Or    HYDROmorphone (Dilaudid) 1 MG/ML injection 0.4 mg  0.4 mg Intravenous Q3H PRN    enoxaparin (Lovenox) 30 MG/0.3ML SUBQ injection 30 mg  30 mg Subcutaneous Daily    phenol (Chloraseptic) 1.4 % oral liquid spray   Mouth/Throat PRN    acetaminophen (Tylenol Extra Strength) tab 1,000 mg  1,000 mg Oral Q8H KYLE    famotidine (Pepcid) tab 20 mg  20 mg Oral Daily    Or    famotidine (Pepcid) 20 mg/2mL injection 20 mg  20 mg Intravenous Daily    melatonin tab 3 mg  3 mg Oral Nightly PRN    ondansetron (Zofran) 4 MG/2ML injection 4 mg  4 mg Intravenous Q6H PRN    metoclopramide (Reglan) 5 mg/mL injection 5 mg  5 mg Intravenous Q8H PRN    losartan (Cozaar) tab 50 mg  50 mg Oral Daily    hydrALAzine (Apresoline) 20 mg/mL injection 10 mg  10 mg Intravenous Q4H PRN    labetalol (Trandate) 5 mg/mL injection 10 mg  10 mg Intravenous Q4H PRN    albuterol (Ventolin HFA) 108 (90 Base) MCG/ACT inhaler 2 puff  2 puff Inhalation Q6H PRN    clonazePAM (KlonoPIN) tab 0.5 mg  0.5 mg Oral BID    fluticasone propionate (Flonase) 50 MCG/ACT nasal suspension 2 spray  2 spray Each Nare Daily    mirtazapine (Remeron) tab 30 mg  30 mg Oral Nightly    fluticasone furoate-vilanterol (Breo Ellipta) 200-25 MCG/ACT inhaler 1 puff  1 puff Inhalation Daily    piperacillin-tazobactam (Zosyn) 3.375 g in dextrose 5% 100  mL IVPB-ADDV  3.375 g Intravenous Q8H KYLE       OBJECTIVE  Vitals:  /73 (BP Location: Left arm)   Pulse 69   Temp 100.3 °F (37.9 °C) (Temporal)   Resp 21   Ht 152.4 cm (5')   Wt 89 lb 11.6 oz (40.7 kg)   SpO2 95%   BMI 17.52 kg/m²                Physical Exam:    General Appearance: awake, following commands, sitting up in chair  Neck: No JVD  Lungs: Clear to auscultation bilaterally, respirations unlabored  Heart: Regular rate and rhythm, S1 and S2 normal, no murmur, rub or gallop  Abdomen: Soft, incisional tenderness, midline incision with nhi intact, VALERI wit no drainage, colostomy with pink stoma-no output  Extremities: Extremities normal, atraumatic, no cyanosis or edema, capillary refill <3 sec.    Pulses: 2+ and symmetric all extremities  Skin: Skin color pale. Midline incision dressing c/d/i      Data this admission:  CT ABDOMEN+PELVIS(CONTRAST ONLY)(CPT=74177)    Result Date: 7/10/2024  CONCLUSION:   1. There has been increase in the pericolonic inflammatory stranding and phlegmonous tissue thickening around the sigmoid colon when compared to the prior CT from 6/21/2024.  Consistent with continued/worsening colitis and/or diverticulitis.  Two focal measurable structures are indeterminate between small abscess cavities or prominent inflamed diverticula.  Nothing is conclusive for definitive abscess.  Small free fluid pelvis without large or drainable ascites.  No sign of bowel obstruction or free air.  2. In addition to the intraperitoneal inflammatory process, there is now thickening of the soft tissue around the tract for the abdominal wall drain on the left along with adjacent fatty stranding of the abdominal wall, may reflect extension of inflammation/infection in this region without measurable abscess.  Advise correlation for any potential abnormal drainage from this area.  3. Thickened left-sided urinary bladder where it abuts the sigmoid and pericolonic inflammatory phlegmonous process,  probably secondarily inflamed/infected urinary bladder.  Advise correlation with symptoms and urinalysis.   LOCATION:  [facility]   Dictated by (CST): Colten Deshpande MD on 7/10/2024 at 4:50 PM     Finalized by (CST): Colten Deshpande MD on 7/10/2024 at 5:00 PM         Labs:  Lab Results   Component Value Date    WBC 6.8 07/14/2024    HGB 8.0 07/14/2024    HCT 23.7 07/14/2024    .0 07/14/2024    CREATSERUM 0.60 07/14/2024    BUN 6 07/14/2024     07/14/2024    K 3.2 07/14/2024    K 3.2 07/14/2024     07/14/2024    CO2 25.0 07/14/2024    GLU 82 07/14/2024    CA 8.1 07/14/2024    ALB 2.0 07/14/2024    ALKPHO 58 07/14/2024    BILT 0.2 07/14/2024    TP 5.2 07/14/2024    AST 16 07/14/2024    ALT 37 07/14/2024       Assessment/Plan:    S/p ex lap, low anterior resection of the rectosigmoid colon with Elziabeth's puch and end colostomy, drainage of abdominal abscess and placement of ailyn drain on 7/11  - CLD per surgery  - Pain management  - Ostomy care  - VALERI to bulb suction  - Zosyn (7/10- )  -OOB to chair    Leukocytosis, likely 2/2 to above--> improved  - Afebrile now, last low grade fever 7/12  - Blood cultures NGTD  -Abdominal fluid cx with E. Coli, streptoccocus, pseudomonas  - Cdiff negative  - Antibiotics as above  - Encourage IS  - ID following    Anemia  -Hgb dropped. Repeat CBC at noon  -No signs of active bleeding  -Monitor VALERI drain output closely and ostomy  -Monitor daily CBC    History of HTN  - Home losartan  -monitor on tele     Anxiety/depression  - home Klonopin    F/E/N  - Replete electrolytes per protocol  - CLD    Proph  - SQ lovenox   - SCDs  - PT/OT    Dispo  - DNAR select  - may transfer to surgical floor    Plan of care discussed with intensivist, Dr. Ml London, [facility]  ICU  Phone  [phone]   Pager [phone]      Intensivist Addendum:  I agree with APN note above. I have independently seen & evaluated the patient.  I agree with the management plan outlined above with the following  changes/additions:    No further ICU needs. Dietary needs managed by surgery.  Continuing antibiotics per infectious disease.  Has remained stable for 2+ days.  Awaiting transfer to surgical floor.    Lvl 3    Galileo Bull,   Pulmonary & Critical Care Medicine  [facility]

## 2024-07-14 NOTE — PROGRESS NOTES
[facility]  Progress Note    Bibi Diaz Patient Status:  Inpatient    1945 MRN [id number]   Location [facility] 4SW-A Attending Geena Pardo MD   Hosp Day # 4 PCP Rick Shannon DO     Subjective:  Patient sleeping under blankets this afternoon at the time of my visit. Reports feeling worse today. Pain after eating but denies nausea or vomiting. Flatus from the ostomy but no stool. Vitals normal and afebrile .     Objective/Physical Exam:  BP (!) 163/82 (BP Location: Left arm)   Pulse 74   Temp 99 °F (37.2 °C) (Temporal)   Resp 20   Ht 60\"   Wt 89 lb 11.6 oz (40.7 kg)   SpO2 98%   BMI 17.52 kg/m²     Intake/Output Summary (Last 24 hours) at 2024 1507  Last data filed at 2024 1200  Gross per 24 hour   Intake 1020 ml   Output 1290 ml   Net -270 ml       General: Alert, orientated x3.  Cooperative.  No apparent distress.  Abdomen:  Soft, non-distended,tender about the incisions, with no rebound or guarding.  No peritoneal signs.  Incision:  Clean, dry, intact without erythema.  Ostomy well perfused and productive of bowel sweat and gas but no stool, drain w serous output     Labs:  Lab Results   Component Value Date    WBC 7.6 2024    HGB 9.2 2024    HCT 27.2 2024    .0 2024      Lab Results   Component Value Date    PT 12.6 2013    PT 13.2 2011    INR 1.35 (H) 2024    INR 1.05 2024    INR 0.99 2022     Lab Results   Component Value Date     2024    K 4.3 2024     2024    CO2 25.0 2024    BUN 6 2024    CREATSERUM 0.60 2024    GLU 82 2024    CA 8.1 2024    ALKPHO 58 2024    ALT 37 2024    AST 16 2024    BILT 0.2 2024    ALB 2.0 2024    TP 5.2 2024          Assessment/Plan:  79 year old female w diverticular abscess sp wood's procedure POD 3  Some gas and bowel sweat in ostomy bag, no stool  Continue multimodal analgesia.  Continue antibiotics. Appreciate ID recs. Ambulate TID. SCDs. DVT chemoprophylaxis. IS  Jason White MD  7/14/2024  3:07 PM

## 2024-07-15 LAB
ALBUMIN SERPL-MCNC: 2.1 G/DL (ref 3.4–5)
ALBUMIN/GLOB SERPL: 0.6 {RATIO} (ref 1–2)
ALP LIVER SERPL-CCNC: 62 U/L
ALT SERPL-CCNC: 37 U/L
ANION GAP SERPL CALC-SCNC: 7 MMOL/L (ref 0–18)
AST SERPL-CCNC: 15 U/L (ref 15–37)
BASOPHILS # BLD AUTO: 0.04 X10(3) UL (ref 0–0.2)
BASOPHILS NFR BLD AUTO: 0.5 %
BILIRUB SERPL-MCNC: 0.2 MG/DL (ref 0.1–2)
BUN BLD-MCNC: 6 MG/DL (ref 9–23)
CALCIUM BLD-MCNC: 8.9 MG/DL (ref 8.5–10.1)
CHLORIDE SERPL-SCNC: 112 MMOL/L (ref 98–112)
CO2 SERPL-SCNC: 22 MMOL/L (ref 21–32)
CREAT BLD-MCNC: 0.78 MG/DL
EGFRCR SERPLBLD CKD-EPI 2021: 77 ML/MIN/1.73M2 (ref 60–?)
EOSINOPHIL # BLD AUTO: 0.9 X10(3) UL (ref 0–0.7)
EOSINOPHIL NFR BLD AUTO: 11.5 %
ERYTHROCYTE [DISTWIDTH] IN BLOOD BY AUTOMATED COUNT: 17.9 %
GLOBULIN PLAS-MCNC: 3.6 G/DL (ref 2.8–4.4)
GLUCOSE BLD-MCNC: 94 MG/DL (ref 70–99)
HCT VFR BLD AUTO: 26.9 %
HGB BLD-MCNC: 9.2 G/DL
IMM GRANULOCYTES # BLD AUTO: 0.04 X10(3) UL (ref 0–1)
IMM GRANULOCYTES NFR BLD: 0.5 %
LYMPHOCYTES # BLD AUTO: 1.16 X10(3) UL (ref 1–4)
LYMPHOCYTES NFR BLD AUTO: 14.8 %
MCH RBC QN AUTO: 28.8 PG (ref 26–34)
MCHC RBC AUTO-ENTMCNC: 34.2 G/DL (ref 31–37)
MCV RBC AUTO: 84.1 FL
MONOCYTES # BLD AUTO: 0.52 X10(3) UL (ref 0.1–1)
MONOCYTES NFR BLD AUTO: 6.6 %
NEUTROPHILS # BLD AUTO: 5.2 X10 (3) UL (ref 1.5–7.7)
NEUTROPHILS # BLD AUTO: 5.2 X10(3) UL (ref 1.5–7.7)
NEUTROPHILS NFR BLD AUTO: 66.1 %
OSMOLALITY SERPL CALC.SUM OF ELEC: 289 MOSM/KG (ref 275–295)
PLATELET # BLD AUTO: 410 10(3)UL (ref 150–450)
POTASSIUM SERPL-SCNC: 4.1 MMOL/L (ref 3.5–5.1)
PROT SERPL-MCNC: 5.7 G/DL (ref 6.4–8.2)
RBC # BLD AUTO: 3.2 X10(6)UL
SODIUM SERPL-SCNC: 141 MMOL/L (ref 136–145)
WBC # BLD AUTO: 7.9 X10(3) UL (ref 4–11)

## 2024-07-15 PROCEDURE — 99232 SBSQ HOSP IP/OBS MODERATE 35: CPT | Performed by: INTERNAL MEDICINE

## 2024-07-15 NOTE — OCCUPATIONAL THERAPY NOTE
OCCUPATIONAL THERAPY EVALUATION - INPATIENT     Room Number: 327/327-A  Evaluation Date: 7/15/2024  Type of Evaluation: Initial  Presenting Problem: Colonic Diverticular abscess, s/p Low Anterior Resection of the Rectosigmoid Colon with Elizabeth's pouch and end colostomy. Drainage of abdominal abscess.  Placement Keegan drain 7/11/24    Physician Order: IP Consult to Occupational Therapy  Reason for Therapy: ADL/IADL Dysfunction and Discharge Planning    OCCUPATIONAL THERAPY ASSESSMENT   Patient is currently functioning below baseline with ADLs and functional mobility. Prior to admission, patient's baseline is IND with ADLs and mobility at baseline. Granddaughter assists with IADLs as needed.  Patient is requiring minimum assistance as a result of the following impairments: decreased functional strength, decreased functional reach, decreased endurance, and impaired standing balance. Occupational Therapy will continue to follow for duration of hospitalization.    Patient will benefit from continued skilled OT Services to promote return to prior level of function and safety with continuous assistance and gradual rehabilitative therapy      History Related to Current Admission: Patient is a 79 year old female admitted on 7/10/2024 with Presenting Problem: Colonic Diverticular abscess, s/p Low Anterior Resection of the Rectosigmoid Colon with Elizabeth's pouch and end colostomy. Drainage of abdominal abscess.  Placement Keegan drain 7/11/24. Co-Morbidities : COPD, essential hypertension, dyslipidemia, GERD, anxiety, depression    WEIGHT BEARING RESTRICTION  Weight Bearing Restriction: None                Recommendations for nursing staff:   Transfers: one person with RW  Toileting location: bedside commode or toilet as tolerable.     EVALUATION SESSION:  Patient Start of Session: semi-supine in bed  FUNCTIONAL TRANSFER ASSESSMENT  Sit to Stand: Edge of Bed  Edge of Bed: Contact Guard Assist    BED MOBILITY  Supine to Sit :  Contact Guard Assist  Sit to Supine (OT): Minimal Assist  Scooting: CGA    BALANCE ASSESSMENT  Static Sitting: Supervision  Sitting Unilateral: Supervision  Static Standing: Contact Guard Assist  Standing Unilateral: Contact Guard Assist    FUNCTIONAL ADL ASSESSMENT  LB Dressing Standing: Moderate Assist      ACTIVITY TOLERANCE: functional mobility approx 3 minutes with RW                         O2 SATURATIONS       COGNITION  Overall Cognitive Status:  WFL - within functional limits    Upper Extremity   ROM: within functional limits   Strength: within functional limits   Coordination  Gross motor: WFL  Fine motor: WFL  Sensation: Light touch:  intact    EDUCATION PROVIDED  Patient: Role of Occupational Therapy; Plan of Care; Functional Transfer Techniques; Posture/Positioning; Energy Conservation; Proper Body Mechanics  Patient's Response to Education: Verbalized Understanding; Returned Demonstration; Requires Further Education    Equipment used: RW  Demonstrates functional use, Would benefit from additional trial      Therapist comments: Pt cooperative. Pt became down about her performance at end of session and made comments about potentially not being on this earth much longer, due to her advanced age. Pt denied suicidal thoughts or intentions. Supportive listening provided. Sentiments endorsed to RN and SW.     Patient End of Session: In bed;Needs met;Call light within reach;RN aware of session/findings;All patient questions and concerns addressed;With  staff;Discussed recommendations with /    OCCUPATIONAL PROFILE    HOME SITUATION  Type of Home: Apartment  Home Layout: One level  Lives With: Alone               Occupation/Status: watches TV throughout the day.     Drives: No  Patient Regularly Uses: Glasses    Prior Level of Function: Per pt lives in one level apartment alone. No steps to enter building. Ambulates with RW. No recent falls. Grand-daughter assists with transportation,  groceries.     SUBJECTIVE   \"I'm not going to be here much longer.\" Re; on this planet.    PAIN ASSESSMENT  Ratin  Location: denies       OBJECTIVE  Precautions: Bed/chair alarm;Drain(s);Colostomy;Abdominal protective strategies  Fall Risk: Standard fall risk      ASSESSMENTS    AM-PAC ‘6-Clicks’ Inpatient Daily Activity Short Form  -   Putting on and taking off regular lower body clothing?: A Lot  -   Bathing (including washing, rinsing, drying)?: A Lot  -   Toileting, which includes using toilet, bedpan or urinal? : A Lot  -   Putting on and taking off regular upper body clothing?: A Little  -   Taking care of personal grooming such as brushing teeth?: A Little  -   Eating meals?: A Little    AM-PAC Score:  Score: 15  Approx Degree of Impairment: 56.46%  Standardized Score (AM-PAC Scale): 34.69    ADDITIONAL TESTS     NEUROLOGICAL FINDINGS      COGNITION ASSESSMENTS       PLAN  OT Treatment Plan: Balance activities;Energy conservation/work simplification techniques;ADL training;IADL training;Functional transfer training;UE strengthening/ROM;Endurance training;Patient/Family training;Patient/Family education;Equipment eval/education;Compensatory technique education;Continued evaluation  Rehab Potential : Good  Frequency: 3x/week  Number of Visits to Meet Established Goals: 4    ADL Goals   Patient will perform grooming: with supervision  Patient will perform upper body dressing:  with supervision  Patient will perform lower body dressing:  with supervision  Patient will perform toileting: with supervision    Functional Transfer Goals  Patient will transfer from supine to sit:  with supervision  Patient will transfer from sit to stand:  with supervision  Patient will transfer to bedside commode:  with supervision  Patient will transfer to toilet:  with supervision    Additional Goals  Pt will tolerate standing for 6 minutes utilizing AD as needed in preparation to perform grooming ADLs at sink level.    Patient  Evaluation Complexity Level:   Occupational Profile/Medical History LOW - Brief history including review of medical or therapy records    Specific performance deficits impacting engagement in ADL/IADL LOW  1 - 3 performance deficits    Client Assessment/Performance Deficits MODERATE - Comorbidities and min to mod modifications of tasks    Clinical Decision Making LOW - Analysis of occupational profile, problem-focused assessments, limited treatment options    Overall Complexity LOW     OT Session Time: 16 minutes  Therapeutic Activity: 12 minutes

## 2024-07-15 NOTE — PROGRESS NOTES
[facility]   part of [facility] ID PROGRESS NOTE    Bibi Diaz Patient Status:  Inpatient    1945 MRN [id number]   Location [facility] 4SW-A Attending Geena Pardo MD   Hosp Day # 5 PCP Rick Shannon DO     Abx: IV Zosyn D#4    Subjective: Patient seen and examined today. Abdominal pain improving. Tolerating her diet. Afebrile. On room air.     Allergies:  Allergies   Allergen Reactions    Avelox [Moxifloxacin Hcl In Nacl] SWELLING    Avelox [Moxifloxacin Hydrochloride] TONGUE SWELLING     \"TABS\"    Amoxicillin NAUSEA AND VOMITING     Vomiting.    Statins Myopathy    Sulfa Antibiotics RASH and ITCHING    Dander OTHER (SEE COMMENTS)     \"Animals\": runny nose, watery eyes, itching    Morphine OTHER (SEE COMMENTS)     Word finding difficulties    Diphenhydramine Runny nose     \"Animals\": runny nose, watery eyes, itching    Dust Runny nose    Latex RASH    Sulfa Drugs Cross Reactors RASH and ITCHING       Medications:    Current Facility-Administered Medications:     ibuprofen (Motrin) tab 400 mg, 400 mg, Oral, Q6H PRN **OR** ibuprofen (Motrin) tab 600 mg, 600 mg, Oral, Q6H PRN    traMADol (Ultram) tab 50 mg, 50 mg, Oral, Q6H PRN    multivitamin with minerals (Thera M Plus) tab 1 tablet, 1 tablet, Oral, Daily    HYDROmorphone (Dilaudid) 1 MG/ML injection 0.2 mg, 0.2 mg, Intravenous, Q3H PRN **OR** HYDROmorphone (Dilaudid) 1 MG/ML injection 0.4 mg, 0.4 mg, Intravenous, Q3H PRN    enoxaparin (Lovenox) 30 MG/0.3ML SUBQ injection 30 mg, 30 mg, Subcutaneous, Daily    phenol (Chloraseptic) 1.4 % oral liquid spray, , Mouth/Throat, PRN    acetaminophen (Tylenol Extra Strength) tab 1,000 mg, 1,000 mg, Oral, Q8H KYLE    famotidine (Pepcid) tab 20 mg, 20 mg, Oral, Daily **OR** famotidine (Pepcid) 20 mg/2mL injection 20 mg, 20 mg, Intravenous, Daily    melatonin tab 3 mg, 3 mg, Oral, Nightly PRN    ondansetron (Zofran) 4 MG/2ML injection 4 mg, 4 mg, Intravenous, Q6H PRN    metoclopramide  (Reglan) 5 mg/mL injection 5 mg, 5 mg, Intravenous, Q8H PRN    losartan (Cozaar) tab 50 mg, 50 mg, Oral, Daily    hydrALAzine (Apresoline) 20 mg/mL injection 10 mg, 10 mg, Intravenous, Q4H PRN    labetalol (Trandate) 5 mg/mL injection 10 mg, 10 mg, Intravenous, Q4H PRN    albuterol (Ventolin HFA) 108 (90 Base) MCG/ACT inhaler 2 puff, 2 puff, Inhalation, Q6H PRN    clonazePAM (KlonoPIN) tab 0.5 mg, 0.5 mg, Oral, BID    fluticasone propionate (Flonase) 50 MCG/ACT nasal suspension 2 spray, 2 spray, Each Nare, Daily    mirtazapine (Remeron) tab 30 mg, 30 mg, Oral, Nightly    fluticasone furoate-vilanterol (Breo Ellipta) 200-25 MCG/ACT inhaler 1 puff, 1 puff, Inhalation, Daily    piperacillin-tazobactam (Zosyn) 3.375 g in dextrose 5% 100 mL IVPB-ADDV, 3.375 g, Intravenous, Q8H KYLE    Review of Systems:  Completed. See pertinent positives and negatives above.     Physical Exam:  Vital signs: Blood pressure 143/71, pulse 76, temperature 98 °F (36.7 °C), temperature source Oral, resp. rate 16, height 152.4 cm (5'), weight 89 lb 11.6 oz (40.7 kg), SpO2 97%, not currently breastfeeding.    General: Alert, answering questions appropriately, NAD, on room air. Sitting up in a chair.   HEENT: Moist mucous membranes.   Neck: No lymphadenopathy.  Supple.  Cardiovascular: RRR  Respiratory: Clear to auscultation bilaterally.  No wheezes. No rhonchi.  Abdomen: Soft, midline incision with staples in place- clean/dry/intact, some distension. + drain with serosang appearing output. + ostomy in place.  Musculoskeletal: No edema noted  Integument: No lesions. No erythema.    Laboratory Data:  Recent Labs   Lab 07/15/24  0519   RBC 3.20*   HGB 9.2*   HCT 26.9*   MCV 84.1   MCH 28.8   MCHC 34.2   RDW 17.9   NEPRELIM 5.20   WBC 7.9   .0     Recent Labs   Lab 07/13/24  0702 07/14/24  0329 07/14/24  1205 07/15/24  0519   GLU 99 82  --  94   BUN 7* 6*  --  6*   CREATSERUM 0.60 0.60  --  0.78   CA 8.9 8.1*  --  8.9   ALB 1.9* 2.0*  --   2.1*    141  --  141   K 3.1* 3.2*  3.2* 4.3 4.1    110  --  112   CO2 25.0 25.0  --  22.0   ALKPHO 52* 58  --  62   AST 16 16  --  15   ALT 39 37  --  37   BILT 0.3 0.2  --  0.2   TP 5.4* 5.2*  --  5.7*       Microbiology: Reviewed in EMR    Radiology: Reviewed.  PROCEDURE:  CT ABDOMEN+PELVIS (CONTRAST ONLY) (CPT=74177)     COMPARISON:  [facility] , CT, CT ABDOMEN+PELVIS(CONTRAST ONLY)(CPT=74177), 6/17/2024, 10:08 PM.  [facility] , CT, CT ABDOMEN+PELVIS(CONTRAST ONLY)(CPT=74177), 6/21/2024, 6:19 PM.     INDICATIONS:  abd pain starting today, surgery with drain x2 weeks ago     TECHNIQUE:  CT scanning was performed from the dome of the diaphragm to the pubic symphysis with non-ionic intravenous contrast material. Post contrast coronal MPR imaging was performed.  Dose reduction techniques were used. Dose information is  transmitted to the ACR (American College of Radiology) NRDR (National Radiology Data Registry) which includes the Dose Index Registry.     PATIENT STATED HISTORY:(As transcribed by Technologist)  Abdominal pain      CONTRAST USED:  80cc of Isovue 370     FINDINGS:          LIVER:  Unremarkable.     BILIARY:  Unremarkable.     PANCREAS:  Unremarkable.     SPLEEN:  Unremarkable.     KIDNEYS:  No acute abnormality.     ADRENALS:  Unremarkable.     AORTA/VASCULAR:  No aortic aneurysm. There are atherosclerotic changes including calcification involving the aorta, but no evidence for aneurysm involving the aorta.     RETROPERITONEUM:  Unremarkable.     BOWEL/MESENTERY:  Surgical drain, percutaneous inserted anterior abdominal wall on the left, pigtail left lower quadrant stable position of the pigtail.  Suspect small residual abscess image 75 measuring 1.5 cm to the left of the continued-inflamed  sigmoid colon.  In this region is relatively extensive pericolonic stranding, the stranding appears worse than on the most recent CT scan from 6/21/2024.  Another small suspected abscess image 72 measuring  about 1.7 cm adjacent to the pigtail catheter  and sigmoid colon.  However each of these could reflect inflamed, prominent fluid and air-filled diverticula as they both appear immediately adjacent to the sigmoid colon.  There therefore indeterminate between small abscess versus fluid-filled  diverticula.  Continued thickening of the sigmoid colon and moderate amount of stool in the colon and large amount of stool in the rectum.  The stranding appears greater on the sigmoid colon and extends along the abdominal wall tract for the drain, for  example there is now tissue thickening measuring up to 2.7 cm image 75 at the anterior abdominal wall where the drain enters the peritoneal cavity.  The abdominal wall fat shows stranding in this region additionally.  No measurable abscess in this  region.  More proximally the stool in the colon is moderate, there is no sign of colonic obstruction.  Moderate fluid and air in the small bowel without small bowel obstruction pattern.  No generalized free air.  Trace free fluid pelvis but no large or  drainable ascites.     ABDOMINAL WALL:  See above regarding the insertion site of the surgical drain on the left.     URINARY BLADDER:  Thickened appearance of the left side of the urinary bladder as it abuts the above described sigmoid colon and pericolonic inflammatory process.  No gas bubbles in the bladder.  This could be secondarily inflamed/infected..     LYMPH NODES PELVIS:  Unremarkable.     PELVIC ORGANS:  No acute process.     LUNG BASES:  No acute process.     BONES:  No acute abnormality. Note is made of degenerative changes present in the spine.        Impression:     CONCLUSION:       1. There has been increase in the pericolonic inflammatory stranding and phlegmonous tissue thickening around the sigmoid colon when compared to the prior CT from 6/21/2024.  Consistent with continued/worsening colitis and/or diverticulitis.  Two focal  measurable structures are indeterminate  between small abscess cavities or prominent inflamed diverticula.  Nothing is conclusive for definitive abscess.  Small free fluid pelvis without large or drainable ascites.  No sign of bowel obstruction or free  air.     2. In addition to the intraperitoneal inflammatory process, there is now thickening of the soft tissue around the tract for the abdominal wall drain on the left along with adjacent fatty stranding of the abdominal wall, may reflect extension of  inflammation/infection in this region without measurable abscess.  Advise correlation for any potential abnormal drainage from this area.     3. Thickened left-sided urinary bladder where it abuts the sigmoid and pericolonic inflammatory phlegmonous process, probably secondarily inflamed/infected urinary bladder.  Advise correlation with symptoms and urinalysis.        LOCATION:  [facility]        Dictated by (CST): Colten Deshpande MD on 7/10/2024 at 4:50 PM      Finalized by (CST): Colten Deshpande MD on 7/10/2024 at 5:00 PM        ASSESSMENT:  Diverticulitis with abscess  S/p IR drainage 6/18, cxs polymicrobial (E. Coli, strep anginosus, E. Coli, strep constellatus, pseudomonas). Recently completed course of IV Zosyn  Now presents with worsening abdominal pain. S/p LAR of rectosigmoid colon with Elizabeth's pouch and end colostomy and drainage of abdominal abscess with placement of ailyn drain 7/11. No OR cxs noted.  Leukocytosis, d/t above, resolved.  HTN  COPD  Anemia    PLAN:  - continue IV Zosyn   - follow blood cultures- ngtd  - follow temps and cbc  - follow abdominal exam    Discussed with RN and patient.     [facility]  [phone]    ID ATTENDING ADDENDUM     Pt seen an examined independently. Chart reviewed. Agree with above. Note has been reviewed by me and modified as needed.  Exam and Impression/ Recs as noted above.  Doing well, minimal pain and tolerating diet. Should be able to transition to  po abx once ready for dc  Will follow  D/w staff and with pt  More than 50% of clinical time and 100% of the clinical decision making performed by me.    Leighann Patterson MD

## 2024-07-15 NOTE — PLAN OF CARE
Aox4. VSS. Denies pain to abdomen, denies nausea. Penrose dressing saturated with serosanguineous output and some stool, dressing changed performed with new 1-piece ostomy, began demonstrating education to patient. Patient verbalizes feeling of being overwhelmed. Wound/ostomy education order in place. NSR with PVCs on telemetry, denies lightheadedness, dyspnea, chest pain.     Ostomy with liquid brown stool output, gas.

## 2024-07-15 NOTE — SPIRITUAL CARE NOTE
Spiritual Care Visit Note    Patient Name: Bibi Diaz Date of Spiritual Care Visit: 07/15/24   : 1945 Primary Dx: Colonic diverticular abscess       Referred By: Referral From: Nurse    Spiritual Care Taxonomy:    Intended Effects: Build relationship of care and support    Methods: Encourage sharing of feelings;Explore oralia and values;Offer emotional support;Offer spiritual/Church support    Interventions: Acknowledge current situation;Active listening;Explain  role;Identify supportive relationship(s);Hassell;Share words of hope and inspiration    Visit Type/Summary:     Introduced self to the patient. Assessed for spiritual and psychosocial needs. Created safety for patient through empathic presence and non-judgmental listening.  Provided a calming space to open up her story, thoughts and feelings. Patient expressed feeling of \"sad\" and added that she is \"overwhelmed.\" When inquired, she shared that she \"live alone\" and the need to teach herself how to take care of herself with this new condition overwhelms her. Acknowledged her new condition/situation. Processed her feelings. Explored her family support that include a grand daughter because her children has other conditions/needs. Per request, prayed and promoted a sense of inner peace and strength. Explained 's role in care and support. Established supportive/caring relationship. Empowered patient to request  support through their nurse. Patient was appreciative of spiritual care and emotional support.     Also, provided Spiritual Care card with  contact information/resources.   Spiritual Care support can be requested via an Epic consult.   For urgent/immediate needs, please contact the On Call  at: [place]: ext [phone]    Rev. Anirudh Millan M.Div., M.T.S., APBCC., CGCS.  Spiritual Care Lead

## 2024-07-15 NOTE — PROGRESS NOTES
Alert & oriented x4. VSS on room air. Purewick in place. Tolerating diet. Ambulates with standby assist. Midline with staples MICKY and c/d/I. Left colostomy in place with good output, pennrose drain secured beside ostomy. Right VALERI draining serosanguineous fluid. Denies nausea/chest pain/SOB. Pain controlled. Patient updated on plan of care. Questions and concerns addressed. Safety precautions in place. Frequent rounds performed.

## 2024-07-15 NOTE — WOUND PROGRESS NOTE
[facility]  Inpatient Wound Care Contact Note    Bibi Mannback Patient Status:  Inpatient    1945 MRN [id number]   Location [facility] 3NW-A Attending Geena Pardo MD   Hosp Day # 5 PCP Rick Shannon DO     Attempted to see patient for follow up ostomy care session.  Patient declined again the ostomy education and teachings [ second attempt].Patient states  she is very tired ,exhausted and then she started to cry.  RN aware.     We will continue to follow this patient while in-house and assist with ostomy care discharge planning.    Please call me at [phone]  if you have any questions about this consultation and plan of care.       Thank you,  Lizzy Washington RN BSN [address] [address]  Spectralink: [phone]

## 2024-07-15 NOTE — PHYSICAL THERAPY NOTE
PHYSICAL THERAPY TREATMENT NOTE - INPATIENT    Room Number: 327/327-A     Session: 1     Number of Visits to Meet Established Goals: 3    Presenting Problem: abd pain, s/p low anterior resection of colon with wood's pouch and colostomy creation and placement of ailyn drain  Co-Morbidities : COPD, essential hypertension, dyslipidemia, GERD, anxiety, depression    ASSESSMENT   Patient demonstrates fair progress this session, goals  remain in progress.    Patient continues to function below baseline with bed mobility and gait.  Contributing factors to remaining limitations include decreased endurance/aerobic capacity, pain, and decreased muscular endurance.  Next session anticipate patient to progress gait.  Physical Therapy will continue to follow patient for duration of hospitalization.  Prior Level of [place]: Per pt lives in one level apartment alone. No steps to enter building. Ambulates with RW. No recent falls. Grand-daughter assists with transportation, groceries.   Patient continues to benefit from continued skilled PT services: to promote return to prior level of function and safety with continuous assistance and gradual rehabilitative therapy .    PLAN  PT Treatment Plan: Body mechanics;Bed mobility;Coordination;Endurance;Energy conservation;Patient education;Family education;Gait training;Neuromuscular re-educate;Range of motion;Strengthening;Transfer training;Balance training  Rehab Potential : Good  Frequency (Obs): 3-5x/week    CURRENT GOALS       Goal #1 Patient is able to demonstrate supine - sit EOB @ level: supervision      Goal #2 Patient is able to demonstrate transfers Sit to/from Stand at assistance level: supervision      Goal #3 Patient is able to ambulate 50 feet with assist device: walker - rolling at assistance level: supervision      Goal #4     Goal #5     Goal #6     Goal Comments: Goals established on 7/13/2024  7/15/2024 all goals ongoing     SUBJECTIVE  \"You don't know what  it's like to be 89 and just want to leave. This is not living\" (pt just turned 79)   Pt may benefit from psych consult . SW made aware of pt's comments for wanting to die.     OBJECTIVE  Precautions: Bed/chair alarm;Drain(s);Colostomy;Abdominal protective strategies    WEIGHT BEARING RESTRICTION  Weight Bearing Restriction: None                PAIN ASSESSMENT   Ratin  Location: incisional  Management Techniques: Activity promotion;Body mechanics;Breathing techniques;Relaxation;Repositioning    BALANCE                                                                                                                       Static Sitting: Fair  Dynamic Sitting: Fair           Static Standing: Fair -  Dynamic Standing: Fair -    ACTIVITY TOLERANCE                         O2 WALK         AM-PAC '6-Clicks' INPATIENT SHORT FORM - BASIC MOBILITY  How much difficulty does the patient currently have...  Patient Difficulty: Turning over in bed (including adjusting bedclothes, sheets and blankets)?: None   Patient Difficulty: Sitting down on and standing up from a chair with arms (e.g., wheelchair, bedside commode, etc.): A Little   Patient Difficulty: Moving from lying on back to sitting on the side of the bed?: None   How much help from another person does the patient currently need...   Help from Another: Moving to and from a bed to a chair (including a wheelchair)?: A Little   Help from Another: Need to walk in hospital room?: A Little   Help from Another: Climbing 3-5 steps with a railing?: A Lot       AM-PAC Score:  Raw Score: 19   Approx Degree of Impairment: 41.77%   Standardized Score (AM-PAC Scale): 45.44   CMS Modifier (G-Code): CK    FUNCTIONAL ABILITY STATUS  Gait Assessment   Functional Mobility/Gait Assessment  Gait Assistance: Contact guard assist;Supervision  Distance (ft): 40,12  Assistive Device: Rolling walker  Pattern: Shuffle    Skilled Therapy Provided  Pt presents in sup  Therapist educated pt on  benefits of mobility, log roll and activity recs  Pt t/f EOB c cues for log roll and CGA  Pt gait trained c RW supervision, to CGA as pt fatigues c decreased jordin and step length  Mod A to change brief.   Min A to return to sup via log roll  RN and SW aware pt made comments about not wanting to live. Pt states she has no plans, however, appears frustrated c current state and reports maximum fatigue.   D/w pt that she may require increased assist at home. Pt states her grand daughter works nights and her niece can check in on her.pt may be looking into CG services, however, pt was very fatigued and unable to elaborate.     Bed Mobility:  Rolling: ind    Supine<>Sit: CGA    Sit<>Supine: min A      Transfer Mobility:  Sit<>Stand: supervision, cues for hand placement    Stand<>Sit: CGA    Gait: CGA c RW     Therapist's Comments: pt states she had amb earlier today c nsg staff           Patient End of Session: In bed;With  staff;Needs met;Call light within reach;RN aware of session/findings;All patient questions and concerns addressed    PT Session Time: 27 minutes  Gait Training: 10 minutes  Therapeutic Activity: 17 minutes  Therapeutic Exercise:  minutes   Neuromuscular Re-education:  minutes

## 2024-07-15 NOTE — PAYOR COMM NOTE
--------------  CONTINUED STAY REVIEW  7/13-7/15  Payor: [employer]  Subscriber #:  [id number]  Authorization Number: [id number]    Admit date: 7/10/24  Admit time:  6:30 PM    REVIEW DOCUMENTATION:  7/13 Surgery  She denies flatus or stool in her ostomy appliance. She reports she has not ambulated.      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        Plan:  Continue clear liquid diet.  Add nutritional supplements daily.  Analgesics and antiemetics as needed  Remove mills catheter today  Continue drain care.  Continue ostomy care.  Continue IV antibiotics.  Encourage ambulation and up to chair.   Aquacel dressing removed at bedside today  AM labs to monitor hemoglobin   DVT prophylaxis with Lovenox  GI prophylaxis with Pepcid  OK to transfer to floor from general surgery standpoint        Myra Mustafa PA-C  7/13/2024  7:58 AM     Patient seen and examined, I agree with the documentation above with the following addendum.     No acute events overnight. Feels better. Abdominal pain improved. No nausea or vomiting.   Physical exam:  General: NAD  Abdomen: soft, tender about the incision, incisions are clean dry and intact, no erythema, drain is serosang, ostomy is well perfused and productive of gas        Assesment & Plan:  78 year old female, 2 Days Post-Op from LAR  Continue clear diet  DVT chemoprophylaxis  Ambulate  IV abx  Remove mills  Multimodal analgesia            7/13 IM    Chief Complaint: abd pain         Subjective:  Patient doing much better. Less nausea, abd pain improved. Ate breakfast. In the chair now.       Temp:  [98.3 °F (36.8 °C)-100.6 °F (38.1 °C)] 98.9 °F (37.2 °C)  Pulse:  [68-87] 68  Resp:  [12-25] 19  BP: (131-176)/(54-74) 140/62  SpO2:  [96 %-98 %] 96 %     Scheduled Medications    multivitamin with minerals  1 tablet Oral Daily     ketorolac  15 mg Intravenous q6h    enoxaparin  30 mg Subcutaneous Daily    acetaminophen  1,000 mg Oral Q8H KYLE    famotidine  20 mg Oral Daily     Or    famotidine  20 mg Intravenous Daily    losartan  50 mg Oral Daily    clonazePAM  0.5 mg Oral BID    fluticasone propionate  2 spray Each Nare Daily    mirtazapine  30 mg Oral Nightly    fluticasone furoate-vilanterol  1 puff Inhalation Daily    piperacillin-tazobactam  3.375 g Intravenous Q8H [facility]                  Assessment & Plan:  #Abdominal pain d/t diverticular abscess  #Leukocytosis, improved  - surgery noted large inflammatory mass in pelvis near sigmoid colon now s/p LAR w/ hartmanns pouch and end colostomy on 7/11,   - abx IV per ID, follow cultures  - pain control -     #Delirium multifactorial, resolved   #Constipation, improved  #Chronic Anemia, stable, monitor H&H  #Hyponatremia, resolved  #COPD without exacerbation - cont BD  #Essential hypertension-Losartan  #Anxiety, Depression - remeron, klonopin            7/14 IM    Chief Complaint: abd pain         Subjective:  Patient overall better. Feels weak and does not want to get into chair. Has abd cramping at times,.       Temp:  [97.6 °F (36.4 °C)-99.8 °F (37.7 °C)] 97.6 °F (36.4 °C)  Pulse:  [63-84] 63  Resp:  [15-25] 21  BP: (131-155)/(58-72) 131/64  SpO2:  [95 %-97 %] 97 %     Abdomen: Soft, Nondistended +ostomy +drain       Assessment & Plan:  #Abdominal pain d/t diverticular abscess  #Leukocytosis, improved  - surgery noted large inflammatory mass in pelvis near sigmoid colon now s/p LAR w/ hartmanns pouch and end colostomy on 7/11,   - abx IV per ID, follow cultures  - pain control      #Delirium multifactorial, resolved   #Constipation, improved  #Chronic Anemia, stable, monitor H&H  #Hyponatremia, resolved  #COPD without exacerbation - cont BD  #Essential hypertension-Losartan  #Anxiety, Depression - remeron, klonopin          7/15 IM    Chief Complaint: abd pain         Subjective:  Patient  reports feeling anxious about learning how to change ostomy.  Reports no pain/nausea and diet advanced to soft today.   Had her birthday yesterday.        Temp:  [97.5 °F (36.4 °C)-98.9 °F (37.2 °C)] 97.5 °F (36.4 °C)  Pulse:  [62-76] 76  Resp:  [15-19] 18  BP: (117-156)/(51-90) 156/90  SpO2:  [95 %-99 %] 97 %    Abdomen: Soft, Nondistended +ostomy +drain       Lab 07/13/24  0702 07/13/24  1314 07/14/24  0329 07/14/24  1205 07/15/24  0519   WBC 8.2 8.1 6.8 7.6 7.9   HGB 8.6* 9.7* 8.0* 9.2* 9.2*   MCV 87.7 93.8 84.3 84.7 84.1   .0 350.0 386.0 363.0 410.0                Recent Labs   Lab 07/13/24  0702 07/14/24  0329 07/14/24  1205 07/15/24  0519   GLU 99 82  --  94   BUN 7* 6*  --  6*   CREATSERUM 0.60 0.60  --  0.78   CA 8.9 8.1*  --  8.9   ALB 1.9* 2.0*  --  2.1*    141  --  141   K 3.1* 3.2*  3.2* 4.3 4.1    110  --  112   CO2 25.0 25.0  --  22.0   ALKPHO 52* 58  --  62   AST 16 16  --  15   ALT 39 37  --  37   BILT 0.3 0.2  --  0.2   TP 5.4* 5.2*  --  5.7*      Scheduled Medications    multivitamin with minerals  1 tablet Oral Daily    enoxaparin  30 mg Subcutaneous Daily    acetaminophen  1,000 mg Oral Q8H KYLE    famotidine  20 mg Oral Daily     Or    famotidine  20 mg Intravenous Daily    losartan  50 mg Oral Daily    clonazePAM  0.5 mg Oral BID    fluticasone propionate  2 spray Each Nare Daily    mirtazapine  30 mg Oral Nightly    fluticasone furoate-vilanterol  1 puff Inhalation Daily    piperacillin-tazobactam  3.375 g Intravenous Q8H [facility]                  Assessment & Plan:  #Abdominal pain d/t diverticular abscess improved  #Leukocytosis, resolved  - surgery noted 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  Ostomy education  - tolerating soft diet     #Moderate malnutrition, dietitian following/supplements  #Delirium  multifactorial, resolved   #Constipation, improved  #Chronic Anemia, stable  #Hyponatremia, resolved  #COPD without exacerbation - cont BD  #Essential hypertension-Losartan  #Anxiety, Depression - remeron, klonopin  #Deconditioning, PT recs HHPT - SW following     Case d/w pt, RN.  Appreciate consultants.    MARY Greer  12:44 PM      Addendum:     Agree with above note except as documented below.       Gen: NAD  CVS: s1s2  Resp: CTA  Abd: soft     #diverticular abscess  - abx ongoing   - pain control  - ostomy care     # abd pain due to above  # delirium, resolved   # hypokalemia , repalced  # anemia, monitor   # moderate malnutrition, dietician following         ketorolac (Toradol) 15 MG/ML injection 15 mg  Dose: 15 mg  Freq: Every 6 hours Route: IV  Start: 07/12/24 1230 End: 07/14/24 1200       1315 LD-Given     1814 AL-Given     2358 EK-Given      0546 EK-Given     1219 RF-Given     1816 RF-Given      0124 AS-Given     (0630 AS)-Not Given     1200 RF-Given                       piperacillin-tazobactam (Zosyn) 3.375 g in dextrose 5% 100 mL IVPB-ADDV  Dose: 3.375 g  Freq: Every 8 hours scheduled Route: IV  Last Dose: 3.375 g (07/15/24 1429)  Start: 07/10/24 2200   Admin Instructions:   Incompatible with Lactated Ringers (LR)   Order specific questions:        2110 RA-New Bag      0510 RA-New Bag     (1400 SK)-Not Given [C]     2247 SP-New Bag      0556 SP-New Bag     1316 LD-New Bag     2103 EK-New Bag      0547 EK-New Bag     1619 RF-New Bag     2255 AS-New Bag      0658 AS-New Bag     1532 RF-New Bag     2219 MV-New Bag      0523 MV-New Bag     1429 MC-New Bag     2200        piperacillin-tazobactam (Zosyn) 4.5 g in dextrose 5% 100 mL IVPB-ADDV  Dose: 4.5 g  Freq: Once Route: IV  Last Dose: Stopped (07/10/24 1620)  Start: 07/10/24 1357 End: 07/10/24 1620   Admin Instructions:   Incompatible with Lactated Ringers (LR)   Order specific questions:        1442 AP-New Bag     1620 AP-Stopped              potassium chloride (Klor-Con) 20 MEQ oral powder 40 mEq  Dose: 40 mEq  Freq: Every 4 hours Route: OR  Start: 07/14/24 0830 End: 07/14/24 1246         0842 RF-Given     1246 RF-Given         potassium chloride 40 mEq in 250mL sodium chloride 0.9% IVPB premix  Dose: 40 mEq  Freq: Once Route: IV  Last Dose: 40 mEq (07/13/24 1117)  Start: 07/13/24 1030 End: 07/13/24 1517        1117 RF-New Bag          sodium chloride 0.9 % IV bolus 1,000 mL  Dose: 1,000 mL  Freq: Once Route: IV  Last Dose: Stopped (07/10/24 1517)  Start: 07/10/24 1355 End: 07/10/24 1517     1401 AP-New Bag     1517 AP-Stopped                            lactated ringers infusion  Rate: 100 mL/hr  Freq: Continuous Route: IV  Last Dose: Stopped (07/12/24 1717)  Start: 07/11/24 1815 End: 07/12/24 1634      1819 ASPEN-New Bag      1402 AL-New Bag     1634-D/C'd  1717 AL-Stopped           sodium chloride 0.9% infusion  Rate: 100 mL/hr  Freq: Continuous Route: IV  Start: 07/10/24 1845 End: 07/11/24 1841     1845 KR-New Bag      0509 RA-New Bag     1841-D/C'd          sodium chloride 0.9% infusion  Rate: 125 mL/hr  Freq: Continuous Route: IV  Start: 07/10/24 1845 End: 07/10/24 1941   Admin Instructions:   ED holding order only  Cancel if other admission orders exist!          1941-D/C'd           sodium chloride 0.9% infusion  Rate: 125 mL/hr  Freq: Continuous Route: IV  Last Dose: Stopped (07/10/24 1401)  Start: 07/10/24 1339 End: 07/10/24 1641     1351 AP-New Bag     1401 AP-Stopped     1641-D/C'd                          hydrALAzine (Apresoline) 20 mg/mL injection 10 mg  Dose: 10 mg  Freq: Every 4 hours PRN Route: IV  PRN Reason: Increased blood pressure  Start: 07/11/24 2210   Admin Instructions:   For SBP >160.      2246 SP-Given      0513 ZS-Given     1358 AL-Given            HYDROmorphone (Dilaudid) 1 MG/ML injection 0.2 mg  Dose: 0.2 mg  Freq: Every 3 hours PRN Route: IV  PRN Reason: moderate pain  Start: 07/12/24 1814   Admin Instructions:   Use PRN  reason as a guide and follow range order policy. If oral pain meds are ordered and patient can tolerate oral intake, start with PRN oral pain medications first.         1215 WEN-Given                          Or   HYDROmorphone (Dilaudid) 1 MG/ML injection 0.4 mg  Dose: 0.4 mg  Freq: Every 2 hour PRN Route: IV  PRN Reason: severe pain  Start: 07/11/24 1814 End: 07/12/24 1206   Admin Instructions:   Use PRN reason as a guide and follow range order policy. If oral pain meds are ordered and patient can tolerate oral intake, start with PRN oral pain medications first.      2341 SP-Given      0241 SP-Given     1100 AL-Given     1206-D/C'd          HYDROmorphone (Dilaudid) 1 MG/ML injection 0.2 mg  Dose: 0.2 mg  Freq: Every 5 min PRN Route: IV  PRN Reason: mild pain  Start: 07/11/24 1814 End: 07/12/24 0213   Admin Instructions:   Total maximum amount 3mg.  Use PRN reason as a guide and follow range order policy. Use as second line medication if first line narcotic is ineffective.                 0213-D/C'd             Or   HYDROmorphone (Dilaudid) 1 MG/ML injection 0.4 mg  Dose: 0.4 mg  Freq: Every 5 min PRN Route: IV  PRN Reason: moderate pain  Start: 07/11/24 1814 End: 07/12/24 0213   Admin Instructions:   Total maximum amount 3mg.  Use PRN reason as a guide and follow range order policy. Use as second line medication if first line narcotic is ineffective.      1819 ASPEN-Given           0213-D/C'd  (0238 SP)-Not Given           Or   HYDROmorphone (Dilaudid) 1 MG/ML injection 0.6 mg  Dose: 0.6 mg  Freq: Every 5 min PRN Route: IV  PRN Reason: severe pain  Start: 07/11/24 1814 End: 07/12/24 0213   Admin Instructions:   Total maximum amount 3mg.  Use PRN reason as a guide and follow range order policy. Use as second line medication if first line narcotic is ineffective.           2010 SP-Given      0213-D/C'd                   metoclopramide (Reglan) 5 mg/mL injection 5 mg  Dose: 5 mg  Freq: Every 8 hours PRN Route: IV  PRN  Reasons: Nausea,vomiting  Start: 07/11/24 1835   Admin Instructions:   Default antiemetic sequence (unless otherwise preferred by patient):  1. ondansetron (Zofran) 2. metoclopramide (Reglan)  Wait 15 minutes before proceeding to next medication in sequence.  Follow therapeutic duplication policy      2257 SP-Given      1102 AL-Given                Or   morphINE PF 2 MG/ML injection 2 mg  Dose: 2 mg  Freq: Every 2 hour PRN Route: IV  PRN Reason: severe pain  Start: 07/10/24 1708 End: 07/10/24 1925   Admin Instructions:   Use PRN reason as a guide and follow range order policy. If oral pain meds are ordered and patient can tolerate oral intake, start with PRN oral pain medications first.     1712 AP-Given     1853 KR-Given     1925-D/C'd                ondansetron (Zofran) 4 MG/2ML injection 4 mg  Dose: 4 mg  Freq: Every 6 hours PRN Route: IV  PRN Reasons: Nausea,vomiting  Start: 07/11/24 1835   Admin Instructions:   Default antiemetic sequence (unless otherwise preferred by patient):  1. ondansetron (Zofran) 2. metoclopramide (Reglan)  Wait 15 minutes before proceeding to next medication in sequence.  Follow therapeutic duplication policy.      2011 SP-Given      0241 SP-Given     0903 AL-Given     2048 EK-Given      1010 RF-Given       0520 MV-Given             oxyCODONE immediate release tab 2.5 mg  Dose: 2.5 mg  Freq: Every 4 hours PRN Route: OR  PRN Reason: moderate pain  Start: 07/11/24 1814 End: 07/12/24 1116   Admin Instructions:   Use PRN reason as a guide and follow range order policy       0912 AL-Given     1116-D/C'd            traMADol (Ultram) tab 50 mg  Dose: 50 mg  Freq: Every 6 hours PRN Route: OR  PRN Reason: moderate pain  Start: 07/12/24 1117   Admin Instructions:   Max dose 400 mg/day       1723 AL-Given      1816 RF-Given      1016 RF-Given      0558 MV-Given            Vitals (last day)       Date/Time Temp Pulse Resp BP SpO2 Weight O2 Device O2 Flow Rate (L/min) [facility]    07/15/24 1402 -- 71 --  -- -- -- -- -- NW    07/15/24 1302 -- 72 -- -- 96 % -- -- -- NW    07/15/24 1139 97.5 °F (36.4 °C) 76 18 156/90 97 % -- -- -- EC    07/15/24 0950 -- 76 -- -- -- -- -- -- EC    07/15/24 0754 98 °F (36.7 °C) 63 16 143/71 97 % -- None (Room air) -- EC    07/15/24 0554 98.1 °F (36.7 °C) 64 16 136/55 97 % -- None (Room air) -- MV    07/15/24 0100 97.7 °F (36.5 °C) 65 18 117/51 96 % -- None (Room air) -- GB    07/14/24 2115 97.6 °F (36.4 °C) 72 18 121/59 95 % -- None (Room air) -- GB    07/14/24 1720 97.5 °F (36.4 °C) 62 15 146/65 99 % -- None (Room air) -- DV    07/14/24 1600 98.9 °F (37.2 °C) 71 19 155/74 97 % -- None (Room air) -- RF    07/14/24 1200 99 °F (37.2 °C) 74 20 163/82 98 % -- None (Room air) -- RF    07/14/24 0800 100.3 °F (37.9 °C) 69 21 120/73 95 % -- None (Room air) -- RF    07/14/24 0400 97.6 °F (36.4 °C) 63 21 131/64 97 % -- None (Room air) -- AS    07/14/24 0034 98.6 °F (37 °C) 66 23 -- 95 % -- -- -- AS    07/14/24 0000 -- 67 22 155/61 96 % -- None (Room air) -- AS          CIWA Scores (since admission)       None

## 2024-07-15 NOTE — PROGRESS NOTES
[facility]   part of [facility]     Hospitalist Progress Note     Bibi Diaz Patient Status:  Inpatient    1945 MRN [id number]   Location [facility] 3NW-A Attending Dr. Pardo   Hosp Day # 5 PCP Rick Shannon DO     Chief Complaint: abd pain     Subjective:  Patient reports feeling anxious about learning how to change ostomy.  Reports no pain/nausea and diet advanced to soft today.   Had her birthday yesterday.     Objective:    Review of Systems:  comprehensive review of systems was completed; pertinent positive and negatives stated in subjective.    Vital signs:  Temp:  [97.5 °F (36.4 °C)-98.9 °F (37.2 °C)] 97.5 °F (36.4 °C)  Pulse:  [62-76] 76  Resp:  [15-19] 18  BP: (117-156)/(51-90) 156/90  SpO2:  [95 %-99 %] 97 %    Physical Exam:    General: No acute distress 79 year old female alert and oriented  Respiratory: No wheezes, no rhonchi on RA  Cardiovascular: S1, S2, regular rate and rhythm  Abdomen: Soft, Nondistended +ostomy +drain  Neuro: No new focal deficits.   Extremities: No edema      Diagnostic Data:    Labs:  Recent Labs   Lab 24  0702 24  1314 24  0329 24  1205 07/15/24  0519   WBC 8.2 8.1 6.8 7.6 7.9   HGB 8.6* 9.7* 8.0* 9.2* 9.2*   MCV 87.7 93.8 84.3 84.7 84.1   .0 350.0 386.0 363.0 410.0       Recent Labs   Lab 24  0702 24  0329 24  1205 07/15/24  0519   GLU 99 82  --  94   BUN 7* 6*  --  6*   CREATSERUM 0.60 0.60  --  0.78   CA 8.9 8.1*  --  8.9   ALB 1.9* 2.0*  --  2.1*    141  --  141   K 3.1* 3.2*  3.2* 4.3 4.1    110  --  112   CO2 25.0 25.0  --  22.0   ALKPHO 52* 58  --  62   AST 16 16  --  15   ALT 39 37  --  37   BILT 0.3 0.2  --  0.2   TP 5.4* 5.2*  --  5.7*       Estimated Creatinine Clearance: 37 mL/min (based on SCr of 0.78 mg/dL).    No results for input(s): \"TROP\", \"TROPHS\", \"CK\" in the last 168 hours.    No results for input(s): \"PTP\", \"INR\" in the last 168 hours.       [facility]  Encounter on 07/10/24   1. Blood Culture     Status: None (Preliminary result)    Collection Time: 07/10/24  2:38 PM    Specimen: Blood,peripheral   Result Value Ref Range    Blood Culture Result No Growth 4 Days N/A       Imaging: Reviewed in Epic.    Medications:    multivitamin with minerals  1 tablet Oral Daily    enoxaparin  30 mg Subcutaneous Daily    acetaminophen  1,000 mg Oral Q8H KYLE    famotidine  20 mg Oral Daily    Or    famotidine  20 mg Intravenous Daily    losartan  50 mg Oral Daily    clonazePAM  0.5 mg Oral BID    fluticasone propionate  2 spray Each Nare Daily    mirtazapine  30 mg Oral Nightly    fluticasone furoate-vilanterol  1 puff Inhalation Daily    piperacillin-tazobactam  3.375 g Intravenous Q8H KYLE       Assessment & Plan:      #Abdominal pain d/t diverticular abscess improved  #Leukocytosis, resolved  - surgery noted 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  Ostomy education  - tolerating soft diet     #Moderate malnutrition, dietitian following/supplements  #Delirium multifactorial, resolved   #Constipation, improved  #Chronic Anemia, stable  #Hyponatremia, resolved  #COPD without exacerbation - cont BD  #Essential hypertension-Losartan  #Anxiety, Depression - remeron, klonopin  #Deconditioning, PT recs HHPT - SW following    Case d/w pt, RN.  Appreciate consultants.    MARY Greer  12:44 PM     Addendum:    Agree with above note except as documented below.      Gen: NAD  CVS: s1s2  Resp: CTA  Abd: soft    #diverticular abscess  - abx ongoing   - pain control  - ostomy care    # abd pain due to above  # delirium, resolved   # hypokalemia , repalced  # anemia, monitor   # moderate malnutrition, dietician following       Pt seen and examined independently. Chart reviewed. Labs and imaging over the last 24 hours have been  personally reviewed.  I personally made/approved 100% of the management plan for this patient and take full responsibility for the patient management.   Note has been reviewed by me and modified as needed.  Exam and Impression/ Recs as noted above.  D/w staff.    Geena Pardo MD            Supplementary Documentation:     Quality:  DVT Mechanical Prophylaxis:   SCDs, Early ambuation  DVT Pharmacologic Prophylaxis   Medication    enoxaparin (Lovenox) 30 MG/0.3ML SUBQ injection 30 mg                Code Status: DNAR/Selective Treatment  Flood: External urinary catheter in place    The 21st Century Cures Act makes medical notes like these available to patients in the interest of transparency. Please be advised this is a medical document. Medical documents are intended to carry relevant information, facts as evident, and the clinical opinion of the practitioner. The medical note is intended as peer to peer communication and may appear blunt or direct. It is written in medical language and may contain abbreviations or verbiage that are unfamiliar.

## 2024-07-16 VITALS
TEMPERATURE: 97 F | SYSTOLIC BLOOD PRESSURE: 139 MMHG | WEIGHT: 89.75 LBS | OXYGEN SATURATION: 99 % | HEIGHT: 60 IN | DIASTOLIC BLOOD PRESSURE: 69 MMHG | BODY MASS INDEX: 17.62 KG/M2 | HEART RATE: 77 BPM | RESPIRATION RATE: 18 BRPM

## 2024-07-16 LAB
ANION GAP SERPL CALC-SCNC: 5 MMOL/L (ref 0–18)
BUN BLD-MCNC: 7 MG/DL (ref 9–23)
CALCIUM BLD-MCNC: 9.1 MG/DL (ref 8.5–10.1)
CHLORIDE SERPL-SCNC: 110 MMOL/L (ref 98–112)
CO2 SERPL-SCNC: 24 MMOL/L (ref 21–32)
CREAT BLD-MCNC: 0.68 MG/DL
EGFRCR SERPLBLD CKD-EPI 2021: 88 ML/MIN/1.73M2 (ref 60–?)
GLUCOSE BLD-MCNC: 100 MG/DL (ref 70–99)
OSMOLALITY SERPL CALC.SUM OF ELEC: 286 MOSM/KG (ref 275–295)
POTASSIUM SERPL-SCNC: 3.7 MMOL/L (ref 3.5–5.1)
SODIUM SERPL-SCNC: 139 MMOL/L (ref 136–145)

## 2024-07-16 PROCEDURE — 99239 HOSP IP/OBS DSCHRG MGMT >30: CPT | Performed by: INTERNAL MEDICINE

## 2024-07-16 RX ORDER — POTASSIUM CHLORIDE 20 MEQ/1
40 TABLET, EXTENDED RELEASE ORAL ONCE
Status: COMPLETED | OUTPATIENT
Start: 2024-07-16 | End: 2024-07-16

## 2024-07-16 RX ORDER — SIMETHICONE 80 MG
80 TABLET,CHEWABLE ORAL 4 TIMES DAILY PRN
Status: DISCONTINUED | OUTPATIENT
Start: 2024-07-16 | End: 2024-07-16

## 2024-07-16 RX ORDER — CEFADROXIL 500 MG/1
500 CAPSULE ORAL 2 TIMES DAILY
Qty: 20 CAPSULE | Refills: 0 | Status: SHIPPED | OUTPATIENT
Start: 2024-07-16 | End: 2024-07-26

## 2024-07-16 RX ORDER — TRAMADOL HYDROCHLORIDE 50 MG/1
50 TABLET ORAL EVERY 8 HOURS PRN
Qty: 20 TABLET | Refills: 0 | Status: SHIPPED | OUTPATIENT
Start: 2024-07-16

## 2024-07-16 RX ORDER — METRONIDAZOLE 500 MG/1
500 TABLET ORAL 2 TIMES DAILY
Qty: 20 TABLET | Refills: 0 | Status: SHIPPED | OUTPATIENT
Start: 2024-07-16 | End: 2024-07-26

## 2024-07-16 NOTE — PROGRESS NOTES
NURSING DISCHARGE NOTE    Discharged Rehab facility via Wheelchair.  Accompanied by Support staff  Belongings Taken by patient/family.      Pt left via wheelchair with medicar.  Patient's VSS, tolerating diet, voiding adequately, pain controlled, tolerating ambulating, Discharge education provided, reviewed medications and follow up appointments. All questions answered and concerns addressed, patient verbalized understanding. Patient discharged in stable condition.

## 2024-07-16 NOTE — CONGREGATE LIVING REVIEW
[facility] Living Authorization    The [facility] Review Committee has reviewed this case and the patient IS APPROVED for discharge to a facility for Short Term Skilled once the following procedure is followed:     - The physician discharge instructions (contained within the KARIN note for SNF) must inlcude the below appropriate and approved COVID instructions to the facility    For questions regarding CLRC approval process, please contact the CM assigned to the case.  For questions regarding RN discharge workflow, please contact the unit Clinical Leader.

## 2024-07-16 NOTE — PROGRESS NOTES
Alert & oriented x4. VSS on room air. Purewick in place. Tolerating diet. Ambulates with standby assist and walker. Left colostomy in place with good output. Right VALERI draining serosanguineous fluid. Midline with staples MICKY and c/d/I. Denies nausea/chest pain/SOB. Pain controlled. Patient updated on plan of care. Questions and concerns addressed. Safety precautions in place. Frequent rounds performed.      1535: Attempted to call [facility] to give report, no answer, message left with callback number.  1610: Attempted to call [facility] again, staff asked for callback number to give receiving nurse for report. Still no callback at [phone]

## 2024-07-16 NOTE — DISCHARGE INSTRUCTIONS
Dietitian recs: add Archer chocolate daily and Ensure Plus HP chocolate daily   Continue soft low fiber diet  Continue ostomy education  Tramadol prn pain  Continue PT/OT  Follow up with surgery and primary care physician    Current ostomy supplies: Jess ileostomy/colostomy pouch #1930     Surgery recs:  *Keep incision site clean and dry at all times .Monitor for signs of infection like redness,swelling,drainage ,foul odor or temperature of >100.5 F and notify M.D> as needed  *No lifting > 10 lbs  *May shower  *Walk as frequently as tolerated in moderation  *Empty and record VALERI drain output 3x a day and as needed and bring log on your follow up with your surgeon in 2 weeks.Please read handouts provided  *Colostomy care as instructed.Please read handouts provided  *FOR ANY QUESTIONS OR CONCERNS ,PLEASE CALL M.D.*

## 2024-07-16 NOTE — PROGRESS NOTES
[facility]   part of [facility]     Hospitalist Progress Note     Bibi Diaz Patient Status:  Inpatient    1945 MRN [id number]   Location [facility] 3NW-A Attending Dr. Pardo   Hosp Day # 6 PCP Rick Shannon DO     Chief Complaint: abd pain     Subjective:  Patient reports she is going to rehab after dc and says she plans to learn about her ostomy.  Feeling less anxious today.  Denies pain and reports good appetite.     Objective:    Review of Systems:  comprehensive review of systems was completed; pertinent positive and negatives stated in subjective.    Vital signs:  Temp:  [97.3 °F (36.3 °C)-98.2 °F (36.8 °C)] 97.3 °F (36.3 °C)  Pulse:  [66-81] 77  Resp:  [16-22] 18  BP: (111-157)/(56-77) 139/69  SpO2:  [95 %-99 %] 99 %    Physical Exam:    General: No acute distress 79 year old female alert and oriented  Respiratory: No wheezes, no rhonchi on RA  Cardiovascular: S1, S2, regular rate and rhythm  Abdomen: Soft, Nondistended +ostomy +drain  Neuro: No new focal deficits.   Extremities: No edema      Diagnostic Data:    Labs:  Recent Labs   Lab 24  0702 24  1314 24  0329 24  1205 07/15/24  0519   WBC 8.2 8.1 6.8 7.6 7.9   HGB 8.6* 9.7* 8.0* 9.2* 9.2*   MCV 87.7 93.8 84.3 84.7 84.1   .0 350.0 386.0 363.0 410.0       Recent Labs   Lab 24  0702 24  0329 24  1205 07/15/24  0519 24  0541   GLU 99 82  --  94 100*   BUN 7* 6*  --  6* 7*   CREATSERUM 0.60 0.60  --  0.78 0.68   CA 8.9 8.1*  --  8.9 9.1   ALB 1.9* 2.0*  --  2.1*  --     141  --  141 139   K 3.1* 3.2*  3.2* 4.3 4.1 3.7    110  --  112 110   CO2 25.0 25.0  --  22.0 24.0   ALKPHO 52* 58  --  62  --    AST 16 16  --  15  --    ALT 39 37  --  37  --    BILT 0.3 0.2  --  0.2  --    TP 5.4* 5.2*  --  5.7*  --        Estimated Creatinine Clearance: 42.4 mL/min (based on SCr of 0.68 mg/dL).    No results for input(s): \"TROP\", \"TROPHS\", \"CK\" in the last 168 hours.    No  results for input(s): \"PTP\", \"INR\" in the last 168 hours.       Microbiology    Hospital Encounter on 07/10/24   1. Blood Culture     Status: None    Collection Time: 07/10/24  2:38 PM    Specimen: Blood,peripheral   Result Value Ref Range    Blood Culture Result No Growth 5 Days N/A       Imaging: Reviewed in Epic.    Medications:    multivitamin with minerals  1 tablet Oral Daily    enoxaparin  30 mg Subcutaneous Daily    acetaminophen  1,000 mg Oral Q8H KYLE    famotidine  20 mg Oral Daily    Or    famotidine  20 mg Intravenous Daily    losartan  50 mg Oral Daily    clonazePAM  0.5 mg Oral BID    fluticasone propionate  2 spray Each Nare Daily    mirtazapine  30 mg Oral Nightly    fluticasone furoate-vilanterol  1 puff Inhalation Daily    piperacillin-tazobactam  3.375 g Intravenous Q8H KYLE       Assessment & Plan:      #Abdominal pain d/t diverticular abscess improved  #Leukocytosis, resolved  - surgery noted 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  Ostomy education  - tolerating soft diet     #Moderate malnutrition, dietitian following/supplements  #Delirium multifactorial, resolved   #Constipation, improved  #Chronic Anemia, stable  #Hyponatremia, resolved  #COPD without exacerbation - cont BD  #Essential hypertension-Losartan  #Anxiety, Depression - remeron, klonopin  #Deconditioning, PT recs HHPT - SW following    Case d/w pt, RN, MD, ID PA.  Dc planning for [facility]. Tramadol script.  Abx per ID.  F/u general surgery in 2 weeks OP.  Continue ostomy education at rehab.     MARY Greer  1:03 PM           Supplementary Documentation:     Quality:  DVT Mechanical Prophylaxis:   SCDs, Early ambuation  DVT Pharmacologic Prophylaxis   Medication    enoxaparin (Lovenox) 30 MG/0.3ML SUBQ injection 30 mg                Code Status:  DNAR/Selective Treatment  Flood: External urinary catheter in place    The 21st Century Cures Act makes medical notes like these available to patients in the interest of transparency. Please be advised this is a medical document. Medical documents are intended to carry relevant information, facts as evident, and the clinical opinion of the practitioner. The medical note is intended as peer to peer communication and may appear blunt or direct. It is written in medical language and may contain abbreviations or verbiage that are unfamiliar.

## 2024-07-16 NOTE — PROGRESS NOTES
Patient is alert and oriented x4. Sleepy but easily aroused. Vital signs stable. On room air. Tele- NSR in 70's. Abdomen soft, flat, non-distended. Midline with staples open to air. Colostomy putting out stool and gas. IR drain to RLQ draining serosanguinous fluid. Penrose drain to LLQ covered with gauze and clean dry and intact. IV fluids infusing per order. IV antibiotics infusing per order. All questions and concerns addressed. All appropriate safety measures in place.

## 2024-07-16 NOTE — CONSULTS
[facility]  Report of Inpatient Ostomy Consultation     Bibi Diaz Patient Status:  Inpatient    1945 MRN [id number]   Location [facility] 3NW-A 327/327-A Attending Geena Pardo MD   Hosp Day # 6 PCP Rick Shannon DO       REASON FOR CONSULT:  New ostomy      History of Present Illness:   Bibi Diaz is a a(n) 79 year old female.Patient seen at bedside for ostomy education and pouch change.Patient underwent surgery for the creation of colostomy on 24.      History:  Past Medical History:    Abdominal distention    Abdominal pain    Acquired hypothyroidism    Anxiety    Asthma (HCC)    Belching    Bloating    Chronic rhinitis    CKD (chronic kidney disease) stage 3, GFR 30-59 ml/min (HCC)    Constipation    COPD (chronic obstructive pulmonary disease) ([facility])    NO OXYGEN     Depression    Diarrhea, unspecified    Diverticulosis of large intestine    Emphysema    Esophageal reflux    Fatigue    Feeling lonely    Flatulence/gas pain/belching    Food intolerance    H/O bronchitis    Hay fever    Hemorrhoids    High blood pressure    History of depression    Hypercholesterolemia    Hypertension    Itch of skin    Migraines    Mild intermittent asthma without complication ([facility])    Nausea    Night sweats    Pneumonia    PONV (postoperative nausea and vomiting)    Pure hypercholesterolemia    Rash    Reflux    Sleep disturbance    Stress    Uncomfortable fullness after meals    Visual impairment    glasses    Vomiting    Wears glasses     Past Surgical History:   Procedure Laterality Date    Cataract      Colonoscopy      Colonoscopy N/A 3/21/2024    Procedure: COLONOSCOPY;  Surgeon: Delon Ashford DO;  Location:  ENDOSCOPY    Colonoscopy & polypectomy  2014    multiple polyps; tics; repeat 3 yrs    Colonoscopy,remv lesn,snare N/A 2014    Procedure: ESOPHAGOGASTRODUODENOSCOPY, COLONOSCOPY, POSSIBLE BIOPSY, POSSIBLE POLYPECTOMY 52952,15366;  Surgeon: Slade Ivan  MD;  Location: [facility]    Correct bunion,simple Bilateral     Cystotomy,excis vesical neck Left     Egd      Gastro - dmg  2014    stricture; HH    Oophorectomy Bilateral 2017    Other surgical history  2023    Robotic repair of hiatal hernia and Nissen fundoplication    Patient documented not to have experienced any of the following events N/A 2014    Procedure: ESOPHAGOGASTRODUODENOSCOPY, COLONOSCOPY, POSSIBLE BIOPSY, POSSIBLE POLYPECTOMY 75136,41575;  Surgeon: Slade Ivan MD;  Location: [facility]    Patient withough preoperative order for iv antibiotic surgical site infection prophylaxis. N/A 2014    Procedure: ESOPHAGOGASTRODUODENOSCOPY, COLONOSCOPY, POSSIBLE BIOPSY, POSSIBLE POLYPECTOMY 18018,26782;  Surgeon: Slade Ivan MD;  Location: [facility]    Repair ing hernia,5+y/o,reducibl      Upper gi endoscopy,diagnosis N/A 2014    Procedure: ESOPHAGOGASTRODUODENOSCOPY, COLONOSCOPY, POSSIBLE BIOPSY, POSSIBLE POLYPECTOMY 53192,95412;  Surgeon: Slade Ivan MD;  Location: [facility]      Social History     Socioeconomic History    Marital status:    Tobacco Use    Smoking status: Former     Current packs/day: 0.00     Types: Cigarettes     Start date: 1970     Quit date: 1980     Years since quittin.8    Smokeless tobacco: Never    Tobacco comments:      1/2 pack per day. Social history shows \"Tobacco Use: Active\" and \"Never Smoker: Active\"   Vaping Use    Vaping status: Never Used   Substance and Sexual Activity    Alcohol use: No    Drug use: No   Other Topics Concern    Caffeine Concern Yes     Comment: \"1 cup of coffee and tea, and can of pop daily\"    Exercise Yes     Comment: walking   Social History Narrative    ** Merged History Encounter **          Social Determinants of Health     Financial Resource Strain: Low Risk  (2023)    Financial Resource Strain     Difficulty of Paying Living Expenses: Not hard at all      Med Affordability: No   Food Insecurity: No Food Insecurity (7/10/2024)    Food Insecurity     Food Insecurity: Never true   Transportation Needs: No Transportation Needs (7/10/2024)    Transportation Needs     Lack of Transportation: No   Physical Activity: Inactive (4/21/2022)    Exercise Vital Sign     Days of Exercise per Week: 0 days     Minutes of Exercise per Session: 0 min   Stress: Stress Concern Present (2/13/2024)    Stress     Feeling of Stress : Yes    Social Connections   Housing Stability: Low Risk  (7/10/2024)    Housing Stability     Housing Instability: No       ASSESSMENT:    Stoma location: [facility]  Stoma type: colostomy   Stoma color: pink  Stoma condition: edematous  Stoma diameter:  1/38\"  Ostomy output: liquid stool  Stoma height: adequate  Peristomal skin: noted incision site with 4 staples and a drain inferior to the stoma  Pouching system:one piece  Able to care for stoma: No      Assessment of Learning Readiness:  Barriers/Limitations:  Cognitive limitations and Emotional factors.Patient verbalized that  she \" will not remember everything \"      Type of ostomy:  colostomy    Post-Operative Teaching:  DEMO RETURN DEMONSTRATION     Remove and re-apply clamp  yes no   Empty/clean pouch  yes no   Measure/cut stomal opening  yes no   Stoma paste or barrier ring  yes no   Remove wafer or pouch   yes no       Other topics Discussed?   Fecal odor/gas control  yes    Overnight drainage  yes    Diet  yes    Emergency supplies  yes    [employer] referral/Secure start program registration yes    Purchasing supplies  yes        Teaching tools used    Video yes    Learning packet   yes    Demonstration  yes    Return Demonstration  no            Outcome of Education:  Needs reinforcement and Shows understanding      IMPRESSION/PLAN:  Completed general education and pouch change with the patient.Discussed [place] Secure start program and patient agreed to sign up.Encouraged to read through the provided written  materials and watch the educational videos.All questions were answered at this time.Plan of care discussed with the nurse and the patient's grand daughter over the phone.    Products used: [place] ileostomy/colostomy pouch #8931   Would benefit from Home Health: yes,if discharged to home.Spoke with the  possible discharge to [facility].    Ostomy Care Recommendations:   Pouch/Appliance change with a frequency of 3- 7 days  using products: Jess ileostomy/colostomy pouch #8931      Thank you for allowing me to participate in the care of your patient.  Time Spent: 1 Hour.    Thank you.    Lizzy Washington RN  Wound/Ostomy care pager [phone]  [facility] [phone]  7/16/2024  3:00 PM

## 2024-07-16 NOTE — PROGRESS NOTES
[facility]   part of [facility] ID PROGRESS NOTE    Bibi Diaz Patient Status:  Inpatient    1945 MRN [id number]   Location [facility] 4SW-A Attending Geena Pardo MD   Hosp Day # 6 PCP Rick Shannon DO     Abx: IV Zosyn D#5    Subjective: Patient seen and examined today. Abdominal pain improved. Eating without difficulty. Afebrile. On room air.     Allergies:  Allergies   Allergen Reactions    Avelox [Moxifloxacin Hcl In Nacl] SWELLING    Avelox [Moxifloxacin Hydrochloride] TONGUE SWELLING     \"TABS\"    Amoxicillin NAUSEA AND VOMITING     Vomiting.    Statins Myopathy    Sulfa Antibiotics RASH and ITCHING    Dander OTHER (SEE COMMENTS)     \"Animals\": runny nose, watery eyes, itching    Morphine OTHER (SEE COMMENTS)     Word finding difficulties    Diphenhydramine Runny nose     \"Animals\": runny nose, watery eyes, itching    Dust Runny nose    Latex RASH    Sulfa Drugs Cross Reactors RASH and ITCHING       Medications:    Current Facility-Administered Medications:     simethicone (Mylicon) chewable tab 80 mg, 80 mg, Oral, QID PRN    ibuprofen (Motrin) tab 400 mg, 400 mg, Oral, Q6H PRN **OR** ibuprofen (Motrin) tab 600 mg, 600 mg, Oral, Q6H PRN    traMADol (Ultram) tab 50 mg, 50 mg, Oral, Q6H PRN    multivitamin with minerals (Thera M Plus) tab 1 tablet, 1 tablet, Oral, Daily    HYDROmorphone (Dilaudid) 1 MG/ML injection 0.2 mg, 0.2 mg, Intravenous, Q3H PRN **OR** HYDROmorphone (Dilaudid) 1 MG/ML injection 0.4 mg, 0.4 mg, Intravenous, Q3H PRN    enoxaparin (Lovenox) 30 MG/0.3ML SUBQ injection 30 mg, 30 mg, Subcutaneous, Daily    phenol (Chloraseptic) 1.4 % oral liquid spray, , Mouth/Throat, PRN    acetaminophen (Tylenol Extra Strength) tab 1,000 mg, 1,000 mg, Oral, Q8H KYLE    famotidine (Pepcid) tab 20 mg, 20 mg, Oral, Daily **OR** famotidine (Pepcid) 20 mg/2mL injection 20 mg, 20 mg, Intravenous, Daily    melatonin tab 3 mg, 3 mg, Oral, Nightly PRN    ondansetron (Zofran) 4  MG/2ML injection 4 mg, 4 mg, Intravenous, Q6H PRN    metoclopramide (Reglan) 5 mg/mL injection 5 mg, 5 mg, Intravenous, Q8H PRN    losartan (Cozaar) tab 50 mg, 50 mg, Oral, Daily    hydrALAzine (Apresoline) 20 mg/mL injection 10 mg, 10 mg, Intravenous, Q4H PRN    labetalol (Trandate) 5 mg/mL injection 10 mg, 10 mg, Intravenous, Q4H PRN    albuterol (Ventolin HFA) 108 (90 Base) MCG/ACT inhaler 2 puff, 2 puff, Inhalation, Q6H PRN    clonazePAM (KlonoPIN) tab 0.5 mg, 0.5 mg, Oral, BID    fluticasone propionate (Flonase) 50 MCG/ACT nasal suspension 2 spray, 2 spray, Each Nare, Daily    mirtazapine (Remeron) tab 30 mg, 30 mg, Oral, Nightly    fluticasone furoate-vilanterol (Breo Ellipta) 200-25 MCG/ACT inhaler 1 puff, 1 puff, Inhalation, Daily    piperacillin-tazobactam (Zosyn) 3.375 g in dextrose 5% 100 mL IVPB-ADDV, 3.375 g, Intravenous, Q8H KYLE    Review of Systems:  Completed. See pertinent positives and negatives above.     Physical Exam:  Vital signs: Blood pressure 152/77, pulse 73, temperature 97.8 °F (36.6 °C), temperature source Oral, resp. rate 22, height 152.4 cm (5'), weight 89 lb 11.6 oz (40.7 kg), SpO2 98%, not currently breastfeeding.    General: Alert, answering questions appropriately, NAD, on room air. Sitting up in a chair.   HEENT: Moist mucous membranes.   Neck: No lymphadenopathy.  Supple.  Cardiovascular: RRR  Respiratory: Clear to auscultation bilaterally.  No wheezes. No rhonchi.  Abdomen: Soft, midline incision with staples in place- clean/dry/intact, some distension. + drain with serosang appearing output. + ostomy in place.  Musculoskeletal: No edema noted  Integument: No lesions. No erythema.    Laboratory Data:  Recent Labs   Lab 07/15/24  0519   RBC 3.20*   HGB 9.2*   HCT 26.9*   MCV 84.1   MCH 28.8   MCHC 34.2   RDW 17.9   NEPRELIM 5.20   WBC 7.9   .0     Recent Labs   Lab 07/13/24  0702 07/14/24  0329 07/14/24  1205 07/15/24  0519 07/16/24  0541   GLU 99 82  --  94 100*   BUN 7*  6*  --  6* 7*   CREATSERUM 0.60 0.60  --  0.78 0.68   CA 8.9 8.1*  --  8.9 9.1   ALB 1.9* 2.0*  --  2.1*  --     141  --  141 139   K 3.1* 3.2*  3.2* 4.3 4.1 3.7    110  --  112 110   CO2 25.0 25.0  --  22.0 24.0   ALKPHO 52* 58  --  62  --    AST 16 16  --  15  --    ALT 39 37  --  37  --    BILT 0.3 0.2  --  0.2  --    TP 5.4* 5.2*  --  5.7*  --        Microbiology: Reviewed in EMR    Radiology: Reviewed.  PROCEDURE:  CT ABDOMEN+PELVIS (CONTRAST ONLY) (CPT=74177)     COMPARISON:  [facility] , CT, CT ABDOMEN+PELVIS(CONTRAST ONLY)(CPT=74177), 6/17/2024, 10:08 PM.  [facility] , CT, CT ABDOMEN+PELVIS(CONTRAST ONLY)(CPT=74177), 6/21/2024, 6:19 PM.     INDICATIONS:  abd pain starting today, surgery with drain x2 weeks ago     TECHNIQUE:  CT scanning was performed from the dome of the diaphragm to the pubic symphysis with non-ionic intravenous contrast material. Post contrast coronal MPR imaging was performed.  Dose reduction techniques were used. Dose information is  transmitted to the ACR (American College of Radiology) NRDR (National Radiology Data Registry) which includes the Dose Index Registry.     PATIENT STATED HISTORY:(As transcribed by Technologist)  Abdominal pain      CONTRAST USED:  80cc of Isovue 370     FINDINGS:          LIVER:  Unremarkable.     BILIARY:  Unremarkable.     PANCREAS:  Unremarkable.     SPLEEN:  Unremarkable.     KIDNEYS:  No acute abnormality.     ADRENALS:  Unremarkable.     AORTA/VASCULAR:  No aortic aneurysm. There are atherosclerotic changes including calcification involving the aorta, but no evidence for aneurysm involving the aorta.     RETROPERITONEUM:  Unremarkable.     BOWEL/MESENTERY:  Surgical drain, percutaneous inserted anterior abdominal wall on the left, pigtail left lower quadrant stable position of the pigtail.  Suspect small residual abscess image 75 measuring 1.5 cm to the left of the continued-inflamed  sigmoid colon.  In this region is relatively extensive  pericolonic stranding, the stranding appears worse than on the most recent CT scan from 6/21/2024.  Another small suspected abscess image 72 measuring about 1.7 cm adjacent to the pigtail catheter  and sigmoid colon.  However each of these could reflect inflamed, prominent fluid and air-filled diverticula as they both appear immediately adjacent to the sigmoid colon.  There therefore indeterminate between small abscess versus fluid-filled  diverticula.  Continued thickening of the sigmoid colon and moderate amount of stool in the colon and large amount of stool in the rectum.  The stranding appears greater on the sigmoid colon and extends along the abdominal wall tract for the drain, for  example there is now tissue thickening measuring up to 2.7 cm image 75 at the anterior abdominal wall where the drain enters the peritoneal cavity.  The abdominal wall fat shows stranding in this region additionally.  No measurable abscess in this  region.  More proximally the stool in the colon is moderate, there is no sign of colonic obstruction.  Moderate fluid and air in the small bowel without small bowel obstruction pattern.  No generalized free air.  Trace free fluid pelvis but no large or  drainable ascites.     ABDOMINAL WALL:  See above regarding the insertion site of the surgical drain on the left.     URINARY BLADDER:  Thickened appearance of the left side of the urinary bladder as it abuts the above described sigmoid colon and pericolonic inflammatory process.  No gas bubbles in the bladder.  This could be secondarily inflamed/infected..     LYMPH NODES PELVIS:  Unremarkable.     PELVIC ORGANS:  No acute process.     LUNG BASES:  No acute process.     BONES:  No acute abnormality. Note is made of degenerative changes present in the spine.        Impression:     CONCLUSION:       1. There has been increase in the pericolonic inflammatory stranding and phlegmonous tissue thickening around the sigmoid colon when compared  to the prior CT from 6/21/2024.  Consistent with continued/worsening colitis and/or diverticulitis.  Two focal  measurable structures are indeterminate between small abscess cavities or prominent inflamed diverticula.  Nothing is conclusive for definitive abscess.  Small free fluid pelvis without large or drainable ascites.  No sign of bowel obstruction or free  air.     2. In addition to the intraperitoneal inflammatory process, there is now thickening of the soft tissue around the tract for the abdominal wall drain on the left along with adjacent fatty stranding of the abdominal wall, may reflect extension of  inflammation/infection in this region without measurable abscess.  Advise correlation for any potential abnormal drainage from this area.     3. Thickened left-sided urinary bladder where it abuts the sigmoid and pericolonic inflammatory phlegmonous process, probably secondarily inflamed/infected urinary bladder.  Advise correlation with symptoms and urinalysis.        LOCATION:  [facility]        Dictated by (CST): Colten Deshpande MD on 7/10/2024 at 4:50 PM      Finalized by (CST): Colten Deshpande MD on 7/10/2024 at 5:00 PM        ASSESSMENT:  Diverticulitis with abscess  S/p IR drainage 6/18, cxs polymicrobial (E. Coli, strep anginosus, E. Coli, strep constellatus, pseudomonas). Recently completed course of IV Zosyn  Now presents with worsening abdominal pain. S/p LAR of rectosigmoid colon with Elizabeth's pouch and end colostomy and drainage of abdominal abscess with placement of ailyn drain 7/11. No OR cxs noted.  Leukocytosis, d/t above, resolved. Bcxs negative.   HTN  COPD  Anemia    PLAN:  - continue IV Zosyn   - follow temps and cbc  - follow abdominal exam  - will plan to dc with po cefadroxil and flagyl x 10 days on dc.     Discussed with RN, Dr. Patterson, hospitalist PA and patient.     MARY Beck   [facility]  [phone]

## 2024-07-16 NOTE — PROGRESS NOTES
[facility]  Progress Note    Bibi Diaz Patient Status:  Inpatient    1945 MRN [id number]   Location [facility] 3NW-A Attending Geena Pardo MD   Hosp Day # 6 PCP Rick Shannon DO     Subjective:  The patient is resting in bed.  She is unsure how she is feeling this morning since she just woke up.  She denies abdominal pain, nausea, or vomiting.  She continues to have bowel function.  She is tolerating a soft diet without issues.    She states she does not feel ready to discharge home and said this is happening quicker than she thought. She states she doesn't feel comfortable with her ostomy and doesn't know how to care for it. She was seen by wound care yesterday, but declined ostomy education. She was also seen by social work yesterday to discuss discharge planning, but didn't want to discuss it.    Objective/Physical Exam:  General: Alert, orientated x3.  Cooperative.  No apparent distress.  Vital Signs:  Blood pressure 152/77, pulse 73, temperature 97.8 °F (36.6 °C), temperature source Oral, resp. rate 22, height 60\", weight 89 lb 11.6 oz (40.7 kg), SpO2 98%, not currently breastfeeding.  HEENT: Normocephalic, atraumatic. No scleral icterus.  Abdomen:  Soft, non-distended, non-tender, with no rebound or guarding.  No peritoneal signs. Ostomy in left abdomen is pink and patent with stool and flatus in appliance.  Incision: Midline incision is dry, clean, and intact with staples in place. No surrounding erythema or drainage. No signs of infection.  Drain: in place with 45 ml os serosanguineous output over the past 24 hours.    Labs:  CBC:    Lab Results   Component Value Date    WBC 7.9 07/15/2024    WBC 7.6 2024    WBC 6.8 2024     Lab Results   Component Value Date    HEMOGLOBIN 11.8 (L) 2024    HEMOGLOBIN 12.7 2021    HEMOGLOBIN 13.4 2018    HGB 9.2 (L) 07/15/2024    HGB 9.2 (L) 2024    HGB 8.0 (L) 2024      Lab Results   Component Value Date     .0 07/15/2024    .0 07/14/2024    .0 07/14/2024     Lab Results   Component Value Date    CREATSERUM 0.68 07/16/2024    BUN 7 07/16/2024     07/16/2024    K 3.7 07/16/2024     07/16/2024    CO2 24.0 07/16/2024     07/16/2024    CA 9.1 07/16/2024         Assessment/Plan:  Patient Active Problem List   Diagnosis    Major depressive disorder, recurrent episode, moderate (HCC)    Esophageal reflux    Chronic obstructive pulmonary disease with acute lower respiratory infection (HCC)    Acquired hypothyroidism    Memory deficit    Aortic atherosclerosis (HCC)    Diverticulosis of colon    Dizziness and giddiness    Dysthymic disorder    Lichen sclerosus    Pure hypercholesterolemia    Vitamin D deficiency    Vitiligo    Severe episode of recurrent major depressive disorder, without psychotic features (HCC)    Paraesophageal hernia    Subclinical hypothyroidism    Hypertension    Protein-calorie malnutrition, unspecified severity (HCC)    Gastrointestinal hemorrhage, unspecified gastrointestinal hemorrhage type    Thrombocytopenia (HCC)    Acute diverticulitis    Hyponatremia    ACP (advance care planning)    Weakness generalized    Colonic diverticular abscess    Sigmoid diverticulitis    History of repair of hiatal hernia    Diverticulitis of large intestine with abscess without bleeding    Malnutrition (HCC)    Diverticulitis    Pelvic abscess in female    Leukocytosis    Diverticulitis of colon     POD 5 exploratory laparotomy, Ramírez's procedure with end colostomy, drainage of abdominal abscess    Continue diet as tolerated.  Continue nutritional supplements and protein shakes.  Continue drain, wound, and ostomy care.  Ostomy care on consult.  Continue IV antibiotics per ID.  She will discharge home with a course of antibiotics.  Continue pain control and antiemetics as needed.  Encourage ambulation and up to chair.  DVT prophylaxis with Lovenox  GI prophylaxis with  Pepcid.  She is stable for discharge home from a general surgery standpoint.  Social work on consult to help facilitate discharge planning.  She is to follow-up with [facility] general surgery in 2 weeks and Dr. Brooks in 1 month.    MARY Peter  7/16/2024  9:36 AM     Patient reports feeling better today.  She is tolerating soft diet without difficulty.  No concerns regarding ostomy which is pink and functional.  WBC within normal limits, hemoglobin stable, 9.2  Continue work with PT.  Social work in progress discharge planning and placement to [facility]    I agree with the note above and attest to its accuracy with the following changes below after my interview and examination of the patient    The patient was seen and examined.  Available labs and radiology is noted.    Ileana Brooks MD FACS    Please note that this report has been produced using speech recognition software and may contain errors related to that system including but not limited to errors in grammar, punctuation and spelling as well as words and phrases that possibly may have been recognized inappropriately.  If there are any questions or concerns please contact the dictating provider for clarification.    The 21st Century Cures Act makes medical notes like these available to patients in the interest of trans parency. Please be advised this is a medical document. Medical documents are intended to carry relevant information, facts as evident, and the clinical opinion of the practitioner. The medical note is intended as peer to peer communication and may appear blunt or direct. It is written in medical language and may contain abbreviations or verbiage that are unfamiliar.   Again if there are any questions or concerns please contact the dictating provider for clarification.

## 2024-07-16 NOTE — DISCHARGE SUMMARY
[facility]     Bibi Diaz Patient Status:  Inpatient    1945 MRN [id number]   Location [facility] 3NW-A Attending Geena Pardo MD   Hosp Day # 6 PCP Rick Shannon DO     Date of Admission: 7/10/2024  Date of Discharge:  2024     Discharge Disposition: 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 Patient recently admitted for abdominal abscess sp drain placement. She was discharged on IV abx to SNF. Patient has since returned home. Last night around 9pm patient noted change in output from drain and began to have worsening abdominal pain in lower quadrants. Pain has been intermittent in nature. She admits to nausea, no vomiting. No BM. Poor PO intake.     Brief Synopsis: Patient is a 78-year-old female who presented with worsening abdominal pain.  She was recently admitted for abdominal abscess and underwent drain placement.  CT abdomen pelvis showing worsening colitis/diverticulitis.  She continued on IV antibiotics after cultures obtained.  She was admitted for further workup and general surgery, ID were consulted.  She was taken to the OR on  and noted to have a large inflammatory mass in the pelvis near the sigmoid colon.  She underwent LAR with Ramírez's pouch and end colostomy.  She was monitored closely in the ICU postprocedure.  NGTD on  blood cx.  We continued IV fluids, pain management, antiemetics.  Brief episode of delirium which improved.  Patient passing stool. Diet advanced as tolerated.   She was evaluated by PT OT.  Social work assist with discharge planning for subacute rehab.  Patient initially hesitant with ostomy education however after further discussion patient agreeable to learn.  She will continue on antibiotics per ID.  She was discharged to subacute rehab once cleared by all consultants.    Lace+ Score: 82  59-90 High Risk  29-58 Medium Risk  0-28   Low Risk  Patient was referred to the [facility].    TCM Follow-Up Recommendation:  LACE > 58: High Risk of readmission after discharge from the hospital.      Procedures during hospitalization:    Date of procedure:     July 11, 2024-  Pre-Operative Diagnosis: Colonic diverticular abscess [K57.20]  Post-Operative Diagnosis: Same as above  Indication for Surgery:    Colonic diverticular abscess unresponsive to medical management with IR drain and antibiotics  Procedure Performed: Low Anterior Resection of the Rectosigmoid Colon with Elizabeth's pouch and end colostomy. Drainage of abdominal abscess.  Placement Keegan drain      Consultants:  General surgery, ID, critical care         Discharge Medications        CHANGE how you take these medications        Instructions Prescription details   traMADol 50 MG Tabs  Commonly known as: Ultram  What changed: when to take this      Take 1 tablet (50 mg total) by mouth every 8 (eight) hours as needed.   Quantity: 20 tablet  Refills: 0     triamcinolone 0.1 % Crea  Commonly known as: Kenalog  What changed:   how much to take  how to take this  when to take this      APPLY TOPICALLY TO THE AFFECTED AREA TWICE DAILY AS NEEDED   Quantity: 60 g  Refills: 3            CONTINUE taking these medications        Instructions Prescription details   acetaminophen 500 MG Tabs  Commonly known as: Tylenol Extra Strength      Take 1 tablet  (500 mg total) by mouth every 6 (six) hours as needed for Pain.   Refills: 0     albuterol 108 (90 Base) MCG/ACT Aers  Commonly known as: ProAir HFA      INHALE 2 PUFFS BY MOUTH EVERY 6 HOURS AS NEEDED FOR WHEEZING   Quantity: 8.5 g  Refills: 2     clonazePAM 0.5 MG Tabs  Commonly known as: KlonoPIN      Take 1 tablet (0.5 mg total) by mouth in the morning and 1 tablet (0.5 mg total) before bedtime.   Quantity: 28 tablet  Refills: 0     Dupixent 300 MG/2ML Sosy  Generic drug: Dupilumab      Taking every 2 weeks   Refills: 0     fluticasone propionate 50 MCG/ACT Susp  Commonly known as: Flonase      2 sprays by Each Nare route daily.   Quantity: 1 each  Refills: 1     losartan 25 MG Tabs  Commonly known as: Cozaar      Take 1 tablet (25 mg total) by mouth daily.   Quantity: 90 tablet  Refills: 1     meclizine 12.5 MG Tabs  Commonly known as: Antivert      Take 1 tablet (12.5 mg total) by mouth 3 (three) times daily as needed.   Quantity: 30 tablet  Refills: 1     mirtazapine 30 MG Tabs  Commonly known as: Remeron      Take 1 tablet (30 mg total) by mouth nightly.   Quantity: 90 tablet  Refills: 1     multivitamin with minerals Tabs      Take 1 tablet by mouth daily.   Quantity: 30 tablet  Refills: 1     ondansetron 4 MG Tbdp  Commonly known as: Zofran-ODT      Take 1 tablet (4 mg total) by mouth every 8 (eight) hours as needed for Nausea.   Quantity: 30 tablet  Refills: 0     Symbicort 160-4.5 MCG/ACT Aero  Generic drug: Budesonide-Formoterol Fumarate      INHALE 2 PUFFS BY MOUTH TWICE DAILY   Quantity: 10.2 g  Refills: 11     VITAMIN D-3 OR      Take 25 mcg by mouth daily with breakfast.   Refills: 0            STOP taking these medications      dextrose 5% SOLN 100 mL with piperacillin-tazobactam 3.375 (3-0.375) g SOLR 3.375 g                  Where to Get Your Medications        Please  your prescriptions at the location directed by your doctor or nurse    Bring a paper prescription for each of these  medications  traMADol 50 MG Tabs         [place]    Follow-up appointment:   Rick Shannon DO  [address] [place] [address]  [facility] [id number] [phone]    Schedule an appointment as soon as possible for a visit in 1 week(s)      [facility]  [address] [facility] [address] [phone]    Schedule an appointment as soon as possible for a visit in 2 week(s)      Ileana Brooks MD  [address] [facility] [address] [phone]    Schedule an appointment as soon as possible for a visit in 1 month(s)        -----------------------------------------------------------------------------------------------  PATIENT DISCHARGE INSTRUCTIONS: See electronic chart    MARY Greer  3:33 PM     The 21st Century Cures Act makes medical notes like these available to patients in the interest of transparency. Please be advised this is a medical document. Medical documents are intended to carry relevant information, facts as evident, and the clinical opinion of the practitioner. The medical note is intended as peer to peer communication and may appear blunt or direct. It is written in medical language and may contain abbreviations or verbiage that are unfamiliar.

## 2024-07-17 ENCOUNTER — EXTERNAL FACILITY (OUTPATIENT)
Dept: FAMILY MEDICINE CLINIC | Facility: CLINIC | Age: 79
End: 2024-07-17

## 2024-07-17 DIAGNOSIS — E44.0 MODERATE PROTEIN-CALORIE MALNUTRITION (HCC): ICD-10-CM

## 2024-07-17 DIAGNOSIS — Z90.49 S/P COLON RESECTION: ICD-10-CM

## 2024-07-17 DIAGNOSIS — I10 PRIMARY HYPERTENSION: ICD-10-CM

## 2024-07-17 DIAGNOSIS — J44.9 CHRONIC OBSTRUCTIVE PULMONARY DISEASE, UNSPECIFIED COPD TYPE (HCC): ICD-10-CM

## 2024-07-17 DIAGNOSIS — K57.20 PERICOLONIC ABSCESS DUE TO DIVERTICULITIS: Primary | ICD-10-CM

## 2024-07-17 DIAGNOSIS — R53.81 PHYSICAL DECONDITIONING: ICD-10-CM

## 2024-07-17 DIAGNOSIS — D64.9 NORMOCYTIC ANEMIA: ICD-10-CM

## 2024-07-17 DIAGNOSIS — F33.2 SEVERE EPISODE OF RECURRENT MAJOR DEPRESSIVE DISORDER, WITHOUT PSYCHOTIC FEATURES (HCC): ICD-10-CM

## 2024-07-17 NOTE — PROGRESS NOTES
Skilled Nursing Facility, Subacute Rehab  [facility]     Bibi Diaz Author: ZEENAT Rdz     1945 MRN [id number]   Last Hospital  Admission 7/10/24      Last Hospital Discharge 24 PCP Rick Shannon DO       HPI:      Bibi Diaz is a 79 year old female with previous medical history including COPD, HTN, HLD, GERD, anxiety, depression. She was in subacute rehab with abdominal abscess s/p drain placement and completed a course of IV antibiotics. She had been discharged home and noted increased abdominal pain and change in the nature of her VALERI drainage. She went to the hospital for further evaluation. 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 She recovered in ICU with a short period of delerium. Continued on IVF, diet was advanced to soft low fiber and she transitioned to oral antibiotics per ID. Starting education on ostomy care. Now admitted to SNF for sub-acute rehabilitation.     Bibi is seen in her room sitting up in the wheelchair. She is eating eggs and toast. Had pain overnight, none at this time. Feeling fatigued and weak. No nausea now, thinks meds are helping. Appetite is coming back. She has lost more weight. She states she may go to live with her son in [place] after rehab. DW nursing.  She is due for her dupixent and family brought it in. OK to give today.     Hospital Discharge Diagnoses:  Abdominal pain d/t diverticular abscess improved, in OR surgery noted large inflammatory mass in pelvis near sigmoid colon now s/p LAR w/ hartmanns pouch and end colostomy on   Leukocytosis, resolved  Moderate malnutrition  Delirium multifactorial, resolved   Constipation, improved  Chronic Anemia, stable  Hyponatremia, resolved  COPD without  exacerbation  Essential hypertension  Anxiety, Depression  Deconditioning    Chief Complaint at visit:   Chief Complaint   Patient presents with    Follow - Up     S/p colon resection with colostomy for inflammatory mass, malnutrition, anemia, weakness        ALLERGIES    ALLERGIES:  Allergies   Allergen Reactions    Avelox [Moxifloxacin Hcl In Nacl] SWELLING    Avelox [Moxifloxacin Hydrochloride] TONGUE SWELLING     \"TABS\"    Amoxicillin NAUSEA AND VOMITING     Vomiting.    Statins Myopathy    Sulfa Antibiotics RASH and ITCHING    Dander OTHER (SEE COMMENTS)     \"Animals\": runny nose, watery eyes, itching    Morphine OTHER (SEE COMMENTS)     Word finding difficulties    Diphenhydramine Runny nose     \"Animals\": runny nose, watery eyes, itching    Dust Runny nose    Latex RASH    Sulfa Drugs Cross Reactors RASH and ITCHING       CURRENT MEDS:      CURRENT MEDICATIONS   Current Outpatient Medications   Medication Sig Dispense Refill    traMADol 50 MG Oral Tab Take 1 tablet (50 mg total) by mouth every 8 (eight) hours as needed. 20 tablet 0    cefadroxil 500 MG Oral Cap Take 1 capsule (500 mg total) by mouth 2 (two) times daily for 10 days. 20 capsule 0    metRONIDAZOLE 500 MG Oral Tab Take 1 tablet (500 mg total) by mouth in the morning and 1 tablet (500 mg total) before bedtime. Do all this for 10 days. 20 tablet 0    clonazePAM 0.5 MG Oral Tab Take 1 tablet (0.5 mg total) by mouth in the morning and 1 tablet (0.5 mg total) before bedtime. 28 tablet 0    ondansetron 4 MG Oral Tablet Dispersible Take 1 tablet (4 mg total) by mouth every 8 (eight) hours as needed for Nausea. 30 tablet 0    multivitamin with minerals Oral Tab Take 1 tablet by mouth daily. 30 tablet 1    meclizine 12.5 MG Oral Tab Take 1 tablet (12.5 mg total) by mouth 3 (three) times daily as needed. 30 tablet 1    mirtazapine 30 MG Oral Tab Take 1 tablet (30 mg total) by mouth nightly. 90 tablet 1    fluticasone propionate 50 MCG/ACT Nasal Suspension  2 sprays by Each Nare route daily. 1 each 1    losartan 25 MG Oral Tab Take 1 tablet (25 mg total) by mouth daily. 90 tablet 1    albuterol (PROAIR HFA) 108 (90 Base) MCG/ACT Inhalation Aero Soln INHALE 2 PUFFS BY MOUTH EVERY 6 HOURS AS NEEDED FOR WHEEZING 8.5 g 2    SYMBICORT 160-4.5 MCG/ACT Inhalation Aerosol INHALE 2 PUFFS BY MOUTH TWICE DAILY 10.2 g 11    Cholecalciferol (VITAMIN D-3 OR) Take 25 mcg by mouth daily with breakfast.      DUPIXENT 300 MG/2ML Subcutaneous Solution Prefilled Syringe Taking every 2 weeks      TRIAMCINOLONE 0.1 % External Cream APPLY TOPICALLY TO THE AFFECTED AREA TWICE DAILY AS NEEDED (Patient taking differently: as needed.) 60 g 3    acetaminophen 500 MG Oral Tab Take 1 tablet (500 mg total) by mouth every 6 (six) hours as needed for Pain.         HISTORY:    Past Medical History:    Abdominal distention    Abdominal pain    Acquired hypothyroidism    Anxiety    Asthma ([facility])    Belching    Bloating    Chronic rhinitis    CKD (chronic kidney disease) stage 3, GFR 30-59 ml/min (Formerly [facility])    Constipation    COPD (chronic obstructive pulmonary disease) (Formerly [facility])    NO OXYGEN     Depression    Diarrhea, unspecified    Diverticulosis of large intestine    Emphysema    Esophageal reflux    Fatigue    Feeling lonely    Flatulence/gas pain/belching    Food intolerance    H/O bronchitis    Hay fever    Hemorrhoids    High blood pressure    History of depression    Hypercholesterolemia    Hypertension    Itch of skin    Migraines    Mild intermittent asthma without complication ([facility]ly [facility])    Nausea    Night sweats    Pneumonia    PONV (postoperative nausea and vomiting)    Pure hypercholesterolemia    Rash    Reflux    Sleep disturbance    Stress    Uncomfortable fullness after meals    Visual impairment    glasses    Vomiting    Wears glasses     Past Surgical History:   Procedure Laterality Date    Cataract      Colonoscopy      Colonoscopy N/A 3/21/2024    Procedure: COLONOSCOPY;  Surgeon: Makeda  DO Delon;  Location:  ENDOSCOPY    Colonoscopy & polypectomy  09/2014    multiple polyps; tics; repeat 3 yrs    Colonoscopy,remv lesn,snare N/A 09/22/2014    Procedure: ESOPHAGOGASTRODUODENOSCOPY, COLONOSCOPY, POSSIBLE BIOPSY, POSSIBLE POLYPECTOMY [id number];  Surgeon: Slade Ivan MD;  Location: [facility]    Correct bunion,simple Bilateral     Cystotomy,excis vesical neck Left     Egd      Gastro - dmg  09/2014    stricture; HH    Oophorectomy Bilateral 2017    Other surgical history  02/27/2023    Robotic repair of hiatal hernia and Nissen fundoplication    Patient documented not to have experienced any of the following events N/A 09/22/2014    Procedure: ESOPHAGOGASTRODUODENOSCOPY, COLONOSCOPY, POSSIBLE BIOPSY, POSSIBLE POLYPECTOMY [id number];  Surgeon: Slade Ivan MD;  Location: [facility]    Patient withough preoperative order for iv antibiotic surgical site infection prophylaxis. N/A 09/22/2014    Procedure: ESOPHAGOGASTRODUODENOSCOPY, COLONOSCOPY, POSSIBLE BIOPSY, POSSIBLE POLYPECTOMY [id number];  Surgeon: Slade Ivan MD;  Location: [facility]    Repair ing hernia,5+y/o,reducibl      Upper gi endoscopy,diagnosis N/A 09/22/2014    Procedure: ESOPHAGOGASTRODUODENOSCOPY, COLONOSCOPY, POSSIBLE BIOPSY, POSSIBLE POLYPECTOMY [id number];  Surgeon: Slade Ivan MD;  Location: [facility]     Family History   Problem Relation Age of Onset    Colon Cancer Mother     Other (Other) Mother     Other (copd) Mother     Alcohol and Other Disorders Associated Father     Other (Other) Father     Other (emph) Father     Alcohol and Other Disorders Associated Son     Hypertension Sister     Prostate Cancer Brother     Hypertension Brother      Social History     Socioeconomic History    Marital status:    Tobacco Use    Smoking status: Former     Current packs/day: 0.00     Types: Cigarettes     Start date: 9/25/1970     Quit date: 9/25/1980     Years since quitting:  43.8    Smokeless tobacco: Never    Tobacco comments:      1/2 pack per day. Social history shows \"Tobacco Use: Active\" and \"Never Smoker: Active\"   Vaping Use    Vaping status: Never Used   Substance and Sexual Activity    Alcohol use: No    Drug use: No   Other Topics Concern    Caffeine Concern Yes     Comment: \"1 cup of coffee and tea, and can of pop daily\"    Exercise Yes     Comment: walking   Social History Narrative    ** Merged History Encounter **          Social Determinants of Health     Financial Resource Strain: Low Risk  (4/20/2023)    Financial Resource Strain     Difficulty of Paying Living Expenses: Not hard at all     Med Affordability: No   Food Insecurity: No Food Insecurity (7/10/2024)    Food Insecurity     Food Insecurity: Never true   Transportation Needs: No Transportation Needs (7/10/2024)    Transportation Needs     Lack of Transportation: No   Physical Activity: Inactive (4/21/2022)    Exercise Vital Sign     Days of Exercise per Week: 0 days     Minutes of Exercise per Session: 0 min   Stress: Stress Concern Present (2/13/2024)    Stress     Feeling of Stress : Yes    Social Connections   Housing Stability: Low Risk  (7/10/2024)    Housing Stability     Housing Instability: No         POA not on file    CODE STATUS:  DNR    ADVANCED CARE PLANNING TEAM: None    SUBJECTIVE/ ROS:       REVIEW OF SYSTEMS:  GENERAL HEALTH:fatigues easily, recent wgt loss  SKIN: denies any unusual skin lesions or rashes  WOUNDS: none   EYES:no visual complaints or deficits  HENT: denies nasal congestion, sinus pain or sore throat; and hearing loss negative  RESPIRATORY: denies shortness of breath, wheezing or cough   CARDIOVASCULAR:denies chest pain, no palpitations , denies orthopnea, denies cough  GI: denies nausea, vomiting, constipation, diarrhea; no rectal bleeding; no heartburn, colostomy now  Gu: No urinary frequency, urgency or dysuria  MUSCULOSKELETAL:no joint complaints upper or lower  extremities  NEURO:no sensory or motor complaint  PSYCHE: depression  HEMATOLOGY:denies bruising, denies excessive bleeding  ENDOCRINE: denies excessive thirst or urination; denies unexpected wt gain or wt loss  ALLERGY/IMM.: hx of allergies      OBJECTIVE:     ASSESSMENT/ PHYSICAL EXAM:     VITALS:  /80   Pulse 83   Temp 97.7 °F (36.5 °C)   Resp 18   Wt 83 lb 6.4 oz (37.8 kg)   SpO2 98%   BMI 16.29 kg/m²      PHYSICAL EXAM:  GENERAL HEALTH:  thin, frail 79 year old female in no acute distress, underweight with temporal wasting and thin limbs  LINES, TUBES, DRAINS:   VALERI drain right abdomen, site clean and dry  SKIN: warm, dry  WOUND: surgical wound midline abdominal incision with staples open to air, no drainage, well approximated, VALERI drain right abdomen with small amount serosanguinous drainage  EYES: sclera anicteric, conjunctiva normal; there is no nystagmus, no drainage from eyes  HENT: normocephalic; normal nose, no nasal drainage, mucous membranes pink, moist.  NECK: supple, non tender, FROM  BREAST: deferred  RESPIRATORY:clear, no crackles, no wheezing, no dyspnea, no cough, room air  CARDIOVASCULAR: RRR, S1 and S2, no murmurs, no edema  ABDOMEN:  normal active BS+, soft, nondistended; no organomegaly, no masses; nontender, no guarding, no rebound tenderness.  Colostomy site wnl, red stoma moist, soft brown stools.    :no suprapubic distension  LYMPHATIC:no lymphedema  MUSCULOSKELETAL: no acute synovitis upper or lower extremity  EXTREMITIES/VASCULAR:radial pulses 2+ and dorsalis pedal pulses 2+  NEUROLOGIC: A&OX3, no focal deficits, follows commands  PSYCHIATRIC: calm, cooperative, mood and affect appropriate to situation    Hospital and rehab lab results and imaging reviewed as available.      DIAGNOSTICS REVIEWED AT THIS VISIT:    Lab Results   Component Value Date    WBC 9.2 07/17/2024    RBC 3.30 (L) 07/17/2024    HGB 9.3 (L) 07/17/2024    HCT 27.7 (L) 07/17/2024    MCV 83.9 07/17/2024     MCH 28.2 07/17/2024    MCHC 33.6 07/17/2024    RDW 18.2 07/17/2024    .0 (H) 07/17/2024    MPV 9.4 02/28/2013     Lab Results   Component Value Date    GLU 97 07/17/2024    BUN 10 07/17/2024    BUNCREA 14 01/12/2022    CREATSERUM 0.58 07/17/2024    ANIONGAP 7 07/17/2024    GFR 53 (L) 12/12/2017    GFRNAA 65 07/14/2022    GFRAA 75 07/14/2022    CA 9.2 07/17/2024    OSMOCALC 285 07/17/2024    ALKPHO 69 07/17/2024    AST 14 07/17/2024    ALT 21 07/17/2024    BILT 0.2 07/17/2024    TP 6.2 07/17/2024    ALB 3.5 07/17/2024    GLOBULIN 2.7 (L) 07/17/2024    AGRATIO 1.9 01/12/2022     07/17/2024    K 3.9 07/17/2024     07/17/2024    CO2 23.0 07/17/2024     Lab Results   Component Value Date    MG 2.0 07/17/2024     Lab Results   Component Value Date    VITD 45.6 07/17/2024       [facility] medical records reviewed.  Medication reconciliation completed.        MEDICAL DECISION MAKING and PLAN OF CARE:       SEE PLAN BELOW    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 on 7/11/24 for large inflammatory mass  New colostomy patient needs education  Low fiber diet  Surgery PA follow up in 2 weeks, Dr Brooks in one month  Zofran prn  Cefadroxil and metronidazole EOT 7/26/24  Pain management  Wound care midline abdominal dressing with staples open to air  VALERI drain care    Moderate protein calorie malnutrition  Ensure TID  Dietician consult  Weekly weights  Supplements as indicated  Encourage po intake    Anemia post op acute blood loss  Hgb 9.3  Repeat CBC weekly and as indicated    COPD  Symbicort BID  Albuterol HFA prn  Fluticasone nasal  No cough or wheezing today  No hypoxia    HTN  Losartan 25 mg daily  BP q shift and prn  Monitoring now 149/80    Anxiety/depression  Mirtazipine 30 mg PO at bedtime  Clonazepam BID     Vertigo  Meclizine prn    Physical Deconditioning/Weakness; at risk for falling  Fall Precautions  [facility] team to establish care plan meeting with patient  and POA/family as appropriate  [facility] team/ & discharge planner to assist with establishing safe discharge plan for next level of care  PT/OT evaluate and treat  DC planning with MDT, SW, therapies  Services on DC: HHC RN/PT/OT/SLP/SW  Equipment on DC: Wc/walker/bed/Commode     Pain management  Monitor and assess pain  Tramadol 50 Q8 prn  Tylenol 1000 q6 prn  Offer to pre-medicate 30-60 min prior to therapy  Physiatry evaluation with management appreciated    Bowel management  New colostomy  monitoring    Vitamins/supplements as r/t deficiencies  [facility] RD to follow while in rehab; supplementation/diet as per [facility] RD  May continue home supplements  Vit D , level wnl    DVT Prophylaxis   Encourage exercise and participation with therapy as much as able      GI prophylaxis  none    Labs  CBC, CMP weekly and prn      Follow Ups:  PCP within 7 days of DC from rehab  Surgery PA in 2 weeks, Dr Brooks in one month.     This dictation was performed with a verbal recognition program (DRAGON) and it was checked for errors. It is possible that there are still dictated errors within this note. If so, please bring any errors to my attention for an addendum. All efforts were made to ensure that this note is accurate.      75 min  spent w/ patient and reviewing medical records, labs, completing medication reconciliation and entering orders to establish plan of care in [facility].      Note to patient: The 21st Century Cures Act makes medical notes like these available to patients in the interest of transparency. However, this is a medical document intended as peer to peer communication. It is written in medical language and may contain abbreviations or verbiage that are unfamiliar. It may appear blunt or direct. Medical documents are intended to carry relevant information, facts as evident, and the clinical opinion of the practitioner who signs the document.       Margarette Conley, APRN  07/18/24

## 2024-07-18 ENCOUNTER — INITIAL APN SNF VISIT (OUTPATIENT)
Dept: INTERNAL MEDICINE CLINIC | Age: 79
End: 2024-07-18

## 2024-07-18 VITALS
HEART RATE: 83 BPM | TEMPERATURE: 98 F | RESPIRATION RATE: 18 BRPM | WEIGHT: 83.38 LBS | OXYGEN SATURATION: 98 % | DIASTOLIC BLOOD PRESSURE: 80 MMHG | SYSTOLIC BLOOD PRESSURE: 149 MMHG | BODY MASS INDEX: 16 KG/M2

## 2024-07-18 DIAGNOSIS — R53.1 WEAKNESS GENERALIZED: ICD-10-CM

## 2024-07-18 DIAGNOSIS — D62 ACUTE BLOOD LOSS ANEMIA (ABLA): ICD-10-CM

## 2024-07-18 DIAGNOSIS — K57.20 COLONIC DIVERTICULAR ABSCESS: Primary | ICD-10-CM

## 2024-07-18 DIAGNOSIS — R53.1 WEAKNESS: ICD-10-CM

## 2024-07-18 DIAGNOSIS — I15.9 SECONDARY HYPERTENSION: ICD-10-CM

## 2024-07-18 DIAGNOSIS — Z78.9 DECREASED ACTIVITIES OF DAILY LIVING (ADL): ICD-10-CM

## 2024-07-18 DIAGNOSIS — E44.0 MODERATE PROTEIN-CALORIE MALNUTRITION (HCC): ICD-10-CM

## 2024-07-18 DIAGNOSIS — Z93.3 COLOSTOMY IN PLACE (HCC): ICD-10-CM

## 2024-07-18 DIAGNOSIS — J44.9 CHRONIC OBSTRUCTIVE PULMONARY DISEASE, UNSPECIFIED COPD TYPE (HCC): ICD-10-CM

## 2024-07-18 PROCEDURE — 1160F RVW MEDS BY RX/DR IN RCRD: CPT | Performed by: NURSE PRACTITIONER

## 2024-07-18 PROCEDURE — 3077F SYST BP >= 140 MM HG: CPT | Performed by: NURSE PRACTITIONER

## 2024-07-18 PROCEDURE — 3079F DIAST BP 80-89 MM HG: CPT | Performed by: NURSE PRACTITIONER

## 2024-07-18 PROCEDURE — 99310 SBSQ NF CARE HIGH MDM 45: CPT | Performed by: NURSE PRACTITIONER

## 2024-07-18 PROCEDURE — 1124F ACP DISCUSS-NO DSCNMKR DOCD: CPT | Performed by: NURSE PRACTITIONER

## 2024-07-18 PROCEDURE — 1159F MED LIST DOCD IN RCRD: CPT | Performed by: NURSE PRACTITIONER

## 2024-07-18 NOTE — PROGRESS NOTES
Bibi Diaz Author: Gigi Azul MD     1945 MRN [id number]   Last Hospital  Admission 7/10/24      Last Hospital Discharge 24 PCP Rick Shannon DO   Hospital of Discharge  [facility]        CC --admitted to [facility] from [facility] for subacute rehab, s/p colon resection    H.P.I Bibi Diaz is a 79 year old female with past medical history significant for COPD hypertension hyperlipidemia gastroesophageal reflux anxiety and depression who went to the hospital for evaluation after there was increased abdominal pain and VALERI drain from her abdominal abscess.  CT scan of the abdomen showed worsening diverticulitis.  Patient was treated with IV fluids and was taken to operating room on , a large inflammatory mass near the sigmoid colon was found and she underwent a low anterior resection and colostomy placement.  Her hospital course was complicated by delirium, patient was in ICU briefly  She was continued on IV fluids.  Tolerated diet advanced to soft low fiber.  Antibiotics were transitioned to oral.  Patient was evaluated by PT/OT and subacute rehab was recommended  She was transferred to [facility]  Hospital Discharge Diagnoses:  Abdominal pain d/t diverticular abscess improved, in OR surgery noted large inflammatory mass in pelvis near sigmoid colon now s/p LAR w/ hartmanns pouch and end colostomy on   Leukocytosis, resolved  Moderate malnutrition  Delirium multifactorial, resolved   Constipation, improved  Chronic Anemia, stable  Hyponatremia, resolved  COPD without exacerbation  Essential hypertension  Anxiety, Depression  Deconditioning    Patient seen in her room today with her family  States that she is doing well but feels more depressed  She does have history of depression and anxiety and is currently on mirtazapine 30 mg for several years  She states that her appetite has improved  She is motivated to do PT/OT and get back on her  feet  Denies any nausea or vomiting or diarrhea          Past Medical History:    Abdominal distention    Abdominal pain    Acquired hypothyroidism    Anxiety    Asthma (HCC)    Belching    Bloating    Chronic rhinitis    CKD (chronic kidney disease) stage 3, GFR 30-59 ml/min (HCC)    Constipation    COPD (chronic obstructive pulmonary disease) ([facility])    NO OXYGEN     Depression    Diarrhea, unspecified    Diverticulosis of large intestine    Emphysema    Esophageal reflux    Fatigue    Feeling lonely    Flatulence/gas pain/belching    Food intolerance    H/O bronchitis    Hay fever    Hemorrhoids    High blood pressure    History of depression    Hypercholesterolemia    Hypertension    Itch of skin    Migraines    Mild intermittent asthma without complication (HCC)    Nausea    Night sweats    Pneumonia    PONV (postoperative nausea and vomiting)    Pure hypercholesterolemia    Rash    Reflux    Sleep disturbance    Stress    Uncomfortable fullness after meals    Visual impairment    glasses    Vomiting    Wears glasses     Past Surgical History:   Procedure Laterality Date    Cataract      Colonoscopy      Colonoscopy N/A 3/21/2024    Procedure: COLONOSCOPY;  Surgeon: Delon Ashford DO;  Location:  ENDOSCOPY    Colonoscopy & polypectomy  09/2014    multiple polyps; tics; repeat 3 yrs    Colonoscopy,remv lesn,snare N/A 09/22/2014    Procedure: ESOPHAGOGASTRODUODENOSCOPY, COLONOSCOPY, POSSIBLE BIOPSY, POSSIBLE POLYPECTOMY 68970,09889;  Surgeon: Slade Ivan MD;  Location: [facility]    Correct bunion,simple Bilateral     Cystotomy,excis vesical neck Left     Egd      Gastro - dmg  09/2014    stricture; HH    Oophorectomy Bilateral 2017    Other surgical history  02/27/2023    Robotic repair of hiatal hernia and Nissen fundoplication    Patient documented not to have experienced any of the following events N/A 09/22/2014    Procedure: ESOPHAGOGASTRODUODENOSCOPY, COLONOSCOPY, POSSIBLE BIOPSY, POSSIBLE  POLYPECTOMY 34651,37667;  Surgeon: Slade Ivan MD;  Location: [facility]    Patient withough preoperative order for iv antibiotic surgical site infection prophylaxis. N/A 2014    Procedure: ESOPHAGOGASTRODUODENOSCOPY, COLONOSCOPY, POSSIBLE BIOPSY, POSSIBLE POLYPECTOMY 87829,46836;  Surgeon: Slade Ivan MD;  Location: [facility]    Repair ing hernia,5+y/o,reducibl      Upper gi endoscopy,diagnosis N/A 2014    Procedure: ESOPHAGOGASTRODUODENOSCOPY, COLONOSCOPY, POSSIBLE BIOPSY, POSSIBLE POLYPECTOMY 49790,44374;  Surgeon: Slade Ivan MD;  Location: [facility]     Family History   Problem Relation Age of Onset    Colon Cancer Mother     Other (Other) Mother     Other (copd) Mother     Alcohol and Other Disorders Associated Father     Other (Other) Father     Other (emph) Father     Alcohol and Other Disorders Associated Son     Hypertension Sister     Prostate Cancer Brother     Hypertension Brother      Social History     Socioeconomic History    Marital status:    Tobacco Use    Smoking status: Former     Current packs/day: 0.00     Types: Cigarettes     Start date: 1970     Quit date: 1980     Years since quittin.8    Smokeless tobacco: Never    Tobacco comments:      1/2 pack per day. Social history shows \"Tobacco Use: Active\" and \"Never Smoker: Active\"   Vaping Use    Vaping status: Never Used   Substance and Sexual Activity    Alcohol use: No    Drug use: No   Other Topics Concern    Caffeine Concern Yes     Comment: \"1 cup of coffee and tea, and can of pop daily\"    Exercise Yes     Comment: walking   Social History Narrative    ** Merged History Encounter **          Social Determinants of Health     Financial Resource Strain: Low Risk  (2023)    Financial Resource Strain     Difficulty of Paying Living Expenses: Not hard at all     Med Affordability: No   Food Insecurity: No Food Insecurity (7/10/2024)    Food Insecurity     Food  Insecurity: Never true   Transportation Needs: No Transportation Needs (7/10/2024)    Transportation Needs     Lack of Transportation: No   Physical Activity: Inactive (4/21/2022)    Exercise Vital Sign     Days of Exercise per Week: 0 days     Minutes of Exercise per Session: 0 min   Stress: Stress Concern Present (2/13/2024)    Stress     Feeling of Stress : Yes    Social Connections   Housing Stability: Low Risk  (7/10/2024)    Housing Stability     Housing Instability: No       ALLERGIES:  Allergies   Allergen Reactions    Avelox [Moxifloxacin Hcl In Nacl] SWELLING    Avelox [Moxifloxacin Hydrochloride] TONGUE SWELLING     \"TABS\"    Amoxicillin NAUSEA AND VOMITING     Vomiting.    Statins Myopathy    Sulfa Antibiotics RASH and ITCHING    Dander OTHER (SEE COMMENTS)     \"Animals\": runny nose, watery eyes, itching    Morphine OTHER (SEE COMMENTS)     Word finding difficulties    Diphenhydramine Runny nose     \"Animals\": runny nose, watery eyes, itching    Dust Runny nose    Latex RASH    Sulfa Drugs Cross Reactors RASH and ITCHING       CODE STATUS:  DNR    CURRENT MEDICATIONS   Current Outpatient Medications   Medication Sig Dispense Refill    traMADol 50 MG Oral Tab Take 1 tablet (50 mg total) by mouth every 8 (eight) hours as needed. 20 tablet 0    cefadroxil 500 MG Oral Cap Take 1 capsule (500 mg total) by mouth 2 (two) times daily for 10 days. 20 capsule 0    metRONIDAZOLE 500 MG Oral Tab Take 1 tablet (500 mg total) by mouth in the morning and 1 tablet (500 mg total) before bedtime. Do all this for 10 days. 20 tablet 0    clonazePAM 0.5 MG Oral Tab Take 1 tablet (0.5 mg total) by mouth in the morning and 1 tablet (0.5 mg total) before bedtime. 28 tablet 0    ondansetron 4 MG Oral Tablet Dispersible Take 1 tablet (4 mg total) by mouth every 8 (eight) hours as needed for Nausea. 30 tablet 0    multivitamin with minerals Oral Tab Take 1 tablet by mouth daily. 30 tablet 1    meclizine 12.5 MG Oral Tab Take 1  tablet (12.5 mg total) by mouth 3 (three) times daily as needed. 30 tablet 1    mirtazapine 30 MG Oral Tab Take 1 tablet (30 mg total) by mouth nightly. 90 tablet 1    fluticasone propionate 50 MCG/ACT Nasal Suspension 2 sprays by Each Nare route daily. 1 each 1    losartan 25 MG Oral Tab Take 1 tablet (25 mg total) by mouth daily. 90 tablet 1    albuterol (PROAIR HFA) 108 (90 Base) MCG/ACT Inhalation Aero Soln INHALE 2 PUFFS BY MOUTH EVERY 6 HOURS AS NEEDED FOR WHEEZING 8.5 g 2    SYMBICORT 160-4.5 MCG/ACT Inhalation Aerosol INHALE 2 PUFFS BY MOUTH TWICE DAILY 10.2 g 11    Cholecalciferol (VITAMIN D-3 OR) Take 25 mcg by mouth daily with breakfast.      DUPIXENT 300 MG/2ML Subcutaneous Solution Prefilled Syringe Taking every 2 weeks      TRIAMCINOLONE 0.1 % External Cream APPLY TOPICALLY TO THE AFFECTED AREA TWICE DAILY AS NEEDED (Patient taking differently: as needed.) 60 g 3    acetaminophen 500 MG Oral Tab Take 1 tablet (500 mg total) by mouth every 6 (six) hours as needed for Pain.         REVIEW OF SYSTEMS:  Review of Systems:   Constitutional: No fevers, chills, fatigue or night sweats.  ENT: No mouth pain, neck pain, running nose, headaches or swollen glands.  Skin: No rashes, pruritus or skin changes,  Respiratory: Denies cough, wheezing or shortness of breath.  CV: Denies chest pain, palpitations, orthopnea, PND or dizziness.  Musculoskeletal: No joint pain, stiffness or swelling.  GI: No nausea, vomiting or diarrhea. No blood in stools.  As per HPI  Neurologic: No seizures, tremors, weakness or numbness    VITALS: Pulse 82/min respiration 18/min blood pressure 140/80 pulse ox 98%    PHYSICAL EXAM:  GENERAL: well developed, well nourished, in no apparent distress  SKIN: no rashes, no suspicious lesions  Wound; midline surgical wound with staples, no drainage, VALERI drain right abdomen with serosanguineous discharge  HEENT: atraumatic, normocephalic, ears and throat are clear  EYES: PERRLA, EOMI, conjunctiva  are clear  NECK: supple, no adenopathy, no bruits  CHEST: no chest tenderness  LUNGS: clear to auscultation  CARDIO: RRR without murmur  GI: good BS's, no masses, HSM or tenderness, colostomy in place  : deferred  RECTAL: deferred  MUSCULOSKELETAL: back is not tender, FROM of the back  EXTREMITIES: no cyanosis, clubbing or edema  NEURO: Oriented times three, cranial nerves are intact, motor and sensory are grossly intact      DIAGNOSTICS REVIEWED AT THIS VISIT:  Lab Results   Component Value Date    GLU 97 07/17/2024    BUN 10 07/17/2024    BUNCREA 14 01/12/2022    CREATSERUM 0.58 07/17/2024    ANIONGAP 7 07/17/2024    GFR 53 (L) 12/12/2017    GFRNAA 65 07/14/2022    GFRAA 75 07/14/2022    CA 9.2 07/17/2024    OSMOCALC 285 07/17/2024    ALKPHO 69 07/17/2024    AST 14 07/17/2024    ALT 21 07/17/2024    BILT 0.2 07/17/2024    TP 6.2 07/17/2024    ALB 3.5 07/17/2024    GLOBULIN 2.7 (L) 07/17/2024    AGRATIO 1.9 01/12/2022     07/17/2024    K 3.9 07/17/2024     07/17/2024    CO2 23.0 07/17/2024       Lab Results   Component Value Date    WBC 9.2 07/17/2024    RBC 3.30 (L) 07/17/2024    HGB 9.3 (L) 07/17/2024    HCT 27.7 (L) 07/17/2024    .0 (H) 07/17/2024    MPV 9.4 02/28/2013    MCV 83.9 07/17/2024    MCH 28.2 07/17/2024    MCHC 33.6 07/17/2024    RDW 18.2 07/17/2024    NEPRELIM 6.21 07/17/2024    NEPERCENT 67.3 07/17/2024    LYPERCENT 14.1 07/17/2024    MOPERCENT 8.1 07/17/2024    EOPERCENT 9.1 07/17/2024    BAPERCENT 0.7 07/17/2024    NE 6.21 07/17/2024    LYMABS 1.30 07/17/2024    MOABSO 0.75 07/17/2024    EOABSO 0.84 (H) 07/17/2024    BAABSO 0.06 07/17/2024     ASSESSMENT AND PLAN:      Diagnoses and all orders for this visit:    Pericolonic abscess due to diverticulitis  S/P colon resection  Wound care on consult  VALERI drain care  Continue cefadroxil and metronidazole until 7/26  Tylenol for pain  Colostomy education  Moderate protein-calorie malnutrition (HCC)  Dietitian on consult  Weekly  weights  Encourage p.o. intake  Ensure/boost 3 times a day with each meal  Severe episode of recurrent major depressive disorder, without psychotic features (HCC)  Continue clonazepam  I am increasing her mirtazapine to 45 mg p.o. daily  Psych on consult  Normocytic anemia  Last hemoglobin 9.3  Will continue to check weekly labs  Physical deconditioning  - PT to work on ambulation, gait, balance, strength, endurance, transfers, safety  - OT to work on fine motor skills, ADLs, hygiene, toileting, transfers, safety  Primary hypertension  Monitor blood pressures every shift  Losartan 25 mg daily  COPD  Stable at this time  Continue Symbicort and albuterol HFA  - 24h nursing for medication administration, bowel/bladder care, pain/sleep assessment  - Physician supervision for multiple medical comorbidities, fall risk, DVT risk, infection risk, pain management  - Psych for adjustment to disability and cognitive deficits  - Social work/: for discharge planning needs and access to community resources as needed     -Hospital medications reviewed reconciled and restarted   Check the labs in the morning, CBC CMP TSH, vitamin D, UA    Time spent at appointment today is 95 minutes including preparing to see patient, reviewing test results, performing medically appropriate examination and evaluation and coordinating care, counseling and educating patient/family, ordering medications and testing, and documenting clinical information in EMR.       Gigi Azul MD   [facility]  [address] [place]. [address] [place] [id number]    Electronically signed      This dictation was performed with a verbal recognition program (DRAGON) and it was checked for errors. It is possible that there are still dictated errors within this office note. If so, please bring any errors to my attention for an addendum. All efforts were made to ensure that this office note is accurate

## 2024-07-18 NOTE — PAYOR COMM NOTE
--------------  DISCHARGE REVIEW    Payor: [employer]  Subscriber #:  [id number]  Authorization Number: [id number]    Admit date: 7/10/24  Admit time:   6:30 PM  Discharge Date: 2024  5:32 PM     Admitting Physician: Win Brothers MD  Attending Physician:  Win Brothers MD  Primary Care Physician: Rick Shannon DO          Discharge Summary Notes        Discharge Summary signed by Berna Galaviz PA at 2024  3:36 PM       Author: Berna Galaviz PA Specialty: Internal Medicine Author Type: Physician Assistant    Filed: 2024  3:36 PM Date of Service: 2024  1:11 PM Status: Signed    : Berna Galaviz PA (Physician Assistant) Cosigner: Geena Pardo MD at 2024  9:31 PM           [facility]     Bibi Diaz Patient Status:  Inpatient    1945 MRN [id number]   Location [facility] 3NW-A Attending Geena Pardo MD   Hosp Day # 6 PCP Rick Shannon DO     Date of Admission: 7/10/2024  Date of Discharge:  2024     Discharge Disposition: [facility]    Discharge Diagnosis:  #Abdominal pain d/t diverticular abscess improved, in OR surgery noted large inflammatory mass in pelvis near sigmoid colon now s/p LAR w/ hartmanns pouch and end colostomy on   #Leukocytosis, resolved  #Moderate malnutrition  #Delirium multifactorial, resolved   #Constipation, improved  #Chronic Anemia, stable  #Hyponatremia, resolved  #COPD without exacerbation  #Essential hypertension  #Anxiety, Depression  #Deconditioning    History of Present Illness:   Bibi Diaz is a 78 year old female with a history of COPD, essential hypertension, dyslipidemia, GERD, anxiety, depression who presents with abdominal pain. Patient recently admitted for abdominal abscess sp drain placement. She was discharged on IV abx to SNF. Patient has since returned home. Last night around 9pm patient noted change in output from drain and began to have worsening  abdominal pain in lower quadrants. Pain has been intermittent in nature. She admits to nausea, no vomiting. No BM. Poor PO intake.     Brief Synopsis: Patient is a 78-year-old female who presented with worsening abdominal pain.  She was recently admitted for abdominal abscess and underwent drain placement.  CT abdomen pelvis showing worsening colitis/diverticulitis.  She continued on IV antibiotics after cultures obtained.  She was admitted for further workup and general surgery, ID were consulted.  She was taken to the OR on 7/11 and noted to have a large inflammatory mass in the pelvis near the sigmoid colon.  She underwent LAR with Ramírez's pouch and end colostomy.  She was monitored closely in the ICU postprocedure.  NGTD on blood cx.  We continued IV fluids, pain management, antiemetics.  Brief episode of delirium which improved.  Patient passing stool. Diet advanced as tolerated.   She was evaluated by PT OT.  Social work assist with discharge planning for subacute rehab.  Patient initially hesitant with ostomy education however after further discussion patient agreeable to learn.  She will continue on antibiotics per ID.  She was discharged to subacute rehab once cleared by all consultants.    Lace+ Score: 82  59-90 High Risk  29-58 Medium Risk  0-28   Low Risk  Patient was referred to the [facility].    TCM Follow-Up Recommendation:  LACE > 58: High Risk of readmission after discharge from the hospital.      Procedures during hospitalization:    Date of procedure:     July 11, 2024-  Pre-Operative Diagnosis: Colonic diverticular abscess [K57.20]  Post-Operative Diagnosis: Same as above  Indication for Surgery:    Colonic diverticular abscess unresponsive to medical management with IR drain and antibiotics  Procedure Performed: Low Anterior Resection of the Rectosigmoid Colon with Elizabeth's pouch and end colostomy. Drainage of abdominal abscess.  Placement Keegan  drain      Consultants:  General surgery, ID, critical care         Discharge Medications        CHANGE how you take these medications        Instructions Prescription details   traMADol 50 MG Tabs  Commonly known as: Ultram  What changed: when to take this      Take 1 tablet (50 mg total) by mouth every 8 (eight) hours as needed.   Quantity: 20 tablet  Refills: 0     triamcinolone 0.1 % Crea  Commonly known as: Kenalog  What changed:   how much to take  how to take this  when to take this      APPLY TOPICALLY TO THE AFFECTED AREA TWICE DAILY AS NEEDED   Quantity: 60 g  Refills: 3            CONTINUE taking these medications        Instructions Prescription details   acetaminophen 500 MG Tabs  Commonly known as: Tylenol Extra Strength      Take 1 tablet (500 mg total) by mouth every 6 (six) hours as needed for Pain.   Refills: 0     albuterol 108 (90 Base) MCG/ACT Aers  Commonly known as: ProAir HFA      INHALE 2 PUFFS BY MOUTH EVERY 6 HOURS AS NEEDED FOR WHEEZING   Quantity: 8.5 g  Refills: 2     clonazePAM 0.5 MG Tabs  Commonly known as: KlonoPIN      Take 1 tablet (0.5 mg total) by mouth in the morning and 1 tablet (0.5 mg total) before bedtime.   Quantity: 28 tablet  Refills: 0     Dupixent 300 MG/2ML Sosy  Generic drug: Dupilumab      Taking every 2 weeks   Refills: 0     fluticasone propionate 50 MCG/ACT Susp  Commonly known as: Flonase      2 sprays by Each Nare route daily.   Quantity: 1 each  Refills: 1     losartan 25 MG Tabs  Commonly known as: Cozaar      Take 1 tablet (25 mg total) by mouth daily.   Quantity: 90 tablet  Refills: 1     meclizine 12.5 MG Tabs  Commonly known as: Antivert      Take 1 tablet (12.5 mg total) by mouth 3 (three) times daily as needed.   Quantity: 30 tablet  Refills: 1     mirtazapine 30 MG Tabs  Commonly known as: Remeron      Take 1 tablet (30 mg total) by mouth nightly.   Quantity: 90 tablet  Refills: 1     multivitamin with minerals Tabs      Take 1 tablet by mouth daily.    Quantity: 30 tablet  Refills: 1     ondansetron 4 MG Tbdp  Commonly known as: Zofran-ODT      Take 1 tablet (4 mg total) by mouth every 8 (eight) hours as needed for Nausea.   Quantity: 30 tablet  Refills: 0     Symbicort 160-4.5 MCG/ACT Aero  Generic drug: Budesonide-Formoterol Fumarate      INHALE 2 PUFFS BY MOUTH TWICE DAILY   Quantity: 10.2 g  Refills: 11     VITAMIN D-3 OR      Take 25 mcg by mouth daily with breakfast.   Refills: 0            STOP taking these medications      dextrose 5% SOLN 100 mL with piperacillin-tazobactam 3.375 (3-0.375) g SOLR 3.375 g                  Where to Get Your Medications        Please  your prescriptions at the location directed by your doctor or nurse    Bring a paper prescription for each of these medications  traMADol 50 MG Tabs         [facility] reviewed: yes    Follow-up appointment:   Rick Shannon DO  [address] [place] [id number] [phone]    Schedule an appointment as soon as possible for a visit in 1 week(s)      [facility] PA  [address] [facility] [id number] [phone]    Schedule an appointment as soon as possible for a visit in 2 week(s)      Ileana Brooks MD  [address] [facility] [id number] [phone]    Schedule an appointment as soon as possible for a visit in 1 month(s)        -----------------------------------------------------------------------------------------------  PATIENT DISCHARGE INSTRUCTIONS: See electronic chart    MARY Greer  3:33 PM     The 21st Century Cures Act makes medical notes like these available to patients in the interest of transparency. Please be advised this is a medical document. Medical documents are intended to carry relevant information, facts as evident, and the clinical opinion of the practitioner. The medical note is intended as peer to peer communication and may appear blunt or direct. It is written in medical language and may contain abbreviations or verbiage  that are unfamiliar.       Electronically signed by Geena Pardo MD on 7/16/2024  9:31 PM         REVIEWER COMMENTS

## 2024-07-23 ENCOUNTER — SNF VISIT (OUTPATIENT)
Dept: INTERNAL MEDICINE CLINIC | Age: 79
End: 2024-07-23

## 2024-07-23 VITALS
BODY MASS INDEX: 16 KG/M2 | WEIGHT: 83 LBS | SYSTOLIC BLOOD PRESSURE: 110 MMHG | RESPIRATION RATE: 18 BRPM | DIASTOLIC BLOOD PRESSURE: 60 MMHG | HEART RATE: 73 BPM | OXYGEN SATURATION: 94 % | TEMPERATURE: 97 F

## 2024-07-23 DIAGNOSIS — Z78.9 DECREASED ACTIVITIES OF DAILY LIVING (ADL): ICD-10-CM

## 2024-07-23 DIAGNOSIS — J44.9 CHRONIC OBSTRUCTIVE PULMONARY DISEASE, UNSPECIFIED COPD TYPE (HCC): ICD-10-CM

## 2024-07-23 DIAGNOSIS — E44.0 MODERATE PROTEIN-CALORIE MALNUTRITION (HCC): ICD-10-CM

## 2024-07-23 DIAGNOSIS — R53.1 WEAKNESS GENERALIZED: ICD-10-CM

## 2024-07-23 DIAGNOSIS — Z93.3 COLOSTOMY IN PLACE (HCC): ICD-10-CM

## 2024-07-23 DIAGNOSIS — K57.20 COLONIC DIVERTICULAR ABSCESS: Primary | ICD-10-CM

## 2024-07-23 PROCEDURE — 1160F RVW MEDS BY RX/DR IN RCRD: CPT | Performed by: NURSE PRACTITIONER

## 2024-07-23 PROCEDURE — 99309 SBSQ NF CARE MODERATE MDM 30: CPT | Performed by: NURSE PRACTITIONER

## 2024-07-23 PROCEDURE — 3074F SYST BP LT 130 MM HG: CPT | Performed by: NURSE PRACTITIONER

## 2024-07-23 PROCEDURE — 1159F MED LIST DOCD IN RCRD: CPT | Performed by: NURSE PRACTITIONER

## 2024-07-23 PROCEDURE — 3078F DIAST BP <80 MM HG: CPT | Performed by: NURSE PRACTITIONER

## 2024-07-23 NOTE — PROGRESS NOTES
Bibi Diaz, 7/14/1945, 79 year old, female    Chief Complaint:    Chief Complaint   Patient presents with    Follow - Up     Colostomy, weakness, weight loss        Subjective:   TODAY:  Bibi is seen lying in bed this afternoon. She is feeling improved. Still some intermittent abdominal pains. Colostomy output is good. She is learning to care for it with nursing education. Movement improving. Appetite is improving, she is eating small meals more frequently. DC plan to home with son in [place]. DW nursing.     Denies insomnia, fatigue, fever/chills, cough, SOB, dyspnea, angina, palpitations, n/v, diarrhea, constipation, and urinary sxs.      Objective:  /60   Pulse 73   Temp 97.2 °F (36.2 °C)   Resp 18   Wt 83 lb (37.6 kg)   SpO2 94%   BMI 16.21 kg/m²     PHYSICAL EXAM:  GENERAL HEALTH:  thin, frail 79 year old female in no acute distress, underweight with temporal wasting and thin limbs  LINES, TUBES, DRAINS:  VALERI drain right abdomen, site clean and dry  SKIN: warm, dry  WOUND: surgical wound midline abdominal incision with staples open to air, no drainage, well approximated, VALERI drain right abdomen with small amount serosanguinous drainage  EYES: sclera anicteric, conjunctiva normal; there is no nystagmus, no drainage from eyes  HENT: normocephalic; normal nose, no nasal drainage, mucous membranes pink, moist.  NECK: supple, non tender, FROM  BREAST: deferred  RESPIRATORY:clear, no crackles, no wheezing, no dyspnea, no cough, room air  CARDIOVASCULAR: RRR, S1 and S2, no murmurs, no edema  ABDOMEN:  normal active BS+, soft, nondistended; no organomegaly, no masses; nontender, no guarding, no rebound tenderness.  Colostomy site wnl, red stoma moist, soft brown stools.    :no suprapubic distension  LYMPHATIC:no lymphedema  MUSCULOSKELETAL: no acute synovitis upper or lower extremity  EXTREMITIES/VASCULAR:radial pulses 2+ and dorsalis pedal pulses 2+  NEUROLOGIC: A&OX3, no focal deficits,  follows commands  PSYCHIATRIC: calm, cooperative, mood and affect appropriate to situation    Therapy update:  Transfers: min assist  ADLS:  supervision  Ambulation:  300 ft with RW and supervision  DC plan: home with sonpaige on DC is the plan    Medications reviewed: Yes      Current Outpatient Medications:     traMADol 50 MG Oral Tab, Take 1 tablet (50 mg total) by mouth every 8 (eight) hours as needed., Disp: 20 tablet, Rfl: 0    cefadroxil 500 MG Oral Cap, Take 1 capsule (500 mg total) by mouth 2 (two) times daily for 10 days., Disp: 20 capsule, Rfl: 0    metRONIDAZOLE 500 MG Oral Tab, Take 1 tablet (500 mg total) by mouth in the morning and 1 tablet (500 mg total) before bedtime. Do all this for 10 days., Disp: 20 tablet, Rfl: 0    clonazePAM 0.5 MG Oral Tab, Take 1 tablet (0.5 mg total) by mouth in the morning and 1 tablet (0.5 mg total) before bedtime., Disp: 28 tablet, Rfl: 0    ondansetron 4 MG Oral Tablet Dispersible, Take 1 tablet (4 mg total) by mouth every 8 (eight) hours as needed for Nausea., Disp: 30 tablet, Rfl: 0    multivitamin with minerals Oral Tab, Take 1 tablet by mouth daily., Disp: 30 tablet, Rfl: 1    meclizine 12.5 MG Oral Tab, Take 1 tablet (12.5 mg total) by mouth 3 (three) times daily as needed., Disp: 30 tablet, Rfl: 1    mirtazapine 30 MG Oral Tab, Take 1 tablet (30 mg total) by mouth nightly., Disp: 90 tablet, Rfl: 1    fluticasone propionate 50 MCG/ACT Nasal Suspension, 2 sprays by Each Nare route daily., Disp: 1 each, Rfl: 1    losartan 25 MG Oral Tab, Take 1 tablet (25 mg total) by mouth daily., Disp: 90 tablet, Rfl: 1    albuterol (PROAIR HFA) 108 (90 Base) MCG/ACT Inhalation Aero Soln, INHALE 2 PUFFS BY MOUTH EVERY 6 HOURS AS NEEDED FOR WHEEZING, Disp: 8.5 g, Rfl: 2    SYMBICORT 160-4.5 MCG/ACT Inhalation Aerosol, INHALE 2 PUFFS BY MOUTH TWICE DAILY, Disp: 10.2 g, Rfl: 11    Cholecalciferol (VITAMIN D-3 OR), Take 25 mcg by mouth daily with breakfast., Disp: , Rfl:     DUPIXENT 300  MG/2ML Subcutaneous Solution Prefilled Syringe, Taking every 2 weeks, Disp: , Rfl:     TRIAMCINOLONE 0.1 % External Cream, APPLY TOPICALLY TO THE AFFECTED AREA TWICE DAILY AS NEEDED (Patient taking differently: as needed.), Disp: 60 g, Rfl: 3    acetaminophen 500 MG Oral Tab, Take 1 tablet (500 mg total) by mouth every 6 (six) hours as needed for Pain., Disp: , Rfl:       Diagnostics reviewed:    Lab Results   Component Value Date    WBC 9.2 07/17/2024    RBC 3.30 (L) 07/17/2024    HGB 9.3 (L) 07/17/2024    HCT 27.7 (L) 07/17/2024    MCV 83.9 07/17/2024    MCH 28.2 07/17/2024    MCHC 33.6 07/17/2024    RDW 18.2 07/17/2024    .0 (H) 07/17/2024    MPV 9.4 02/28/2013     Lab Results   Component Value Date    GLU 97 07/17/2024    BUN 10 07/17/2024    BUNCREA 14 01/12/2022    CREATSERUM 0.58 07/17/2024    ANIONGAP 7 07/17/2024    GFR 53 (L) 12/12/2017    GFRNAA 65 07/14/2022    GFRAA 75 07/14/2022    CA 9.2 07/17/2024    OSMOCALC 285 07/17/2024    ALKPHO 69 07/17/2024    AST 14 07/17/2024    ALT 21 07/17/2024    BILT 0.2 07/17/2024    TP 6.2 07/17/2024    ALB 3.5 07/17/2024    GLOBULIN 2.7 (L) 07/17/2024    AGRATIO 1.9 01/12/2022     07/17/2024    K 3.9 07/17/2024     07/17/2024    CO2 23.0 07/17/2024       Assessment and plan:  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 on 7/11/24 for large inflammatory mass  New colostomy patient needs education  Low fiber diet  Surgery PA follow up in 2 weeks, Dr Brooks in one month  Zofran prn  Cefadroxil and metronidazole EOT 7/26/24  Pain management  Wound care midline abdominal dressing with staples open to air  VALERI drain care     Moderate protein calorie malnutrition  Ensure TID  Dietician consult  Weekly weights  Supplements as indicated  Encourage po intake     Anemia post op acute blood loss  Hgb 9.3  Repeat CBC weekly and as indicated     COPD  Symbicort BID  Albuterol HFA prn  Fluticasone nasal  No cough or wheezing  today  No hypoxia     HTN  Losartan 25 mg daily  BP q shift and prn  Monitoring now 110/60      Anxiety/depression  Mirtazipine 30 mg PO at bedtime  Clonazepam BID   Psychiatry and counseling services     Vertigo  Meclizine prn     Physical Deconditioning/Weakness; at risk for falling  Fall Precautions  [facility] team to establish care plan meeting with patient and POA/family as appropriate  [facility] team/ & discharge planner to assist with establishing safe discharge plan for next level of care  PT/OT evaluate and treat  DC planning with MDT, SW, therapies  Services on DC: [facility] RN/PT/OT/SW  Equipment on DC: Walker      Pain management  Monitor and assess pain  Tramadol 50 Q8 prn  Tylenol 1000 q6 prn  Offer to pre-medicate 30-60 min prior to therapy  Physiatry evaluation with management appreciated     Bowel management  New colostomy  monitoring     Vitamins/supplements as r/t deficiencies  [facility] RD to follow while in rehab; supplementation/diet as per [facility] RD  May continue home supplements  Vit D , level wnl     DVT Prophylaxis   Encourage exercise and participation with therapy as much as able        GI prophylaxis  none     Labs  CBC, CMP weekly and prn     Follow Ups:  PCP within 7 days of DC from rehab  Surgery PA in 2 weeks, Dr Brooks in one month.       *Established patient; follow-up moderately complex visit/ greater than 30       35 minutes spent w/ patient and staff, including but not limited to/ reviewing present status, needs, abilities with disciplines, reviewing medical records, vital signs, labs, completing medication reconciliation and entering orders for continued care in [facility].    Note to patient: The 21st Century Cures Act makes medical notes like these available to patients in the interest of transparency. However, this is a medical document intended as peer to peer communication. It is written in medical language and may contain abbreviations or verbiage that are unfamiliar. It may appear blunt or  direct. Medical documents are intended to carry relevant information, facts as evident, and the clinical opinion of the practitioner who signs the document.    Margarette Conley, APRN  7/23/2024

## 2024-07-25 ENCOUNTER — OFFICE VISIT (OUTPATIENT)
Facility: LOCATION | Age: 79
End: 2024-07-25
Payer: COMMERCIAL

## 2024-07-25 ENCOUNTER — TELEPHONE (OUTPATIENT)
Dept: FAMILY MEDICINE | Age: 79
End: 2024-07-25

## 2024-07-25 VITALS — HEART RATE: 73 BPM | TEMPERATURE: 98 F

## 2024-07-25 DIAGNOSIS — N73.9 PELVIC ABSCESS IN FEMALE: ICD-10-CM

## 2024-07-25 DIAGNOSIS — Z98.890 POSTOPERATIVE STATE: Primary | ICD-10-CM

## 2024-07-25 DIAGNOSIS — Z98.890 POST-OPERATIVE STATE: ICD-10-CM

## 2024-07-25 PROCEDURE — 1160F RVW MEDS BY RX/DR IN RCRD: CPT

## 2024-07-25 PROCEDURE — 1159F MED LIST DOCD IN RCRD: CPT

## 2024-07-25 PROCEDURE — 99024 POSTOP FOLLOW-UP VISIT: CPT

## 2024-07-25 NOTE — PROGRESS NOTES
Follow Up Visit Note       Active Problems      1. Postoperative state    2. Post-operative state    3. Pelvic abscess in female          Chief Complaint   Chief Complaint   Patient presents with    Post-Op     PO 7/11 EXPLORATORY LAPAROTOMY, LOW ANTERIOR COLON RESECTION, COLOSTOMY CREATION annie/Cecilia- abdominal pain after meals and sharp pain at times on left abdomen and lower back pain, reports nausea, denies vomiting, fevers or chills, urge having BMs          History of Present Illness  Bibi is a female who underwent exploratory laparotomy, low anterior colon resection, colostomy creation with Dr. Brooks on 7/11/2024.  She presents to clinic today with her granddaughter for follow-up evaluation.    She reports she is overall feeling okay following surgery.  She reports occasional abdominal pain.  She reports occasional nausea, but states this has been occurring since surgery.  She denies vomiting.  She reports an increase in her appetite.  She reports she is having regular gas and stool output from her ostomy.  She denies urinary symptoms.  She denies fever or chills.  She does report frustration with her care at her assisted living.  She reports that she is planning to move to [place] on Saturday to live with her son.  She denies concerns with her incision.  She reports her drain output has been minimal.    Specimen pathology as below:  Excision, portion of sigmoid colon:  -Diverticular disease with diverticulitis and peridiverticular abscess formation.  -Lines of excision with no significant pathologic change.  -No evidence of malignancy.  Allergies  Bibi is allergic to avelox [moxifloxacin hcl in nacl], avelox [moxifloxacin hydrochloride], amoxicillin, statins, sulfa antibiotics, dander, morphine, diphenhydramine, dust, latex, and sulfa drugs cross reactors.    Past Medical / Surgical / Social / Family History    The past medical and past surgical history have been reviewed by me today.    Past Medical  History:    Abdominal distention    Abdominal pain    Acquired hypothyroidism    Anxiety    Asthma (HCC)    Belching    Bloating    Chronic rhinitis    CKD (chronic kidney disease) stage 3, GFR 30-59 ml/min (HCC)    Constipation    COPD (chronic obstructive pulmonary disease) ([facility])    NO OXYGEN     Depression    Diarrhea, unspecified    Diverticulosis of large intestine    Emphysema    Esophageal reflux    Fatigue    Feeling lonely    Flatulence/gas pain/belching    Food intolerance    H/O bronchitis    Hay fever    Hemorrhoids    High blood pressure    History of depression    Hypercholesterolemia    Hypertension    Itch of skin    Migraines    Mild intermittent asthma without complication (HCC)    Nausea    Night sweats    Pneumonia    PONV (postoperative nausea and vomiting)    Pure hypercholesterolemia    Rash    Reflux    Sleep disturbance    Stress    Uncomfortable fullness after meals    Visual impairment    glasses    Vomiting    Wears glasses     Past Surgical History:   Procedure Laterality Date    Cataract      Colonoscopy      Colonoscopy N/A 3/21/2024    Procedure: COLONOSCOPY;  Surgeon: Delon Ashford DO;  Location:  ENDOSCOPY    Colonoscopy & polypectomy  09/2014    multiple polyps; tics; repeat 3 yrs    Colonoscopy,remv lesn,snare N/A 09/22/2014    Procedure: ESOPHAGOGASTRODUODENOSCOPY, COLONOSCOPY, POSSIBLE BIOPSY, POSSIBLE POLYPECTOMY 63351,96706;  Surgeon: Slade Ivan MD;  Location: [facility]    Correct bunion,simple Bilateral     Cystotomy,excis vesical neck Left     Egd      Gastro - dmg  09/2014    stricture; HH    Oophorectomy Bilateral 2017    Other surgical history  02/27/2023    Robotic repair of hiatal hernia and Nissen fundoplication    Patient documented not to have experienced any of the following events N/A 09/22/2014    Procedure: ESOPHAGOGASTRODUODENOSCOPY, COLONOSCOPY, POSSIBLE BIOPSY, POSSIBLE POLYPECTOMY 53727,49155;  Surgeon: Slade Ivan MD;  Location:  [facility]    Patient withough preoperative order for iv antibiotic surgical site infection prophylaxis. N/A 2014    Procedure: ESOPHAGOGASTRODUODENOSCOPY, COLONOSCOPY, POSSIBLE BIOPSY, POSSIBLE POLYPECTOMY 86681,03921;  Surgeon: Slade Ivan MD;  Location: [facility]    Repair ing hernia,5+y/o,reducibl      Upper gi endoscopy,diagnosis N/A 2014    Procedure: ESOPHAGOGASTRODUODENOSCOPY, COLONOSCOPY, POSSIBLE BIOPSY, POSSIBLE POLYPECTOMY 76003,09820;  Surgeon: Slade Ivan MD;  Location: [facility]       The family history and social history have been reviewed by me today.    Family History   Problem Relation Age of Onset    Colon Cancer Mother     Other (Other) Mother     Other (copd) Mother     Alcohol and Other Disorders Associated Father     Other (Other) Father     Other (emph) Father     Alcohol and Other Disorders Associated Son     Hypertension Sister     Prostate Cancer Brother     Hypertension Brother      Social History     Socioeconomic History    Marital status:    Tobacco Use    Smoking status: Former     Current packs/day: 0.00     Types: Cigarettes     Start date: 1970     Quit date: 1980     Years since quittin.8    Smokeless tobacco: Never    Tobacco comments:      1/2 pack per day. Social history shows \"Tobacco Use: Active\" and \"Never Smoker: Active\"   Vaping Use    Vaping status: Never Used   Substance and Sexual Activity    Alcohol use: No    Drug use: No   Other Topics Concern    Caffeine Concern Yes     Comment: \"1 cup of coffee and tea, and can of pop daily\"    Exercise Yes     Comment: walking        Current Outpatient Medications:     traMADol 50 MG Oral Tab, Take 1 tablet (50 mg total) by mouth every 8 (eight) hours as needed., Disp: 20 tablet, Rfl: 0    cefadroxil 500 MG Oral Cap, Take 1 capsule (500 mg total) by mouth 2 (two) times daily for 10 days., Disp: 20 capsule, Rfl: 0    metRONIDAZOLE 500 MG Oral Tab, Take 1 tablet (500  mg total) by mouth in the morning and 1 tablet (500 mg total) before bedtime. Do all this for 10 days., Disp: 20 tablet, Rfl: 0    clonazePAM 0.5 MG Oral Tab, Take 1 tablet (0.5 mg total) by mouth in the morning and 1 tablet (0.5 mg total) before bedtime., Disp: 28 tablet, Rfl: 0    ondansetron 4 MG Oral Tablet Dispersible, Take 1 tablet (4 mg total) by mouth every 8 (eight) hours as needed for Nausea., Disp: 30 tablet, Rfl: 0    multivitamin with minerals Oral Tab, Take 1 tablet by mouth daily., Disp: 30 tablet, Rfl: 1    meclizine 12.5 MG Oral Tab, Take 1 tablet (12.5 mg total) by mouth 3 (three) times daily as needed., Disp: 30 tablet, Rfl: 1    mirtazapine 30 MG Oral Tab, Take 1 tablet (30 mg total) by mouth nightly., Disp: 90 tablet, Rfl: 1    fluticasone propionate 50 MCG/ACT Nasal Suspension, 2 sprays by Each Nare route daily., Disp: 1 each, Rfl: 1    losartan 25 MG Oral Tab, Take 1 tablet (25 mg total) by mouth daily., Disp: 90 tablet, Rfl: 1    albuterol (PROAIR HFA) 108 (90 Base) MCG/ACT Inhalation Aero Soln, INHALE 2 PUFFS BY MOUTH EVERY 6 HOURS AS NEEDED FOR WHEEZING, Disp: 8.5 g, Rfl: 2    SYMBICORT 160-4.5 MCG/ACT Inhalation Aerosol, INHALE 2 PUFFS BY MOUTH TWICE DAILY, Disp: 10.2 g, Rfl: 11    Cholecalciferol (VITAMIN D-3 OR), Take 25 mcg by mouth daily with breakfast., Disp: , Rfl:     DUPIXENT 300 MG/2ML Subcutaneous Solution Prefilled Syringe, Taking every 2 weeks, Disp: , Rfl:     TRIAMCINOLONE 0.1 % External Cream, APPLY TOPICALLY TO THE AFFECTED AREA TWICE DAILY AS NEEDED (Patient taking differently: as needed.), Disp: 60 g, Rfl: 3    acetaminophen 500 MG Oral Tab, Take 1 tablet (500 mg total) by mouth every 6 (six) hours as needed for Pain., Disp: , Rfl:      Review of Systems  The Review of Systems has been reviewed by me during today.  Review of Systems   Constitutional:  Negative for activity change, appetite change, chills, fatigue and fever.   Gastrointestinal:  Positive for abdominal  pain. Negative for abdominal distention, constipation, diarrhea, nausea and vomiting.   Genitourinary:  Negative for difficulty urinating, dysuria and urgency.   Skin:  Negative for rash and wound.        Physical Findings   Pulse 73   Temp 97.5 °F (36.4 °C) (Temporal)   Physical Exam  Vitals reviewed.   Constitutional:       General: She is not in acute distress.     Appearance: Normal appearance. She is not ill-appearing.   HENT:      Head: Normocephalic and atraumatic.   Eyes:      General: No scleral icterus.     Conjunctiva/sclera: Conjunctivae normal.   Pulmonary:      Effort: Pulmonary effort is normal. No respiratory distress.   Abdominal:      General: Abdomen is flat. There is no distension.      Palpations: Abdomen is soft.      Tenderness: There is no abdominal tenderness. There is no guarding or rebound.          Comments: Midline incision is clean, dry, with staples in place.  No surrounding erythema.  No fluctuance to palpation.  No signs of infection.      I removed the patient's staples at today's visit.  Steri-Strips were applied over the incisions.   Musculoskeletal:      Cervical back: Normal range of motion.   Skin:     General: Skin is warm and dry.      Coloration: Skin is not jaundiced.      Findings: No erythema.   Neurological:      Mental Status: She is alert.   Psychiatric:         Mood and Affect: Mood normal.         Behavior: Behavior normal.          Assessment   1. Postoperative state    2. Post-operative state    3. Pelvic abscess in female        Plan   The patient is doing well postoperatively.  Continue Diet as tolerated.  Slowly begin to add fiber back into her diet.  Tylenol and Ibuprofen as needed for pain control.  She can apply cold or warm compresses for comfort.  Continue local wound care, soap and water to the incisions.  I discussed with the patient that the Steri-Strips will fall off on their own.  I removed the patient's drain at today's visit. I discussed with the  patient that this drain site will close on its own in the next 48-72 hours. They should continue to keep the area clean and dry. They may apply a dry dressing over the area and change twice daily or as needed for saturation. Instructed patient to contact the office with any concerns of infection.    Pathology discussed with the patient.   Recommend Lifting restrictions of no greater than 15-20 lbs for 6 weeks postoperatively  All the patient's questions and concerns were addressed. They voiced understanding and are in agreement with the plan     Follow Up  She is scheduled to follow-up with Dr. Brooks on 8/8/2024 for her 1 month follow-up, sooner if needed.    Myra Mustafa PA-C

## 2024-07-26 ENCOUNTER — TELEPHONE (OUTPATIENT)
Facility: LOCATION | Age: 79
End: 2024-07-26

## 2024-07-26 ENCOUNTER — TELEPHONE (OUTPATIENT)
Facility: CLINIC | Age: 79
End: 2024-07-26

## 2024-07-26 NOTE — TELEPHONE ENCOUNTER
Spoke to facility staff who states patient has drain in place still.  Staff states patient scheduled for discharge today and wanted to make sure we are aware.  Reached out to PA's regarding this matter.  CHON Renteria said should would see about the matter.

## 2024-07-26 NOTE — TELEPHONE ENCOUNTER
Received call from patient's living facility regarding penrose drain that is in place to previous IR drain site. I informed the nurse that it is okay to remove the penrose drain and apply a dry dressing, change twice daily as needed for saturation.    She should follow up with Dr. Brooks on 8/8/2024 as scheduled, sooner if needed.    Myra Mustafa PA-C

## 2024-07-26 NOTE — TELEPHONE ENCOUNTER
Good evening,   The nurse from the facility that the patient is recovering at called to make sure that the drain under the stoma was left in on purpose before the patient is discharged today.    Call back # [phone]

## 2024-07-31 PROBLEM — E03.9 ACQUIRED HYPOTHYROIDISM: Chronic | Status: ACTIVE | Noted: 2018-03-01

## 2024-07-31 PROBLEM — K44.9 DIAPHRAGMATIC HERNIA WITHOUT OBSTRUCTION OR GANGRENE: Status: ACTIVE | Noted: 2024-01-01

## 2024-07-31 PROBLEM — I70.0 AORTIC ATHEROSCLEROSIS (CMD): Status: ACTIVE | Noted: 2020-06-15

## 2024-07-31 PROBLEM — M05.20 RHEUMATOID ARTERITIS  (CMD): Status: ACTIVE | Noted: 2020-01-03

## 2024-07-31 PROBLEM — J44.0 CHRONIC OBSTRUCTIVE PULMONARY DISEASE WITH ACUTE LOWER RESPIRATORY INFECTION  (CMD): Status: ACTIVE | Noted: 2018-03-01

## 2024-07-31 PROBLEM — I10 HYPERTENSION: Status: ACTIVE | Noted: 2023-01-19

## 2024-07-31 PROBLEM — K57.92 ACUTE DIVERTICULITIS: Status: ACTIVE | Noted: 2024-01-01

## 2024-07-31 PROBLEM — N73.9 PELVIC ABSCESS IN FEMALE: Status: ACTIVE | Noted: 2024-07-03

## 2024-07-31 PROBLEM — K57.20 DIVERTICULITIS OF LARGE INTESTINE WITH PERFORATION AND ABSCESS WITHOUT BLEEDING: Status: ACTIVE | Noted: 2024-01-01

## 2024-07-31 PROBLEM — K57.20 COLONIC DIVERTICULAR ABSCESS: Status: ACTIVE | Noted: 2024-01-01

## 2024-07-31 PROBLEM — K57.32 SIGMOID DIVERTICULITIS: Status: ACTIVE | Noted: 2024-06-18

## 2024-07-31 PROBLEM — E46 PROTEIN-CALORIE MALNUTRITION  (CMD): Status: ACTIVE | Noted: 2024-01-01

## 2024-07-31 PROBLEM — D69.6 THROMBOCYTOPENIA (CMD): Chronic | Status: ACTIVE | Noted: 2024-05-14

## 2024-07-31 PROBLEM — K65.1 PERITONEAL ABSCESS  (CMD): Status: ACTIVE | Noted: 2024-07-16

## 2024-07-31 PROBLEM — N18.30 CKD (CHRONIC KIDNEY DISEASE) STAGE 3, GFR 30-59 ML/MIN  (CMD): Chronic | Status: ACTIVE | Noted: 2019-07-09

## 2024-07-31 PROBLEM — E78.5 HYPERLIPIDEMIA: Status: ACTIVE | Noted: 2023-01-18

## 2024-07-31 PROBLEM — F41.9 ANXIETY DISORDER, UNSPECIFIED: Status: ACTIVE | Noted: 2024-01-01

## 2024-08-02 ENCOUNTER — HOSPITAL ENCOUNTER (INPATIENT)
Age: 79
Discharge: STILL ADMITTED OR ADMINISTRATIVE | DRG: 388 | End: 2024-08-02
Attending: EMERGENCY MEDICINE | Admitting: HOSPITALIST

## 2024-08-02 ENCOUNTER — APPOINTMENT (OUTPATIENT)
Dept: CT IMAGING | Age: 79
DRG: 388 | End: 2024-08-02
Attending: EMERGENCY MEDICINE

## 2024-08-02 ENCOUNTER — TELEPHONE (OUTPATIENT)
Dept: CASE MANAGEMENT | Facility: HOSPITAL | Age: 79
End: 2024-08-02

## 2024-08-02 ENCOUNTER — APPOINTMENT (OUTPATIENT)
Dept: FAMILY MEDICINE | Age: 79
End: 2024-08-02

## 2024-08-02 ENCOUNTER — APPOINTMENT (OUTPATIENT)
Dept: GENERAL RADIOLOGY | Age: 79
DRG: 388 | End: 2024-08-02
Attending: EMERGENCY MEDICINE

## 2024-08-02 DIAGNOSIS — K57.20 COLONIC DIVERTICULAR ABSCESS: ICD-10-CM

## 2024-08-02 DIAGNOSIS — K57.92 DIVERTICULITIS: ICD-10-CM

## 2024-08-02 DIAGNOSIS — K56.609 SMALL BOWEL OBSTRUCTION  (CMD): Primary | ICD-10-CM

## 2024-08-02 PROBLEM — Z90.49 HISTORY OF LOW ANTERIOR RESECTION OF RECTUM: Status: ACTIVE | Noted: 2024-01-01

## 2024-08-02 LAB
ALBUMIN SERPL-MCNC: 3.1 G/DL (ref 3.6–5.1)
ALBUMIN SERPL-MCNC: 3.4 G/DL (ref 3.6–5.1)
ALBUMIN/GLOB SERPL: 0.8 {RATIO} (ref 1–2.4)
ALBUMIN/GLOB SERPL: 0.9 {RATIO} (ref 1–2.4)
ALP SERPL-CCNC: 76 UNITS/L (ref 45–117)
ALP SERPL-CCNC: 79 UNITS/L (ref 45–117)
ALT SERPL-CCNC: 13 UNITS/L
ALT SERPL-CCNC: 16 UNITS/L
ANION GAP SERPL CALC-SCNC: 15 MMOL/L (ref 7–19)
ANION GAP SERPL CALC-SCNC: 17 MMOL/L (ref 7–19)
APPEARANCE UR: CLEAR
AST SERPL-CCNC: 18 UNITS/L
AST SERPL-CCNC: 19 UNITS/L
ATRIAL RATE (BPM): 69
BASOPHILS # BLD: 0 K/MCL (ref 0–0.3)
BASOPHILS # BLD: 0.1 K/MCL (ref 0–0.3)
BASOPHILS NFR BLD: 0 %
BASOPHILS NFR BLD: 0 %
BILIRUB SERPL-MCNC: 0.3 MG/DL (ref 0.2–1)
BILIRUB SERPL-MCNC: 0.4 MG/DL (ref 0.2–1)
BILIRUB UR QL STRIP: NEGATIVE
BUN SERPL-MCNC: 14 MG/DL (ref 6–20)
BUN SERPL-MCNC: 15 MG/DL (ref 6–20)
BUN/CREAT SERPL: 22 (ref 7–25)
BUN/CREAT SERPL: 22 (ref 7–25)
CALCIUM SERPL-MCNC: 9.4 MG/DL (ref 8.4–10.2)
CALCIUM SERPL-MCNC: 9.9 MG/DL (ref 8.4–10.2)
CHLORIDE SERPL-SCNC: 100 MMOL/L (ref 97–110)
CHLORIDE SERPL-SCNC: 99 MMOL/L (ref 97–110)
CO2 SERPL-SCNC: 24 MMOL/L (ref 21–32)
CO2 SERPL-SCNC: 24 MMOL/L (ref 21–32)
COLOR UR: ABNORMAL
CREAT SERPL-MCNC: 0.65 MG/DL (ref 0.51–0.95)
CREAT SERPL-MCNC: 0.67 MG/DL (ref 0.51–0.95)
DEPRECATED RDW RBC: 60.8 FL (ref 39–50)
DEPRECATED RDW RBC: 61.9 FL (ref 39–50)
DIASTOLIC BLOOD PRESSURE: 79
EGFRCR SERPLBLD CKD-EPI 2021: 88 ML/MIN/{1.73_M2}
EGFRCR SERPLBLD CKD-EPI 2021: 89 ML/MIN/{1.73_M2}
EOSINOPHIL # BLD: 0 K/MCL (ref 0–0.5)
EOSINOPHIL # BLD: 0.1 K/MCL (ref 0–0.5)
EOSINOPHIL NFR BLD: 0 %
EOSINOPHIL NFR BLD: 1 %
ERYTHROCYTE [DISTWIDTH] IN BLOOD: 18.5 % (ref 11–15)
ERYTHROCYTE [DISTWIDTH] IN BLOOD: 18.6 % (ref 11–15)
FASTING DURATION TIME PATIENT: ABNORMAL H
FASTING DURATION TIME PATIENT: ABNORMAL H
GLOBULIN SER-MCNC: 3.7 G/DL (ref 2–4)
GLOBULIN SER-MCNC: 3.9 G/DL (ref 2–4)
GLUCOSE SERPL-MCNC: 155 MG/DL (ref 70–99)
GLUCOSE SERPL-MCNC: 175 MG/DL (ref 70–99)
GLUCOSE UR STRIP-MCNC: NEGATIVE MG/DL
HCT VFR BLD CALC: 34.7 % (ref 36–46.5)
HCT VFR BLD CALC: 36.2 % (ref 36–46.5)
HGB BLD-MCNC: 11.2 G/DL (ref 12–15.5)
HGB BLD-MCNC: 11.6 G/DL (ref 12–15.5)
HGB UR QL STRIP: NEGATIVE
IMM GRANULOCYTES # BLD AUTO: 0 K/MCL (ref 0–0.2)
IMM GRANULOCYTES # BLD AUTO: 0.1 K/MCL (ref 0–0.2)
IMM GRANULOCYTES # BLD: 0 %
IMM GRANULOCYTES # BLD: 1 %
INR PPP: 1
KETONES UR STRIP-MCNC: ABNORMAL MG/DL
LACTATE BLDV-SCNC: 2 MMOL/L (ref 0–2)
LEUKOCYTE ESTERASE UR QL STRIP: NEGATIVE
LIPASE SERPL-CCNC: 48 UNITS/L (ref 15–77)
LYMPHOCYTES # BLD: 0.5 K/MCL (ref 1–4)
LYMPHOCYTES # BLD: 1.4 K/MCL (ref 1–4)
LYMPHOCYTES NFR BLD: 12 %
LYMPHOCYTES NFR BLD: 4 %
MAGNESIUM SERPL-MCNC: 1.9 MG/DL (ref 1.7–2.4)
MCH RBC QN AUTO: 28.4 PG (ref 26–34)
MCH RBC QN AUTO: 28.8 PG (ref 26–34)
MCHC RBC AUTO-ENTMCNC: 32 G/DL (ref 32–36.5)
MCHC RBC AUTO-ENTMCNC: 32.3 G/DL (ref 32–36.5)
MCV RBC AUTO: 88.1 FL (ref 78–100)
MCV RBC AUTO: 89.8 FL (ref 78–100)
MONOCYTES # BLD: 0.9 K/MCL (ref 0.3–0.9)
MONOCYTES # BLD: 1 K/MCL (ref 0.3–0.9)
MONOCYTES NFR BLD: 8 %
MONOCYTES NFR BLD: 8 %
NEUTROPHILS # BLD: 11.2 K/MCL (ref 1.8–7.7)
NEUTROPHILS # BLD: 9.5 K/MCL (ref 1.8–7.7)
NEUTROPHILS NFR BLD: 79 %
NEUTROPHILS NFR BLD: 87 %
NITRITE UR QL STRIP: NEGATIVE
NRBC BLD MANUAL-RTO: 0 /100 WBC
NRBC BLD MANUAL-RTO: 0 /100 WBC
P AXIS (DEGREES): 56
PH UR STRIP: 7 [PH] (ref 5–7)
PLATELET # BLD AUTO: 331 K/MCL (ref 140–450)
PLATELET # BLD AUTO: 396 K/MCL (ref 140–450)
POTASSIUM SERPL-SCNC: 3.6 MMOL/L (ref 3.4–5.1)
POTASSIUM SERPL-SCNC: 3.8 MMOL/L (ref 3.4–5.1)
PR-INTERVAL (MSEC): 160
PROT SERPL-MCNC: 6.8 G/DL (ref 6.4–8.2)
PROT SERPL-MCNC: 7.3 G/DL (ref 6.4–8.2)
PROT UR STRIP-MCNC: NEGATIVE MG/DL
PROTHROMBIN TIME: 10.7 SEC (ref 9.7–11.8)
QRS-INTERVAL (MSEC): 78
QT-INTERVAL (MSEC): 452
QTC: 484
R AXIS (DEGREES): -18
RAINBOW EXTRA TUBES HOLD SPECIMEN: NORMAL
RBC # BLD: 3.94 MIL/MCL (ref 4–5.2)
RBC # BLD: 4.03 MIL/MCL (ref 4–5.2)
REPORT TEXT: NORMAL
SODIUM SERPL-SCNC: 135 MMOL/L (ref 135–145)
SODIUM SERPL-SCNC: 136 MMOL/L (ref 135–145)
SP GR UR STRIP: <1.005 (ref 1–1.03)
SYSTOLIC BLOOD PRESSURE: 163
T AXIS (DEGREES): 41
T4 FREE SERPL-MCNC: 1 NG/DL (ref 0.8–1.5)
TSH SERPL-ACNC: 20.28 MCUNITS/ML (ref 0.35–5)
UROBILINOGEN UR STRIP-MCNC: 0.2 MG/DL
VENTRICULAR RATE EKG/MIN (BPM): 69
WBC # BLD: 12 K/MCL (ref 4.2–11)
WBC # BLD: 12.8 K/MCL (ref 4.2–11)

## 2024-08-02 PROCEDURE — 96361 HYDRATE IV INFUSION ADD-ON: CPT

## 2024-08-02 PROCEDURE — 85610 PROTHROMBIN TIME: CPT | Performed by: EMERGENCY MEDICINE

## 2024-08-02 PROCEDURE — 10002800 HB RX 250 W HCPCS

## 2024-08-02 PROCEDURE — 10002807 HB RX 258: Performed by: EMERGENCY MEDICINE

## 2024-08-02 PROCEDURE — 10002801 HB RX 250 W/O HCPCS: Performed by: NURSE PRACTITIONER

## 2024-08-02 PROCEDURE — 80053 COMPREHEN METABOLIC PANEL: CPT | Performed by: EMERGENCY MEDICINE

## 2024-08-02 PROCEDURE — 99285 EMERGENCY DEPT VISIT HI MDM: CPT

## 2024-08-02 PROCEDURE — 36415 COLL VENOUS BLD VENIPUNCTURE: CPT | Performed by: HOSPITALIST

## 2024-08-02 PROCEDURE — 97162 PT EVAL MOD COMPLEX 30 MIN: CPT

## 2024-08-02 PROCEDURE — 10002800 HB RX 250 W HCPCS: Performed by: EMERGENCY MEDICINE

## 2024-08-02 PROCEDURE — 93005 ELECTROCARDIOGRAM TRACING: CPT | Performed by: EMERGENCY MEDICINE

## 2024-08-02 PROCEDURE — 99285 EMERGENCY DEPT VISIT HI MDM: CPT | Performed by: EMERGENCY MEDICINE

## 2024-08-02 PROCEDURE — 10004173 HB COUNTER-THERAPY VISIT PT

## 2024-08-02 PROCEDURE — 10002805 HB CONTRAST AGENT: Performed by: EMERGENCY MEDICINE

## 2024-08-02 PROCEDURE — 10004172 HB COUNTER-THERAPY VISIT OT: Performed by: OCCUPATIONAL THERAPIST

## 2024-08-02 PROCEDURE — 10002807 HB RX 258: Performed by: HOSPITALIST

## 2024-08-02 PROCEDURE — 74177 CT ABD & PELVIS W/CONTRAST: CPT

## 2024-08-02 PROCEDURE — 96376 TX/PRO/DX INJ SAME DRUG ADON: CPT

## 2024-08-02 PROCEDURE — 74177 CT ABD & PELVIS W/CONTRAST: CPT | Performed by: STUDENT IN AN ORGANIZED HEALTH CARE EDUCATION/TRAINING PROGRAM

## 2024-08-02 PROCEDURE — 96375 TX/PRO/DX INJ NEW DRUG ADDON: CPT

## 2024-08-02 PROCEDURE — 93010 ELECTROCARDIOGRAM REPORT: CPT | Performed by: INTERNAL MEDICINE

## 2024-08-02 PROCEDURE — 97530 THERAPEUTIC ACTIVITIES: CPT

## 2024-08-02 PROCEDURE — 99221 1ST HOSP IP/OBS SF/LOW 40: CPT | Performed by: SURGERY

## 2024-08-02 PROCEDURE — 96374 THER/PROPH/DIAG INJ IV PUSH: CPT

## 2024-08-02 PROCEDURE — 71045 X-RAY EXAM CHEST 1 VIEW: CPT

## 2024-08-02 PROCEDURE — 83605 ASSAY OF LACTIC ACID: CPT | Performed by: EMERGENCY MEDICINE

## 2024-08-02 PROCEDURE — 83690 ASSAY OF LIPASE: CPT | Performed by: EMERGENCY MEDICINE

## 2024-08-02 PROCEDURE — 84443 ASSAY THYROID STIM HORMONE: CPT | Performed by: HOSPITALIST

## 2024-08-02 PROCEDURE — 85025 COMPLETE CBC W/AUTO DIFF WBC: CPT | Performed by: HOSPITALIST

## 2024-08-02 PROCEDURE — 10004651 HB RX, NO CHARGE ITEM: Performed by: HOSPITALIST

## 2024-08-02 PROCEDURE — 10002800 HB RX 250 W HCPCS: Performed by: STUDENT IN AN ORGANIZED HEALTH CARE EDUCATION/TRAINING PROGRAM

## 2024-08-02 PROCEDURE — 94640 AIRWAY INHALATION TREATMENT: CPT

## 2024-08-02 PROCEDURE — 10000002 HB ROOM CHARGE MED SURG

## 2024-08-02 PROCEDURE — 71045 X-RAY EXAM CHEST 1 VIEW: CPT | Performed by: RADIOLOGY

## 2024-08-02 PROCEDURE — 80053 COMPREHEN METABOLIC PANEL: CPT | Performed by: HOSPITALIST

## 2024-08-02 PROCEDURE — 99223 1ST HOSP IP/OBS HIGH 75: CPT | Performed by: HOSPITALIST

## 2024-08-02 PROCEDURE — 10002800 HB RX 250 W HCPCS: Performed by: HOSPITALIST

## 2024-08-02 PROCEDURE — 81003 URINALYSIS AUTO W/O SCOPE: CPT | Performed by: EMERGENCY MEDICINE

## 2024-08-02 PROCEDURE — 85025 COMPLETE CBC W/AUTO DIFF WBC: CPT | Performed by: EMERGENCY MEDICINE

## 2024-08-02 PROCEDURE — 97166 OT EVAL MOD COMPLEX 45 MIN: CPT | Performed by: OCCUPATIONAL THERAPIST

## 2024-08-02 PROCEDURE — 84439 ASSAY OF FREE THYROXINE: CPT | Performed by: HOSPITALIST

## 2024-08-02 PROCEDURE — 83735 ASSAY OF MAGNESIUM: CPT | Performed by: HOSPITALIST

## 2024-08-02 PROCEDURE — 36415 COLL VENOUS BLD VENIPUNCTURE: CPT

## 2024-08-02 PROCEDURE — 97530 THERAPEUTIC ACTIVITIES: CPT | Performed by: OCCUPATIONAL THERAPIST

## 2024-08-02 PROCEDURE — 10002803 HB RX 637: Performed by: HOSPITALIST

## 2024-08-02 RX ORDER — LORAZEPAM 2 MG/ML
0.5 INJECTION INTRAMUSCULAR EVERY 8 HOURS PRN
Status: DISCONTINUED | OUTPATIENT
Start: 2024-08-02 | End: 2024-08-06 | Stop reason: HOSPADM

## 2024-08-02 RX ORDER — ACETAMINOPHEN 325 MG/1
650 TABLET ORAL EVERY 4 HOURS PRN
Status: DISCONTINUED | OUTPATIENT
Start: 2024-08-02 | End: 2024-08-02

## 2024-08-02 RX ORDER — DULOXETIN HYDROCHLORIDE 30 MG/1
30 CAPSULE, DELAYED RELEASE ORAL DAILY
COMMUNITY

## 2024-08-02 RX ORDER — PANTOPRAZOLE SODIUM 40 MG/1
40 TABLET, DELAYED RELEASE ORAL
Status: DISCONTINUED | OUTPATIENT
Start: 2024-08-02 | End: 2024-08-02

## 2024-08-02 RX ORDER — ACETAMINOPHEN 650 MG/1
650 SUPPOSITORY RECTAL EVERY 4 HOURS PRN
Status: DISCONTINUED | OUTPATIENT
Start: 2024-08-02 | End: 2024-08-06 | Stop reason: HOSPADM

## 2024-08-02 RX ORDER — 0.9 % SODIUM CHLORIDE 0.9 %
2 VIAL (ML) INJECTION EVERY 12 HOURS SCHEDULED
Status: DISCONTINUED | OUTPATIENT
Start: 2024-08-02 | End: 2024-08-06 | Stop reason: HOSPADM

## 2024-08-02 RX ORDER — CLONAZEPAM 0.5 MG/1
0.5 TABLET ORAL 2 TIMES DAILY PRN
Status: DISCONTINUED | OUTPATIENT
Start: 2024-08-02 | End: 2024-08-06 | Stop reason: HOSPADM

## 2024-08-02 RX ORDER — METOCLOPRAMIDE HYDROCHLORIDE 5 MG/ML
5 INJECTION INTRAMUSCULAR; INTRAVENOUS EVERY 6 HOURS PRN
Status: DISCONTINUED | OUTPATIENT
Start: 2024-08-02 | End: 2024-08-06 | Stop reason: HOSPADM

## 2024-08-02 RX ORDER — IPRATROPIUM BROMIDE AND ALBUTEROL SULFATE 2.5; .5 MG/3ML; MG/3ML
3 SOLUTION RESPIRATORY (INHALATION)
Status: DISCONTINUED | OUTPATIENT
Start: 2024-08-02 | End: 2024-08-06 | Stop reason: HOSPADM

## 2024-08-02 RX ORDER — ATORVASTATIN CALCIUM 40 MG/1
40 TABLET, FILM COATED ORAL DAILY
Status: DISCONTINUED | OUTPATIENT
Start: 2024-08-02 | End: 2024-08-02

## 2024-08-02 RX ORDER — ACETAMINOPHEN 325 MG/1
650 TABLET ORAL EVERY 4 HOURS PRN
Status: DISCONTINUED | OUTPATIENT
Start: 2024-08-02 | End: 2024-08-06 | Stop reason: HOSPADM

## 2024-08-02 RX ORDER — TRAMADOL HYDROCHLORIDE 50 MG/1
50 TABLET ORAL EVERY 6 HOURS PRN
COMMUNITY

## 2024-08-02 RX ORDER — LEVOTHYROXINE SODIUM 50 UG/1
50 TABLET ORAL
Status: DISCONTINUED | OUTPATIENT
Start: 2024-08-02 | End: 2024-08-02

## 2024-08-02 RX ORDER — HYDROCODONE BITARTRATE AND ACETAMINOPHEN 5; 325 MG/1; MG/1
1 TABLET ORAL EVERY 4 HOURS PRN
Status: DISCONTINUED | OUTPATIENT
Start: 2024-08-02 | End: 2024-08-06 | Stop reason: HOSPADM

## 2024-08-02 RX ORDER — ONDANSETRON 2 MG/ML
4 INJECTION INTRAMUSCULAR; INTRAVENOUS EVERY 6 HOURS PRN
Status: DISCONTINUED | OUTPATIENT
Start: 2024-08-02 | End: 2024-08-06 | Stop reason: HOSPADM

## 2024-08-02 RX ORDER — LORAZEPAM 2 MG/ML
0.5 INJECTION INTRAMUSCULAR ONCE
Status: COMPLETED | OUTPATIENT
Start: 2024-08-02 | End: 2024-08-02

## 2024-08-02 RX ORDER — POTASSIUM CHLORIDE 14.9 MG/ML
20 INJECTION INTRAVENOUS ONCE
Status: COMPLETED | OUTPATIENT
Start: 2024-08-02 | End: 2024-08-02

## 2024-08-02 RX ORDER — SODIUM CHLORIDE, SODIUM LACTATE, POTASSIUM CHLORIDE, CALCIUM CHLORIDE 600; 310; 30; 20 MG/100ML; MG/100ML; MG/100ML; MG/100ML
INJECTION, SOLUTION INTRAVENOUS CONTINUOUS
Status: DISCONTINUED | OUTPATIENT
Start: 2024-08-02 | End: 2024-08-05

## 2024-08-02 RX ORDER — MECLIZINE HCL 12.5 MG 12.5 MG/1
12.5 TABLET ORAL 3 TIMES DAILY PRN
COMMUNITY

## 2024-08-02 RX ORDER — LOSARTAN POTASSIUM 25 MG/1
25 TABLET ORAL DAILY
COMMUNITY

## 2024-08-02 RX ORDER — FAMOTIDINE 10 MG/ML
20 INJECTION, SOLUTION INTRAVENOUS DAILY
Status: DISCONTINUED | OUTPATIENT
Start: 2024-08-02 | End: 2024-08-06 | Stop reason: HOSPADM

## 2024-08-02 RX ORDER — MIRTAZAPINE 30 MG/1
30 TABLET, FILM COATED ORAL DAILY
COMMUNITY

## 2024-08-02 RX ORDER — BUDESONIDE AND FORMOTEROL FUMARATE DIHYDRATE 160; 4.5 UG/1; UG/1
2 AEROSOL RESPIRATORY (INHALATION)
Status: DISCONTINUED | OUTPATIENT
Start: 2024-08-02 | End: 2024-08-05

## 2024-08-02 RX ORDER — AMLODIPINE BESYLATE 5 MG/1
2.5 TABLET ORAL DAILY
Status: DISCONTINUED | OUTPATIENT
Start: 2024-08-02 | End: 2024-08-02

## 2024-08-02 RX ORDER — ONDANSETRON 4 MG/1
4 TABLET, FILM COATED ORAL EVERY 8 HOURS PRN
COMMUNITY

## 2024-08-02 RX ORDER — ONDANSETRON 2 MG/ML
4 INJECTION INTRAMUSCULAR; INTRAVENOUS ONCE
Status: COMPLETED | OUTPATIENT
Start: 2024-08-02 | End: 2024-08-02

## 2024-08-02 RX ADMIN — MORPHINE SULFATE 4 MG: 4 INJECTION INTRAVENOUS at 03:16

## 2024-08-02 RX ADMIN — ONDANSETRON 4 MG: 2 INJECTION INTRAMUSCULAR; INTRAVENOUS at 12:00

## 2024-08-02 RX ADMIN — PIPERACILLIN AND TAZOBACTAM 3.38 G: 3; .375 INJECTION, POWDER, FOR SOLUTION INTRAVENOUS at 22:19

## 2024-08-02 RX ADMIN — SODIUM CHLORIDE, POTASSIUM CHLORIDE, SODIUM LACTATE AND CALCIUM CHLORIDE: 600; 310; 30; 20 INJECTION, SOLUTION INTRAVENOUS at 06:18

## 2024-08-02 RX ADMIN — SODIUM CHLORIDE, PRESERVATIVE FREE 2 ML: 5 INJECTION INTRAVENOUS at 22:20

## 2024-08-02 RX ADMIN — MORPHINE SULFATE 4 MG: 4 INJECTION INTRAVENOUS at 08:38

## 2024-08-02 RX ADMIN — METOCLOPRAMIDE HYDROCHLORIDE 5 MG: 5 INJECTION INTRAMUSCULAR; INTRAVENOUS at 20:42

## 2024-08-02 RX ADMIN — BUDESONIDE AND FORMOTEROL FUMARATE DIHYDRATE 2 PUFF: 160; 4.5 AEROSOL RESPIRATORY (INHALATION) at 08:43

## 2024-08-02 RX ADMIN — MORPHINE SULFATE 4 MG: 4 INJECTION INTRAVENOUS at 20:50

## 2024-08-02 RX ADMIN — IOHEXOL 100 ML: 300 INJECTION, SOLUTION INTRAVENOUS at 02:50

## 2024-08-02 RX ADMIN — PIPERACILLIN AND TAZOBACTAM 3.38 G: 3; .375 INJECTION, POWDER, FOR SOLUTION INTRAVENOUS at 14:14

## 2024-08-02 RX ADMIN — MORPHINE SULFATE 4 MG: 4 INJECTION INTRAVENOUS at 01:54

## 2024-08-02 RX ADMIN — SODIUM CHLORIDE 500 ML: 9 INJECTION, SOLUTION INTRAVENOUS at 02:00

## 2024-08-02 RX ADMIN — POTASSIUM CHLORIDE 20 MEQ: 14.9 INJECTION, SOLUTION INTRAVENOUS at 08:31

## 2024-08-02 RX ADMIN — BUDESONIDE AND FORMOTEROL FUMARATE DIHYDRATE 2 PUFF: 160; 4.5 AEROSOL RESPIRATORY (INHALATION) at 19:54

## 2024-08-02 RX ADMIN — POTASSIUM CHLORIDE 20 MEQ: 14.9 INJECTION, SOLUTION INTRAVENOUS at 12:12

## 2024-08-02 RX ADMIN — PIPERACILLIN AND TAZOBACTAM 3.38 G: 3; .375 INJECTION, POWDER, FOR SOLUTION INTRAVENOUS at 08:31

## 2024-08-02 RX ADMIN — LORAZEPAM 0.5 MG: 2 INJECTION INTRAMUSCULAR; INTRAVENOUS at 12:28

## 2024-08-02 RX ADMIN — ONDANSETRON 4 MG: 2 INJECTION INTRAMUSCULAR; INTRAVENOUS at 02:03

## 2024-08-02 RX ADMIN — FAMOTIDINE 20 MG: 10 INJECTION INTRAVENOUS at 14:14

## 2024-08-02 RX ADMIN — LORAZEPAM 0.5 MG: 2 INJECTION INTRAMUSCULAR; INTRAVENOUS at 04:03

## 2024-08-02 RX ADMIN — ONDANSETRON 4 MG: 2 INJECTION INTRAMUSCULAR; INTRAVENOUS at 19:27

## 2024-08-02 RX ADMIN — SODIUM CHLORIDE 100 ML: 9 INJECTION, SOLUTION INTRAVENOUS at 02:51

## 2024-08-02 RX ADMIN — SODIUM CHLORIDE, POTASSIUM CHLORIDE, SODIUM LACTATE AND CALCIUM CHLORIDE: 600; 310; 30; 20 INJECTION, SOLUTION INTRAVENOUS at 19:31

## 2024-08-02 SDOH — SOCIAL STABILITY: SOCIAL INSECURITY: HOW OFTEN DOES ANYONE, INCLUDING FAMILY AND FRIENDS, THREATEN YOU WITH HARM?: NEVER

## 2024-08-02 SDOH — HEALTH STABILITY: PHYSICAL HEALTH: DO YOU HAVE DIFFICULTY DRESSING OR BATHING?: NO

## 2024-08-02 SDOH — HEALTH STABILITY: GENERAL: BECAUSE OF A PHYSICAL, MENTAL, OR EMOTIONAL CONDITION, DO YOU HAVE DIFFICULTY DOING ERRANDS ALONE?: YES

## 2024-08-02 SDOH — SOCIAL STABILITY: SOCIAL INSECURITY: HOW OFTEN DOES ANYONE, INCLUDING FAMILY AND FRIENDS, PHYSICALLY HURT YOU?: NEVER

## 2024-08-02 SDOH — ECONOMIC STABILITY: HOUSING INSECURITY: WHAT IS YOUR LIVING SITUATION TODAY?: ADULT CHILDREN

## 2024-08-02 SDOH — ECONOMIC STABILITY: GENERAL: WOULD YOU LIKE HELP WITH ANY OF THE FOLLOWING NEEDS?: I DON'T WANT HELP WITH ANY OF THESE

## 2024-08-02 SDOH — ECONOMIC STABILITY: GENERAL

## 2024-08-02 SDOH — HEALTH STABILITY: PHYSICAL HEALTH: DO YOU HAVE SERIOUS DIFFICULTY WALKING OR CLIMBING STAIRS?: YES

## 2024-08-02 SDOH — ECONOMIC STABILITY: HOUSING INSECURITY: WHAT IS YOUR LIVING SITUATION TODAY?: HOUSE

## 2024-08-02 SDOH — ECONOMIC STABILITY: INCOME INSECURITY: IN THE PAST 12 MONTHS, HAS THE ELECTRIC, GAS, OIL, OR WATER COMPANY THREATENED TO SHUT OFF SERVICE IN YOUR HOME?: NO

## 2024-08-02 SDOH — ECONOMIC STABILITY: HOUSING INSECURITY: WHAT IS YOUR LIVING SITUATION TODAY?: I HAVE A STEADY PLACE TO LIVE

## 2024-08-02 SDOH — ECONOMIC STABILITY: FOOD INSECURITY: WITHIN THE PAST 12 MONTHS, THE FOOD YOU BOUGHT JUST DIDN'T LAST AND YOU DIDN'T HAVE MONEY TO GET MORE.: NEVER TRUE

## 2024-08-02 SDOH — ECONOMIC STABILITY: HOUSING INSECURITY: DO YOU HAVE PROBLEMS WITH ANY OF THE FOLLOWING?: NONE OF THE ABOVE

## 2024-08-02 SDOH — SOCIAL STABILITY: SOCIAL INSECURITY: HOW OFTEN DOES ANYONE, INCLUDING FAMILY AND FRIENDS, SCREAM OR CURSE AT YOU?: NEVER

## 2024-08-02 SDOH — SOCIAL STABILITY: SOCIAL NETWORK: SUPPORT SYSTEMS: FAMILY MEMBERS

## 2024-08-02 SDOH — SOCIAL STABILITY: SOCIAL NETWORK
HOW OFTEN DO YOU SEE OR TALK TO PEOPLE THAT YOU CARE ABOUT AND FEEL CLOSE TO? (FOR EXAMPLE: TALKING TO FRIENDS ON THE PHONE, VISITING FRIENDS OR FAMILY, GOING TO CHURCH OR CLUB MEETINGS): 5 OR MORE TIMES A WEEK

## 2024-08-02 SDOH — SOCIAL STABILITY: SOCIAL INSECURITY: HOW OFTEN DOES ANYONE, INCLUDING FAMILY AND FRIENDS, INSULT OR TALK DOWN TO YOU?: NEVER

## 2024-08-02 ASSESSMENT — PAIN SCALES - GENERAL
PAINLEVEL_OUTOF10: 4
PAINLEVEL_OUTOF10: 3
PAINLEVEL_OUTOF10: 2
PAINLEVEL_OUTOF10: 4
PAINLEVEL_OUTOF10: 4
PAINLEVEL_OUTOF10: 3
PAINLEVEL_OUTOF10: 0
PAINLEVEL_OUTOF10: 0
PAINLEVEL_OUTOF10: 10
PAINLEVEL_OUTOF10: 10

## 2024-08-02 ASSESSMENT — LIFESTYLE VARIABLES
AUDIT-C TOTAL SCORE: 0
ALCOHOL_USE_STATUS: NO OR LOW RISK WITH VALIDATED TOOL
HOW OFTEN DO YOU HAVE A DRINK CONTAINING ALCOHOL: NEVER
HOW MANY STANDARD DRINKS CONTAINING ALCOHOL DO YOU HAVE ON A TYPICAL DAY: 0,1 OR 2
HOW OFTEN DO YOU HAVE 6 OR MORE DRINKS ON ONE OCCASION: NEVER

## 2024-08-02 ASSESSMENT — ENCOUNTER SYMPTOMS
NAUSEA: 1
ABDOMINAL PAIN: 1
RESPIRATORY NEGATIVE: 1
ACTIVITY CHANGE: 1
BRUISES/BLEEDS EASILY: 0
DIARRHEA: 0
CONSTIPATION: 0
FEVER: 0
NEUROLOGICAL NEGATIVE: 1
VOMITING: 1
BACK PAIN: 0

## 2024-08-02 ASSESSMENT — COGNITIVE AND FUNCTIONAL STATUS - GENERAL
HELP NEEDED DRESSING REGULAR UPPER BODY CLOTHING: A LITTLE
DAILY_ACTIVITY_RAW_SCORE: 15
HELP NEEDED DRESSING REGULAR LOWER BODY CLOTHING: A LOT
DAILY_ACTIVITY_CONVERTED_SCORE: 34.69
BASIC_MOBILITY_RAW_SCORE: 17
BASIC_MOBILITY_CONVERTED_SCORE: 39.67
HELP NEEDED FOR PERSONAL GROOMING: A LITTLE
HELP NEEDED FOR TOILETING: A LOT
HELP NEEDED FOR BATHING: A LOT
DO YOU HAVE DIFFICULTY DRESSING OR BATHING: YES

## 2024-08-02 ASSESSMENT — COLUMBIA-SUICIDE SEVERITY RATING SCALE - C-SSRS
1. WITHIN THE PAST MONTH, HAVE YOU WISHED YOU WERE DEAD OR WISHED YOU COULD GO TO SLEEP AND NOT WAKE UP?: YES
6. HAVE YOU EVER DONE ANYTHING, STARTED TO DO ANYTHING, OR PREPARED TO DO ANYTHING TO END YOUR LIFE?: NO
IS THE PATIENT ABLE TO COMPLETE C-SSRS: YES
2. HAVE YOU ACTUALLY HAD ANY THOUGHTS OF KILLING YOURSELF?: NO

## 2024-08-02 ASSESSMENT — ACTIVITIES OF DAILY LIVING (ADL)
ADL_SHORT_OF_BREATH: NO
RECENT_DECLINE_ADL: NO
FEEDING: INDEPENDENT
PRIOR_ADL_TOILETING: MODIFIED INDEPENDENT
ADL_SCORE: 24
TOILETING: INDEPENDENT
EATING: MODIFIED INDEPENDENT
DRESSING: INDEPENDENT
BATHING: INDEPENDENT
ADL_BEFORE_ADMISSION: INDEPENDENT
PRIOR_ADL_BATHING: MODIFIED INDEPENDENT
PRIOR_ADL: MODIFIED INDEPENDENT

## 2024-08-02 ASSESSMENT — ORIENTATION MEMORY CONCENTRATION TEST (OMCT)
OMCT SCORE: 0
REPEAT THE NAME AND ADDRESS I ASKED YOU TO REMEMBER: CORRECT
COUNT BACKWARDS FROM 20 TO 1: CORRECT
WHAT MONTH IS IT NOW: CORRECT
WHAT TIME IS IT (NO WATCH OR CLOCK): CORRECT
WHAT YEAR IS IT NOW (MUST BE EXACT): CORRECT
SAY THE MONTHS IN REVERSE ORDER STARTING WITH LAST MONTH: CORRECT
OMCT INTERPRETATION: 0-6: NO SIGNIFICANT IMPAIRMENT

## 2024-08-02 ASSESSMENT — PATIENT HEALTH QUESTIONNAIRE - PHQ9
IS PATIENT ABLE TO COMPLETE PHQ2 OR PHQ9: YES
SUM OF ALL RESPONSES TO PHQ9 QUESTIONS 1 AND 2: 2
CLINICAL INTERPRETATION OF PHQ2 SCORE: NO FURTHER SCREENING NEEDED
2. FEELING DOWN, DEPRESSED OR HOPELESS: SEVERAL DAYS
1. LITTLE INTEREST OR PLEASURE IN DOING THINGS: SEVERAL DAYS
SUM OF ALL RESPONSES TO PHQ9 QUESTIONS 1 AND 2: 2

## 2024-08-02 ASSESSMENT — PAIN SCALES - WONG BAKER
WONGBAKER_NUMERICALRESPONSE: 0
WONGBAKER_NUMERICALRESPONSE: 0

## 2024-08-02 NOTE — CM/SW NOTE
SW received call to see if POA scanned into Epic.  No POA seen.  Instructed to call medical records.      KELSEY Zhong ext [phone]

## 2024-08-03 ENCOUNTER — APPOINTMENT (OUTPATIENT)
Dept: GENERAL RADIOLOGY | Age: 79
DRG: 388 | End: 2024-08-03
Attending: HOSPITALIST

## 2024-08-03 VITALS
BODY MASS INDEX: 15.37 KG/M2 | HEIGHT: 60 IN | SYSTOLIC BLOOD PRESSURE: 147 MMHG | DIASTOLIC BLOOD PRESSURE: 68 MMHG | HEART RATE: 70 BPM | RESPIRATION RATE: 16 BRPM | TEMPERATURE: 98.3 F | WEIGHT: 78.26 LBS | OXYGEN SATURATION: 94 %

## 2024-08-03 LAB
ALBUMIN SERPL-MCNC: 2.8 G/DL (ref 3.6–5.1)
ALBUMIN/GLOB SERPL: 0.8 {RATIO} (ref 1–2.4)
ALP SERPL-CCNC: 65 UNITS/L (ref 45–117)
ALT SERPL-CCNC: 12 UNITS/L
ANION GAP SERPL CALC-SCNC: 14 MMOL/L (ref 7–19)
AST SERPL-CCNC: 17 UNITS/L
BASOPHILS # BLD: 0 K/MCL (ref 0–0.3)
BASOPHILS NFR BLD: 1 %
BILIRUB SERPL-MCNC: 0.6 MG/DL (ref 0.2–1)
BUN SERPL-MCNC: 15 MG/DL (ref 6–20)
BUN/CREAT SERPL: 17 (ref 7–25)
CALCIUM SERPL-MCNC: 9.1 MG/DL (ref 8.4–10.2)
CHLORIDE SERPL-SCNC: 100 MMOL/L (ref 97–110)
CO2 SERPL-SCNC: 28 MMOL/L (ref 21–32)
CREAT SERPL-MCNC: 0.86 MG/DL (ref 0.51–0.95)
DEPRECATED RDW RBC: 61.9 FL (ref 39–50)
EGFRCR SERPLBLD CKD-EPI 2021: 68 ML/MIN/{1.73_M2}
EOSINOPHIL # BLD: 0.1 K/MCL (ref 0–0.5)
EOSINOPHIL NFR BLD: 4 %
ERYTHROCYTE [DISTWIDTH] IN BLOOD: 19.1 % (ref 11–15)
FASTING DURATION TIME PATIENT: ABNORMAL H
GLOBULIN SER-MCNC: 3.7 G/DL (ref 2–4)
GLUCOSE SERPL-MCNC: 142 MG/DL (ref 70–99)
HCT VFR BLD CALC: 31 % (ref 36–46.5)
HGB BLD-MCNC: 10 G/DL (ref 12–15.5)
IMM GRANULOCYTES # BLD AUTO: 0 K/MCL (ref 0–0.2)
IMM GRANULOCYTES # BLD: 0 %
LYMPHOCYTES # BLD: 0.4 K/MCL (ref 1–4)
LYMPHOCYTES NFR BLD: 13 %
MCH RBC QN AUTO: 28.4 PG (ref 26–34)
MCHC RBC AUTO-ENTMCNC: 32.3 G/DL (ref 32–36.5)
MCV RBC AUTO: 88.1 FL (ref 78–100)
MONOCYTES # BLD: 0.8 K/MCL (ref 0.3–0.9)
MONOCYTES NFR BLD: 25 %
NEUTROPHILS # BLD: 1.8 K/MCL (ref 1.8–7.7)
NEUTROPHILS NFR BLD: 57 %
NRBC BLD MANUAL-RTO: 0 /100 WBC
PLATELET # BLD AUTO: 362 K/MCL (ref 140–450)
POTASSIUM SERPL-SCNC: 3.8 MMOL/L (ref 3.4–5.1)
PROT SERPL-MCNC: 6.5 G/DL (ref 6.4–8.2)
RBC # BLD: 3.52 MIL/MCL (ref 4–5.2)
SODIUM SERPL-SCNC: 138 MMOL/L (ref 135–145)
WBC # BLD: 3.2 K/MCL (ref 4.2–11)

## 2024-08-03 PROCEDURE — 97110 THERAPEUTIC EXERCISES: CPT | Performed by: PHYSICAL THERAPIST

## 2024-08-03 PROCEDURE — 85025 COMPLETE CBC W/AUTO DIFF WBC: CPT

## 2024-08-03 PROCEDURE — 10002807 HB RX 258: Performed by: HOSPITALIST

## 2024-08-03 PROCEDURE — 10002801 HB RX 250 W/O HCPCS: Performed by: NURSE PRACTITIONER

## 2024-08-03 PROCEDURE — 10002807 HB RX 258: Performed by: SURGERY

## 2024-08-03 PROCEDURE — 10002800 HB RX 250 W HCPCS: Performed by: HOSPITALIST

## 2024-08-03 PROCEDURE — 10002807 HB RX 258

## 2024-08-03 PROCEDURE — 94640 AIRWAY INHALATION TREATMENT: CPT

## 2024-08-03 PROCEDURE — 10002800 HB RX 250 W HCPCS: Performed by: SURGERY

## 2024-08-03 PROCEDURE — 97530 THERAPEUTIC ACTIVITIES: CPT | Performed by: PHYSICAL THERAPIST

## 2024-08-03 PROCEDURE — 80053 COMPREHEN METABOLIC PANEL: CPT

## 2024-08-03 PROCEDURE — 74018 RADEX ABDOMEN 1 VIEW: CPT

## 2024-08-03 PROCEDURE — 10004173 HB COUNTER-THERAPY VISIT PT: Performed by: PHYSICAL THERAPIST

## 2024-08-03 PROCEDURE — 96372 THER/PROPH/DIAG INJ SC/IM: CPT | Performed by: SURGERY

## 2024-08-03 PROCEDURE — 36415 COLL VENOUS BLD VENIPUNCTURE: CPT

## 2024-08-03 PROCEDURE — 74018 RADEX ABDOMEN 1 VIEW: CPT | Performed by: RADIOLOGY

## 2024-08-03 PROCEDURE — 10000002 HB ROOM CHARGE MED SURG

## 2024-08-03 PROCEDURE — 99024 POSTOP FOLLOW-UP VISIT: CPT | Performed by: SURGERY

## 2024-08-03 PROCEDURE — 99232 SBSQ HOSP IP/OBS MODERATE 35: CPT | Performed by: STUDENT IN AN ORGANIZED HEALTH CARE EDUCATION/TRAINING PROGRAM

## 2024-08-03 RX ORDER — DEXTROSE MONOHYDRATE, SODIUM CHLORIDE, AND POTASSIUM CHLORIDE 50; 1.49; 9 G/1000ML; G/1000ML; G/1000ML
INJECTION, SOLUTION INTRAVENOUS CONTINUOUS
Status: DISCONTINUED | OUTPATIENT
Start: 2024-08-03 | End: 2024-08-05

## 2024-08-03 RX ORDER — SODIUM CHLORIDE 9 MG/ML
INJECTION, SOLUTION INTRAVENOUS CONTINUOUS
Status: DISCONTINUED | OUTPATIENT
Start: 2024-08-03 | End: 2024-08-03

## 2024-08-03 RX ORDER — ENOXAPARIN SODIUM 100 MG/ML
30 INJECTION SUBCUTANEOUS DAILY
Status: DISCONTINUED | OUTPATIENT
Start: 2024-08-03 | End: 2024-08-05

## 2024-08-03 RX ADMIN — ENOXAPARIN SODIUM 30 MG: 100 INJECTION SUBCUTANEOUS at 13:04

## 2024-08-03 RX ADMIN — MORPHINE SULFATE 4 MG: 4 INJECTION INTRAVENOUS at 08:10

## 2024-08-03 RX ADMIN — SODIUM CHLORIDE: 9 INJECTION, SOLUTION INTRAVENOUS at 09:53

## 2024-08-03 RX ADMIN — DEXTROSE MONOHYDRATE, SODIUM CHLORIDE, AND POTASSIUM CHLORIDE: 50; 9; 1.49 INJECTION, SOLUTION INTRAVENOUS at 13:31

## 2024-08-03 RX ADMIN — BUDESONIDE AND FORMOTEROL FUMARATE DIHYDRATE 2 PUFF: 160; 4.5 AEROSOL RESPIRATORY (INHALATION) at 20:22

## 2024-08-03 RX ADMIN — ONDANSETRON 4 MG: 2 INJECTION INTRAMUSCULAR; INTRAVENOUS at 08:10

## 2024-08-03 RX ADMIN — PIPERACILLIN AND TAZOBACTAM 3.38 G: 3; .375 INJECTION, POWDER, FOR SOLUTION INTRAVENOUS at 05:31

## 2024-08-03 RX ADMIN — PIPERACILLIN AND TAZOBACTAM 3.38 G: 3; .375 INJECTION, POWDER, FOR SOLUTION INTRAVENOUS at 13:04

## 2024-08-03 RX ADMIN — BUDESONIDE AND FORMOTEROL FUMARATE DIHYDRATE 2 PUFF: 160; 4.5 AEROSOL RESPIRATORY (INHALATION) at 08:15

## 2024-08-03 RX ADMIN — PIPERACILLIN AND TAZOBACTAM 3.38 G: 3; .375 INJECTION, POWDER, FOR SOLUTION INTRAVENOUS at 21:11

## 2024-08-03 RX ADMIN — ONDANSETRON 4 MG: 2 INJECTION INTRAMUSCULAR; INTRAVENOUS at 22:25

## 2024-08-03 RX ADMIN — METOCLOPRAMIDE HYDROCHLORIDE 5 MG: 5 INJECTION INTRAMUSCULAR; INTRAVENOUS at 03:48

## 2024-08-03 RX ADMIN — ONDANSETRON 4 MG: 2 INJECTION INTRAMUSCULAR; INTRAVENOUS at 15:49

## 2024-08-03 RX ADMIN — MORPHINE SULFATE 4 MG: 4 INJECTION INTRAVENOUS at 15:49

## 2024-08-03 RX ADMIN — FAMOTIDINE 20 MG: 10 INJECTION INTRAVENOUS at 08:10

## 2024-08-03 ASSESSMENT — PAIN SCALES - GENERAL
PAINLEVEL_OUTOF10: 9
PAINLEVEL_OUTOF10: 8
PAINLEVEL_OUTOF10: 0

## 2024-08-03 ASSESSMENT — COGNITIVE AND FUNCTIONAL STATUS - GENERAL
BASIC_MOBILITY_CONVERTED_SCORE: 36.97
BASIC_MOBILITY_RAW_SCORE: 15

## 2024-08-04 LAB
ANION GAP SERPL CALC-SCNC: 10 MMOL/L (ref 7–19)
BASOPHILS # BLD: 0 K/MCL (ref 0–0.3)
BASOPHILS NFR BLD: 1 %
BUN SERPL-MCNC: 11 MG/DL (ref 6–20)
BUN/CREAT SERPL: 11 (ref 7–25)
CALCIUM SERPL-MCNC: 8.9 MG/DL (ref 8.4–10.2)
CHLORIDE SERPL-SCNC: 107 MMOL/L (ref 97–110)
CO2 SERPL-SCNC: 29 MMOL/L (ref 21–32)
CREAT SERPL-MCNC: 0.96 MG/DL (ref 0.51–0.95)
DEPRECATED RDW RBC: 61.2 FL (ref 39–50)
EGFRCR SERPLBLD CKD-EPI 2021: 60 ML/MIN/{1.73_M2}
EOSINOPHIL # BLD: 0.3 K/MCL (ref 0–0.5)
EOSINOPHIL NFR BLD: 7 %
ERYTHROCYTE [DISTWIDTH] IN BLOOD: 18.4 % (ref 11–15)
FASTING DURATION TIME PATIENT: ABNORMAL H
GLUCOSE SERPL-MCNC: 121 MG/DL (ref 70–99)
HCT VFR BLD CALC: 27.9 % (ref 36–46.5)
HGB BLD-MCNC: 8.8 G/DL (ref 12–15.5)
IMM GRANULOCYTES # BLD AUTO: 0 K/MCL (ref 0–0.2)
IMM GRANULOCYTES # BLD: 0 %
LYMPHOCYTES # BLD: 0.8 K/MCL (ref 1–4)
LYMPHOCYTES NFR BLD: 19 %
MCH RBC QN AUTO: 28.6 PG (ref 26–34)
MCHC RBC AUTO-ENTMCNC: 31.5 G/DL (ref 32–36.5)
MCV RBC AUTO: 90.6 FL (ref 78–100)
MONOCYTES # BLD: 0.9 K/MCL (ref 0.3–0.9)
MONOCYTES NFR BLD: 24 %
NEUTROPHILS # BLD: 2 K/MCL (ref 1.8–7.7)
NEUTROPHILS NFR BLD: 49 %
NRBC BLD MANUAL-RTO: 0 /100 WBC
PLATELET # BLD AUTO: 295 K/MCL (ref 140–450)
POTASSIUM SERPL-SCNC: 3.6 MMOL/L (ref 3.4–5.1)
RBC # BLD: 3.08 MIL/MCL (ref 4–5.2)
SODIUM SERPL-SCNC: 142 MMOL/L (ref 135–145)
WBC # BLD: 4 K/MCL (ref 4.2–11)

## 2024-08-04 PROCEDURE — 99231 SBSQ HOSP IP/OBS SF/LOW 25: CPT | Performed by: SURGERY

## 2024-08-04 PROCEDURE — 10000002 HB ROOM CHARGE MED SURG

## 2024-08-04 PROCEDURE — 74018 RADEX ABDOMEN 1 VIEW: CPT | Performed by: STUDENT IN AN ORGANIZED HEALTH CARE EDUCATION/TRAINING PROGRAM

## 2024-08-04 PROCEDURE — 85025 COMPLETE CBC W/AUTO DIFF WBC: CPT

## 2024-08-04 PROCEDURE — 10002807 HB RX 258: Performed by: HOSPITALIST

## 2024-08-04 PROCEDURE — 10002800 HB RX 250 W HCPCS: Performed by: HOSPITALIST

## 2024-08-04 PROCEDURE — 10002801 HB RX 250 W/O HCPCS: Performed by: NURSE PRACTITIONER

## 2024-08-04 PROCEDURE — 94640 AIRWAY INHALATION TREATMENT: CPT

## 2024-08-04 PROCEDURE — 10002803 HB RX 637: Performed by: SURGERY

## 2024-08-04 PROCEDURE — 10002807 HB RX 258: Performed by: SURGERY

## 2024-08-04 PROCEDURE — 80048 BASIC METABOLIC PNL TOTAL CA: CPT

## 2024-08-04 PROCEDURE — 99232 SBSQ HOSP IP/OBS MODERATE 35: CPT | Performed by: STUDENT IN AN ORGANIZED HEALTH CARE EDUCATION/TRAINING PROGRAM

## 2024-08-04 PROCEDURE — 10002800 HB RX 250 W HCPCS: Performed by: SURGERY

## 2024-08-04 PROCEDURE — 96372 THER/PROPH/DIAG INJ SC/IM: CPT | Performed by: SURGERY

## 2024-08-04 RX ADMIN — MORPHINE SULFATE 4 MG: 4 INJECTION INTRAVENOUS at 11:28

## 2024-08-04 RX ADMIN — ENOXAPARIN SODIUM 30 MG: 100 INJECTION SUBCUTANEOUS at 07:50

## 2024-08-04 RX ADMIN — DOCUSATE SODIUM 100 MG: 50 LIQUID ORAL at 21:17

## 2024-08-04 RX ADMIN — MORPHINE SULFATE 4 MG: 4 INJECTION INTRAVENOUS at 15:45

## 2024-08-04 RX ADMIN — METOCLOPRAMIDE HYDROCHLORIDE 5 MG: 5 INJECTION INTRAMUSCULAR; INTRAVENOUS at 01:15

## 2024-08-04 RX ADMIN — PIPERACILLIN AND TAZOBACTAM 3.38 G: 3; .375 INJECTION, POWDER, FOR SOLUTION INTRAVENOUS at 21:17

## 2024-08-04 RX ADMIN — FAMOTIDINE 20 MG: 10 INJECTION INTRAVENOUS at 07:50

## 2024-08-04 RX ADMIN — PIPERACILLIN AND TAZOBACTAM 3.38 G: 3; .375 INJECTION, POWDER, FOR SOLUTION INTRAVENOUS at 05:07

## 2024-08-04 RX ADMIN — MORPHINE SULFATE 4 MG: 4 INJECTION INTRAVENOUS at 01:25

## 2024-08-04 RX ADMIN — BUDESONIDE AND FORMOTEROL FUMARATE DIHYDRATE 2 PUFF: 160; 4.5 AEROSOL RESPIRATORY (INHALATION) at 19:41

## 2024-08-04 RX ADMIN — BUDESONIDE AND FORMOTEROL FUMARATE DIHYDRATE 2 PUFF: 160; 4.5 AEROSOL RESPIRATORY (INHALATION) at 08:56

## 2024-08-04 RX ADMIN — ONDANSETRON 4 MG: 2 INJECTION INTRAMUSCULAR; INTRAVENOUS at 10:14

## 2024-08-04 RX ADMIN — DOCUSATE SODIUM 100 MG: 50 LIQUID ORAL at 10:47

## 2024-08-04 RX ADMIN — METOCLOPRAMIDE HYDROCHLORIDE 5 MG: 5 INJECTION INTRAMUSCULAR; INTRAVENOUS at 15:44

## 2024-08-04 RX ADMIN — ONDANSETRON 4 MG: 2 INJECTION INTRAMUSCULAR; INTRAVENOUS at 21:17

## 2024-08-04 RX ADMIN — DEXTROSE MONOHYDRATE, SODIUM CHLORIDE, AND POTASSIUM CHLORIDE: 50; 9; 1.49 INJECTION, SOLUTION INTRAVENOUS at 13:39

## 2024-08-04 RX ADMIN — PIPERACILLIN AND TAZOBACTAM 3.38 G: 3; .375 INJECTION, POWDER, FOR SOLUTION INTRAVENOUS at 13:33

## 2024-08-04 RX ADMIN — SODIUM CHLORIDE 25 ML: 9 INJECTION, SOLUTION INTRAVENOUS at 05:14

## 2024-08-04 RX ADMIN — DEXTROSE MONOHYDRATE, SODIUM CHLORIDE, AND POTASSIUM CHLORIDE: 50; 9; 1.49 INJECTION, SOLUTION INTRAVENOUS at 01:25

## 2024-08-04 ASSESSMENT — PAIN SCALES - GENERAL
PAINLEVEL_OUTOF10: 5
PAINLEVEL_OUTOF10: 7
PAINLEVEL_OUTOF10: 6
PAINLEVEL_OUTOF10: 0
PAINLEVEL_OUTOF10: 0

## 2024-08-05 LAB
ANION GAP SERPL CALC-SCNC: 16 MMOL/L (ref 7–19)
BASOPHILS # BLD: 0 K/MCL (ref 0–0.3)
BASOPHILS NFR BLD: 0 %
BUN SERPL-MCNC: 3 MG/DL (ref 6–20)
BUN/CREAT SERPL: 4 (ref 7–25)
CALCIUM SERPL-MCNC: 8.8 MG/DL (ref 8.4–10.2)
CHLORIDE SERPL-SCNC: 104 MMOL/L (ref 97–110)
CO2 SERPL-SCNC: 21 MMOL/L (ref 21–32)
CREAT SERPL-MCNC: 0.71 MG/DL (ref 0.51–0.95)
DEPRECATED RDW RBC: 55.8 FL (ref 39–50)
EGFRCR SERPLBLD CKD-EPI 2021: 86 ML/MIN/{1.73_M2}
EOSINOPHIL # BLD: 0.3 K/MCL (ref 0–0.5)
EOSINOPHIL NFR BLD: 7 %
ERYTHROCYTE [DISTWIDTH] IN BLOOD: 17.3 % (ref 11–15)
FASTING DURATION TIME PATIENT: ABNORMAL H
GLUCOSE SERPL-MCNC: 118 MG/DL (ref 70–99)
HCT VFR BLD CALC: 30.1 % (ref 36–46.5)
HGB BLD-MCNC: 9.7 G/DL (ref 12–15.5)
IMM GRANULOCYTES # BLD AUTO: 0 K/MCL (ref 0–0.2)
IMM GRANULOCYTES # BLD: 0 %
LYMPHOCYTES # BLD: 0.7 K/MCL (ref 1–4)
LYMPHOCYTES NFR BLD: 14 %
MAGNESIUM SERPL-MCNC: 1.7 MG/DL (ref 1.7–2.4)
MCH RBC QN AUTO: 28.4 PG (ref 26–34)
MCHC RBC AUTO-ENTMCNC: 32.2 G/DL (ref 32–36.5)
MCV RBC AUTO: 88 FL (ref 78–100)
MONOCYTES # BLD: 0.8 K/MCL (ref 0.3–0.9)
MONOCYTES NFR BLD: 16 %
NEUTROPHILS # BLD: 3 K/MCL (ref 1.8–7.7)
NEUTROPHILS NFR BLD: 63 %
NRBC BLD MANUAL-RTO: 0 /100 WBC
PHOSPHATE SERPL-MCNC: 1.3 MG/DL (ref 2.4–4.7)
PLATELET # BLD AUTO: 322 K/MCL (ref 140–450)
POTASSIUM SERPL-SCNC: 3 MMOL/L (ref 3.4–5.1)
POTASSIUM SERPL-SCNC: 3.2 MMOL/L (ref 3.4–5.1)
RBC # BLD: 3.42 MIL/MCL (ref 4–5.2)
SODIUM SERPL-SCNC: 138 MMOL/L (ref 135–145)
TRIGL SERPL-MCNC: 100 MG/DL
WBC # BLD: 4.8 K/MCL (ref 4.2–11)

## 2024-08-05 PROCEDURE — 10004651 HB RX, NO CHARGE ITEM: Performed by: HOSPITALIST

## 2024-08-05 PROCEDURE — 10002800 HB RX 250 W HCPCS: Performed by: HOSPITALIST

## 2024-08-05 PROCEDURE — 84132 ASSAY OF SERUM POTASSIUM: CPT | Performed by: STUDENT IN AN ORGANIZED HEALTH CARE EDUCATION/TRAINING PROGRAM

## 2024-08-05 PROCEDURE — 10000002 HB ROOM CHARGE MED SURG

## 2024-08-05 PROCEDURE — 36415 COLL VENOUS BLD VENIPUNCTURE: CPT | Performed by: SURGERY

## 2024-08-05 PROCEDURE — 84100 ASSAY OF PHOSPHORUS: CPT | Performed by: STUDENT IN AN ORGANIZED HEALTH CARE EDUCATION/TRAINING PROGRAM

## 2024-08-05 PROCEDURE — 10002807 HB RX 258: Performed by: HOSPITALIST

## 2024-08-05 PROCEDURE — 99231 SBSQ HOSP IP/OBS SF/LOW 25: CPT | Performed by: SURGERY

## 2024-08-05 PROCEDURE — 10002800 HB RX 250 W HCPCS

## 2024-08-05 PROCEDURE — 94640 AIRWAY INHALATION TREATMENT: CPT

## 2024-08-05 PROCEDURE — 3E0336Z INTRODUCTION OF NUTRITIONAL SUBSTANCE INTO PERIPHERAL VEIN, PERCUTANEOUS APPROACH: ICD-10-PCS | Performed by: SURGERY

## 2024-08-05 PROCEDURE — 10002803 HB RX 637

## 2024-08-05 PROCEDURE — 84478 ASSAY OF TRIGLYCERIDES: CPT | Performed by: STUDENT IN AN ORGANIZED HEALTH CARE EDUCATION/TRAINING PROGRAM

## 2024-08-05 PROCEDURE — 99233 SBSQ HOSP IP/OBS HIGH 50: CPT | Performed by: STUDENT IN AN ORGANIZED HEALTH CARE EDUCATION/TRAINING PROGRAM

## 2024-08-05 PROCEDURE — 85025 COMPLETE CBC W/AUTO DIFF WBC: CPT | Performed by: SURGERY

## 2024-08-05 PROCEDURE — 10002807 HB RX 258: Performed by: SURGERY

## 2024-08-05 PROCEDURE — 83735 ASSAY OF MAGNESIUM: CPT | Performed by: STUDENT IN AN ORGANIZED HEALTH CARE EDUCATION/TRAINING PROGRAM

## 2024-08-05 PROCEDURE — 10002803 HB RX 637: Performed by: HOSPITALIST

## 2024-08-05 PROCEDURE — 80048 BASIC METABOLIC PNL TOTAL CA: CPT | Performed by: SURGERY

## 2024-08-05 RX ORDER — AMOXICILLIN 250 MG
2 CAPSULE ORAL 2 TIMES DAILY PRN
Status: DISCONTINUED | OUTPATIENT
Start: 2024-08-05 | End: 2024-08-06 | Stop reason: HOSPADM

## 2024-08-05 RX ORDER — MORPHINE SULFATE 20 MG/ML
5 SOLUTION ORAL
Status: DISCONTINUED | OUTPATIENT
Start: 2024-08-05 | End: 2024-08-06 | Stop reason: HOSPADM

## 2024-08-05 RX ORDER — GLYCOPYRROLATE 0.2 MG/ML
0.4 INJECTION, SOLUTION INTRAMUSCULAR; INTRAVENOUS EVERY 6 HOURS PRN
Status: DISCONTINUED | OUTPATIENT
Start: 2024-08-05 | End: 2024-08-06 | Stop reason: HOSPADM

## 2024-08-05 RX ORDER — SCOLOPAMINE TRANSDERMAL SYSTEM 1 MG/1
1 PATCH, EXTENDED RELEASE TRANSDERMAL
Status: DISCONTINUED | OUTPATIENT
Start: 2024-08-05 | End: 2024-08-06 | Stop reason: HOSPADM

## 2024-08-05 RX ORDER — LORAZEPAM 0.5 MG/1
0.5 TABLET ORAL
Status: DISCONTINUED | OUTPATIENT
Start: 2024-08-05 | End: 2024-08-06 | Stop reason: HOSPADM

## 2024-08-05 RX ORDER — ONDANSETRON 2 MG/ML
4 INJECTION INTRAMUSCULAR; INTRAVENOUS EVERY 12 HOURS PRN
Status: DISCONTINUED | OUTPATIENT
Start: 2024-08-05 | End: 2024-08-06 | Stop reason: HOSPADM

## 2024-08-05 RX ORDER — GLYCOPYRROLATE 1 MG/5ML
0.5 SOLUTION ORAL EVERY 6 HOURS PRN
Status: DISCONTINUED | OUTPATIENT
Start: 2024-08-05 | End: 2024-08-06 | Stop reason: HOSPADM

## 2024-08-05 RX ORDER — DEXTROSE MONOHYDRATE 100 MG/ML
1000 INJECTION, SOLUTION INTRAVENOUS CONTINUOUS PRN
Status: DISCONTINUED | OUTPATIENT
Start: 2024-08-05 | End: 2024-08-06 | Stop reason: HOSPADM

## 2024-08-05 RX ORDER — GLYCOPYRROLATE 1 MG/1
0.5 TABLET ORAL EVERY 6 HOURS PRN
Status: DISCONTINUED | OUTPATIENT
Start: 2024-08-05 | End: 2024-08-06 | Stop reason: HOSPADM

## 2024-08-05 RX ORDER — BISACODYL 10 MG
10 SUPPOSITORY, RECTAL RECTAL DAILY PRN
Status: DISCONTINUED | OUTPATIENT
Start: 2024-08-05 | End: 2024-08-06 | Stop reason: HOSPADM

## 2024-08-05 RX ORDER — POTASSIUM CHLORIDE 1.5 G/1.58G
40 POWDER, FOR SOLUTION ORAL ONCE
Status: DISCONTINUED | OUTPATIENT
Start: 2024-08-05 | End: 2024-08-06 | Stop reason: HOSPADM

## 2024-08-05 RX ORDER — LORAZEPAM 2 MG/ML
0.5 INJECTION INTRAMUSCULAR
Status: DISCONTINUED | OUTPATIENT
Start: 2024-08-05 | End: 2024-08-06 | Stop reason: HOSPADM

## 2024-08-05 RX ORDER — MAGNESIUM HYDROXIDE/ALUMINUM HYDROXICE/SIMETHICONE 120; 1200; 1200 MG/30ML; MG/30ML; MG/30ML
30 SUSPENSION ORAL PRN
Status: DISCONTINUED | OUTPATIENT
Start: 2024-08-05 | End: 2024-08-06 | Stop reason: HOSPADM

## 2024-08-05 RX ORDER — ONDANSETRON 4 MG/1
4 TABLET, ORALLY DISINTEGRATING ORAL EVERY 12 HOURS PRN
Status: DISCONTINUED | OUTPATIENT
Start: 2024-08-05 | End: 2024-08-06 | Stop reason: HOSPADM

## 2024-08-05 RX ORDER — LACTULOSE 10 G/15ML
20 SOLUTION ORAL DAILY PRN
Status: DISCONTINUED | OUTPATIENT
Start: 2024-08-05 | End: 2024-08-06 | Stop reason: HOSPADM

## 2024-08-05 RX ORDER — LIDOCAINE HYDROCHLORIDE 20 MG/ML
10 JELLY TOPICAL PRN
Status: DISCONTINUED | OUTPATIENT
Start: 2024-08-05 | End: 2024-08-06 | Stop reason: HOSPADM

## 2024-08-05 RX ORDER — CARBOXYMETHYLCELLULOSE SODIUM 5 MG/ML
1 SOLUTION/ DROPS OPHTHALMIC PRN
Status: DISCONTINUED | OUTPATIENT
Start: 2024-08-05 | End: 2024-08-06 | Stop reason: HOSPADM

## 2024-08-05 RX ADMIN — SODIUM CHLORIDE, PRESERVATIVE FREE 2 ML: 5 INJECTION INTRAVENOUS at 20:36

## 2024-08-05 RX ADMIN — PIPERACILLIN AND TAZOBACTAM 3.38 G: 3; .375 INJECTION, POWDER, FOR SOLUTION INTRAVENOUS at 05:04

## 2024-08-05 RX ADMIN — METOCLOPRAMIDE HYDROCHLORIDE 5 MG: 5 INJECTION INTRAMUSCULAR; INTRAVENOUS at 05:06

## 2024-08-05 RX ADMIN — MORPHINE SULFATE 4 MG: 4 INJECTION INTRAVENOUS at 07:35

## 2024-08-05 RX ADMIN — ONDANSETRON 4 MG: 2 INJECTION INTRAMUSCULAR; INTRAVENOUS at 02:58

## 2024-08-05 RX ADMIN — DEXTROSE MONOHYDRATE, SODIUM CHLORIDE, AND POTASSIUM CHLORIDE: 50; 9; 1.49 INJECTION, SOLUTION INTRAVENOUS at 03:02

## 2024-08-05 RX ADMIN — MORPHINE SULFATE 5 MG: 100 SOLUTION ORAL at 22:35

## 2024-08-05 RX ADMIN — METOCLOPRAMIDE HYDROCHLORIDE 5 MG: 5 INJECTION INTRAMUSCULAR; INTRAVENOUS at 22:36

## 2024-08-05 RX ADMIN — MORPHINE SULFATE 2 MG: 2 INJECTION, SOLUTION INTRAMUSCULAR; INTRAVENOUS at 20:31

## 2024-08-05 RX ADMIN — BUDESONIDE AND FORMOTEROL FUMARATE DIHYDRATE 2 PUFF: 160; 4.5 AEROSOL RESPIRATORY (INHALATION) at 08:18

## 2024-08-05 RX ADMIN — ONDANSETRON 4 MG: 2 INJECTION INTRAMUSCULAR; INTRAVENOUS at 11:46

## 2024-08-05 RX ADMIN — ONDANSETRON 4 MG: 2 INJECTION INTRAMUSCULAR; INTRAVENOUS at 20:22

## 2024-08-05 RX ADMIN — LORAZEPAM 0.5 MG: 2 INJECTION INTRAMUSCULAR; INTRAVENOUS at 17:29

## 2024-08-05 RX ADMIN — HYDROCODONE BITARTRATE AND ACETAMINOPHEN 1 TABLET: 5; 325 TABLET ORAL at 05:14

## 2024-08-05 RX ADMIN — HYDROCODONE BITARTRATE AND ACETAMINOPHEN 1 TABLET: 5; 325 TABLET ORAL at 18:41

## 2024-08-05 RX ADMIN — MORPHINE SULFATE 4 MG: 4 INJECTION INTRAVENOUS at 23:39

## 2024-08-05 ASSESSMENT — PAIN SCALES - GENERAL
PAINLEVEL_OUTOF10: 7
PAINLEVEL_OUTOF10: 8
PAINLEVEL_OUTOF10: 9
PAINLEVEL_OUTOF10: 0
PAINLEVEL_OUTOF10: 0
PAINLEVEL_OUTOF10: 5
PAINLEVEL_OUTOF10: 10
PAINLEVEL_OUTOF10: 5
PAINLEVEL_OUTOF10: 0
PAINLEVEL_OUTOF10: 6
PAINLEVEL_OUTOF10: 3
PAINLEVEL_OUTOF10: 7

## 2024-08-06 VITALS
RESPIRATION RATE: 14 BRPM | DIASTOLIC BLOOD PRESSURE: 86 MMHG | WEIGHT: 81.35 LBS | HEIGHT: 60 IN | OXYGEN SATURATION: 98 % | BODY MASS INDEX: 15.97 KG/M2 | SYSTOLIC BLOOD PRESSURE: 172 MMHG | TEMPERATURE: 98.2 F | HEART RATE: 79 BPM

## 2024-08-06 PROBLEM — C22.1 CHOLANGIOCARCINOMA  (CMD): Status: ACTIVE | Noted: 2024-01-01

## 2024-08-06 PROBLEM — K56.609 SBO (SMALL BOWEL OBSTRUCTION)  (CMD): Status: ACTIVE | Noted: 2024-01-01

## 2024-08-06 LAB
ANION GAP SERPL CALC-SCNC: 12 MMOL/L (ref 7–19)
BUN SERPL-MCNC: 3 MG/DL (ref 6–20)
BUN/CREAT SERPL: 5 (ref 7–25)
CALCIUM SERPL-MCNC: 8.7 MG/DL (ref 8.4–10.2)
CHLORIDE SERPL-SCNC: 103 MMOL/L (ref 97–110)
CO2 SERPL-SCNC: 25 MMOL/L (ref 21–32)
CREAT SERPL-MCNC: 0.65 MG/DL (ref 0.51–0.95)
EGFRCR SERPLBLD CKD-EPI 2021: 89 ML/MIN/{1.73_M2}
FASTING DURATION TIME PATIENT: ABNORMAL H
GLUCOSE SERPL-MCNC: 90 MG/DL (ref 70–99)
MAGNESIUM SERPL-MCNC: 1.8 MG/DL (ref 1.7–2.4)
PHOSPHATE SERPL-MCNC: 2.8 MG/DL (ref 2.4–4.7)
POTASSIUM SERPL-SCNC: 2.9 MMOL/L (ref 3.4–5.1)
RAINBOW EXTRA TUBES HOLD SPECIMEN: NORMAL
SODIUM SERPL-SCNC: 137 MMOL/L (ref 135–145)

## 2024-08-06 PROCEDURE — 10004651 HB RX, NO CHARGE ITEM: Performed by: HOSPITALIST

## 2024-08-06 PROCEDURE — 83735 ASSAY OF MAGNESIUM: CPT | Performed by: PHYSICIAN ASSISTANT

## 2024-08-06 PROCEDURE — 36415 COLL VENOUS BLD VENIPUNCTURE: CPT | Performed by: PHYSICIAN ASSISTANT

## 2024-08-06 PROCEDURE — 80048 BASIC METABOLIC PNL TOTAL CA: CPT | Performed by: PHYSICIAN ASSISTANT

## 2024-08-06 PROCEDURE — 10002800 HB RX 250 W HCPCS: Performed by: HOSPITALIST

## 2024-08-06 PROCEDURE — 10002800 HB RX 250 W HCPCS

## 2024-08-06 PROCEDURE — 10002801 HB RX 250 W/O HCPCS: Performed by: NURSE PRACTITIONER

## 2024-08-06 PROCEDURE — 84100 ASSAY OF PHOSPHORUS: CPT | Performed by: PHYSICIAN ASSISTANT

## 2024-08-06 RX ADMIN — ONDANSETRON 4 MG: 2 INJECTION INTRAMUSCULAR; INTRAVENOUS at 02:34

## 2024-08-06 RX ADMIN — SODIUM CHLORIDE, PRESERVATIVE FREE 2 ML: 5 INJECTION INTRAVENOUS at 08:37

## 2024-08-06 RX ADMIN — LORAZEPAM 0.5 MG: 2 INJECTION INTRAMUSCULAR; INTRAVENOUS at 00:57

## 2024-08-06 RX ADMIN — METOCLOPRAMIDE HYDROCHLORIDE 5 MG: 5 INJECTION INTRAMUSCULAR; INTRAVENOUS at 08:32

## 2024-08-06 RX ADMIN — FAMOTIDINE 20 MG: 10 INJECTION INTRAVENOUS at 08:35

## 2024-08-06 ASSESSMENT — PAIN SCALES - GENERAL
PAINLEVEL_OUTOF10: 0
PAINLEVEL_OUTOF10: 1
PAINLEVEL_OUTOF10: 5

## 2024-08-07 PROBLEM — Z51.5 HOSPICE CARE PATIENT: Status: ACTIVE | Noted: 2024-01-01

## 2024-08-13 ENCOUNTER — PATIENT OUTREACH (OUTPATIENT)
Dept: CASE MANAGEMENT | Age: 79
End: 2024-08-13

## 2024-08-13 ENCOUNTER — TELEPHONE (OUTPATIENT)
Dept: FAMILY MEDICINE CLINIC | Facility: CLINIC | Age: 79
End: 2024-08-13

## 2024-08-13 NOTE — TELEPHONE ENCOUNTER
Per care everywhere notes reviewed patient passed away 8/9/2024 in hospice care .       Date of Death: 08/09/2024.

## 2024-08-13 NOTE — PROGRESS NOTES
CCM performed chart review for monthly outreach .    Per chart review -  care everywhere notes reviewed- patient passed away 8/9/2024 in hospice care .       Date of Death: 08/09/2024     CCM will notify PCP .

## 2025-04-29 NOTE — PROGRESS NOTES
[facility]  Progress Note    Bibi Diaz Patient Status:  Inpatient    1945 MRN [id number]   Location [facility] 3NW-A Attending Geena Pardo MD   Hosp Day # 5 PCP Rick Shannon DO     Subjective:  Patient is POD4 from LAR with Elizabeth's with end colostomy secondary to diverticular abscess.     Patient reports she woke up with pain and nausea this morning, but is improving after Zofran. She indicates her pain was in RUQ and epigastric region which is unchanged from where she experienced pain in the past. She denies any drain site pain. She denies any nausea with food and has been tolerating her diet well. Patient is unsure if she has been passing gas, but reports staff has indicated she is.     Objective/Physical Exam:  General: Alert, orientated x3.  Cooperative.  No apparent distress.  Vital Signs:  Blood pressure 136/55, pulse 64, temperature 98.1 °F (36.7 °C), temperature source Oral, resp. rate 16, height 60\", weight 89 lb 11.6 oz, SpO2 97%, not currently breastfeeding.  Wt Readings from Last 3 Encounters:   24 89 lb 11.6 oz   24 84 lb 9.6 oz   24 87 lb     Lungs: No respiratory distress.  Cardiac: Regular rate and rhythm.   Abdomen:  Soft, non distended, appropriately tender at incisions, with no rebound or guarding.  No peritoneal signs. Stoma is pink and well perfused with bowel present in colostomy.   Extremities:  No lower extremity edema noted.    Incision: Clean, dry, intact, no erythema. Staples in place.   Drain: VALERI in place with serosanguinous output    Intake/Output:    Intake/Output Summary (Last 24 hours) at 7/15/2024 0755  Last data filed at 7/15/2024 0554  Gross per 24 hour   Intake 1540 ml   Output 2775 ml   Net -1235 ml     I/O last 3 completed shifts:  In: 1740 [P.O.:1440; IV PIGGYBACK:300]  Out: 3535 [Urine:3050; Drains:85; Stool:400]  No intake/output data recorded.    Medications:    multivitamin with minerals  1 tablet Oral Daily    enoxaparin   30 mg Subcutaneous Daily    acetaminophen  1,000 mg Oral Q8H KYLE    famotidine  20 mg Oral Daily    Or    famotidine  20 mg Intravenous Daily    losartan  50 mg Oral Daily    clonazePAM  0.5 mg Oral BID    fluticasone propionate  2 spray Each Nare Daily    mirtazapine  30 mg Oral Nightly    fluticasone furoate-vilanterol  1 puff Inhalation Daily    piperacillin-tazobactam  3.375 g Intravenous Q8H [facility]       Labs:  Lab Results   Component Value Date    WBC 7.9 07/15/2024    HGB 9.2 07/15/2024    HCT 26.9 07/15/2024    .0 07/15/2024     Lab Results   Component Value Date     07/15/2024    K 4.1 07/15/2024     07/15/2024    CO2 22.0 07/15/2024    BUN 6 07/15/2024    CREATSERUM 0.78 07/15/2024    GLU 94 07/15/2024    CA 8.9 07/15/2024    ALKPHO 62 07/15/2024    ALT 37 07/15/2024    AST 15 07/15/2024    BILT 0.2 07/15/2024    ALB 2.1 07/15/2024    TP 5.7 07/15/2024     Lab Results   Component Value Date    PT 12.6 01/31/2013    PT 13.2 08/02/2011    INR 1.35 (H) 06/18/2024    INR 1.05 03/20/2024    INR 0.99 12/29/2022         Assessment  Patient Active Problem List   Diagnosis    Major depressive disorder, recurrent episode, moderate (HCC)    Esophageal reflux    Chronic obstructive pulmonary disease with acute lower respiratory infection (HCC)    Acquired hypothyroidism    Memory deficit    Aortic atherosclerosis (HCC)    Diverticulosis of colon    Dizziness and giddiness    Dysthymic disorder    Lichen sclerosus    Pure hypercholesterolemia    Vitamin D deficiency    Vitiligo    Severe episode of recurrent major depressive disorder, without psychotic features (HCC)    Paraesophageal hernia    Subclinical hypothyroidism    Hypertension    Protein-calorie malnutrition, unspecified severity (HCC)    Gastrointestinal hemorrhage, unspecified gastrointestinal hemorrhage type    Thrombocytopenia (HCC)    Acute diverticulitis    Hyponatremia    ACP (advance care planning)    Weakness generalized    Colonic  diverticular abscess    Sigmoid diverticulitis    History of repair of hiatal hernia    Diverticulitis of large intestine with abscess without bleeding    Malnutrition (HCC)    Diverticulitis    Pelvic abscess in female    Leukocytosis    Diverticulitis of colon       Postop day 4 low anterior resection of rectosigmoid colon with Elizabeth's pouch and end colostomy, drainage of abdominal abscess.     Plan:  Continue full liquid diet, advance as tolerated. Nutrition supplements BID.   Analgesics and antiemetics as needed.   Ambulate and up to chair.  Continue incentive spirometer.  DVT prophylaxis with lovenox.  GI prophylaxis with famotidine.  Continue drain and ostomy care.  Continue antibiotics, ID following.     Quality:  DVT Mechanical Prophylaxis:   SCDs, Early ambuation  DVT Pharmacologic Prophylaxis   Medication    enoxaparin (Lovenox) 30 MG/0.3ML SUBQ injection 30 mg                Code Status: DNAR/Selective Treatment  Flood: External urinary catheter in place  Flood Duration (in days):   Central line:    WOLF:       Vivien Robertson  7/15/2024  7:27 AM      The patient was discussed with Rach Martinez PA-C.     The patient is resting in bed. She reports feeling better today. She reports some abdominal pain this morning after waking up. She reports some nausea which is She denies nausea or vomiting. She is tolerating a full liquid diet. She reports sitting in the chair yesterday.     Abdomen: soft, non distended, non-tender to palpation. No rebound or guarding. No peritoneal sigs.   Incision: midline incision is dry clean, and intact with staples in place. No surrounding erythema or drainage.  No signs of infection.  Drain: 65 ml of serous output over the past 24 hours.    POD 4 exploratory laparotomy, Ramírez's procedure with end colostomy, drainage of abdominal abscess     Plan:  Advance to soft diet. Continue nutritional supplements and protein shakes.   Continue drain, wound, and ostomy care. Ostomy care  [No Acute Distress] : no acute distress on consult.   Continue pain control and antiemetics as needed.  Encourage ambulation and up to chair.  Continue IV antibiotics.  Social work to initiate discharge planning to possible rehab facility.   DVT prophylaxis with Lovenox  GI prophylaxis with Pepcid   Hopeful discharge home within the next 24-48 hours.    Rach Martinez PA-C    Patient feeling better.  Ostomy output is improving.  No new concerns.  Patient would likely require postdischarge CHUY's placement.  I agree with the note above and attest to its accuracy with the following changes below after my interview and examination of the patient    The patient was seen and examined.  Available labs and radiology is noted.    Ileana Brooks MD FACS    Please note that this report has been produced using speech recognition software and may contain errors related to that system including but not limited to errors in grammar, punctuation and spelling as well as words and phrases that possibly may have been recognized inappropriately.  If there are any questions or concerns please contact the dictating provider for clarification.    The 21st Century Cures Act makes medical notes like these available to patients in the interest of trans parency. Please be advised this is a medical document. Medical documents are intended to carry relevant information, facts as evident, and the clinical opinion of the practitioner. The medical note is intended as peer to peer communication and may appear blunt or direct. It is written in medical language and may contain abbreviations or verbiage that are unfamiliar.   Again if there are any questions or concerns please contact the dictating provider for clarification.             [Normal Oropharynx] : normal oropharynx [Normal Appearance] : normal appearance [No Neck Mass] : no neck mass [Normal Rate/Rhythm] : normal rate/rhythm [Normal S1, S2] : normal s1, s2 [No Murmurs] : no murmurs [No Resp Distress] : no resp distress [Clear to Auscultation Bilaterally] : clear to auscultation bilaterally [Normal Gait] : normal gait [No Clubbing] : no clubbing [No Cyanosis] : no cyanosis [No Edema] : no edema [FROM] : FROM [Normal Color/ Pigmentation] : normal color/ pigmentation [No Focal Deficits] : no focal deficits [Oriented x3] : oriented x3 [Normal Affect] : normal affect

## (undated) DIAGNOSIS — J44.0 CHRONIC OBSTRUCTIVE PULMONARY DISEASE WITH ACUTE LOWER RESPIRATORY INFECTION (HCC): ICD-10-CM

## (undated) DIAGNOSIS — M05.20 RHEUMATOID ARTERITIS (HCC): ICD-10-CM

## (undated) DIAGNOSIS — I70.0 AORTIC ATHEROSCLEROSIS (HCC): ICD-10-CM

## (undated) DIAGNOSIS — E03.9 ACQUIRED HYPOTHYROIDISM: Chronic | ICD-10-CM

## (undated) DIAGNOSIS — F33.0 MILD RECURRENT MAJOR DEPRESSION (HCC): Chronic | ICD-10-CM

## (undated) DIAGNOSIS — I10 ESSENTIAL HYPERTENSION: ICD-10-CM

## (undated) DIAGNOSIS — F41.9 ANXIETY: ICD-10-CM

## (undated) DEVICE — SUTURE VLOC NONAB 0 6" 0306

## (undated) DEVICE — STERILE POLYISOPRENE POWDER-FREE SURGICAL GLOVES: Brand: PROTEXIS

## (undated) DEVICE — TIP-UP FENESTRATED GRASPER: Brand: ENDOWRIST

## (undated) DEVICE — CURVED, LARGE JAW, OPEN SEALER/DIVIDER NANO-COATED: Brand: LIGASURE IMPACT

## (undated) DEVICE — MONOPOLAR CURVED SCISSORS: Brand: ENDOWRIST

## (undated) DEVICE — #11 STERILE BLADE: Brand: POLYMER COATED BLADES

## (undated) DEVICE — SUT ETHIBOND 0 SH X524H

## (undated) DEVICE — PACK PBDS LAPAROTOMY GENERAL

## (undated) DEVICE — 1200CC GUARDIAN II: Brand: GUARDIAN

## (undated) DEVICE — MEDI-VAC SUCTION HANDLE REGULAR CAPACITY: Brand: CARDINAL HEALTH

## (undated) DEVICE — SUT MONOCRYL 4-0 PS-2 Y496G

## (undated) DEVICE — VIOLET BRAIDED (POLYGLACTIN 910), SYNTHETIC ABSORBABLE SUTURE: Brand: COATED VICRYL

## (undated) DEVICE — Device: Brand: DEFENDO AIR/WATER/SUCTION AND BIOPSY VALVE

## (undated) DEVICE — 10FT COMBINED O2 DELIVERY/CO2 MONITORING. FILTER WITH MICROSTREAM TYPE LUER: Brand: DUAL ADULT NASAL CANNULA

## (undated) DEVICE — ELECTRODE ES L10.2CM BLDE L4IN EXT MPLR OPN

## (undated) DEVICE — LAPAROVUE VISIBILITY SYSTEM LAPAROSCOPIC SOLUTIONS: Brand: LAPAROVUE

## (undated) DEVICE — ANTIBACTERIAL UNDYED BRAIDED (POLYGLACTIN 910), SYNTHETIC ABSORBABLE SUTURE: Brand: COATED VICRYL

## (undated) DEVICE — ROBOTIC GENERAL: Brand: MEDLINE INDUSTRIES, INC.

## (undated) DEVICE — MEDI-VAC NON-CONDUCTIVE SUCTION TUBING: Brand: CARDINAL HEALTH

## (undated) DEVICE — TROCAR: Brand: KII FIOS FIRST ENTRY

## (undated) DEVICE — SUT COAT VCRL + 2-0 18IN ABSRB UD ANTIBACT

## (undated) DEVICE — STAPLER WITH DST SERIES TECHNOLOGY: Brand: GIA

## (undated) DEVICE — ENDOSCOPY PACK UPPER: Brand: MEDLINE INDUSTRIES, INC.

## (undated) DEVICE — KIT VLV 5 PC AIR H2O SUCT BX ENDOGATOR CONN

## (undated) DEVICE — DEVICE INFL 60ML 15ATM PRSS COOK SPHR

## (undated) DEVICE — TRI-LUMEN FILTERED TUBE SET WITH ACTIVATED CHARCOAL FILTER: Brand: AIRSEAL

## (undated) DEVICE — SOL  0.9% 1000ML

## (undated) DEVICE — 40580 - THE PINK PAD - ADVANCED TRENDELENBURG POSITIONING KIT: Brand: 40580 - THE PINK PAD - ADVANCED TRENDELENBURG POSITIONING KIT

## (undated) DEVICE — FILTERLINE NASAL ADULT O2/CO2

## (undated) DEVICE — GLOVE SUR 6.5 SENSICARE PI PIP CRM PWD F

## (undated) DEVICE — COLUMN DRAPE

## (undated) DEVICE — 3M™ RED DOT™ MONITORING ELECTRODE WITH FOAM TAPE AND STICKY GEL, 50/BAG, 20/CASE, 72/PLT 2570: Brand: RED DOT™

## (undated) DEVICE — SLEEVE COMPR MD KNEE LEN SGL USE KENDALL SCD

## (undated) DEVICE — VESSEL SEALER EXTEND: Brand: ENDOWRIST

## (undated) DEVICE — AEGIS 1" DISK 4MM HOLE, PEEL OPEN: Brand: MEDLINE

## (undated) DEVICE — DRAIN SUR 19FR L0.25IN 3/4 FLUT 3/16IN TRCR

## (undated) DEVICE — DRAPE FLD WRM W44XL66IN C6L FOR INTRATEMP SYS

## (undated) DEVICE — STRL PENROSE DRAIN 18" X 1/4": Brand: CARDINAL HEALTH

## (undated) DEVICE — MEGA SUTURECUT ND: Brand: ENDOWRIST

## (undated) DEVICE — SUT COAT VCRL 4-0 27IN SH-1 ABSRB VLT 22MM 1/

## (undated) DEVICE — PAD,ABDOMINAL,8"X7.5",ST,LF,20/BX: Brand: MEDLINE INDUSTRIES, INC.

## (undated) DEVICE — SUT ETHLN 3-0 18IN PS-1 NABSRB BLK 24MM 3/8 C

## (undated) DEVICE — EVACUATOR SUR 100CC SIL BLB WND

## (undated) DEVICE — PTFE COATED BLADE 2.75': Brand: MEDLINE

## (undated) DEVICE — SPONGE: SPECIALTY PEANUT XR 100/CS: Brand: MEDICAL ACTION INDUSTRIES

## (undated) DEVICE — GIJAW SINGLE-USE BIOPSY FORCEPS WITH NEEDLE: Brand: GIJAW

## (undated) DEVICE — SOLUTION IRRIG 1000ML 0.9% NACL USP BTL

## (undated) DEVICE — BITEBLOCK ENDOSCP 60FR MAXI STRP

## (undated) DEVICE — PROXIMATE RH ROTATING HEAD SKIN STAPLERS (35 WIDE) CONTAINS 35 STAINLESS STEEL STAPLES: Brand: PROXIMATE

## (undated) DEVICE — COVER LT HNDL RIG FOR SUR CAM DISP

## (undated) DEVICE — FENESTRATED BIPOLAR FORCEPS: Brand: ENDOWRIST

## (undated) DEVICE — DISPOSABLE GRASPER: Brand: EPIX LAPAROSCOPIC GRASPER

## (undated) DEVICE — KIT CUSTOM ENDOPROCEDURE STERIS

## (undated) DEVICE — ENDOPATH ULTRA VERESS INSUFFLATION NEEDLES WITH LUER LOCK CONNECTORS: Brand: ENDOPATH

## (undated) DEVICE — GOWN,SIRUS,FABRIC-REINFORCED,X-LARGE: Brand: MEDLINE

## (undated) DEVICE — 2, DISPOSABLE SUCTION/IRRIGATOR WITHOUT DISPOSABLE TIP: Brand: STRYKEFLOW

## (undated) DEVICE — DILATION SYRINGE: Brand: COOK

## (undated) DEVICE — TIP COVER ACCESSORY

## (undated) DEVICE — CLOSURE EXOFIN 1.0ML

## (undated) DEVICE — CLOSING BUNDLE: Brand: MEDLINE INDUSTRIES, INC.

## (undated) DEVICE — TRAY SURESTEP 16 BARDEX UMETR

## (undated) DEVICE — AIRSEAL 8 MM ACCESS PORT AND LOW PROFILE OBTURATOR WITH BLADELESS OPTICAL TIP, 120 MM LENGTH: Brand: AIRSEAL

## (undated) DEVICE — LAPAROTOMY SPONGE - RF AND X-RAY DETECTABLE PRE-WASHED: Brand: SITUATE

## (undated) DEVICE — BLADELESS OBTURATOR: Brand: WECK VISTA

## (undated) DEVICE — CANNULA SEAL

## (undated) DEVICE — ARM DRAPE

## (undated) DEVICE — HERCULES 3 STAGE BALLOON ESOPHAGEAL: Brand: HERCULES

## (undated) DEVICE — LOADING UNIT WITH DST SERIES TECHNOLOGY: Brand: GIA

## (undated) DEVICE — SUT VCRL 0 L18IN ABSRB UD TIE POLYGLACTIN

## (undated) DEVICE — GOWN,PREVENTION PLUS,XLARGE,STERILE: Brand: MEDLINE

## (undated) NOTE — IP AVS SNAPSHOT
Patient Demographics     Address  [address]  [facility] [address] Phone  [phone] (Home)  [phone] (Mobile) *Preferred* E-mail Address  [email or web address]      Patient Contacts     Name Relation Home Work Mobile    LEO BROWN   [phone]    LORNE BROWN Next of Kin [phone]    NOVA BROWN [phone]      Allergies as of 6/24/2024  Review status set to In Progress on 6/18/2024       Noted Reaction Type Reactions    Avelox [moxifloxacin Hcl In Nacl] 08/22/2012   Side Effect SWELLING    Avelox [moxifloxacin Hydrochloride] 12/15/2011    TONGUE SWELLING    \"TABS\"    DELETED: Doxycycline Hyclate 01/04/2012    HIVES, SWELLING    \"TABS\"    Amoxicillin 04/19/2016    NAUSEA AND VOMITING    Vomiting.    Statins 04/05/2021   Side Effect Myopathy    Sulfa Antibiotics 08/29/2011   Side Effect RASH, ITCHING    Dander 02/17/2023    OTHER (SEE COMMENTS)    \"Animals\": runny nose, watery eyes, itching    DELETED: Doxycycline Hyclate 08/22/2012   Side Effect HIVES, SWELLING    DELETED: Flovent [fluticasone Propionate] 08/29/2011        DELETED: Sulfa Antibiotics 07/24/2012        DELETED: Allergy 08/29/2011    Runny nose    \"Animals\": runny nose, watery eyes, itching    Diphenhydramine 08/29/2011    Runny nose    \"Animals\": runny nose, watery eyes, itching    Dust 08/29/2011    Runny nose    Latex 09/17/2014    RASH    Sulfa Drugs Cross Reactors 08/29/2011    RASH, ITCHING      Code Status Information     Code Status    DNAR/Selective Treatment        Patient Instructions       IR drains to be managed by radiology.  Drains to be removed when output is less than 10 cc for 2 consecutive days.    Patient Isolation Status     None to display      Microbiology Results (All)     Procedure Component Value Units Date/Time    Aerobic Bacterial Culture [[id number]]  (Abnormal)  (Susceptibility) Collected: 06/18/24 1176    Order Status: Completed Lab Status: Final result  Updated: 06/22/24 1548    Specimen: Abscess from Abdominal peritoneum      Aerobic Culture Result 2+ growth Escherichia coli      4+ growth Streptococcus anginosus      1+ growth Escherichia coli      2+ growth Streptococcus constellatus      2+ growth Pseudomonas aeruginosa     Aerobic Smear 4+ WBCs seen      1+ Gram Positive Cocci      2+ Gram Negative Rods      1+ Gram Positive Rods    Susceptibility      Escherichia coli      Not Specified     Ampicillin 8  Sensitive     Cefazolin <=4  Sensitive     Ciprofloxacin <=0.25  Sensitive     Gentamicin <=1  Sensitive     Levofloxacin <=0.12  Sensitive     Meropenem <=0.25  Sensitive     Piperacillin + Tazobactam <=4  Sensitive     Trimethoprim/Sulfa <=20  Sensitive                      Susceptibility      Escherichia coli      Not Specified     Ampicillin 16  Intermediate     Cefazolin <=4  Sensitive     Ciprofloxacin <=0.25  Sensitive     Gentamicin <=1  Sensitive     Levofloxacin <=0.12  Sensitive     Meropenem <=0.25  Sensitive     Piperacillin + Tazobactam <=4  Sensitive     Trimethoprim/Sulfa <=20  Sensitive                      Susceptibility      Pseudomonas aeruginosa      Not Specified     Cefepime <=2  Sensitive     Ceftazidime <=1  Sensitive     Ciprofloxacin <=1  Sensitive     Levofloxacin 1  Sensitive     Meropenem <=1  Sensitive     Piperacillin + Tazobactam <=4  Sensitive     Tobramycin <=1  Sensitive                          Anaerobic Culture [[id number]] Collected: 06/18/24 1646    Order Status: Completed Lab Status: Preliminary result Updated: 06/19/24 0737    Specimen: Abscess from Abdominal peritoneum      Anaerobic Culture Pending      Immunizations     Name Date      Covid-19 Pfizer 12/08/21     Covid-19 Pfizer 04/30/21     Covid-19 Pfizer 04/09/21     Fluad 0.5ml 10/23/20     INFLUENZA 10/20/23     INFLUENZA 09/08/22     INFLUENZA 12/08/21     INFLUENZA 10/29/19     INFLUENZA 10/29/19     INFLUENZA 10/17/16     INFLUENZA 11/02/15     INFLUENZA  10/16/14     INFLUENZA 10/06/14     INFLUENZA 08/29/13     INFLUENZA 08/29/13     INFLUENZA 01/04/12     INFLUENZA 10/28/10     INFLUENZA 09/29/08     INFLUENZA 11/05/07     INFLUENZA 11/11/06     INFLUENZA 12/01/05     INFLUENZA 11/16/02     INFLUENZA 10/19/01     Pneumococcal (Prevnar 13) 05/16/17     Pneumovax 23 01/04/12     Pneumovax 23 08/29/11     Pneumovax 23 09/29/09     TD 03/21/06     TDAP 08/29/11     Zoster Vaccine Recombinant Adjuvanted (Shingrix) 07/15/20     Zoster Vaccine Recombinant Adjuvanted (Shingrix) 07/15/20        Follow-up Information     Remi Friedman MD. Go on 7/3/2024.    Specialty: SURGERY, GENERAL  Why: at 2pm  Contact information:  [address] [facility] [address] [phone]             Rick Shannon, DO Follow up in 1 week(s).    Specialty: Family Medicine  Why: Follow up  Contact information:  [address] [place] [address] [address]  [facility] [address] [phone]                        Your Home Meds List      TAKE these medications       Instructions Authorizing Provider Morning Afternoon Evening As Needed   acetaminophen 500 MG Tabs  Commonly known as: Tylenol Extra Strength  Next dose due: As needed       Take 1 tablet (500 mg total) by mouth every 6 (six) hours as needed for Pain.          albuterol 108 (90 Base) MCG/ACT Aers  Commonly known as: ProAir HFA      INHALE 2 PUFFS BY MOUTH EVERY 6 HOURS AS NEEDED FOR WHEEZING   Rick Shannon         clobetasol 0.05 % Oint  Commonly known as: Temovate              clonazePAM 0.5 MG Tabs  Commonly known as: KlonoPIN  Next dose due: As needed       Take 1 tablet (0.5 mg total) by mouth 2 (two) times daily as needed.   Margot Francis         dextrose 5% SOLN 100 mL with piperacillin-tazobactam 3.375 (3-0.375) g SOLR 3.375 g  Start taking on: June 25, 2024  Next dose due: 8:30pm      Inject 3.375 g into the vein every 8 (eight) hours for 7 days. Remove midline with completion of ivabx 7/2  Stop taking on: July 2, 2024   Marc LU  Vanda         DULoxetine 30 MG Cpep  Commonly known as: Cymbalta  Next dose due: Tomorrow morning      Take 1 capsule (30 mg total) by mouth daily.   Eliana Shannon         Dupixent 300 MG/2ML Sosy  Generic drug: Dupilumab              fluticasone propionate 50 MCG/ACT Susp  Commonly known as: Flonase  Next dose due: Tomorrow morning      2 sprays by Each Nare route daily.   Rick Shannon         losartan 25 MG Tabs  Commonly known as: Cozaar  Next dose due: Tomorrow morning      Take 1 tablet (25 mg total) by mouth daily.   Rick Shannon         meclizine 12.5 MG Tabs  Commonly known as: Antivert  Next dose due: As needed       Take 1 tablet (12.5 mg total) by mouth 3 (three) times daily as needed.   Rick Shannon         mirtazapine 30 MG Tabs  Commonly known as: Remeron  Next dose due: Tonight      Take 1 tablet (30 mg total) by mouth nightly.   Rick Shannon         multivitamin with minerals Tabs  Next dose due: Tomorrow morning      Take 1 tablet by mouth daily.   Marcela Dangelo         ondansetron 4 MG Tbdp  Commonly known as: Zofran-ODT  Next dose due: As needed       Take 1 tablet (4 mg total) by mouth every 4 (four) hours as needed for Nausea.   Rick Shannon         ondansetron 4 MG Tbdp  Commonly known as: Zofran-ODT  Next dose due: As needed      Take 1 tablet (4 mg total) by mouth every 8 (eight) hours as needed for Nausea.   Margot Francis         Symbicort 160-4.5 MCG/ACT Aero  Generic drug: Budesonide-Formoterol Fumarate  Next dose due: This evening      INHALE 2 PUFFS BY MOUTH TWICE DAILY   Rick Shannon         traMADol 50 MG Tabs  Commonly known as: Ultram  Next dose due: As needed       Take 1 tablet (50 mg total) by mouth every 12 (twelve) hours as needed.   Margot Francis         triamcinolone 0.1 % Crea  Commonly known as: Kenalog  Next dose due: As needed       APPLY TOPICALLY TO THE AFFECTED AREA TWICE DAILY AS NEEDED   Rick Shannon         VITAMIN D-3 OR  Next dose due: Tomorrow morning      Take by  mouth.                Where to Get Your Medications      These medications were sent to [id number] - [place], [place] - [address] AT [facility] [address], [phone], [phone]  [address], [facility] [address]    Phone: [phone]   ondansetron 4 MG Tbdp     Please  your prescriptions at the location directed by your doctor or nurse    Bring a paper prescription for each of these medications  clonazePAM 0.5 MG Tabs  dextrose 5% SOLN 100 mL with piperacillin-tazobactam 3.375 (3-0.375) g SOLR 3.375 g  traMADol 50 MG Tabs           [phone] - MAR ACTION REPORT  (last 48 hrs)    ** SITE UNKNOWN **     Order ID Medication Name Action Time Action Reason Comments    [id number] DULoxetine (Cymbalta)  cap 30 mg 06/23/24 0851 Given      [id number] DULoxetine (Cymbalta) DR cap 30 mg 06/24/24 0925 Given      [id number] acetaminophen (Tylenol Extra Strength) tab 1,000 mg 06/23/24 0036 Given      [id number] acetaminophen (Tylenol Extra Strength) tab 1,000 mg 06/24/24 0259 Given      [id number] clonazePAM (KlonoPIN) tab 0.5 mg 06/23/24 1150 Given      [id number] clonazePAM (KlonoPIN) tab 0.5 mg 06/24/24 0925 Given      [id number] fluticasone furoate-vilanterol (Breo Ellipta) 100-25 MCG/ACT inhaler 1 puff 06/24/24 0930 Given      [id number] hydrALAzine (Apresoline) 20 mg/mL injection 5 mg 06/23/24 1150 Given      [id number] losartan (Cozaar) tab 25 mg 06/23/24 1144 Given      [id number] losartan (Cozaar) tab 25 mg 06/24/24 0925 Given      [id number] metoclopramide (Reglan) 5 mg/mL injection 10 mg 06/23/24 0506 Given      [id number] mirtazapine (Remeron) tab 30 mg 06/22/24 1958 Given      [id number] mirtazapine (Remeron) tab 30 mg 06/23/24 2146 Given      [id number] multivitamin with minerals (Thera M Plus) tab 1 tablet 06/23/24 0851 Given      [id number] multivitamin with minerals (Thera M Plus) tab 1 tablet 06/24/24 0925 Given      [id number] ondansetron (Zofran) 4 MG/2ML injection 4 mg 06/22/24 2347 Given       [id number] ondansetron (Zofran) 4 MG/2ML injection 4 mg 06/23/24 0724 Given      [id number] ondansetron (Zofran) 4 MG/2ML injection 4 mg 06/24/24 0256 Given      [id number] piperacillin-tazobactam (Zosyn) 3.375 g in dextrose 5% 100 mL IVPB-ADDV 06/22/24 1839 New Bag      [id number] piperacillin-tazobactam (Zosyn) 3.375 g in dextrose 5% 100 mL IVPB-ADDV 06/23/24 0314 New Bag      [id number] piperacillin-tazobactam (Zosyn) 3.375 g in dextrose 5% 100 mL IVPB-ADDV 06/23/24 1144 New Bag      [id number] piperacillin-tazobactam (Zosyn) 3.375 g in dextrose 5% 100 mL IVPB-ADDV 06/23/24 1850 New Bag      [id number] piperacillin-tazobactam (Zosyn) 3.375 g in dextrose 5% 100 mL IVPB-ADDV 06/24/24 0256 New Bag      [id number] piperacillin-tazobactam (Zosyn) 3.375 g in dextrose 5% 100 mL IVPB-ADDV 06/24/24 1230 New Bag      [id number] polyethylene glycol (PEG 3350) (Miralax) 17 g oral packet 17 g 06/24/24 1230 Given      [id number] sodium chloride 0.9% infusion 06/22/24 2349 New Bag      [id number] sodium chloride 0.9% infusion 06/23/24 2145 New Bag      [id number] sorbitol 70 % oral solution 30 mL 06/22/24 1653 Given  patient having late lunch          LEFT LOWER ABDOMEN     Order ID Medication Name Action Time Action Reason Comments    [id number] heparin (Porcine) 5000 UNIT/ML injection 5,000 Units 06/24/24 0925 Given            LEFT UPPER ABDOMEN     Order ID Medication Name Action Time Action Reason Comments    [id number] heparin (Porcine) 5000 UNIT/ML injection 5,000 Units 06/23/24 0851 Given            RIGHT LOWER ABDOMEN     Order ID Medication Name Action Time Action Reason Comments    [id number] heparin (Porcine) 5000 UNIT/ML injection 5,000 Units 06/22/24 1959 Given      [id number] heparin (Porcine) 5000 UNIT/ML injection 5,000 Units 06/23/24 2146 Given              Recent Vital Signs    Flowsheet Row Most Recent Value   /81 Filed at 06/24/2024 1100   Pulse 81 Filed at 06/24/2024 1100   Resp 18 Filed at 06/24/2024 0300   Temp  98.1 °F (36.7 °C) Filed at 2024 0300   SpO2 99 % Filed at 2024 0300      Patient's Most Recent Weight    Flowsheet Row Most Recent Value   Patient Weight 43.6 kg (96 lb 3.2 oz)      Lab Results Last 24 Hours    No matching results found     Pending Labs     Order Current Status    Anaerobic Culture Preliminary result         H&P - H&P Note      H&P signed by Lokesh Temple DO at 2024 12:40 AM  Version 1 of 1    Author: Lokesh Temple DO Service: — Author Type: Physician    Filed: 2024 12:40 AM Date of Service: 2024  9:26 PM Status: Signed    : Lokesh Temple DO (Physician)         [facility]     Bibi Diaz Patient Status:  Emergency    1945 MRN [id number]   Location [facility] EMERGENCY DEPARTMENT Attending Jarrod Gonzalez MD   Hosp Day # 0 PCP Rick Shannon DO     Chief Complaint: worsening debility     Subjective:    History of Present Illness:     Bibi Diaz is a 78 year old female with past medical history hypothyroidism, anxiety, asthma, CKD 3, COPD, essential hypertension, hyperlipidemia, recent hospitalization for diverticulitis presents to hospital today for worsening debility and pain.  Patient was discharged  after hospitalization for diverticulitis but was unable to take antibiotics because \"it made her nauseous\".  Patient continued feel further weakness and abdominal pain and decided come to the hospital for further evaluation.  Patient had her granddaughter at the bedside lives nearby.  Patient reportedly having hard time completing ADLs.  No nausea vomiting no fevers or chills.            History/Other:    Past Medical History:  Past Medical History:    Abdominal distention    Abdominal pain    Acquired hypothyroidism    Anxiety    Asthma (HCC)    Belching    Bloating    Chronic rhinitis    CKD (chronic kidney disease) stage 3, GFR 30-59 ml/min (HCC)    Constipation    COPD (chronic obstructive pulmonary disease)  (HCC)    NO OXYGEN     Depression    Diarrhea, unspecified    Diverticulosis of large intestine    Emphysema    Esophageal reflux    Fatigue    Feeling lonely    Flatulence/gas pain/belching    Food intolerance    H/O bronchitis    Hay fever    Hemorrhoids    High blood pressure    History of depression    Hypercholesterolemia    Hypertension    Itch of skin    Migraines    Mild intermittent asthma without complication (HCC)    Nausea    Night sweats    Pneumonia    PONV (postoperative nausea and vomiting)    Pure hypercholesterolemia    Rash    Reflux    Sleep disturbance    Stress    Uncomfortable fullness after meals    Visual impairment    glasses    Vomiting    Wears glasses     Past Surgical History:   Past Surgical History:   Procedure Laterality Date    Cataract      Colonoscopy      Colonoscopy N/A 3/21/2024    Procedure: COLONOSCOPY;  Surgeon: Delon Ashford DO;  Location:  ENDOSCOPY    Colonoscopy & polypectomy  09/2014    multiple polyps; tics; repeat 3 yrs    Colonoscopy,remv lesn,snare N/A 09/22/2014    Procedure: ESOPHAGOGASTRODUODENOSCOPY, COLONOSCOPY, POSSIBLE BIOPSY, POSSIBLE POLYPECTOMY 48192,24115;  Surgeon: Slade Ivan MD;  Location: [facility]    Correct bunion,simple Bilateral     Cystotomy,excis vesical neck Left     Egd      Gastro - dmg  09/2014    stricture; HH    Oophorectomy Bilateral 2017    Other surgical history  02/27/2023    Robotic repair of hiatal hernia and Nissen fundoplication    Patient documented not to have experienced any of the following events N/A 09/22/2014    Procedure: ESOPHAGOGASTRODUODENOSCOPY, COLONOSCOPY, POSSIBLE BIOPSY, POSSIBLE POLYPECTOMY 37172,02205;  Surgeon: Slade Ivan MD;  Location: [facility]    Patient withough preoperative order for iv antibiotic surgical site infection prophylaxis. N/A 09/22/2014    Procedure: ESOPHAGOGASTRODUODENOSCOPY, COLONOSCOPY, POSSIBLE BIOPSY, POSSIBLE POLYPECTOMY 62259,58395;  Surgeon: Marzena  Slade COOPER MD;  Location: [facility]    Repair ing hernia,5+y/o,reducibl      Upper gi endoscopy,diagnosis N/A 09/22/2014    Procedure: ESOPHAGOGASTRODUODENOSCOPY, COLONOSCOPY, POSSIBLE BIOPSY, POSSIBLE POLYPECTOMY 06245,21832;  Surgeon: Slade Ivan MD;  Location: [facility]      Family History:   Family History   Problem Relation Age of Onset    Colon Cancer Mother     Other (Other) Mother     Other (copd) Mother     Alcohol and Other Disorders Associated Father     Other (Other) Father     Other (emph) Father     Alcohol and Other Disorders Associated Son     Hypertension Sister     Prostate Cancer Brother     Hypertension Brother      Social History:    reports that she quit smoking about 43 years ago. Her smoking use included cigarettes. She started smoking about 53 years ago. She has never used smokeless tobacco. She reports that she does not drink alcohol and does not use drugs.     Allergies:   Allergies   Allergen Reactions    Avelox [Moxifloxacin Hcl In Nacl] SWELLING    Avelox [Moxifloxacin Hydrochloride] TONGUE SWELLING     \"TABS\"    Amoxicillin NAUSEA AND VOMITING     Vomiting.    Statins Myopathy    Sulfa Antibiotics RASH and ITCHING    Dander OTHER (SEE COMMENTS)     \"Animals\": runny nose, watery eyes, itching    Diphenhydramine Runny nose     \"Animals\": runny nose, watery eyes, itching    Dust Runny nose    Latex RASH    Sulfa Drugs Cross Reactors RASH and ITCHING       Medications:    Current Facility-Administered Medications on File Prior to Encounter   Medication Dose Route Frequency Provider Last Rate Last Admin    [COMPLETED] iron sucrose (Venofer) 20 mg/mL injection 200 mg  200 mg Intravenous Once Marcela Dangelo MD   200 mg at 06/12/24 1241    [COMPLETED] acetaminophen (Tylenol Extra Strength) tab 1,000 mg  1,000 mg Oral Once Tyrone Kumari MD   1,000 mg at 06/10/24 1700    [COMPLETED] HYDROmorphone (Dilaudid) 1 MG/ML injection 0.5 mg  0.5 mg Intravenous Q30 Min PRN Kenyetta  MD Tyrone   0.5 mg at 06/12/24 0902    [COMPLETED] sodium chloride 0.9% infusion 1,000 mL  1,000 mL Intravenous Once Tyrone Kumari  mL/hr at 06/10/24 2010 1,000 mL at 06/10/24 2010    [COMPLETED] ondansetron (Zofran) 4 MG/2ML injection 4 mg  4 mg Intravenous Once Tyrone Kumari MD   4 mg at 06/10/24 2005    [COMPLETED] iopamidol 76% (ISOVUE-370) injection for power injector  60 mL Intravenous ONCE PRN Tyrone Kumari MD   60 mL at 06/10/24 1959    [COMPLETED] piperacillin-tazobactam (Zosyn) 4.5 g in dextrose 5% 100 mL IVPB-ADDV  4.5 g Intravenous Once Tyrone Kumari MD   Stopped at 06/10/24 2122    [COMPLETED] ondansetron (Zofran) 4 MG/2ML injection 4 mg  4 mg Intravenous Once Tal Anderson MD   4 mg at 05/13/24 1657    [COMPLETED] pantoprazole (Protonix) 40 mg in sodium chloride 0.9% PF 10 mL IV push  40 mg Intravenous Once Tal Anderson MD   40 mg at 05/13/24 1657    [COMPLETED] acetaminophen (Tylenol Extra Strength) tab 1,000 mg  1,000 mg Oral Once Tal Anderson MD   1,000 mg at 05/13/24 1657    [COMPLETED] iopamidol 76% (ISOVUE-370) injection for power injector  75 mL Intravenous ONCE PRN Tal Anderson MD   75 mL at 05/13/24 1803    [COMPLETED] ketorolac (Toradol) 15 MG/ML injection 15 mg  15 mg Intravenous Once Tal Anderson MD   15 mg at 05/13/24 1838    [COMPLETED] sodium chloride 0.9 % IV bolus 500 mL  500 mL Intravenous Once Margot Blanco  mL/hr at 03/28/24 1126 500 mL at 03/28/24 1126    [COMPLETED] sorbitol 70 % oral solution 30 mL  30 mL Oral Once Margot Blanco MD   30 mL at 03/28/24 1603    [COMPLETED] sodium chloride 0.9% infusion   Intravenous Once Yemi Mauro DO 10 mL/hr at 03/23/24 0140 New Bag at 03/23/24 0140    [COMPLETED] potassium chloride 20 mEq/100mL IVPB premix 20 mEq  20 mEq Intravenous Once Olaru, MD Marcela   Stopped at 03/22/24 1031    [COMPLETED] clonazePAM (KlonoPIN) tab 0.5 mg  0.5 mg Oral Once Olaru, MD Marcela   0.5 mg at 03/22/24 1130     [COMPLETED] iopamidol 76% (ISOVUE-370) injection for power injector  75 mL Intravenous ONCE PRN Marcela Dangelo MD   75 mL at 03/22/24 1443    [COMPLETED] sodium chloride 0.9% infusion   Intravenous Once Delon Ashford, DO 10 mL/hr at 03/21/24 1100 New Bag at 03/21/24 1100    [COMPLETED] iopamidol 76% (ISOVUE-370) injection for power injector  75 mL Intravenous ONCE PRN Marcela Dangelo MD   75 mL at 03/21/24 1816    [COMPLETED] potassium chloride 40 mEq in 250mL sodium chloride 0.9% IVPB premix  40 mEq Intravenous Once Marcela Dangelo MD 62.5 mL/hr at 03/21/24 2000 40 mEq at 03/21/24 2000    [COMPLETED] sodium chloride 0.9% infusion   Intravenous Once Yemi Mauro,  mL/hr at 03/21/24 2230 Bolus from Bag at 03/21/24 2230    [COMPLETED] sodium chloride 0.9% infusion   Intravenous Once Yemi Mauro, DO 10 mL/hr at 03/21/24 2357 New Bag at 03/21/24 2357    [COMPLETED] pantoprazole (Protonix) 40 mg in sodium chloride 0.9% PF 10 mL IV push  40 mg Intravenous Once Tla Anderson MD   40 mg at 03/20/24 0701    [COMPLETED] sodium chloride 0.9 % IV bolus 1,000 mL  1,000 mL Intravenous Once Tal Anderson MD 1,000 mL/hr at 03/20/24 0802 1,000 mL at 03/20/24 0802    [COMPLETED] acetaminophen (Tylenol Extra Strength) tab 1,000 mg  1,000 mg Oral Once Tal Anderson MD   1,000 mg at 03/20/24 0809    [COMPLETED] ondansetron (Zofran) 4 MG/2ML injection 4 mg  4 mg Intravenous Once Tal Anderson MD   4 mg at 03/20/24 1020    [COMPLETED] clonazePAM (KlonoPIN) tab 0.5 mg  0.5 mg Oral Once Tal Anderson MD   0.5 mg at 03/20/24 1020    [COMPLETED] polyethylene glycol-electrolyte (Golytely) 236 g oral solution 4,000 mL  4,000 mL Oral Once Yasmine Dolan APRN   4,000 mL at 03/20/24 1824    [COMPLETED] sodium chloride 0.9 % IV bolus 500 mL  500 mL Intravenous Once Geena Pardo  mL/hr at 03/20/24 2135 500 mL at 03/20/24 2135     Current Outpatient Medications on File Prior to Encounter    Medication Sig Dispense Refill    multivitamin with minerals Oral Tab Take 1 tablet by mouth daily. 30 tablet 1    amoxicillin-pot clavulanate 400-57 mg/5mL Oral Recon Susp Take 7 mL (560 mg total) by mouth 2 (two) times daily for 10 days. 140 mL 0    CLONAZEPAM 0.5 MG Oral Tab TAKE 1 TABLET(0.5 MG) BY MOUTH TWICE DAILY AS NEEDED 55 tablet 0    DULoxetine (CYMBALTA) 30 MG Oral Cap DR Particles Take 1 capsule (30 mg total) by mouth daily. 30 capsule 0    meclizine 12.5 MG Oral Tab Take 1 tablet (12.5 mg total) by mouth 3 (three) times daily as needed. 30 tablet 1    ondansetron 4 MG Oral Tablet Dispersible Take 1 tablet (4 mg total) by mouth every 4 (four) hours as needed for Nausea. 30 tablet 0    mirtazapine 30 MG Oral Tab Take 1 tablet (30 mg total) by mouth nightly. 90 tablet 1    fluticasone propionate 50 MCG/ACT Nasal Suspension 2 sprays by Each Nare route daily. 1 each 1    losartan 25 MG Oral Tab Take 1 tablet (25 mg total) by mouth daily. 90 tablet 1    albuterol (PROAIR HFA) 108 (90 Base) MCG/ACT Inhalation Aero Soln INHALE 2 PUFFS BY MOUTH EVERY 6 HOURS AS NEEDED FOR WHEEZING 8.5 g 2    SYMBICORT 160-4.5 MCG/ACT Inhalation Aerosol INHALE 2 PUFFS BY MOUTH TWICE DAILY 10.2 g 11    Cholecalciferol (VITAMIN D-3 OR) Take by mouth.      DUPIXENT 300 MG/2ML Subcutaneous Solution Prefilled Syringe       clobetasol 0.05 % External Ointment       TRIAMCINOLONE 0.1 % External Cream APPLY TOPICALLY TO THE AFFECTED AREA TWICE DAILY AS NEEDED (Patient taking differently: as needed.) 60 g 3    acetaminophen 500 MG Oral Tab Take 1 tablet (500 mg total) by mouth every 6 (six) hours as needed for Pain.         Review of Systems:   A comprehensive review of systems was completed.    Pertinent positives and negatives noted in the HPI.    Objective:   Physical Exam:    /56   Pulse 57   Temp 96.9 °F (36.1 °C) (Temporal)   Resp 17   Ht 5' (1.524 m)   Wt 90 lb (40.8 kg)   SpO2 97%   BMI 17.58 kg/m²   General: No  acute distress, Alert  Respiratory: No rhonchi, no wheezes  Cardiovascular: S1, S2. Regular rate and rhythm  Abdomen: Soft, Non-tender, non-distended, positive bowel sounds  Neuro: No new focal deficits  Extremities: No edema      Results:    Labs:      Labs Last 24 Hours:    Recent Labs   Lab 06/11/24  0748 06/12/24  0603 06/13/24  0923 06/14/24  1209 06/17/24  1712   RBC 3.53* 2.93* 3.24* 3.04* 3.19*   HGB 10.0* 8.2* 9.0* 8.5* 8.9*   HCT 30.2* 24.5* 27.1* 25.2* 26.5*   MCV 85.6 83.6 83.6 82.9 83.1   MCH 28.3 28.0 27.8 28.0 27.9   MCHC 33.1 33.5 33.2 33.7 33.6   RDW 14.4 14.2 14.1 14.3 14.6   NEPRELIM 21.49* 16.05*  --   --  7.45   WBC 24.0* 17.4* 13.1* 9.2 9.7   .0 261.0 281.0 303.0 386.0       Recent Labs   Lab 06/12/24  0603 06/14/24  1209 06/17/24  1712   * 98 92   BUN 9 5* 7*   CREATSERUM 0.80 0.69 0.64   EGFRCR 75 88 90   CA 8.3* 8.4* 9.0   ALB  --   --  2.6*   * 136 141   K 3.5 3.5 3.4*    107 108   CO2 19.0* 22.0 26.0   ALKPHO  --   --  73   AST  --   --  21   ALT  --   --  15   BILT  --   --  0.3   TP  --   --  6.2*       Lab Results   Component Value Date    PT 12.6 01/31/2013    PT 13.2 08/02/2011    INR 1.05 03/20/2024    INR 0.99 12/29/2022    INR 1.09 03/01/2018       No results for input(s): \"TROP\", \"TROPHS\", \"CK\" in the last 168 hours.    No results for input(s): \"TROP\", \"PBNP\" in the last 168 hours.    No results for input(s): \"PCT\" in the last 168 hours.    Imaging: Imaging data reviewed in Epic.    Assessment & Plan:          #Multiple pericolonic abscess  #Acute diverticulitis  -General surgery to see  -IV Zosyn  -IV fluids and pain control    #Debility  -Therapies to see    #Normocytic anemia  #Iron deficiency    #Orthostatic hypotension    #Essential hypertension    #CKD 3    #Hyperlipidemia    #Hypothyroidism    #COPD        Plan of care discussed with ER physician, patient, patient's family     Lokesh Temple DO    Supplementary Documentation:     The 21st Century  Cures Act makes medical notes like these available to patients in the interest of transparency. Please be advised this is a medical document. Medical documents are intended to carry relevant information, facts as evident, and the clinical opinion of the practitioner. The medical note is intended as peer to peer communication and may appear blunt or direct. It is written in medical language and may contain abbreviations or verbiage that are unfamiliar.                                   Electronically signed by Lokesh Temple DO on 2024 12:40 AM              Consults - MD Consult Notes      Consults signed by Ileana Brooks MD at 2024  7:34 PM     Author: Ileana Brooks MD Service: General Surgery Author Type: Physician    Filed: 2024  7:34 PM Date of Service: 2024  1:34 PM Status: Signed    : Ileana Brooks MD (Physician)     Consult Orders    1. Consult to General Surgery [[id number]] ordered by Lokesh Temple DO at 24 0040             [facility]  Report of Consultation    Bibi Diaz Patient Status:  Observation    1945 MRN [id number]   Location [facility] 5NW-A Attending Rich Jesus MD   Hosp Day # 0 PCP Rick Shannon DO     Requesting Physician:  Dr. Temple    Reason for Consultation:  Unwitnessed fall, multiple pericolonic abscesses    Chief Complaint:  Lower abdominal pain    History of Present Illness:  Bibi Diaz is a 78 year old female who presents to [facility] after an unwitnessed fall out of bed.    She reports feeling dizzy and nauseous for the past couple weeks.  She attributes her symptoms to her gastroparesis.  On Saturday she reports feeling very nauseous, denies vomiting, fevers, or chills.  She reports intermittent symptoms of diarrhea and constipation for which she takes a fiber supplement at home.    On , she reports going to sleep in the middle of her bed and walking up on the floor.  She states she was too weak to get  up and laid on the floor for a few hours. She called her son, who called her granddaughter to present to [facility]. She isn't sure if she hit her head during the fall. She denies of consciousness.    Upon presentation to the hospital, the patient was hemodynamically stable.  CBC reveals WBC 9.7, hemoglobin 9.8, platelets 386,000. Slightly hypokalemic at 3.4 with no other electrolyte abnormalities.  BUN 7, creatinine 0.64.    CT of the brain and head revealed no acute intracranial bleed or acute intracranial process.  CT of the abdomen and pelvis reveals large amount of stool in the descending and sigmoid colon.  There is pain of the colon in this region with surrounding stranding most likely sterocoral colitis.  Are numerous pericolonic abscess formations including a collection measuring 4.0X 2.7 cm adjacent to the sigmoid colon where there is also stranding and enhancement of the pericolonic tissues.  Additional small suspected abscess cavities with small foci of fluid with surrounding enhancement.    The patient's past medical history significant for for giant paraesophageal hernia, GERD, hypothyroidism, depression, anxiety, hypertension, COPD, hypercholesterolemia, and aortic atherosclerosis. The patient's surgical history significant for tubal ligation.  She denies taking blood thinning medications.      History:  Past Medical History:    Abdominal distention    Abdominal pain    Acquired hypothyroidism    Anxiety    Asthma ([facility])    Belching    Bloating    Chronic rhinitis    CKD (chronic kidney disease) stage 3, GFR 30-59 ml/min ([facility])    Constipation    COPD (chronic obstructive pulmonary disease) ([facility])    NO OXYGEN     Depression    Diarrhea, unspecified    Diverticulosis of large intestine    Emphysema    Esophageal reflux    Fatigue    Feeling lonely    Flatulence/gas pain/belching    Food intolerance    H/O bronchitis    Hay fever    Hemorrhoids    High blood pressure    History of depression     Hypercholesterolemia    Hypertension    Itch of skin    Migraines    Mild intermittent asthma without complication (HCC)    Nausea    Night sweats    Pneumonia    PONV (postoperative nausea and vomiting)    Pure hypercholesterolemia    Rash    Reflux    Sleep disturbance    Stress    Uncomfortable fullness after meals    Visual impairment    glasses    Vomiting    Wears glasses     Past Surgical History:   Procedure Laterality Date    Cataract      Colonoscopy      Colonoscopy N/A 3/21/2024    Procedure: COLONOSCOPY;  Surgeon: Delon Ashford DO;  Location:  ENDOSCOPY    Colonoscopy & polypectomy  09/2014    multiple polyps; tics; repeat 3 yrs    Colonoscopy,remv lesn,snare N/A 09/22/2014    Procedure: ESOPHAGOGASTRODUODENOSCOPY, COLONOSCOPY, POSSIBLE BIOPSY, POSSIBLE POLYPECTOMY 48006,14860;  Surgeon: Slade Ivan MD;  Location: [facility]    Correct bunion,simple Bilateral     Cystotomy,excis vesical neck Left     Egd      Gastro - dmg  09/2014    stricture; HH    Oophorectomy Bilateral 2017    Other surgical history  02/27/2023    Robotic repair of hiatal hernia and Nissen fundoplication    Patient documented not to have experienced any of the following events N/A 09/22/2014    Procedure: ESOPHAGOGASTRODUODENOSCOPY, COLONOSCOPY, POSSIBLE BIOPSY, POSSIBLE POLYPECTOMY 64303,94649;  Surgeon: Slade Ivan MD;  Location: [facility]    Patient withough preoperative order for iv antibiotic surgical site infection prophylaxis. N/A 09/22/2014    Procedure: ESOPHAGOGASTRODUODENOSCOPY, COLONOSCOPY, POSSIBLE BIOPSY, POSSIBLE POLYPECTOMY 18207,25064;  Surgeon: Slade Ivan MD;  Location: [facility]    Repair ing hernia,5+y/o,reducibl      Upper gi endoscopy,diagnosis N/A 09/22/2014    Procedure: ESOPHAGOGASTRODUODENOSCOPY, COLONOSCOPY, POSSIBLE BIOPSY, POSSIBLE POLYPECTOMY 64739,07458;  Surgeon: Slade Ivan MD;  Location: [facility]     Family History   Problem Relation Age  of Onset    Colon Cancer Mother     Other (Other) Mother     Other (copd) Mother     Alcohol and Other Disorders Associated Father     Other (Other) Father     Other (emph) Father     Alcohol and Other Disorders Associated Son     Hypertension Sister     Prostate Cancer Brother     Hypertension Brother       reports that she quit smoking about 43 years ago. Her smoking use included cigarettes. She started smoking about 53 years ago. She has never used smokeless tobacco. She reports that she does not drink alcohol and does not use drugs.    Allergies:  Allergies   Allergen Reactions    Avelox [Moxifloxacin Hcl In Nacl] SWELLING    Avelox [Moxifloxacin Hydrochloride] TONGUE SWELLING     \"TABS\"    Amoxicillin NAUSEA AND VOMITING     Vomiting.    Statins Myopathy    Sulfa Antibiotics RASH and ITCHING    Dander OTHER (SEE COMMENTS)     \"Animals\": runny nose, watery eyes, itching    Diphenhydramine Runny nose     \"Animals\": runny nose, watery eyes, itching    Dust Runny nose    Latex RASH    Sulfa Drugs Cross Reactors RASH and ITCHING       Medications:  Medications Prior to Admission   Medication Sig    multivitamin with minerals Oral Tab Take 1 tablet by mouth daily.    CLONAZEPAM 0.5 MG Oral Tab TAKE 1 TABLET(0.5 MG) BY MOUTH TWICE DAILY AS NEEDED    DULoxetine (CYMBALTA) 30 MG Oral Cap DR Particles Take 1 capsule (30 mg total) by mouth daily.    meclizine 12.5 MG Oral Tab Take 1 tablet (12.5 mg total) by mouth 3 (three) times daily as needed.    ondansetron 4 MG Oral Tablet Dispersible Take 1 tablet (4 mg total) by mouth every 4 (four) hours as needed for Nausea.    mirtazapine 30 MG Oral Tab Take 1 tablet (30 mg total) by mouth nightly.    fluticasone propionate 50 MCG/ACT Nasal Suspension 2 sprays by Each Nare route daily.    losartan 25 MG Oral Tab Take 1 tablet (25 mg total) by mouth daily.    albuterol (PROAIR HFA) 108 (90 Base) MCG/ACT Inhalation Aero Soln INHALE 2 PUFFS BY MOUTH EVERY 6 HOURS AS NEEDED FOR  WHEEZING    SYMBICORT 160-4.5 MCG/ACT Inhalation Aerosol INHALE 2 PUFFS BY MOUTH TWICE DAILY    TRIAMCINOLONE 0.1 % External Cream APPLY TOPICALLY TO THE AFFECTED AREA TWICE DAILY AS NEEDED (Patient taking differently: as needed.)    amoxicillin-pot clavulanate 400-57 mg/5mL Oral Recon Susp Take 7 mL (560 mg total) by mouth 2 (two) times daily for 10 days.    Cholecalciferol (VITAMIN D-3 OR) Take by mouth.    DUPIXENT 300 MG/2ML Subcutaneous Solution Prefilled Syringe     clobetasol 0.05 % External Ointment     acetaminophen 500 MG Oral Tab Take 1 tablet (500 mg total) by mouth every 6 (six) hours as needed for Pain.         Current Facility-Administered Medications:     piperacillin-tazobactam (Zosyn) 3.375 g in dextrose 5% 100 mL IVPB-ADDV, 3.375 g, Intravenous, Q8H    sodium chloride 0.9% infusion, , Intravenous, Continuous    acetaminophen (Tylenol Extra Strength) tab 500 mg, 500 mg, Oral, Q4H PRN    melatonin tab 3 mg, 3 mg, Oral, Nightly PRN    ondansetron (Zofran) 4 MG/2ML injection 4 mg, 4 mg, Intravenous, Q6H PRN    metoclopramide (Reglan) 5 mg/mL injection 10 mg, 10 mg, Intravenous, Q8H PRN    heparin (Porcine) 5000 UNIT/ML injection 5,000 Units, 5,000 Units, Subcutaneous, 2 times per day    polyethylene glycol (PEG 3350) (Miralax) 17 g oral packet 17 g, 17 g, Oral, Daily PRN    sennosides (Senokot) tab 17.2 mg, 17.2 mg, Oral, Nightly PRN    bisacodyl (Dulcolax) 10 MG rectal suppository 10 mg, 10 mg, Rectal, Daily PRN    fleet enema (Fleet) 7-19 GM/118ML rectal enema 133 mL, 1 enema, Rectal, Once PRN    clonazePAM (KlonoPIN) tab 0.5 mg, 0.5 mg, Oral, BID PRN    DULoxetine (Cymbalta) DR cap 30 mg, 30 mg, Oral, Daily    mirtazapine (Remeron) tab 30 mg, 30 mg, Oral, Nightly    multivitamin with minerals (Thera M Plus) tab 1 tablet, 1 tablet, Oral, Daily    fluticasone furoate-vilanterol (Breo Ellipta) 100-25 MCG/ACT inhaler 1 puff, 1 puff, Inhalation, Daily    morphINE PF 2 MG/ML injection 0.5 mg, 0.5 mg,  Intravenous, Q2H PRN **OR** morphINE PF 2 MG/ML injection 1 mg, 1 mg, Intravenous, Q2H PRN **OR** morphINE PF 2 MG/ML injection 2 mg, 2 mg, Intravenous, Q2H PRN    potassium chloride 40 mEq in 250mL sodium chloride 0.9% IVPB premix, 40 mEq, Intravenous, Once    [COMPLETED] sodium chloride 0.9 % IV bolus 500 mL, 500 mL, Intravenous, Once **FOLLOWED BY** sodium chloride 0.9% infusion, , Intravenous, Continuous    Review of Systems:  Pertinent items are noted in HPI.  Allergic/Immuno:  Review of patient's allergies performed.  Cardiovascular:  Negative for cool extremity and irregular heartbeat/palpitations  Constitutional:  Negative for decreased activity, fever, irritability and lethargy  Endocrine:  Negative for abnormal sleep patterns, increased activity, polydipsia and polyphagia  ENMT:  Negative for ear drainage, hearing loss and nasal drainage  Eyes:  Negative for eye discharge and vision loss  Gastrointestinal:  Positive for abdominal pain, nausea.  Negative for constipation, decreased appetite, diarrhea and vomiting  Genitourinary:  Negative for dysuria and hematuria  Hema/Lymph:  Negative for easy bleeding and easy bruising  Integumentary:  Negative for pruritus and rash  Musculoskeletal:  Negative for bone/joint symptoms  Neurological:  Negative for gait disturbance  Psychiatric:  Negative for inappropriate interaction and psychiatric symptoms  Respiratory:  Negative for cough, dyspnea and wheezing      Physical Exam:  Blood pressure 122/50, pulse 58, temperature 98.2 °F (36.8 °C), temperature source Oral, resp. rate 17, height 60\", weight 96 lb 3.2 oz (43.6 kg), SpO2 94%, not currently breastfeeding.    General: Alert, orientated x3.  Cooperative.  No apparent distress.    HEENT: Exam is unremarkable.  Without scleral icterus.  Mucous membranes are moist. EOM are WNL.    Neck: No tenderness to palpitation.  Full range of motion to flexion and extension, lateral rotation and lateral flexion of cervical  spine.  No JVD. Supple.     Lungs: Clear to auscultation bilaterally. No wheezes, no rales, no rhonchi. Good excursion of the diaphragms. No secondary use of accessory respiratory musculature.    Cardiac: Regular rate and rhythm. No murmur.    Abdomen:   Soft, non-distended, tender to palpation in left lower quadrant, tympanic to percussion. No peritoneal signs. No guarding.     Extremities:  No lower extremity edema noted.  Without clubbing or cyanosis.      Skin: Normal texture and turgor.    Laboratory Data:  Recent Labs   Lab 06/12/24  0603 06/13/24  0923 06/14/24  1209 06/17/24  1712 06/18/24  0719   RBC 2.93*   < > 3.04* 3.19* 3.18*   HGB 8.2*   < > 8.5* 8.9* 8.7*   HCT 24.5*   < > 25.2* 26.5* 27.1*   MCV 83.6   < > 82.9 83.1 85.2   MCH 28.0   < > 28.0 27.9 27.4   MCHC 33.5   < > 33.7 33.6 32.1   RDW 14.2   < > 14.3 14.6 14.9   NEPRELIM 16.05*  --   --  7.45 11.04*   WBC 17.4*   < > 9.2 9.7 12.2*   .0   < > 303.0 386.0 423.0    < > = values in this interval not displayed.       Recent Labs   Lab 06/14/24  1209 06/17/24  1712 06/18/24  0719   GLU 98 92 96   BUN 5* 7* 5*   CREATSERUM 0.69 0.64 0.66   CA 8.4* 9.0 8.5   ALB  --  2.6* 2.5*    141 144   K 3.5 3.4* 3.3*    108 113*   CO2 22.0 26.0 23.0   ALKPHO  --  73 68   AST  --  21 18   ALT  --  15 14   BILT  --  0.3 0.3   TP  --  6.2* 5.8*         No results for input(s): \"PTP\", \"INR\", \"PTT\" in the last 168 hours.      CT ABDOMEN+PELVIS(CONTRAST ONLY)(CPT=74177)    Result Date: 6/17/2024  CONCLUSION:  Hyperdense desiccated appearing stool in the sigmoid colon with stercoral colitis, and pericolonic abscess formations, multiple.  The largest pericolonic abscess measures 4.0 cm.   LOCATION:  [facility]   Dictated by (CST): Colten Deshpande MD on 6/17/2024 at 11:11 PM     Finalized by (CST): Colten Deshpande MD on 6/17/2024 at 11:17 PM       CT BRAIN OR HEAD (87331)    Result Date: 6/17/2024  CONCLUSION:  Chronic changes in the brain, as described, but  no acute intracranial bleed, or other acute intracranial process identified.    LOCATION:  [facility]   Dictated by (CST): Colten Deshpande MD on 6/17/2024 at 7:23 PM     Finalized by (CST): Colten Deshpande MD on 6/17/2024 at 7:25 PM          MARY Peter  6/18/2024  1:34 PM    Is this a shared or split note between Advanced Practice Provider and Physician? Yes      Medical Decision Making         Impression:  Patient Active Problem List   Diagnosis    Major depressive disorder, recurrent episode, moderate (HCC)    Esophageal reflux    Chronic obstructive pulmonary disease with acute lower respiratory infection (HCC)    Acquired hypothyroidism    Memory deficit    Aortic atherosclerosis (HCC)    Diverticulosis of colon    Dizziness and giddiness    Dysthymic disorder    Lichen sclerosus    Pure hypercholesterolemia    Vitamin D deficiency    Vitiligo    Severe episode of recurrent major depressive disorder, without psychotic features (HCC)    Paraesophageal hernia    Subclinical hypothyroidism    Hypertension    Protein-calorie malnutrition, unspecified severity (HCC)    Gastrointestinal hemorrhage, unspecified gastrointestinal hemorrhage type    Thrombocytopenia (HCC)    Acute diverticulitis    Hyponatremia    ACP (advance care planning)    Weakness generalized    Colonic diverticular abscess       78-year-old female who was admitted through the ED after an unwitnessed fall out of bed.  The patient reports that she has been falling more frequently over the past few months.  She reports that on Sunday night she went to sleep and subsequently woke up on the floor.  She was eventually assisted by her granddaughter however she was on the ground for several hours per her recollection.  Workup in the emergency department revealed slightly diminished hemoglobin of 8.9, WBC within normal limits, platelet count normal.  CT scan brain was negative for acute injury.  As part of workup, CT scan abdomen and pelvis was  performed.  Unexpectedly, multiple pericolonic abscesses were identified.  BOWEL/MESENTERY:  Large amount of hyperdense desiccated appearing stool in the descending colon, and sigmoid colon.  Thickening of the colon in this region with surrounding stranding, most likely stercoral colitis.  Assessment has limitations without   contrast, but there are numerous pericolonic abscess formations including a collection measuring 4.0 x 2.7 cm image 82 adjacent to the sigmoid colon in this region, where there is also stranding and enhancement of the pericolonic soft tissues.  Below   this, for example on image 86 there are additional small suspected abscess cavities with small foci of fluid with surrounding enhancement.  Additional small abscess changes are seen within the inflammation.  More proximally the colon contains moderate   amount of stool.  Small bowel caliber normal.   The patient did have a prior CT scan Rose Mary 10, 2024 which revealed changes consistent with sigmoid diverticulitis.  No evidence of abscess formation at that time.  The patient has had multiple CTAs earlier this year in February and March as part of workup for GI bleed.  No areas of extravasation of bleeding were identified by CT  Past surgical history-hiatal hernia repair in December 2022 by Dr. Friedman    Since admission, the patient has been afebrile and hemodynamically stable.  On examination she does have some tenderness in the left lower quadrant to deep palpation.  There is no evidence of percussion tenderness, guarding or rebound.  Treatment options were reviewed in detail with Bibi.  At this time the recommendation would be to proceed with IR drainage of pericolonic diverticular abscess.  Continue clear liquid diet, IV antibiotics   patient is comfortable with these recommendations and will proceed as discussed  The possible need for surgical intervention was discussed.  We did discuss that should surgery be indicated in this acute period,  surgery would include laparotomy, sigmoid colon resection with creation of end colostomy.  Bibi has no further questions at this time.  The risks, benefits, complications, possible outcomes and alternatives of the procedure were explained to the patient in detail.  Potential complications of this procedure and as with any surgical procedure and anesthesia were reviewed including but not limited to bleeding, infection, thromboembolic event, pneumonia were discussed.  Expected postoperative pain, recuperation and postoperative course and possible complications were also reviewed.  All questions from the patient were answered in detail.  The patient did verbalize understanding and does not have any further questions at this time.  The patient does wish to proceed with the proposed procedure.    MARIAM Brooks MD, FACS    Please note that this report has been produced using speech recognition software and may contain errors related to that system including but not limited to errors in grammar, punctuation and spelling as well as words and phrases that possibly may have been recognized inappropriately.  If there are any questions or concerns please contact the dictating provider for clarification.  The 21st Century Cures Act makes medical notes like these available to patients in the interest of trans parency. Please be advised this is a medical document. Medical documents are intended to carry relevant information, facts as evident, and the clinical opinion of the practitioner. The medical note is intended as peer to peer communication and may appear blunt or direct. It is written in medical language and may contain abbreviations or verbiage that are unfamiliar.  If there are any questions or concerns please contact the dictating provider for clarification.                    Electronically signed by Ileana Brooks MD on 6/18/2024  7:34 PM           D/C Summary    No notes of this type exist for this encounter.         Physical Therapy Notes (last 72 hours)      Physical Therapy Note signed by Adelaide Waldrop PT at 6/23/2024  1:31 PM  Version 1 of 1    Author: Adelaide Waldrop PT Service: Rehab Author Type: Physical Therapist    Filed: 6/23/2024  1:31 PM Date of Service: 6/23/2024  1:00 PM Status: Signed    : Adelaide Waldrop PT (Physical Therapist)          PHYSICAL THERAPY TREATMENT NOTE - INPATIENT    Room Number: 518/518-A     Session: 2     Number of Visits to Meet Established Goals: 5    Presenting Problem: weakness generalized, failure to thrive  Co-Morbidities : hypothyoid, anxiety, asthma, CKD3, COPD, essential HTN    ASSESSMENT   Patient demonstrates limited progress this session, goals  remain in progress.    Patient continues to function below baseline with bed mobility, transfers, gait, stair negotiation, and standing prolonged periods.  Contributing factors to remaining limitations include decreased functional strength, decreased endurance/aerobic capacity, pain, impaired   balance, and medical status.  Next session anticipate patient to progress transfers and gait.  Physical Therapy will continue to follow patient for duration of hospitalization.    Patient continues to benefit from continued skilled PT services: to promote return to prior level of function and safety with continuous assistance and gradual rehabilitative therapy .    PLAN  PT Treatment Plan: Bed mobility;Endurance;Energy conservation;Patient education;Family education;Gait training;Strengthening;Transfer training;Balance training  Rehab Potential : Good  Frequency (Obs): 3-5x/week    CURRENT GOALS     Goal #1 Patient is able to demonstrate supine - sit EOB @ level: supervision     Goal #2 Patient is able to demonstrate transfers Sit to/from Stand at assistance level: supervision     Goal #3 Patient is able to ambulate 50 feet with assist device: walker - rolling at assistance level: minimum assistance     Goal #4    Goal #5    Goal #6     Goal Comments: Goals established on 2024 all goals ongoing    SUBJECTIVE  \"I wake up and I'm nauseous\"    OBJECTIVE  Precautions: Bed/chair alarm    WEIGHT BEARING RESTRICTION  Weight Bearing Restriction: None                PAIN ASSESSMENT   Ratin  Location: head and stomach  Management Techniques: Activity promotion;Repositioning    BALANCE                                                                                                                       Static Sitting: Fair +  Dynamic Sitting: Fair -           Static Standing: Not tested  Dynamic Standing: Not tested    ACTIVITY TOLERANCE                              AM-PAC '6-Clicks' INPATIENT SHORT FORM - BASIC MOBILITY  How much difficulty does the patient currently have...  Patient Difficulty: Turning over in bed (including adjusting bedclothes, sheets and blankets)?: A Little   Patient Difficulty: Sitting down on and standing up from a chair with arms (e.g., wheelchair, bedside commode, etc.): A Little   Patient Difficulty: Moving from lying on back to sitting on the side of the bed?: A Lot   How much help from another person does the patient currently need...   Help from Another: Moving to and from a bed to a chair (including a wheelchair)?: A Lot   Help from Another: Need to walk in hospital room?: A Lot   Help from Another: Climbing 3-5 steps with a railing?: Total       AM-PAC Score:  Raw Score: 13   Approx Degree of Impairment: 64.91%   Standardized Score (AM-PAC Scale): 36.74   CMS Modifier (G-Code): CL    FUNCTIONAL ABILITY STATUS    Bed Mobility:  Supine>Sit: mod A   Sit>Supine: [place]    Transfer Mobility:  Pt politely declined 2' incr tiredness from poor sleep and nausea.    Skilled Therapy Provided  Pt greeted supine in bed. Pt engaging in supine<>sit, along with seated EOB dynamic and static activities. Attempted to have pt engage in STS transfers, however pt politely declining 2' incr tiredness and fatigue from poor sleep  and nausea. Pt educated on importance of continued and progressive mobility while hospitalized. Engaged in multi dir reaching with Ue's without LOB. Grossly SBA for duration of sitting EOB, approx 8min.       THERAPEUTIC EXERCISES  Lower Extremity Ankle pumps  LAQ     Upper Extremity      Position Sitting     Repetitions   10   Sets   1     Patient End of Session: In bed;Needs met;Call light within reach;All patient questions and concerns addressed;RN aware of session/findings;Alarm set    PT Session Time: 15 minutes  Therapeutic Activity: 10 minutes  Therapeutic Exercise: 5minutes                  Physical Therapy Note signed by Sammie Austin PTA at 6/22/2024  9:45 AM  Version 1 of 1    Author: Sammie Austin PTA Service: Rehab Author Type: Physical Therapy Assistant    Filed: 6/22/2024  9:45 AM Date of Service: 6/22/2024  9:45 AM Status: Signed    : Sammie Austin PTA (Physical Therapy Assistant)       Attempted to see patient for PT this am. Patient unable to participate due to receiving suppository and now waiting. Will follow up at a later time/date.                 Occupational Therapy Notes (last 72 hours)      Occupational Therapy Note signed by Seth Mccullough OT at 6/24/2024  3:14 PM  Version 1 of 1    Author: Seth Mccullough OT Service: Rehab Author Type: Occupational Therapist    Filed: 6/24/2024  3:14 PM Date of Service: 6/24/2024 11:10 AM Status: Signed    : Seth Mccullough OT (Occupational Therapist)       OCCUPATIONAL THERAPY TREATMENT NOTE - INPATIENT     Room Number: 530/530-A  Session: 1   Number of Visits to Meet Established Goals: 5    Presenting Problem: Generalized weakness  Prior Level of Function: Pt. Stated that she was IND w/ all BADLs but has had a harder time the last week with completing them without assistance.     ASSESSMENT   Patient demonstrates good  progress this session, goals remain in progress.    Patient continues to function below baseline with  toileting, lower body dressing, bed mobility, transfers, static standing balance, dynamic standing balance, and functional standing tolerance.   Contributing factors to remaining limitations include decreased functional strength, decreased endurance, and decreased muscular endurance.  Next session anticipate patient to progress toileting, lower body dressing, bed mobility, transfers, static standing balance, dynamic standing balance, and functional standing tolerance.  Occupational Therapy will continue to follow patient for duration of hospitalization.    Patient continues to benefit from continued skilled OT services: to promote return to prior level of function and safety with continuous assistance and gradual rehabilitative therapy .          OT Device Recommendations: TBD    History: Patient is a 78 year old female admitted on 6/17/2024 with Presenting Problem: Generalized weakness. Co-Morbidities : hypothyoid, anxiety, asthma, CKD3, COPD, essential HTN    WEIGHT BEARING RESTRICTION  Weight Bearing Restriction: None                Recommendations for nursing staff:   Transfers: 1 person  Toileting location: toilet or bedside commode (pending anxiety)    TREATMENT SESSION:  Patient Start of Session: semi supine in bed  FUNCTIONAL TRANSFER ASSESSMENT  Sit to Stand: Edge of Bed  Edge of Bed: Minimal Assist  Toilet Transfer: Minimal Assist    BED MOBILITY  Supine to Sit : Stand-by Assist  Sit to Supine (OT): Minimal Assist  Scooting: CGA/SBA    BALANCE ASSESSMENT  Static Sitting: Supervision  Sitting Bilateral: Stand-by Assist  Static Standing: Minimal Assist  Standing Bilateral: Minimal Assist    FUNCTIONAL ADL ASSESSMENT  LB Dressing Seated: Stand-by Assist  LB Dressing Standing: Minimal Assist  Toileting Seated: Supervision  Toileting Standing: Minimal Assist      ACTIVITY TOLERANCE: WFL                         O2 SATURATIONS       EDUCATION PROVIDED  Patient: Role of Occupational Therapy; Plan of Care;  Discharge Recommendations; Functional Transfer Techniques; Fall Prevention; Posture/Positioning; Energy Conservation; Proper Body Mechanics  Patient's Response to Education: Verbalized Understanding; Returned Demonstration      Equipment used: RW  Demonstrates functional use, Would benefit from additional trial      Therapist comments: Pt received semi supine in bed, pleasant and cooperative for OT session. Pt states that she wants to try to get to the bathroom to have a bowel movement but reports that she's anxious to try walking there. This writer provided pt with calming techniques to overcome anxious feeling. Pt agreeable to try. Pt performs bed mobility at SBA to sit EOB. Sit to stand transfers performed at min A. Pt with good static standing balance in preparation to advance to functional mobility. Pt demos ambulatory toilet transfer with RW requiring min A and cues for pursed lip breathing techniques to keep calm and focused on task. Pt reaches toilet and completes clothing management at min A. Toilet hygiene in sitting completed at supervision and min A in standing. Pt then dons pull-ups at SBA in sitting and min A to advance up past hips in standing. Pt ambulates back to bed and transfers back into bed with all needs.  Patient End of Session: In bed;Needs met;Call light within reach;RN aware of session/findings;All patient questions and concerns addressed;Alarm set    SUBJECTIVE  \"I'll be honest, I'm a little anxious to get up now.\"  \"You made it easier for me to get up and move today. Thank you.\"    PAIN ASSESSMENT  Rating: Unable to rate  Location: reports weak all over  Management Techniques: Activity promotion;Repositioning     OBJECTIVE  Precautions: Bed/chair alarm    AM-PAC ‘6-Clicks’ Inpatient Daily Activity Short Form  -   Putting on and taking off regular lower body clothing?: A Lot  -   Bathing (including washing, rinsing, drying)?: A Lot  -   Toileting, which includes using toilet, bedpan or  urinal? : A Little  -   Putting on and taking off regular upper body clothing?: A Lot  -   Taking care of personal grooming such as brushing teeth?: A Little  -   Eating meals?: A Little    AM-PAC Score:  Score: 15  Approx Degree of Impairment: 56.46%  Standardized Score (AM-PAC Scale): 34.69    PLAN  OT Treatment Plan: ADL training;Energy conservation/work simplification techniques;Balance activities;Functional transfer training;UE strengthening/ROM;Endurance training;Cognitive reorientation;Patient/Family education;Fine motor coordination activities;Compensatory technique education  Rehab Potential : Good  Frequency: 3-5x/week    OT Goals:     All goals ongoing 06/24    ADL Goals   Patient will perform grooming: with min assist and while in chair  Patient will perform lower body dressing:  with mod assist and while at edge of bed  Patient will perform toileting: with max assist and with bedside commode     Functional Transfer Goals  Patient will transfer from supine to sit:  with min assist  Patient will transfer from sit to stand:  with min assist  Patient will transfer to bedside commode:  with max assist     UE Exercise Program Goal  Patient will be supervision with bilateral AROM HEP (home exercise program).     Therapist Goals  Perform SBT or SLUMS     OT Session Time: 25 minutes  Self-Care Home Management: 25 minutes                   Video Swallow Study Notes    No notes of this type exist for this encounter.     SLP Notes    No notes of this type exist for this encounter.     Future Appointments        Provider Department Center    6/27/2024 9:00 AM Eliana Shannon DO [facility], [address], [place] [facility] [place]    7/3/2024 2:00 PM Remi Friedman MD [facility] [id number]      Multidisciplinary Problems     Active Goals     Not on file          Resolved Goals        Problem: Patient/Family Goals    Goal Priority Disciplines Outcome  Interventions   Patient/Family Long Term Goal   (Resolved)     Interdisciplinary Completed    Description: Patient's Long Term Goal: discharge home    Interventions:  - Consult to Hosp, Gen Surgery  - IV Abx  - IVF  - Pain control  - See additional Care Plan goals for specific interventions   Patient/Family Short Term Goal   (Resolved)     Interdisciplinary Completed    Description: Patient's Short Term Goal: pain control  6/19: maintain safety  6/19 noc: rest  6/20: maintain safety  6/20 NOC: manage gas  6/21: maintain safety  06/21/2024 - have a bowel movement  06/21/2024 - \"I just want to rest now\"  6/23 AM: rest  06/23/2024 - control nausea  Interventions:   - Consult to Hosp, Gen Surgery  - IVF  - IV Abx  - Pain control  - Miralax as ordered  - zofran/reglan as ordered  - See additional Care Plan goals for specific interventions               Discharge Treatment Preferences    Flowsheet Row Most Recent Value   Preferences    Submitted to [facility] Yes   PASRR Level 1 Submitted Yes

## (undated) NOTE — MR AVS SNAPSHOT
[address]  [address] [facility], [address] [address] [phone]               Thank you for choosing us for your health care visit with Chanelle Burns NP.   We are glad to serve you and happy to provide you with Take 1 capsule (300 mg total) by mouth 2 (two) times daily. Commonly known as:  OMNICEF           ClonazePAM 0.5 MG Tabs   Take 1 tablet (0.5 mg total) by mouth 2 (two) times daily.    Commonly known as:  KLONOPIN           escitalopram 20 MG Tabs   Take - Albuterol Sulfate  (90 Base) MCG/ACT Aers  - cefdinir 300 MG Caps  - predniSONE 10 MG Tabs            MyChart     Call the [employer] for assistance with your inactive [employer] account    If you have questions, you can call [phone] to talk to

## (undated) NOTE — Clinical Note
Transitional Care Management call completed. A Transitional Care Management appointment is scheduled for 7/12/2024. The patient was unable to complete medication review with the nurse care manager. Nurse care manager contacted Dr. Friedman' office to provide a condition update on the VALERI drain soreness, redness, and leaking around the VALERI drain tube. Thank you.

## (undated) NOTE — Clinical Note
WINNIE Chun completed TCM. Patient has upcoming TCM/HFU appointment scheduled on 11/08/22. Thank you.

## (undated) NOTE — LETTER
Martha Friedman M.D., F.A.C.S. Surgical Clearance Needed    Date: 1/11/2023                                                                       From: [facility]    Attn: DR BUSH                                                                                Fax:     RE: Surgical Clearance               # of Pages: 1        Patient Name: Odilia Lundborg    Patient YOB: 1945    Consult For: MEDICAL CLEARANCE    Surgery: ROBOTIC LAPAROSCOPIC REPAIR OF HIATAL HERNIA AND NISSEN FUNDOPLICATION POSSIBLE MESH    Date of Surgery: 2/20/2023 AT [facility]        This facsimile transmission is provided for your internal use only. If the reader of this message is not the intended recipient, you are hereby notified that you have received this document in error, and that any review, dissemination, distribution, or copying of this message is strictly prohibited. If you have received this communication in error, please notify us immediately by telephone and return the original message to us by mail.

## (undated) NOTE — ED AVS SNAPSHOT
Ms. Yanni Pardo   MRN: [id number]    Department:  [facility] Emergency Department   Date of Visit:  3/19/2018           Disclosure     Insurance plans vary and the physician(s) referred by the ER may not be covered by your plan.  Please conta tell this physician (or your personal doctor if your instructions are to return to your personal doctor) about any new or lasting problems. The primary care or specialist physician will see patients referred from the [facility] Emergency Department.  Jamir Horta

## (undated) NOTE — LETTER
05/05/21        Josselyn Muhammad  [address]  [address] [address]      Dear Kenna Chow records indicate that you have outstanding lab work and or testing that was ordered for you and has not yet been completed:  Orders Placed T

## (undated) NOTE — MR AVS SNAPSHOT
[address]  [address] [address] [phone]               Thank you for choosing us for your health care visit with Kaylin Coker DO.   We are glad to serve you and happy to provide you with this jasmine Inhale 2 puffs into the lungs 2 (two) times daily.    Commonly known as:  SYMBICORT           Clobetasol Propionate 0.05 % Oint   Apply to the affected of rash BID x 2 weeks then PRN flares   Commonly known as:  TEMOVATE           ClonazePAM 0.5 MG Tabs   T What changed:  Another medication with the same name was added. Make sure you understand how and when to take each. Commonly known as:  DELTASONE           * predniSONE 20 MG Tabs   Daily x 3 weeks, then 1/2 tablet daily x 3 weeks   What changed:   You we schedule your appointment. If you are confident that your benefit plan will not require a referral or authorization, such as [place] Medicaid, please feel free to schedule your appointment immediately.  However, if you are unsure about the requirements

## (undated) NOTE — MR AVS SNAPSHOT
[address]  [address] [address] [phone]               Thank you for choosing us for your health care visit with Elva Son, .   We are glad to serve you and happy to provide you with this jasmine Commonly known as:  TEMOVATE           ClonazePAM 0.5 MG Tabs   TAKE 1 TABLET BY MOUTH TWICE DAILY AS NEEDED FOR ANXIETY   Commonly known as:  KLONOPIN           escitalopram 20 MG Tabs   Take 1 tablet (20 mg total) by mouth daily.    Commonly known as:  LE Apply 1 Application topically 2 (two) times daily. Commonly known as:  KENALOG           * Notice: This list has 2 medication(s) that are the same as other medications prescribed for you.  Read the directions carefully, and ask your doctor or other care

## (undated) NOTE — ED AVS SNAPSHOT
Ms. Darrel Schultz   MRN: [id number]    Department:  [facility] Emergency Department   Date of Visit:  3/29/2018           Disclosure     Insurance plans vary and the physician(s) referred by the ER may not be covered by your plan.  Please conta tell this physician (or your personal doctor if your instructions are to return to your personal doctor) about any new or lasting problems. The primary care or specialist physician will see patients referred from the [facility] Emergency Department.  Cheryle Levins

## (undated) NOTE — LETTER
[address]  [address]  Authorization for Surgical Operation and Procedure     Date:___________                                                                                                         Time:__________  I hereby authorize Surgeon(s):  Delon Ashford DO, my physician and his/her assistants (if applicable), which may include medical students, residents, and/or fellows, to perform the following surgical operation/ procedure and administer such anesthesia as may be determined necessary by my physician:  Operation/Procedure name (s) Procedure(s[facility]  [facility]   2.   I recognize that during the surgical operation/procedure, unforeseen conditions may necessitate additional or different procedures than those listed above.  I, therefore, further authorize and request that the above-named surgeon, assistants, or designees perform such procedures as are, in their judgment, necessary and desirable.    3.   My surgeon/physician has discussed prior to my surgery the potential benefits, risks and side effects of this procedure; the likelihood of achieving goals; and potential problems that might occur during recuperation.  They also discussed reasonable alternatives to the procedure, including risks, benefits, and side effects related to the alternatives and risks related to not receiving this procedure.  I have had all my questions answered and I acknowledge that no guarantee has been made as to the result that may be obtained.    4.   Should the need arise during my operation/procedure, which includes change of level of care prior to discharge, I also consent to the administration of blood and/or blood products.  Further, I understand that despite careful testing and screening of blood or blood products by collecting agencies, I may still be subject to ill effects as a result of receiving a blood transfusion and/or blood products.  The following are  some, but not all, of the potential risks that can occur: fever and allergic reactions, hemolytic reactions, transmission of diseases such as Hepatitis, AIDS and Cytomegalovirus (CMV) and fluid overload.  In the event that I wish to have an autologous transfusion of my own blood, or a directed donor transfusion, I will discuss this with my physician.  Check only if Refusing Blood or Blood Products  I understand refusal of blood or blood products as deemed necessary by my physician may have serious consequences to my condition to include possible death. I hereby assume responsibility for my refusal and release the hospital, its personnel, and my physicians from any responsibility for the consequences of my refusal.          o  Refuse      5.   I authorize the use of any specimen, organs, tissues, body parts or foreign objects that may be removed from my body during the operation/procedure for diagnosis, research or teaching purposes and their subsequent disposal by hospital authorities.  I also authorize the release of specimen test results and/or written reports to my treating physician on the hospital medical staff or other referring or consulting physicians involved in my care, at the discretion of the Pathologist or my treating physician.    6.   I consent to the photographing or videotaping of the operations or procedures to be performed, including appropriate portions of my body for medical, scientific, or educational purposes, provided my identity is not revealed by the pictures or by descriptive texts accompanying them.  If the procedure has been photographed/videotaped, the surgeon will obtain the original picture, image, videotape or CD.  The hospital will not be responsible for storage, release or maintenance of the picture, image, tape or CD.    7.   I consent to the presence of a  or observers in the operating room as deemed necessary by my physician or their designees.    8.   I  recognize that in the event my procedure results in extended X-Ray/fluoroscopy time, I may develop a skin reaction.    9. If I have a Do Not Attempt Resuscitation (DNAR) order in place, that status will be suspended while in the operating room, procedural suite, and during the recovery period unless otherwise explicitly stated by me (or a person authorized to consent on my behalf). The surgeon or my attending physician will determine when the applicable recovery period ends for purposes of reinstating the DNAR order.  10. Patients having a sterilization procedure: I understand that if the procedure is successful the results will be permanent and it will therefore be impossible for me to inseminate, conceive, or bear children.  I also understand that the procedure is intended to result in sterility, although the result has not been guaranteed.   11. I acknowledge that my physician has explained sedation/analgesia administration to me including the risk and benefits I consent to the administration of sedation/analgesia as may be necessary or desirable in the judgment of my physician.    I CERTIFY THAT I HAVE READ AND FULLY UNDERSTAND THE ABOVE CONSENT TO OPERATION and/or OTHER PROCEDURE.    _________________________________________  __________________________________  Signature of Patient     Signature of Responsible Person         ___________________________________         Printed Name of Responsible Person           _________________________________                 Relationship to Patient  _________________________________________  ______________________________  Signature of Witness          Date  Time      Patient Name: Bibi Diaz     : 1945                 Printed: 2024     Medical Record #: [id number]                     Page [address] of [address] [place], [place] [address], [place]  [address]    Consent for Anesthesia    I, Bibi Diaz agree to be  cared for by an anesthesiologist, who is specially trained to monitor me and give me medicine to put me to sleep or keep me comfortable during my procedure    I understand that my anesthesiologist is not an employee or agent of [facility] or [employer]. He or she works for [employer]    As the patient asking for anesthesia services, I agree to:  Allow the anesthesiologist (anesthesia doctor) to give me medicine and do additional procedures as necessary. Some examples are: Starting or using an “IV” to give me medicine, fluids or blood during my procedure, and having a breathing tube placed to help me breathe when I’m asleep (intubation). In the event that my heart stops working properly, I understand that my anesthesiologist will make every effort to sustain my life, unless otherwise directed by [facility] Do Not Resuscitate documents.  Tell my anesthesia doctor before my procedure:  If I am pregnant.  The last time that I ate or drank.  All of the medicines I take (including prescriptions, herbal supplements, and pills I can buy without a prescription (including street drugs/illegal medications). Failure to inform my anesthesiologist about these medicines may increase my risk of anesthetic complications.  If I am allergic to anything or have had a reaction to anesthesia before.  I understand how the anesthesia medicine will help me (benefits).  I understand that with any type of anesthesia medicine there are risks:  The most common risks are: nausea, vomiting, sore throat, muscle soreness, damage to my eyes, mouth, or teeth (from breathing tube placement).  Rare risks include: remembering what happened during my procedure, allergic reactions to medications, injury to my airway, heart, lungs, vision, nerves, or muscles and in extremely rare instances death.  My doctor has explained to me other choices available to me for my care (alternatives).  Pregnant Patients  (“epidural”):  I understand that the risks of having an epidural (medicine given into my back to help control pain during labor), include itching, low blood pressure, difficulty urinating, headache or slowing of the baby’s heart. Very rare risks include infection, bleeding, seizure, irregular heart rhythms and nerve injury.  Regional Anesthesia (“spinal”, “epidural”, & “nerve blocks”):  I understand that rare but potential complications include headache, bleeding, infection, seizure, irregular heart rhythms, and nerve injury.    I can change my mind about having anesthesia services at any time before I get the medicine.    _____________________________________________________________________________  Patient (or Representative) Signature/Relationship to Patient  Date   Time    _____________________________________________________________________________   Name (if used)    Language/Organization   Time    _____________________________________________________________________________  Anesthesiologist Signature     Date   Time  I have discussed the procedure and information above with the patient (or patient’s representative) and answered their questions. The patient or their representative has agreed to have anesthesia services.    _____________________________________________________________________________  Witness        Date   Time  I have verified that the signature is that of the patient or patient’s representative, and that it was signed before the procedure  Patient Name: Bibi Diaz     : 1945                 Printed: 2024     Medical Record #: [id number]                     Page 2 of 2

## (undated) NOTE — LETTER
[facility]  Branden Shelton [address] [facility], [address] [place] [address]    Consent for Operation    Date: __________________    Time: _______________    1.  I authorize the performance upon Yoselyn Lay the following operation:    Procedure(s):  ESOPHAGOGASTRODUOD procedure has been videotaped, the surgeon will obtain the original videotape. The hospital will not be responsible for storage or maintenance of this tape.     6. For the purpose of advancing medical education, I consent to the admittance of observers to t STATEMENTS REQUIRING INSERTION OR COMPLETION WERE FILLED IN.     Signature of Patient:   ___________________________    When the patient is a minor or mentally incompetent to give consent:  Signature of person authorized to consent for patient: ____________ drugs/illegal medications). Failure to inform my anesthesiologist about these medicines may increase my risk of anesthetic complications. · If I am allergic to anything or have had a reaction to anesthesia before.     3. I understand how the anesthesia med I have discussed the procedure and information above with the patient (or patient’s representative) and answered their questions. The patient or their representative has agreed to have anesthesia services.     _______________________________________________

## (undated) NOTE — LETTER
[facility]  [address] [facility] [address]  [phone]    Blood Transfusion Consent    In the course of your treatment, it may become necessary to administer a transfusion of blood or blood components. This form provides basic information concerning this procedure and, if signed by you, authorizes its administration. By signing this form, you agree that all of your questions about the administration of blood or blood products have been answered by the ordering medical professional or designee.    Description of Procedure  Blood is introduced into one of your veins, commonly in the arm, using a sterilized disposable needle. The amount of blood transfused, and whether the transfusion will be of blood or blood components is a judgement the physician will make based on your particular needs.    Risks  The transfusion is a common procedure of low risk.  MINOR AND TEMPORARY REACTIONS ARE NOT UNCOMMON, including a slight bruise, swelling or local reaction in the area where the needle pierces your skin, or a nonserious reaction to the transfused material itself, including headache, fever or mild skin reaction, such as rash.  Serious reactions are possible, though very unlikely, and include severe allergic reaction (shock) and destruction (hemolysis) of transfused blood cells.  Infectious diseases which are known to be transmitted by blood transfusion include certain types of viral Hepatitis(liver infection from a virus), Human Immunodeficiency Virus (HIV-1,2) infection, a viral infection known to cause Acquired Immunodeficiency Syndrome (AIDS), as well as certain other bacterial, viral, and parasitic diseases. While a minimal risk of acquiring an infectious disease from transfused blood exists, in accordance with the Federal and State law, all due care has been taken in donor selection and testing to avoid transmission of disease.    Alternatives  If loss of blood poses serious threats during your  treatment, THERE IS NO EFFECTIVE ALTERNATIVE TO BLOOD TRANSFUSION. However, if you have any further questions on this matter, your provider will fully explain the alternatives to you if it has not already been done.    I, ______________________________, have read/had read to me the above. I understand the matters bearing on the decision whether or not to authorize a transfusion of blood or blood components. I have no questions which have not been answered to my full satisfaction. I hereby consent to such transfusion as my physician may deem necessary or advisable in the course of my treatment.    ______________________________________________                    ___________________________  (Signature of Patient or Responsible party in case of minor,                 (Printed Name of Patient or incompetent, or unconscious patient)              Responsible Party)    ___________________________               _____________________  (Relationship to Patient if not self)                                    (Date and Time)    __________________________                                                           ______________________              (Signature of Witness)               (Printed Name of Witness)     Language line ()    Telephone/Verbal/Video Consent    __________________________                     ____________________  (Signature of 2nd Witness           (Printed Name of 2nd  Telephone/Verbal/Video Consent)           Witness)    Patient Name: Bibi Diaz     : 1945                 Printed: 2024     Medical Record #: [id number]      Rev: 2023

## (undated) NOTE — LETTER
Patient Name: Bibi Diaz        : 1945       Medical Record #: [id number]    CONSENT FOR PROCEDURES/SEDATION    Date: 2024       Time: 1:47 PM        1. I authorize the performance upon Bibi Diaz the following:    __________________________________________________________________________    2. I authorize Dr. Gautam (and whomever is designated as the doctor’s assistant), to perform the above mentioned procedures.    3. If any unforeseen conditions arise during this procedure calling for additional procedures, operations, or medications (including anesthesia and blood transfusion), I  further request and authorize the doctor to do whatever he/she deems advisable in my interest.    4. I consent to the taking and reproduction of any photographs in the course of this procedure for professional purposes.    5. I consent to the administration of such sedation as may be considered necessary or advisable by the physician responsible for this service, with the exception of  _____________________________.    6. I have been informed by my doctor of the nature and purpose of this procedure/sedation, possible alternative methods of treatment, risk involved and possible complications.      Signature of Patient:  ___________________________    Signature of person authorized to consent for patient: Relationship to patient:  ___________________________    ___________________    Witness: ____________________     Date: ______________    Provider: ____________________     Date: ______________

## (undated) NOTE — Clinical Note
Hi Dr Shawnee Alexis , just wanted to give you an update, patient cancelled endocrinologist and does not intend to follow up at this time , she is hoping to discuss at upcoming appointment, patient also having GI episode- CCM sent message to GI provider to advise of any further recommendation.

## (undated) NOTE — MR AVS SNAPSHOT
[address]  [address] [address] [phone]               Thank you for choosing us for your health care visit with Lisa Leo DO.   We are glad to serve you and happy to provide you with this jasmine \"TABS\"    Dust     Latex Rash    Sulfa Antibiotics Rash, Itching    Sulfa Drugs Cross Reactors Rash, Itching                Today's Vital Signs     BP Pulse Temp Height Weight BMI    126/88 mmHg 84 98.9 °F (37.2 °C) (Temporal) 60\" 134 lb 26.17 kg/m2 Apply 1 Application topically 3 (three) times daily. What changed:  Another medication with the same name was removed. Continue taking this medication, and follow the directions you see here.    Commonly known as:  BACTROBAN           Pravastatin Sodium 2

## (undated) NOTE — LETTER
24    Patient: Bibi Diaz  : 1945 Visit date: 7/3/2024    Dear  Rick Shannon DO    Thank you for referring Bibi Diaz to my practice.  Please find my assessment and plan below.    Assessment   1. Pelvic abscess in female        Plan     The patient is overall improving following IR drainage of pelvic abscess.  Antibiotic regimen complete.  Minimal drain output.  I have ordered repeat CT scan with injection through existing catheter to assess the abscess cavities.  If favorable, drain can be removed and continue with current management.  If infectious process persists, patient will return to my attention to discuss surgical options    The patient was provided ample opportunity to ask questions.  All of the patient's questions were answered in detail.  The patient voiced understanding of the care plan.       Sincerely,       Remi Friedman MD   CC:   No Recipients

## (undated) NOTE — ED AVS SNAPSHOT
Ms. Federico Coronel   MRN: [id number]    Department:  [facility] Emergency Department   Date of Visit:  9/2/2019           Disclosure     Insurance plans vary and the physician(s) referred by the ER may not be covered by your plan.  Please contac tell this physician (or your personal doctor if your instructions are to return to your personal doctor) about any new or lasting problems. The primary care or specialist physician will see patients referred from the [facility] Emergency Department.  Jairon Lawler

## (undated) NOTE — ED AVS SNAPSHOT
Ms. Yoseph Nguyen   MRN: [id number]    Department:  [facility] Emergency Department   Date of Visit:  12/3/2017           Disclosure     Insurance plans vary and the physician(s) referred by the ER may not be covered by your plan.  Please conta tell this physician (or your personal doctor if your instructions are to return to your personal doctor) about any new or lasting problems. The primary care or specialist physician will see patients referred from the [facility] Emergency Department.  Marianela Guaman

## (undated) NOTE — LETTER
[address]  [address]  Authorization for Surgical Operation and Procedure     Date:___________                                                                                                         Time:__________  I hereby authorize Surgeon(s):  Ileana Brooks MD, my physician and his/her assistants (if applicable), which may include medical students, residents, and/or fellows, to perform the following surgical operation/ procedure and administer such anesthesia as may be determined necessary by my physician:  Operation/Procedure name (s) Procedure(s):  EXPLORATORY LAPAROTOMY, LOW ANTERIOR COLON RESECTION, COLOSTOMY CREATION on [facility]   2.   I recognize that during the surgical operation/procedure, unforeseen conditions may necessitate additional or different procedures than those listed above.  I, therefore, further authorize and request that the above-named surgeon, assistants, or designees perform such procedures as are, in their judgment, necessary and desirable.    3.   My surgeon/physician has discussed prior to my surgery the potential benefits, risks and side effects of this procedure; the likelihood of achieving goals; and potential problems that might occur during recuperation.  They also discussed reasonable alternatives to the procedure, including risks, benefits, and side effects related to the alternatives and risks related to not receiving this procedure.  I have had all my questions answered and I acknowledge that no guarantee has been made as to the result that may be obtained.    4.   Should the need arise during my operation/procedure, which includes change of level of care prior to discharge, I also consent to the administration of blood and/or blood products.  Further, I understand that despite careful testing and screening of blood or blood products by collecting agencies, I may still be subject to ill effects as a result of receiving a  blood transfusion and/or blood products.  The following are some, but not all, of the potential risks that can occur: fever and allergic reactions, hemolytic reactions, transmission of diseases such as Hepatitis, AIDS and Cytomegalovirus (CMV) and fluid overload.  In the event that I wish to have an autologous transfusion of my own blood, or a directed donor transfusion, I will discuss this with my physician.  Check only if Refusing Blood or Blood Products  I understand refusal of blood or blood products as deemed necessary by my physician may have serious consequences to my condition to include possible death. I hereby assume responsibility for my refusal and release the hospital, its personnel, and my physicians from any responsibility for the consequences of my refusal.          o  Refuse      5.   I authorize the use of any specimen, organs, tissues, body parts or foreign objects that may be removed from my body during the operation/procedure for diagnosis, research or teaching purposes and their subsequent disposal by hospital authorities.  I also authorize the release of specimen test results and/or written reports to my treating physician on the hospital medical staff or other referring or consulting physicians involved in my care, at the discretion of the Pathologist or my treating physician.    6.   I consent to the photographing or videotaping of the operations or procedures to be performed, including appropriate portions of my body for medical, scientific, or educational purposes, provided my identity is not revealed by the pictures or by descriptive texts accompanying them.  If the procedure has been photographed/videotaped, the surgeon will obtain the original picture, image, videotape or CD.  The hospital will not be responsible for storage, release or maintenance of the picture, image, tape or CD.    7.   I consent to the presence of a  or observers in the operating room as deemed  necessary by my physician or their designees.    8.   I recognize that in the event my procedure results in extended X-Ray/fluoroscopy time, I may develop a skin reaction.    9. If I have a Do Not Attempt Resuscitation (DNAR) order in place, that status will be suspended while in the operating room, procedural suite, and during the recovery period unless otherwise explicitly stated by me (or a person authorized to consent on my behalf). The surgeon or my attending physician will determine when the applicable recovery period ends for purposes of reinstating the DNAR order.  10. Patients having a sterilization procedure: I understand that if the procedure is successful the results will be permanent and it will therefore be impossible for me to inseminate, conceive, or bear children.  I also understand that the procedure is intended to result in sterility, although the result has not been guaranteed.   11. I acknowledge that my physician has explained sedation/analgesia administration to me including the risk and benefits I consent to the administration of sedation/analgesia as may be necessary or desirable in the judgment of my physician.    I CERTIFY THAT I HAVE READ AND FULLY UNDERSTAND THE ABOVE CONSENT TO OPERATION and/or OTHER PROCEDURE.    _________________________________________  __________________________________  Signature of Patient     Signature of Responsible Person         ___________________________________         Printed Name of Responsible Person           _________________________________                 Relationship to Patient  _________________________________________  ______________________________  Signature of Witness          Date  Time      Patient Name: Bibi Diaz     : 1945                 Printed: 2024     Medical Record #: [id number]                     Page 1 of 2                                    [address]   [address]    Consent for Anesthesia    IBibi agree to be cared for by an anesthesiologist, who is specially trained to monitor me and give me medicine to put me to sleep or keep me comfortable during my procedure    I understand that my anesthesiologist is not an employee or agent of [facility] or [employer]. He or she works for [employer].    As the patient asking for anesthesia services, I agree to:  Allow the anesthesiologist (anesthesia doctor) to give me medicine and do additional procedures as necessary. Some examples are: Starting or using an “IV” to give me medicine, fluids or blood during my procedure, and having a breathing tube placed to help me breathe when I’m asleep (intubation). In the event that my heart stops working properly, I understand that my anesthesiologist will make every effort to sustain my life, unless otherwise directed by [facility] Do Not Resuscitate documents.  Tell my anesthesia doctor before my procedure:  If I am pregnant.  The last time that I ate or drank.  All of the medicines I take (including prescriptions, herbal supplements, and pills I can buy without a prescription (including street drugs/illegal medications). Failure to inform my anesthesiologist about these medicines may increase my risk of anesthetic complications.  If I am allergic to anything or have had a reaction to anesthesia before.  I understand how the anesthesia medicine will help me (benefits).  I understand that with any type of anesthesia medicine there are risks:  The most common risks are: nausea, vomiting, sore throat, muscle soreness, damage to my eyes, mouth, or teeth (from breathing tube placement).  Rare risks include: remembering what happened during my procedure, allergic reactions to medications, injury to my airway, heart, lungs, vision, nerves, or muscles and in extremely rare instances death.  My doctor has explained to me other choices  available to me for my care (alternatives).  Pregnant Patients (“epidural”):  I understand that the risks of having an epidural (medicine given into my back to help control pain during labor), include itching, low blood pressure, difficulty urinating, headache or slowing of the baby’s heart. Very rare risks include infection, bleeding, seizure, irregular heart rhythms and nerve injury.  Regional Anesthesia (“spinal”, “epidural”, & “nerve blocks”):  I understand that rare but potential complications include headache, bleeding, infection, seizure, irregular heart rhythms, and nerve injury.    I can change my mind about having anesthesia services at any time before I get the medicine.    _____________________________________________________________________________  Patient (or Representative) Signature/Relationship to Patient  Date   Time    _____________________________________________________________________________   Name (if used)    Language/Organization   Time    _____________________________________________________________________________  Anesthesiologist Signature     Date   Time  I have discussed the procedure and information above with the patient (or patient’s representative) and answered their questions. The patient or their representative has agreed to have anesthesia services.    _____________________________________________________________________________  Witness        Date   Time  I have verified that the signature is that of the patient or patient’s representative, and that it was signed before the procedure  Patient Name: Bibi Diaz     : 1945                 Printed: 2024     Medical Record #: [id number]                     Page 2 of 2

## (undated) NOTE — LETTER
[address]  [address]  Authorization for Surgical Operation and Procedure     Date:___________                                                                                                         Time:__________  I hereby authorize CT drain of pericolonic abscess, my physician and his/her assistants (if applicable), which may include medical students, residents, and/or fellows, to perform the following surgical operation/ procedure and administer such anesthesia as may be determined necessary by my physician:  Operation/Procedure name (s)  on Bibi Diaz   2.   I recognize that during the surgical operation/procedure, unforeseen conditions may necessitate additional or different procedures than those listed above.  I, therefore, further authorize and request that the above-named surgeon, assistants, or designees perform such procedures as are, in their judgment, necessary and desirable.    3.   My surgeon/physician has discussed prior to my surgery the potential benefits, risks and side effects of this procedure; the likelihood of achieving goals; and potential problems that might occur during recuperation.  They also discussed reasonable alternatives to the procedure, including risks, benefits, and side effects related to the alternatives and risks related to not receiving this procedure.  I have had all my questions answered and I acknowledge that no guarantee has been made as to the result that may be obtained.    4.   Should the need arise during my operation/procedure, which includes change of level of care prior to discharge, I also consent to the administration of blood and/or blood products.  Further, I understand that despite careful testing and screening of blood or blood products by collecting agencies, I may still be subject to ill effects as a result of receiving a blood transfusion and/or blood products.  The following are some, but not all, of the  potential risks that can occur: fever and allergic reactions, hemolytic reactions, transmission of diseases such as Hepatitis, AIDS and Cytomegalovirus (CMV) and fluid overload.  In the event that I wish to have an autologous transfusion of my own blood, or a directed donor transfusion, I will discuss this with my physician.  Check only if Refusing Blood or Blood Products  I understand refusal of blood or blood products as deemed necessary by my physician may have serious consequences to my condition to include possible death. I hereby assume responsibility for my refusal and release the hospital, its personnel, and my physicians from any responsibility for the consequences of my refusal.          o  Refuse      5.   I authorize the use of any specimen, organs, tissues, body parts or foreign objects that may be removed from my body during the operation/procedure for diagnosis, research or teaching purposes and their subsequent disposal by hospital authorities.  I also authorize the release of specimen test results and/or written reports to my treating physician on the hospital medical staff or other referring or consulting physicians involved in my care, at the discretion of the Pathologist or my treating physician.    6.   I consent to the photographing or videotaping of the operations or procedures to be performed, including appropriate portions of my body for medical, scientific, or educational purposes, provided my identity is not revealed by the pictures or by descriptive texts accompanying them.  If the procedure has been photographed/videotaped, the surgeon will obtain the original picture, image, videotape or CD.  The hospital will not be responsible for storage, release or maintenance of the picture, image, tape or CD.    7.   I consent to the presence of a  or observers in the operating room as deemed necessary by my physician or their designees.    8.   I recognize that in the event my  procedure results in extended X-Ray/fluoroscopy time, I may develop a skin reaction.    9. If I have a Do Not Attempt Resuscitation (DNAR) order in place, that status will be suspended while in the operating room, procedural suite, and during the recovery period unless otherwise explicitly stated by me (or a person authorized to consent on my behalf). The surgeon or my attending physician will determine when the applicable recovery period ends for purposes of reinstating the DNAR order.  10. Patients having a sterilization procedure: I understand that if the procedure is successful the results will be permanent and it will therefore be impossible for me to inseminate, conceive, or bear children.  I also understand that the procedure is intended to result in sterility, although the result has not been guaranteed.   11. I acknowledge that my physician has explained sedation/analgesia administration to me including the risk and benefits I consent to the administration of sedation/analgesia as may be necessary or desirable in the judgment of my physician.    I CERTIFY THAT I HAVE READ AND FULLY UNDERSTAND THE ABOVE CONSENT TO OPERATION and/or OTHER PROCEDURE.    _________________________________________  __________________________________  Signature of Patient     Signature of Responsible Person         ___________________________________         Printed Name of Responsible Person           _________________________________                 Relationship to Patient  _________________________________________  ______________________________  Signature of Witness          Date  Time      Patient Name: Bibi Diaz     : 1945                 Printed: 2024     Medical Record #: [id number]                     Page [address] of [address] [place] [address]  [address]    Consent for Anesthesia    I, Bibi Diaz agree to be cared for by an  anesthesiologist, who is specially trained to monitor me and give me medicine to put me to sleep or keep me comfortable during my procedure    I understand that my anesthesiologist is not an employee or agent of [facility] or [employer]. He or she works for [employer].    As the patient asking for anesthesia services, I agree to:  Allow the anesthesiologist (anesthesia doctor) to give me medicine and do additional procedures as necessary. Some examples are: Starting or using an “IV” to give me medicine, fluids or blood during my procedure, and having a breathing tube placed to help me breathe when I’m asleep (intubation). In the event that my heart stops working properly, I understand that my anesthesiologist will make every effort to sustain my life, unless otherwise directed by [facility] Do Not Resuscitate documents.  Tell my anesthesia doctor before my procedure:  If I am pregnant.  The last time that I ate or drank.  All of the medicines I take (including prescriptions, herbal supplements, and pills I can buy without a prescription (including street drugs/illegal medications). Failure to inform my anesthesiologist about these medicines may increase my risk of anesthetic complications.  If I am allergic to anything or have had a reaction to anesthesia before.  I understand how the anesthesia medicine will help me (benefits).  I understand that with any type of anesthesia medicine there are risks:  The most common risks are: nausea, vomiting, sore throat, muscle soreness, damage to my eyes, mouth, or teeth (from breathing tube placement).  Rare risks include: remembering what happened during my procedure, allergic reactions to medications, injury to my airway, heart, lungs, vision, nerves, or muscles and in extremely rare instances death.  My doctor has explained to me other choices available to me for my care (alternatives).  Pregnant Patients (“epidural”):  I understand  that the risks of having an epidural (medicine given into my back to help control pain during labor), include itching, low blood pressure, difficulty urinating, headache or slowing of the baby’s heart. Very rare risks include infection, bleeding, seizure, irregular heart rhythms and nerve injury.  Regional Anesthesia (“spinal”, “epidural”, & “nerve blocks”):  I understand that rare but potential complications include headache, bleeding, infection, seizure, irregular heart rhythms, and nerve injury.    I can change my mind about having anesthesia services at any time before I get the medicine.    _____________________________________________________________________________  Patient (or Representative) Signature/Relationship to Patient  Date   Time    _____________________________________________________________________________   Name (if used)    Language/Organization   Time    _____________________________________________________________________________  Anesthesiologist Signature     Date   Time  I have discussed the procedure and information above with the patient (or patient’s representative) and answered their questions. The patient or their representative has agreed to have anesthesia services.    _____________________________________________________________________________  Witness        Date   Time  I have verified that the signature is that of the patient or patient’s representative, and that it was signed before the procedure  Patient Name: Bibi Diaz     : 1945                 Printed: 2024     Medical Record #: [id number]                     Page 2 of 2

## (undated) NOTE — Clinical Note
Pt is coming for hospital follow up on 3/7/18. Pt stated she is doing better since d/c as far as not feeling SOB however she is feeling weak, tired and overwhelmed.  Pt stated she declined [facility] services when RN called however after some encouragement, the pt

## (undated) NOTE — MR AVS SNAPSHOT
[address]  [address] [place] [id number]  [phone]               Thank you for choosing us for your health care visit with Cee Palomares DO.   We are glad to serve you and happy to provide you with this jasmine Brian Collins INTERNAL [[id number] CUSTOM]  Order #:  [id number]         **REFERRAL REQUEST**    Your physician has referred you to a specialist.  Your physician or the clinic staff will provide you with the phone number you should call to schedule your appointme [facility] in [facility] in [facility]   [address] [facility], [address]: [phone]    [facility]: [address] [place] [address] ClonazePAM 0.5 MG Tabs   Take 1 tablet (0.5 mg total) by mouth 2 (two) times daily. What changed:  See the new instructions. Commonly known as:  KLONOPIN           escitalopram 20 MG Tabs   Take 1 tablet (20 mg total) by mouth daily.    Commonly known INHALE 2 PUFFS BY MOUTH EVERY 6 HOURS AS NEEDED FOR WHEEZING           SYMBICORT 160-4.5 MCG/ACT Aero   Generic drug:  Budesonide-Formoterol Fumarate   INHALE 2 PUFFS BY MOUTH TWICE DAILY           * Notice:   This list has 2 medication(s) that are the same [facility] [address] [place], [place] [address] for making healthy food choices  -   Enjoy your food, but eat less. Fully enjoy your food when eating. Don’t eat while distracted and slow down. Avoid over sized portions. Don’t eat while when you’re bored.

## (undated) NOTE — MR AVS SNAPSHOT
[address]  [address] [facility], [place] [id number] [phone]               Thank you for choosing us for your health care visit with Carmen Godinez DO.   We are glad to serve you and happy to provide you with this jasmine Assoc Dx:  Hypothyroidism, unspecified type [E03.9]           ThinPrep PAP with HPV Reflex Request    Complete by:   May 16, 2017    Assoc Dx:  Screening for cervical cancer [Z12.4]                 Follow-up Instructions     Return in 6 months (on 11/16/20 • History of gestational diabetes or birth of baby weighing more than 9 pounds     Covered at least every 3 years,           GLUCOSE (mg/dL)   Date Value   12/06/2016 92   04/18/2014 85   04/17/2012 99   ---------- Medicare covers annually or at 6-month in  Americans over age 72 No flowsheet data found.  OK to schedule if you are in this risk group, make sure you have a referral   Bone Density Screening      Bone density screening   Covered every 2 yrs after age 72    Covered yearly for Long term Gluc Homosexual men   Illicit injectable drug abusers     Tetanus Toxoid- Only covered with a cut with metal- TD and TDaP Not covered by Medicare Part B) No orders found for this or any previous visit.  This may be covered with your prescription benefits, but Me Take 1 capsule (200 mg total) by mouth every 8 (eight) hours as needed.    Commonly known as:  TESSALON           Clobetasol Propionate 0.05 % Oint   Apply to the affected of rash BID x 2 weeks then PRN flares   Commonly known as:  Drake Jasso PROAIR  (90 Base) MCG/ACT Aers   Generic drug:  Albuterol Sulfate HFA   INHALE 2 PUFFS BY MOUTH EVERY 6 HOURS AS NEEDED FOR WHEEZING           SYMBICORT 160-4.5 MCG/ACT Aero   Generic drug:  Budesonide-Formoterol Fumarate   INHALE 2 PUFFS BY MOUTH

## (undated) NOTE — LETTER
[address]  [address]  Authorization for Surgical Operation and Procedure     Date:___________                                                                                                         Time:__________  I hereby authorize Surgeon(s):  Delon Ashford DO, my physician and his/her assistants (if applicable), which may include medical students, residents, and/or fellows, to perform the following surgical operation/ procedure and administer such anesthesia as may be determined necessary by my physician:  Operation/Procedure name (s) Procedure(s):  [facility]   2.   I recognize that during the surgical operation/procedure, unforeseen conditions may necessitate additional or different procedures than those listed above.  I, therefore, further authorize and request that the above-named surgeon, assistants, or designees perform such procedures as are, in their judgment, necessary and desirable.    3.   My surgeon/physician has discussed prior to my surgery the potential benefits, risks and side effects of this procedure; the likelihood of achieving goals; and potential problems that might occur during recuperation.  They also discussed reasonable alternatives to the procedure, including risks, benefits, and side effects related to the alternatives and risks related to not receiving this procedure.  I have had all my questions answered and I acknowledge that no guarantee has been made as to the result that may be obtained.    4.   Should the need arise during my operation/procedure, which includes change of level of care prior to discharge, I also consent to the administration of blood and/or blood products.  Further, I understand that despite careful testing and screening of blood or blood products by collecting agencies, I may still be subject to ill effects as a result of receiving a blood transfusion and/or blood products.  The following are  some, but not all, of the potential risks that can occur: fever and allergic reactions, hemolytic reactions, transmission of diseases such as Hepatitis, AIDS and Cytomegalovirus (CMV) and fluid overload.  In the event that I wish to have an autologous transfusion of my own blood, or a directed donor transfusion, I will discuss this with my physician.  Check only if Refusing Blood or Blood Products  I understand refusal of blood or blood products as deemed necessary by my physician may have serious consequences to my condition to include possible death. I hereby assume responsibility for my refusal and release the hospital, its personnel, and my physicians from any responsibility for the consequences of my refusal.          o  Refuse      5.   I authorize the use of any specimen, organs, tissues, body parts or foreign objects that may be removed from my body during the operation/procedure for diagnosis, research or teaching purposes and their subsequent disposal by hospital authorities.  I also authorize the release of specimen test results and/or written reports to my treating physician on the hospital medical staff or other referring or consulting physicians involved in my care, at the discretion of the Pathologist or my treating physician.    6.   I consent to the photographing or videotaping of the operations or procedures to be performed, including appropriate portions of my body for medical, scientific, or educational purposes, provided my identity is not revealed by the pictures or by descriptive texts accompanying them.  If the procedure has been photographed/videotaped, the surgeon will obtain the original picture, image, videotape or CD.  The hospital will not be responsible for storage, release or maintenance of the picture, image, tape or CD.    7.   I consent to the presence of a  or observers in the operating room as deemed necessary by my physician or their designees.    8.   I  recognize that in the event my procedure results in extended X-Ray/fluoroscopy time, I may develop a skin reaction.    9. If I have a Do Not Attempt Resuscitation (DNAR) order in place, that status will be suspended while in the operating room, procedural suite, and during the recovery period unless otherwise explicitly stated by me (or a person authorized to consent on my behalf). The surgeon or my attending physician will determine when the applicable recovery period ends for purposes of reinstating the DNAR order.  10. Patients having a sterilization procedure: I understand that if the procedure is successful the results will be permanent and it will therefore be impossible for me to inseminate, conceive, or bear children.  I also understand that the procedure is intended to result in sterility, although the result has not been guaranteed.   11. I acknowledge that my physician has explained sedation/analgesia administration to me including the risk and benefits I consent to the administration of sedation/analgesia as may be necessary or desirable in the judgment of my physician.    I CERTIFY THAT I HAVE READ AND FULLY UNDERSTAND THE ABOVE CONSENT TO OPERATION and/or OTHER PROCEDURE.    _________________________________________  __________________________________  Signature of Patient     Signature of Responsible Person         ___________________________________         Printed Name of Responsible Person           _________________________________                 Relationship to Patient  _________________________________________  ______________________________  Signature of Witness          Date  Time      Patient Name: Bibi Diaz     : 1945                 Printed: 2024     Medical Record #: [id number]                     Page [address] of [address] [place], [place] [address], [place]  [address]    Consent for Anesthesia    I, Bibi Diaz agree to be  cared for by an anesthesiologist, who is specially trained to monitor me and give me medicine to put me to sleep or keep me comfortable during my procedure    I understand that my anesthesiologist is not an employee or agent of [facility] or [employer]. He or she works for [employer]    As the patient asking for anesthesia services, I agree to:  Allow the anesthesiologist (anesthesia doctor) to give me medicine and do additional procedures as necessary. Some examples are: Starting or using an “IV” to give me medicine, fluids or blood during my procedure, and having a breathing tube placed to help me breathe when I’m asleep (intubation). In the event that my heart stops working properly, I understand that my anesthesiologist will make every effort to sustain my life, unless otherwise directed by [facility] Do Not Resuscitate documents.  Tell my anesthesia doctor before my procedure:  If I am pregnant.  The last time that I ate or drank.  All of the medicines I take (including prescriptions, herbal supplements, and pills I can buy without a prescription (including street drugs/illegal medications). Failure to inform my anesthesiologist about these medicines may increase my risk of anesthetic complications.  If I am allergic to anything or have had a reaction to anesthesia before.  I understand how the anesthesia medicine will help me (benefits).  I understand that with any type of anesthesia medicine there are risks:  The most common risks are: nausea, vomiting, sore throat, muscle soreness, damage to my eyes, mouth, or teeth (from breathing tube placement).  Rare risks include: remembering what happened during my procedure, allergic reactions to medications, injury to my airway, heart, lungs, vision, nerves, or muscles and in extremely rare instances death.  My doctor has explained to me other choices available to me for my care (alternatives).  Pregnant Patients  (“epidural”):  I understand that the risks of having an epidural (medicine given into my back to help control pain during labor), include itching, low blood pressure, difficulty urinating, headache or slowing of the baby’s heart. Very rare risks include infection, bleeding, seizure, irregular heart rhythms and nerve injury.  Regional Anesthesia (“spinal”, “epidural”, & “nerve blocks”):  I understand that rare but potential complications include headache, bleeding, infection, seizure, irregular heart rhythms, and nerve injury.    I can change my mind about having anesthesia services at any time before I get the medicine.    _____________________________________________________________________________  Patient (or Representative) Signature/Relationship to Patient  Date   Time    _____________________________________________________________________________   Name (if used)    Language/Organization   Time    _____________________________________________________________________________  Anesthesiologist Signature     Date   Time  I have discussed the procedure and information above with the patient (or patient’s representative) and answered their questions. The patient or their representative has agreed to have anesthesia services.    _____________________________________________________________________________  Witness        Date   Time  I have verified that the signature is that of the patient or patient’s representative, and that it was signed before the procedure  Patient Name: Bibi Diaz     : 1945                 Printed: 2024     Medical Record #: [id number]                     Page 2 of 2

## (undated) NOTE — Clinical Note
Spoke with pt today--confirms TCM/HFU appt tomorrow and gen sx appt--declines TCC appt-thank you.   Future Appointments 1/6/2023   8:00 AM    Jenny Ma DO          [facility]              [facility] [address] 1/11/2023  3:00 PM    Roger Friedman MD      [facility]

## (undated) NOTE — LETTER
02/19/20        Genetta How Dr Tyrone John [address] [place] [id number]      Dear Leyda Skinner records indicate that you have outstanding lab work and or testing that was ordered for you and has not yet been completed:  Orders Placed This E